# Patient Record
Sex: MALE | Race: WHITE | NOT HISPANIC OR LATINO | Employment: OTHER | URBAN - METROPOLITAN AREA
[De-identification: names, ages, dates, MRNs, and addresses within clinical notes are randomized per-mention and may not be internally consistent; named-entity substitution may affect disease eponyms.]

---

## 2018-03-05 LAB
ALT SERPL-CCNC: ABNORMAL U/L
AST SERPL-CCNC: ABNORMAL U/L
BUN BLD-MCNC: 24 MG/DL (ref 4–21)
CHOLEST SERPL-MSCNC: 164 MG/DL (ref 0–200)
CREAT SERPL-MCNC: ABNORMAL MG/DL
GLUCOSE 1H P 100 G GLC PO SERPL-MCNC: ABNORMAL MG/DL (ref ?–200)
GLUCOSE 2H P 100 G GLC PO SERPL-MCNC: ABNORMAL MG/DL (ref ?–140)
HBA1C MFR BLD: 6.6 %
HDLC SERPL-MCNC: 32 MG/DL
LDLC SERPL CALC-MCNC: 106 MG/DL (ref 0–160)
MICROALBUMIN URINE: ABNORMAL
MICROALBUMIN/CREATININE RATIO: ABNORMAL
PROTEIN/CREATININE RATIO: ABNORMAL
TRIGL SERPL-MCNC: 151 MG/DL

## 2018-06-05 ENCOUNTER — DOCUMENTATION ONLY (OUTPATIENT)
Dept: FAMILY MEDICINE | Facility: CLINIC | Age: 83
End: 2018-06-05

## 2018-06-05 NOTE — PROGRESS NOTES
Pre-Visit Chart Review  For Appointment Scheduled on 6-6-18    Health Maintenance Due   Topic Date Due    Eye Exam  03/25/1944    TETANUS VACCINE  03/25/1952    Zoster Vaccine  03/25/1994    Pneumococcal (65+) (2 of 2 - PCV13) 08/20/2014    Foot Exam  08/20/2014    Hemoglobin A1c  08/10/2015    Lipid Panel  02/10/2016

## 2018-06-06 ENCOUNTER — OFFICE VISIT (OUTPATIENT)
Dept: FAMILY MEDICINE | Facility: CLINIC | Age: 83
End: 2018-06-06
Payer: MEDICARE

## 2018-06-06 VITALS
SYSTOLIC BLOOD PRESSURE: 109 MMHG | DIASTOLIC BLOOD PRESSURE: 64 MMHG | WEIGHT: 231.5 LBS | BODY MASS INDEX: 36.34 KG/M2 | HEIGHT: 67 IN | HEART RATE: 57 BPM | TEMPERATURE: 98 F

## 2018-06-06 DIAGNOSIS — Z01.00 DIABETIC EYE EXAM: Primary | ICD-10-CM

## 2018-06-06 DIAGNOSIS — I10 HYPERTENSION, UNSPECIFIED TYPE: ICD-10-CM

## 2018-06-06 DIAGNOSIS — E66.9 OBESITY, UNSPECIFIED CLASSIFICATION, UNSPECIFIED OBESITY TYPE, UNSPECIFIED WHETHER SERIOUS COMORBIDITY PRESENT: ICD-10-CM

## 2018-06-06 DIAGNOSIS — E11.22 CONTROLLED TYPE 2 DIABETES MELLITUS WITH STAGE 3 CHRONIC KIDNEY DISEASE, WITHOUT LONG-TERM CURRENT USE OF INSULIN: ICD-10-CM

## 2018-06-06 DIAGNOSIS — N18.30 CHRONIC KIDNEY DISEASE, STAGE III (MODERATE): ICD-10-CM

## 2018-06-06 DIAGNOSIS — K76.0 FATTY LIVER: ICD-10-CM

## 2018-06-06 DIAGNOSIS — E78.5 HYPERLIPIDEMIA LDL GOAL <70: ICD-10-CM

## 2018-06-06 DIAGNOSIS — N18.30 CONTROLLED TYPE 2 DIABETES MELLITUS WITH STAGE 3 CHRONIC KIDNEY DISEASE, WITHOUT LONG-TERM CURRENT USE OF INSULIN: ICD-10-CM

## 2018-06-06 DIAGNOSIS — E11.9 DIABETIC EYE EXAM: Primary | ICD-10-CM

## 2018-06-06 DIAGNOSIS — Z12.5 PROSTATE CANCER SCREENING: ICD-10-CM

## 2018-06-06 DIAGNOSIS — M10.9 GOUT, UNSPECIFIED CAUSE, UNSPECIFIED CHRONICITY, UNSPECIFIED SITE: ICD-10-CM

## 2018-06-06 PROCEDURE — 99203 OFFICE O/P NEW LOW 30 MIN: CPT | Mod: S$PBB,,, | Performed by: FAMILY MEDICINE

## 2018-06-06 PROCEDURE — 99999 PR PBB SHADOW E&M-EST. PATIENT-LVL III: CPT | Mod: PBBFAC,,, | Performed by: FAMILY MEDICINE

## 2018-06-06 PROCEDURE — 99213 OFFICE O/P EST LOW 20 MIN: CPT | Mod: PBBFAC,PO | Performed by: FAMILY MEDICINE

## 2018-06-06 RX ORDER — CAPTOPRIL 50 MG/1
50 TABLET ORAL DAILY
COMMUNITY
Start: 2018-02-27 | End: 2018-12-13

## 2018-06-06 RX ORDER — ASPIRIN 81 MG/1
81 TABLET ORAL DAILY
COMMUNITY
End: 2019-03-25

## 2018-06-06 RX ORDER — TAMSULOSIN HYDROCHLORIDE 0.4 MG/1
0.4 CAPSULE ORAL DAILY
COMMUNITY
End: 2019-08-08

## 2018-06-06 RX ORDER — SITAGLIPTIN 100 MG/1
100 TABLET, FILM COATED ORAL DAILY
COMMUNITY
Start: 2018-02-27 | End: 2019-08-08

## 2018-06-06 RX ORDER — PREGABALIN 75 MG/1
75 CAPSULE ORAL 3 TIMES DAILY
COMMUNITY
Start: 2018-03-03 | End: 2019-08-08

## 2018-06-06 RX ORDER — PRAVASTATIN SODIUM 40 MG/1
40 TABLET ORAL DAILY
COMMUNITY
Start: 2018-02-27 | End: 2019-08-08

## 2018-06-06 RX ORDER — ALLOPURINOL 300 MG/1
300 TABLET ORAL
COMMUNITY
Start: 2018-02-27 | End: 2018-06-06

## 2018-06-06 RX ORDER — FEBUXOSTAT 40 MG/1
40 TABLET, FILM COATED ORAL DAILY
Qty: 90 TABLET | Refills: 3 | Status: SHIPPED | OUTPATIENT
Start: 2018-06-06 | End: 2018-09-11

## 2018-06-06 NOTE — PROGRESS NOTES
Subjective:       Patient ID: Siddharth Urias is a 84 y.o. male.    Chief Complaint: Establish Care    HPI  Review of Systems   Constitutional: Negative for fatigue and unexpected weight change.   Respiratory: Negative for chest tightness and shortness of breath.    Cardiovascular: Negative for chest pain, palpitations and leg swelling.   Gastrointestinal: Negative for abdominal pain.   Musculoskeletal: Negative for arthralgias.   Neurological: Negative for dizziness, syncope, light-headedness and headaches.       Patient Active Problem List   Diagnosis    Depressive disorder, not elsewhere classified    Unspecified venous (peripheral) insufficiency    Prostate cancer    HTN (hypertension)    Fatty liver    Irradiation cystitis    SANDRA (obstructive sleep apnea)    Hyperlipidemia LDL goal <70    Type 2 diabetes mellitus, controlled, with renal complications    Diabetic nephropathy    Chronic kidney disease, stage 3    Bilateral renal cysts    Obesity, unspecified    Body mass index 33.0-33.9, adult     Patient is here for an annual check up  Health maintenance reviewed.  Due for see list  No visits with results within 1 Month(s) from this visit.   Latest known visit with results is:   Lab Visit on 09/25/2014   Component Date Value Ref Range Status    PSA DIAGNOSTIC 09/25/2014 0.19  0.00 - 4.00 ng/mL Final   ]  Objective:      Physical Exam   Constitutional: He is oriented to person, place, and time. He appears well-developed and well-nourished.   Cardiovascular: Normal rate, regular rhythm and normal heart sounds.    Pulmonary/Chest: Effort normal and breath sounds normal.   Musculoskeletal: He exhibits no edema.   Neurological: He is alert and oriented to person, place, and time.   Skin: Skin is warm and dry.   Psychiatric: He has a normal mood and affect.   Nursing note and vitals reviewed.      Assessment:       1. Diabetic eye exam    2. Fatty liver    3. Hypertension, unspecified type    4.  "Hyperlipidemia LDL goal <70    5. Obesity, unspecified classification, unspecified obesity type, unspecified whether serious comorbidity present    6. Controlled type 2 diabetes mellitus with stage 3 chronic kidney disease, without long-term current use of insulin    7. Chronic kidney disease, stage 3    8. Gout, unspecified cause, unspecified chronicity, unspecified site    9. Prostate cancer screening        Plan:       1. Diabetic eye exam  Screen and treat as indicated:  - Ambulatory referral to Optometry    2. Fatty liver  Your liver enzymes are slightly elevated.  Avoid liver irritants like tylenol and alcohol, eat a low fat diet and work toward an ideal body weight to help lower liver inflammation.     3. Hypertension, unspecified type  Controlled on current medications.  Continue current medications.  D/c metoprolol    4. Hyperlipidemia LDL goal <70  Stable condition.  Continue current medications.  Will adjust based on lab findings or if condition changes.    - Lipid panel; Future    5. Obesity, unspecified classification, unspecified obesity type, unspecified whether serious comorbidity present  Counseled patient on his ideal body weight, health consequences of being obese and current recommendations including weekly exercise and a heart healthy diet.  Current BMI is:Estimated body mass index is 36.26 kg/m² as calculated from the following:    Height as of this encounter: 5' 7" (1.702 m).    Weight as of this encounter: 105 kg (231 lb 7.7 oz)..  Patient is aware that ideal BMI < 25 or Weight in (lb) to have BMI = 25: 159.3.        6. Controlled type 2 diabetes mellitus with stage 3 chronic kidney disease, without long-term current use of insulin  Stable condition.  Continue current medications.  Will adjust based on lab findings or if condition changes.    - CBC auto differential; Future  - Comprehensive metabolic panel; Future  - Hemoglobin A1c; Future    7. Chronic kidney disease, stage 3  Stable and " chronic.  Will continue to monitor q3-6 months and control chronic conditions as optimally as possible to preserve function.      8. Gout, unspecified cause, unspecified chronicity, unspecified site  Screen and treat as indicated:  D/c allopurinol and add uloric  - Uric acid; Future    9. Prostate cancer screening  Discussed benefits, risks and limitations of PSA testing and current USPSTF guidelines.  Patient expressed verbal understanding and wished to pursue screening.      Reeval 3 months or sooner prn  - PSA, Screening; Future

## 2018-06-29 ENCOUNTER — OFFICE VISIT (OUTPATIENT)
Dept: OPTOMETRY | Facility: CLINIC | Age: 83
End: 2018-06-29
Payer: MEDICARE

## 2018-06-29 DIAGNOSIS — Z96.1 PSEUDOPHAKIA: ICD-10-CM

## 2018-06-29 DIAGNOSIS — H52.7 REFRACTIVE ERROR: ICD-10-CM

## 2018-06-29 DIAGNOSIS — E11.9 DIABETES MELLITUS WITHOUT OPHTHALMIC MANIFESTATIONS: Primary | ICD-10-CM

## 2018-06-29 PROCEDURE — 99211 OFF/OP EST MAY X REQ PHY/QHP: CPT | Mod: PBBFAC,PO | Performed by: OPTOMETRIST

## 2018-06-29 PROCEDURE — 92004 COMPRE OPH EXAM NEW PT 1/>: CPT | Mod: S$PBB,,, | Performed by: OPTOMETRIST

## 2018-06-29 PROCEDURE — 99999 PR PBB SHADOW E&M-EST. PATIENT-LVL I: CPT | Mod: PBBFAC,,, | Performed by: OPTOMETRIST

## 2018-06-29 NOTE — LETTER
June 29, 2018      Martine Maxwell MD  [address]  [place] [address]           [facility]  [address] [place] [address] Drive [address]  [place] [address]  Phone: [phone]          Patient: Siddharth Urias   MR Number: [id number]   YOB: 1934   Date of Visit: 6/29/2018       Dear Dr. Martine Maxwell:    Thank you for referring Siddharth Urias to me for evaluation. Attached you will find relevant portions of my assessment and plan of care.    If you have questions, please do not hesitate to call me. I look forward to following Siddharth Urias along with you.    Sincerely,    Nestor Quesada, OD    Enclosure  CC:  No Recipients    If you would like to receive this communication electronically, please contact [email or web address] or [phone] to request more information on [employer] Link access.    For providers and/or their staff who would like to refer a patient to [facility], please contact us through our one-stop-shop provider referral line, Santos Flores, at [phone].    If you feel you have received this communication in error or would no longer like to receive these types of communications, please e-mail [email or web address]

## 2018-06-29 NOTE — PROGRESS NOTES
HPI     Presenting Complaint: Pt here today for yearly diabetic eye exam. DLE: 12   years    Hemoglobin A1C       Date                     Value               Ref Range             Status                05/08/2014               6.9 (H)             4.5 - 6.2 %           Final                 08/27/2013               7.3 (H)             4.0 - 6.2 %           Final                 01/31/2013               7.5 (H)             4.0 - 6.2 %           Final            ----------    Ophthalmic medication / drops: None    (-) headaches  (-) diplopia   (-) flashes / (-) floaters      Last edited by Murray Dutton on 6/29/2018  3:15 PM. (History)            Assessment /Plan     For exam results, see Encounter Report.    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 Discussed possible ocular affects of uncontrolled blood sugar with patient. Recommended continued strong blood sugar control and continued care with PCP. Monitor yearly.     S/p cataract extraction with good results. Patient happy with current specs, denies refraction. Return as needed for updated spec Rx.      RTC in 1 year for comprehensive eye exam, or sooner prn.

## 2018-09-06 ENCOUNTER — LAB VISIT (OUTPATIENT)
Dept: LAB | Facility: HOSPITAL | Age: 83
End: 2018-09-06
Attending: FAMILY MEDICINE
Payer: MEDICARE

## 2018-09-06 ENCOUNTER — PATIENT OUTREACH (OUTPATIENT)
Dept: ADMINISTRATIVE | Facility: HOSPITAL | Age: 83
End: 2018-09-06

## 2018-09-06 DIAGNOSIS — E11.22 CONTROLLED TYPE 2 DIABETES MELLITUS WITH STAGE 3 CHRONIC KIDNEY DISEASE, WITHOUT LONG-TERM CURRENT USE OF INSULIN: ICD-10-CM

## 2018-09-06 DIAGNOSIS — M10.9 GOUT, UNSPECIFIED CAUSE, UNSPECIFIED CHRONICITY, UNSPECIFIED SITE: ICD-10-CM

## 2018-09-06 DIAGNOSIS — N18.30 CONTROLLED TYPE 2 DIABETES MELLITUS WITH STAGE 3 CHRONIC KIDNEY DISEASE, WITHOUT LONG-TERM CURRENT USE OF INSULIN: ICD-10-CM

## 2018-09-06 DIAGNOSIS — Z12.5 PROSTATE CANCER SCREENING: ICD-10-CM

## 2018-09-06 DIAGNOSIS — E78.5 HYPERLIPIDEMIA LDL GOAL <70: ICD-10-CM

## 2018-09-06 DIAGNOSIS — E11.8 TYPE 2 DIABETES MELLITUS WITH COMPLICATION, WITHOUT LONG-TERM CURRENT USE OF INSULIN: Primary | ICD-10-CM

## 2018-09-06 LAB
ALBUMIN SERPL BCP-MCNC: 3.8 G/DL
ALP SERPL-CCNC: 59 U/L
ALT SERPL W/O P-5'-P-CCNC: 31 U/L
ANION GAP SERPL CALC-SCNC: 12 MMOL/L
AST SERPL-CCNC: 21 U/L
BASOPHILS # BLD AUTO: 0.07 K/UL
BASOPHILS NFR BLD: 0.8 %
BILIRUB SERPL-MCNC: 0.7 MG/DL
BUN SERPL-MCNC: 28 MG/DL
CALCIUM SERPL-MCNC: 9.6 MG/DL
CHLORIDE SERPL-SCNC: 107 MMOL/L
CHOLEST SERPL-MCNC: 177 MG/DL
CHOLEST/HDLC SERPL: 4.9 {RATIO}
CO2 SERPL-SCNC: 22 MMOL/L
COMPLEXED PSA SERPL-MCNC: 0.34 NG/ML
CREAT SERPL-MCNC: 1.6 MG/DL
DIFFERENTIAL METHOD: ABNORMAL
EOSINOPHIL # BLD AUTO: 0.3 K/UL
EOSINOPHIL NFR BLD: 2.9 %
ERYTHROCYTE [DISTWIDTH] IN BLOOD BY AUTOMATED COUNT: 16.3 %
EST. GFR  (AFRICAN AMERICAN): 45.1 ML/MIN/1.73 M^2
EST. GFR  (NON AFRICAN AMERICAN): 39 ML/MIN/1.73 M^2
ESTIMATED AVG GLUCOSE: 151 MG/DL
GLUCOSE SERPL-MCNC: 163 MG/DL
HBA1C MFR BLD HPLC: 6.9 %
HCT VFR BLD AUTO: 55.4 %
HDLC SERPL-MCNC: 36 MG/DL
HDLC SERPL: 20.3 %
HGB BLD-MCNC: 16.7 G/DL
IMM GRANULOCYTES # BLD AUTO: 0.05 K/UL
IMM GRANULOCYTES NFR BLD AUTO: 0.6 %
LDLC SERPL CALC-MCNC: 109.6 MG/DL
LYMPHOCYTES # BLD AUTO: 1.7 K/UL
LYMPHOCYTES NFR BLD: 18.7 %
MCH RBC QN AUTO: 25.6 PG
MCHC RBC AUTO-ENTMCNC: 30.1 G/DL
MCV RBC AUTO: 85 FL
MONOCYTES # BLD AUTO: 0.8 K/UL
MONOCYTES NFR BLD: 9.3 %
NEUTROPHILS # BLD AUTO: 6.2 K/UL
NEUTROPHILS NFR BLD: 67.7 %
NONHDLC SERPL-MCNC: 141 MG/DL
NRBC BLD-RTO: 0 /100 WBC
PLATELET # BLD AUTO: 181 K/UL
PMV BLD AUTO: 11.5 FL
POTASSIUM SERPL-SCNC: 4.2 MMOL/L
PROT SERPL-MCNC: 8.2 G/DL
RBC # BLD AUTO: 6.52 M/UL
SODIUM SERPL-SCNC: 141 MMOL/L
TRIGL SERPL-MCNC: 157 MG/DL
URATE SERPL-MCNC: 10.7 MG/DL
WBC # BLD AUTO: 9.07 K/UL

## 2018-09-06 PROCEDURE — 80053 COMPREHEN METABOLIC PANEL: CPT

## 2018-09-06 PROCEDURE — 83036 HEMOGLOBIN GLYCOSYLATED A1C: CPT

## 2018-09-06 PROCEDURE — 80061 LIPID PANEL: CPT

## 2018-09-06 PROCEDURE — 85025 COMPLETE CBC W/AUTO DIFF WBC: CPT

## 2018-09-06 PROCEDURE — 36415 COLL VENOUS BLD VENIPUNCTURE: CPT | Mod: PO

## 2018-09-06 PROCEDURE — 84550 ASSAY OF BLOOD/URIC ACID: CPT

## 2018-09-06 PROCEDURE — 84153 ASSAY OF PSA TOTAL: CPT

## 2018-09-11 ENCOUNTER — OFFICE VISIT (OUTPATIENT)
Dept: FAMILY MEDICINE | Facility: CLINIC | Age: 83
End: 2018-09-11
Payer: MEDICARE

## 2018-09-11 VITALS
HEIGHT: 67 IN | BODY MASS INDEX: 35.64 KG/M2 | TEMPERATURE: 98 F | DIASTOLIC BLOOD PRESSURE: 71 MMHG | SYSTOLIC BLOOD PRESSURE: 106 MMHG | HEART RATE: 86 BPM | RESPIRATION RATE: 16 BRPM | OXYGEN SATURATION: 95 % | WEIGHT: 227.06 LBS

## 2018-09-11 DIAGNOSIS — Z23 FLU VACCINE NEED: ICD-10-CM

## 2018-09-11 DIAGNOSIS — N18.30 CHRONIC KIDNEY DISEASE, STAGE III (MODERATE): ICD-10-CM

## 2018-09-11 DIAGNOSIS — E78.5 HYPERLIPIDEMIA LDL GOAL <70: ICD-10-CM

## 2018-09-11 DIAGNOSIS — E11.22 CONTROLLED TYPE 2 DIABETES MELLITUS WITH STAGE 3 CHRONIC KIDNEY DISEASE, WITHOUT LONG-TERM CURRENT USE OF INSULIN: ICD-10-CM

## 2018-09-11 DIAGNOSIS — I10 HYPERTENSION, UNSPECIFIED TYPE: ICD-10-CM

## 2018-09-11 DIAGNOSIS — N18.30 CONTROLLED TYPE 2 DIABETES MELLITUS WITH STAGE 3 CHRONIC KIDNEY DISEASE, WITHOUT LONG-TERM CURRENT USE OF INSULIN: ICD-10-CM

## 2018-09-11 DIAGNOSIS — K76.0 FATTY LIVER: ICD-10-CM

## 2018-09-11 DIAGNOSIS — Z23 NEED FOR VACCINATION WITH 13-POLYVALENT PNEUMOCOCCAL CONJUGATE VACCINE: ICD-10-CM

## 2018-09-11 DIAGNOSIS — M10.9 GOUT, UNSPECIFIED CAUSE, UNSPECIFIED CHRONICITY, UNSPECIFIED SITE: ICD-10-CM

## 2018-09-11 DIAGNOSIS — K58.0 IRRITABLE BOWEL SYNDROME WITH DIARRHEA: Primary | ICD-10-CM

## 2018-09-11 DIAGNOSIS — K58.0 IRRITABLE BOWEL SYNDROME WITH DIARRHEA: ICD-10-CM

## 2018-09-11 PROCEDURE — 90662 IIV NO PRSV INCREASED AG IM: CPT | Mod: PBBFAC,PO

## 2018-09-11 PROCEDURE — 99214 OFFICE O/P EST MOD 30 MIN: CPT | Mod: S$PBB,,, | Performed by: FAMILY MEDICINE

## 2018-09-11 PROCEDURE — G0009 ADMIN PNEUMOCOCCAL VACCINE: HCPCS | Mod: PBBFAC,PO

## 2018-09-11 PROCEDURE — 99214 OFFICE O/P EST MOD 30 MIN: CPT | Mod: PBBFAC,PO | Performed by: FAMILY MEDICINE

## 2018-09-11 PROCEDURE — 99999 PR PBB SHADOW E&M-EST. PATIENT-LVL IV: CPT | Mod: PBBFAC,,, | Performed by: FAMILY MEDICINE

## 2018-09-11 RX ORDER — ALLOPURINOL 300 MG/1
300 TABLET ORAL DAILY
COMMUNITY
End: 2018-09-11 | Stop reason: SDUPTHER

## 2018-09-11 RX ORDER — CHLORDIAZEPOXIDE HYDROCHLORIDE AND CLIDINIUM BROMIDE 5; 2.5 MG/1; MG/1
1 CAPSULE ORAL
Qty: 30 CAPSULE | Status: SHIPPED | OUTPATIENT
Start: 2018-09-11 | End: 2018-09-11 | Stop reason: SDUPTHER

## 2018-09-11 RX ORDER — ALLOPURINOL 300 MG/1
300 TABLET ORAL DAILY
Qty: 90 TABLET | Refills: 3 | Status: SHIPPED | OUTPATIENT
Start: 2018-09-11 | End: 2019-08-08

## 2018-09-11 RX ORDER — DULOXETIN HYDROCHLORIDE 30 MG/1
30 CAPSULE, DELAYED RELEASE ORAL DAILY
Qty: 30 CAPSULE | Refills: 2 | Status: SHIPPED | OUTPATIENT
Start: 2018-09-11 | End: 2018-12-13

## 2018-09-11 RX ORDER — CHLORDIAZEPOXIDE HYDROCHLORIDE AND CLIDINIUM BROMIDE 5; 2.5 MG/1; MG/1
1 CAPSULE ORAL
Qty: 30 CAPSULE | Status: SHIPPED | OUTPATIENT
Start: 2018-09-11 | End: 2018-09-12 | Stop reason: SDUPTHER

## 2018-09-11 RX ORDER — ALLOPURINOL 300 MG/1
300 TABLET ORAL DAILY
Qty: 90 TABLET | Refills: 3 | Status: SHIPPED | OUTPATIENT
Start: 2018-09-11 | End: 2018-09-11 | Stop reason: SDUPTHER

## 2018-09-11 NOTE — PATIENT INSTRUCTIONS
Established High Blood Pressure    High blood pressure (hypertension) is a chronic disease. Often, healthcare providers dont know what causes it. But it can be caused by certain health conditions and medicines.  If you have high blood pressure, you may not have any symptoms. If you do have symptoms, they may include headache, dizziness, changes in your vision, chest pain, and shortness of breath. But even without symptoms, high blood pressure thats not treated raises your risk for heart attack and stroke. High blood pressure is a serious health risk and shouldnt be ignored.  A blood pressure reading is made up of two numbers: a higher number over a lower number. The top number is the systolic pressure. The bottom number is the diastolic pressure. A normal blood pressure is a systolic pressure of  less than 120 over a diastolic pressure of less than 80. You will see your blood pressure readings written together. For example, a person with a systolic pressure of 188 and a diastolic pressure of 78 will have 118/78 written in the medical record.  High blood pressure is when either the top number is 140 or higher, or the bottom number is 90 or higher. This must be the result when taking your blood pressure a number of times. The blood pressures between normal and high are called prehypertension.  Home care  If you have high blood pressure, you should do what is listed below to lower your blood pressure. If you are taking medicines for high blood pressure, these methods may reduce or end your need for medicines in the future.  · Begin a weight-loss program if you are overweight.  · Cut back on how much salt you get in your diet. Heres how to do this:  ¨ Dont eat foods that have a lot of salt. These include olives, pickles, smoked meats, and salted potato chips.  ¨ Dont add salt to your food at the table.  ¨ Use only small amounts of salt when cooking.  · Start an exercise program. Talk with your healthcare  provider about the type of exercise program that would be best for you. It doesn't have to be hard. Even brisk walking for 20 minutes 3 times a week is a good form of exercise.  · Dont take medicines that stimulate the heart. This includes many over-the-counter cold and sinus decongestant pills and sprays, as well as diet pills. Check the warnings about hypertension on the label. Before buying any over-the-counter medicines or supplements, always ask the pharmacist about the product's potential interaction with your high blood pressure and your high blood pressure medicines.  · Stimulants such as amphetamine or cocaine could be deadly for someone with high blood pressure. Never take these.  · Limit how much caffeine you get in your diet. Switch to caffeine-free products.  · Stop smoking. If you are a long-time smoker, this can be hard. Talk to your healthcare provider about medicines and nicotine replacement options to help you. Also, enroll in a stop-smoking program to make it more likely that you will quit for good.  · Learn how to handle stress. This is an important part of any program to lower blood pressure. Learn about relaxation methods like meditation, yoga, or biofeedback.  · If your provider prescribed medicines, take them exactly as directed. Missing doses may cause your blood pressure get out of control.  · If you miss a dose or doses, check with your healthcare provider or pharmacist about what to do.  · Consider buying an automatic blood pressure machine. Ask your provider for a recommendation. You can get one of these at most pharmacies.     The American Heart Association recommends the following guidelines for home blood pressure monitoring:  · Don't smoke or drink coffee for 30 minutes before taking your blood pressure.  · Go to the bathroom before the test.  · Relax for 5 minutes before taking the measurement.  · Sit with your back supported (don't sit on a couch or soft chair); keep your feet on  the floor uncrossed. Place your arm on a solid flat surface (like a table) with the upper part of the arm at heart level. Place the middle of the cuff directly above the eye of the elbow. Check the monitor's instruction manual for an illustration.  · Take multiple readings. When you measure, take 2 to 3 readings one minute apart and record all of the results.  · Take your blood pressure at the same time every day, or as your healthcare provider recommends.  · Record the date, time, and blood pressure reading.  · Take the record with you to your next medical appointment. If your blood pressure monitor has a built-in memory, simply take the monitor with you to your next appointment.  · Call your provider if you have several high readings. Don't be frightened by a single high blood pressure reading, but if you get several high readings, check in with your healthcare provider.  · Note: When blood pressure reaches a systolic (top number) of 180 or higher OR diastolic (bottom number) of 110 or higher, seek emergency medical treatment.  Follow-up care  You will need to see your healthcare provider regularly. This is to check your blood pressure and to make changes to your medicines. Make a follow-up appointment as directed. Bring the record of your home blood pressure readings to the appointment.  When to seek medical advice  Call your healthcare provider right away if any of these occur:  · Blood pressure reaches a systolic (upper number) of 180 or higher OR a diastolic (bottom number) of 110 or higher  · Chest pain or shortness of breath  · Severe headache  · Throbbing or rushing sound in the ears  · Nosebleed  · Sudden severe pain in your belly (abdomen)  · Extreme drowsiness, confusion, or fainting  · Dizziness or spinning sensation (vertigo)  · Weakness of an arm or leg or one side of the face  · You have problems speaking or seeing   Date Last Reviewed: 12/1/2016  © 0360-1998 [employer]. [address] [place] [address]  [place] [address]. All rights reserved. This information is not intended as a substitute for professional medical care. Always follow your healthcare professional's instructions.            Walking for Fitness  Fitness walking has something for everyone, even people who are already fit. Walking is one of the safest ways to condition your body aerobically. It can boost energy, help you lose weight, and reduce stress.    Physical benefits  · Walking strengthens your heart and lungs, and tones your muscles.  · When walking, your feet land with less impact than in other sports. This reduces chances of muscle, bone, and joint injury.  · Regular walking improves your cholesterol levels and lowers your risk of heart disease. And it helps you control your blood sugar if you have diabetes.  · Walking is a weight-bearing activity, which helps maintain bone density. This can help prevent osteoporosis.  Personal rewards  · Taking walks can help you relax and manage stress. And fitness walking may make you feel better about yourself.  · Walking can help you sleep better at night and make you less likely to be depressed.  · Regular walking may help maintain your memory as you get older.  · Walking is a great way to spend extra time with friends and family members. Be sure to invite your dog along!  Q&A about fitness walking  Q: Will walking keep me fit?  A: Yes. Regular walking at the right pace gives you all the benefits of other aerobic activities, such as jogging and swimming.  Q: Will walking help me lose weight and keep it off?  A: Yes. Per mile, walking can burn as many calories as jogging. Your health care provider can help work walking into your weight-loss plan.  Q: Is walking safe for my health?  A: Yes. Walking is safe if you have high blood pressure, diabetes, heart disease, or other conditions. Talk to your healthcare provider before you start.  Date Last Reviewed: 4/1/2017  © 2831-9954 [employer]. [address] [place] [address], [place] [address]. All rights reserved. This information is not intended as a substitute for professional medical care. Always follow your healthcare professional's instructions.            Diabetes (General Information)  Diabetes is a long-term health problem. It means your body does not make enough insulin. Or it may mean that your body cannot use the insulin it makes. Insulin is a hormone in your body. It lets blood sugar (glucose) reach the cells in your body. All of your cells need glucose for fuel.  When you have diabetes, the glucose in your blood builds up because it cannot get into the cells. This buildup is called high blood sugar (hyperglycemia).  Your blood sugar level depends on several things. It depends on what kind of food you eat and how much of it you eat. It also depends on how much exercise you get, and how much insulin you have in your body. Eating too much of the wrong kinds of food or not taking diabetes medicine on time can cause high blood sugar. Infections can cause high blood sugar even if you are taking medicines correctly.  These things can also cause low blood sugar:  · Missing meals  · Not eating enough food  · Taking too much diabetes medicine  Diabetes can cause serious problems over time if you do not get treated. These problems include heart disease, stroke, kidney failure, and blindness. They also include nerve pain or loss of feeling in your legs and feet, and gangrene of the feet. By keeping your blood sugar under control you can prevent or delay these problems.  Normal blood sugar levels are 80 to 100 before a meal and less than 180 in the 1 to 2 hours after a meal.  Home care  Follow these guidelines when caring for yourself at home:  · Follow the diet your healthcare provider gives you. Take insulin or other diabetes medicine exactly as told to.  · Watch your blood sugar as you are told to. Keep a log of your results. This will help your  provider change your medicines to keep your blood sugar under control.  · Try to reach your ideal weight. You may be able to cut back on or not have to take diabetes medicine if you eat the right foods and get exercise.  · Do not smoke. Smoking worsens the effects of diabetes on your circulation. You are much more likely to have a heart attack if you have diabetes and you smoke.  · Take good care of your feet. If you have lost feeling in your feet, you may not see an injury or infection. Check your feet and between your toes at least once a week.  · Wear a medical alert bracelet or necklace, or carry a card in your wallet that says you have diabetes. This will help healthcare providers give you the right care if you get very ill and cannot tell them that you have diabetes.  Sick day plan  If you get a cold, the flu, or a bacterial or viral infection, take these steps:  · Look at your diabetes sick plan and call your healthcare provider as you were told to. You may need to call your provider right away if:  ¨ Your blood sugar is above 240 while taking your diabetes medicine  ¨ Your urine ketone levels are above normal or high  ¨ You have been vomiting more than 6 hours  ¨ You have trouble breathing or your breath ha s a fruity smell  ¨ You have a high fever  ¨ You have a fever for several days and you are not getting better  ¨ You get light-headed and are sleepier than usual  · Keep taking your diabetes pills (oral medicine) even if you have been vomiting and are feeling sick. Call your provider right away because you may need insulin to lower your blood sugar until you recover from your illness.  · Keep taking your insulin even if you have been vomiting and are feeling sick. Call your provider right away to ask if you need to change your insulin dose. This will depend on your blood sugar results.  · Check your blood sugar every 2 to 4 hours, or at least 4 times a day.  · Check your ketones often. If you are vomiting  and having diarrhea, watch them more often.  · Do not skip meals. Try to eat small meals on a regular schedule. Do this even if you do not feel like eating.  · Drink water or other liquids that do not have caffeine or calories. This will keep you from getting dehydrated. If you are nauseated or vomiting, takes small sips every 5 minutes. To prevent dehydration try to drink a cup (8 ounces) of fluids every hour while you are awake.  General care  Always bring a source of fast-acting sugar with you in case you have symptoms of low blood sugar (below 70). At the first sign of low blood sugar, eat or drink 15 to 20 grams of fast-acting sugar to raise your blood sugar. Examples are:  · 3 to 4 glucose tablets. You can buy these at most drugstores.  · 4 ounces (1/2 cup) of regular (not diet) soft drinks  · 4 ounces (1/2 cup) of any fruit juice  · 8 ounces (1 cup) of milk  · 5 to 6 pieces of hard candy  · 1 tablespoon of honey  Check your blood sugar 15 minutes after treating yourself. If it is still below 70, take 15 to 20 more grams of fast-acting sugar. Test again in 15 minutes. If it returns to normal (70 or above), eat a snack or meal to keep your blood sugar in a safe range. If it stays low, call your doctor or go to an emergency room.  Follow-up care  Follow-up with your healthcare provider, or as advised. For more information about diabetes, visit the American Diabetes Association website at www.diabetes.org or call 341-406-3800.  When to seek medical advice  Call your healthcare provider right away if you have any of these symptoms of high blood sugar:  · Frequent urination  · Dizziness  · Drowsiness  · Thirst  · Headache  · Nausea or vomiting  · Abdominal pain  · Eyesight changes  · Fast breathing  · Confusion or loss of consciousness  Also call your provider right away if you have any of these signs of low blood sugar:  · Fatigue  · Headache  · Shakes  · Excess sweating  · Hunger  · Feeling anxious or  restless  · Eyesight changes  · Drowsiness  · Weakness  · Confusion or loss of consciousness  Call 911  Call for emergency help right away if any of these occur:  · Chest pain or shortness of breath  · Dizziness or fainting  · Weakness of an arm or leg or one side of the face  · Trouble speaking or seeing   Date Last Reviewed: 6/1/2016 © 2000-2017 [employer]. [address] [place] [address], [place] [address]. All rights reserved. This information is not intended as a substitute for professional medical care. Always follow your healthcare professional's instructions.            Weight Management: Getting Started  Healthy bodies come in all shapes and sizes. Not all bodies are made to be thin. For some people, a healthy weight is higher than the average weight listed on weight charts. Your healthcare provider can help you decide on a healthy weight for you.    Reasons to lose weight  Losing weight can help with some health problems, such as high blood pressure, heart disease, diabetes, sleep apnea, and arthritis. You may also feel more energy.  Set your long-term goal  Your goal doesn't even have to be a specific weight. You may decide on a fitness goal (such as being able to walk 10 miles a week), or a health goal (such as lowering your blood pressure). Choose a goal that is measurable and reasonable, so you know when you've reached it. A goal of reaching a BMI of less than 25 is not always reasonable (or possible).   Make an action plan  Habits dont change overnight. Setting your goals too high can leave you feeling discouraged if you cant reach them. Be realistic. Choose one or two small changes you can make now. Set an action plan for how you are going to make these changes. When you can stick to this plan, keep making a few more small changes. Taking small steps will help you stay on the path to success.  Track your progress  Write down your goals. Then, keep a daily record of your progress. Write down  what you eat and how active you are. This record lets you look back on how much youve done. It may also help when youre feeling frustrated. Reward yourself for success. Even if you dont reach every goal, give yourself credit for what you do get done.  Get support  Encouragement from others can help make losing weight easier. Ask your family members and friends for support. They may even want to join you. Also look to your healthcare provider, registered dietitian, and  for help. Your local hospital can give you more information about nutrition, exercise, and weight loss.  Date Last Reviewed: 1/31/2016  © 6536-1875 [employer]. [address] [place] [address], [place] [address]. All rights reserved. This information is not intended as a substitute for professional medical care. Always follow your healthcare professional's instructions.

## 2018-09-12 DIAGNOSIS — K58.0 IRRITABLE BOWEL SYNDROME WITH DIARRHEA: ICD-10-CM

## 2018-09-14 RX ORDER — CHLORDIAZEPOXIDE HYDROCHLORIDE AND CLIDINIUM BROMIDE 5; 2.5 MG/1; MG/1
1 CAPSULE ORAL
Qty: 30 CAPSULE | Refills: 2 | Status: SHIPPED | OUTPATIENT
Start: 2018-09-14 | End: 2018-10-11

## 2018-09-23 NOTE — PROGRESS NOTES
Subjective:       Patient ID: Siddharth Urias is a 84 y.o. male.    Chief Complaint: Follow-up (3mth f/u)    HPI  Review of Systems   Constitutional: Negative for fatigue and unexpected weight change.   Respiratory: Negative for chest tightness and shortness of breath.    Cardiovascular: Negative for chest pain, palpitations and leg swelling.   Gastrointestinal: Negative for abdominal pain.   Musculoskeletal: Negative for arthralgias.   Neurological: Negative for dizziness, syncope, light-headedness and headaches.       Patient Active Problem List   Diagnosis    Depressive disorder, not elsewhere classified    Unspecified venous (peripheral) insufficiency    Prostate cancer    HTN (hypertension)    Fatty liver    Irradiation cystitis    SANDRA (obstructive sleep apnea)    Hyperlipidemia LDL goal <70    Type 2 diabetes mellitus, controlled, with renal complications    Diabetic nephropathy    Chronic kidney disease, stage 3    Bilateral renal cysts    Obesity, unspecified    Body mass index 33.0-33.9, adult     Patient is here for a chronic conditions follow up.    Patient Outreach on 09/06/2018   Component Date Value Ref Range Status    Hemoglobin A1C 03/05/2018 6.6  % Final    Cholesterol 03/05/2018 164  0 - 200 mg/dL Final    HDL 03/05/2018 32  mg/dL Final    LDL Calculated 03/05/2018 106  0 - 160 mg/dL Final    Triglycerides 03/05/2018 151  mg/dL Final    BUN 03/05/2018 24* 4 - 21 mg/dL Final   Lab Visit on 09/06/2018   Component Date Value Ref Range Status    WBC 09/06/2018 9.07  3.90 - 12.70 K/uL Final    RBC 09/06/2018 6.52* 4.60 - 6.20 M/uL Final    Hemoglobin 09/06/2018 16.7  14.0 - 18.0 g/dL Final    Hematocrit 09/06/2018 55.4* 40.0 - 54.0 % Final    MCV 09/06/2018 85  82 - 98 fL Final    MCH 09/06/2018 25.6* 27.0 - 31.0 pg Final    MCHC 09/06/2018 30.1* 32.0 - 36.0 g/dL Final    RDW 09/06/2018 16.3* 11.5 - 14.5 % Final    Platelets 09/06/2018 181  150 - 350 K/uL Final    MPV  09/06/2018 11.5  9.2 - 12.9 fL Final    Immature Granulocytes 09/06/2018 0.6* 0.0 - 0.5 % Final    Gran # (ANC) 09/06/2018 6.2  1.8 - 7.7 K/uL Final    Immature Grans (Abs) 09/06/2018 0.05* 0.00 - 0.04 K/uL Final    Lymph # 09/06/2018 1.7  1.0 - 4.8 K/uL Final    Mono # 09/06/2018 0.8  0.3 - 1.0 K/uL Final    Eos # 09/06/2018 0.3  0.0 - 0.5 K/uL Final    Baso # 09/06/2018 0.07  0.00 - 0.20 K/uL Final    nRBC 09/06/2018 0  0 /100 WBC Final    Gran% 09/06/2018 67.7  38.0 - 73.0 % Final    Lymph% 09/06/2018 18.7  18.0 - 48.0 % Final    Mono% 09/06/2018 9.3  4.0 - 15.0 % Final    Eosinophil% 09/06/2018 2.9  0.0 - 8.0 % Final    Basophil% 09/06/2018 0.8  0.0 - 1.9 % Final    Differential Method 09/06/2018 Automated   Final    Sodium 09/06/2018 141  136 - 145 mmol/L Final    Potassium 09/06/2018 4.2  3.5 - 5.1 mmol/L Final    Chloride 09/06/2018 107  95 - 110 mmol/L Final    CO2 09/06/2018 22* 23 - 29 mmol/L Final    Glucose 09/06/2018 163* 70 - 110 mg/dL Final    BUN, Bld 09/06/2018 28* 8 - 23 mg/dL Final    Creatinine 09/06/2018 1.6* 0.5 - 1.4 mg/dL Final    Calcium 09/06/2018 9.6  8.7 - 10.5 mg/dL Final    Total Protein 09/06/2018 8.2  6.0 - 8.4 g/dL Final    Albumin 09/06/2018 3.8  3.5 - 5.2 g/dL Final    Total Bilirubin 09/06/2018 0.7  0.1 - 1.0 mg/dL Final    Alkaline Phosphatase 09/06/2018 59  55 - 135 U/L Final    AST 09/06/2018 21  10 - 40 U/L Final    ALT 09/06/2018 31  10 - 44 U/L Final    Anion Gap 09/06/2018 12  8 - 16 mmol/L Final    eGFR if  09/06/2018 45.1* >60 mL/min/1.73 m^2 Final    eGFR if non African American 09/06/2018 39.0* >60 mL/min/1.73 m^2 Final    Hemoglobin A1C 09/06/2018 6.9* 4.0 - 5.6 % Final    Estimated Avg Glucose 09/06/2018 151* 68 - 131 mg/dL Final    Cholesterol 09/06/2018 177  120 - 199 mg/dL Final    Triglycerides 09/06/2018 157* 30 - 150 mg/dL Final    HDL 09/06/2018 36* 40 - 75 mg/dL Final    LDL Cholesterol 09/06/2018 109.6   63.0 - 159.0 mg/dL Final    HDL/Chol Ratio 09/06/2018 20.3  20.0 - 50.0 % Final    Total Cholesterol/HDL Ratio 09/06/2018 4.9  2.0 - 5.0 Final    Non-HDL Cholesterol 09/06/2018 141  mg/dL Final    PSA, SCREEN 09/06/2018 0.34  0.00 - 4.00 ng/mL Final    Uric Acid 09/06/2018 10.7* 3.4 - 7.0 mg/dL Final   }  Objective:      Physical Exam   Constitutional: He is oriented to person, place, and time. He appears well-developed and well-nourished.   Cardiovascular: Normal rate, regular rhythm and normal heart sounds.   Pulmonary/Chest: Effort normal and breath sounds normal.   Musculoskeletal: He exhibits no edema.   Neurological: He is alert and oriented to person, place, and time.   Skin: Skin is warm and dry.   Psychiatric: He has a normal mood and affect.   Nursing note and vitals reviewed.      Assessment:       1. Irritable bowel syndrome with diarrhea    2. Gout, unspecified cause, unspecified chronicity, unspecified site    3. Need for vaccination with 13-polyvalent pneumococcal conjugate vaccine    4. Flu vaccine need    5. Controlled type 2 diabetes mellitus with stage 3 chronic kidney disease, without long-term current use of insulin    6. Hyperlipidemia LDL goal <70    7. Hypertension, unspecified type    8. Chronic kidney disease, stage 3    9. Fatty liver        Plan:       1. Irritable bowel syndrome with diarrhea  Counseled patient on IBS diagnosis, treatment options including probiotics, fiber supplements, anti-spasmodics, anti depressants and antibiotics.  Discussed side effects, risks and benefits of each.  Printed materials were given and FODMAPS diet was discussed. Patient elected to proceed       2. Gout, unspecified cause, unspecified chronicity, unspecified site  Treat  - allopurinol (ZYLOPRIM) 300 MG tablet; Take 1 tablet (300 mg total) by mouth once daily.  Dispense: 90 tablet; Refill: 3  - Uric acid; Future    3. Need for vaccination with 13-polyvalent pneumococcal conjugate vaccine  Immunize  today.  Counseled patient on risks, benefits and side effects.  Patient elected to proceed with vaccination.    - (In Office Administered) Pneumococcal Conjugate Vaccine (13 Valent) (IM)    4. Flu vaccine need  Immunize today.  Counseled patient on risks, benefits and side effects.  Patient elected to proceed with vaccination.    - Influenza - High Dose (65+) (PF) (IM)    5. Controlled type 2 diabetes mellitus with stage 3 chronic kidney disease, without long-term current use of insulin  Stable condition.  Continue current medications.  Will adjust based on lab findings or if condition changes.    - CBC auto differential; Future  - Comprehensive metabolic panel; Future  - Hemoglobin A1c; Future    6. Hyperlipidemia LDL goal <70  Stable condition.  Continue current medications.  Will adjust based on lab findings or if condition changes.    - Lipid panel; Future    7. Hypertension, unspecified type  Controlled on current medications.  Continue current medications.      8. Chronic kidney disease, stage 3  Stable and chronic.  Will continue to monitor q3-6 months and control chronic conditions as optimally as possible to preserve function.      9. Fatty liver  Your liver enzymes are slightly elevated.  Avoid liver irritants like tylenol and alcohol, eat a low fat diet and work toward an ideal body weight to help lower liver inflammation.         Time spent with patient: 20 minutes    Patient with be reevaluated in 1 month with [facility]  6 months or sooner prn    Greater than 50% of this visit was spent counseling as described in above documentation:Yes

## 2018-10-11 ENCOUNTER — OFFICE VISIT (OUTPATIENT)
Dept: FAMILY MEDICINE | Facility: CLINIC | Age: 83
End: 2018-10-11
Payer: MEDICARE

## 2018-10-11 VITALS
BODY MASS INDEX: 35.26 KG/M2 | SYSTOLIC BLOOD PRESSURE: 127 MMHG | DIASTOLIC BLOOD PRESSURE: 81 MMHG | WEIGHT: 224.63 LBS | HEIGHT: 67 IN | HEART RATE: 82 BPM | TEMPERATURE: 98 F

## 2018-10-11 DIAGNOSIS — N18.30 CONTROLLED TYPE 2 DIABETES MELLITUS WITH STAGE 3 CHRONIC KIDNEY DISEASE, WITHOUT LONG-TERM CURRENT USE OF INSULIN: ICD-10-CM

## 2018-10-11 DIAGNOSIS — M79.672 PAIN IN BOTH FEET: ICD-10-CM

## 2018-10-11 DIAGNOSIS — E66.01 SEVERE OBESITY (BMI 35.0-39.9) WITH COMORBIDITY: ICD-10-CM

## 2018-10-11 DIAGNOSIS — E11.22 CONTROLLED TYPE 2 DIABETES MELLITUS WITH STAGE 3 CHRONIC KIDNEY DISEASE, WITHOUT LONG-TERM CURRENT USE OF INSULIN: ICD-10-CM

## 2018-10-11 DIAGNOSIS — I10 HYPERTENSION, UNSPECIFIED TYPE: ICD-10-CM

## 2018-10-11 DIAGNOSIS — M79.671 PAIN IN BOTH FEET: ICD-10-CM

## 2018-10-11 DIAGNOSIS — K58.0 IRRITABLE BOWEL SYNDROME WITH DIARRHEA: Primary | ICD-10-CM

## 2018-10-11 PROCEDURE — 99214 OFFICE O/P EST MOD 30 MIN: CPT | Mod: S$PBB,,, | Performed by: NURSE PRACTITIONER

## 2018-10-11 PROCEDURE — 99999 PR PBB SHADOW E&M-EST. PATIENT-LVL V: CPT | Mod: PBBFAC,,, | Performed by: NURSE PRACTITIONER

## 2018-10-11 PROCEDURE — 99215 OFFICE O/P EST HI 40 MIN: CPT | Mod: PBBFAC,PO | Performed by: NURSE PRACTITIONER

## 2018-10-11 RX ORDER — LOPERAMIDE HYDROCHLORIDE 2 MG/1
2 CAPSULE ORAL 4 TIMES DAILY PRN
Refills: 0
Start: 2018-10-11 | End: 2018-10-21

## 2018-10-11 NOTE — PROGRESS NOTES
Subjective:       Patient ID: Siddharth Urias is a 84 y.o. male.    Chief Complaint: Follow-up (1 month f/u IBS )    Mr. Urias presents to the clinic today for follow up for IBS.  He did not try the Librax which was prescribed at last visit because he was scared of side effects when he read the information given to him by the pharmacist.  He complains of uncontrollable diarrhea whenever he eats a very large meal or greasy meal.  No problem with lactose.  No cramping or pain prior to BM.  No constipation.  Has not changed his diet.  He complains of stiffness and numbness in his feet.  He was seeing Podiatry at  and need to establish within [facility] system.      Review of Systems   Constitutional: Negative for chills and fever.   Respiratory: Negative for cough and shortness of breath.    Gastrointestinal: Positive for diarrhea. Negative for abdominal pain, constipation, nausea and vomiting.       Objective:      Physical Exam   Constitutional: He is oriented to person, place, and time. He appears well-developed and well-nourished. No distress.   HENT:   Head: Normocephalic and atraumatic.   Right Ear: External ear normal.   Left Ear: External ear normal.   Mouth/Throat: Oropharynx is clear and moist. No oropharyngeal exudate.   Eyes: Pupils are equal, round, and reactive to light. Right eye exhibits no discharge. Left eye exhibits no discharge.   Neck: Neck supple. No thyromegaly present.   Cardiovascular: Normal rate and regular rhythm. Exam reveals no gallop and no friction rub.   No murmur heard.  Pulmonary/Chest: Effort normal and breath sounds normal. No respiratory distress. He has no wheezes. He has no rales.   Abdominal: Soft. He exhibits no distension. There is no tenderness.   Obese abdomen limits exam   Musculoskeletal:        Right foot: There is decreased range of motion and deformity (hammer toes).        Left foot: There is decreased range of motion and deformity (hammer toes).    Lymphadenopathy:     He has no cervical adenopathy.   Neurological: He is alert and oriented to person, place, and time. Coordination normal.   Skin: Skin is warm and dry.   Psychiatric: He has a normal mood and affect. His behavior is normal. Thought content normal.   Vitals reviewed.          Current Outpatient Medications:     allopurinol (ZYLOPRIM) 300 MG tablet, Take 1 tablet (300 mg total) by mouth once daily., Disp: 90 tablet, Rfl: 3    aspirin (ECOTRIN) 81 MG EC tablet, Take 81 mg by mouth once daily., Disp: , Rfl:     captopril (CAPOTEN) 50 MG tablet, Take 50 mg by mouth once daily. , Disp: , Rfl:     DULoxetine (CYMBALTA) 30 MG capsule, Take 1 capsule (30 mg total) by mouth once daily., Disp: 30 capsule, Rfl: 2    JANUVIA 100 mg Tab, 100 mg once daily. , Disp: , Rfl:     LYRICA 75 mg capsule, Take 75 mg by mouth 3 (three) times daily. , Disp: , Rfl:     multivitamin with minerals tablet, Take 1 tablet by mouth once daily., Disp: , Rfl:     pravastatin (PRAVACHOL) 40 MG tablet, Take 40 mg by mouth once daily. , Disp: , Rfl:     tamsulosin (FLOMAX) 0.4 mg Cp24, Take 0.4 mg by mouth once daily., Disp: , Rfl:     VESICARE 5 mg tablet, TAKE 1 TABLET DAILY, Disp: 90 tablet, Rfl: 0    loperamide (IMODIUM) 2 mg capsule, Take 1 capsule (2 mg total) by mouth 4 (four) times daily as needed for Diarrhea (diarrhea)., Disp: , Rfl: 0  Assessment:       1. Irritable bowel syndrome with diarrhea    2. Pain in both feet    3. Hypertension, unspecified type    4. Controlled type 2 diabetes mellitus with stage 3 chronic kidney disease, without long-term current use of insulin    5. Severe obesity (BMI 35.0-39.9) with comorbidity        Plan:       Irritable bowel syndrome with diarrhea  Avoid greasy food and large meals.    If for some reason you have to eat greasy meal, take loperamide 30-45 min prior to meal.  -     loperamide (IMODIUM) 2 mg capsule; Take 1 capsule (2 mg total) by mouth 4 (four) times daily as  needed for Diarrhea (diarrhea).; Refill: 0    Pain in both feet  -     Ambulatory referral to Podiatry    Hypertension, unspecified type  Stable on current medication.    Controlled type 2 diabetes mellitus with stage 3 chronic kidney disease, without long-term current use of insulin  Stable on current medication.    Severe obesity (BMI 35.0-39.9) with comorbidity  Increase physical activity.  Decrease fattening, greasy foods.    Patient readiness: acceptance and barriers:none    During the course of the visit the patient was educated and counseled about the following:     Diabetes:  Discussed general issues about diabetes pathophysiology and management.  Suggested low cholesterol diet.  Encouraged aerobic exercise.  Hypertension:   Medication: no change.  Obesity:   Regular aerobic exercise program discussed.    Goals: Diabetes: Maintain Hemoglobin A1C below 7, Hypertension: Reduce Blood Pressure and Obesity: Reduce calorie intake and BMI    Did patient meet goals/outcomes: Yes    The following self management tools provided: declined    Patient Instructions (the written plan) was given to the patient/family.     Time spent with patient: 15 minutes    Barriers to medications present (no )    Adverse reactions to current medications (no)    Over the counter medications reviewed (No)        RTC 6 mos with PCP.

## 2018-10-16 ENCOUNTER — OFFICE VISIT (OUTPATIENT)
Dept: PODIATRY | Facility: CLINIC | Age: 83
End: 2018-10-16
Payer: MEDICARE

## 2018-10-16 VITALS — HEIGHT: 67 IN | BODY MASS INDEX: 35.02 KG/M2 | WEIGHT: 223.13 LBS

## 2018-10-16 DIAGNOSIS — M20.5X1 HALLUX LIMITUS OF RIGHT FOOT: ICD-10-CM

## 2018-10-16 DIAGNOSIS — E11.49 TYPE II DIABETES MELLITUS WITH NEUROLOGICAL MANIFESTATIONS: ICD-10-CM

## 2018-10-16 DIAGNOSIS — M79.671 FOOT PAIN, RIGHT: Primary | ICD-10-CM

## 2018-10-16 PROCEDURE — 99203 OFFICE O/P NEW LOW 30 MIN: CPT | Mod: S$PBB,,, | Performed by: PODIATRIST

## 2018-10-16 PROCEDURE — 99999 PR PBB SHADOW E&M-EST. PATIENT-LVL III: CPT | Mod: PBBFAC,,, | Performed by: PODIATRIST

## 2018-10-16 PROCEDURE — 99213 OFFICE O/P EST LOW 20 MIN: CPT | Mod: PBBFAC,PO | Performed by: PODIATRIST

## 2018-10-16 RX ORDER — LIDOCAINE HYDROCHLORIDE 20 MG/ML
JELLY TOPICAL
Qty: 30 ML | Refills: 2 | Status: SHIPPED | OUTPATIENT
Start: 2018-10-16 | End: 2021-06-28

## 2018-10-16 NOTE — PROGRESS NOTES
Reviewed resident note, exam and procedures were performed under my direct supervision.  Agree with note and care.  Discrepancies discussed.    CC Diabetes, increased risk amputation needing evaluation/management/optomization of foot care.    CC2 deep aching pain right big toe joint, sometimes sharp.    The patient has received literature on basic diabetic foot care.  Patient will inspect feet daily, wear protective shoe gear when ambulatory, and apply moisturizer to skin as needed to maintain elasticity and help prevent ulceration.    Discussed conservative treatment with shoes of adequate dimensions, material, and style to alleviate symptoms and delay or prevent surgical intervention.    Continue periodic pedicures.

## 2018-10-16 NOTE — PROGRESS NOTES
Subjective:      Patient ID: Siddharth Urias is a 84 y.o. male.    Chief Complaint: Diabetic Foot Exam    Siddharth is a 84 y.o. male who presents to the clinic upon referral from Dr. Becerril  for evaluation and treatment of diabetic feet. Siddharth has a past medical history of Arthritis, Benign hypertension, Diabetes mellitus, type 2, Fatty liver, Irradiation cystitis, SANDRA (obstructive sleep apnea), and Prostate cancer (2009). Patient relates no major problem with feet. Only complaints today consist of some pain to his right foot, points to his 1st MTPJ, states it has been going on for several months, worse with activity, better with rest.    PCP: Martine Maxwell MD    Date Last Seen by PCP: 10/11    Current shoe gear: Slip-on shoes    Hemoglobin A1C   Date Value Ref Range Status   09/06/2018 6.9 (H) 4.0 - 5.6 % Final     Comment:     ADA Screening Guidelines:  5.7-6.4%  Consistent with prediabetes  >or=6.5%  Consistent with diabetes  High levels of fetal hemoglobin interfere with the HbA1C  assay. Heterozygous hemoglobin variants (HbS, HgC, etc)do  not significantly interfere with this assay.   However, presence of multiple variants may affect accuracy.     03/05/2018 6.6 % Final   05/08/2014 6.9 (H) 4.5 - 6.2 % Final   08/27/2013 7.3 (H) 4.0 - 6.2 % Final           Review of Systems   Constitution: Negative for chills and fever.   Skin: Negative for color change, dry skin, poor wound healing, suspicious lesions and unusual hair distribution.   Musculoskeletal: Positive for joint pain. Negative for falls, joint swelling, muscle weakness and myalgias.   Gastrointestinal: Negative for nausea and vomiting.   Neurological: Positive for numbness and sensory change. Negative for loss of balance, paresthesias and tremors.   Psychiatric/Behavioral: Negative for altered mental status. The patient is not nervous/anxious.            Objective:      Physical Exam   Constitutional: He is oriented to person, place, and time. He  appears well-developed and well-nourished.   HENT:   Head: Atraumatic.   Neck: No tracheal deviation present.   Cardiovascular:   Pulses:       Dorsalis pedis pulses are 2+ on the right side, and 2+ on the left side.        Posterior tibial pulses are 2+ on the right side, and 2+ on the left side.   Musculoskeletal: He exhibits tenderness. He exhibits no edema or deformity.   Pain to palpation of 1st right sub met head.  Decreased 1st MTPJ with weight bearing and non weight bearing b/l.  Ankle dorsiflexion decreased at <10 degrees bilateral with moderate increase with knee flexion bilateral.      Otherwise, Normal angle, base, station of gait. All ten toes without clubbing, cyanosis, or signs of ischemia.  No pain to palpation bilateral lower extremities.  Range of motion, stability, muscle strength, and muscle tone age and health appropriate normal bilateral feet and legs.   Feet:   Right Foot:   Protective Sensation: 10 sites tested. 7 sites sensed.   Left Foot:   Protective Sensation: 10 sites tested. 7 sites sensed.   Neurological: He is alert and oriented to person, place, and time. He displays no atrophy.   Reflex Scores:       Patellar reflexes are 2+ on the right side and 2+ on the left side.       Achilles reflexes are 2+ on the right side and 2+ on the left side.  Skin: Capillary refill takes less than 2 seconds. No rash noted. No erythema.     Otherwise no open wounds, interdigital macerations, or calluses.  Skin with normal temperature, texture and turgor.    .       Psychiatric: He has a normal mood and affect. His behavior is normal.             Assessment:       Encounter Diagnoses   Name Primary?    Foot pain, right Yes    Type II diabetes mellitus with neurological manifestations     Hallux limitus of right foot          Plan:       [facility] was seen today for diabetic foot exam.    Diagnoses and all orders for this visit:    Foot pain, right  -     X-Ray Foot Complete Right; Future    Type II  diabetes mellitus with neurological manifestations  -     X-Ray Foot Complete Right; Future    Hallux limitus of right foot  -     X-Ray Foot Complete Right; Future    Other orders  -     lidocaine HCL 2% (XYLOCAINE) 2 % jelly; Apply topically as needed. Apply topically once nightly to affected part of foot/feet.      I counseled the patient on his conditions, their implications and medical management.    Ordered x-rays    Patient evaluated on Diabetic foot risk factors.  Patient counseled to do daily foot checks.  Counseled to wear accommodative shoe gear.  Educated on importance of glycemic control.    Patient will stretch the tendo achilles complex three times daily as demonstrated in the office.  Literature was dispensed illustrating proper stretching technique.     Rx lidocaine jelly for symptomatic relief.    Assisted by Scar Núñez, PGY2

## 2018-10-16 NOTE — LETTER
October 16, 2018      Cha Becerril, ISIDROP-C  [address] [place] [address]           [facility]  [address]  [place] [address]  Phone: [phone]          Patient: Siddharth Urias   MR Number: [id number]   YOB: 1934   Date of Visit: 10/16/2018       Dear Cha Becerril:    Thank you for referring Siddharth Urias to me for evaluation. Attached you will find relevant portions of my assessment and plan of care.    If you have questions, please do not hesitate to call me. I look forward to following Siddharth Urias along with you.    Sincerely,    Chai Gordon, KRYSTYNA    Enclosure  CC:  No Recipients    If you would like to receive this communication electronically, please contact [email or web address] or [phone] to request more information on [facility] access.    For providers and/or their staff who would like to refer a patient to [facility], please contact us through our one-stop-shop provider referral line, [facility], at [phone].    If you feel you have received this communication in error or would no longer like to receive these types of communications, please e-mail [email or web address]

## 2018-11-21 ENCOUNTER — TELEPHONE (OUTPATIENT)
Dept: FAMILY MEDICINE | Facility: CLINIC | Age: 83
End: 2018-11-21

## 2018-11-21 ENCOUNTER — OFFICE VISIT (OUTPATIENT)
Dept: FAMILY MEDICINE | Facility: CLINIC | Age: 83
End: 2018-11-21
Payer: MEDICARE

## 2018-11-21 VITALS
SYSTOLIC BLOOD PRESSURE: 137 MMHG | HEIGHT: 67 IN | TEMPERATURE: 99 F | DIASTOLIC BLOOD PRESSURE: 81 MMHG | HEART RATE: 103 BPM | BODY MASS INDEX: 34.91 KG/M2 | WEIGHT: 222.44 LBS

## 2018-11-21 DIAGNOSIS — M10.9 ACUTE GOUT OF LEFT HAND, UNSPECIFIED CAUSE: Primary | ICD-10-CM

## 2018-11-21 PROCEDURE — 96372 THER/PROPH/DIAG INJ SC/IM: CPT | Mod: PBBFAC,PO

## 2018-11-21 PROCEDURE — 99999 PR PBB SHADOW E&M-EST. PATIENT-LVL III: CPT | Mod: PBBFAC,,, | Performed by: FAMILY MEDICINE

## 2018-11-21 PROCEDURE — 99214 OFFICE O/P EST MOD 30 MIN: CPT | Mod: S$PBB,,, | Performed by: FAMILY MEDICINE

## 2018-11-21 PROCEDURE — 99213 OFFICE O/P EST LOW 20 MIN: CPT | Mod: PBBFAC,PO,25 | Performed by: FAMILY MEDICINE

## 2018-11-21 RX ORDER — BETAMETHASONE SODIUM PHOSPHATE AND BETAMETHASONE ACETATE 3; 3 MG/ML; MG/ML
9 INJECTION, SUSPENSION INTRA-ARTICULAR; INTRALESIONAL; INTRAMUSCULAR; SOFT TISSUE
Status: COMPLETED | OUTPATIENT
Start: 2018-11-21 | End: 2018-11-21

## 2018-11-21 RX ADMIN — BETAMETHASONE ACETATE AND BETAMETHASONE SODIUM PHOSPHATE 9 MG: 3; 3 INJECTION, SUSPENSION INTRA-ARTICULAR; INTRALESIONAL; INTRAMUSCULAR; SOFT TISSUE at 10:11

## 2018-11-21 NOTE — PROGRESS NOTES
Subjective:       Patient ID: Siddharth Urias is a 84 y.o. male.    Chief Complaint: Joint Swelling (hand/finger)    New to me patient here for UC visit.      Hand Pain    There was no injury mechanism. The pain is present in the left hand. The quality of the pain is described as aching. The pain does not radiate. The pain is moderate. The pain has been constant since the incident. Pertinent negatives include no chest pain or numbness. The symptoms are aggravated by palpation and movement. He has tried nothing for the symptoms.     Review of Systems   Constitutional: Negative for fever.   Respiratory: Negative for shortness of breath.    Cardiovascular: Negative for chest pain.   Gastrointestinal: Negative for abdominal pain and nausea.   Skin: Negative for rash.   Neurological: Negative for numbness.   All other systems reviewed and are negative.      Objective:      Physical Exam   Constitutional: He appears well-developed. No distress.   Cardiovascular: Normal rate, regular rhythm and intact distal pulses.   Murmur heard.  Pulmonary/Chest: Effort normal and breath sounds normal.   Musculoskeletal:        Left hand: He exhibits normal capillary refill.        Hands:      Assessment:       1. Acute gout of left hand, unspecified cause        Plan:       Siddharth was seen today for joint swelling.    Diagnoses and all orders for this visit:    Acute gout of left hand, unspecified cause  -     betamethasone acetate-betamethasone sodium phosphate injection 9 mg    Continue Allopurinal

## 2018-11-21 NOTE — PROGRESS NOTES
Administered 9 mg celestone, IM. Identified patient's name&. Patient tolerated well, aseptic technique maintained. Pain scale 0/10 with injection.

## 2018-11-21 NOTE — TELEPHONE ENCOUNTER
Call transferrred Spoke to patient's wife states patient's left hand is swollen x 4 days states patient did fall or hurt his hand states patient woke up with the swelling  Appointment scheduled patient's wife confirmed appointment

## 2018-12-11 ENCOUNTER — TELEPHONE (OUTPATIENT)
Dept: FAMILY MEDICINE | Facility: CLINIC | Age: 83
End: 2018-12-11

## 2018-12-11 NOTE — TELEPHONE ENCOUNTER
----- Message from Benita Mackenzie sent at 12/11/2018 12:01 PM CST -----  Contact: wife Enedina Moore   Patient wife states patient has a cough and little flem coming out mouth and will like a cough syrup called in for patient       Please call to advice [phone] patient wife states use this phone           [facility] [address] [place] [address] YAMILETH BAEZ  [address] AT [facility] [address]  [place]  [address] [place] [address]  Phone: [phone] Fax: [phone]

## 2018-12-11 NOTE — TELEPHONE ENCOUNTER
----- Message from Atul Russell sent at 12/11/2018 10:40 AM CST -----  Contact: pt wife cherry  Type: Needs Medical Advice    Who Called:  [email or web address]  Symptoms (please be specific):  Slight cold with cough with green phlem  How long has patient had these symptoms:  since last night  Pharmacy name and phone #:      [facility] [address] [place] [address] YAMILETH BAEZ - [address] AT [facility] [address]  [place]  [address] [place] [address]  Phone: [phone] Fax: [phone]    Best Call Back Number:  or [phone]  Additional Information:

## 2018-12-13 ENCOUNTER — OFFICE VISIT (OUTPATIENT)
Dept: FAMILY MEDICINE | Facility: CLINIC | Age: 83
End: 2018-12-13
Payer: MEDICARE

## 2018-12-13 ENCOUNTER — HOSPITAL ENCOUNTER (OUTPATIENT)
Facility: HOSPITAL | Age: 83
Discharge: HOME | End: 2018-12-14
Attending: EMERGENCY MEDICINE | Admitting: HOSPITALIST
Payer: MEDICARE

## 2018-12-13 VITALS
HEART RATE: 100 BPM | BODY MASS INDEX: 35.5 KG/M2 | TEMPERATURE: 99 F | SYSTOLIC BLOOD PRESSURE: 140 MMHG | DIASTOLIC BLOOD PRESSURE: 78 MMHG | WEIGHT: 226.19 LBS | HEIGHT: 67 IN

## 2018-12-13 DIAGNOSIS — N18.30 CONTROLLED TYPE 2 DIABETES MELLITUS WITH STAGE 3 CHRONIC KIDNEY DISEASE, WITHOUT LONG-TERM CURRENT USE OF INSULIN: ICD-10-CM

## 2018-12-13 DIAGNOSIS — E11.59 HYPERTENSION ASSOCIATED WITH DIABETES: ICD-10-CM

## 2018-12-13 DIAGNOSIS — K62.7 RADIATION PROCTITIS: ICD-10-CM

## 2018-12-13 DIAGNOSIS — I15.2 HYPERTENSION ASSOCIATED WITH DIABETES: ICD-10-CM

## 2018-12-13 DIAGNOSIS — K62.5 RECTAL BLEEDING: Primary | ICD-10-CM

## 2018-12-13 DIAGNOSIS — K57.30 DIVERTICULOSIS OF LARGE INTESTINE WITHOUT HEMORRHAGE: ICD-10-CM

## 2018-12-13 DIAGNOSIS — K63.5 POLYP OF COLON, UNSPECIFIED PART OF COLON, UNSPECIFIED TYPE: ICD-10-CM

## 2018-12-13 DIAGNOSIS — E11.22 CONTROLLED TYPE 2 DIABETES MELLITUS WITH STAGE 3 CHRONIC KIDNEY DISEASE, WITHOUT LONG-TERM CURRENT USE OF INSULIN: ICD-10-CM

## 2018-12-13 DIAGNOSIS — R05.9 COUGH: ICD-10-CM

## 2018-12-13 LAB
ABO + RH BLD: NORMAL
ALBUMIN SERPL BCP-MCNC: 3.4 G/DL
ALP SERPL-CCNC: 56 U/L
ALT SERPL W/O P-5'-P-CCNC: 23 U/L
ANION GAP SERPL CALC-SCNC: 8 MMOL/L
APTT BLDCRRT: 27.1 SEC
AST SERPL-CCNC: 17 U/L
BASOPHILS # BLD AUTO: 0 K/UL
BASOPHILS NFR BLD: 0.4 %
BILIRUB SERPL-MCNC: 0.5 MG/DL
BLD GP AB SCN CELLS X3 SERPL QL: NORMAL
BUN SERPL-MCNC: 32 MG/DL
CALCIUM SERPL-MCNC: 9.2 MG/DL
CHLORIDE SERPL-SCNC: 107 MMOL/L
CO2 SERPL-SCNC: 26 MMOL/L
CREAT SERPL-MCNC: 1.6 MG/DL
DIFFERENTIAL METHOD: ABNORMAL
EOSINOPHIL # BLD AUTO: 0.2 K/UL
EOSINOPHIL NFR BLD: 2.1 %
ERYTHROCYTE [DISTWIDTH] IN BLOOD BY AUTOMATED COUNT: 16.5 %
EST. GFR  (AFRICAN AMERICAN): 45 ML/MIN/1.73 M^2
EST. GFR  (NON AFRICAN AMERICAN): 39 ML/MIN/1.73 M^2
ESTIMATED AVG GLUCOSE: 154 MG/DL
GLUCOSE SERPL-MCNC: 114 MG/DL
HBA1C MFR BLD HPLC: 7 %
HCT VFR BLD AUTO: 42.8 %
HCT VFR BLD AUTO: 45.3 %
HGB BLD-MCNC: 13.6 G/DL
HGB BLD-MCNC: 14.9 G/DL
INR PPP: 1
LYMPHOCYTES # BLD AUTO: 1.3 K/UL
LYMPHOCYTES NFR BLD: 14.4 %
MCH RBC QN AUTO: 26.6 PG
MCHC RBC AUTO-ENTMCNC: 32.8 G/DL
MCV RBC AUTO: 81 FL
MONOCYTES # BLD AUTO: 0.6 K/UL
MONOCYTES NFR BLD: 6.6 %
NEUTROPHILS # BLD AUTO: 6.9 K/UL
NEUTROPHILS NFR BLD: 76.5 %
PLATELET # BLD AUTO: 141 K/UL
PMV BLD AUTO: 9.4 FL
POCT GLUCOSE: 104 MG/DL (ref 70–110)
POCT GLUCOSE: 108 MG/DL (ref 70–110)
POTASSIUM SERPL-SCNC: 4.4 MMOL/L
PROT SERPL-MCNC: 6.9 G/DL
PROTHROMBIN TIME: 10.7 SEC
RBC # BLD AUTO: 5.58 M/UL
SODIUM SERPL-SCNC: 141 MMOL/L
WBC # BLD AUTO: 9 K/UL

## 2018-12-13 PROCEDURE — 85018 HEMOGLOBIN: CPT

## 2018-12-13 PROCEDURE — 85025 COMPLETE CBC W/AUTO DIFF WBC: CPT

## 2018-12-13 PROCEDURE — G0378 HOSPITAL OBSERVATION PER HR: HCPCS

## 2018-12-13 PROCEDURE — 99285 EMERGENCY DEPT VISIT HI MDM: CPT | Mod: 27

## 2018-12-13 PROCEDURE — 25000003 PHARM REV CODE 250: Performed by: EMERGENCY MEDICINE

## 2018-12-13 PROCEDURE — 25000003 PHARM REV CODE 250: Performed by: HOSPITALIST

## 2018-12-13 PROCEDURE — 86850 RBC ANTIBODY SCREEN: CPT

## 2018-12-13 PROCEDURE — 99213 OFFICE O/P EST LOW 20 MIN: CPT | Mod: PBBFAC,PO | Performed by: NURSE PRACTITIONER

## 2018-12-13 PROCEDURE — 85014 HEMATOCRIT: CPT

## 2018-12-13 PROCEDURE — 85730 THROMBOPLASTIN TIME PARTIAL: CPT

## 2018-12-13 PROCEDURE — 99214 OFFICE O/P EST MOD 30 MIN: CPT | Mod: S$PBB,,, | Performed by: NURSE PRACTITIONER

## 2018-12-13 PROCEDURE — 36415 COLL VENOUS BLD VENIPUNCTURE: CPT

## 2018-12-13 PROCEDURE — 80053 COMPREHEN METABOLIC PANEL: CPT

## 2018-12-13 PROCEDURE — 83036 HEMOGLOBIN GLYCOSYLATED A1C: CPT

## 2018-12-13 PROCEDURE — 85610 PROTHROMBIN TIME: CPT

## 2018-12-13 PROCEDURE — 99999 PR PBB SHADOW E&M-EST. PATIENT-LVL III: CPT | Mod: PBBFAC,,, | Performed by: NURSE PRACTITIONER

## 2018-12-13 PROCEDURE — 96361 HYDRATE IV INFUSION ADD-ON: CPT

## 2018-12-13 PROCEDURE — 96360 HYDRATION IV INFUSION INIT: CPT

## 2018-12-13 RX ORDER — IBUPROFEN 200 MG
24 TABLET ORAL
Status: DISCONTINUED | OUTPATIENT
Start: 2018-12-13 | End: 2018-12-14 | Stop reason: HOSPADM

## 2018-12-13 RX ORDER — ACETAMINOPHEN 325 MG/1
650 TABLET ORAL EVERY 8 HOURS PRN
Status: DISCONTINUED | OUTPATIENT
Start: 2018-12-13 | End: 2018-12-14 | Stop reason: HOSPADM

## 2018-12-13 RX ORDER — FEBUXOSTAT 40 MG/1
40 TABLET, FILM COATED ORAL DAILY
COMMUNITY
End: 2019-08-08

## 2018-12-13 RX ORDER — SODIUM CHLORIDE 9 MG/ML
INJECTION, SOLUTION INTRAVENOUS CONTINUOUS
Status: DISCONTINUED | OUTPATIENT
Start: 2018-12-13 | End: 2018-12-14 | Stop reason: HOSPADM

## 2018-12-13 RX ORDER — PREGABALIN 75 MG/1
75 CAPSULE ORAL 3 TIMES DAILY
Status: DISCONTINUED | OUTPATIENT
Start: 2018-12-13 | End: 2018-12-14 | Stop reason: HOSPADM

## 2018-12-13 RX ORDER — GLUCAGON 1 MG
1 KIT INJECTION
Status: DISCONTINUED | OUTPATIENT
Start: 2018-12-13 | End: 2018-12-14 | Stop reason: HOSPADM

## 2018-12-13 RX ORDER — PANTOPRAZOLE SODIUM 40 MG/1
40 TABLET, DELAYED RELEASE ORAL DAILY
Status: DISCONTINUED | OUTPATIENT
Start: 2018-12-14 | End: 2018-12-13

## 2018-12-13 RX ORDER — ONDANSETRON 2 MG/ML
4 INJECTION INTRAMUSCULAR; INTRAVENOUS EVERY 8 HOURS PRN
Status: DISCONTINUED | OUTPATIENT
Start: 2018-12-13 | End: 2018-12-14 | Stop reason: HOSPADM

## 2018-12-13 RX ORDER — INSULIN ASPART 100 [IU]/ML
0-5 INJECTION, SOLUTION INTRAVENOUS; SUBCUTANEOUS EVERY 6 HOURS PRN
Status: DISCONTINUED | OUTPATIENT
Start: 2018-12-13 | End: 2018-12-14 | Stop reason: HOSPADM

## 2018-12-13 RX ORDER — PANTOPRAZOLE SODIUM 40 MG/1
40 TABLET, DELAYED RELEASE ORAL DAILY
Status: DISCONTINUED | OUTPATIENT
Start: 2018-12-13 | End: 2018-12-14 | Stop reason: HOSPADM

## 2018-12-13 RX ORDER — SODIUM CHLORIDE 0.9 % (FLUSH) 0.9 %
5 SYRINGE (ML) INJECTION
Status: DISCONTINUED | OUTPATIENT
Start: 2018-12-13 | End: 2018-12-14 | Stop reason: HOSPADM

## 2018-12-13 RX ORDER — IBUPROFEN 200 MG
16 TABLET ORAL
Status: DISCONTINUED | OUTPATIENT
Start: 2018-12-13 | End: 2018-12-14 | Stop reason: HOSPADM

## 2018-12-13 RX ORDER — TAMSULOSIN HYDROCHLORIDE 0.4 MG/1
0.4 CAPSULE ORAL DAILY
Status: DISCONTINUED | OUTPATIENT
Start: 2018-12-14 | End: 2018-12-14 | Stop reason: HOSPADM

## 2018-12-13 RX ADMIN — SODIUM CHLORIDE: 0.9 INJECTION, SOLUTION INTRAVENOUS at 02:12

## 2018-12-13 RX ADMIN — SODIUM CHLORIDE 500 ML: 0.9 INJECTION, SOLUTION INTRAVENOUS at 11:12

## 2018-12-13 RX ADMIN — PANTOPRAZOLE SODIUM 40 MG: 40 TABLET, DELAYED RELEASE ORAL at 08:12

## 2018-12-13 RX ADMIN — PREGABALIN 75 MG: 75 CAPSULE ORAL at 08:12

## 2018-12-13 NOTE — PROGRESS NOTES
Subjective:       Patient ID: Siddharth Urias is a 84 y.o. male.    Chief Complaint: Cough    Mr. Urias presents to the clinic today for cough which is chronic and occurs in the morning.  Denies shortness of breath.  No wheezing or allergy/sinus symptoms. Cough is productive.  Patient's wife who is present at visit mentions he has had rectal bleeding x 2 days.  He admits to large amount of bright red blood with clots per rectum, painless.  No abdominal pain, constipation, diarrhea, vomiting.  Has possibly decreased in the past day.  He had to start wearing a diaper because the blood was soaking through his clothes.  He is not lightheaded/dizzy.      Review of Systems   Constitutional: Negative for chills and fever.   HENT: Negative for congestion, ear pain, postnasal drip, rhinorrhea and sinus pressure.    Respiratory: Positive for cough. Negative for shortness of breath and wheezing.    Cardiovascular: Negative for chest pain, palpitations and leg swelling.   Gastrointestinal: Positive for anal bleeding. Negative for abdominal pain, constipation, diarrhea, nausea and rectal pain.   Neurological: Negative for dizziness and light-headedness.       Objective:      Physical Exam   Constitutional: He is oriented to person, place, and time. He appears well-developed and well-nourished. No distress.   HENT:   Head: Normocephalic and atraumatic.   Right Ear: External ear normal.   Left Ear: External ear normal.   Mouth/Throat: Oropharynx is clear and moist. No oropharyngeal exudate.   Eyes: Pupils are equal, round, and reactive to light. Right eye exhibits no discharge. Left eye exhibits no discharge.   Neck: Neck supple.   Cardiovascular: Normal rate and regular rhythm. Exam reveals no gallop and no friction rub.   No murmur heard.  Pulmonary/Chest: Effort normal and breath sounds normal. No respiratory distress. He has no wheezes. He has no rales.   Abdominal: Soft. Bowel sounds are normal. He exhibits no  distension. There is no tenderness.   Genitourinary: Rectal exam shows external hemorrhoid (non-inflamed). Rectal exam shows no internal hemorrhoid, no fissure, no mass and no tenderness.   Genitourinary Comments: Rectal bleeding noted with dark red and bright red clotted blood in diaper.  Perianal area covered with blood.   Lymphadenopathy:     He has no cervical adenopathy.   Neurological: He is alert and oriented to person, place, and time. Coordination normal.   Skin: Skin is warm and dry.   Psychiatric: He has a normal mood and affect. His behavior is normal. Thought content normal.   Vitals reviewed.        No current facility-administered medications for this visit.     Current Outpatient Medications:     allopurinol (ZYLOPRIM) 300 MG tablet, Take 1 tablet (300 mg total) by mouth once daily., Disp: 90 tablet, Rfl: 3    aspirin (ECOTRIN) 81 MG EC tablet, Take 81 mg by mouth once daily., Disp: , Rfl:     febuxostat (ULORIC) 40 mg Tab, Take 40 mg by mouth once daily., Disp: , Rfl:     JANUVIA 100 mg Tab, 100 mg once daily. , Disp: , Rfl:     LYRICA 75 mg capsule, Take 75 mg by mouth 3 (three) times daily. , Disp: , Rfl:     pravastatin (PRAVACHOL) 40 MG tablet, Take 40 mg by mouth once daily. , Disp: , Rfl:     tamsulosin (FLOMAX) 0.4 mg Cp24, Take 0.4 mg by mouth once daily., Disp: , Rfl:     VESICARE 5 mg tablet, TAKE 1 TABLET DAILY, Disp: 90 tablet, Rfl: 0    lidocaine HCL 2% (XYLOCAINE) 2 % jelly, Apply topically as needed. Apply topically once nightly to affected part of foot/feet., Disp: 30 mL, Rfl: 2    multivitamin with minerals tablet, Take 1 tablet by mouth once daily., Disp: , Rfl:   Assessment:       1. Rectal bleeding    2. Controlled type 2 diabetes mellitus with stage 3 chronic kidney disease, without long-term current use of insulin    3. Hypertension associated with diabetes    4. Cough        Plan:       Rectal bleeding  Patient was referred to [facility] ED.   -     Refer to  Emergency Dept.    Controlled type 2 diabetes mellitus with stage 3 chronic kidney disease, without long-term current use of insulin  Stable, continue current medication.    Hypertension associated with diabetes  Stable, continue current medication.    Cough   Will address after patient is evaluated in ED.   Would recommend PFT, CXR.    Patient readiness: acceptance and barriers:none    During the course of the visit the patient was educated and counseled about the following:     Diabetes:  Discussed general issues about diabetes pathophysiology and management.  Hypertension:   Medication: no change.    Goals: Diabetes: Maintain Hemoglobin A1C below 7 and Hypertension: Reduce Blood Pressure    Did patient meet goals/outcomes: Yes    The following self management tools provided: declined    Patient Instructions (the written plan) was given to the patient/family.     Time spent with patient: 15 minutes    Barriers to medications present (no )    Adverse reactions to current medications (no)    Over the counter medications reviewed (No)

## 2018-12-13 NOTE — ED NOTES
Pt presents to ED POV with c/o rectal bleeding x3 days. Pt is AAOx4. Skin warm, dry to touch. Respirations even, nonlabored. NAD noted. Pt ambulates with steady gait unassisted. Pt denies abdominal pain, nausea, or vomiting.

## 2018-12-13 NOTE — PLAN OF CARE
Problem: Adult Inpatient Plan of Care  Goal: Plan of Care Review  Outcome: Ongoing (interventions implemented as appropriate)  Pt lying in bed, respirations even and unlabored, no acute distress noted.  GI consult called in to Dr. Aguiar, he will see the patient on tomorrow.  NS infusing at 125 ml/hr.  TEDS/SCDs intact.  Denies pain and discomfort at this time.  Side rails up times two, bed low and locked, call light within reach.

## 2018-12-13 NOTE — NURSING
Pt admitted to unit from ER.  Initial assessment completed per flow sheet.  Vitals stable.  Telemetry monitor 8712 applied NSR 70s.  NS infusing at 125 ml/hr via RFA PIV. Denies pain and discomfort at this time.  Side rails up times two, bed low and locked, call light within reach.

## 2018-12-13 NOTE — ED PROVIDER NOTES
"Encounter Date: 12/13/2018    SCRIBE #1 NOTE: I, Fadumo Diaz, am scribing for, and in the presence of, Gunner Rodriguez MD.       History     Chief Complaint   Patient presents with    Rectal Bleeding     x 3 days red blood from rectum. Denies pain       Time seen by provider: 9:14 AM on 12/13/2018    Siddharth Urias is a 84 y.o. male with DM, HTN and prostate CA (2009) who presents to the ED with an onset of rectal bleeding for the past 3 days. He was seen in the ER PTA for a cough and was referred to the ER for further evaluation of rectal bleeding. The patient states that he has bright and dark red rectal bleeding for the past 3 days. He reports having a BM everyday and has "uncontrollable diarrhea monthly, sometimes weekly" but has not had diarrhea in over a month. The patient is on a daily baby Aspirin regimen but no additional blood thinners. He has had a colonoscopy in the past. The patient denies fevers, difficulty breathing, chest pain, nausea, vomiting, constipation, rectal pain, blood in urine or any other symptoms at this time. No known drug allergies noted.      The history is provided by the patient and the spouse (wife).     Review of patient's allergies indicates:   Allergen Reactions    No known drug allergies      Past Medical History:   Diagnosis Date    Arthritis     Benign hypertension     Diabetes mellitus, type 2     2/2011    Fatty liver     Irradiation cystitis     SANDRA (obstructive sleep apnea)     no    Prostate cancer 2009    s/p XRT (T1C, [place] 8)     Past Surgical History:   Procedure Laterality Date    APPENDECTOMY      COLONOSCOPY N/A 3/11/2013    [facility]    HERNIA REPAIR      bilateral inguinal    TONSILLECTOMY       Family History   Problem Relation Age of Onset    Diabetes Mother      Social History     Tobacco Use    Smoking status: Former Smoker     Packs/day: 1.50     Years: 60.00     Pack years: 90.00     Types: " Cigarettes     Last attempt to quit: 1/2/2008     Years since quitting: 10.9    Smokeless tobacco: Former User     Quit date: 3/11/2010   Substance Use Topics    Alcohol use: Yes     Comment: social    Drug use: No     Review of Systems   Constitutional: Negative for fever.   HENT: Negative for nosebleeds.    Eyes: Negative for visual disturbance.   Respiratory: Negative for shortness of breath.    Cardiovascular: Negative for chest pain.   Gastrointestinal: Positive for anal bleeding (bright & dark). Negative for abdominal pain, constipation, diarrhea, nausea, rectal pain and vomiting.   Genitourinary: Negative for hematuria.   Musculoskeletal: Negative for back pain and neck pain.   Skin: Negative for rash.   Neurological: Negative for seizures, syncope and headaches.     Physical Exam     Initial Vitals [12/13/18 0859]   BP Pulse Resp Temp SpO2   131/72 67 16 98.4 °F (36.9 °C) (!) 92 %      MAP       --         Physical Exam    Nursing note and vitals reviewed.  Constitutional: He appears well-developed and well-nourished. He is not diaphoretic. No distress.   HENT:   Head: Normocephalic and atraumatic.   Eyes: EOM are normal. Pupils are equal, round, and reactive to light.   Neck: Normal range of motion. Neck supple.   Cardiovascular: Normal rate, regular rhythm and intact distal pulses. Exam reveals no gallop and no friction rub.    Murmur heard.  Pulmonary/Chest: Breath sounds normal. No respiratory distress. He has no wheezes. He has no rhonchi. He has no rales.   Abdominal: Soft. Bowel sounds are normal. There is no tenderness.   Genitourinary: Rectal exam shows guaiac positive stool. Guaiac positive stool. : Acceptable.  Genitourinary Comments: Grossly dark bloody stool.    Musculoskeletal: Normal range of motion.   Neurological: He is alert and oriented to person, place, and time.   Skin: Skin is warm.   Psychiatric: He has a normal mood and affect. His behavior is normal. Judgment and  thought content normal.       ED Course   Procedures  Labs Reviewed   COMPREHENSIVE METABOLIC PANEL - Abnormal; Notable for the following components:       Result Value    Glucose 114 (*)     BUN, Bld 32 (*)     Creatinine 1.6 (*)     Albumin 3.4 (*)     eGFR if  45 (*)     eGFR if non  39 (*)     All other components within normal limits   CBC W/ AUTO DIFFERENTIAL - Abnormal; Notable for the following components:    MCV 81 (*)     MCH 26.6 (*)     RDW 16.5 (*)     Platelets 141 (*)     Gran% 76.5 (*)     Lymph% 14.4 (*)     All other components within normal limits   PROTIME-INR   APTT   TYPE & SCREEN        Imaging Results    None          Medical Decision Making:   History:   Old Medical Records: I decided to obtain old medical records.  Initial Assessment:   84-year-old male presented with a chief complaint of rectal bleeding.  Differential Diagnosis:   My differential diagnosis includes colitis, diverticulitis, internal hemorrhoids and coagulopathy.   Clinical Tests:   Lab Tests: Ordered and Reviewed  ED Management:  The patient was emergently evaluated in the emergency department, his evaluation was significant for an elderly male with a benign abdominal exam.  The patient does have dark bloody stool noted as well. The patient's labs were significant only for a mild decrease in his hemoglobin and hematocrit.  The patient was orthostatic by vital signs in the emergency department.  He was treated with a small IV fluid bolus.  A.  I will admit him to the hospitalist service for further care and workup of his rectal bleeding.  The case was discussed with the hospitalist on call, Dr. Hernandez.  She has accepted the patient for admission.  Other:   I have discussed this case with another health care provider.            Scribe Attestation:   Scribe #1: I performed the above scribed service and the documentation accurately describes the services I performed. I attest to the accuracy  of the note.           I, Dr. Gunner Rodriguez, personally performed the services described in this documentation. All medical record entries made by the scribe were at my direction and in my presence.  I have reviewed the chart and agree that the record reflects my personal performance and is accurate and complete. Gunner Rodriguez MD.  1:51 PM 12/13/2018       Clinical Impression:   The encounter diagnosis was Rectal bleeding.      Disposition:   Disposition: Placed in Observation                        Gunner Rodriguez MD  12/13/18 6122

## 2018-12-13 NOTE — HPI
"Siddharth Urias is a 84 y.o. male with DM, HTN and prostate CA (2009) who presents to the ED with an onset of rectal bleeding for the past 3 days.   He was seen in the ER PTA for a cough and was referred to the ER for further evaluation of rectal bleeding. The patient states that he has bright and dark red rectal bleeding for the past 3 days. Wife reported bright red blood on sheets of bed approx 5 inch [place].  He reports having a BM everyday and has "uncontrollable diarrhea monthly, sometimes weekly" but has not had diarrhea in over a month. The patient is on a daily baby Aspirin regimen but no additional blood thinners. He has had a colonoscopy in the past--last 2013; He denies chronic or excessive use of nsaids or etoh. The patient denies fevers, difficulty breathing, chest pain, abdominal pain,  nausea, vomiting, constipation, rectal pain, blood in urine, dizzy, lightheaded or weakness  or any other symptoms at this time. No known drug allergies noted.    Evaluation in ER -noted stable vital signs and normal hgb down 2 gm from 3 months ago. He had large amount of thick black stool in his diaper in the ER but no active bright red blood. He is admitted for serial h/h, prn transfusion and GI consult. Patient is stable but has had 3 days of bleeding per report.         "

## 2018-12-14 ENCOUNTER — ANESTHESIA EVENT (OUTPATIENT)
Dept: ENDOSCOPY | Facility: HOSPITAL | Age: 83
End: 2018-12-14
Payer: MEDICARE

## 2018-12-14 ENCOUNTER — ANESTHESIA (OUTPATIENT)
Dept: ENDOSCOPY | Facility: HOSPITAL | Age: 83
End: 2018-12-14
Payer: MEDICARE

## 2018-12-14 VITALS
DIASTOLIC BLOOD PRESSURE: 68 MMHG | RESPIRATION RATE: 16 BRPM | OXYGEN SATURATION: 93 % | BODY MASS INDEX: 33.5 KG/M2 | HEART RATE: 81 BPM | HEIGHT: 69 IN | SYSTOLIC BLOOD PRESSURE: 112 MMHG | WEIGHT: 226.19 LBS | TEMPERATURE: 96 F

## 2018-12-14 PROBLEM — K62.7 PROCTITIS, RADIATION: Status: ACTIVE | Noted: 2018-12-14

## 2018-12-14 LAB
ANION GAP SERPL CALC-SCNC: 9 MMOL/L
BUN SERPL-MCNC: 19 MG/DL
CALCIUM SERPL-MCNC: 8.5 MG/DL
CHLORIDE SERPL-SCNC: 106 MMOL/L
CO2 SERPL-SCNC: 25 MMOL/L
CREAT SERPL-MCNC: 1.2 MG/DL
EST. GFR  (AFRICAN AMERICAN): >60 ML/MIN/1.73 M^2
EST. GFR  (NON AFRICAN AMERICAN): 55 ML/MIN/1.73 M^2
GLUCOSE SERPL-MCNC: 127 MG/DL
HCT VFR BLD AUTO: 43.3 %
HCT VFR BLD AUTO: 43.6 %
HGB BLD-MCNC: 14 G/DL
HGB BLD-MCNC: 14.1 G/DL
POCT GLUCOSE: 130 MG/DL (ref 70–110)
POTASSIUM SERPL-SCNC: 4.3 MMOL/L
SODIUM SERPL-SCNC: 140 MMOL/L

## 2018-12-14 PROCEDURE — 37000008 HC ANESTHESIA 1ST 15 MINUTES: Performed by: INTERNAL MEDICINE

## 2018-12-14 PROCEDURE — 45385 COLONOSCOPY W/LESION REMOVAL: CPT | Performed by: INTERNAL MEDICINE

## 2018-12-14 PROCEDURE — 45380 COLONOSCOPY AND BIOPSY: CPT | Mod: 59,,, | Performed by: INTERNAL MEDICINE

## 2018-12-14 PROCEDURE — 25000003 PHARM REV CODE 250: Performed by: EMERGENCY MEDICINE

## 2018-12-14 PROCEDURE — 25000003 PHARM REV CODE 250: Performed by: INTERNAL MEDICINE

## 2018-12-14 PROCEDURE — 85018 HEMOGLOBIN: CPT | Mod: 91

## 2018-12-14 PROCEDURE — 63600175 PHARM REV CODE 636 W HCPCS: Performed by: NURSE ANESTHETIST, CERTIFIED REGISTERED

## 2018-12-14 PROCEDURE — 80048 BASIC METABOLIC PNL TOTAL CA: CPT

## 2018-12-14 PROCEDURE — 37000009 HC ANESTHESIA EA ADD 15 MINS: Performed by: INTERNAL MEDICINE

## 2018-12-14 PROCEDURE — 45382 COLONOSCOPY W/CONTROL BLEED: CPT | Performed by: INTERNAL MEDICINE

## 2018-12-14 PROCEDURE — 27202087 HC PROBE, APC: Performed by: INTERNAL MEDICINE

## 2018-12-14 PROCEDURE — 27201089 HC SNARE, DISP (ANY): Performed by: INTERNAL MEDICINE

## 2018-12-14 PROCEDURE — 45380 COLONOSCOPY AND BIOPSY: CPT | Performed by: INTERNAL MEDICINE

## 2018-12-14 PROCEDURE — 25000003 PHARM REV CODE 250: Performed by: HOSPITALIST

## 2018-12-14 PROCEDURE — 36415 COLL VENOUS BLD VENIPUNCTURE: CPT

## 2018-12-14 PROCEDURE — 27201012 HC FORCEPS, HOT/COLD, DISP: Performed by: INTERNAL MEDICINE

## 2018-12-14 PROCEDURE — 85014 HEMATOCRIT: CPT

## 2018-12-14 PROCEDURE — D9220A PRA ANESTHESIA: Mod: ANES,,, | Performed by: ANESTHESIOLOGY

## 2018-12-14 PROCEDURE — 45388 COLONOSCOPY W/ABLATION: CPT | Mod: ,,, | Performed by: INTERNAL MEDICINE

## 2018-12-14 PROCEDURE — D9220A PRA ANESTHESIA: Mod: CRNA,,, | Performed by: NURSE ANESTHETIST, CERTIFIED REGISTERED

## 2018-12-14 PROCEDURE — 88305 TISSUE EXAM BY PATHOLOGIST: CPT | Mod: 91 | Performed by: PATHOLOGY

## 2018-12-14 PROCEDURE — 45385 COLONOSCOPY W/LESION REMOVAL: CPT | Mod: 59,,, | Performed by: INTERNAL MEDICINE

## 2018-12-14 PROCEDURE — G0378 HOSPITAL OBSERVATION PER HR: HCPCS

## 2018-12-14 PROCEDURE — 99204 OFFICE O/P NEW MOD 45 MIN: CPT | Mod: 25,,, | Performed by: INTERNAL MEDICINE

## 2018-12-14 RX ORDER — PROPOFOL 10 MG/ML
VIAL (ML) INTRAVENOUS
Status: DISCONTINUED | OUTPATIENT
Start: 2018-12-14 | End: 2018-12-14

## 2018-12-14 RX ADMIN — PROPOFOL 40 MG: 10 INJECTION, EMULSION INTRAVENOUS at 12:12

## 2018-12-14 RX ADMIN — PANTOPRAZOLE SODIUM 40 MG: 40 TABLET, DELAYED RELEASE ORAL at 08:12

## 2018-12-14 RX ADMIN — PROPOFOL 120 MG: 10 INJECTION, EMULSION INTRAVENOUS at 12:12

## 2018-12-14 RX ADMIN — PREGABALIN 75 MG: 75 CAPSULE ORAL at 03:12

## 2018-12-14 RX ADMIN — SODIUM CHLORIDE: 0.9 INJECTION, SOLUTION INTRAVENOUS at 11:12

## 2018-12-14 RX ADMIN — TAMSULOSIN HYDROCHLORIDE 0.4 MG: 0.4 CAPSULE ORAL at 08:12

## 2018-12-14 RX ADMIN — POLYETHYLENE GLYCOL-3350 AND ELECTROLYTES WITH FLAVOR PACK 4000 ML: 240; 5.84; 2.98; 6.72; 22.72 POWDER, FOR SOLUTION ORAL at 12:12

## 2018-12-14 RX ADMIN — SODIUM CHLORIDE: 0.9 INJECTION, SOLUTION INTRAVENOUS at 08:12

## 2018-12-14 RX ADMIN — PREGABALIN 75 MG: 75 CAPSULE ORAL at 08:12

## 2018-12-14 RX ADMIN — PROPOFOL 40 MG: 10 INJECTION, EMULSION INTRAVENOUS at 01:12

## 2018-12-14 NOTE — TRANSFER OF CARE
"Anesthesia Transfer of Care Note    Patient: Siddharth Urias    Procedure(s) Performed: Procedure(s) (LRB):  COLONOSCOPY (N/A)    Patient location: GI    Anesthesia Type: general    Transport from OR: Transported from OR on 2-3 L/min O2 by NC with adequate spontaneous ventilation    Post pain: adequate analgesia    Post assessment: no apparent anesthetic complications    Post vital signs: stable    Level of consciousness: awake    Nausea/Vomiting: no nausea/vomiting    Complications: none    Transfer of care protocol was followed      Last vitals:   Visit Vitals  BP (!) 152/74 (BP Location: Left arm, Patient Position: Lying)   Pulse 65   Temp 35.7 °C (96.2 °F) (Oral)   Resp 16   Ht 5' 8.5" (1.74 m)   Wt 102.6 kg (226 lb 3.1 oz)   SpO2 (!) 94%   BMI 33.89 kg/m²     "

## 2018-12-14 NOTE — ASSESSMENT & PLAN NOTE
DM2-hold po medications; Accucks and ISS; start long acting insulin as indicated.   Start levemir at 0.2 units/kg daily  Start humalog 0.08 units/kg with meals

## 2018-12-14 NOTE — PLAN OF CARE
Problem: Adult Inpatient Plan of Care  Goal: Plan of Care Review  Outcome: Ongoing (interventions implemented as appropriate)  POC reviewed with pt with verbalized understanding. Free from falls, safety maintained, VSS. Bowel prep initiated and completed per orders and NPO status maintained for colonoscopy today.  Stool still a maroon color, but consistency of water. No complaints at this time. Trending H/H. Call light in reach, bed locked/lowest position. Srx2. Will continue to monitor.

## 2018-12-14 NOTE — NURSING
Pt's medication and discharge instructions given and reviewed, understanding verbalized.  PIV and telemetry monitor removed.  Discharged home with his wife.

## 2018-12-14 NOTE — DISCHARGE INSTRUCTIONS
Thank you for choosing [facility] for your medical care. The primary doctor who is taking care of you at the time of your discharge is Martine Hernandez MD.     Your were admitted to the hospital with Rectal bleeding. 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  If you have any questions about your medical issues, prescriptions, or any other questions, please feel free to contact the [facility] Medicine Dept at [phone] and we will help.    Please direct all long term medication refills and follow up to your primary care provider, Martine Maxwell MD. Thank you again for letting us take care of your health care needs.

## 2018-12-14 NOTE — ASSESSMENT & PLAN NOTE
CKD (chronic kidney disease) stage 3, GFR 30-59 ml/min renal dose all meds; no nephrotoxic agents   Likely 2/2 DM/htn

## 2018-12-14 NOTE — PLAN OF CARE
12/14/18 1608   Final Note   Assessment Type Final Discharge Note   Anticipated Discharge Disposition Home

## 2018-12-14 NOTE — HOSPITAL COURSE
Siddharth Urias is a 84 y.o. male with DM, HTN and prostate CA (2009) who presents to the ED with an onset of rectal bleeding for the past 3 days.  He has chronic diarrhea. His hgb remained stable. He was prepped for colonoscopy which indicated radiation proctitis. GI instructions area as follows:  Colonoscopy done.  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  Treated with APC.  Bleeding likely minor as CBC stable.  Recommend high fiber diet and daily stool softener.  Avoid NSAIDs.  Follow up in my office in 6 weeks.  OK to discharge from GI standpoint.  Call for questions.  He is dc home. He tolerated diet prior to discharge   PE -ALERT nad   skin -w/d  Psych-cooperative and calm. Insight good

## 2018-12-14 NOTE — PLAN OF CARE
Pt awake, alert.  Vital signs stable, denies nausea or pain,  abd soft. No adverse effects of anesthesia noted. Transferred to room per wheelchair,  Report given to Jesus

## 2018-12-14 NOTE — H&P
"[facility] Medicine  History & Physical    Patient Name: Siddharth Urias  MRN: [id number]  Admission Date: 12/13/2018  Attending Physician: Martine Hernandez MD   Primary Care Provider: Martine Maxwell MD         Patient information was obtained from past medical records and ER records.     Subjective:     Principal Problem:Rectal bleeding    Chief Complaint:   Chief Complaint   Patient presents with    Rectal Bleeding     x 3 days red blood from rectum. Denies pain        HPI: Siddharth Urias is a 84 y.o. male with DM, HTN and prostate CA (2009) who presents to the ED with an onset of rectal bleeding for the past 3 days.   He was seen in the ER PTA for a cough and was referred to the ER for further evaluation of rectal bleeding. The patient states that he has bright and dark red rectal bleeding for the past 3 days. Wife reported bright red blood on sheets of bed approx 5 inch [place].  He reports having a BM everyday and has "uncontrollable diarrhea monthly, sometimes weekly" but has not had diarrhea in over a month. The patient is on a daily baby Aspirin regimen but no additional blood thinners. He has had a colonoscopy in the past--last 2013; He denies chronic or excessive use of nsaids or etoh. The patient denies fevers, difficulty breathing, chest pain, abdominal pain,  nausea, vomiting, constipation, rectal pain, blood in urine, dizzy, lightheaded or weakness  or any other symptoms at this time. No known drug allergies noted.    Evaluation in ER -noted stable vital signs and normal hgb down 2 gm from 3 months ago. He had large amount of thick black stool in his diaper in the ER but no active bright red blood. He is admitted for serial h/h, prn transfusion and GI consult. Patient is stable but has had 3 days of bleeding per report.           Past Medical History:   Diagnosis Date    Arthritis     Benign hypertension     Diabetes mellitus, type 2     2/2011    Fatty liver     " Irradiation cystitis     SANDRA (obstructive sleep apnea)     no    Prostate cancer 2009    s/p XRT (T1C, [place] 8)       Past Surgical History:   Procedure Laterality Date    APPENDECTOMY      COLONOSCOPY N/A 3/11/2013    Performed by [facility] at [facility] ENDO    HERNIA REPAIR      bilateral inguinal    TONSILLECTOMY         Review of patient's allergies indicates:   Allergen Reactions    No known drug allergies        No current facility-administered medications on file prior to encounter.      Current Outpatient Medications on File Prior to Encounter   Medication Sig    allopurinol (ZYLOPRIM) 300 MG tablet Take 1 tablet (300 mg total) by mouth once daily.    aspirin (ECOTRIN) 81 MG EC tablet Take 81 mg by mouth once daily.    febuxostat (ULORIC) 40 mg Tab Take 40 mg by mouth once daily.    JANUVIA 100 mg Tab 100 mg once daily.     lidocaine HCL 2% (XYLOCAINE) 2 % jelly Apply topically as needed. Apply topically once nightly to affected part of foot/feet.    LYRICA 75 mg capsule Take 75 mg by mouth 3 (three) times daily.     multivitamin with minerals tablet Take 1 tablet by mouth once daily.    pravastatin (PRAVACHOL) 40 MG tablet Take 40 mg by mouth once daily.     tamsulosin (FLOMAX) 0.4 mg Cp24 Take 0.4 mg by mouth once daily.    VESICARE 5 mg tablet TAKE 1 TABLET DAILY    [DISCONTINUED] captopril (CAPOTEN) 50 MG tablet Take 50 mg by mouth once daily.     [DISCONTINUED] DULoxetine (CYMBALTA) 30 MG capsule Take 1 capsule (30 mg total) by mouth once daily.     Family History     Problem Relation (Age of Onset)    Diabetes Mother        Tobacco Use    Smoking status: Former Smoker     Packs/day: 1.50     Years: 60.00     Pack years: 90.00     Types: Cigarettes     Last attempt to quit: 1/2/2008     Years since quitting: 10.9    Smokeless tobacco: Former User     Quit date: 3/11/2010   Substance and Sexual Activity    Alcohol use: Yes     Comment: social    Drug use: No    Sexual  activity: Not on file     Review of Systems   Constitutional: Negative for chills, fever and unexpected weight change.   HENT: Negative for congestion and sore throat.    Eyes: Negative for discharge and itching.   Respiratory: Negative for cough and shortness of breath.    Cardiovascular: Negative for chest pain and leg swelling.   Gastrointestinal: Positive for blood in stool (bright red and dark both ). Negative for abdominal distention, abdominal pain, diarrhea, rectal pain and vomiting.   Endocrine: Negative for cold intolerance and heat intolerance.   Genitourinary: Negative for difficulty urinating and dysuria.   Musculoskeletal: Negative for arthralgias and back pain.   Skin: Negative for pallor and rash.   Allergic/Immunologic: Negative for environmental allergies and food allergies.   Neurological: Negative for dizziness and weakness.   Hematological: Negative for adenopathy. Does not bruise/bleed easily.   Psychiatric/Behavioral: Negative for agitation and decreased concentration.     Objective:     Vital Signs (Most Recent):  Temp: 97.9 °F (36.6 °C) (12/13/18 1917)  Pulse: 66 (12/13/18 1917)  Resp: 16 (12/13/18 1917)  BP: (!) 151/90 (12/13/18 1917)  SpO2: (!) 91 % (12/13/18 1917) Vital Signs (24h Range):  Temp:  [97 °F (36.1 °C)-98.6 °F (37 °C)] 97.9 °F (36.6 °C)  Pulse:  [] 66  Resp:  [16-20] 16  SpO2:  [91 %-92 %] 91 %  BP: (131-169)/(67-94) 151/90     Weight: 102.6 kg (226 lb 3.1 oz)  Body mass index is 33.89 kg/m².    Physical Exam   Constitutional: He is oriented to person, place, and time. He appears well-developed and well-nourished. No distress.   HENT:   Head: Normocephalic and atraumatic.   Right Ear: External ear normal.   Left Ear: External ear normal.   Nose: Nose normal.   Mouth/Throat: Oropharynx is clear and moist. No oropharyngeal exudate.   Eyes: Conjunctivae and EOM are normal. Right eye exhibits no discharge. Left eye exhibits no discharge. No scleral icterus.   Neck: Neck  supple. No thyromegaly present.   Cardiovascular: Normal rate, regular rhythm, normal heart sounds and intact distal pulses. Exam reveals no gallop and no friction rub.   No murmur heard.  Pulmonary/Chest: Effort normal and breath sounds normal. No respiratory distress. He has no wheezes.   Abdominal: Soft. Bowel sounds are normal. He exhibits no distension and no mass. There is no tenderness. There is no rebound and no guarding.   Musculoskeletal: Normal range of motion. He exhibits no edema or tenderness.   Lymphadenopathy:     He has no cervical adenopathy.   Neurological: He is alert and oriented to person, place, and time. No cranial nerve deficit. Coordination normal.   Skin: Skin is warm and dry. Capillary refill takes less than 2 seconds. No rash noted. No erythema.   Psychiatric: He has a normal mood and affect. His behavior is normal. Judgment and thought content normal.   Nursing note and vitals reviewed.        CRANIAL NERVES     CN III, IV, VI   Extraocular motions are normal.        Significant Labs:   BMP:   Recent Labs   Lab 12/13/18  0940   *      K 4.4      CO2 26   BUN 32*   CREATININE 1.6*   CALCIUM 9.2     CBC:   Recent Labs   Lab 12/13/18  0940   WBC 9.00   HGB 14.9   HCT 45.3   *       Significant Imaging: I have reviewed and interpreted all pertinent imaging results/findings within the past 24 hours.    Assessment/Plan:     * Rectal bleeding    Acute, possible upper combined with lower -no evidence active hemorrhoid bleed   Could be diverticular /ischemic but no evidence active bleed or recent hypotension     Keep npo and start PPI. Gi consulted -if acute bleed immediate evaluation   Is type and screened.   Monitor serial h/h; transfuse not required at present.   Monitor overnight        Chronic kidney disease, stage 3     CKD (chronic kidney disease) stage 3, GFR 30-59 ml/min renal dose all meds; no nephrotoxic agents   Likely 2/2 DM/htn         Type 2 diabetes  mellitus, controlled, with renal complications    DM2-hold po medications; Accucks and ISS; start long acting insulin as indicated.   Start levemir at 0.2 units/kg daily  Start humalog 0.08 units/kg with meals        SANDRA (obstructive sleep apnea)    Chronic, stable -oxygen prn        Hypertension associated with diabetes    Chronic, stable-not currently on meds   Hypertension Medications                VTE Risk Mitigation (From admission, onward)        Ordered     IP VTE HIGH RISK PATIENT  Once      12/13/18 1718     Reason for no Mechanical VTE Prophylaxis  Once      12/13/18 1718     Place LAKESHA hose  Until discontinued      12/13/18 1407     Place sequential compression device  Until discontinued      12/13/18 1407      sent a text to Dr Bonds -will see in am       Martine Hernandez MD  Department of Hospital Medicine   [facility]

## 2018-12-14 NOTE — ANESTHESIA POSTPROCEDURE EVALUATION
"Anesthesia Post Evaluation    Patient: Siddharth Urias    Procedure(s) Performed: Procedure(s) (LRB):  COLONOSCOPY (N/A)    Final Anesthesia Type: general  Patient location during evaluation: PACU  Patient participation: Yes- Able to Participate  Level of consciousness: awake and alert  Post-procedure vital signs: reviewed and stable  Pain management: adequate  Airway patency: patent  PONV status at discharge: No PONV  Anesthetic complications: no      Cardiovascular status: hemodynamically stable and blood pressure returned to baseline  Respiratory status: unassisted, spontaneous ventilation and room air  Hydration status: euvolemic  Follow-up not needed.        Visit Vitals  /68   Pulse 81   Temp 35.7 °C (96.2 °F) (Oral)   Resp 16   Ht 5' 8.5" (1.74 m)   Wt 102.6 kg (226 lb 3.1 oz)   SpO2 (!) 93%   BMI 33.89 kg/m²       Pain/Edward Score: No Data Recorded      "

## 2018-12-14 NOTE — PLAN OF CARE
Patient reports living with spouse, reports independence, denies use of assist device, does not have POA or living will. Pharmacy is [facility], PCP is Dr. Maxwell. Patient plans to return home with no needs.      12/14/18 1500   Discharge Assessment   Assessment Type Discharge Planning Assessment   Confirmed/corrected address and phone number on facesheet? Yes   Assessment information obtained from? Patient   Communicated expected length of stay with patient/caregiver yes   Prior to hospitilization cognitive status: Alert/Oriented   Prior to hospitalization functional status: Independent   Current cognitive status: Alert/Oriented   Current Functional Status: Independent   Lives With spouse   Able to Return to Prior Arrangements yes   Is patient able to care for self after discharge? Yes   Patient's perception of discharge disposition home or selfcare   Readmission Within the Last 30 Days no previous admission in last 30 days   Patient currently being followed by outpatient case management? No   Patient currently receives any other outside agency services? No   Equipment Currently Used at Home none   Do you have any problems affording any of your prescribed medications? No   Is the patient taking medications as prescribed? yes   Does the patient have transportation home? Yes   Does the patient receive services at the [facility]? No   Discharge Plan A Home   Patient/Family in Agreement with Plan yes

## 2018-12-14 NOTE — H&P
[facility]     CC: Rectal bleeding    HPI 84 y.o. male with rectal bleeding, onset about 3 days ago, bright red, copious amount, ongoing, with no alleviating/exacerbating factors.  He last had colonoscopy several years ago and is uncertain if he had polyps.  No family history of colon cancer.  No colon surgeries.  No blood thinners.  Denies UGI complaints.    Past Medical History:   Diagnosis Date    Arthritis     Benign hypertension     Diabetes mellitus, type 2     2/2011    Fatty liver     Irradiation cystitis     SANDRA (obstructive sleep apnea)     no    Prostate cancer 2009    s/p XRT (T1C, João 8)       Past Surgical History:   Procedure Laterality Date    APPENDECTOMY      COLONOSCOPY N/A 3/11/2013    Performed by [facility]ano Pro [facility] at [facility] ENDO    HERNIA REPAIR      bilateral inguinal    TONSILLECTOMY         Social History     Tobacco Use    Smoking status: Former Smoker     Packs/day: 1.50     Years: 60.00     Pack years: 90.00     Types: Cigarettes     Last attempt to quit: 1/2/2008     Years since quitting: 10.9    Smokeless tobacco: Former User     Quit date: 3/11/2010   Substance Use Topics    Alcohol use: Yes     Comment: social    Drug use: No       Family History   Problem Relation Age of Onset    Diabetes Mother        Review of Systems  General ROS: negative for - chills, fever or weight loss  Psychological ROS: negative for - hallucination, depression or suicidal ideation  Ophthalmic ROS: negative for - blurry vision, photophobia or eye pain  ENT ROS: negative for - epistaxis, sore throat or rhinorrhea  Respiratory ROS: no cough, shortness of breath, or wheezing  Cardiovascular ROS: no chest pain or dyspnea on exertion  Gastrointestinal ROS: + rectal bleeding  Genito-Urinary ROS: no dysuria, trouble voiding, or hematuria  Musculoskeletal ROS: negative for - arthralgia, myalgia, weakness  Neurological ROS: no syncope or seizures; no ataxia  Dermatological ROS:  "negative for pruritis, rash and jaundice    Physical Examination  BP (!) 152/74 (BP Location: Left arm, Patient Position: Lying)   Pulse 65   Temp 96.2 °F (35.7 °C) (Oral)   Resp 16   Ht 5' 8.5" (1.74 m)   Wt 102.6 kg (226 lb 3.1 oz)   SpO2 (!) 94%   BMI 33.89 kg/m²   General appearance: alert, cooperative, no distress  HENT: Normocephalic, atraumatic, neck symmetrical, no nasal discharge   Eyes: conjunctivae/corneas clear, PERRL, EOM's intact, sclera anicteric  Lungs: clear to auscultation bilaterally, no dullness to percussion bilaterally, symmetric expansion, breathing unlabored  Heart: regular rate and rhythm without rub; no displacement of the PMI   Abdomen: obese, NT   Extremities: extremities symmetric; no clubbing, cyanosis, or edema  Integument: Skin color, texture, turgor normal; no rashes; hair distrubution normal, no jaundice  Neurologic: Alert and oriented X 3, no focal sensory or motor neurologic deficits  Psychiatric: no pressured speech; normal affect; no evidence of impaired cognition, no anxiety/depression     Labs:  Lab Results   Component Value Date    WBC 9.00 12/13/2018    HGB 14.1 12/14/2018    HCT 43.3 12/14/2018    MCV 81 (L) 12/13/2018     (L) 12/13/2018       CMP  Sodium   Date Value Ref Range Status   12/14/2018 140 136 - 145 mmol/L Final     Potassium   Date Value Ref Range Status   12/14/2018 4.3 3.5 - 5.1 mmol/L Final     Chloride   Date Value Ref Range Status   12/14/2018 106 95 - 110 mmol/L Final     CO2   Date Value Ref Range Status   12/14/2018 25 23 - 29 mmol/L Final     Glucose   Date Value Ref Range Status   12/14/2018 127 (H) 70 - 110 mg/dL Final     BUN, Bld   Date Value Ref Range Status   12/14/2018 19 8 - 23 mg/dL Final     BUN   Date Value Ref Range Status   03/05/2018 24 (A) 4 - 21 mg/dL Final     Creatinine   Date Value Ref Range Status   12/14/2018 1.2 0.5 - 1.4 mg/dL Final     Calcium   Date Value Ref Range Status   12/14/2018 8.5 (L) 8.7 - 10.5 mg/dL " Final     Total Protein   Date Value Ref Range Status   12/13/2018 6.9 6.0 - 8.4 g/dL Final     Albumin   Date Value Ref Range Status   12/13/2018 3.4 (L) 3.5 - 5.2 g/dL Final     Total Bilirubin   Date Value Ref Range Status   12/13/2018 0.5 0.1 - 1.0 mg/dL Final     Comment:     For infants and newborns, interpretation of results should be based  on gestational age, weight and in agreement with clinical  observations.  Premature Infant recommended reference ranges:  Up to 24 hours.............<8.0 mg/dL  Up to 48 hours............<12.0 mg/dL  3-5 days..................<15.0 mg/dL  6-29 days.................<15.0 mg/dL       Alkaline Phosphatase   Date Value Ref Range Status   12/13/2018 56 55 - 135 U/L Final     AST   Date Value Ref Range Status   12/13/2018 17 10 - 40 U/L Final     ALT   Date Value Ref Range Status   12/13/2018 23 10 - 44 U/L Final     Anion Gap   Date Value Ref Range Status   12/14/2018 9 8 - 16 mmol/L Final     eGFR if    Date Value Ref Range Status   12/14/2018 >60 >60 mL/min/1.73 m^2 Final     eGFR if non    Date Value Ref Range Status   12/14/2018 55 (A) >60 mL/min/1.73 m^2 Final     Comment:     Calculation used to obtain the estimated glomerular filtration  rate (eGFR) is the CKD-EPI equation.              Imaging:  Ultrasound was independently visualized and reviewed by me and showed fatty liver.    I have personally reviewed these images    Case discussed with nursing who relates no further bleeding since admission.    Assessment:   1.  Rectal bleeding without anemia    Plan:  1.  Colonoscopy today  2.  Monitor CBC  3.  Further recommendations to follow after above.  4.  Communication will be sent to the referring MD, Dr. Hernandez regarding my assessment and plan on this patient via EPIC.      Noel Nation MD  [facility]  [address], [address]  [place] [address]  Office: [phone]  Fax: [phone]

## 2018-12-14 NOTE — [employer] PATIENT INSTRUCTIONS
Discharge Summary/Instructions after an Endoscopic Procedure  Patient Name: Siddharth Urias  Patient MRN: [id number]  Patient YOB: 1934  Friday, December 14, 2018  Noel Nation MD  RESTRICTIONS:  During your procedure today, you received medications for sedation.  These   medications may affect your judgment, balance and coordination.  Therefore,   for 24 hours, you have the following restrictions:   - DO NOT drive a car, operate machinery, make legal/financial decisions,   sign important papers or drink alcohol.    ACTIVITY:  Today: no heavy lifting, straining or running due to procedural   sedation/anesthesia.  The following day: return to full activity including work.  DIET:  Eat and drink normally unless instructed otherwise.     TREATMENT FOR COMMON SIDE EFFECTS:  - Mild abdominal pain, nausea, belching, bloating or excessive gas:  rest,   eat lightly and use a heating pad.  - Sore Throat: treat with throat lozenges and/or gargle with warm salt   water.  - Because air was used during the procedure, expelling large amounts of air   from your rectum or belching is normal.  - If a bowel prep was taken, you may not have a bowel movement for 1-3 days.    This is normal.  SYMPTOMS TO WATCH FOR AND REPORT TO YOUR PHYSICIAN:  1. Abdominal pain or bloating, other than gas cramps.  2. Chest pain.  3. Back pain.  4. Signs of infection such as: chills or fever occurring within 24 hours   after the procedure.  5. Rectal bleeding, which would show as bright red, maroon, or black stools.   (A tablespoon of blood from the rectum is not serious, especially if   hemorrhoids are present.)  6. Vomiting.  7. Weakness or dizziness.  GO DIRECTLY TO THE NEAREST EMERGENCY ROOM IF YOU HAVE ANY OF THE FOLLOWING:      Difficulty breathing              Chills and/or fever over 101 F   Persistent vomiting and/or vomiting blood   Severe abdominal pain   Severe chest pain   Black, tarry stools   Bleeding- more than one  tablespoon   Any other symptom or condition that you feel may need urgent attention  Your doctor recommends these additional instructions:  If any biopsies were taken, your doctors clinic will contact you in 1 to 2   weeks with any results.  - [facility] for possible [facility] same day.   - High fiber diet.   - Continue present medications.   - No aspirin, ibuprofen, naproxen, or other non-steroidal anti-inflammatory   drugs.   - Await pathology results.   - Repeat colonoscopy PRN for retreatment.   - Repeat colonoscopy in 3 years for surveillance.   - Discharge patient to home (ambulatory).   - Take Sitz baths Daily as needed.   - Return to my office in 6 weeks.  For questions, problems or results please call your physician - Noel Nation MD at Work:  [phone].  [facility], EMERGENCY ROOM PHONE NUMBER: [phone]  IF A COMPLICATION OR EMERGENCY SITUATION ARISES AND YOU ARE UNABLE TO REACH   YOUR PHYSICIAN - GO DIRECTLY TO THE EMERGENCY ROOM.  Noel Nation MD  12/14/2018 1:19:14 PM  This report has been verified and signed electronically.  [employer]

## 2018-12-14 NOTE — ANESTHESIA PREPROCEDURE EVALUATION
12/14/2018  Siddharth Urias is a 84 y.o., male.    Anesthesia Evaluation      I have reviewed the Medications.     Review of Systems  Anesthesia Hx:  No problems with previous Anesthesia   Social:  No Alcohol Use, Former Smoker    Hematology/Oncology:        Current/Recent Cancer.   Cardiovascular:   Hypertension    Pulmonary:   Sleep Apnea    Renal/:   Chronic Renal Disease, CRI    Hepatic/GI:  Hepatic/GI Normal    Neurological:  Neurology Normal    Endocrine:   Diabetes        Physical Exam  General:  Obesity    Airway/Jaw/Neck:  Airway Findings: Mouth Opening: Normal Tongue: Normal  General Airway Assessment: Adult, Average  Mallampati: II  Jaw/Neck Findings:  Neck ROM: Normal ROM       Chest/Lungs:  Chest/Lungs Findings: Clear to auscultation, Normal Respiratory Rate     Heart/Vascular:  Heart Findings: Rate: Normal  Rhythm: Regular Rhythm  Sounds: Normal  Heart murmur: negative       Mental Status:  Mental Status Findings:  Cooperative, Alert and Oriented         Anesthesia Plan  Type of Anesthesia, risks & benefits discussed:  Anesthesia Type:  general  Patient's Preference:   Intra-op Monitoring Plan:   Intra-op Monitoring Plan Comments:   Post Op Pain Control Plan:   Post Op Pain Control Plan Comments:   Induction:   IV  Beta Blocker:  Patient is not currently on a Beta-Blocker (No further documentation required).       Informed Consent: Patient understands risks and agrees with Anesthesia plan.  Questions answered. Anesthesia consent signed with patient.  ASA Score: 3     Day of Surgery Review of History & Physical:            Ready For Surgery From Anesthesia Perspective.

## 2018-12-14 NOTE — CONSULTS
See H&P for full consult.    Colonoscopy done.  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  Treated with APC.  Bleeding likely minor as CBC stable.  Recommend high fiber diet and daily stool softener.  Avoid NSAIDs.  Follow up in my office in 6 weeks.  OK to discharge from GI standpoint.  Call for questions.

## 2018-12-14 NOTE — ASSESSMENT & PLAN NOTE
Acute, possible upper combined with lower -no evidence active hemorrhoid bleed   Could be diverticular /ischemic but no evidence active bleed or recent hypotension     Keep npo and start PPI. Gi consulted -if acute bleed immediate evaluation   Is type and screened.   Monitor serial h/h; transfuse not required at present.   Monitor overnight

## 2018-12-14 NOTE — SUBJECTIVE & OBJECTIVE
Past Medical History:   Diagnosis Date    Arthritis     Benign hypertension     Diabetes mellitus, type 2     2/2011    Fatty liver     Irradiation cystitis     SANDRA (obstructive sleep apnea)     no    Prostate cancer 2009    s/p XRT (T1C, [place] 8)       Past Surgical History:   Procedure Laterality Date    APPENDECTOMY      COLONOSCOPY N/A 3/11/2013    [facility] at [facility]    HERNIA REPAIR      bilateral inguinal    TONSILLECTOMY         Review of patient's allergies indicates:   Allergen Reactions    No known drug allergies        No current facility-administered medications on file prior to encounter.      Current Outpatient Medications on File Prior to Encounter   Medication Sig    allopurinol (ZYLOPRIM) 300 MG tablet Take 1 tablet (300 mg total) by mouth once daily.    aspirin (ECOTRIN) 81 MG EC tablet Take 81 mg by mouth once daily.    febuxostat (ULORIC) 40 mg Tab Take 40 mg by mouth once daily.    JANUVIA 100 mg Tab 100 mg once daily.     lidocaine HCL 2% (XYLOCAINE) 2 % jelly Apply topically as needed. Apply topically once nightly to affected part of foot/feet.    LYRICA 75 mg capsule Take 75 mg by mouth 3 (three) times daily.     multivitamin with minerals tablet Take 1 tablet by mouth once daily.    pravastatin (PRAVACHOL) 40 MG tablet Take 40 mg by mouth once daily.     tamsulosin (FLOMAX) 0.4 mg Cp24 Take 0.4 mg by mouth once daily.    VESICARE 5 mg tablet TAKE 1 TABLET DAILY    [DISCONTINUED] captopril (CAPOTEN) 50 MG tablet Take 50 mg by mouth once daily.     [DISCONTINUED] DULoxetine (CYMBALTA) 30 MG capsule Take 1 capsule (30 mg total) by mouth once daily.     Family History     Problem Relation (Age of Onset)    Diabetes Mother        Tobacco Use    Smoking status: Former Smoker     Packs/day: 1.50     Years: 60.00     Pack years: 90.00     Types: Cigarettes     Last attempt to quit: 1/2/2008     Years since quitting: 10.9    Smokeless tobacco:  Former User     Quit date: 3/11/2010   Substance and Sexual Activity    Alcohol use: Yes     Comment: social    Drug use: No    Sexual activity: Not on file     Review of Systems   Constitutional: Negative for chills, fever and unexpected weight change.   HENT: Negative for congestion and sore throat.    Eyes: Negative for discharge and itching.   Respiratory: Negative for cough and shortness of breath.    Cardiovascular: Negative for chest pain and leg swelling.   Gastrointestinal: Positive for blood in stool (bright red and dark both ). Negative for abdominal distention, abdominal pain, diarrhea, rectal pain and vomiting.   Endocrine: Negative for cold intolerance and heat intolerance.   Genitourinary: Negative for difficulty urinating and dysuria.   Musculoskeletal: Negative for arthralgias and back pain.   Skin: Negative for pallor and rash.   Allergic/Immunologic: Negative for environmental allergies and food allergies.   Neurological: Negative for dizziness and weakness.   Hematological: Negative for adenopathy. Does not bruise/bleed easily.   Psychiatric/Behavioral: Negative for agitation and decreased concentration.     Objective:     Vital Signs (Most Recent):  Temp: 97.9 °F (36.6 °C) (12/13/18 1917)  Pulse: 66 (12/13/18 1917)  Resp: 16 (12/13/18 1917)  BP: (!) 151/90 (12/13/18 1917)  SpO2: (!) 91 % (12/13/18 1917) Vital Signs (24h Range):  Temp:  [97 °F (36.1 °C)-98.6 °F (37 °C)] 97.9 °F (36.6 °C)  Pulse:  [] 66  Resp:  [16-20] 16  SpO2:  [91 %-92 %] 91 %  BP: (131-169)/(67-94) 151/90     Weight: 102.6 kg (226 lb 3.1 oz)  Body mass index is 33.89 kg/m².    Physical Exam   Constitutional: He is oriented to person, place, and time. He appears well-developed and well-nourished. No distress.   HENT:   Head: Normocephalic and atraumatic.   Right Ear: External ear normal.   Left Ear: External ear normal.   Nose: Nose normal.   Mouth/Throat: Oropharynx is clear and moist. No oropharyngeal exudate.    Eyes: Conjunctivae and EOM are normal. Right eye exhibits no discharge. Left eye exhibits no discharge. No scleral icterus.   Neck: Neck supple. No thyromegaly present.   Cardiovascular: Normal rate, regular rhythm, normal heart sounds and intact distal pulses. Exam reveals no gallop and no friction rub.   No murmur heard.  Pulmonary/Chest: Effort normal and breath sounds normal. No respiratory distress. He has no wheezes.   Abdominal: Soft. Bowel sounds are normal. He exhibits no distension and no mass. There is no tenderness. There is no rebound and no guarding.   Musculoskeletal: Normal range of motion. He exhibits no edema or tenderness.   Lymphadenopathy:     He has no cervical adenopathy.   Neurological: He is alert and oriented to person, place, and time. No cranial nerve deficit. Coordination normal.   Skin: Skin is warm and dry. Capillary refill takes less than 2 seconds. No rash noted. No erythema.   Psychiatric: He has a normal mood and affect. His behavior is normal. Judgment and thought content normal.   Nursing note and vitals reviewed.        CRANIAL NERVES     CN III, IV, VI   Extraocular motions are normal.        Significant Labs:   BMP:   Recent Labs   Lab 12/13/18  0940   *      K 4.4      CO2 26   BUN 32*   CREATININE 1.6*   CALCIUM 9.2     CBC:   Recent Labs   Lab 12/13/18  0940   WBC 9.00   HGB 14.9   HCT 45.3   *       Significant Imaging: I have reviewed and interpreted all pertinent imaging results/findings within the past 24 hours.

## 2018-12-14 NOTE — DISCHARGE SUMMARY
"[facility] Medicine  Discharge Summary      Patient Name: Siddharth Urias  MRN: [id number]  Admission Date: 12/13/2018  Hospital Length of Stay: 0 days  Discharge Date and Time: No discharge date for patient encounter.  Attending Physician: Martine Hernandez MD   Discharging Provider: Martine Hernandez MD  Primary Care Provider: Martine Maxwell MD      HPI:   Siddharth Urias is a 84 y.o. male with DM, HTN and prostate CA (2009) who presents to the ED with an onset of rectal bleeding for the past 3 days.   He was seen in the ER PTA for a cough and was referred to the ER for further evaluation of rectal bleeding. The patient states that he has bright and dark red rectal bleeding for the past 3 days. Wife reported bright red blood on sheets of bed approx 5 inch [place].  He reports having a BM everyday and has "uncontrollable diarrhea monthly, sometimes weekly" but has not had diarrhea in over a month. The patient is on a daily baby Aspirin regimen but no additional blood thinners. He has had a colonoscopy in the past--last 2013; He denies chronic or excessive use of nsaids or etoh. The patient denies fevers, difficulty breathing, chest pain, abdominal pain,  nausea, vomiting, constipation, rectal pain, blood in urine, dizzy, lightheaded or weakness  or any other symptoms at this time. No known drug allergies noted.    Evaluation in ER -noted stable vital signs and normal hgb down 2 gm from 3 months ago. He had large amount of thick black stool in his diaper in the ER but no active bright red blood. He is admitted for serial h/h, prn transfusion and GI consult. Patient is stable but has had 3 days of bleeding per report.           Procedure(s) (LRB):  COLONOSCOPY (N/A)      Hospital Course:   Siddharth Urias is a 84 y.o. male with DM, HTN and prostate CA (2009) who presents to the ED with an onset of rectal bleeding for the past 3 days.  He has chronic diarrhea. His hgb remained stable. He " was prepped for colonoscopy which indicated radiation proctitis. GI instructions area as follows:  Colonoscopy done.  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  Treated with APC.  Bleeding likely minor as CBC stable.  Recommend high fiber diet and daily stool softener.  Avoid NSAIDs.  Follow up in my office in 6 weeks.  OK to discharge from GI standpoint.  Call for questions.  He is dc home. He tolerated diet prior to discharge   PE -ALERT nad   skin -w/d  Psych-cooperative and calm. Insight good         Consults:   Consults (From admission, onward)        Status Ordering Provider     Inpatient consult to Gastroenterology  Once     Provider:  Noel Aguiar MD    Completed MARTINE PICHARDO          No new Assessment & Plan notes have been filed under this hospital service since the last note was generated.  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    Specialty:  Family Medicine  Why:  As needed  Contact information:  [address]  [place] [id number] [phone]             Noel Aguiar MD In 4 weeks.    Specialty:  Gastroenterology  Contact information:  [address]  [address]  [place] [address] [phone]                 Patient Instructions:      Diet diabetic     Activity as tolerated       Significant Diagnostic Studies:      Results for REESE PETERSON (MRN [id number]) as of 12/14/2018 15:01   Ref. Range 12/13/2018 09:40 12/14/2018 08:30   Sodium Latest Ref Range: 136 - 145 mmol/L 141 140   Potassium Latest Ref Range: 3.5 - 5.1 mmol/L 4.4 4.3   Chloride Latest Ref Range: 95 - 110 mmol/L 107 106   CO2 Latest Ref Range: 23 - 29 mmol/L 26 25   Anion Gap Latest Ref Range: 8 - 16 mmol/L 8 9   BUN, Bld Latest Ref Range: 8 - 23 mg/dL 32 (H) 19   Creatinine Latest Ref Range: 0.5 - 1.4 mg/dL 1.6 (H) 1.2   eGFR if non African American Latest Ref Range: >60 mL/min/1.73 m^2 39 (A) 55 (A)   eGFR if African American Latest Ref Range: >60 mL/min/1.73 m^2 45 (A) >60   Glucose Latest Ref Range: 70 - 110 mg/dL 114 (H) 127 (H)   Calcium Latest Ref Range: 8.7 - 10.5 mg/dL 9.2 8.5 (L)   Alkaline Phosphatase Latest Ref Range: 55 - 135 U/L 56    Total Protein Latest Ref Range: 6.0 - 8.4 g/dL 6.9    Albumin Latest Ref Range: 3.5 - 5.2 g/dL 3.4 (L)    Total Bilirubin Latest Ref Range: 0.1 - 1.0 mg/dL 0.5    AST Latest Ref Range: 10 - 40 U/L 17    ALT Latest Ref Range: 10 - 44 U/L 23      Results for REESE PETERSON (MRN [id number]) as of 12/14/2018 15:01   Ref. Range 12/13/2018 09:40 12/13/2018 23:15 12/14/2018 07:26 12/14/2018 13:40   Hemoglobin Latest Ref Range: 14.0 - 18.0 g/dL 14.9 13.6 (L) 14.1 14.0   Hematocrit Latest Ref Range: 40.0 - 54.0 % 45.3 42.8 43.3 43.6     Results for REESE PETERSON (MRN [id number]) as of 12/14/2018 15:01   Ref. Range 12/13/2018 09:40   Protime Latest Ref Range: 9.0 - 12.5 sec 10.7   Coumadin Monitoring INR Latest Ref Range: 0.8 - 1.2  1.0   aPTT Latest Ref Range: 21.0 - 32.0 sec 27.1     Pending Diagnostic Studies:     None         Medications:  Reconciled Home Medications:      Medication List      CONTINUE taking these medications    allopurinol 300 MG tablet  Commonly known as:  ZYLOPRIM  Take 1 tablet (300 mg total) by mouth once daily.     aspirin 81 MG EC tablet  Commonly known as:  ECOTRIN  Take 81 mg by mouth once daily.      JANUVIA 100 MG Tab  Generic drug:  SITagliptin  100 mg once daily.     lidocaine HCL 2% 2 % jelly  Commonly known as:  XYLOCAINE  Apply topically as needed. Apply topically once nightly to affected part of foot/feet.     LYRICA 75 MG capsule  Generic drug:  pregabalin  Take 75 mg by mouth 3 (three) times daily.     multivitamin with minerals tablet  Take 1 tablet by mouth once daily.     pravastatin 40 MG tablet  Commonly known as:  PRAVACHOL  Take 40 mg by mouth once daily.     tamsulosin 0.4 mg Cap  Commonly known as:  FLOMAX  Take 0.4 mg by mouth once daily.     ULORIC 40 mg Tab  Generic drug:  febuxostat  Take 40 mg by mouth once daily.     VESICARE 5 MG tablet  Generic drug:  solifenacin  TAKE 1 TABLET DAILY            Indwelling Lines/Drains at time of discharge:   Lines/Drains/Airways          None          Time spent on the discharge of patient: 25 minutes  Patient was seen and examined on the date of discharge and determined to be suitable for discharge.         Martine Hernandez MD  Department of Hospital Medicine  [facility]

## 2018-12-17 ENCOUNTER — TELEPHONE (OUTPATIENT)
Dept: FAMILY MEDICINE | Facility: CLINIC | Age: 83
End: 2018-12-17

## 2018-12-17 ENCOUNTER — TELEPHONE (OUTPATIENT)
Dept: MEDSURG UNIT | Facility: HOSPITAL | Age: 83
End: 2018-12-17

## 2018-12-17 NOTE — TELEPHONE ENCOUNTER
----- Message from Jeannette Elliott sent at 12/17/2018 11:15 AM CST -----  Contact: self  Type: Needs Medical Advice    Who Called:  self  Symptoms (please be specific):  Discharge discharge 12/14/18 [facility] dx rectal bleeding  Best Call Back Number: [phone] or [phone]  Additional Information: patient was told to contact his primary doctor. Please call patient. Thanks!

## 2018-12-17 NOTE — TELEPHONE ENCOUNTER
Spoke with patient's wife Enedina and she did not want to make the appointment for the patient at all, as she is concerned about her deductible, especially as she said the patient is seeing Dr. Aguiar on 1/10/19. Enedina finally agreed to have the patient come for his hospital f/u appointment on 1/14/19.

## 2019-01-10 ENCOUNTER — OFFICE VISIT (OUTPATIENT)
Dept: GASTROENTEROLOGY | Facility: CLINIC | Age: 84
End: 2019-01-10
Payer: MEDICARE

## 2019-01-10 VITALS — HEIGHT: 69 IN | WEIGHT: 222.88 LBS | BODY MASS INDEX: 33.01 KG/M2

## 2019-01-10 DIAGNOSIS — K52.9 CHRONIC DIARRHEA: Primary | ICD-10-CM

## 2019-01-10 DIAGNOSIS — K62.7 PROCTITIS, RADIATION: ICD-10-CM

## 2019-01-10 PROBLEM — K62.5 RECTAL BLEEDING: Status: RESOLVED | Noted: 2018-12-13 | Resolved: 2019-01-10

## 2019-01-10 PROCEDURE — 99214 OFFICE O/P EST MOD 30 MIN: CPT | Mod: S$PBB,,, | Performed by: INTERNAL MEDICINE

## 2019-01-10 PROCEDURE — 99999 PR PBB SHADOW E&M-EST. PATIENT-LVL III: ICD-10-PCS | Mod: PBBFAC,,, | Performed by: INTERNAL MEDICINE

## 2019-01-10 PROCEDURE — 99214 PR OFFICE/OUTPT VISIT, EST, LEVL IV, 30-39 MIN: ICD-10-PCS | Mod: S$PBB,,, | Performed by: INTERNAL MEDICINE

## 2019-01-10 PROCEDURE — 99213 OFFICE O/P EST LOW 20 MIN: CPT | Mod: PBBFAC,PO | Performed by: INTERNAL MEDICINE

## 2019-01-10 PROCEDURE — 99999 PR PBB SHADOW E&M-EST. PATIENT-LVL III: CPT | Mod: PBBFAC,,, | Performed by: INTERNAL MEDICINE

## 2019-01-10 RX ORDER — CHOLESTYRAMINE 4 G/4.8G
4 POWDER, FOR SUSPENSION ORAL 3 TIMES DAILY
Qty: 270 PACKET | Refills: 3 | Status: SHIPPED | OUTPATIENT
Start: 2019-01-10 | End: 2021-06-28

## 2019-01-10 NOTE — PROGRESS NOTES
"Subjective:       Patient ID: Siddharth Urias is a 84 y.o. male.    This is an established patient.      Chief Complaint: Diarrhea    Diarrhea    This is a chronic problem. The current episode started more than 1 year ago (a few years ago). The problem occurs 2 to 4 times per day. The problem has been unchanged. Diarrhea characteristics: loose, explosive, nonbloody. Diarrhea awakens from sleep: rarely. Associated symptoms include bloating. Pertinent negatives include no abdominal pain, chills, coughing, fever, vomiting or weight loss. Nothing aggravates the symptoms. There are no known risk factors. He has tried anti-motility drug for the symptoms. The treatment provided no relief. recent rectal bleeding secondary to radiation proctitis treated with colonoscopy/APC     Review of Systems   Constitutional: Negative for chills, fatigue, fever and weight loss.   HENT: Negative for trouble swallowing.    Respiratory: Negative for cough, shortness of breath and wheezing.    Cardiovascular: Negative for chest pain and palpitations.   Gastrointestinal: Positive for bloating and diarrhea. Negative for abdominal pain, constipation, nausea and vomiting.   Skin: Negative for color change and rash.   All other systems reviewed and are negative.      Objective:       Vitals:    01/10/19 1417   Weight: 101.1 kg (222 lb 14.2 oz)   Height: 5' 8.5" (1.74 m)         Physical Exam   Constitutional: He is oriented to person, place, and time. He appears well-developed and well-nourished.   HENT:   Head: Normocephalic and atraumatic.   Mouth/Throat: Oropharynx is clear and moist.   Eyes: Conjunctivae are normal. Pupils are equal, round, and reactive to light. No scleral icterus.   Neck: Normal range of motion. Neck supple. No thyromegaly present.   Cardiovascular: Normal rate and regular rhythm.   No murmur heard.  Pulmonary/Chest: Effort normal and breath sounds normal. He has no wheezes.   Abdominal: Soft. Bowel sounds are normal. " He exhibits no distension. There is no tenderness.   Obese     Lymphadenopathy:     He has no cervical adenopathy.   Neurological: He is alert and oriented to person, place, and time.   Vitals reviewed.         Lab Results   Component Value Date    WBC 9.00 12/13/2018    HGB 14.0 12/14/2018    HCT 43.6 12/14/2018    MCV 81 (L) 12/13/2018     (L) 12/13/2018     Further history was obtained from the patient's wife who was present throughout the interview and states that problem has caused lifestyle inconveniences.  History is otherwise as above in the HPI.       Assessment:       1. Chronic diarrhea        Plan:       1.  Check labs and stool studies  2.  Check ultrasound  3.  Increase fiber  4.  Add cholestyramine to current regimen  5.  Consider probiotic

## 2019-01-13 ENCOUNTER — HOSPITAL ENCOUNTER (EMERGENCY)
Facility: HOSPITAL | Age: 84
Discharge: HOME | End: 2019-01-14
Attending: EMERGENCY MEDICINE
Payer: MEDICARE

## 2019-01-13 DIAGNOSIS — K92.1 BLACK STOOLS: Primary | ICD-10-CM

## 2019-01-13 DIAGNOSIS — R00.0 TACHYCARDIA: ICD-10-CM

## 2019-01-13 LAB
ALBUMIN SERPL BCP-MCNC: 3.2 G/DL
ALP SERPL-CCNC: 48 U/L
ALT SERPL W/O P-5'-P-CCNC: 21 U/L
ANION GAP SERPL CALC-SCNC: 13 MMOL/L
AST SERPL-CCNC: 18 U/L
BASOPHILS # BLD AUTO: 0 K/UL
BASOPHILS NFR BLD: 0.2 %
BILIRUB SERPL-MCNC: 0.3 MG/DL
BUN SERPL-MCNC: 41 MG/DL
CALCIUM SERPL-MCNC: 8.4 MG/DL
CHLORIDE SERPL-SCNC: 108 MMOL/L
CO2 SERPL-SCNC: 18 MMOL/L
CREAT SERPL-MCNC: 1.5 MG/DL
DIFFERENTIAL METHOD: ABNORMAL
EOSINOPHIL # BLD AUTO: 0.1 K/UL
EOSINOPHIL NFR BLD: 1.2 %
ERYTHROCYTE [DISTWIDTH] IN BLOOD BY AUTOMATED COUNT: 16.3 %
EST. GFR  (AFRICAN AMERICAN): 49 ML/MIN/1.73 M^2
EST. GFR  (NON AFRICAN AMERICAN): 42 ML/MIN/1.73 M^2
GLUCOSE SERPL-MCNC: 180 MG/DL
HCT VFR BLD AUTO: 47 %
HGB BLD-MCNC: 14.8 G/DL
LIPASE SERPL-CCNC: 25 U/L
LYMPHOCYTES # BLD AUTO: 0.9 K/UL
LYMPHOCYTES NFR BLD: 7.9 %
MAGNESIUM SERPL-MCNC: 1.8 MG/DL
MCH RBC QN AUTO: 25.8 PG
MCHC RBC AUTO-ENTMCNC: 31.5 G/DL
MCV RBC AUTO: 82 FL
MONOCYTES # BLD AUTO: 0.7 K/UL
MONOCYTES NFR BLD: 5.6 %
NEUTROPHILS # BLD AUTO: 10.2 K/UL
NEUTROPHILS NFR BLD: 85.1 %
PLATELET # BLD AUTO: 167 K/UL
PMV BLD AUTO: 10.1 FL
POTASSIUM SERPL-SCNC: 5 MMOL/L
PROT SERPL-MCNC: 6 G/DL
RBC # BLD AUTO: 5.73 M/UL
SODIUM SERPL-SCNC: 139 MMOL/L
WBC # BLD AUTO: 12 K/UL

## 2019-01-13 PROCEDURE — 83690 ASSAY OF LIPASE: CPT

## 2019-01-13 PROCEDURE — 93005 ELECTROCARDIOGRAM TRACING: CPT

## 2019-01-13 PROCEDURE — 80053 COMPREHEN METABOLIC PANEL: CPT

## 2019-01-13 PROCEDURE — 83735 ASSAY OF MAGNESIUM: CPT

## 2019-01-13 PROCEDURE — 25000003 PHARM REV CODE 250: Performed by: EMERGENCY MEDICINE

## 2019-01-13 PROCEDURE — 36415 COLL VENOUS BLD VENIPUNCTURE: CPT

## 2019-01-13 PROCEDURE — 99284 EMERGENCY DEPT VISIT MOD MDM: CPT | Mod: 25

## 2019-01-13 PROCEDURE — 96360 HYDRATION IV INFUSION INIT: CPT

## 2019-01-13 PROCEDURE — 85025 COMPLETE CBC W/AUTO DIFF WBC: CPT

## 2019-01-13 RX ADMIN — SODIUM CHLORIDE 1000 ML: 0.9 INJECTION, SOLUTION INTRAVENOUS at 11:01

## 2019-01-14 ENCOUNTER — TELEPHONE (OUTPATIENT)
Dept: GASTROENTEROLOGY | Facility: CLINIC | Age: 84
End: 2019-01-14

## 2019-01-14 ENCOUNTER — OFFICE VISIT (OUTPATIENT)
Dept: FAMILY MEDICINE | Facility: CLINIC | Age: 84
End: 2019-01-14
Payer: MEDICARE

## 2019-01-14 VITALS
HEIGHT: 67 IN | HEART RATE: 101 BPM | SYSTOLIC BLOOD PRESSURE: 111 MMHG | BODY MASS INDEX: 32.96 KG/M2 | RESPIRATION RATE: 18 BRPM | OXYGEN SATURATION: 95 % | DIASTOLIC BLOOD PRESSURE: 70 MMHG | WEIGHT: 210 LBS | TEMPERATURE: 97 F

## 2019-01-14 VITALS
WEIGHT: 220.25 LBS | SYSTOLIC BLOOD PRESSURE: 110 MMHG | TEMPERATURE: 98 F | BODY MASS INDEX: 34.49 KG/M2 | DIASTOLIC BLOOD PRESSURE: 65 MMHG | HEART RATE: 81 BPM

## 2019-01-14 DIAGNOSIS — K62.7 PROCTITIS, RADIATION: Primary | ICD-10-CM

## 2019-01-14 DIAGNOSIS — E11.22 CONTROLLED TYPE 2 DIABETES MELLITUS WITH STAGE 3 CHRONIC KIDNEY DISEASE, WITHOUT LONG-TERM CURRENT USE OF INSULIN: ICD-10-CM

## 2019-01-14 DIAGNOSIS — N18.30 CONTROLLED TYPE 2 DIABETES MELLITUS WITH STAGE 3 CHRONIC KIDNEY DISEASE, WITHOUT LONG-TERM CURRENT USE OF INSULIN: ICD-10-CM

## 2019-01-14 DIAGNOSIS — E11.59 HYPERTENSION ASSOCIATED WITH DIABETES: ICD-10-CM

## 2019-01-14 DIAGNOSIS — I15.2 HYPERTENSION ASSOCIATED WITH DIABETES: ICD-10-CM

## 2019-01-14 DIAGNOSIS — K62.5 RECTAL BLEEDING: ICD-10-CM

## 2019-01-14 DIAGNOSIS — E66.9 OBESITY (BMI 30-39.9): ICD-10-CM

## 2019-01-14 DIAGNOSIS — R19.7 DIARRHEA, UNSPECIFIED TYPE: ICD-10-CM

## 2019-01-14 DIAGNOSIS — K62.7 RADIATION PROCTITIS: Primary | ICD-10-CM

## 2019-01-14 PROCEDURE — 99999 PR PBB SHADOW E&M-EST. PATIENT-LVL IV: ICD-10-PCS | Mod: PBBFAC,,, | Performed by: NURSE PRACTITIONER

## 2019-01-14 PROCEDURE — 99214 PR OFFICE/OUTPT VISIT, EST, LEVL IV, 30-39 MIN: ICD-10-PCS | Mod: S$PBB,,, | Performed by: NURSE PRACTITIONER

## 2019-01-14 PROCEDURE — 99214 OFFICE O/P EST MOD 30 MIN: CPT | Mod: PBBFAC,PO | Performed by: NURSE PRACTITIONER

## 2019-01-14 PROCEDURE — 99999 PR PBB SHADOW E&M-EST. PATIENT-LVL IV: CPT | Mod: PBBFAC,,, | Performed by: NURSE PRACTITIONER

## 2019-01-14 PROCEDURE — 99214 OFFICE O/P EST MOD 30 MIN: CPT | Mod: S$PBB,,, | Performed by: NURSE PRACTITIONER

## 2019-01-14 NOTE — TELEPHONE ENCOUNTER
Patient wife was told to notify you he went to the ER yesterday, he had explosive uncontrollable diarrhea all over the bathroom and toilet bloody red and black diarrhea.patient is taking medication as prescribed has f/u appointment today with PCP and ultrasound and labs are scheduled 1/18 as ordered.

## 2019-01-14 NOTE — ED NOTES
Pt presents with complaints of diarrhea with rectal bleeding. Pt has 2 small episodes of black diarrhea with noted blood yesterday and one large explosive one just prior to coming in tonight. Pt reports feeling weak after the episode as he attempted to get into the shower. Pt alert and oriented with neuro intact. No complaints of noted active rectal bleeding at this time. Pt denies abdominal pain. No abdominal pain upon palpation. Abdomen soft and non distended.

## 2019-01-14 NOTE — PROGRESS NOTES
Subjective:       Patient ID: Siddharth Urias is a 84 y.o. male.    Chief Complaint: Rectal Bleeding    Mr. Urias presents to the clinic today for hospital discharge follow up for radiation proctitis.  He was sent from office to the ED on 12/13 and was admitted until 12/14 for rectal bleeding.  He saw Dr. Aguiar and had colonoscopy showing radiation proctitis which was treated at that time with APC.  Patient was given prescription for cholestyramine which he was taking but stopped taking it yesterday after he had an episode of bloody diarrhea which prompted return to the ED.  He is worried the medication caused the diarrhea.  He received IVF in the ED for dizziness and has not had any more dizziness.  He has not had any further bloody diarrhea but does state he has a small amount of leaking blood from the rectum.  He has labs and ultrasounds scheduled later this week.      Review of Systems   Constitutional: Negative for chills and fever.   Respiratory: Negative for shortness of breath.    Cardiovascular: Negative for chest pain.   Gastrointestinal: Positive for blood in stool and diarrhea. Negative for abdominal pain, constipation, nausea, rectal pain and vomiting.   Neurological: Negative for dizziness, syncope, weakness and light-headedness.       Objective:      Physical Exam   Constitutional: He is oriented to person, place, and time. He appears well-developed and well-nourished. No distress.   HENT:   Head: Normocephalic and atraumatic.   Right Ear: External ear normal.   Left Ear: External ear normal.   Mouth/Throat: Oropharynx is clear and moist. No oropharyngeal exudate.   Eyes: Right eye exhibits no discharge. Left eye exhibits no discharge.   Neck: Neck supple.   Cardiovascular: Normal rate and regular rhythm. Exam reveals no gallop and no friction rub.   No murmur heard.  Pulmonary/Chest: Effort normal and breath sounds normal. No respiratory distress. He has no wheezes. He has no rales.    Abdominal: Soft. He exhibits no distension. There is no tenderness.   Lymphadenopathy:     He has no cervical adenopathy.   Neurological: He is alert and oriented to person, place, and time. Coordination normal.   Skin: Skin is warm and dry.   Psychiatric: He has a normal mood and affect. His behavior is normal. Thought content normal.   Vitals reviewed.          Current Outpatient Medications:     Bifidobacterium infantis (ALIGN ORAL), Take by mouth., Disp: , Rfl:     allopurinol (ZYLOPRIM) 300 MG tablet, Take 1 tablet (300 mg total) by mouth once daily., Disp: 90 tablet, Rfl: 3    aspirin (ECOTRIN) 81 MG EC tablet, Take 81 mg by mouth once daily., Disp: , Rfl:     cholestyramine-aspartame (CHOLESTYRAMINE LIGHT) 4 gram PwPk, Take 1 packet (4 g total) by mouth 3 (three) times daily., Disp: 270 packet, Rfl: 3    febuxostat (ULORIC) 40 mg Tab, Take 40 mg by mouth once daily., Disp: , Rfl:     JANUVIA 100 mg Tab, 100 mg once daily. , Disp: , Rfl:     lidocaine HCL 2% (XYLOCAINE) 2 % jelly, Apply topically as needed. Apply topically once nightly to affected part of foot/feet., Disp: 30 mL, Rfl: 2    LYRICA 75 mg capsule, Take 75 mg by mouth 3 (three) times daily. , Disp: , Rfl:     multivitamin with minerals tablet, Take 1 tablet by mouth once daily., Disp: , Rfl:     pravastatin (PRAVACHOL) 40 MG tablet, Take 40 mg by mouth once daily. , Disp: , Rfl:     tamsulosin (FLOMAX) 0.4 mg Cp24, Take 0.4 mg by mouth once daily., Disp: , Rfl:     VESICARE 5 mg tablet, TAKE 1 TABLET DAILY, Disp: 90 tablet, Rfl: 0  No current facility-administered medications for this visit.   Assessment:       1. Proctitis, radiation    2. Hypertension associated with diabetes    3. Controlled type 2 diabetes mellitus with stage 3 chronic kidney disease, without long-term current use of insulin    4. Obesity (BMI 30-39.9)        Plan:       Proctitis, radiation  Patient's wife has already called GI office regarding ED visit,  awaiting Dr. Aguiar's recommendations. May need retreatment with APC.  Advised to continue prescribed cholestyramine.    Hypertension associated with diabetes  Stable on current medication.    Controlled type 2 diabetes mellitus with stage 3 chronic kidney disease, without long-term current use of insulin  Stable on current medication.    Obesity (BMI 30-39.9)  Patient readiness: acceptance and barriers:none    During the course of the visit the patient was educated and counseled about the following:     Diabetes:  Discussed general issues about diabetes pathophysiology and management.  Hypertension:   Medication: no change.  Obesity:   Diet interventions: moderate (500 kCal/d) deficit diet.    Goals: Diabetes: Maintain Hemoglobin A1C below 7, Hypertension: Reduce Blood Pressure and Obesity: Reduce calorie intake and BMI    Did patient meet goals/outcomes: Yes    The following self management tools provided: declined    Patient Instructions (the written plan) was given to the patient/family.     Time spent with patient: 15 minutes    Barriers to medications present (no )    Adverse reactions to current medications (no)    Over the counter medications reviewed (Yes)

## 2019-01-14 NOTE — ED PROVIDER NOTES
Encounter Date: 1/13/2019    SCRIBE #1 NOTE: Erika MEDINA, am scribing for, and in the presence of, .       History     Chief Complaint   Patient presents with    Diarrhea     Black diarrhea tonight        Time seen by provider: 10:57 PM on 01/13/2019    Siddharth Urias is a 84 y.o. male with who presents to the ED with diarrhea onset yesterday. He states yesterday he had diarrhea x1 and another today that had bloody red and black stools. Patient states he felt dizzy during/after the episode of diarrhea. He states his diarrhea is uncontrollable and is wearing depends; he notes the intermittent uncontrollable diarrhea began 2 years ago after radiation therapy for prostate CA. Patient has denies nausea, vomiting, abdominal pain, back pain, chest pain, shortness of breath, urinary symptoms, LOC, or fever.   is his GI and patient received an endoscope 1 month ago and follow up apt with him 3 days ago.       The history is provided by the patient. No  was used.     Review of patient's allergies indicates:   Allergen Reactions    No known drug allergies      Past Medical History:   Diagnosis Date    Arthritis     Benign hypertension     Diabetes mellitus, type 2     2/2011    Fatty liver     Irradiation cystitis     SANDRA (obstructive sleep apnea)     no    Prostate cancer 2009    s/p XRT (T1C, João 8)     Past Surgical History:   Procedure Laterality Date    APPENDECTOMY      COLONOSCOPY N/A 12/14/2018    Performed by Noel [facility]uiar [facility] at [facility]    COLONOSCOPY N/A 3/11/2013    Performed by Dano Pro [facility] at [facility]    HERNIA REPAIR      bilateral inguinal    TONSILLECTOMY       Family History   Problem Relation Age of Onset    Diabetes Mother      Social History     Tobacco Use    Smoking status: Former Smoker     Packs/day: 1.50     Years: 60.00     Pack years: 90.00     Types: Cigarettes     Last attempt to quit: 1/2/2008     Years since  quittin.0    Smokeless tobacco: Former User     Quit date: 3/11/2010   Substance Use Topics    Alcohol use: Yes     Comment: social    Drug use: No     Review of Systems   Constitutional: Negative for chills and fever.   HENT: Negative for drooling and sore throat.    Eyes: Negative for photophobia and visual disturbance.   Respiratory: Negative for cough and shortness of breath.    Cardiovascular: Negative for chest pain.   Gastrointestinal: Positive for blood in stool and diarrhea. Negative for abdominal pain, nausea and vomiting.   Genitourinary: Negative for difficulty urinating, dysuria and hematuria.   Musculoskeletal: Negative for back pain and neck pain.   Skin: Negative for rash.   Neurological: Negative for syncope, speech difficulty, weakness and numbness.   Hematological: Does not bruise/bleed easily.   Psychiatric/Behavioral: Negative for confusion.       Physical Exam     Initial Vitals [19 2225]   BP Pulse Resp Temp SpO2   105/62 (!) 126 20 97.1 °F (36.2 °C) 95 %      MAP       --         Physical Exam    Nursing note and vitals reviewed.  Constitutional: He appears well-nourished.   HENT:   Head: Normocephalic and atraumatic.   Eyes: Conjunctivae and EOM are normal.   Neck: Normal range of motion. Neck supple. No thyroid mass present.   Cardiovascular: Normal rate, regular rhythm, normal heart sounds and intact distal pulses. Exam reveals no gallop and no friction rub.    No murmur heard.  Pulmonary/Chest: Breath sounds normal. He has no wheezes. He has no rhonchi. He has no rales.   Abdominal: Soft. Normal appearance and bowel sounds are normal. He exhibits no distension. There is no tenderness. There is no rebound and no guarding.   Genitourinary: Rectal exam shows guaiac positive stool. Guaiac positive stool. : Acceptable.  Neurological: He is alert and oriented to person, place, and time. He has normal strength. No cranial nerve deficit or sensory deficit.   Skin:  Skin is warm and dry. No rash noted. No erythema.   Psychiatric: He has a normal mood and affect. His speech is normal. Cognition and memory are normal.         ED Course   Procedures  Labs Reviewed - No data to display  EKG Readings: (Independently Interpreted)   Initial Reading: No STEMI. Rhythm: Sinus Tachycardia. Heart Rate: 113. Clinical Impression: with PACs   Sinus tachycardia ate a rate of 113 with 1st degree AV block. PAC's. Inferior infarct age undetermined. No STEMI.        Imaging Results    None          Medical Decision Making:   History:   Old Medical Records: I decided to obtain old medical records.  Clinical Tests:   Lab Tests: Ordered and Reviewed  Medical Tests: Ordered and Reviewed  ED Management:  This patient was interviewed and examined emergently.  Initial vital signs significant for tachycardia.  Patient is not appear acutely dehydrated on physical examination. The patient reports a single episode of large black diarrhea tonight.  The patient was institutedOn cholestyramine therapy within the past 48 hr which I would associated with his new tarry stools tonight.  The patient's hemoglobin is stable.  He exhibits baseline findings on his comprehensive metabolic panel.  No concern for ongoing severe GI bleeding.  He recently was diagnosed with radiation proctitis which also may be contributing.  He is asked to increase hydration and continue taking his medications as prescribed by his gastroenterologist.  He is asked to follow up as soon as possible regarding any ongoing symptoms.  He is asked to return to the ER for any new, concerning, or worsening symptoms. Patient was agreeable with this plan for follow-up and was discharged in stable condition with his wife and son accompanying him.  Of note, the patient did have significant improvement in tachycardia with bolus hydration here.            Scribe Attestation:   Scribe #1: I performed the above scribed service and the documentation  accurately describes the services I performed. I attest to the accuracy of the note.    I, Dr. Bandar Worley, personally performed the services described in this documentation. All medical record entries made by the scribe were at my direction and in my presence.  I have reviewed the chart and agree that the record reflects my personal performance and is accurate and complete. Bandar Worley MD.  12:48 AM 01/14/2019           Clinical Impression:   The primary encounter diagnosis was Black stools. A diagnosis of Tachycardia was also pertinent to this visit.      Disposition:   Disposition: Admitted  Condition: Stable                        Bandar Worley MD  01/14/19 0656

## 2019-01-15 NOTE — TELEPHONE ENCOUNTER
Flex sig scheduled 1/22 at 1130am [facility](per patient request did not want to prep with enemas) instructions reviewed and patient will  a copy at the   and patient states understanding.

## 2019-01-18 ENCOUNTER — TELEPHONE (OUTPATIENT)
Dept: GASTROENTEROLOGY | Facility: CLINIC | Age: 84
End: 2019-01-18

## 2019-01-18 ENCOUNTER — HOSPITAL ENCOUNTER (OUTPATIENT)
Dept: RADIOLOGY | Facility: HOSPITAL | Age: 84
Discharge: HOME | End: 2019-01-18
Attending: INTERNAL MEDICINE
Payer: MEDICARE

## 2019-01-18 DIAGNOSIS — D64.9 ANEMIA, UNSPECIFIED TYPE: Primary | ICD-10-CM

## 2019-01-18 DIAGNOSIS — K52.9 CHRONIC DIARRHEA: ICD-10-CM

## 2019-01-18 PROCEDURE — 76700 US EXAM ABDOM COMPLETE: CPT | Mod: TC

## 2019-01-18 PROCEDURE — 76700 US EXAM ABDOM COMPLETE: CPT | Mod: 26,,, | Performed by: RADIOLOGY

## 2019-01-18 PROCEDURE — 76700 US ABDOMEN COMPLETE: ICD-10-PCS | Mod: 26,,, | Performed by: RADIOLOGY

## 2019-01-22 ENCOUNTER — HOSPITAL ENCOUNTER (OUTPATIENT)
Facility: HOSPITAL | Age: 84
Discharge: HOME | End: 2019-01-22
Attending: INTERNAL MEDICINE | Admitting: INTERNAL MEDICINE
Payer: MEDICARE

## 2019-01-22 ENCOUNTER — ANESTHESIA EVENT (OUTPATIENT)
Dept: ENDOSCOPY | Facility: HOSPITAL | Age: 84
End: 2019-01-22
Payer: MEDICARE

## 2019-01-22 ENCOUNTER — ANESTHESIA (OUTPATIENT)
Dept: ENDOSCOPY | Facility: HOSPITAL | Age: 84
End: 2019-01-22
Payer: MEDICARE

## 2019-01-22 DIAGNOSIS — K64.9 HEMORRHOIDS, UNSPECIFIED HEMORRHOID TYPE: Primary | ICD-10-CM

## 2019-01-22 DIAGNOSIS — K92.1 BLACK STOOLS: ICD-10-CM

## 2019-01-22 DIAGNOSIS — D64.9 ANEMIA, UNSPECIFIED TYPE: Primary | ICD-10-CM

## 2019-01-22 DIAGNOSIS — D64.9 ANEMIA, UNSPECIFIED TYPE: ICD-10-CM

## 2019-01-22 DIAGNOSIS — K62.7 RADIATION PROCTITIS: ICD-10-CM

## 2019-01-22 LAB
BASOPHILS # BLD AUTO: 0 K/UL
BASOPHILS NFR BLD: 0.4 %
DIFFERENTIAL METHOD: ABNORMAL
EOSINOPHIL # BLD AUTO: 0.1 K/UL
EOSINOPHIL NFR BLD: 1.5 %
ERYTHROCYTE [DISTWIDTH] IN BLOOD BY AUTOMATED COUNT: 16.9 %
FERRITIN SERPL-MCNC: 155 NG/ML
HCT VFR BLD AUTO: 31.3 %
HGB BLD-MCNC: 10 G/DL
IRON SERPL-MCNC: 35 UG/DL
LYMPHOCYTES # BLD AUTO: 1.2 K/UL
LYMPHOCYTES NFR BLD: 15.9 %
MCH RBC QN AUTO: 26.3 PG
MCHC RBC AUTO-ENTMCNC: 32 G/DL
MCV RBC AUTO: 82 FL
MONOCYTES # BLD AUTO: 0.6 K/UL
MONOCYTES NFR BLD: 7.8 %
NEUTROPHILS # BLD AUTO: 5.7 K/UL
NEUTROPHILS NFR BLD: 74.4 %
PLATELET # BLD AUTO: 230 K/UL
PMV BLD AUTO: 9 FL
RBC # BLD AUTO: 3.8 M/UL
SATURATED IRON: 12 %
TOTAL IRON BINDING CAPACITY: 302 UG/DL
TRANSFERRIN SERPL-MCNC: 204 MG/DL
WBC # BLD AUTO: 7.6 K/UL

## 2019-01-22 PROCEDURE — 25000003 PHARM REV CODE 250: Performed by: INTERNAL MEDICINE

## 2019-01-22 PROCEDURE — 37000008 HC ANESTHESIA 1ST 15 MINUTES: Performed by: INTERNAL MEDICINE

## 2019-01-22 PROCEDURE — D9220A PRA ANESTHESIA: ICD-10-PCS | Mod: CRNA,,, | Performed by: NURSE ANESTHETIST, CERTIFIED REGISTERED

## 2019-01-22 PROCEDURE — 45334 SIGMOIDOSCOPY FOR BLEEDING: CPT | Mod: ,,, | Performed by: INTERNAL MEDICINE

## 2019-01-22 PROCEDURE — D9220A PRA ANESTHESIA: ICD-10-PCS | Mod: ANES,,, | Performed by: ANESTHESIOLOGY

## 2019-01-22 PROCEDURE — 27202087 HC PROBE, APC: Performed by: INTERNAL MEDICINE

## 2019-01-22 PROCEDURE — D9220A PRA ANESTHESIA: Mod: CRNA,,, | Performed by: NURSE ANESTHETIST, CERTIFIED REGISTERED

## 2019-01-22 PROCEDURE — 82728 ASSAY OF FERRITIN: CPT

## 2019-01-22 PROCEDURE — 83540 ASSAY OF IRON: CPT

## 2019-01-22 PROCEDURE — 36415 COLL VENOUS BLD VENIPUNCTURE: CPT

## 2019-01-22 PROCEDURE — D9220A PRA ANESTHESIA: Mod: ANES,,, | Performed by: ANESTHESIOLOGY

## 2019-01-22 PROCEDURE — 45334 PR SIGMOIDOSCOPY,FLEX,W/CONTROL,BLEEDING: ICD-10-PCS | Mod: ,,, | Performed by: INTERNAL MEDICINE

## 2019-01-22 PROCEDURE — 45334 SIGMOIDOSCOPY FOR BLEEDING: CPT | Performed by: INTERNAL MEDICINE

## 2019-01-22 PROCEDURE — 85025 COMPLETE CBC W/AUTO DIFF WBC: CPT

## 2019-01-22 PROCEDURE — 63600175 PHARM REV CODE 636 W HCPCS: Performed by: NURSE ANESTHETIST, CERTIFIED REGISTERED

## 2019-01-22 RX ORDER — PROPOFOL 10 MG/ML
INJECTION, EMULSION INTRAVENOUS
Status: COMPLETED
Start: 2019-01-22 | End: 2019-01-22

## 2019-01-22 RX ORDER — SODIUM CHLORIDE 9 MG/ML
INJECTION, SOLUTION INTRAVENOUS CONTINUOUS
Status: DISCONTINUED | OUTPATIENT
Start: 2019-01-22 | End: 2019-01-22 | Stop reason: HOSPADM

## 2019-01-22 RX ORDER — PROPOFOL 10 MG/ML
VIAL (ML) INTRAVENOUS
Status: DISCONTINUED | OUTPATIENT
Start: 2019-01-22 | End: 2019-01-22

## 2019-01-22 RX ORDER — LIDOCAINE HYDROCHLORIDE 20 MG/ML
INJECTION, SOLUTION EPIDURAL; INFILTRATION; INTRACAUDAL; PERINEURAL
Status: DISCONTINUED
Start: 2019-01-22 | End: 2019-01-22 | Stop reason: HOSPADM

## 2019-01-22 RX ORDER — LIDOCAINE HCL/PF 100 MG/5ML
SYRINGE (ML) INTRAVENOUS
Status: DISCONTINUED | OUTPATIENT
Start: 2019-01-22 | End: 2019-01-22

## 2019-01-22 RX ADMIN — PROPOFOL 80 MG: 10 INJECTION, EMULSION INTRAVENOUS at 12:01

## 2019-01-22 RX ADMIN — SODIUM CHLORIDE 1000 ML: 0.9 INJECTION, SOLUTION INTRAVENOUS at 11:01

## 2019-01-22 RX ADMIN — LIDOCAINE HYDROCHLORIDE 75 MG: 20 INJECTION, SOLUTION INTRAVENOUS at 12:01

## 2019-01-22 NOTE — TRANSFER OF CARE
"Anesthesia Transfer of Care Note    Patient: Siddharth Urias    Procedure(s) Performed: Procedure(s) (LRB):  SIGMOIDOSCOPY, FLEXIBLE (N/A)    Patient location: PACU    Anesthesia Type: general    Transport from OR: Transported from OR on room air with adequate spontaneous ventilation    Post pain: adequate analgesia    Post assessment: no apparent anesthetic complications    Post vital signs: stable    Level of consciousness: awake    Nausea/Vomiting: no nausea/vomiting    Complications: none    Transfer of care protocol was followed      Last vitals:   Visit Vitals  /85   Pulse 94   Temp 37.2 °C (99 °F) (Temporal)   Resp 20   Ht 5' 8" (1.727 m)   Wt 99.8 kg (220 lb)   SpO2 (!) 94%   BMI 33.45 kg/m²     "

## 2019-01-22 NOTE — DISCHARGE INSTRUCTIONS
"Discharge Instructions: After Your Surgery/Procedure  Youve just had surgery. During surgery you were given medicine called anesthesia to keep you relaxed and free of pain. After surgery you may have some pain or nausea. This is common. Here are some tips for feeling better and getting well after surgery.     Stay on schedule with your medication.   Going home  Your doctor or nurse will show you how to take care of yourself when you go home. He or she will also answer your questions. Have an adult family member or friend drive you home.      For your safety we recommend these precaution for the first 24 hours after your procedure:  · Do not drive or use heavy equipment.  · Do not make important decisions or sign legal papers.  · Do not drink alcohol.  · Have someone stay with you, if needed. He or she can watch for problems and help keep you safe.  · Your concentration, balance, coordination, and judgement may be impaired for many hours after anesthesia.  Use caution when ambulating or standing up.     · You may feel weak and "washed out" after anesthesia and surgery.      Subtle residual effects of general anesthesia or sedation with regional / local anesthesia can last more than 24 hours.  Rest for the remainder of the day or longer if your Doctor/Surgeon has advised you to do so.  Although you may feel normal within the first 24 hours, your reflexes and mental ability may be impaired without you realizing it.  You may feel dizzy, lightheaded or sleepy for 24 hours or longer.      Be sure to go to all follow-up visits with your doctor. And rest after your surgery for as long as your doctor tells you to.  Coping with pain  If you have pain after surgery, pain medicine will help you feel better. Take it as told, before pain becomes severe. Also, ask your doctor or pharmacist about other ways to control pain. This might be with heat, ice, or relaxation. And follow any other instructions your surgeon or nurse gives " you.  Tips for taking pain medicine  To get the best relief possible, remember these points:  · Pain medicines can upset your stomach. Taking them with a little food may help.  · Most pain relievers taken by mouth need at least 20 to 30 minutes to start to work.  · Taking medicine on a schedule can help you remember to take it. Try to time your medicine so that you can take it before starting an activity. This might be before you get dressed, go for a walk, or sit down for dinner.  · Constipation is a common side effect of pain medicines. Call your doctor before taking any medicines such as laxatives or stool softeners to help ease constipation. Also ask if you should skip any foods. Drinking lots of fluids and eating foods such as fruits and vegetables that are high in fiber can also help. Remember, do not take laxatives unless your surgeon has prescribed them.  · Drinking alcohol and taking pain medicine can cause dizziness and slow your breathing. It can even be deadly. Do not drink alcohol while taking pain medicine.  · Pain medicine can make you react more slowly to things. Do not drive or run machinery while taking pain medicine.  Your health care provider may tell you to take acetaminophen to help ease your pain. Ask him or her how much you are supposed to take each day. Acetaminophen or other pain relievers may interact with your prescription medicines or other over-the-counter (OTC) drugs. Some prescription medicines have acetaminophen and other ingredients. Using both prescription and OTC acetaminophen for pain can cause you to overdose. Read the labels on your OTC medicines with care. This will help you to clearly know the list of ingredients, how much to take, and any warnings. It may also help you not take too much acetaminophen. If you have questions or do not understand the information, ask your pharmacist or health care provider to explain it to you before you take the OTC medicine.  Managing  nausea  Some people have an upset stomach after surgery. This is often because of anesthesia, pain, or pain medicine, or the stress of surgery. These tips will help you handle nausea and eat healthy foods as you get better. If you were on a special food plan before surgery, ask your doctor if you should follow it while you get better. These tips may help:  · Do not push yourself to eat. Your body will tell you when to eat and how much.  · Start off with clear liquids and soup. They are easier to digest.  · Next try semi-solid foods, such as mashed potatoes, applesauce, and gelatin, as you feel ready.  · Slowly move to solid foods. Dont eat fatty, rich, or spicy foods at first.  · Do not force yourself to have 3 large meals a day. Instead eat smaller amounts more often.  · Take pain medicines with a small amount of solid food, such as crackers or toast, to avoid nausea.     Call your surgeon if  · You still have pain an hour after taking medicine. The medicine may not be strong enough.  · You feel too sleepy, dizzy, or groggy. The medicine may be too strong.  · You have side effects like nausea, vomiting, or skin changes, such as rash, itching, or hives.       If you have obstructive sleep apnea  You were given anesthesia medicine during surgery to keep you comfortable and free of pain. After surgery, you may have more apnea spells because of this medicine and other medicines you were given. The spells may last longer than usual.   At home:  · Keep using the continuous positive airway pressure (CPAP) device when you sleep. Unless your health care provider tells you not to, use it when you sleep, day or night. CPAP is a common device used to treat obstructive sleep apnea.  · Talk with your provider before taking any pain medicine, muscle relaxants, or sedatives. Your provider will tell you about the possible dangers of taking these medicines.  © 1343-6763 [employer]. [address] [place] [address]  [place] [address]. All rights reserved. This information is not intended as a substitute for professional medical care. Always follow your healthcare professional's instructions.    Hemorrhoids    Hemorrhoids are swollen and inflamed veins inside the rectum and near the anus. The rectum is the last several inches of the colon. The anus is the passage between the rectum and the outside of the body.  Causes  The veins can become swollen due to increased pressure in them. This is most often caused by:  · Chronic constipation or diarrhea  · Straining when having a bowel movement  · Sitting too long on the toilet  · A low-fiber diet  · Pregnancy  Symptoms  · Bleeding from the rectum (this may be noticeable after bowel movements)  · Lump near the anus  · Itching around the anus  · Pain around the anus  There are different types of hemorrhoids. Depending on the type you have and the severity, you may be able to treat yourself at home. In some cases, a procedure may be the best treatment option. Your healthcare provider can tell you more about this, if needed.  Home care  General care  · To get relief from pain or itching, try:  ¨ Topical products. Your healthcare provider may prescribe or recommend creams, ointments, or pads that can be applied to the hemorrhoid. Use these exactly as directed.  ¨ Medicines. Your healthcare provider may recommend stool softeners, suppositories, or laxatives to help manage constipation. Use these exactly as directed.  ¨ Sitz baths. A sitz bath involves sitting in a few inches of warm bath water. Be careful not to make the water so hot that you burn yourself--test it before sitting in it. Soak for about 10 to 15 minutes a few times a day. This may help relieve pain.  Tips to help prevent hemorrhoids  · Eat more fiber. Fiber adds bulk to stool and absorbs water as it moves through your colon. This makes stool softer and easier to pass.  ¨ Increase the fiber in your diet with more fiber-rich foods.  These include fresh fruit, vegetables, and whole grains.  ¨ Take a fiber supplement or bulking agent, if advised to by your provider. These include products such as psyllium or methylcellulose.  · Drink plenty of water, if directed to by your provider. This can help keep stool soft.  · Be more active. Frequent exercise aids digestion and helps prevent constipation. It may also help make bowel movements more regular.  · Dont strain during bowel movements. This can make hemorrhoids more likely. Also, dont sit on the toilet for long periods of time.  Follow-up care  Follow up with your healthcare provider, or as advised. If a culture or imaging tests were done, you will be notified of the results when they are ready. This may take a few days or longer.  When to seek medical advice  Call your healthcare provider right away if any of these occur:  · Increased bleeding from the rectum  · Increased pain around the rectum or anus  · Weakness or dizziness  Call 911  Call 911 or return to the emergency department right away if any of these occur:  · Trouble breathing or swallowing  · Fainting or loss of consciousness  · Unusually fast heart rate  · Vomiting blood  · Large amounts of blood in stool  Date Last Reviewed: 6/22/2015  © 9442-7122 [employer]. [address] [place] [address], [place] [address]. All rights reserved. This information is not intended as a substitute for professional medical care. Always follow your healthcare professional's instructions.

## 2019-01-22 NOTE — ANESTHESIA PREPROCEDURE EVALUATION
01/22/2019  Siddharth Urias is a 84 y.o., male.    Pre-op Assessment    I have reviewed the Patient Summary Reports.     I have reviewed the Nursing Notes.   I have reviewed the Medications.     Review of Systems  Anesthesia Hx:  No problems with previous Anesthesia    Social:  No Alcohol Use, Former Smoker    Hematology/Oncology:        Current/Recent Cancer. Other (see Oncology comments)   Cardiovascular:   Hypertension    Pulmonary:   Sleep Apnea    Renal/:   Chronic Renal Disease, CRI    Hepatic/GI:   Liver Disease,    Neurological:  Neurology Normal    Endocrine:   Diabetes    Psych:   Psychiatric History          Physical Exam  General:  Obesity    Airway/Jaw/Neck:  Airway Findings: Mouth Opening: Normal Tongue: Normal  General Airway Assessment: Adult, Average  Mallampati: II  Jaw/Neck Findings:  Neck ROM: Normal ROM       Chest/Lungs:  Chest/Lungs Findings: Clear to auscultation, Normal Respiratory Rate     Heart/Vascular:  Heart Findings: Rate: Normal  Rhythm: Regular Rhythm        Mental Status:  Mental Status Findings:  Cooperative, Alert and Oriented         Anesthesia Plan  Type of Anesthesia, risks & benefits discussed:  Anesthesia Type:  general  Patient's Preference:   Intra-op Monitoring Plan: standard ASA monitors  Intra-op Monitoring Plan Comments:   Post Op Pain Control Plan:   Post Op Pain Control Plan Comments:   Induction:   IV  Beta Blocker:  Patient is not currently on a Beta-Blocker (No further documentation required).       Informed Consent: Patient understands risks and agrees with Anesthesia plan.  Questions answered. Anesthesia consent signed with patient.  ASA Score: 3     Day of Surgery Review of History & Physical: I have interviewed and examined the patient. I have reviewed the patient's H&P dated:  There are no significant changes.  H&P update referred to the provider.          Ready For Surgery From Anesthesia Perspective.

## 2019-01-22 NOTE — ANESTHESIA POSTPROCEDURE EVALUATION
"Anesthesia Post Evaluation    Patient: Siddharth Urias    Procedure(s) Performed: Procedure(s) (LRB):  SIGMOIDOSCOPY, FLEXIBLE (N/A)    Final Anesthesia Type: general  Patient location during evaluation: PACU  Patient participation: Yes- Able to Participate  Level of consciousness: awake and alert  Post-procedure vital signs: reviewed and stable  Pain management: adequate  Airway patency: patent  PONV status at discharge: No PONV  Anesthetic complications: no      Cardiovascular status: hemodynamically stable  Respiratory status: unassisted and room air  Hydration status: euvolemic  Follow-up not needed.        Visit Vitals  /61   Pulse 84   Temp 37.2 °C (99 °F) (Temporal)   Resp 20   Ht 5' 8" (1.727 m)   Wt 99.8 kg (220 lb)   SpO2 96%   BMI 33.45 kg/m²       Pain/Edward Score: Edward Score: 9 (1/22/2019  1:15 PM)        "

## 2019-01-22 NOTE — [employer] PATIENT INSTRUCTIONS
Discharge Summary/Instructions after an Endoscopic Procedure  Patient Name: Siddharth Urias  Patient MRN: [id number]  Patient YOB: 1934  Tuesday, January 22, 2019  Noel Nation MD  RESTRICTIONS:  During your procedure today, you received medications for sedation.  These   medications may affect your judgment, balance and coordination.  Therefore,   for 24 hours, you have the following restrictions:   - DO NOT drive a car, operate machinery, make legal/financial decisions,   sign important papers or drink alcohol.    ACTIVITY:  Today: no heavy lifting, straining or running due to procedural   sedation/anesthesia.  The following day: return to full activity including work.  DIET:  Eat and drink normally unless instructed otherwise.     TREATMENT FOR COMMON SIDE EFFECTS:  - Mild abdominal pain, nausea, belching, bloating or excessive gas:  rest,   eat lightly and use a heating pad.  - Sore Throat: treat with throat lozenges and/or gargle with warm salt   water.  - Because air was used during the procedure, expelling large amounts of air   from your rectum or belching is normal.  - If a bowel prep was taken, you may not have a bowel movement for 1-3 days.    This is normal.  SYMPTOMS TO WATCH FOR AND REPORT TO YOUR PHYSICIAN:  1. Abdominal pain or bloating, other than gas cramps.  2. Chest pain.  3. Back pain.  4. Signs of infection such as: chills or fever occurring within 24 hours   after the procedure.  5. Rectal bleeding, which would show as bright red, maroon, or black stools.   (A tablespoon of blood from the rectum is not serious, especially if   hemorrhoids are present.)  6. Vomiting.  7. Weakness or dizziness.  GO DIRECTLY TO THE NEAREST EMERGENCY ROOM IF YOU HAVE ANY OF THE FOLLOWING:      Difficulty breathing              Chills and/or fever over 101 F   Persistent vomiting and/or vomiting blood   Severe abdominal pain   Severe chest pain   Black, tarry stools   Bleeding- more than one  tablespoon   Any other symptom or condition that you feel may need urgent attention  Your doctor recommends these additional instructions:  If any biopsies were taken, your doctors clinic will contact you in 1 to 2   weeks with any results.  - Discharge patient to home (ambulatory).   - Patient has a contact number available for emergencies.  The signs and   symptoms of potential delayed complications were discussed with the   patient.  Return to normal activities tomorrow.  Written discharge   instructions were provided to the patient.   - Resume previous diet.   - Take Sitz baths Daily.   - Refer to a colo-rectal surgeon at appointment to be scheduled.   - Return to my office PRN.  For questions, problems or results please call your physician - Noel Nation MD at Work:  [phone].  [facility], EMERGENCY ROOM PHONE NUMBER: [phone]  IF A COMPLICATION OR EMERGENCY SITUATION ARISES AND YOU ARE UNABLE TO REACH   YOUR PHYSICIAN - GO DIRECTLY TO THE EMERGENCY ROOM.  Noel Nation MD  1/22/2019 1:10:12 PM  This report has been verified and signed electronically.  [employer]

## 2019-01-23 VITALS
BODY MASS INDEX: 33.34 KG/M2 | HEIGHT: 68 IN | SYSTOLIC BLOOD PRESSURE: 120 MMHG | RESPIRATION RATE: 23 BRPM | OXYGEN SATURATION: 94 % | DIASTOLIC BLOOD PRESSURE: 61 MMHG | WEIGHT: 220 LBS | TEMPERATURE: 99 F | HEART RATE: 84 BPM

## 2019-01-24 ENCOUNTER — ANESTHESIA EVENT (OUTPATIENT)
Dept: ENDOSCOPY | Facility: HOSPITAL | Age: 84
End: 2019-01-24
Payer: MEDICARE

## 2019-01-24 ENCOUNTER — HOSPITAL ENCOUNTER (OUTPATIENT)
Facility: HOSPITAL | Age: 84
Discharge: HOME | End: 2019-01-24
Attending: INTERNAL MEDICINE | Admitting: INTERNAL MEDICINE
Payer: MEDICARE

## 2019-01-24 ENCOUNTER — ANESTHESIA (OUTPATIENT)
Dept: ENDOSCOPY | Facility: HOSPITAL | Age: 84
End: 2019-01-24
Payer: MEDICARE

## 2019-01-24 DIAGNOSIS — K29.70 GASTRITIS, PRESENCE OF BLEEDING UNSPECIFIED, UNSPECIFIED CHRONICITY, UNSPECIFIED GASTRITIS TYPE: ICD-10-CM

## 2019-01-24 DIAGNOSIS — K29.80 DUODENITIS: Primary | ICD-10-CM

## 2019-01-24 DIAGNOSIS — D50.9 IDA (IRON DEFICIENCY ANEMIA): ICD-10-CM

## 2019-01-24 PROCEDURE — 43239 EGD BIOPSY SINGLE/MULTIPLE: CPT | Performed by: INTERNAL MEDICINE

## 2019-01-24 PROCEDURE — 88342 TISSUE SPECIMEN TO PATHOLOGY - SURGERY: ICD-10-PCS | Mod: 26,,, | Performed by: PATHOLOGY

## 2019-01-24 PROCEDURE — 88342 IMHCHEM/IMCYTCHM 1ST ANTB: CPT | Mod: 26,,, | Performed by: PATHOLOGY

## 2019-01-24 PROCEDURE — D9220A PRA ANESTHESIA: Mod: CRNA,,, | Performed by: NURSE ANESTHETIST, CERTIFIED REGISTERED

## 2019-01-24 PROCEDURE — 43239 EGD BIOPSY SINGLE/MULTIPLE: CPT | Mod: ,,, | Performed by: INTERNAL MEDICINE

## 2019-01-24 PROCEDURE — D9220A PRA ANESTHESIA: ICD-10-PCS | Mod: ANES,,, | Performed by: ANESTHESIOLOGY

## 2019-01-24 PROCEDURE — 37000008 HC ANESTHESIA 1ST 15 MINUTES: Performed by: INTERNAL MEDICINE

## 2019-01-24 PROCEDURE — 88305 TISSUE EXAM BY PATHOLOGIST: CPT | Mod: 26,,, | Performed by: PATHOLOGY

## 2019-01-24 PROCEDURE — D9220A PRA ANESTHESIA: Mod: ANES,,, | Performed by: ANESTHESIOLOGY

## 2019-01-24 PROCEDURE — 37000009 HC ANESTHESIA EA ADD 15 MINS: Performed by: INTERNAL MEDICINE

## 2019-01-24 PROCEDURE — 27201012 HC FORCEPS, HOT/COLD, DISP: Performed by: INTERNAL MEDICINE

## 2019-01-24 PROCEDURE — 63600175 PHARM REV CODE 636 W HCPCS: Performed by: NURSE ANESTHETIST, CERTIFIED REGISTERED

## 2019-01-24 PROCEDURE — 88305 TISSUE SPECIMEN TO PATHOLOGY - SURGERY: ICD-10-PCS | Mod: 26,,, | Performed by: PATHOLOGY

## 2019-01-24 PROCEDURE — 88305 TISSUE EXAM BY PATHOLOGIST: CPT | Mod: 59 | Performed by: PATHOLOGY

## 2019-01-24 PROCEDURE — 25000003 PHARM REV CODE 250: Performed by: INTERNAL MEDICINE

## 2019-01-24 PROCEDURE — D9220A PRA ANESTHESIA: ICD-10-PCS | Mod: CRNA,,, | Performed by: NURSE ANESTHETIST, CERTIFIED REGISTERED

## 2019-01-24 PROCEDURE — 43239 PR EGD, FLEX, W/BIOPSY, SGL/MULTI: ICD-10-PCS | Mod: ,,, | Performed by: INTERNAL MEDICINE

## 2019-01-24 RX ORDER — SUCRALFATE 1 G/1
1 TABLET ORAL 4 TIMES DAILY
Qty: 40 TABLET | Refills: 0 | Status: SHIPPED | OUTPATIENT
Start: 2019-01-24 | End: 2019-02-03

## 2019-01-24 RX ORDER — PROPOFOL 10 MG/ML
INJECTION, EMULSION INTRAVENOUS
Status: COMPLETED
Start: 2019-01-24 | End: 2019-01-24

## 2019-01-24 RX ORDER — PROPOFOL 10 MG/ML
VIAL (ML) INTRAVENOUS
Status: DISCONTINUED | OUTPATIENT
Start: 2019-01-24 | End: 2019-01-24

## 2019-01-24 RX ORDER — SODIUM CHLORIDE 9 MG/ML
INJECTION, SOLUTION INTRAVENOUS CONTINUOUS
Status: DISCONTINUED | OUTPATIENT
Start: 2019-01-24 | End: 2019-01-24 | Stop reason: HOSPADM

## 2019-01-24 RX ORDER — PANTOPRAZOLE SODIUM 40 MG/1
40 TABLET, DELAYED RELEASE ORAL DAILY
Qty: 90 TABLET | Refills: 3 | Status: SHIPPED | OUTPATIENT
Start: 2019-01-24 | End: 2019-08-08

## 2019-01-24 RX ORDER — LIDOCAINE HYDROCHLORIDE 20 MG/ML
INJECTION, SOLUTION EPIDURAL; INFILTRATION; INTRACAUDAL; PERINEURAL
Status: DISCONTINUED
Start: 2019-01-24 | End: 2019-01-24 | Stop reason: HOSPADM

## 2019-01-24 RX ORDER — LIDOCAINE HCL/PF 100 MG/5ML
SYRINGE (ML) INTRAVENOUS
Status: DISCONTINUED | OUTPATIENT
Start: 2019-01-24 | End: 2019-01-24

## 2019-01-24 RX ADMIN — SODIUM CHLORIDE: 0.9 INJECTION, SOLUTION INTRAVENOUS at 11:01

## 2019-01-24 RX ADMIN — PROPOFOL 100 MG: 10 INJECTION, EMULSION INTRAVENOUS at 01:01

## 2019-01-24 RX ADMIN — PROPOFOL 30 MG: 10 INJECTION, EMULSION INTRAVENOUS at 01:01

## 2019-01-24 RX ADMIN — LIDOCAINE HYDROCHLORIDE 100 MG: 20 INJECTION, SOLUTION INTRAVENOUS at 01:01

## 2019-01-24 NOTE — PROVATION PATIENT INSTRUCTIONS
Discharge Summary/Instructions after an Endoscopic Procedure  Patient Name: Siddharth Urias  Patient MRN: [id number]  Patient YOB: 1934  Thursday, January 24, 2019  Noel Nation MD  RESTRICTIONS:  During your procedure today, you received medications for sedation.  These   medications may affect your judgment, balance and coordination.  Therefore,   for 24 hours, you have the following restrictions:   - DO NOT drive a car, operate machinery, make legal/financial decisions,   sign important papers or drink alcohol.    ACTIVITY:  Today: no heavy lifting, straining or running due to procedural   sedation/anesthesia.  The following day: return to full activity including work.  DIET:  Eat and drink normally unless instructed otherwise.     TREATMENT FOR COMMON SIDE EFFECTS:  - Mild abdominal pain, nausea, belching, bloating or excessive gas:  rest,   eat lightly and use a heating pad.  - Sore Throat: treat with throat lozenges and/or gargle with warm salt   water.  - Because air was used during the procedure, expelling large amounts of air   from your rectum or belching is normal.  - If a bowel prep was taken, you may not have a bowel movement for 1-3 days.    This is normal.  SYMPTOMS TO WATCH FOR AND REPORT TO YOUR PHYSICIAN:  1. Abdominal pain or bloating, other than gas cramps.  2. Chest pain.  3. Back pain.  4. Signs of infection such as: chills or fever occurring within 24 hours   after the procedure.  5. Rectal bleeding, which would show as bright red, maroon, or black stools.   (A tablespoon of blood from the rectum is not serious, especially if   hemorrhoids are present.)  6. Vomiting.  7. Weakness or dizziness.  GO DIRECTLY TO THE NEAREST EMERGENCY ROOM IF YOU HAVE ANY OF THE FOLLOWING:      Difficulty breathing              Chills and/or fever over 101 F   Persistent vomiting and/or vomiting blood   Severe abdominal pain   Severe chest pain   Black, tarry stools   Bleeding- more than one  tablespoon   Any other symptom or condition that you feel may need urgent attention  Your doctor recommends these additional instructions:  If any biopsies were taken, your doctors clinic will contact you in 1 to 2   weeks with any results.  - Patient has a contact number available for emergencies.  The signs and   symptoms of potential delayed complications were discussed with the   patient.  Return to normal activities tomorrow.  Written discharge   instructions were provided to the patient.   - Resume previous diet.   - Continue present medications.   - No aspirin, ibuprofen, naproxen, or other non-steroidal anti-inflammatory   drugs.   - Await pathology results.   - Discharge patient to home (ambulatory).   - Follow an antireflux regimen.   - Use Protonix (pantoprazole) 40 mg PO daily.   - Use sucralfate tablets 1 gram PO QID for 10 days.   - Return to my office in 4 weeks.  For questions, problems or results please call your physician - Noel Nation MD at Work:  [phone].  [facility], EMERGENCY ROOM PHONE NUMBER: [phone]  IF A COMPLICATION OR EMERGENCY SITUATION ARISES AND YOU ARE UNABLE TO REACH   YOUR PHYSICIAN - GO DIRECTLY TO THE EMERGENCY ROOM.  Noel Nation MD  1/24/2019 1:24:10 PM  This report has been verified and signed electronically.  [employer]

## 2019-01-24 NOTE — ANESTHESIA PREPROCEDURE EVALUATION
01/24/2019  Siddharth Urias is a 84 y.o., male.    Pre-op Assessment    I have reviewed the Patient Summary Reports.     I have reviewed the Nursing Notes.   I have reviewed the Medications.     Review of Systems  Anesthesia Hx:  No problems with previous Anesthesia    Social:  No Alcohol Use, Former Smoker    Hematology/Oncology:        Current/Recent Cancer. Other (see Oncology comments)   Cardiovascular:   Hypertension    Pulmonary:   Sleep Apnea    Renal/:   Chronic Renal Disease, CRI    Hepatic/GI:   Liver Disease,    Neurological:  Neurology Normal    Endocrine:   Diabetes    Psych:   Psychiatric History          Physical Exam  General:  Obesity    Airway/Jaw/Neck:  Airway Findings: Mouth Opening: Normal Tongue: Normal  General Airway Assessment: Adult, Average  Mallampati: II  Jaw/Neck Findings:  Neck ROM: Normal ROM       Chest/Lungs:  Chest/Lungs Findings: Clear to auscultation, Normal Respiratory Rate     Heart/Vascular:  Heart Findings: Rate: Normal  Rhythm: Regular Rhythm        Mental Status:  Mental Status Findings:  Cooperative, Alert and Oriented         Anesthesia Plan  Type of Anesthesia, risks & benefits discussed:  Anesthesia Type:  general  Patient's Preference:   Intra-op Monitoring Plan: standard ASA monitors  Intra-op Monitoring Plan Comments:   Post Op Pain Control Plan:   Post Op Pain Control Plan Comments:   Induction:   IV  Beta Blocker:  Patient is not currently on a Beta-Blocker (No further documentation required).       Informed Consent: Patient understands risks and agrees with Anesthesia plan.  Questions answered. Anesthesia consent signed with patient.  ASA Score: 3     Day of Surgery Review of History & Physical: I have interviewed and examined the patient. I have reviewed the patient's H&P dated:  There are no significant changes.  H&P update referred to the provider.          Ready For Surgery From Anesthesia Perspective.

## 2019-01-24 NOTE — ANESTHESIA POSTPROCEDURE EVALUATION
"Anesthesia Post Evaluation    Patient: Siddharth Urias    Procedure(s) Performed: Procedure(s) (LRB):  EGD (ESOPHAGOGASTRODUODENOSCOPY) (N/A)    Final Anesthesia Type: general  Patient location during evaluation: PACU  Patient participation: Yes- Able to Participate  Level of consciousness: awake and alert  Post-procedure vital signs: reviewed and stable  Pain management: adequate  Airway patency: patent  PONV status at discharge: No PONV  Anesthetic complications: no      Cardiovascular status: blood pressure returned to baseline  Respiratory status: unassisted  Hydration status: euvolemic  Follow-up not needed.        Visit Vitals  BP (!) 111/58   Pulse 93   Temp 37.1 °C (98.8 °F)   Resp 17   Ht 5' 7" (1.702 m)   Wt 93.4 kg (206 lb)   SpO2 (!) 94%   BMI 32.26 kg/m²       Pain/Edward Score: No Data Recorded      "

## 2019-01-24 NOTE — DISCHARGE INSTRUCTIONS
Duodenitis    The duodenum is the first part of the small intestine, just past the stomach. Duodenitis is inflammation of the lining of the duodenum. This sheet tells you more about the condition.  Causes of duodenitis  The most common cause of duodenitis is infection by Helicobacter pylori (H. pylori) bacteria. Another common cause is long-term use of NSAIDs (such as aspirin and ibuprofen). Celiac disease, an allergy to gluten, causes a particular type of inflammation in the duodenum along with other changes. Less commonly, duodenitis happens along with another health problem, such as Crohn's disease. Drinking alcohol, smoking, or taking certain medicines also may make duodenitis more likely to happen.   Symptoms of duodenitis  The condition may cause no symptoms. If symptoms do happen, they can include:  · Burning, cramping, or hunger-like pain in your stomach  · Gas or a bloated feeling  · Nausea and vomiting  · Feeling full soon after starting a meal  Diagnosing duodenitis  Here is what will be done to diagnose duodenitis:  · If duodenitis is suspected, an upper endoscopy with biopsy will be done to confirm it. During this test, a thin, flexible tube with a light and camera on the end (endoscope) is used. The scope is moved down your throat to the stomach and into the duodenum. The scope sends images of the duodenum to a video screen. Small samples (biopsy) of the lining of the duodenum may be taken. These samples may be sent to a lab for testing for H. pylori.  · To check for H. pylori or other pathogens, a blood, stool, gastric biopsy, or breath test may be done. Samples of blood or stool are taken and tested in a lab. For a breath test, you swallow a harmless compound. If H. pylori bacteria are present, extra carbon dioxide gas can then be detected in your breath.  · To check for celiac disease, blood tests may be done. If positive, an upper endoscopy with biopsy is usually done to confirm the  diagnosis.   · In rare cases, an upper gastrointestinal (GI) series is done. This gives more information about the digestive tract. This procedure takes X-rays of the upper GI tract from the mouth to the small intestine.  Treating duodenitis  Duodenitis is treated using one or more of the following:  · Antibiotic medicines to kill H. pylori  · Medicines to reduce the amount of acid the stomach makes  · Stopping NSAIDs such as aspirin and ibuprofen. However, if you take aspirin for a medical condition, such as heart disease or stroke, do not stop until you check with your prescribing healthcare provider. If you take NSAIDs for arthritis or pain, check with your healthcare provider about alternatives.  · Adopting a gluten-free diet if celiac disease is the cause.  · Avoiding alcohol  · Stopping smoking  Your healthcare provider can tell you more about what treatments are needed.  Recovery and follow-up  With treatment, most cases of duodenitis clear up completely. In rare cases, duodenitis can be an ongoing (chronic) problem or can develop into a duodenal ulcer. If your symptoms do not improve or if they go away and come back, let your healthcare provider know. In such cases, regular healthcare provider visits and treatments are needed to manage your condition.   When to call the healthcare provider  Call your healthcare provider right away if you have any of the following:  · Fever of 100.4°F (38.0°C) or higher, or as advised by your healthcare provider  · Nausea or vomiting (vomit may be bloody or look like coffee grounds)  · Dark, tarry, or bloody stools  · Sudden or severe stomach pain  · Pain that does not improve with treatment  · Rapid weight loss   Date Last Reviewed: 7/1/2016  © 6015-3040 [employer]. [address] [place] [address], [place] [address]. All rights reserved. This information is not intended as a substitute for professional medical care. Always follow your healthcare professional's  instructions.        Gastritis (Adult)    Gastritis is inflammation and irritation of the stomach lining. It can be present for a short time (acute) or be long lasting (chronic). Gastritis is often caused by infection with bacteria called H pylori. More than a third of people in the [place] have this bacteria in their bodies. In many cases, H pylori causes no problems or symptoms. In some people, though, the infection irritates the stomach lining and causes gastritis. Other causes of stomach irritation include drinking alcohol or taking pain-relieving medicines called NSAIDs (such as aspirin or ibuprofen).   Symptoms of gastritis can include:  · Abdominal pain or bloating  · Loss of appetite  · Nausea or vomiting  · Vomiting blood or having black stools  · Feeling more tired than usual  An inflamed and irritated stomach lining is more likely to develop a sore called an ulcer. To help prevent this, gastritis should be treated.  Home care  If needed, medicines may be prescribed. If you have H pylori infection, treating it will likely relieve your symptoms. Other changes can help reduce stomach irritation and help it heal.  · If you have been prescribed medicines for H pylori infection, take them as directed. Take all of the medicine until it is finished or your healthcare provider tells you to stop, even if you feel better.  · Your healthcare provider may recommend avoiding NSAIDs. If you take daily aspirin for your heart or other medical reasons, do not stop without talking to your healthcare provider first.  · Avoid drinking alcohol.  · Stop smoking. Smoking can irritate the stomach and delay healing. As much as possible, stay away from second hand smoke.  Follow-up care  Follow up with your healthcare provider, or as advised by our staff. Testing may be needed to check for inflammation or an ulcer.  When to seek medical advice  Call your healthcare provider for any of the following:  · Stomach pain that gets worse or  moves to the lower right abdomen (appendix area)  · Chest pain that appears or gets worse, or spreads to the back, neck, shoulder, or arm  · Frequent vomiting (cant keep down liquids)  · Blood in the stool or vomit (red or black in color)  · Feeling weak or dizzy  · Fever of 100.4ºF (38ºC) or higher, or as directed by your healthcare provider  Date Last Reviewed: 6/22/2015  © 5233-3367 [employer]. [address] [place] [address]. All rights reserved. This information is not intended as a substitute for professional medical care. Always follow your healthcare professional's instructions.

## 2019-01-24 NOTE — TRANSFER OF CARE
"Anesthesia Transfer of Care Note    Patient: Siddharth Urias    Procedure(s) Performed: Procedure(s) (LRB):  EGD (ESOPHAGOGASTRODUODENOSCOPY) (N/A)    Patient location: GI    Anesthesia Type: general    Transport from OR: Transported from OR on 2-3 L/min O2 by NC with adequate spontaneous ventilation    Post pain: adequate analgesia    Post assessment: no apparent anesthetic complications    Post vital signs: stable    Level of consciousness: awake    Nausea/Vomiting: no nausea/vomiting    Complications: none    Transfer of care protocol was followed      Last vitals:   Visit Vitals  /75   Pulse 85   Temp 37.1 °C (98.8 °F)   Resp 19   Ht 5' 7" (1.702 m)   Wt 93.4 kg (206 lb)   SpO2 (!) 92%   BMI 32.26 kg/m²     "

## 2019-01-25 ENCOUNTER — TELEPHONE (OUTPATIENT)
Dept: SURGERY | Facility: CLINIC | Age: 84
End: 2019-01-25

## 2019-01-25 ENCOUNTER — TELEPHONE (OUTPATIENT)
Dept: GASTROENTEROLOGY | Facility: CLINIC | Age: 84
End: 2019-01-25

## 2019-01-25 VITALS
RESPIRATION RATE: 19 BRPM | TEMPERATURE: 99 F | SYSTOLIC BLOOD PRESSURE: 126 MMHG | BODY MASS INDEX: 32.33 KG/M2 | WEIGHT: 206 LBS | DIASTOLIC BLOOD PRESSURE: 73 MMHG | OXYGEN SATURATION: 94 % | HEART RATE: 84 BPM | HEIGHT: 67 IN

## 2019-01-25 DIAGNOSIS — K64.9 HEMORRHOIDS, UNSPECIFIED HEMORRHOID TYPE: Primary | ICD-10-CM

## 2019-01-25 NOTE — TELEPHONE ENCOUNTER
----- Message from Chai Tamayo sent at 1/25/2019 10:37 AM CST -----  Type: Needs Medical Advice    Who Called:  Wife/Edith    Best Call Back Number: [phone]  Additional Information: Caller is requesting a callback regarding a doctor that Dr. Nation had recommended to him for a hemohrhoids

## 2019-02-19 ENCOUNTER — OFFICE VISIT (OUTPATIENT)
Dept: SURGERY | Facility: CLINIC | Age: 84
End: 2019-02-19
Payer: MEDICARE

## 2019-02-19 VITALS
SYSTOLIC BLOOD PRESSURE: 132 MMHG | HEART RATE: 89 BPM | BODY MASS INDEX: 34.98 KG/M2 | HEIGHT: 67 IN | DIASTOLIC BLOOD PRESSURE: 78 MMHG | WEIGHT: 222.88 LBS | TEMPERATURE: 98 F

## 2019-02-19 DIAGNOSIS — K64.9 BLEEDING HEMORRHOIDS: Primary | ICD-10-CM

## 2019-02-19 PROCEDURE — 99999 PR PBB SHADOW E&M-EST. PATIENT-LVL III: ICD-10-PCS | Mod: PBBFAC,,, | Performed by: SURGERY

## 2019-02-19 PROCEDURE — 99213 OFFICE O/P EST LOW 20 MIN: CPT | Mod: PBBFAC,PO,25 | Performed by: SURGERY

## 2019-02-19 PROCEDURE — 46600 PR DIAG2STIC A2SCOPY: ICD-10-PCS | Mod: S$PBB,,, | Performed by: SURGERY

## 2019-02-19 PROCEDURE — 46600 DIAGNOSTIC ANOSCOPY SPX: CPT | Mod: PBBFAC,PO | Performed by: SURGERY

## 2019-02-19 PROCEDURE — 99204 PR OFFICE/OUTPT VISIT, NEW, LEVL IV, 45-59 MIN: ICD-10-PCS | Mod: S$PBB,25,, | Performed by: SURGERY

## 2019-02-19 PROCEDURE — 99204 OFFICE O/P NEW MOD 45 MIN: CPT | Mod: S$PBB,25,, | Performed by: SURGERY

## 2019-02-19 PROCEDURE — 99999 PR PBB SHADOW E&M-EST. PATIENT-LVL III: CPT | Mod: PBBFAC,,, | Performed by: SURGERY

## 2019-02-19 PROCEDURE — 46600 DIAGNOSTIC ANOSCOPY SPX: CPT | Mod: S$PBB,,, | Performed by: SURGERY

## 2019-02-19 RX ORDER — SUCRALFATE 1 G/1
1 TABLET ORAL 4 TIMES DAILY
COMMUNITY
End: 2019-08-08

## 2019-02-19 NOTE — PROGRESS NOTES
Subjective:       Patient ID: Siddharth Urias is a 84 y.o. male.    Chief Complaint: Consult (Hemorrhoids/Hx of colon cancer)    HPI  Pleasant 85 yo M referred to me for evaluation of hemorrhoids.  He notes that he has no pain.  Has had some occasional bleeding once a month.  No fever/chills.  He notes that they do not irritate him.  He does note that he did have some explosive diarrhea a few weeks ago after taking medication wrong.  REview of records indicates he has prolonged history of dirrhea.   Has not had that issue since.  Pt has a history of prostate cancer treated with radiation.  Has known history of radiation proctitis    NO significant abdominal surgery  Review of Systems   Constitutional: Negative for activity change, appetite change, diaphoresis, fever and unexpected weight change.   Respiratory: Negative for cough, chest tightness and wheezing.    Cardiovascular: Negative for chest pain and palpitations.   Gastrointestinal: Positive for diarrhea. Negative for abdominal distention, abdominal pain, anal bleeding, blood in stool, constipation, nausea, rectal pain and vomiting.   Genitourinary: Negative for difficulty urinating, dysuria and hematuria.   Musculoskeletal: Negative for back pain and gait problem.   Skin: Negative for color change and wound.   Neurological: Negative for tremors and seizures.   Hematological: Negative for adenopathy.       Objective:      Physical Exam   Constitutional: He is oriented to person, place, and time. He appears well-developed and well-nourished.   HENT:   Head: Normocephalic and atraumatic.   Eyes: Pupils are equal, round, and reactive to light.   Neck: Normal range of motion. No tracheal deviation present. No thyromegaly present.   Cardiovascular: Normal rate, regular rhythm and normal heart sounds. Exam reveals no gallop and no friction rub.   No murmur heard.  Pulmonary/Chest: Effort normal and breath sounds normal. He has no wheezes. He exhibits no  tenderness.   Abdominal: He exhibits no distension and no mass. There is no tenderness. There is no rebound and no guarding.   Genitourinary:   Genitourinary Comments: Ext exam demonstrates fecal residue in the perianal area. Prolapsed hemorrhoids which reduce with manual pressure.  Return nearly immediately.   KERWIN with decreased sphincter tone.  Anoscopy demonstrting enlarged int hemorrhoids in the R ant, R post and L lateral position       Musculoskeletal: Normal range of motion.   Neurological: He is alert and oriented to person, place, and time. Coordination normal.   Skin: Skin is warm.   Psychiatric: He has a normal mood and affect.   Vitals reviewed.      Assessment:     Prolapsing/bleeding hemorrhoids  No diagnosis found.    Plan:       D/w pt.  I have recommended increasing fiber in male diet and to also begin using a fiber supplement (metamucil or psyillium husk).  I have also offered and recommended banding.  Pt notes he prefers to avoid banding today if possible Will monitor for improvement and if no significant improvement will consider more aggressive treatment.

## 2019-02-19 NOTE — LETTER
February 19, 2019      Noel Aguiar MD  [address]  [address]  [place] [address]           [facility]  [address]  [place] [address]  Phone: [phone]          Patient: Siddharth Urias   MR Number: [id number]   YOB: 1934   Date of Visit: 2/19/2019       Dear Dr. Noel Aguiar:    Thank you for referring Siddharth Urias to me for evaluation. Attached you will find relevant portions of my assessment and plan of care.    If you have questions, please do not hesitate to call me. I look forward to following Siddharth Urias along with you.    Sincerely,    Dayday Arce MD    Enclosure  CC:  No Recipients    If you would like to receive this communication electronically, please contact [email or web address] or [phone] to request more information on [employer] access.    For providers and/or their staff who would like to refer a patient to [facility], please contact us through our one-stop-shop provider referral line, [facility], at [phone].    If you feel you have received this communication in error or would no longer like to receive these types of communications, please e-mail [email or web address]

## 2019-02-27 ENCOUNTER — OFFICE VISIT (OUTPATIENT)
Dept: GASTROENTEROLOGY | Facility: CLINIC | Age: 84
End: 2019-02-27
Payer: MEDICARE

## 2019-02-27 VITALS
WEIGHT: 226 LBS | BODY MASS INDEX: 35.47 KG/M2 | SYSTOLIC BLOOD PRESSURE: 114 MMHG | HEART RATE: 72 BPM | HEIGHT: 67 IN | DIASTOLIC BLOOD PRESSURE: 67 MMHG

## 2019-02-27 DIAGNOSIS — K62.7 RADIATION PROCTITIS: Primary | ICD-10-CM

## 2019-02-27 DIAGNOSIS — K64.9 HEMORRHOIDS, UNSPECIFIED HEMORRHOID TYPE: ICD-10-CM

## 2019-02-27 DIAGNOSIS — K52.9 CHRONIC DIARRHEA: ICD-10-CM

## 2019-02-27 DIAGNOSIS — G47.33 OSA (OBSTRUCTIVE SLEEP APNEA): ICD-10-CM

## 2019-02-27 PROCEDURE — 99214 OFFICE O/P EST MOD 30 MIN: CPT | Mod: S$PBB,,, | Performed by: INTERNAL MEDICINE

## 2019-02-27 PROCEDURE — 99213 OFFICE O/P EST LOW 20 MIN: CPT | Mod: PBBFAC,PO | Performed by: INTERNAL MEDICINE

## 2019-02-27 PROCEDURE — 99999 PR PBB SHADOW E&M-EST. PATIENT-LVL III: CPT | Mod: PBBFAC,,, | Performed by: INTERNAL MEDICINE

## 2019-02-27 PROCEDURE — 99214 PR OFFICE/OUTPT VISIT, EST, LEVL IV, 30-39 MIN: ICD-10-PCS | Mod: S$PBB,,, | Performed by: INTERNAL MEDICINE

## 2019-02-27 PROCEDURE — 99999 PR PBB SHADOW E&M-EST. PATIENT-LVL III: ICD-10-PCS | Mod: PBBFAC,,, | Performed by: INTERNAL MEDICINE

## 2019-02-27 NOTE — PROGRESS NOTES
"Subjective:       Patient ID: iSddharth Urias is a 84 y.o. male.    This is an established patient.      Chief Complaint: Diarrhea    PAST HISTORY:    Diarrhea    This is a chronic problem. The current episode started more than 1 year ago (a few years ago). The problem occurs 2 to 4 times per day. The problem has been unchanged. Diarrhea characteristics: loose, explosive, nonbloody. Diarrhea awakens from sleep: rarely. Associated symptoms include bloating. Pertinent negatives include no abdominal pain, chills, coughing, fever, vomiting or weight loss. Nothing aggravates the symptoms. There are no known risk factors. He has tried anti-motility drug for the symptoms. The treatment provided no relief. recent rectal bleeding secondary to radiation proctitis treated with colonoscopy/APC     INTERVAL HISTORY:  Since last visit, he had EGD and flex sig with treatment of radiation proctitis.  He also started cholestyramine.  He reports that all symptoms are greatly improved and he feels well.  No bleeding, abdominal pain, or change in bowel habits.  No other complaints.    Review of Systems   Constitutional: Negative for chills, fatigue, fever and weight loss.   HENT: Negative for trouble swallowing.    Respiratory: Negative for cough, shortness of breath and wheezing.    Cardiovascular: Negative for chest pain and palpitations.   Gastrointestinal: Positive for bloating and diarrhea. Negative for abdominal pain, constipation, nausea and vomiting.   Skin: Negative for color change and rash.   All other systems reviewed and are negative.      Objective:       Vitals:    02/27/19 1422   BP: 114/67   Pulse: 72   Weight: 102.5 kg (225 lb 15.5 oz)   Height: 5' 7" (1.702 m)         Physical Exam   Constitutional: He is oriented to person, place, and time. He appears well-developed and well-nourished.   HENT:   Head: Normocephalic and atraumatic.   Mouth/Throat: Oropharynx is clear and moist.   Eyes: Conjunctivae are normal. " Pupils are equal, round, and reactive to light. No scleral icterus.   Cardiovascular: Normal rate and regular rhythm.   No murmur heard.  Pulmonary/Chest: Effort normal and breath sounds normal. He has no wheezes.   Abdominal: Soft. Bowel sounds are normal. He exhibits no distension. There is no tenderness.   Obese     Neurological: He is alert and oriented to person, place, and time.   Vitals reviewed.         Lab Results   Component Value Date    WBC 7.60 01/22/2019    HGB 10.0 (L) 01/22/2019    HCT 31.3 (L) 01/22/2019    MCV 82 01/22/2019     01/22/2019     Further history was obtained from the patient's wife who was present throughout the interview and states that problem has caused lifestyle inconveniences in the past but is now much improved.  History is otherwise as above in the HPI.       Assessment:       1. Radiation proctitis    2. SANDRA (obstructive sleep apnea)    3. Chronic diarrhea    4. Hemorrhoids, unspecified hemorrhoid type        Plan:       1.  Continue fiber  2.  Continue current regimen  3.  Repeat flex sig PRN for rectal bleeding  4.  Follow up PRN

## 2019-03-12 DIAGNOSIS — E11.29 TYPE 2 DIABETES MELLITUS WITH OTHER KIDNEY COMPLICATION, UNSPECIFIED WHETHER LONG TERM INSULIN USE: Primary | ICD-10-CM

## 2019-03-14 ENCOUNTER — LAB VISIT (OUTPATIENT)
Dept: LAB | Facility: HOSPITAL | Age: 84
End: 2019-03-14
Attending: FAMILY MEDICINE
Payer: MEDICARE

## 2019-03-14 DIAGNOSIS — E11.22 CONTROLLED TYPE 2 DIABETES MELLITUS WITH STAGE 3 CHRONIC KIDNEY DISEASE, WITHOUT LONG-TERM CURRENT USE OF INSULIN: ICD-10-CM

## 2019-03-14 DIAGNOSIS — E78.5 HYPERLIPIDEMIA LDL GOAL <70: ICD-10-CM

## 2019-03-14 DIAGNOSIS — N18.30 CONTROLLED TYPE 2 DIABETES MELLITUS WITH STAGE 3 CHRONIC KIDNEY DISEASE, WITHOUT LONG-TERM CURRENT USE OF INSULIN: ICD-10-CM

## 2019-03-14 DIAGNOSIS — M10.9 GOUT, UNSPECIFIED CAUSE, UNSPECIFIED CHRONICITY, UNSPECIFIED SITE: ICD-10-CM

## 2019-03-14 LAB
ALBUMIN SERPL BCP-MCNC: 3.5 G/DL
ALP SERPL-CCNC: 61 U/L
ALT SERPL W/O P-5'-P-CCNC: 21 U/L
ANION GAP SERPL CALC-SCNC: 8 MMOL/L
AST SERPL-CCNC: 22 U/L
BASOPHILS # BLD AUTO: 0.09 K/UL
BASOPHILS NFR BLD: 1 %
BILIRUB SERPL-MCNC: 0.4 MG/DL
BUN SERPL-MCNC: 22 MG/DL
CALCIUM SERPL-MCNC: 9.6 MG/DL
CHLORIDE SERPL-SCNC: 104 MMOL/L
CHOLEST SERPL-MCNC: 151 MG/DL
CHOLEST/HDLC SERPL: 4.9 {RATIO}
CO2 SERPL-SCNC: 27 MMOL/L
CREAT SERPL-MCNC: 1.6 MG/DL
DIFFERENTIAL METHOD: ABNORMAL
EOSINOPHIL # BLD AUTO: 0.4 K/UL
EOSINOPHIL NFR BLD: 4.3 %
ERYTHROCYTE [DISTWIDTH] IN BLOOD BY AUTOMATED COUNT: 15.7 %
EST. GFR  (AFRICAN AMERICAN): 45.1 ML/MIN/1.73 M^2
EST. GFR  (NON AFRICAN AMERICAN): 39 ML/MIN/1.73 M^2
ESTIMATED AVG GLUCOSE: 134 MG/DL
GLUCOSE SERPL-MCNC: 144 MG/DL
HBA1C MFR BLD HPLC: 6.3 %
HCT VFR BLD AUTO: 46.1 %
HDLC SERPL-MCNC: 31 MG/DL
HDLC SERPL: 20.5 %
HGB BLD-MCNC: 13.1 G/DL
IMM GRANULOCYTES # BLD AUTO: 0.04 K/UL
IMM GRANULOCYTES NFR BLD AUTO: 0.4 %
LDLC SERPL CALC-MCNC: 87.6 MG/DL
LYMPHOCYTES # BLD AUTO: 1.7 K/UL
LYMPHOCYTES NFR BLD: 17.9 %
MCH RBC QN AUTO: 23.8 PG
MCHC RBC AUTO-ENTMCNC: 28.4 G/DL
MCV RBC AUTO: 84 FL
MONOCYTES # BLD AUTO: 1 K/UL
MONOCYTES NFR BLD: 10.5 %
NEUTROPHILS # BLD AUTO: 6.2 K/UL
NEUTROPHILS NFR BLD: 65.9 %
NONHDLC SERPL-MCNC: 120 MG/DL
NRBC BLD-RTO: 0 /100 WBC
PLATELET # BLD AUTO: 257 K/UL
PMV BLD AUTO: 11.4 FL
POTASSIUM SERPL-SCNC: 4.1 MMOL/L
PROT SERPL-MCNC: 7.7 G/DL
RBC # BLD AUTO: 5.51 M/UL
SODIUM SERPL-SCNC: 139 MMOL/L
TRIGL SERPL-MCNC: 162 MG/DL
URATE SERPL-MCNC: 8.8 MG/DL
WBC # BLD AUTO: 9.43 K/UL

## 2019-03-14 PROCEDURE — 80053 COMPREHEN METABOLIC PANEL: CPT

## 2019-03-14 PROCEDURE — 80061 LIPID PANEL: CPT

## 2019-03-14 PROCEDURE — 84550 ASSAY OF BLOOD/URIC ACID: CPT

## 2019-03-14 PROCEDURE — 36415 COLL VENOUS BLD VENIPUNCTURE: CPT | Mod: PO

## 2019-03-14 PROCEDURE — 83036 HEMOGLOBIN GLYCOSYLATED A1C: CPT

## 2019-03-14 PROCEDURE — 85025 COMPLETE CBC W/AUTO DIFF WBC: CPT

## 2019-03-22 ENCOUNTER — HOSPITAL ENCOUNTER (OUTPATIENT)
Dept: RADIOLOGY | Facility: HOSPITAL | Age: 84
Discharge: HOME | End: 2019-03-22
Attending: PHYSICIAN ASSISTANT
Payer: MEDICARE

## 2019-03-22 ENCOUNTER — OFFICE VISIT (OUTPATIENT)
Dept: FAMILY MEDICINE | Facility: CLINIC | Age: 84
End: 2019-03-22
Payer: MEDICARE

## 2019-03-22 ENCOUNTER — LAB VISIT (OUTPATIENT)
Dept: LAB | Facility: HOSPITAL | Age: 84
End: 2019-03-22
Attending: PHYSICIAN ASSISTANT
Payer: MEDICARE

## 2019-03-22 ENCOUNTER — TELEPHONE (OUTPATIENT)
Dept: FAMILY MEDICINE | Facility: CLINIC | Age: 84
End: 2019-03-22

## 2019-03-22 VITALS
OXYGEN SATURATION: 94 % | WEIGHT: 224.88 LBS | DIASTOLIC BLOOD PRESSURE: 68 MMHG | HEART RATE: 72 BPM | RESPIRATION RATE: 16 BRPM | HEIGHT: 67 IN | SYSTOLIC BLOOD PRESSURE: 138 MMHG | BODY MASS INDEX: 35.29 KG/M2 | TEMPERATURE: 98 F

## 2019-03-22 DIAGNOSIS — R60.0 LEG EDEMA: ICD-10-CM

## 2019-03-22 DIAGNOSIS — L03.115 CELLULITIS OF RIGHT LOWER EXTREMITY: ICD-10-CM

## 2019-03-22 DIAGNOSIS — I82.411 ACUTE DEEP VEIN THROMBOSIS (DVT) OF FEMORAL VEIN OF RIGHT LOWER EXTREMITY: ICD-10-CM

## 2019-03-22 DIAGNOSIS — I82.411 ACUTE DEEP VEIN THROMBOSIS (DVT) OF FEMORAL VEIN OF RIGHT LOWER EXTREMITY: Primary | ICD-10-CM

## 2019-03-22 LAB
ALBUMIN SERPL BCP-MCNC: 3.6 G/DL
ALP SERPL-CCNC: 55 U/L
ALT SERPL W/O P-5'-P-CCNC: 20 U/L
ANION GAP SERPL CALC-SCNC: 10 MMOL/L
AST SERPL-CCNC: 27 U/L
BASOPHILS # BLD AUTO: 0.04 K/UL
BASOPHILS NFR BLD: 0.5 %
BILIRUB SERPL-MCNC: 0.4 MG/DL
BUN SERPL-MCNC: 14 MG/DL
CALCIUM SERPL-MCNC: 9.2 MG/DL
CHLORIDE SERPL-SCNC: 106 MMOL/L
CO2 SERPL-SCNC: 26 MMOL/L
CREAT SERPL-MCNC: 1.3 MG/DL
DIFFERENTIAL METHOD: ABNORMAL
EOSINOPHIL # BLD AUTO: 0.4 K/UL
EOSINOPHIL NFR BLD: 4.9 %
ERYTHROCYTE [DISTWIDTH] IN BLOOD BY AUTOMATED COUNT: 16 %
EST. GFR  (AFRICAN AMERICAN): 57.9 ML/MIN/1.73 M^2
EST. GFR  (NON AFRICAN AMERICAN): 50.1 ML/MIN/1.73 M^2
GLUCOSE SERPL-MCNC: 83 MG/DL
HCT VFR BLD AUTO: 46.6 %
HGB BLD-MCNC: 13.2 G/DL
IMM GRANULOCYTES # BLD AUTO: 0.04 K/UL
IMM GRANULOCYTES NFR BLD AUTO: 0.5 %
LYMPHOCYTES # BLD AUTO: 1.4 K/UL
LYMPHOCYTES NFR BLD: 19.6 %
MCH RBC QN AUTO: 23.7 PG
MCHC RBC AUTO-ENTMCNC: 28.3 G/DL
MCV RBC AUTO: 84 FL
MONOCYTES # BLD AUTO: 0.6 K/UL
MONOCYTES NFR BLD: 8.3 %
NEUTROPHILS # BLD AUTO: 4.8 K/UL
NEUTROPHILS NFR BLD: 66.2 %
NRBC BLD-RTO: 0 /100 WBC
PLATELET # BLD AUTO: 248 K/UL
PMV BLD AUTO: 11 FL
POTASSIUM SERPL-SCNC: 4.1 MMOL/L
PROT SERPL-MCNC: 7.6 G/DL
RBC # BLD AUTO: 5.58 M/UL
SODIUM SERPL-SCNC: 142 MMOL/L
WBC # BLD AUTO: 7.31 K/UL

## 2019-03-22 PROCEDURE — 85610 PROTHROMBIN TIME: CPT

## 2019-03-22 PROCEDURE — 85305 CLOT INHIBIT PROT S TOTAL: CPT

## 2019-03-22 PROCEDURE — 93971 US LOWER EXTREMITY VEINS RIGHT: ICD-10-PCS | Mod: 26,RT,, | Performed by: RADIOLOGY

## 2019-03-22 PROCEDURE — 93971 EXTREMITY STUDY: CPT | Mod: 26,RT,, | Performed by: RADIOLOGY

## 2019-03-22 PROCEDURE — 99214 OFFICE O/P EST MOD 30 MIN: CPT | Mod: S$PBB,,, | Performed by: PHYSICIAN ASSISTANT

## 2019-03-22 PROCEDURE — 85730 THROMBOPLASTIN TIME PARTIAL: CPT

## 2019-03-22 PROCEDURE — 99214 PR OFFICE/OUTPT VISIT, EST, LEVL IV, 30-39 MIN: ICD-10-PCS | Mod: S$PBB,,, | Performed by: PHYSICIAN ASSISTANT

## 2019-03-22 PROCEDURE — 99215 OFFICE O/P EST HI 40 MIN: CPT | Mod: PBBFAC,25,PO | Performed by: PHYSICIAN ASSISTANT

## 2019-03-22 PROCEDURE — 99999 PR PBB SHADOW E&M-EST. PATIENT-LVL V: CPT | Mod: PBBFAC,,, | Performed by: PHYSICIAN ASSISTANT

## 2019-03-22 PROCEDURE — 99999 PR PBB SHADOW E&M-EST. PATIENT-LVL V: ICD-10-PCS | Mod: PBBFAC,,, | Performed by: PHYSICIAN ASSISTANT

## 2019-03-22 PROCEDURE — 80053 COMPREHEN METABOLIC PANEL: CPT

## 2019-03-22 PROCEDURE — 36415 COLL VENOUS BLD VENIPUNCTURE: CPT | Mod: PO

## 2019-03-22 PROCEDURE — 93971 EXTREMITY STUDY: CPT | Mod: TC,RT

## 2019-03-22 PROCEDURE — 85300 ANTITHROMBIN III ACTIVITY: CPT

## 2019-03-22 PROCEDURE — 85025 COMPLETE CBC W/AUTO DIFF WBC: CPT

## 2019-03-22 NOTE — TELEPHONE ENCOUNTER
Called by [facility] on call and connected to pharmacist at [facility].  Patient diagnosed today with DVT and Xarelto ordered.  Xarelto not covered on insurance and requires prior authorization.  Attempted to change to Eliquis but also requires prior authorization which cannot be obtained until Monday.    Patient with GFR greater than 60, no known drug allergies, PT/INR ordered but not yet completed.    Prescription given to pharmacist for warfarin 5 mg.  Take two (10mg) tonight, two (10mg) tomorrow and one (5mg) daily thereafter.  Contact Dr. Maxwell/Jazmyne for follow up Monday.

## 2019-03-22 NOTE — PROGRESS NOTES
Subjective:       Patient ID: Siddharth Urias is a 84 y.o. male.    Chief Complaint: Leg Swelling (right x 1 week)    Patient presents for evaluation of redness and swelling to the right lower extremity.  He noticed the symptoms approximately 4 days ago.  Patient denies any injury.  He denies pain in the leg.  He states that the leg is itching.  He states that he has been scratching the leg due to the itching.  He has not tried any treatment for the itching, redness, or swelling. Patient states he is otherwise feeling well.  He denies recent fever, cough, shortness of breath, upper respiratory symptoms, chest pain, heart palpitations.      Review of Systems   Constitutional: Negative for activity change, appetite change, chills, diaphoresis, fatigue, fever and unexpected weight change.   Respiratory: Negative for cough, chest tightness, shortness of breath and wheezing.    Cardiovascular: Positive for leg swelling. Negative for chest pain and palpitations.   Musculoskeletal: Negative for gait problem.   Skin: Positive for color change. Negative for rash and wound.        And itching       Objective:      Physical Exam   HENT:   Head: Normocephalic and atraumatic.   Cardiovascular: Normal rate, regular rhythm and normal heart sounds.   Pulmonary/Chest: Effort normal and breath sounds normal.   Musculoskeletal:        Right lower leg: He exhibits swelling and edema. He exhibits no tenderness.        Left lower leg: He exhibits no tenderness, no edema and no deformity.   Calf size is 44 cm on the right and 38 on the left  Ankle size is 25 cm on the right and 23 on the left  Juan sign is negative bilaterally   Skin: Skin is warm and dry. Capillary refill takes less than 2 seconds.          US venous RLE:  Positive examination demonstrating DVT within the right common femoral and proximal femoral veins.  Assessment:       1. Acute deep vein thrombosis (DVT) of femoral vein of right lower extremity    2. Leg edema         Plan:       Siddharth was seen today for leg swelling.    Diagnoses and all orders for this visit:    Acute deep vein thrombosis (DVT) of femoral vein of right lower extremity  -     CBC auto differential; Future  -     Comprehensive metabolic panel; Future  -     Protime-INR; Future  -     APTT; Future  -     PROTEIN C ANTIGEN, TOTAL; Future  -     PROTEIN S ANTIGEN, TOTAL; Future  -     ANTITHROMBIN III; Future  -     FACTOR 5 LEIDEN; Future    Leg edema  -     US Lower Extremity Veins Right; Future    Other orders  -     rivaroxaban (XARELTO) 15 mg Tab; Take 1 tablet (15 mg total) by mouth 2 (two) times daily with meals. for 21 days    Follow up as scheduled   Seek ER for any bleeding, melena

## 2019-03-22 NOTE — PATIENT INSTRUCTIONS
Deep Vein Thrombosis (DVT)    Deep vein thrombosis (DVT) occurs when a blood clot (thrombus) forms in a deep vein. This happens most often in the leg. It can also happen in the arms or other parts of the body. A part of the clot called an embolus can break off and travel to the lungs. When this happens, its called a pulmonary embolism (PE). PE is a medical emergency. It can cut off blood flow and lead to death. Both DVT and PE are closely related. Together, they are often referred to by the term venous thromboembolism (VTE).  Risk factors for DVT  Anything that slows blood flow, injures the lining of a vein, or increases blood clotting can make you more prone to having DVT. This includes the following:  · Long periods without movement (such as when sitting for many hours at a time or when recovering from major surgery or illness)  · Estrogen (female hormone) therapy, such as hormone replacement therapy (HRT) or oral contraceptives  · Fractured hip or leg  · Major surgery or joint replacement  · Major trauma or spinal cord injury  · Cancer  · Family history  · Excess weight or obesity  · Smoking  · Older age  Symptoms  DVT does not always cause symptoms. When symptoms do occur, they may appear around the site of the DVT, such as in the leg. Possible symptoms include:  · Swelling  · Pain  · Warmth  · Redness  · Tenderness  Home care  · You were likely prescribed blood thinners (anticoagulants). They may be given as pills (oral) or shots (injections). Follow all instructions when using these medicines. Note: Do not take blood thinners with other medicines, herbal remedies, or supplements without talking to your provider first. Certain medicines or products can affect how blood thinners work.  · Follow your providers instructions about activity and rest.  · If support or compression stockings are prescribed, wear them as directed. These may help improve blood flow in the legs.  · When sitting or lying down, move  your ankles, toes and knees often. This may also help improve blood flow in the legs.  Follow-up care  Follow up with your healthcare provider, or as advised. If imaging tests were done, they may need further review by a doctor. You will be told of any new findings that may affect your care.  When to seek medical advice  Call your healthcare provider right away if any of these occur:  · New or increased swelling, pain, tenderness, warmth, or redness, in the leg, arm, or other area  · Blood in the urine  · Bleeding with bowel movements  Call 911  Call 911 right away if any of these occur:  · Bleeding from the nose, gums, a cut, or vagina  · Heavy or uncontrolled bleeding  · Trouble breathing  · Chest pain or discomfort that worsens with deep breathing or coughing  · Coughing (may cough up blood)  · Fast heartbeat  · Sweating  · Anxiety  · Lightheadedness, dizziness, or fainting  Date Last Reviewed: 9/21/2015  © 4079-1880 [employer]. [address] [place] [address], [place] [address]. All rights reserved. This information is not intended as a substitute for professional medical care. Always follow your healthcare professional's instructions.

## 2019-03-25 ENCOUNTER — OFFICE VISIT (OUTPATIENT)
Dept: FAMILY MEDICINE | Facility: CLINIC | Age: 84
End: 2019-03-25
Payer: MEDICARE

## 2019-03-25 ENCOUNTER — LAB VISIT (OUTPATIENT)
Dept: LAB | Facility: HOSPITAL | Age: 84
End: 2019-03-25
Attending: NURSE PRACTITIONER
Payer: MEDICARE

## 2019-03-25 VITALS
SYSTOLIC BLOOD PRESSURE: 131 MMHG | BODY MASS INDEX: 35.39 KG/M2 | HEART RATE: 67 BPM | DIASTOLIC BLOOD PRESSURE: 81 MMHG | OXYGEN SATURATION: 91 % | WEIGHT: 225.5 LBS | HEIGHT: 67 IN | TEMPERATURE: 98 F

## 2019-03-25 DIAGNOSIS — I82.411 ACUTE DEEP VEIN THROMBOSIS (DVT) OF FEMORAL VEIN OF RIGHT LOWER EXTREMITY: Primary | ICD-10-CM

## 2019-03-25 DIAGNOSIS — Z79.01 WARFARIN ANTICOAGULATION: ICD-10-CM

## 2019-03-25 DIAGNOSIS — I82.411 ACUTE DEEP VEIN THROMBOSIS (DVT) OF FEMORAL VEIN OF RIGHT LOWER EXTREMITY: ICD-10-CM

## 2019-03-25 LAB
INR PPP: 3.3 (ref 0.8–1.2)
PROTHROMBIN TIME: 33.9 SEC (ref 9–12.5)

## 2019-03-25 PROCEDURE — 99999 PR PBB SHADOW E&M-EST. PATIENT-LVL IV: CPT | Mod: PBBFAC,,, | Performed by: NURSE PRACTITIONER

## 2019-03-25 PROCEDURE — 99214 OFFICE O/P EST MOD 30 MIN: CPT | Mod: PBBFAC,PO | Performed by: NURSE PRACTITIONER

## 2019-03-25 PROCEDURE — 36415 COLL VENOUS BLD VENIPUNCTURE: CPT

## 2019-03-25 PROCEDURE — 99214 OFFICE O/P EST MOD 30 MIN: CPT | Mod: S$PBB,,, | Performed by: NURSE PRACTITIONER

## 2019-03-25 PROCEDURE — 99214 PR OFFICE/OUTPT VISIT, EST, LEVL IV, 30-39 MIN: ICD-10-PCS | Mod: S$PBB,,, | Performed by: NURSE PRACTITIONER

## 2019-03-25 PROCEDURE — 85610 PROTHROMBIN TIME: CPT

## 2019-03-25 PROCEDURE — 99999 PR PBB SHADOW E&M-EST. PATIENT-LVL IV: ICD-10-PCS | Mod: PBBFAC,,, | Performed by: NURSE PRACTITIONER

## 2019-03-25 RX ORDER — WARFARIN SODIUM 5 MG/1
TABLET ORAL
Refills: 0 | COMMUNITY
Start: 2019-03-22 | End: 2019-03-25 | Stop reason: DRUGHIGH

## 2019-03-25 RX ORDER — ENOXAPARIN SODIUM 100 MG/ML
100 INJECTION SUBCUTANEOUS EVERY 12 HOURS
Qty: 14 ML | Refills: 0 | Status: SHIPPED | OUTPATIENT
Start: 2019-03-25 | End: 2019-03-25 | Stop reason: DRUGHIGH

## 2019-03-25 RX ORDER — WARFARIN 4 MG/1
4 TABLET ORAL DAILY
Qty: 30 TABLET | Refills: 1 | Status: SHIPPED | OUTPATIENT
Start: 2019-03-25 | End: 2019-03-25 | Stop reason: DRUGHIGH

## 2019-03-25 RX ORDER — WARFARIN SODIUM 5 MG/1
5 TABLET ORAL DAILY
Qty: 30 TABLET | Refills: 0
Start: 2019-03-25 | End: 2019-03-26

## 2019-03-25 NOTE — PATIENT INSTRUCTIONS
Avoid green leafy vegetables broccoli, lettuce, spinach etc.    Check INR today. The goal is between 2-3. I will call you with the results and the plan. Follow up in one week.

## 2019-03-25 NOTE — PROGRESS NOTES
Subjective:       Patient ID: Siddharth Urias is a 85 y.o. male.    Chief Complaint: Follow-up (6month)    Mr. Urias presents today after diagnosis of DVT on 3/22 to discuss medication management. U/S on 3/22 showed DVT within the right common femoral and proximal femoral veins. Xarelto was sent to his pharmacy. Later that evening the on call physician, Dr. Whitley, was notified that Xarelto required prior authorization. Attempted to switch to Eliquis, but this also required prior auth. The patient was placed on coumadin-  Take two (10mg) tonight, two (10mg) tomorrow and one (5mg) daily thereafter. He has taken 2 tabs daily since starting the prescription on Friday. Denies any changes in his symptoms- no SOB, chest pain, difficulty breathing, bleeding, dark stools or blood in stool. Has been resting at home over the weekend. He is here today with his family- concerned about medication and not able to get Xarelto.      Patient Active Problem List   Diagnosis    Depressive disorder, not elsewhere classified    Unspecified venous (peripheral) insufficiency    Prostate cancer    Hypertension associated with diabetes    Fatty liver    Irradiation cystitis    SANDRA (obstructive sleep apnea)    Hyperlipidemia LDL goal <70    Type 2 diabetes mellitus, controlled, with renal complications    Diabetic nephropathy    Chronic kidney disease, stage 3    Bilateral renal cysts    Obesity (BMI 30-39.9)    Radiation proctitis    NISH (iron deficiency anemia)    Warfarin anticoagulation    Acute deep vein thrombosis (DVT) of femoral vein of right lower extremity     Review of Systems   Constitutional: Negative for activity change, appetite change, chills, diaphoresis, fatigue, fever and unexpected weight change.   Respiratory: Negative for cough, chest tightness, shortness of breath and wheezing.    Cardiovascular: Positive for leg swelling. Negative for chest pain and palpitations.   Musculoskeletal: Negative for  gait problem.   Skin: Positive for color change. Negative for rash and wound.   Neurological: Negative for dizziness, weakness and light-headedness.       Objective:      Physical Exam   Constitutional: He is oriented to person, place, and time. He appears well-developed and well-nourished.   HENT:   Head: Normocephalic and atraumatic.   Cardiovascular: Normal rate, regular rhythm and normal heart sounds.   Pulmonary/Chest: Effort normal and breath sounds normal.   Musculoskeletal:        Right lower leg: He exhibits swelling and edema.   Erythema right lower leg   Neurological: He is alert and oriented to person, place, and time.   Skin: Skin is warm and dry.   Nursing note and vitals reviewed.      Assessment:       1. Acute deep vein thrombosis (DVT) of femoral vein of right lower extremity    2. Warfarin anticoagulation        Plan:       Siddharth was seen today for follow-up.    Diagnoses and all orders for this visit:    Acute deep vein thrombosis (DVT) of femoral vein of right lower extremity  -     Protime-INR; Future  -     Ambulatory referral to Anticoagulation Monitoring  -     Discontinue: enoxaparin (LOVENOX) 100 mg/mL Syrg; Inject 1 mL (100 mg total) into the skin every 12 (twelve) hours. for 7 days  -     warfarin (COUMADIN) 5 MG tablet; Take 1 tablet (5 mg total) by mouth Daily.  -     Ambulatory Referral to Pharmacy Assistance  -     rivaroxaban (XARELTO) 15 mg Tab; Take 1 tablet (15 mg total) by mouth 2 (two) times daily with meals. for 21 days  STAT INR today 3.3- no need for addition of Lovenox bridge  Continue 5 mg dose daily since patient has been doubling dose. Begin tomorrow, took today's dose this morning. Recheck INR 1 week  D/C aspirin and NSAIDs   Educated on coumadin diet- avoid foods rich in vitamin K  May participate in basic ADLs, avoid strenuous activity   Encouraged to seek emergency care if increased bleeding, bloody/dark stools, SOB, chest pain, cognitive changes   Will send  xarelto prescription to [facility]. Patient instructed that if this prescription arrives to his home by mail DO NOT start without contacting me to discuss. Continue coumadin for the time being. He and family verbalized understanding    Warfarin anticoagulation  -     Ambulatory referral to Anticoagulation Monitoring  -     warfarin (COUMADIN) 5 MG tablet; Take 1 tablet (5 mg total) by mouth Daily.  See above    F/U 1 week

## 2019-03-26 ENCOUNTER — DOCUMENTATION ONLY (OUTPATIENT)
Dept: FAMILY MEDICINE | Facility: CLINIC | Age: 84
End: 2019-03-26

## 2019-03-26 ENCOUNTER — TELEPHONE (OUTPATIENT)
Dept: FAMILY MEDICINE | Facility: CLINIC | Age: 84
End: 2019-03-26

## 2019-03-26 ENCOUNTER — ANTI-COAG VISIT (OUTPATIENT)
Dept: CARDIOLOGY | Facility: CLINIC | Age: 84
End: 2019-03-26

## 2019-03-26 DIAGNOSIS — I82.411 ACUTE DEEP VEIN THROMBOSIS (DVT) OF FEMORAL VEIN OF RIGHT LOWER EXTREMITY: ICD-10-CM

## 2019-03-26 DIAGNOSIS — Z79.01 WARFARIN ANTICOAGULATION: ICD-10-CM

## 2019-03-26 NOTE — TELEPHONE ENCOUNTER
----- Message from Priscilla Blackburn sent at 3/26/2019  9:22 AM CDT -----  Contact: Patient's wife Edith  Type: Needs Medical Advice    Who Called:  Patient  Best Call Back Number:  or   Additional Information: Mrs. Sharma is calling to clarify somethings on her 's medication rivaroxaban (XARELTO) 15 mg Tab.Please advise.

## 2019-03-26 NOTE — TELEPHONE ENCOUNTER
Spoke with patient wife, she needed to know where they had to go to  the xarelto, location was given and they will call the office before the patient begin taking this medication.

## 2019-03-26 NOTE — PROGRESS NOTES
Patient with acute VTE referred for warfarin management. Per chart review, preferred medication (rivaroxaban) was approved by patient's insurance. Referring provider, JOSHUA Verma, has cancelled referral. Have asked [facility] Med team to please remove warfarin from medication list.

## 2019-03-27 ENCOUNTER — TELEPHONE (OUTPATIENT)
Dept: PHARMACY | Facility: CLINIC | Age: 84
End: 2019-03-27

## 2019-03-27 NOTE — TELEPHONE ENCOUNTER
----- Message from Jeannette Elliott sent at 3/27/2019  7:55 AM CDT -----  Contact: wife, Enedina Moore  Type: Needs Medical Advice    Who Called:  self  Symptoms (please be specific):    How long has patient had these symptoms:  N/A  Pharmacy name and phone #:  N/A  Best Call Back Number: [phone]  Additional Information: patient was asked to call once they purchased the Xeralto. Please call wife. Thanks!

## 2019-03-27 NOTE — TELEPHONE ENCOUNTER
I spoke with the patient's spouse and she stated that patient is not in need of assistance with his medication at this time. A PA was needed from the imsurance company, who in return did approved the PA for the patient.

## 2019-03-28 LAB
APTT BLDCRRT: 28.1 SEC (ref 21–32)
AT III ACT/NOR PPP CHRO: 92 % (ref 83–118)
INR PPP: 1.1 (ref 0.8–1.2)
PROT S AG ACT/NOR PPP IA: 94 % (ref 50–140)
PROTHROMBIN TIME: 11.3 SEC (ref 9–12.5)

## 2019-03-29 LAB — PROT C AG ACT/NOR PPP IA: 101 % (ref 70–150)

## 2019-04-01 ENCOUNTER — TELEPHONE (OUTPATIENT)
Dept: PHARMACY | Facility: CLINIC | Age: 84
End: 2019-04-01

## 2019-04-01 ENCOUNTER — OFFICE VISIT (OUTPATIENT)
Dept: FAMILY MEDICINE | Facility: CLINIC | Age: 84
End: 2019-04-01
Payer: MEDICARE

## 2019-04-01 ENCOUNTER — HOSPITAL ENCOUNTER (OUTPATIENT)
Dept: RADIOLOGY | Facility: CLINIC | Age: 84
Discharge: HOME | End: 2019-04-01
Attending: NURSE PRACTITIONER
Payer: MEDICARE

## 2019-04-01 ENCOUNTER — OFFICE VISIT (OUTPATIENT)
Dept: HEMATOLOGY/ONCOLOGY | Facility: CLINIC | Age: 84
End: 2019-04-01
Payer: MEDICARE

## 2019-04-01 VITALS
WEIGHT: 226.88 LBS | DIASTOLIC BLOOD PRESSURE: 72 MMHG | OXYGEN SATURATION: 95 % | HEART RATE: 67 BPM | HEIGHT: 67 IN | SYSTOLIC BLOOD PRESSURE: 127 MMHG | BODY MASS INDEX: 35.61 KG/M2 | TEMPERATURE: 99 F

## 2019-04-01 VITALS
BODY MASS INDEX: 35.39 KG/M2 | HEART RATE: 103 BPM | SYSTOLIC BLOOD PRESSURE: 102 MMHG | RESPIRATION RATE: 22 BRPM | TEMPERATURE: 97 F | OXYGEN SATURATION: 92 % | DIASTOLIC BLOOD PRESSURE: 76 MMHG | WEIGHT: 225.5 LBS | HEIGHT: 67 IN

## 2019-04-01 DIAGNOSIS — I82.401 ACUTE DEEP VEIN THROMBOSIS (DVT) OF RIGHT LOWER EXTREMITY, UNSPECIFIED VEIN: Primary | ICD-10-CM

## 2019-04-01 DIAGNOSIS — I74.9 EMBOLISM AND THROMBOSIS OF ARTERY: ICD-10-CM

## 2019-04-01 DIAGNOSIS — I82.411 ACUTE DEEP VEIN THROMBOSIS (DVT) OF FEMORAL VEIN OF RIGHT LOWER EXTREMITY: Primary | ICD-10-CM

## 2019-04-01 DIAGNOSIS — R60.0 LOCALIZED EDEMA: ICD-10-CM

## 2019-04-01 DIAGNOSIS — E66.9 OBESITY (BMI 30-39.9): ICD-10-CM

## 2019-04-01 DIAGNOSIS — E78.5 HYPERLIPIDEMIA LDL GOAL <70: ICD-10-CM

## 2019-04-01 DIAGNOSIS — E11.59 HYPERTENSION ASSOCIATED WITH DIABETES: ICD-10-CM

## 2019-04-01 DIAGNOSIS — M79.89 SWELLING OF RIGHT HAND: ICD-10-CM

## 2019-04-01 DIAGNOSIS — N18.30 CHRONIC KIDNEY DISEASE, STAGE III (MODERATE): ICD-10-CM

## 2019-04-01 DIAGNOSIS — M25.531 RIGHT WRIST PAIN: ICD-10-CM

## 2019-04-01 DIAGNOSIS — K76.0 FATTY LIVER: ICD-10-CM

## 2019-04-01 DIAGNOSIS — I15.2 HYPERTENSION ASSOCIATED WITH DIABETES: ICD-10-CM

## 2019-04-01 DIAGNOSIS — G47.33 OSA (OBSTRUCTIVE SLEEP APNEA): ICD-10-CM

## 2019-04-01 DIAGNOSIS — I82.411 ACUTE DEEP VEIN THROMBOSIS (DVT) OF FEMORAL VEIN OF RIGHT LOWER EXTREMITY: ICD-10-CM

## 2019-04-01 DIAGNOSIS — E08.21 DIABETIC NEPHROPATHY ASSOCIATED WITH DIABETES MELLITUS DUE TO UNDERLYING CONDITION: ICD-10-CM

## 2019-04-01 DIAGNOSIS — C61 PROSTATE CANCER: ICD-10-CM

## 2019-04-01 PROCEDURE — 99213 OFFICE O/P EST LOW 20 MIN: CPT | Mod: S$PBB,,, | Performed by: NURSE PRACTITIONER

## 2019-04-01 PROCEDURE — 99999 PR PBB SHADOW E&M-EST. PATIENT-LVL IV: CPT | Mod: PBBFAC,,, | Performed by: INTERNAL MEDICINE

## 2019-04-01 PROCEDURE — 99999 PR PBB SHADOW E&M-EST. PATIENT-LVL III: ICD-10-PCS | Mod: PBBFAC,,, | Performed by: NURSE PRACTITIONER

## 2019-04-01 PROCEDURE — 99214 OFFICE O/P EST MOD 30 MIN: CPT | Mod: PBBFAC,27,PO,25 | Performed by: INTERNAL MEDICINE

## 2019-04-01 PROCEDURE — 93971 EXTREMITY STUDY: CPT | Mod: 26,RT,S$GLB, | Performed by: RADIOLOGY

## 2019-04-01 PROCEDURE — 99999 PR PBB SHADOW E&M-EST. PATIENT-LVL IV: ICD-10-PCS | Mod: PBBFAC,,, | Performed by: INTERNAL MEDICINE

## 2019-04-01 PROCEDURE — 99999 PR PBB SHADOW E&M-EST. PATIENT-LVL III: CPT | Mod: PBBFAC,,, | Performed by: NURSE PRACTITIONER

## 2019-04-01 PROCEDURE — 99204 OFFICE O/P NEW MOD 45 MIN: CPT | Mod: S$PBB,,, | Performed by: INTERNAL MEDICINE

## 2019-04-01 PROCEDURE — 93971 EXTREMITY STUDY: CPT | Mod: TC,PO,RT

## 2019-04-01 PROCEDURE — 99204 PR OFFICE/OUTPT VISIT, NEW, LEVL IV, 45-59 MIN: ICD-10-PCS | Mod: S$PBB,,, | Performed by: INTERNAL MEDICINE

## 2019-04-01 PROCEDURE — 99213 OFFICE O/P EST LOW 20 MIN: CPT | Mod: PBBFAC,PO,25 | Performed by: NURSE PRACTITIONER

## 2019-04-01 PROCEDURE — 93971 US UPPER EXTREMITY VEINS RIGHT: ICD-10-PCS | Mod: 26,RT,S$GLB, | Performed by: RADIOLOGY

## 2019-04-01 PROCEDURE — 99213 PR OFFICE/OUTPT VISIT, EST, LEVL III, 20-29 MIN: ICD-10-PCS | Mod: S$PBB,,, | Performed by: NURSE PRACTITIONER

## 2019-04-01 NOTE — LETTER
April 1, 2019      AUNG Elliott  [address]  [place] [address]           [facility]  [address], [address]  [facility] [address]  Phone: [phone]          Patient: Siddharth Urias   MR Number: [id number]   YOB: 1934   Date of Visit: 4/1/2019       Dear Jazmyne Maguire:    Thank you for referring Siddharth Urias to me for evaluation. Attached you will find relevant portions of my assessment and plan of care.    If you have questions, please do not hesitate to call me. I look forward to following Siddharth Urias along with you.    Sincerely,    Dona Rodriguez MD    Enclosure  CC:  No Recipients    If you would like to receive this communication electronically, please contact [email or web address] or [phone] to request more information on [facility] access.    For providers and/or their staff who would like to refer a patient to [facility], please contact us through our one-stop-shop provider referral line, [facility] [place], at [phone].    If you feel you have received this communication in error or would no longer like to receive these types of communications, please e-mail [email or web address]

## 2019-04-01 NOTE — PROGRESS NOTES
Subjective:       Patient ID: Siddharth Urias is a 85 y.o. male.    Chief Complaint:  Past few months has been very sedentary not doing too much because of the cold weather and feeling tired  Maynor swelling went for ultrasound shows DVT  mpression       Positive examination demonstrating DVT within the right common femoral and proximal femoral veins.     Started Xarelto here on consult  No prior history  No family history  of hypercoagulability    In clinic with his wife of 60 years  HPI:     Social History     Socioeconomic History    Marital status:      Spouse name: None    Number of children: None    Years of education: None    Highest education level: None   Occupational History    None   Social Needs    Financial resource strain: None    Food insecurity:     Worry: None     Inability: None    Transportation needs:     Medical: None     Non-medical: None   Tobacco Use    Smoking status: Former Smoker     Packs/day: 1.50     Years: 60.00     Pack years: 90.00     Types: Cigarettes     Last attempt to quit: 2008     Years since quittin.2    Smokeless tobacco: Former User     Quit date: 3/11/2010   Substance and Sexual Activity    Alcohol use: Yes     Comment: social    Drug use: No    Sexual activity: None   Lifestyle    Physical activity:     Days per week: None     Minutes per session: None    Stress: None   Relationships    Social connections:     Talks on phone: None     Gets together: None     Attends Caodaism service: None     Active member of club or organization: None     Attends meetings of clubs or organizations: None     Relationship status: None    Intimate partner violence:     Fear of current or ex partner: None     Emotionally abused: None     Physically abused: None     Forced sexual activity: None   Other Topics Concern    None   Social History Narrative    None     Family History   Problem Relation Age of Onset    Diabetes Mother      Past Surgical  History:   Procedure Laterality Date    APPENDECTOMY      COLONOSCOPY N/A 12/14/2018    [facility]    COLONOSCOPY N/A 3/11/2013    [facility] D[facility] Pro [facility] at [facility]    EGD (ESOPHAGOGASTRODUODENOSCOPY) N/A 1/24/2019    [facility]    HERNIA REPAIR      bilateral inguinal    SIGMOIDOSCOPY, FLEXIBLE N/A 1/22/2019    [facility]    TONSILLECTOMY       Past Medical History:   Diagnosis Date    Arthritis     Benign hypertension     Diabetes mellitus, type 2     2/2011    Fatty liver     Irradiation cystitis     SANDRA (obstructive sleep apnea)     no    Prostate cancer 2009    s/p XRT (T1C, [place] 8)    Radiation proctitis        Current Outpatient Medications:     allopurinol (ZYLOPRIM) 300 MG tablet, Take 1 tablet (300 mg total) by mouth once daily., Disp: 90 tablet, Rfl: 3    Bifidobacterium infantis (ALIGN ORAL), Take by mouth., Disp: , Rfl:     cholestyramine-aspartame (CHOLESTYRAMINE LIGHT) 4 gram PwPk, Take 1 packet (4 g total) by mouth 3 (three) times daily., Disp: 270 packet, Rfl: 3    febuxostat (ULORIC) 40 mg Tab, Take 40 mg by mouth once daily., Disp: , Rfl:     JANUVIA 100 mg Tab, 100 mg once daily. , Disp: , Rfl:     lidocaine HCL 2% (XYLOCAINE) 2 % jelly, Apply topically as needed. Apply topically once nightly to affected part of foot/feet., Disp: 30 mL, Rfl: 2    LYRICA 75 mg capsule, Take 75 mg by mouth 3 (three) times daily. , Disp: , Rfl:     multivitamin with minerals tablet, Take 1 tablet by mouth once daily., Disp: , Rfl:     pantoprazole (PROTONIX) 40 MG tablet, Take 1 tablet (40 mg total) by mouth once daily., Disp: 90 tablet, Rfl: 3    pravastatin (PRAVACHOL) 40 MG tablet, Take 40 mg by mouth once daily. , Disp: , Rfl:     rivaroxaban (XARELTO) 15 mg Tab, Take 1 tablet (15 mg total) by mouth 2 (two) times daily with meals. for 21 days, Disp: 180 tablet,  Rfl: 3    sucralfate (CARAFATE) 1 gram tablet, Take 1 g by mouth 4 (four) times daily., Disp: , Rfl:     tamsulosin (FLOMAX) 0.4 mg Cp24, Take 0.4 mg by mouth once daily., Disp: , Rfl:     VESICARE 5 mg tablet, TAKE 1 TABLET DAILY, Disp: 90 tablet, Rfl: 0  Review of patient's allergies indicates:   Allergen Reactions    No known drug allergies          REVIEW OF SYSTEMS:     CONSTITUTIONAL: The patient denies any weight change. There is no apparent    change in appetite, fever, night sweats, headaches, fatigue, dizziness, or    weakness.      SKIN: Denies rash, issues with nails, non-healing sores, bleeding, blotching    skin or abnormal bruising. Denies new moles or changes to existing moles.      BREASTS: There is no swelling around breasts or nipple discharge.    EYES: Denies eye pain, blurred vision, swelling, redness or discharge.      ENT AND MOUTH: Denies runny nose, stuffiness, sinus trouble or sores. Denies    nosebleeds. Denies, hoarseness, change in voice or swelling in front of the    neck.      CARDIOVASCULAR: Denies chest pain, discomfort or palpitations. Denies neck    swelling or episodes of passing out.      RESPIRATORY: Denies cough, sputum production, blood in sputum, and denies    shortness of breath.      GI: Denies trouble swallowing, indigestion, heartburn, abdominal pain, nausea,    vomiting, diarrhea, altered bowel habits, blood in stool, discoloration of    stools, change in nature of stool, bloating, increased abdominal girth.      GENITOURINARY: No discharge. No pelvic pain or lumps. No rash around groin or  lesions. No urinary frequency, hesitation, painful urination or blood in    urine. Denies incontinence. No problems with intercourse.      MUSCULOSKELETAL: Denies neck or back pain. Denies weakness in arms or legs,    joint problems or distended inflamed veins in legs. Denies swelling or abnormal  glands.  Some swelling of right lower extremity    NEUROLOGICAL: Denies tingling,  numbness, altered mentation changes to nerve    function in the face, weakness to one or both of the body. Denies changes to    gait and denies multiple falls or accidents.      PSYCHIATRIC: Denies nervousness, anxiety, hallucinations, depression, suicidal    ideation, trouble sleeping or changes in behavior noticed by family.      The patient denies recent foreign travel or recent exposure to chemicals or    products of concern or infectious diseases.     PHYSICAL EXAM:     Vitals:    04/01/19 1114   BP: 102/76   Pulse: 103   Resp: (!) 22   Temp: 96.5 °F (35.8 °C)       GENERAL: Comfortable looking patient. Patient is in no distress.  Awake, alert and oriented to time, person and place.  No anxiety, or agitation.      HEENT: Normal conjunctivae and eyelids. WNL.  PERRLA 3 to 4 mm. No icterus, no pallor, no congestion, and no discharge noted.     NECK:  Supple. Trachea is central.  No crepitus.  No JVD or masses.    RESPIRATORY:  No intercostal retractions.  No dullness to percussion.  Chest is clear to auscultation.  No rales, rhonchi or wheezes.  No crepitus.  Good air entry bilaterally.    CARDIOVASCULAR:  S1 and S2 are normally heard without murmurs or gallops.  All peripheral pulses are present.    ABDOMEN:  Normal abdomen.  No hepatosplenomegaly.  No free fluid.  Bowel sounds are present.  No hernia noted. No masses.  No rebound or tenderness.  No guarding or rigidity.  Umbilicus is midline.    LYMPHATICS:  No axillary, cervical, supraclavicular, submental, or inguinal lymphadenopathy.    SKIN/MUSCULOSKELETAL:  There is no evidence of excoriation marks or ecchmosis.  No rashes.  No cyanosis.  No clubbing.  No joint or skeletal deformities noted.  Normal range of motion.    NEUROLOGIC:  Higher functions are appropriate.  No cranial nerve deficits.  Normal coco.  Normal strength.  Motor and sensory functions are normal.  Deep tendon reflexes are normal.    GENITAL/RECTAL:  Exams are deferred.      Laboratory:      CBC:  Lab Results   Component Value Date    WBC 7.31 03/22/2019    RBC 5.58 03/22/2019    HGB 13.2 (L) 03/22/2019    HCT 46.6 03/22/2019    MCV 84 03/22/2019    MCH 23.7 (L) 03/22/2019    MCHC 28.3 (L) 03/22/2019    RDW 16.0 (H) 03/22/2019     03/22/2019    MPV 11.0 03/22/2019    GRAN 4.8 03/22/2019    GRAN 66.2 03/22/2019    LYMPH 1.4 03/22/2019    LYMPH 19.6 03/22/2019    MONO 0.6 03/22/2019    MONO 8.3 03/22/2019    EOS 0.4 03/22/2019    BASO 0.04 03/22/2019    EOSINOPHIL 4.9 03/22/2019    BASOPHIL 0.5 03/22/2019       BMP: BMP  Lab Results   Component Value Date     03/22/2019    K 4.1 03/22/2019     03/22/2019    CO2 26 03/22/2019    BUN 14 03/22/2019    CREATININE 1.3 03/22/2019    CALCIUM 9.2 03/22/2019    ANIONGAP 10 03/22/2019    ESTGFRAFRICA 57.9 (A) 03/22/2019    EGFRNONAA 50.1 (A) 03/22/2019       LFT:   Lab Results   Component Value Date    ALT 20 03/22/2019    AST 27 03/22/2019    ALKPHOS 55 03/22/2019    BILITOT 0.4 03/22/2019         Assessment/Plan:       1. Acute deep vein thrombosis (DVT) of right lower extremity, unspecified vein    2. Embolism and thrombosis of artery     3. Acute deep vein thrombosis (DVT) of femoral vein of right lower extremity        New onset DVT right leg with sedentary lifestyle  Continue Xarelto for 3 months to 4 months  Repeat ultrasound of right leg and see patient in clinic if thrombus has resolved will discontinue Xarelto and perform hypercoagulable workup patient needs to be more active  Continue PCP for fatty liver diabetes depression chronic kidney disease follow with Urology for prostate cancer continue with obstructive sleep apnea follow-up continue weight loss attempts  Continue management of dyslipidemia and hypertension

## 2019-04-01 NOTE — TELEPHONE ENCOUNTER
I spoke with the patient's spouse and shesaid he is not in need of medication at this time, the medication that he needed assistance with was for a PA.

## 2019-04-01 NOTE — PROGRESS NOTES
Subjective:       Patient ID: Siddharth Urias is a 85 y.o. male.    Chief Complaint: Hand Pain (right hand (swelling))    Mr. Urias presents today for a 1 week F/U for a DVT of the right femoral vein. Since his last visit, Xarelto was approved by his insurance. Began taking on 3/27. Had an appointment with Dr. Rodriguez today. Has repeat ultrasound scheduled for August. Also complaints of right hand swelling and pain in his right wrist. Started this morning. Wrist is tender to the touch Has had similar pain, self limiting in the past. History of gout.     Patient Active Problem List   Diagnosis    Depressive disorder, not elsewhere classified    Unspecified venous (peripheral) insufficiency    Prostate cancer    Hypertension associated with diabetes    Fatty liver    Irradiation cystitis    SANDRA (obstructive sleep apnea)    Hyperlipidemia LDL goal <70    Type 2 diabetes mellitus, controlled, with renal complications    Diabetic nephropathy    Chronic kidney disease, stage 3    Bilateral renal cysts    Obesity (BMI 30-39.9)    Radiation proctitis    NISH (iron deficiency anemia)    Warfarin anticoagulation    Acute deep vein thrombosis (DVT) of femoral vein of right lower extremity     Review of Systems   Constitutional: Negative for activity change, appetite change, chills, diaphoresis, fatigue, fever and unexpected weight change.   Respiratory: Negative for cough, chest tightness, shortness of breath and wheezing.    Cardiovascular: Positive for leg swelling. Negative for chest pain and palpitations.   Musculoskeletal: Negative for gait problem.        Right hand/wrist pain   Skin: Positive for color change. Negative for rash and wound.   Neurological: Negative for dizziness, weakness and light-headedness.       Objective:      Physical Exam   Constitutional: He is oriented to person, place, and time. He appears well-developed and well-nourished.   HENT:   Head: Normocephalic and atraumatic.    Cardiovascular: Normal rate, regular rhythm and normal heart sounds.   Pulmonary/Chest: Effort normal and breath sounds normal.   Musculoskeletal:        Left wrist: He exhibits tenderness and swelling.        Arms:       Right lower leg: He exhibits swelling.   Right lower leg no long erythematous. Skin is firm to touch- left leg circumference 40 cm, right leg circumference 42.5 cm    Neurological: He is alert and oriented to person, place, and time.   Skin: Skin is warm and dry.   Nursing note and vitals reviewed.      Assessment:       1. Acute deep vein thrombosis (DVT) of femoral vein of right lower extremity    2. Hypertension associated with diabetes    3. Right wrist pain    4. Swelling of right hand    5. Localized edema         Plan:       Siddharth was seen today for hand pain.    Diagnoses and all orders for this visit:    Acute deep vein thrombosis (DVT) of femoral vein of right lower extremity  Continue Xarelto at current dose  Have patient back on day 21 to discuss dose change   Continue with hematology's recommendation     Hypertension associated with diabetes  Controlled on current regimen    Right wrist pain  See below    Swelling of right hand  -     Uric acid; Future  -     US Upper Extremity Veins Right; Future  Screen and treat as needed    Localized edema   -     US Upper Extremity Veins Right; Future  Screen and treat as needed    F/U April 17

## 2019-04-02 ENCOUNTER — TELEPHONE (OUTPATIENT)
Dept: FAMILY MEDICINE | Facility: CLINIC | Age: 84
End: 2019-04-02

## 2019-04-02 DIAGNOSIS — M79.89 SWELLING OF RIGHT HAND: Primary | ICD-10-CM

## 2019-04-02 RX ORDER — METHYLPREDNISOLONE 4 MG/1
TABLET ORAL
Qty: 1 PACKAGE | Refills: 0 | Status: SHIPPED | OUTPATIENT
Start: 2019-04-02 | End: 2019-04-23

## 2019-04-02 NOTE — TELEPHONE ENCOUNTER
Spoke with patient wife regarding his results, and eloise's suggestions, patient wife stated they will  the steroids from their local pharmacy and call if it seems they don't see any change in his condition.

## 2019-04-02 NOTE — TELEPHONE ENCOUNTER
----- Message from Leah Salinas sent at 4/2/2019  8:30 AM CDT -----  Contact: Enedina Moore (spouse)  Type: Needs Medical Advice    Who Called:  Enedina Moore (#  Best Call Back Number: [phone]  Additional Information: would like for JOSHUA Verma to give her a call back in regards to the  Appointment yesterday. She states the conditions have worsened.. Please give call back

## 2019-04-17 ENCOUNTER — OFFICE VISIT (OUTPATIENT)
Dept: FAMILY MEDICINE | Facility: CLINIC | Age: 84
End: 2019-04-17
Payer: MEDICARE

## 2019-04-17 VITALS
HEART RATE: 68 BPM | HEIGHT: 67 IN | SYSTOLIC BLOOD PRESSURE: 110 MMHG | BODY MASS INDEX: 34.84 KG/M2 | TEMPERATURE: 98 F | OXYGEN SATURATION: 94 % | WEIGHT: 222 LBS | DIASTOLIC BLOOD PRESSURE: 71 MMHG

## 2019-04-17 DIAGNOSIS — I82.411 ACUTE DEEP VEIN THROMBOSIS (DVT) OF FEMORAL VEIN OF RIGHT LOWER EXTREMITY: Primary | ICD-10-CM

## 2019-04-17 PROCEDURE — 99999 PR PBB SHADOW E&M-EST. PATIENT-LVL III: ICD-10-PCS | Mod: PBBFAC,,, | Performed by: NURSE PRACTITIONER

## 2019-04-17 PROCEDURE — 99213 OFFICE O/P EST LOW 20 MIN: CPT | Mod: PBBFAC,PO | Performed by: NURSE PRACTITIONER

## 2019-04-17 PROCEDURE — 99213 OFFICE O/P EST LOW 20 MIN: CPT | Mod: S$PBB,,, | Performed by: NURSE PRACTITIONER

## 2019-04-17 PROCEDURE — 99213 PR OFFICE/OUTPT VISIT, EST, LEVL III, 20-29 MIN: ICD-10-PCS | Mod: S$PBB,,, | Performed by: NURSE PRACTITIONER

## 2019-04-17 PROCEDURE — 99999 PR PBB SHADOW E&M-EST. PATIENT-LVL III: CPT | Mod: PBBFAC,,, | Performed by: NURSE PRACTITIONER

## 2019-04-17 NOTE — PROGRESS NOTES
Subjective:       Patient ID: Siddharth Urias is a 85 y.o. male.    Chief Complaint: Follow-up    Mr. Urias presents today for DVT F/U. Last seen on 4/1, started xarelto 15 mg BID on 3/27. Has bottle today, has 3 tablets left. He admits that he may have missed some doses. Has not taken any today. Yesterday, 4/16, marked 21 days. He is here today to discuss dose change. Vitals stable.     Patient Active Problem List   Diagnosis    Depressive disorder, not elsewhere classified    Unspecified venous (peripheral) insufficiency    Prostate cancer    Hypertension associated with diabetes    Fatty liver    Irradiation cystitis    SANDRA (obstructive sleep apnea)    Hyperlipidemia LDL goal <70    Type 2 diabetes mellitus, controlled, with renal complications    Diabetic nephropathy    Chronic kidney disease, stage 3    Bilateral renal cysts    Obesity (BMI 30-39.9)    Radiation proctitis    NISH (iron deficiency anemia)    Warfarin anticoagulation    Acute deep vein thrombosis (DVT) of femoral vein of right lower extremity     Review of Systems   Constitutional: Negative for activity change, appetite change, chills, diaphoresis, fatigue, fever and unexpected weight change.   Respiratory: Negative for cough, chest tightness, shortness of breath and wheezing.    Cardiovascular: Positive for leg swelling. Negative for chest pain and palpitations.   Musculoskeletal: Negative for gait problem.   Skin: Positive for color change. Negative for rash and wound.   Neurological: Negative for dizziness, weakness and light-headedness.       Objective:      Physical Exam   Constitutional: He is oriented to person, place, and time. He appears well-developed and well-nourished.   HENT:   Head: Normocephalic and atraumatic.   Cardiovascular: Normal rate, regular rhythm and normal heart sounds.   Pulmonary/Chest: Effort normal and breath sounds normal.   Musculoskeletal:        Right lower leg: He exhibits swelling.    Right lower extremity has slight pink tint. Skin remains firm to touch- left leg circumference 39 cm, right leg circumference 40 cm    Neurological: He is alert and oriented to person, place, and time.   Skin: Skin is warm and dry.   Nursing note and vitals reviewed.      Assessment:       1. Acute deep vein thrombosis (DVT) of femoral vein of right lower extremity        Plan:       Siddharth was seen today for follow-up.    Diagnoses and all orders for this visit:    Acute deep vein thrombosis (DVT) of femoral vein of right lower extremity  -     rivaroxaban (XARELTO) 20 mg Tab; Take 1 tablet (20 mg total) by mouth daily with dinner or evening meal.  Take 15 mg BID today, start 20 mg daily tomorrow. Notify clinic if unable to get prescription   Continue medication DAILY until appointment with Dr. Rodriguez in August, F/U with us after. Verbalized understanding of the importance of compliance   RTC if symptomatic  Discussed risk for GI bleed and S/S, chest pain or SOB- go to ED if this occurs

## 2019-04-24 ENCOUNTER — TELEPHONE (OUTPATIENT)
Dept: GASTROENTEROLOGY | Facility: CLINIC | Age: 84
End: 2019-04-24

## 2019-04-24 NOTE — TELEPHONE ENCOUNTER
----- Message from Zeynep Wallace sent at 4/24/2019  1:31 PM CDT -----  Contact: Enedina wife  Type: Needs Medical Advice    Who Called:  Enedina  Symptoms (please be specific):  Having diarrhea  How long has patient had these symptoms:  ongoing  Pharmacy name and phone #:    Phone: [phone] Fax: [phone]    [facility] [address] [place] [address] YAMILETH BAEZ  [address] [facility] [address]  [address] [address]  Phone: [phone] Fax: [phone]    Best Call Back Number: [phone]  Additional Information: Enedina req for nurse to call her as soon as the nurse can.

## 2019-04-24 NOTE — TELEPHONE ENCOUNTER
Spoke with patient wife she was very upset on the phone states  keeps having diarrhea and accidents all over the house it is effecting her life. Asked wife what medications patient is taking for diarrhea she states she does not really know they do the best they can, offered appointment with Dr. Aguiar or NP sooner wife refused states he is under the care of NP for DVT and does not want appointment will follow up with JOSHUA Lopez

## 2019-05-10 ENCOUNTER — TELEPHONE (OUTPATIENT)
Dept: FAMILY MEDICINE | Facility: CLINIC | Age: 84
End: 2019-05-10

## 2019-05-10 NOTE — TELEPHONE ENCOUNTER
----- Message from Hesham Peralta sent at 5/10/2019 11:52 AM CDT -----  Type:  Patient Call Back    Who Called:  Pt wife mariza  Does the patient know what this is regarding?: blood clot; medication. Needs advice pt doesn't know what to do.confused.  Best Call Back Number: [phone]  Additional Information:  Please call pt and leave a detailed message if there is no answer.

## 2019-06-05 ENCOUNTER — TELEPHONE (OUTPATIENT)
Dept: FAMILY MEDICINE | Facility: CLINIC | Age: 84
End: 2019-06-05

## 2019-06-05 NOTE — TELEPHONE ENCOUNTER
----- Message from Jeannette Elliott sent at 6/5/2019 10:14 AM CDT -----  Contact: wifeEnedina  Type: Needs Medical Advice    Who Called:  wifeEnedina  Symptoms (please be specific):    How long has patient had these symptoms:    Pharmacy name and phone #:    Best Call Back Number: [phone] or  [phone]  Additional Information: Patient's wife would like a call back regarding patient's medication Xarelto. Please call wife to advise when he should be seen again. Thanks!

## 2019-06-05 NOTE — TELEPHONE ENCOUNTER
Spoke with patient wife regarding her call , she had concerns about his xarelto. I informed her that eloise wanted him to keep taking the medication until he see Dr. Rodriguez and Dr. Maxwell in August along with his ultrasound . patinet wife expressed understanding

## 2019-06-17 ENCOUNTER — OFFICE VISIT (OUTPATIENT)
Dept: FAMILY MEDICINE | Facility: CLINIC | Age: 84
End: 2019-06-17
Payer: MEDICARE

## 2019-06-17 VITALS
BODY MASS INDEX: 34.91 KG/M2 | TEMPERATURE: 99 F | HEIGHT: 67 IN | WEIGHT: 222.44 LBS | HEART RATE: 70 BPM | OXYGEN SATURATION: 94 % | DIASTOLIC BLOOD PRESSURE: 68 MMHG | SYSTOLIC BLOOD PRESSURE: 122 MMHG

## 2019-06-17 DIAGNOSIS — J30.9 ALLERGIC RHINITIS, UNSPECIFIED SEASONALITY, UNSPECIFIED TRIGGER: ICD-10-CM

## 2019-06-17 DIAGNOSIS — R05.9 COUGH: ICD-10-CM

## 2019-06-17 DIAGNOSIS — J00 ACUTE NASOPHARYNGITIS: Primary | ICD-10-CM

## 2019-06-17 PROCEDURE — 99213 OFFICE O/P EST LOW 20 MIN: CPT | Mod: S$PBB,,, | Performed by: NURSE PRACTITIONER

## 2019-06-17 PROCEDURE — 99213 OFFICE O/P EST LOW 20 MIN: CPT | Mod: PBBFAC,PO | Performed by: NURSE PRACTITIONER

## 2019-06-17 PROCEDURE — 99999 PR PBB SHADOW E&M-EST. PATIENT-LVL III: ICD-10-PCS | Mod: PBBFAC,,, | Performed by: NURSE PRACTITIONER

## 2019-06-17 PROCEDURE — 99999 PR PBB SHADOW E&M-EST. PATIENT-LVL III: CPT | Mod: PBBFAC,,, | Performed by: NURSE PRACTITIONER

## 2019-06-17 PROCEDURE — 99213 PR OFFICE/OUTPT VISIT, EST, LEVL III, 20-29 MIN: ICD-10-PCS | Mod: S$PBB,,, | Performed by: NURSE PRACTITIONER

## 2019-06-17 RX ORDER — GUAIFENESIN 100 MG/5ML
200 SOLUTION ORAL 3 TIMES DAILY PRN
COMMUNITY
End: 2019-12-06

## 2019-06-17 RX ORDER — FLUTICASONE PROPIONATE 50 MCG
1 SPRAY, SUSPENSION (ML) NASAL DAILY
Qty: 1 BOTTLE | Refills: 3 | Status: SHIPPED | OUTPATIENT
Start: 2019-06-17 | End: 2022-05-09

## 2019-06-17 RX ORDER — LORATADINE 10 MG/1
10 TABLET ORAL DAILY
Refills: 0 | COMMUNITY
Start: 2019-06-17 | End: 2021-06-28

## 2019-06-17 RX ORDER — BENZONATATE 100 MG/1
100 CAPSULE ORAL 3 TIMES DAILY PRN
Qty: 30 CAPSULE | Refills: 0 | Status: SHIPPED | OUTPATIENT
Start: 2019-06-17 | End: 2019-06-27

## 2019-06-17 NOTE — PROGRESS NOTES
Subjective:       Patient ID: Siddharth Urias is a 85 y.o. male.    Chief Complaint: Cough (yellow mucus)    URI    This is a new problem. The current episode started 1 to 4 weeks ago. The problem has been unchanged. There has been no fever. Associated symptoms include congestion, coughing (occasionally productive ) and rhinorrhea (clear). Pertinent negatives include no sinus pain or sore throat. Treatments tried: OTC cough medication. The treatment provided no relief.     Patient Active Problem List   Diagnosis    Depressive disorder, not elsewhere classified    Unspecified venous (peripheral) insufficiency    Prostate cancer    Hypertension associated with diabetes    Fatty liver    Irradiation cystitis    SANDRA (obstructive sleep apnea)    Hyperlipidemia LDL goal <70    Type 2 diabetes mellitus, controlled, with renal complications    Diabetic nephropathy    Chronic kidney disease, stage 3    Bilateral renal cysts    Obesity (BMI 30-39.9)    Radiation proctitis    NISH (iron deficiency anemia)    Warfarin anticoagulation    Acute deep vein thrombosis (DVT) of femoral vein of right lower extremity     Review of Systems   Constitutional: Negative for activity change, chills and fever.   HENT: Positive for congestion and rhinorrhea (clear). Negative for postnasal drip, sinus pressure, sinus pain and sore throat.    Eyes: Negative for redness and itching.   Respiratory: Positive for cough (occasionally productive ).        Objective:      Physical Exam   Constitutional: He is oriented to person, place, and time. He appears well-developed and well-nourished.   Cardiovascular: Normal rate, regular rhythm and normal heart sounds.   Pulmonary/Chest: Effort normal and breath sounds normal. He has no wheezes.   Musculoskeletal: He exhibits no edema.   Neurological: He is alert and oriented to person, place, and time.   Skin: Skin is warm and dry.   Psychiatric: He has a normal mood and affect.   Nursing  note and vitals reviewed.      Assessment:       1. Acute nasopharyngitis    2. Cough    3. Allergic rhinitis, unspecified seasonality, unspecified trigger        Plan:       Siddharth was seen today for cough.    Diagnoses and all orders for this visit:    Acute nasopharyngitis  I counseled the patient on general home care guidelines for cough and congestion including increasing fluid intake, getting plenty of rest and use of OTC cough and cold medications.  I recommended guafenesin for congestion and dextromethorphan as directed for cough.  A brand like Mucinex DM is recommended.  Avoidance of decongestants is recommended for patients with heart problems and hypertension.  Extra vitamin C may also benefit.  Return to clinic if symptoms last longer than 10 days or sooner if worsen with symptoms like fever > 100.4, severe sinus pain or headache, thick yellow nasal discharge or sputum, dehydration or lethargy.      Cough  -     benzonatate (TESSALON) 100 MG capsule; Take 1 capsule (100 mg total) by mouth 3 (three) times daily as needed.  Use PRN    Allergic rhinitis, unspecified seasonality, unspecified trigger  -     fluticasone propionate (FLONASE) 50 mcg/actuation nasal spray; 1 spray (50 mcg total) by Each Nare route once daily.  -     loratadine (CLARITIN) 10 mg tablet; Take 1 tablet (10 mg total) by mouth once daily.  Recommend otc non-sedating antihistamine such as Loratadine and/or steroid nasal spray such as Flonase as directed and as needed.  Please return to clinic if symptoms persist after these interventions.

## 2019-07-25 ENCOUNTER — PATIENT OUTREACH (OUTPATIENT)
Dept: ADMINISTRATIVE | Facility: HOSPITAL | Age: 84
End: 2019-07-25

## 2019-07-25 NOTE — PROGRESS NOTES
Health Maintenance Due   Topic Date Due    TETANUS VACCINE  03/25/1952    Foot Exam  06/01/2019    Eye Exam  06/29/2019

## 2019-07-25 NOTE — LETTER
July 25, 2019    Siddharth Urias  [address] [address]             [facility]  [address]  [place] [address]  Phone: [phone] [facility] is committed to your overall health.  To help you get the most out of each of your visits, we will review your information to make sure you are up to date on all of your recommended tests and/or procedures.      Dr. Martine Maxwell MD has found that your chart shows you may be due for a:    DIABETIC EYE EXAM  DIABETIC FOOT EXAM     If you have had any of the above done at another facility, please bring the records or information with you so that your record at [facility] will be complete.  If you would like to schedule any of these, please contact the clinic at [phone].    If you are currently taking medication, please bring it with you to your appointment for review.    Also, if you have any type of Advanced Directives, please bring them with you to your office visit so we may scan them into your chart.    Thank You,    Your [facility] Team,  MD Tanesha Mckenna LPN Clinical Care Coordinator  [facility]  [address]  [facility] [address]  Phone [phone]  Fax [phone]

## 2019-08-01 ENCOUNTER — HOSPITAL ENCOUNTER (OUTPATIENT)
Dept: RADIOLOGY | Facility: HOSPITAL | Age: 84
Discharge: HOME | End: 2019-08-01
Attending: INTERNAL MEDICINE
Payer: MEDICARE

## 2019-08-01 DIAGNOSIS — I74.9 EMBOLISM AND THROMBOSIS OF ARTERY: ICD-10-CM

## 2019-08-01 DIAGNOSIS — I82.401 ACUTE DEEP VEIN THROMBOSIS (DVT) OF RIGHT LOWER EXTREMITY, UNSPECIFIED VEIN: ICD-10-CM

## 2019-08-01 PROCEDURE — 93970 US LOWER EXTREMITY VEINS BILATERAL: ICD-10-PCS | Mod: 26,,, | Performed by: RADIOLOGY

## 2019-08-01 PROCEDURE — 93970 EXTREMITY STUDY: CPT | Mod: 26,,, | Performed by: RADIOLOGY

## 2019-08-01 PROCEDURE — 93970 EXTREMITY STUDY: CPT | Mod: TC

## 2019-08-05 ENCOUNTER — OFFICE VISIT (OUTPATIENT)
Dept: HEMATOLOGY/ONCOLOGY | Facility: CLINIC | Age: 84
End: 2019-08-05
Payer: MEDICARE

## 2019-08-05 VITALS
HEART RATE: 76 BPM | OXYGEN SATURATION: 93 % | TEMPERATURE: 98 F | WEIGHT: 224.44 LBS | SYSTOLIC BLOOD PRESSURE: 145 MMHG | DIASTOLIC BLOOD PRESSURE: 87 MMHG | HEIGHT: 67 IN | RESPIRATION RATE: 20 BRPM | BODY MASS INDEX: 35.22 KG/M2

## 2019-08-05 DIAGNOSIS — I74.9 EMBOLISM AND THROMBOSIS OF ARTERY: ICD-10-CM

## 2019-08-05 DIAGNOSIS — I82.411 ACUTE DEEP VEIN THROMBOSIS (DVT) OF FEMORAL VEIN OF RIGHT LOWER EXTREMITY: ICD-10-CM

## 2019-08-05 DIAGNOSIS — I82.401 ACUTE DEEP VEIN THROMBOSIS (DVT) OF RIGHT LOWER EXTREMITY, UNSPECIFIED VEIN: Primary | ICD-10-CM

## 2019-08-05 DIAGNOSIS — N18.30 CHRONIC KIDNEY DISEASE, STAGE III (MODERATE): ICD-10-CM

## 2019-08-05 DIAGNOSIS — I15.2 HYPERTENSION ASSOCIATED WITH DIABETES: ICD-10-CM

## 2019-08-05 DIAGNOSIS — E11.59 HYPERTENSION ASSOCIATED WITH DIABETES: ICD-10-CM

## 2019-08-05 DIAGNOSIS — E78.5 HYPERLIPIDEMIA LDL GOAL <70: ICD-10-CM

## 2019-08-05 DIAGNOSIS — E66.9 OBESITY (BMI 30-39.9): ICD-10-CM

## 2019-08-05 PROCEDURE — 99214 PR OFFICE/OUTPT VISIT, EST, LEVL IV, 30-39 MIN: ICD-10-PCS | Mod: S$PBB,,, | Performed by: INTERNAL MEDICINE

## 2019-08-05 PROCEDURE — 99213 OFFICE O/P EST LOW 20 MIN: CPT | Mod: PBBFAC,PO,25 | Performed by: INTERNAL MEDICINE

## 2019-08-05 PROCEDURE — 99999 PR PBB SHADOW E&M-EST. PATIENT-LVL III: CPT | Mod: PBBFAC,,, | Performed by: INTERNAL MEDICINE

## 2019-08-05 PROCEDURE — 99497 ADVNCD CARE PLAN 30 MIN: CPT | Mod: PBBFAC,PO | Performed by: INTERNAL MEDICINE

## 2019-08-05 PROCEDURE — 99999 PR PBB SHADOW E&M-EST. PATIENT-LVL III: ICD-10-PCS | Mod: PBBFAC,,, | Performed by: INTERNAL MEDICINE

## 2019-08-05 PROCEDURE — 99497 ADVNCD CARE PLAN 30 MIN: CPT | Mod: S$PBB,,, | Performed by: INTERNAL MEDICINE

## 2019-08-05 PROCEDURE — 99497 PR ADVNCD CARE PLAN 30 MIN: ICD-10-PCS | Mod: S$PBB,,, | Performed by: INTERNAL MEDICINE

## 2019-08-05 PROCEDURE — 99214 OFFICE O/P EST MOD 30 MIN: CPT | Mod: S$PBB,,, | Performed by: INTERNAL MEDICINE

## 2019-08-05 NOTE — PROGRESS NOTES
Subjective:       Patient ID: Siddharth Urias is a 85 y.o. male.    Chief Complaint:  Past few months has been very sedentary not doing too much because of the cold weather and feeling tired  Maynor swelling went for ultrasound showed DVT patient has been on anticoagulant for 3 months here with repeat ultrasound for recheck before coming off anticoagulant  mpression 2019       Positive examination demonstrating DVT within the right common femoral and proximal femoral veins.      onXarelto here  For follow-up  No prior history  No family history  of hypercoagulability    In clinic with his wife of 60 years  HPI:     Social History     Socioeconomic History    Marital status:      Spouse name: Not on file    Number of children: Not on file    Years of education: Not on file    Highest education level: Not on file   Occupational History    Not on file   Social Needs    Financial resource strain: Not on file    Food insecurity:     Worry: Not on file     Inability: Not on file    Transportation needs:     Medical: Not on file     Non-medical: Not on file   Tobacco Use    Smoking status: Former Smoker     Packs/day: 1.50     Years: 60.00     Pack years: 90.00     Types: Cigarettes     Last attempt to quit: 2008     Years since quittin.5    Smokeless tobacco: Former User     Quit date: 3/11/2010   Substance and Sexual Activity    Alcohol use: Yes     Comment: social    Drug use: No    Sexual activity: Not on file   Lifestyle    Physical activity:     Days per week: Not on file     Minutes per session: Not on file    Stress: Not on file   Relationships    Social connections:     Talks on phone: Not on file     Gets together: Not on file     Attends Sikhism service: Not on file     Active member of club or organization: Not on file     Attends meetings of clubs or organizations: Not on file     Relationship status: Not on file   Other Topics Concern    Not on file   Social History  Narrative    Not on file     Family History   Problem Relation Age of Onset    Diabetes Mother      Past Surgical History:   Procedure Laterality Date    APPENDECTOMY      COLONOSCOPY N/A 12/14/2018    [facility]    COLONOSCOPY N/A 3/11/2013    [facility] Dano Pro [facility]    EGD (ESOPHAGOGASTRODUODENOSCOPY) N/A 1/24/2019    [facility]    HERNIA REPAIR      bilateral inguinal    SIGMOIDOSCOPY, FLEXIBLE N/A 1/22/2019    [facility]    TONSILLECTOMY       Past Medical History:   Diagnosis Date    Arthritis     Benign hypertension     Diabetes mellitus, type 2     2/2011    Fatty liver     Irradiation cystitis     SANDRA (obstructive sleep apnea)     no    Prostate cancer 2009    s/p XRT (T1C, João 8)    Radiation proctitis        Current Outpatient Medications:     allopurinol (ZYLOPRIM) 300 MG tablet, Take 1 tablet (300 mg total) by mouth once daily., Disp: 90 tablet, Rfl: 3    Bifidobacterium infantis (ALIGN ORAL), Take by mouth., Disp: , Rfl:     cholestyramine-aspartame (CHOLESTYRAMINE LIGHT) 4 gram PwPk, Take 1 packet (4 g total) by mouth 3 (three) times daily., Disp: 270 packet, Rfl: 3    febuxostat (ULORIC) 40 mg Tab, Take 40 mg by mouth once daily., Disp: , Rfl:     fluticasone propionate (FLONASE) 50 mcg/actuation nasal spray, 1 spray (50 mcg total) by Each Nare route once daily., Disp: 1 Bottle, Rfl: 3    guaifenesin 100 mg/5 ml (ROBITUSSIN) 100 mg/5 mL syrup, Take 200 mg by mouth 3 (three) times daily as needed for Cough., Disp: , Rfl:     JANUVIA 100 mg Tab, 100 mg once daily. , Disp: , Rfl:     lidocaine HCL 2% (XYLOCAINE) 2 % jelly, Apply topically as needed. Apply topically once nightly to affected part of foot/feet., Disp: 30 mL, Rfl: 2    loratadine (CLARITIN) 10 mg tablet, Take 1 tablet (10 mg total) by mouth once daily., Disp: , Rfl: 0    LYRICA 75  mg capsule, Take 75 mg by mouth 3 (three) times daily. , Disp: , Rfl:     multivitamin with minerals tablet, Take 1 tablet by mouth once daily., Disp: , Rfl:     pantoprazole (PROTONIX) 40 MG tablet, Take 1 tablet (40 mg total) by mouth once daily., Disp: 90 tablet, Rfl: 3    pravastatin (PRAVACHOL) 40 MG tablet, Take 40 mg by mouth once daily. , Disp: , Rfl:     rivaroxaban (XARELTO) 20 mg Tab, Take 1 tablet (20 mg total) by mouth daily with dinner or evening meal., Disp: 30 tablet, Rfl: 3    sucralfate (CARAFATE) 1 gram tablet, Take 1 g by mouth 4 (four) times daily., Disp: , Rfl:     tamsulosin (FLOMAX) 0.4 mg Cp24, Take 0.4 mg by mouth once daily., Disp: , Rfl:     VESICARE 5 mg tablet, TAKE 1 TABLET DAILY, Disp: 90 tablet, Rfl: 0  Review of patient's allergies indicates:   Allergen Reactions    No known drug allergies          REVIEW OF SYSTEMS:     CONSTITUTIONAL: The patient denies any weight change. There is no apparent    change in appetite, fever, night sweats, headaches, fatigue, dizziness, or    weakness.      SKIN: Denies rash, issues with nails, non-healing sores, bleeding, blotching    skin or abnormal bruising. Denies new moles or changes to existing moles.      BREASTS: There is no swelling around breasts or nipple discharge.    EYES: Denies eye pain, blurred vision, swelling, redness or discharge.      ENT AND MOUTH: Denies runny nose, stuffiness, sinus trouble or sores. Denies    nosebleeds. Denies, hoarseness, change in voice or swelling in front of the    neck.      CARDIOVASCULAR: Denies chest pain, discomfort or palpitations. Denies neck    swelling or episodes of passing out.      RESPIRATORY: Denies cough, sputum production, blood in sputum, and denies    shortness of breath.      GI: Denies trouble swallowing, indigestion, heartburn, abdominal pain, nausea,    vomiting, diarrhea, altered bowel habits, blood in stool, discoloration of    stools, change in nature of stool, bloating,  increased abdominal girth.      GENITOURINARY: No discharge. No pelvic pain or lumps. No rash around groin or  lesions. No urinary frequency, hesitation, painful urination or blood in    urine. Denies incontinence. No problems with intercourse.      MUSCULOSKELETAL: Denies neck or back pain. Denies weakness in arms or legs,    joint problems or distended inflamed veins in legs. Denies swelling or abnormal  glands.  Some swelling of right lower extremity    NEUROLOGICAL: Denies tingling, numbness, altered mentation changes to nerve    function in the face, weakness to one or both of the body. Denies changes to    gait and denies multiple falls or accidents.      PSYCHIATRIC: Denies nervousness, anxiety, hallucinations, depression, suicidal    ideation, trouble sleeping or changes in behavior noticed by family.      The patient denies recent foreign travel or recent exposure to chemicals or    products of concern or infectious diseases.     PHYSICAL EXAM:     Wt Readings from Last 3 Encounters:   08/05/19 101.8 kg (224 lb 6.9 oz)   06/17/19 100.9 kg (222 lb 7.1 oz)   04/17/19 100.7 kg (222 lb 0.1 oz)     Temp Readings from Last 3 Encounters:   08/05/19 98.3 °F (36.8 °C)   06/17/19 99.1 °F (37.3 °C)   04/17/19 98.2 °F (36.8 °C)     BP Readings from Last 3 Encounters:   08/05/19 (!) 145/87   06/17/19 122/68   04/17/19 110/71     Pulse Readings from Last 3 Encounters:   08/05/19 76   06/17/19 70   04/17/19 68     GENERAL: Comfortable looking patient. Patient is in no distress.  Awake, alert and oriented to time, person and place.  No anxiety, or agitation.      HEENT: Normal conjunctivae and eyelids. WNL.  PERRLA 3 to 4 mm. No icterus, no pallor, no congestion, and no discharge noted.     NECK:  Supple. Trachea is central.  No crepitus.  No JVD or masses.    RESPIRATORY:  No intercostal retractions.  No dullness to percussion.  Chest is clear to auscultation.  No rales, rhonchi or wheezes.  No crepitus.  Good air entry  bilaterally.    CARDIOVASCULAR:  S1 and S2 are normally heard without murmurs or gallops.  All peripheral pulses are present.    ABDOMEN:  Normal abdomen.  No hepatosplenomegaly.  No free fluid.  Bowel sounds are present.  No hernia noted. No masses.  No rebound or tenderness.  No guarding or rigidity.  Umbilicus is midline.    LYMPHATICS:  No axillary, cervical, supraclavicular, submental, or inguinal lymphadenopathy.    SKIN/MUSCULOSKELETAL:  There is no evidence of excoriation marks or ecchmosis.  No rashes.  No cyanosis.  No clubbing.  No joint or skeletal deformities noted.  Normal range of motion.    NEUROLOGIC:  Higher functions are appropriate.  No cranial nerve deficits.  Normal coco.  Normal strength.  Motor and sensory functions are normal.  Deep tendon reflexes are normal.    GENITAL/RECTAL:  Exams are deferred.      Laboratory:     CBC:  Lab Results   Component Value Date    WBC 7.31 03/22/2019    RBC 5.58 03/22/2019    HGB 13.2 (L) 03/22/2019    HCT 46.6 03/22/2019    MCV 84 03/22/2019    MCH 23.7 (L) 03/22/2019    MCHC 28.3 (L) 03/22/2019    RDW 16.0 (H) 03/22/2019     03/22/2019    MPV 11.0 03/22/2019    GRAN 4.8 03/22/2019    GRAN 66.2 03/22/2019    LYMPH 1.4 03/22/2019    LYMPH 19.6 03/22/2019    MONO 0.6 03/22/2019    MONO 8.3 03/22/2019    EOS 0.4 03/22/2019    BASO 0.04 03/22/2019    EOSINOPHIL 4.9 03/22/2019    BASOPHIL 0.5 03/22/2019       BMP: BMP  Lab Results   Component Value Date     03/22/2019    K 4.1 03/22/2019     03/22/2019    CO2 26 03/22/2019    BUN 14 03/22/2019    CREATININE 1.3 03/22/2019    CALCIUM 9.2 03/22/2019    ANIONGAP 10 03/22/2019    ESTGFRAFRICA 57.9 (A) 03/22/2019    EGFRNONAA 50.1 (A) 03/22/2019       LFT:   Lab Results   Component Value Date    ALT 20 03/22/2019    AST 27 03/22/2019    ALKPHOS 55 03/22/2019    BILITOT 0.4 03/22/2019     Impression August 2019 ultrasound       Considerable interval decrease of the deep venous thrombosis right  femoral vein compared to the prior exam with just very small amount of residual eccentric thrombus or wall thickening proximal femoral vein today.         Assessment/Plan:      New onset DVT right leg with sedentary lifestyle repeat ultrasounds after 3 months of anticoagulant still shows DVT but improved  Continue Xarelto for 3 months to 4 months as he still has residual DVT  Repeat ultrasound of right leg and see patient in clinic if thrombus has resolved will discontinue Xarelto and perform hypercoagulable workup patient needs to be more active  Continue PCP for fatty liver diabetes depression chronic kidney disease follow with Urology for prostate cancer continue with obstructive sleep apnea follow-up continue weight loss attempts  Continue management of dyslipidemia and hypertension  Advance Care Planning     Living Will  During this visit, I engaged the patient in the advance care planning process.  The patient and I reviewed the role for advance directives and their purpose in directing future healthcare if the patient's unable to speak for himself.  At this point in time, the patient does have full decision-making capacity.  We discussed different extreme health states that he could experience, and reviewed what kind of medical care he would want in those situations.  The patient communicated that if he were comatose and had little chance of a meaningful recovery, he would not want machines/life-sustaining treatments used.    The patient has not completed a living will to reflect these preferences.  The patient  Has not already designated a healthcare power of  to make decisions on his behalf.   I spent a total of 25 minutes engaging the patient in this advance care planning discussion. His wife will make decisions on his behalf and if she is unable to do so his children will.

## 2019-08-06 DIAGNOSIS — I82.411 ACUTE DEEP VEIN THROMBOSIS (DVT) OF FEMORAL VEIN OF RIGHT LOWER EXTREMITY: ICD-10-CM

## 2019-08-08 ENCOUNTER — OFFICE VISIT (OUTPATIENT)
Dept: FAMILY MEDICINE | Facility: CLINIC | Age: 84
End: 2019-08-08
Payer: MEDICARE

## 2019-08-08 VITALS
HEART RATE: 85 BPM | WEIGHT: 224.19 LBS | TEMPERATURE: 98 F | RESPIRATION RATE: 14 BRPM | HEIGHT: 67 IN | SYSTOLIC BLOOD PRESSURE: 120 MMHG | OXYGEN SATURATION: 85 % | DIASTOLIC BLOOD PRESSURE: 65 MMHG | BODY MASS INDEX: 35.19 KG/M2

## 2019-08-08 DIAGNOSIS — K62.7 RADIATION PROCTITIS: ICD-10-CM

## 2019-08-08 DIAGNOSIS — G47.33 OSA (OBSTRUCTIVE SLEEP APNEA): ICD-10-CM

## 2019-08-08 DIAGNOSIS — E66.9 OBESITY (BMI 30-39.9): ICD-10-CM

## 2019-08-08 DIAGNOSIS — M10.9 GOUT, UNSPECIFIED CAUSE, UNSPECIFIED CHRONICITY, UNSPECIFIED SITE: ICD-10-CM

## 2019-08-08 DIAGNOSIS — N18.30 CHRONIC KIDNEY DISEASE, STAGE III (MODERATE): ICD-10-CM

## 2019-08-08 DIAGNOSIS — E08.21 DIABETIC NEPHROPATHY ASSOCIATED WITH DIABETES MELLITUS DUE TO UNDERLYING CONDITION: ICD-10-CM

## 2019-08-08 DIAGNOSIS — D50.9 IRON DEFICIENCY ANEMIA, UNSPECIFIED IRON DEFICIENCY ANEMIA TYPE: ICD-10-CM

## 2019-08-08 DIAGNOSIS — E11.59 HYPERTENSION ASSOCIATED WITH DIABETES: ICD-10-CM

## 2019-08-08 DIAGNOSIS — Z79.01 WARFARIN ANTICOAGULATION: ICD-10-CM

## 2019-08-08 DIAGNOSIS — E11.22 CONTROLLED TYPE 2 DIABETES MELLITUS WITH STAGE 3 CHRONIC KIDNEY DISEASE, WITHOUT LONG-TERM CURRENT USE OF INSULIN: ICD-10-CM

## 2019-08-08 DIAGNOSIS — Z12.5 PROSTATE CANCER SCREENING: ICD-10-CM

## 2019-08-08 DIAGNOSIS — E78.5 HYPERLIPIDEMIA LDL GOAL <70: Primary | ICD-10-CM

## 2019-08-08 DIAGNOSIS — Z79.899 OTHER LONG TERM (CURRENT) DRUG THERAPY: ICD-10-CM

## 2019-08-08 DIAGNOSIS — N18.30 CONTROLLED TYPE 2 DIABETES MELLITUS WITH STAGE 3 CHRONIC KIDNEY DISEASE, WITHOUT LONG-TERM CURRENT USE OF INSULIN: ICD-10-CM

## 2019-08-08 DIAGNOSIS — K76.0 FATTY LIVER: ICD-10-CM

## 2019-08-08 DIAGNOSIS — C61 PROSTATE CANCER: ICD-10-CM

## 2019-08-08 DIAGNOSIS — I82.411 ACUTE DEEP VEIN THROMBOSIS (DVT) OF FEMORAL VEIN OF RIGHT LOWER EXTREMITY: ICD-10-CM

## 2019-08-08 DIAGNOSIS — R41.3 MEMORY LOSS: ICD-10-CM

## 2019-08-08 DIAGNOSIS — I15.2 HYPERTENSION ASSOCIATED WITH DIABETES: ICD-10-CM

## 2019-08-08 PROCEDURE — 99214 PR OFFICE/OUTPT VISIT, EST, LEVL IV, 30-39 MIN: ICD-10-PCS | Mod: S$PBB,,, | Performed by: FAMILY MEDICINE

## 2019-08-08 PROCEDURE — 99214 OFFICE O/P EST MOD 30 MIN: CPT | Mod: S$PBB,,, | Performed by: FAMILY MEDICINE

## 2019-08-08 PROCEDURE — 99999 PR PBB SHADOW E&M-EST. PATIENT-LVL III: ICD-10-PCS | Mod: PBBFAC,,, | Performed by: FAMILY MEDICINE

## 2019-08-08 PROCEDURE — 99999 PR PBB SHADOW E&M-EST. PATIENT-LVL III: CPT | Mod: PBBFAC,,, | Performed by: FAMILY MEDICINE

## 2019-08-08 PROCEDURE — 99213 OFFICE O/P EST LOW 20 MIN: CPT | Mod: PBBFAC,PO | Performed by: FAMILY MEDICINE

## 2019-08-08 RX ORDER — PANTOPRAZOLE SODIUM 40 MG/1
40 TABLET, DELAYED RELEASE ORAL DAILY
Qty: 90 TABLET | Refills: 3 | Status: SHIPPED | OUTPATIENT
Start: 2019-08-08 | End: 2020-12-24

## 2019-08-08 RX ORDER — SITAGLIPTIN 100 MG/1
100 TABLET, FILM COATED ORAL DAILY
Qty: 90 TABLET | Refills: 3 | Status: SHIPPED | OUTPATIENT
Start: 2019-08-08 | End: 2022-01-01

## 2019-08-08 RX ORDER — PRAVASTATIN SODIUM 40 MG/1
40 TABLET ORAL DAILY
Qty: 90 TABLET | Refills: 3 | Status: SHIPPED | OUTPATIENT
Start: 2019-08-08 | End: 2020-02-04 | Stop reason: SDUPTHER

## 2019-08-08 RX ORDER — ASPIRIN 81 MG/1
81 TABLET ORAL DAILY
Refills: 0 | Status: CANCELLED | COMMUNITY
Start: 2019-08-08 | End: 2020-08-07

## 2019-08-08 RX ORDER — LOSARTAN POTASSIUM 25 MG/1
25 TABLET ORAL DAILY
Qty: 90 TABLET | Refills: 3 | Status: SHIPPED | OUTPATIENT
Start: 2019-08-08 | End: 2020-01-29 | Stop reason: SDUPTHER

## 2019-08-08 RX ORDER — SOLIFENACIN SUCCINATE 5 MG/1
5 TABLET, FILM COATED ORAL DAILY
Qty: 90 TABLET | Refills: 3 | Status: SHIPPED | OUTPATIENT
Start: 2019-08-08 | End: 2020-12-24

## 2019-08-08 NOTE — PROGRESS NOTES
Subjective:       Patient ID: Siddharth Urias is a 85 y.o. male.    Chief Complaint: Follow-up (1mth f/u DVT)    HPI  Review of Systems   Constitutional: Negative for fatigue and unexpected weight change.   Respiratory: Negative for chest tightness and shortness of breath.    Cardiovascular: Negative for chest pain, palpitations and leg swelling.   Gastrointestinal: Negative for abdominal pain.   Genitourinary: Positive for frequency. Negative for dysuria.        Nocturia     Musculoskeletal: Negative for arthralgias.   Neurological: Negative for dizziness, syncope, light-headedness and headaches.       Patient Active Problem List   Diagnosis    Depressive disorder, not elsewhere classified    Unspecified venous (peripheral) insufficiency    Prostate cancer    Hypertension associated with diabetes    Fatty liver    Irradiation cystitis    SANDRA (obstructive sleep apnea)    Hyperlipidemia LDL goal <70    Type 2 diabetes mellitus, controlled, with renal complications    Diabetic nephropathy    Chronic kidney disease, stage 3    Bilateral renal cysts    Obesity (BMI 30-39.9)    Radiation proctitis    NISH (iron deficiency anemia)    Acute deep vein thrombosis (DVT) of femoral vein of right lower extremity     Patient is here for a chronic conditions follow up.    Diagnosed with acute DVT due to sedentary activity 1/19 on xarelto. U/s done 4/19 showed residual but improving DVT and xarelto continue for 3-4 more months.  Heme/onc Dr. Rodriguez consulted. Plans to repeat 12/19 with f/u and consideration to d/c anticoag    GI Dr. Aguiar treating for post radiation proctitis after prostate ca treated 2010, chronic diarrhea    Urology Dr. Mcrae-prostate cancer    Pulm Dr. Henry treating SANDRA    Nephro Dr. Calvert CKD stage 3    Endocrine (Dr. Whitney  In remote past) type 2 DM-last a1c 6.3 3/19 -lipids at goal. On pravastatin-needs arb and asa. Eye exam with Dr. Quesada 8/15/19 scheduled    Podiatry Dr. Gordon  -last ov 10/18    Not taking any meds except xarelto.  No gout attacks in last year. Here with wife of 67 years. Both are reporting increased gradual forgetfulness, getting mixed up or confused more often, easily frustrated.        Objective:      Physical Exam   Constitutional: He is oriented to person, place, and time. He appears well-developed and well-nourished.   Cardiovascular: Normal rate, regular rhythm and normal heart sounds.   Pulmonary/Chest: Effort normal and breath sounds normal.   Musculoskeletal: He exhibits no edema.   Neurological: He is alert and oriented to person, place, and time.   Skin: Skin is warm and dry.   Psychiatric: He has a normal mood and affect.   Nursing note and vitals reviewed.      Assessment:       1. Hyperlipidemia LDL goal <70    2. Hypertension associated with diabetes    3. Diabetic nephropathy associated with diabetes mellitus due to underlying condition    4. Chronic kidney disease, stage 3    5. Warfarin anticoagulation    6. Acute deep vein thrombosis (DVT) of femoral vein of right lower extremity    7. Prostate cancer    8. Iron deficiency anemia, unspecified iron deficiency anemia type    9. Controlled type 2 diabetes mellitus with stage 3 chronic kidney disease, without long-term current use of insulin    10. Obesity (BMI 30-39.9)    11. Fatty liver    12. Radiation proctitis    13. SANDRA (obstructive sleep apnea)    14. Gout, unspecified cause, unspecified chronicity, unspecified site    15. Memory loss    16. Other long term (current) drug therapy     17. Prostate cancer screening        Plan:       1. Hyperlipidemia LDL goal <70  Stable condition.  Continue current medications.  Will adjust based on lab findings or if condition changes.  Restart pravastatin  - Comprehensive metabolic panel; Future  - Lipid panel; Future    2. Hypertension associated with diabetes  Controlled without meds. Add for renal protection:  - losartan (COZAAR) 25 MG tablet; Take 1 tablet (25  "mg total) by mouth once daily.  Dispense: 90 tablet; Refill: 3    3. Diabetic nephropathy associated with diabetes mellitus due to underlying condition  Stable condition.  Continue current medications.  Will adjust based on lab findings or if condition changes.      4. Chronic kidney disease, stage 3  Stable and chronic.  Will continue to monitor q3-6 months and control chronic conditions as optimally as possible to preserve function.      5. Warfarin anticoagulation  Discontinued-now on xarelto    6. Acute deep vein thrombosis (DVT) of femoral vein of right lower extremity  Cont xarelto and f/u 12/19 with u/s repeat and heme/onc  - rivaroxaban (XARELTO) 20 mg Tab; Take 1 tablet (20 mg total) by mouth daily with dinner or evening meal.  Dispense: 90 tablet; Refill: 3    7. Prostate cancer  Cont PSA and urology monitoring    8. Iron deficiency anemia, unspecified iron deficiency anemia type  Screen and treat as indicated:    - CBC auto differential; Future  - Ferritin; Future  - Iron and TIBC; Future    9. Controlled type 2 diabetes mellitus with stage 3 chronic kidney disease, without long-term current use of insulin  Screen and treat as indicated:  Rstart januvia  - Hemoglobin A1c; Future    10. Obesity (BMI 30-39.9)  Counseled patient on his ideal body weight, health consequences of being obese and current recommendations including weekly exercise and a heart healthy diet.  Current BMI is:Estimated body mass index is 35.12 kg/m² as calculated from the following:    Height as of this encounter: 5' 7" (1.702 m).    Weight as of this encounter: 101.7 kg (224 lb 3.3 oz)..  Patient is aware that ideal BMI < 25 or Weight in (lb) to have BMI = 25: 159.3.        11. Fatty liver  Screen and treat as indicated:      12. Radiation proctitis  Cont gi care    13. SANDRA (obstructive sleep apnea)  Cont current treatment    14. Gout, unspecified cause, unspecified chronicity, unspecified site  No attacks. Hold uloric and " allopurinol    15. Memory loss  Screen and treat as indicated:  Consider mri brain if sx persist/worsen and/or neuro referral  - Vitamin B12; Future  - TSH; Future  - Vitamin D; Future  - RPR; Future    16. Other long term (current) drug therapy   Screen and treat as indicated:    - Vitamin D; Future        Time spent with patient: 20 minutes    Patient with be reevaluated in 6 months or sooner prn    Greater than 50% of this visit was spent counseling as described in above documentation:Yes

## 2019-08-09 ENCOUNTER — LAB VISIT (OUTPATIENT)
Dept: LAB | Facility: HOSPITAL | Age: 84
End: 2019-08-09
Attending: FAMILY MEDICINE
Payer: MEDICARE

## 2019-08-09 DIAGNOSIS — Z12.5 PROSTATE CANCER SCREENING: ICD-10-CM

## 2019-08-09 LAB — COMPLEXED PSA SERPL-MCNC: 0.34 NG/ML (ref 0–4)

## 2019-08-09 PROCEDURE — 84153 ASSAY OF PSA TOTAL: CPT

## 2019-08-09 PROCEDURE — 36415 COLL VENOUS BLD VENIPUNCTURE: CPT | Mod: PO

## 2019-08-15 ENCOUNTER — OFFICE VISIT (OUTPATIENT)
Dept: OPTOMETRY | Facility: CLINIC | Age: 84
End: 2019-08-15
Payer: MEDICARE

## 2019-08-15 DIAGNOSIS — H52.7 REFRACTIVE ERROR: ICD-10-CM

## 2019-08-15 DIAGNOSIS — Z96.1 PSEUDOPHAKIA: ICD-10-CM

## 2019-08-15 DIAGNOSIS — E11.9 DIABETES MELLITUS WITHOUT OPHTHALMIC MANIFESTATIONS: Primary | ICD-10-CM

## 2019-08-15 PROCEDURE — 99212 OFFICE O/P EST SF 10 MIN: CPT | Mod: PBBFAC,PO | Performed by: OPTOMETRIST

## 2019-08-15 PROCEDURE — 99999 PR PBB SHADOW E&M-EST. PATIENT-LVL II: ICD-10-PCS | Mod: PBBFAC,,, | Performed by: OPTOMETRIST

## 2019-08-15 PROCEDURE — 92014 PR EYE EXAM, EST PATIENT,COMPREHESV: ICD-10-PCS | Mod: S$PBB,,, | Performed by: OPTOMETRIST

## 2019-08-15 PROCEDURE — 99999 PR PBB SHADOW E&M-EST. PATIENT-LVL II: CPT | Mod: PBBFAC,,, | Performed by: OPTOMETRIST

## 2019-08-15 PROCEDURE — 92014 COMPRE OPH EXAM EST PT 1/>: CPT | Mod: S$PBB,,, | Performed by: OPTOMETRIST

## 2019-08-15 NOTE — PROGRESS NOTES
HPI     Diabetic Eye Exam      Additional comments: pt does not check BSL               Annual Exam      Additional comments: DLE 6-18 (ryan)   ocular health exam --- pt does   not need updated specs rx              Comments     Hemoglobin A1C       Date                     Value               Ref Range             Status                03/14/2019               6.3 (H)             4.0 - 5.6 %           Final                  Hemoglobin A1C       Date                     Value               Ref Range             Status                03/14/2019               6.3 (H)             4.0 - 5.6 %           Final                 12/13/2018               7.0 (H)             4.0 - 5.6 %           Final                 09/06/2018               6.9 (H)             4.0 - 5.6 %           Final                 03/05/2018               6.6                 %                     Final            ----------            Last edited by Nestor Quesada, OD on 8/15/2019  1:47 PM. (History)            Assessment /Plan     For exam results, see Encounter Report.    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 Discussed possible ocular affects of uncontrolled blood sugar with patient. Recommended continued strong blood sugar control and continued care with PCP. Monitor yearly.     S/p cataract extraction with good results. Pt happy with uncorrected distance vision and otc readers prn for near, denies refraction. Return as desired for spec Rx.        RTC in 1 year for comprehensive eye exam, or sooner prn.

## 2019-09-06 ENCOUNTER — HOSPITAL ENCOUNTER (OUTPATIENT)
Dept: RADIOLOGY | Facility: HOSPITAL | Age: 84
Discharge: HOME | End: 2019-09-06
Attending: NURSE PRACTITIONER
Payer: MEDICARE

## 2019-09-06 ENCOUNTER — OFFICE VISIT (OUTPATIENT)
Dept: FAMILY MEDICINE | Facility: CLINIC | Age: 84
End: 2019-09-06
Payer: MEDICARE

## 2019-09-06 VITALS
OXYGEN SATURATION: 93 % | DIASTOLIC BLOOD PRESSURE: 64 MMHG | HEIGHT: 67 IN | TEMPERATURE: 98 F | SYSTOLIC BLOOD PRESSURE: 102 MMHG | HEART RATE: 85 BPM | BODY MASS INDEX: 35.33 KG/M2 | WEIGHT: 225.06 LBS

## 2019-09-06 DIAGNOSIS — D22.9 SKIN MOLE: ICD-10-CM

## 2019-09-06 DIAGNOSIS — R20.2 PARESTHESIAS: Primary | ICD-10-CM

## 2019-09-06 DIAGNOSIS — R20.0 NUMBNESS: ICD-10-CM

## 2019-09-06 DIAGNOSIS — R06.02 SOB (SHORTNESS OF BREATH) ON EXERTION: ICD-10-CM

## 2019-09-06 PROCEDURE — 99214 OFFICE O/P EST MOD 30 MIN: CPT | Mod: PBBFAC,25,PO | Performed by: NURSE PRACTITIONER

## 2019-09-06 PROCEDURE — 71046 X-RAY EXAM CHEST 2 VIEWS: CPT | Mod: TC,FY

## 2019-09-06 PROCEDURE — 99213 PR OFFICE/OUTPT VISIT, EST, LEVL III, 20-29 MIN: ICD-10-PCS | Mod: S$PBB,,, | Performed by: NURSE PRACTITIONER

## 2019-09-06 PROCEDURE — 71046 XR CHEST PA AND LATERAL: ICD-10-PCS | Mod: 26,,, | Performed by: RADIOLOGY

## 2019-09-06 PROCEDURE — 99999 PR PBB SHADOW E&M-EST. PATIENT-LVL IV: ICD-10-PCS | Mod: PBBFAC,,, | Performed by: NURSE PRACTITIONER

## 2019-09-06 PROCEDURE — 71046 X-RAY EXAM CHEST 2 VIEWS: CPT | Mod: 26,,, | Performed by: RADIOLOGY

## 2019-09-06 PROCEDURE — 93005 ELECTROCARDIOGRAM TRACING: CPT | Mod: PBBFAC,PO | Performed by: INTERNAL MEDICINE

## 2019-09-06 PROCEDURE — 93010 EKG 12-LEAD: ICD-10-PCS | Mod: S$PBB,,, | Performed by: INTERNAL MEDICINE

## 2019-09-06 PROCEDURE — 99999 PR PBB SHADOW E&M-EST. PATIENT-LVL IV: CPT | Mod: PBBFAC,,, | Performed by: NURSE PRACTITIONER

## 2019-09-06 PROCEDURE — 93010 ELECTROCARDIOGRAM REPORT: CPT | Mod: S$PBB,,, | Performed by: INTERNAL MEDICINE

## 2019-09-06 PROCEDURE — 99213 OFFICE O/P EST LOW 20 MIN: CPT | Mod: S$PBB,,, | Performed by: NURSE PRACTITIONER

## 2019-09-06 NOTE — PROGRESS NOTES
Subjective:       Patient ID: Siddharth Urias is a 85 y.o. male.    Chief Complaint: tingling in fingers (wart)    Mr. Urias presents today to review medications. Concerned with medication reaction. Losartan added at last appointment with Dr. Maxwell for renal protection. Since starting this, has developed numbness and paresthesias of his left thumb, index, middle fingers. Denies weakness. Feels difficult to grab things. Otherwise asymptomatic. Denies neck pain or arm pain. Wife also reports he becomes extremely SOB with exertion. Mr. Urias states it does not occur every time but he also notices this. Does not occur at rest. No cough, wheezing or recent fever/illness. Requesting referral to have mole on left wrist removed. Vitals stable today.     Patient Active Problem List   Diagnosis    Depressive disorder, not elsewhere classified    Unspecified venous (peripheral) insufficiency    Prostate cancer    Hypertension associated with diabetes    Fatty liver    Irradiation cystitis    SANDRA (obstructive sleep apnea)    Hyperlipidemia LDL goal <70    Type 2 diabetes mellitus, controlled, with renal complications    Diabetic nephropathy    Chronic kidney disease, stage 3    Bilateral renal cysts    Obesity (BMI 30-39.9)    Radiation proctitis    NISH (iron deficiency anemia)    Acute deep vein thrombosis (DVT) of femoral vein of right lower extremity     Review of Systems   Constitutional: Negative for activity change, appetite change, fatigue and fever.   Respiratory: Positive for shortness of breath (with exertion). Negative for cough and wheezing.    Cardiovascular: Negative for chest pain and palpitations.   Gastrointestinal: Negative for abdominal pain, constipation, diarrhea and vomiting.   Musculoskeletal: Negative for arthralgias, back pain, myalgias, neck pain and neck stiffness.   Neurological: Positive for numbness. Negative for dizziness, weakness and light-headedness.         Paresthesias        Objective:      Physical Exam   Constitutional: He is oriented to person, place, and time. He appears well-developed and well-nourished.   Cardiovascular: Normal rate, regular rhythm and normal heart sounds.   Pulmonary/Chest: Effort normal and breath sounds normal.   Musculoskeletal: He exhibits no edema.   Neurological: He is alert and oriented to person, place, and time. He has normal strength.   Skin: Skin is warm and dry.        Psychiatric: He has a normal mood and affect.   Nursing note and vitals reviewed.      Assessment:       1. Paresthesias    2. Numbness    3. Skin mole    4. SOB (shortness of breath) on exertion        Plan:       Siddharth was seen today for tingling in fingers.    Diagnoses and all orders for this visit:    Paresthesias  Hold losartan  F/U via phone next week and see if symptoms improve  If no improvement, consider physical medicine referral    Numbness  See above    Skin mole  -     Ambulatory referral to Dermatology    SOB (shortness of breath) on exertion  -     Complete PFT w/ bronchodilator; Future  -     Echo Color Flow Doppler? Yes; Future  -     X-Ray Chest PA And Lateral; Future  -     IN OFFICE EKG 12-LEAD (to Muse)  -     Holter monitor - 48 hour; Future  Workup  No major changes on EKG- reviewed in collaboration with Dr. Maxwell     F/U based on the results of the above

## 2019-09-06 NOTE — PATIENT INSTRUCTIONS
Hold losartan over the weekend and see if symptoms improve   Call clinic next week and let me know if symptoms are better

## 2019-09-09 ENCOUNTER — TELEPHONE (OUTPATIENT)
Dept: FAMILY MEDICINE | Facility: CLINIC | Age: 84
End: 2019-09-09

## 2019-09-09 DIAGNOSIS — R06.02 SOB (SHORTNESS OF BREATH) ON EXERTION: Primary | ICD-10-CM

## 2019-09-09 NOTE — TELEPHONE ENCOUNTER
----- Message from Jessica Verma NP sent at 9/9/2019  2:06 PM CDT -----  CXR concerning for CHF, needs BNP and CMP. Please schedule ASAP. There is no consolidation.

## 2019-09-09 NOTE — TELEPHONE ENCOUNTER
Spoke with patient regarding his results and eloise's suggestions, patient will have labs drawn today at [facility]

## 2019-09-10 ENCOUNTER — TELEPHONE (OUTPATIENT)
Dept: FAMILY MEDICINE | Facility: CLINIC | Age: 84
End: 2019-09-10

## 2019-09-10 ENCOUNTER — HOSPITAL ENCOUNTER (OUTPATIENT)
Dept: RESPIRATORY THERAPY | Facility: HOSPITAL | Age: 84
Discharge: HOME | End: 2019-09-10
Attending: NURSE PRACTITIONER
Payer: MEDICARE

## 2019-09-10 ENCOUNTER — CLINICAL SUPPORT (OUTPATIENT)
Dept: CARDIOLOGY | Facility: CLINIC | Age: 84
End: 2019-09-10
Attending: NURSE PRACTITIONER
Payer: MEDICARE

## 2019-09-10 VITALS — BODY MASS INDEX: 35.31 KG/M2 | HEIGHT: 67 IN | HEART RATE: 89 BPM | WEIGHT: 225 LBS

## 2019-09-10 DIAGNOSIS — R06.02 SOB (SHORTNESS OF BREATH) ON EXERTION: ICD-10-CM

## 2019-09-10 LAB
ASCENDING AORTA: 2.69 CM
AV INDEX (PROSTH): 0.4
AV MEAN GRADIENT: 30 MMHG
AV PEAK GRADIENT: 48 MMHG
AV VALVE AREA: 1.46 CM2
AV VELOCITY RATIO: 0.34
BRPFT: ABNORMAL
BSA FOR ECHO PROCEDURE: 2.2 M2
CV ECHO LV RWT: 0.69 CM
DLCO ADJ PRE: 14.23 ML/(MIN*MMHG) (ref 14.71–28.57)
DLCO SINGLE BREATH LLN: 14.71
DLCO SINGLE BREATH PRE REF: 65.7 %
DLCO SINGLE BREATH REF: 21.64
DLCOC SBVA LLN: 2.11
DLCOC SBVA PRE REF: 110.4 %
DLCOC SBVA REF: 3.33
DLCOC SINGLE BREATH LLN: 14.71
DLCOC SINGLE BREATH PRE REF: 65.7 %
DLCOC SINGLE BREATH REF: 21.64
DLCOVA LLN: 2.11
DLCOVA PRE REF: 110.4 %
DLCOVA PRE: 3.67 ML/(MIN*MMHG*L) (ref 2.11–4.55)
DLCOVA REF: 3.33
DLVAADJ PRE: 3.67 ML/(MIN*MMHG*L) (ref 2.11–4.55)
DOP CALC AO PEAK VEL: 3.46 M/S
DOP CALC AO VTI: 69.38 CM
DOP CALC LVOT AREA: 3.6 CM2
DOP CALC LVOT DIAMETER: 2.15 CM
DOP CALC LVOT PEAK VEL: 1.19 M/S
DOP CALC LVOT STROKE VOLUME: 101.06 CM3
DOP CALCLVOT PEAK VEL VTI: 27.85 CM
E WAVE DECELERATION TIME: 275.05 MSEC
E/A RATIO: 0.52
E/E' RATIO: 9.83 M/S
ECHO LV POSTERIOR WALL: 1.25 CM (ref 0.6–1.1)
ERVN2 LLN: 0.79
ERVN2 PRE REF: 22.6 %
ERVN2 PRE: 0.18 L (ref 0.79–0.79)
ERVN2 REF: 0.79
FEF 25 75 CHG: 33.4 %
FEF 25 75 LLN: 0.6
FEF 25 75 POST REF: 47.6 %
FEF 25 75 PRE REF: 35.7 %
FEF 25 75 REF: 1.71
FET100 CHG: -1.5 %
FEV1 CHG: 9.8 %
FEV1 FVC CHG: 5.6 %
FEV1 FVC LLN: 59
FEV1 FVC POST REF: 88.2 %
FEV1 FVC PRE REF: 83.5 %
FEV1 FVC REF: 75
FEV1 LLN: 1.67
FEV1 POST REF: 70.7 %
FEV1 PRE REF: 64.4 %
FEV1 REF: 2.46
FRACTIONAL SHORTENING: 31 % (ref 28–44)
FRCN2 LLN: 2.67
FRCN2 PRE REF: 36.5 %
FRCN2 REF: 3.65
FVC CHG: 3.9 %
FVC LLN: 2.37
FVC POST REF: 79.3 %
FVC PRE REF: 76.3 %
FVC REF: 3.34
INTERVENTRICULAR SEPTUM: 1.2 CM (ref 0.6–1.1)
IVC PRE: 2.15 L (ref 2.37–4.31)
IVC SINGLE BREATH LLN: 2.37
IVC SINGLE BREATH PRE REF: 64.3 %
IVC SINGLE BREATH REF: 3.34
IVRT: 0.11 MSEC
LA MAJOR: 5.35 CM
LA MINOR: 5.07 CM
LA WIDTH: 3.03 CM
LEFT ATRIUM SIZE: 3.22 CM
LEFT ATRIUM VOLUME INDEX: 20.3 ML/M2
LEFT ATRIUM VOLUME: 43.18 CM3
LEFT INTERNAL DIMENSION IN SYSTOLE: 2.52 CM (ref 2.1–4)
LEFT VENTRICLE DIASTOLIC VOLUME INDEX: 26.19 ML/M2
LEFT VENTRICLE DIASTOLIC VOLUME: 55.67 ML
LEFT VENTRICLE MASS INDEX: 70 G/M2
LEFT VENTRICLE SYSTOLIC VOLUME INDEX: 10.7 ML/M2
LEFT VENTRICLE SYSTOLIC VOLUME: 22.76 ML
LEFT VENTRICULAR INTERNAL DIMENSION IN DIASTOLE: 3.63 CM (ref 3.5–6)
LEFT VENTRICULAR MASS: 147.8 G
LV LATERAL E/E' RATIO: 9.83 M/S
LV SEPTAL E/E' RATIO: 9.83 M/S
MV PEAK A VEL: 1.14 M/S
MV PEAK E VEL: 0.59 M/S
MVV LLN: 80
MVV PRE REF: 37.2 %
MVV REF: 94
PEF CHG: -5.9 %
PEF LLN: 3.78
PEF POST REF: 97.3 %
PEF PRE REF: 103.4 %
PEF REF: 5.9
PISA TR MAX VEL: 1.69 M/S
POST FEF 25 75: 0.81 L/S (ref 0.6–2.82)
POST FET 100: 8.65 SEC
POST FEV1 FVC: 65.76 % (ref 58.96–90.15)
POST FEV1: 1.74 L (ref 1.67–3.26)
POST FVC: 2.65 L (ref 2.37–4.31)
POST PEF: 5.74 L/S (ref 3.78–8.02)
PRE DLCO: 14.23 ML/(MIN*MMHG) (ref 14.71–28.57)
PRE FEF 25 75: 0.61 L/S (ref 0.6–2.82)
PRE FET 100: 8.78 SEC
PRE FEV1 FVC: 62.25 % (ref 58.96–90.15)
PRE FEV1: 1.58 L (ref 1.67–3.26)
PRE FRC N2: 1.33 L
PRE FVC: 2.55 L (ref 2.37–4.31)
PRE MVV: 35 L/MIN (ref 80.07–108.33)
PRE PEF: 6.1 L/S (ref 3.78–8.02)
PV PEAK VELOCITY: 1.13 CM/S
RA MAJOR: 3.99 CM
RA PRESSURE: 3 MMHG
RA WIDTH: 2.78 CM
RIGHT VENTRICULAR END-DIASTOLIC DIMENSION: 3.39 CM
RVN2 LLN: 2.19
RVN2 PRE REF: 35 %
RVN2 PRE: 1 L (ref 2.19–3.54)
RVN2 REF: 2.87
RVN2TLCN2 LLN: 38.13
RVN2TLCN2 PRE REF: 60 %
RVN2TLCN2 PRE: 28.28 % (ref 38.13–56.09)
RVN2TLCN2 REF: 47.11
SINUS: 2.87 CM
STJ: 2.68 CM
TDI LATERAL: 0.06 M/S
TDI SEPTAL: 0.06 M/S
TDI: 0.06 M/S
TLCN2 LLN: 5.35
TLCN2 PRE REF: 54.6 %
TLCN2 PRE: 3.55 L (ref 5.35–7.65)
TLCN2 REF: 6.5
TR MAX PG: 11 MMHG
TRICUSPID ANNULAR PLANE SYSTOLIC EXCURSION: 1.78 CM
TV REST PULMONARY ARTERY PRESSURE: 14 MMHG
VA PRE: 3.87 L (ref 6.35–6.35)
VA SINGLE BREATH LLN: 6.35
VA SINGLE BREATH PRE REF: 61 %
VA SINGLE BREATH REF: 6.35
VCMAXN2 LLN: 2.37
VCMAXN2 PRE REF: 76.3 %
VCMAXN2 PRE: 2.55 L (ref 2.37–4.31)
VCMAXN2 REF: 3.34

## 2019-09-10 PROCEDURE — 99999 PR PBB SHADOW E&M-EST. PATIENT-LVL I: CPT | Mod: PBBFAC,,,

## 2019-09-10 PROCEDURE — 94729 DIFFUSING CAPACITY: CPT | Mod: 26,,, | Performed by: INTERNAL MEDICINE

## 2019-09-10 PROCEDURE — 94729 DIFFUSING CAPACITY: CPT

## 2019-09-10 PROCEDURE — 99211 OFF/OP EST MAY X REQ PHY/QHP: CPT | Mod: PBBFAC,25,PO

## 2019-09-10 PROCEDURE — 94060 PR EVAL OF BRONCHOSPASM: ICD-10-PCS | Mod: 26,,, | Performed by: INTERNAL MEDICINE

## 2019-09-10 PROCEDURE — 93306 ECHO (CUPID ONLY): ICD-10-PCS | Mod: 26,S$PBB,, | Performed by: INTERNAL MEDICINE

## 2019-09-10 PROCEDURE — 94729 PR C02/MEMBANE DIFFUSE CAPACITY: ICD-10-PCS | Mod: 26,,, | Performed by: INTERNAL MEDICINE

## 2019-09-10 PROCEDURE — 94727 PR PULM FUNCTION TEST BY GAS: ICD-10-PCS | Mod: 26,,, | Performed by: INTERNAL MEDICINE

## 2019-09-10 PROCEDURE — 93306 TTE W/DOPPLER COMPLETE: CPT | Mod: PBBFAC,PO | Performed by: INTERNAL MEDICINE

## 2019-09-10 PROCEDURE — 94727 GAS DIL/WSHOT DETER LNG VOL: CPT | Mod: 26,,, | Performed by: INTERNAL MEDICINE

## 2019-09-10 PROCEDURE — 94060 EVALUATION OF WHEEZING: CPT | Mod: 26,,, | Performed by: INTERNAL MEDICINE

## 2019-09-10 PROCEDURE — 94727 GAS DIL/WSHOT DETER LNG VOL: CPT

## 2019-09-10 PROCEDURE — 94060 EVALUATION OF WHEEZING: CPT

## 2019-09-10 PROCEDURE — 99999 PR PBB SHADOW E&M-EST. PATIENT-LVL I: ICD-10-PCS | Mod: PBBFAC,,,

## 2019-09-10 NOTE — TELEPHONE ENCOUNTER
----- Message from Jessica Verma NP sent at 9/9/2019  4:33 PM CDT -----  BNP not concerning with heart failure- is only slightly above normal. Your kidney function is moderately impaired but this is chronic and stable. Glucose is elevated, has diabetes.     Complete echo and PFTs as scheduled tomorrow.

## 2019-09-10 NOTE — TELEPHONE ENCOUNTER
Spoke with patient regarding his results, patient expressed understanding and will keep his appointment for his echo and PFT today

## 2019-09-11 ENCOUNTER — TELEPHONE (OUTPATIENT)
Dept: FAMILY MEDICINE | Facility: CLINIC | Age: 84
End: 2019-09-11

## 2019-09-11 DIAGNOSIS — I35.0 AORTIC VALVE STENOSIS, ETIOLOGY OF CARDIAC VALVE DISEASE UNSPECIFIED: ICD-10-CM

## 2019-09-11 DIAGNOSIS — R06.02 SOB (SHORTNESS OF BREATH) ON EXERTION: Primary | ICD-10-CM

## 2019-09-11 DIAGNOSIS — R93.1 ABNORMAL ECHOCARDIOGRAM: ICD-10-CM

## 2019-09-11 DIAGNOSIS — R94.2 ABNORMAL PFTS: ICD-10-CM

## 2019-09-11 NOTE — TELEPHONE ENCOUNTER
----- Message from Jessica Verma NP sent at 9/11/2019  2:06 PM CDT -----  Echo and PFTs reviewed in collaboration with Dr. Maxwell- there are some changes on the echo that could be contributing to his shortness of breath, such as aortic valve stenosis. I would like him to see a cardiologist. I have placed an external referral. Please schedule with cardiology institute and fax notes, echo, ekg, recent chest xray. PFTs showed restriction. I would like him to follow up with pulmonology. This could be related to obesity and sedentary lifestyle.

## 2019-09-11 NOTE — PROGRESS NOTES
Spoke with patient regarding results, he has been scheduled to see Dr. Sullivan and Yoko Coppola.on 10/04/2019.

## 2019-09-16 ENCOUNTER — TELEPHONE (OUTPATIENT)
Dept: FAMILY MEDICINE | Facility: CLINIC | Age: 84
End: 2019-09-16

## 2019-09-16 NOTE — TELEPHONE ENCOUNTER
----- Message from Chai Tamayo sent at 9/13/2019  4:36 PM CDT -----  Type:  Patient Returning Call    Who Called:  Enedina Dewitt Adonay (Wife)  Who Left Message for Patient:  NA  Does the patient know what this is regarding?: GERTRUDIS    Best Call Back Number:  [phone]  Additional Information:

## 2019-09-16 NOTE — TELEPHONE ENCOUNTER
Spoke with patient regarding his message from Friday, just needed to clarify the information regarding his appointment with  Dr. Sullivan on 10/4/2019. Information mailed

## 2019-09-25 ENCOUNTER — CLINICAL SUPPORT (OUTPATIENT)
Dept: CARDIOLOGY | Facility: CLINIC | Age: 84
End: 2019-09-25
Attending: NURSE PRACTITIONER
Payer: MEDICARE

## 2019-09-25 DIAGNOSIS — R06.02 SOB (SHORTNESS OF BREATH) ON EXERTION: ICD-10-CM

## 2019-09-25 PROCEDURE — 93227 XTRNL ECG REC<48 HR R&I: CPT | Mod: S$PBB,,, | Performed by: INTERNAL MEDICINE

## 2019-09-25 PROCEDURE — 93226 XTRNL ECG REC<48 HR SCAN A/R: CPT | Mod: PBBFAC,PO | Performed by: INTERNAL MEDICINE

## 2019-09-25 PROCEDURE — 93227 HOLTER MONITOR - 48 HOUR (CUPID ONLY): ICD-10-PCS | Mod: S$PBB,,, | Performed by: INTERNAL MEDICINE

## 2019-09-30 ENCOUNTER — OFFICE VISIT (OUTPATIENT)
Dept: PODIATRY | Facility: CLINIC | Age: 84
End: 2019-09-30
Payer: MEDICARE

## 2019-09-30 VITALS
SYSTOLIC BLOOD PRESSURE: 140 MMHG | BODY MASS INDEX: 35.33 KG/M2 | HEIGHT: 67 IN | WEIGHT: 225.06 LBS | DIASTOLIC BLOOD PRESSURE: 87 MMHG | HEART RATE: 94 BPM

## 2019-09-30 DIAGNOSIS — L98.8 SKIN PLAQUE: Primary | ICD-10-CM

## 2019-09-30 DIAGNOSIS — B35.1 ONYCHOMYCOSIS DUE TO DERMATOPHYTE: ICD-10-CM

## 2019-09-30 PROCEDURE — 99213 OFFICE O/P EST LOW 20 MIN: CPT | Mod: PBBFAC,PO | Performed by: PODIATRIST

## 2019-09-30 PROCEDURE — 99999 PR PBB SHADOW E&M-EST. PATIENT-LVL III: ICD-10-PCS | Mod: PBBFAC,,, | Performed by: PODIATRIST

## 2019-09-30 PROCEDURE — 99212 OFFICE O/P EST SF 10 MIN: CPT | Mod: S$PBB,,, | Performed by: PODIATRIST

## 2019-09-30 PROCEDURE — 99212 PR OFFICE/OUTPT VISIT, EST, LEVL II, 10-19 MIN: ICD-10-PCS | Mod: S$PBB,,, | Performed by: PODIATRIST

## 2019-09-30 PROCEDURE — 99999 PR PBB SHADOW E&M-EST. PATIENT-LVL III: CPT | Mod: PBBFAC,,, | Performed by: PODIATRIST

## 2019-09-30 RX ORDER — CICLOPIROX 80 MG/ML
SOLUTION TOPICAL NIGHTLY
Qty: 6.6 ML | Refills: 11 | Status: SHIPPED | OUTPATIENT
Start: 2019-09-30 | End: 2021-06-28

## 2019-09-30 NOTE — PROGRESS NOTES
Subjective:      Patient ID: Siddharth Urias is a 85 y.o. male.    Chief Complaint: Foot Pain (having some swelling that started yesterday 9/29/19)    Plaque of skin inside/back surface of right ankle/leg.  Gradual onset, unchanged over past several weeks, aggravated by increased weight bearing, shoe gear, pressure.  No previous medical treatment.  aquaphor helping it some past few days.  Denies trauma, surgery, scar right leg.  Currently managed with Predaxa for DVT.     Review of Systems   Constitution: Negative for chills, diaphoresis, fever, malaise/fatigue and night sweats.   Cardiovascular: Positive for leg swelling (right). Negative for claudication, cyanosis and syncope.   Skin: Positive for nail changes, rash and suspicious lesions. Negative for color change, dry skin and unusual hair distribution.   Musculoskeletal: Negative for falls, joint pain, joint swelling, muscle cramps, muscle weakness and stiffness.   Gastrointestinal: Negative for constipation, diarrhea, nausea and vomiting.   Neurological: Negative for brief paralysis, disturbances in coordination, focal weakness, numbness, paresthesias, sensory change and tremors.           Objective:      Physical Exam   Constitutional: He is oriented to person, place, and time. He appears well-developed and well-nourished. He is cooperative.   Oriented to time, place, and person.   Cardiovascular:   Pulses:       Dorsalis pedis pulses are 1+ on the right side, and 1+ on the left side.        Posterior tibial pulses are 1+ on the right side, and 1+ on the left side.   Capillary fill time 3-5 seconds.  All toes warm to touch.      LLE has dependent rubor, <2+ pitting edema, and tenderness to palpation.    RLE negative edema    Negative elevational pallor and dependent rubor bilateral.     Musculoskeletal:   Normal angle, base, station of gait. Decreased stride length, early heel off, moderately propulsive toe off bilateral.    All ten toes without clubbing,  cyanosis, or signs of ischemia.      No pain to palpation bilateral lower extremities.      Range of motion, stability, muscle strength, and muscle tone are age and health appropriate normal bilateral feet and legs.       Lymphadenopathy:   Negative lymphadenopathy bilateral popliteal fossa and tarsal tunnel.  Negative lymphangitic streaking bilateral foot/ankle bilateral.     Neurological: He is alert and oriented to person, place, and time. He has normal strength. He is not disoriented. He displays no atrophy and no tremor. A sensory deficit is present. He exhibits normal muscle tone.   Reflex Scores:       Patellar reflexes are 2+ on the right side and 2+ on the left side.       Achilles reflexes are 2+ on the right side and 2+ on the left side.  Decreased/absent vibratory sensation bilateral feet to 128Hz tuning fork.     Skin: Skin is warm, dry and intact. No abrasion, no bruising, no burn, no ecchymosis, no laceration, no lesion, no petechiae and no rash noted. He is not diaphoretic. No cyanosis or erythema. No pallor. Nails show no clubbing.   Skin thin, atrophic, with decreased density and distribution of pedal hair bilateral, but without hyperpigmentation, irving discoloration,  ulcers, masses, nodules or cords palpated bilateral feet and legs.      Toenails 1st, 2nd, 3rd, 4th, 5th  bilateral are hypertrophic thickened 2-3 mm, dystrophic, discolored tanish brown with tan, gray crumbly subungual debris.  Tender to distal nail plate pressure, without periungual skin abnormality of each.               Assessment:       Encounter Diagnoses   Name Primary?    Skin plaque Yes    Onychomycosis due to dermatophyte          Plan:       Siddharth was seen today for foot pain.    Diagnoses and all orders for this visit:    Skin plaque    Onychomycosis due to dermatophyte    Other orders  -     ciclopirox (PENLAC) 8 % Soln; Apply topically nightly.      I counseled the patient on his conditions, their implications and  medical management.        Follow with Dr. Maxwell for all DVT/anticoagulation instructions.  Keep all follow up same.    Rx penlac    Discussed very low chance of cancer with any mass/skin lesion in body only disprovable by biopsy and pathology.  Patient verbalizes understanding and declines biopsy/immaging today.     Continue aquaphor prn.    With the patient's permission, I debrided all ten toenails with a sterile nipper and curette, removing all offending nail and debris.  Patient tolerated the procedure well and related significant relief. - not billed - my mistake in not recognizing that anticoagulation is not long term - thus they would not be covered by [facility].                No follow-ups on file.

## 2019-10-01 ENCOUNTER — HOSPITAL ENCOUNTER (OUTPATIENT)
Dept: RADIOLOGY | Facility: CLINIC | Age: 84
Discharge: HOME | End: 2019-10-01
Attending: NURSE PRACTITIONER
Payer: MEDICARE

## 2019-10-01 ENCOUNTER — OFFICE VISIT (OUTPATIENT)
Dept: FAMILY MEDICINE | Facility: CLINIC | Age: 84
End: 2019-10-01
Payer: MEDICARE

## 2019-10-01 VITALS
BODY MASS INDEX: 34.88 KG/M2 | TEMPERATURE: 98 F | OXYGEN SATURATION: 95 % | HEIGHT: 67 IN | WEIGHT: 222.25 LBS | DIASTOLIC BLOOD PRESSURE: 62 MMHG | HEART RATE: 86 BPM | RESPIRATION RATE: 18 BRPM | SYSTOLIC BLOOD PRESSURE: 130 MMHG

## 2019-10-01 DIAGNOSIS — M79.89 PAIN AND SWELLING OF RIGHT LOWER LEG: Primary | ICD-10-CM

## 2019-10-01 DIAGNOSIS — M79.661 PAIN AND SWELLING OF RIGHT LOWER LEG: Primary | ICD-10-CM

## 2019-10-01 DIAGNOSIS — E66.09 CLASS 1 OBESITY DUE TO EXCESS CALORIES WITH BODY MASS INDEX (BMI) OF 34.0 TO 34.9 IN ADULT, UNSPECIFIED WHETHER SERIOUS COMORBIDITY PRESENT: ICD-10-CM

## 2019-10-01 DIAGNOSIS — R20.0 NUMBNESS AND TINGLING OF HAND: ICD-10-CM

## 2019-10-01 DIAGNOSIS — M79.89 PAIN AND SWELLING OF RIGHT LOWER LEG: ICD-10-CM

## 2019-10-01 DIAGNOSIS — M79.661 PAIN AND SWELLING OF RIGHT LOWER LEG: ICD-10-CM

## 2019-10-01 DIAGNOSIS — R20.2 NUMBNESS AND TINGLING OF HAND: ICD-10-CM

## 2019-10-01 PROCEDURE — 99215 OFFICE O/P EST HI 40 MIN: CPT | Mod: PBBFAC,25,PO | Performed by: NURSE PRACTITIONER

## 2019-10-01 PROCEDURE — 99999 PR PBB SHADOW E&M-EST. PATIENT-LVL V: CPT | Mod: PBBFAC,,, | Performed by: NURSE PRACTITIONER

## 2019-10-01 PROCEDURE — 99999 PR PBB SHADOW E&M-EST. PATIENT-LVL V: ICD-10-PCS | Mod: PBBFAC,,, | Performed by: NURSE PRACTITIONER

## 2019-10-01 PROCEDURE — 93970 EXTREMITY STUDY: CPT | Mod: TC,PO

## 2019-10-01 PROCEDURE — 99214 OFFICE O/P EST MOD 30 MIN: CPT | Mod: S$PBB,,, | Performed by: NURSE PRACTITIONER

## 2019-10-01 PROCEDURE — 93970 US LOWER EXTREMITY VEINS BILATERAL: ICD-10-PCS | Mod: 26,,, | Performed by: RADIOLOGY

## 2019-10-01 PROCEDURE — 99214 PR OFFICE/OUTPT VISIT, EST, LEVL IV, 30-39 MIN: ICD-10-PCS | Mod: S$PBB,,, | Performed by: NURSE PRACTITIONER

## 2019-10-01 PROCEDURE — 93970 EXTREMITY STUDY: CPT | Mod: 26,,, | Performed by: RADIOLOGY

## 2019-10-01 NOTE — PROGRESS NOTES
Subjective:       Patient ID: Siddharth Urias is a 85 y.o. male.    Chief Complaint: Leg Pain (right leg red swollen ) and Hand Pain (left )    Patient who is new to me presents for redness and swelling to his leg. He is being treated for a DVT with xarelto. He noticed an increase in redness and swelling on Friday. His wife, who is present with him, is concerned that it could be another clot. He denies any CP, SOB, or weakness. In addition he has developed numbness and paresthesias of his left thumb, index, and middle fingers. He has tried a wrist brace with some improvement in symptoms. His wife reports that he had difficulty applying eye drops for her this morning secondary to numbness and paresthesia. Denies weakness and otherwise is asymptomatic. Denies neck pain or arm pain.    Leg Pain    The incident occurred 3 to 5 days ago. The incident occurred at home. There was no injury mechanism. The pain is present in the right leg. The pain is at a severity of 0/10. Associated symptoms include numbness (to left hand). Pertinent negatives include no loss of motion, loss of sensation, muscle weakness or tingling.     Review of Systems   Constitutional: Negative for chills, fatigue and fever.   HENT: Negative for congestion, ear pain, sinus pressure and sore throat.    Respiratory: Negative for shortness of breath and wheezing.    Cardiovascular: Positive for leg swelling. Negative for chest pain.   Gastrointestinal: Negative for abdominal distention and abdominal pain.   Genitourinary: Negative for dysuria and flank pain.   Skin: Negative for color change and pallor.   Neurological: Positive for numbness (to left hand). Negative for tingling, light-headedness and headaches.       Objective:      Physical Exam   Constitutional: He is oriented to person, place, and time. He appears well-developed and well-nourished.   Elderly patient    HENT:   Head: Normocephalic and atraumatic.   Right Ear: External ear normal.    Left Ear: External ear normal.   Eyes: Pupils are equal, round, and reactive to light. Conjunctivae and EOM are normal.   Neck: Normal range of motion. Neck supple.   Cardiovascular: Normal rate and regular rhythm.   Pulmonary/Chest: Effort normal and breath sounds normal.   Abdominal: Soft. Bowel sounds are normal.   Musculoskeletal: Normal range of motion.        Left hand: He exhibits normal range of motion and no tenderness. Normal sensation noted. Normal strength noted. He exhibits no finger abduction, no thumb/finger opposition and no wrist extension trouble.   +2 RLE edema, negative anya's sign  + Durkan's test   Neurological: He is alert and oriented to person, place, and time.   Skin: Skin is warm and dry.        Nursing note and vitals reviewed.          Assessment:       1. Pain and swelling of right lower leg    2. Class 1 obesity due to excess calories with body mass index (BMI) of 34.0 to 34.9 in adult, unspecified whether serious comorbidity present    3. Numbness and tingling of hand        Plan:       Siddharth was seen today for leg pain and hand pain.    Diagnoses and all orders for this visit:    Pain and swelling of right lower leg  -     US Lower Extremity Veins Bilateral; Future  US shows progression of clot, will have patient follow up with hematology. Discussed s/s that warrant ER evaluation.   Class 1 obesity due to excess calories with body mass index (BMI) of 34.0 to 34.9 in adult, unspecified whether serious comorbidity present  Weight loss recommended with well balanced diet and portion controlled meals and increased activity.  Numbness and tingling of hand  Start using a wrist brace at night. Patient declines PT at this time. Follow up in 6 weeks if no improvement.     Follow up as needed.

## 2019-10-01 NOTE — PROGRESS NOTES
Please call the patient and let him know that the ultrasound shows his previous clot is still there, however there seems to be a possible new clot that has formed. He needs to see his hemo/onc for possible change in his blood thinner. Please schedule with hemo/onc. Thanks.

## 2019-10-02 LAB
OHS CV EVENT MONITOR DAY: 0
OHS CV HOLTER LENGTH DECIMAL HOURS: 48
OHS CV HOLTER LENGTH HOURS: 48
OHS CV HOLTER LENGTH MINUTES: 0

## 2019-10-04 ENCOUNTER — TELEPHONE (OUTPATIENT)
Dept: FAMILY MEDICINE | Facility: CLINIC | Age: 84
End: 2019-10-04

## 2019-10-04 ENCOUNTER — OFFICE VISIT (OUTPATIENT)
Dept: PULMONOLOGY | Facility: CLINIC | Age: 84
End: 2019-10-04
Payer: MEDICARE

## 2019-10-04 VITALS
WEIGHT: 220.38 LBS | SYSTOLIC BLOOD PRESSURE: 131 MMHG | HEART RATE: 71 BPM | BODY MASS INDEX: 34.59 KG/M2 | HEIGHT: 67 IN | DIASTOLIC BLOOD PRESSURE: 76 MMHG | OXYGEN SATURATION: 90 %

## 2019-10-04 DIAGNOSIS — J44.9 CHRONIC OBSTRUCTIVE PULMONARY DISEASE, UNSPECIFIED COPD TYPE: Primary | ICD-10-CM

## 2019-10-04 DIAGNOSIS — R06.02 SHORTNESS OF BREATH: ICD-10-CM

## 2019-10-04 PROCEDURE — 99215 OFFICE O/P EST HI 40 MIN: CPT | Mod: S$PBB,ICN,, | Performed by: NURSE PRACTITIONER

## 2019-10-04 PROCEDURE — 99213 OFFICE O/P EST LOW 20 MIN: CPT | Mod: PBBFAC,PO | Performed by: NURSE PRACTITIONER

## 2019-10-04 PROCEDURE — 99999 PR PBB SHADOW E&M-EST. PATIENT-LVL III: CPT | Mod: PBBFAC,,, | Performed by: NURSE PRACTITIONER

## 2019-10-04 PROCEDURE — 99215 PR OFFICE/OUTPT VISIT, EST, LEVL V, 40-54 MIN: ICD-10-PCS | Mod: S$PBB,ICN,, | Performed by: NURSE PRACTITIONER

## 2019-10-04 PROCEDURE — 99999 PR PBB SHADOW E&M-EST. PATIENT-LVL III: ICD-10-PCS | Mod: PBBFAC,,, | Performed by: NURSE PRACTITIONER

## 2019-10-04 RX ORDER — ALLOPURINOL 300 MG/1
300 TABLET ORAL DAILY
Status: ON HOLD | COMMUNITY
End: 2023-01-01 | Stop reason: HOSPADM

## 2019-10-04 RX ORDER — PREGABALIN 75 MG/1
75 CAPSULE ORAL 2 TIMES DAILY
COMMUNITY
End: 2020-02-27 | Stop reason: ALTCHOICE

## 2019-10-04 RX ORDER — TAMSULOSIN HYDROCHLORIDE 0.4 MG/1
0.4 CAPSULE ORAL DAILY
COMMUNITY
End: 2020-01-28 | Stop reason: SDUPTHER

## 2019-10-04 RX ORDER — NAPROXEN SODIUM 220 MG/1
81 TABLET, FILM COATED ORAL DAILY
COMMUNITY

## 2019-10-04 NOTE — PROGRESS NOTES
10/4/2019    Siddharth Urias  New Patient Consult    Chief Complaint   Patient presents with    Shortness of Breath    abnormal PFT       HPI:   10/4/2019- SOB- Onset 3 yrs, worse with exertion, daily, Cough- occasional, morning only, productive yellow in color, quarter size. States occasional day time drowsiness, states does not snore, able to sleep on back with out problems.   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 No pets in home  Family Hx: no lung cx, no COPD, no asthma, Brother ALS  Medical Hx: Arthritis flare age 12 yrs. No previous chest or shoulder surgeries, Pneumonia 1x in lifetime tx outpatient. Dx: DVT Rt leg April 2019; currently on Xarelto     The chief compliant  problem is new to me  PFSH:  Past Medical History:   Diagnosis Date    Arthritis     Benign hypertension     Diabetes mellitus, type 2     2/2011    Fatty liver     Irradiation cystitis     SANDRA (obstructive sleep apnea)     no    Prostate cancer 2009    s/p XRT (T1C, Meredith 8)    Radiation proctitis          Past Surgical History:   Procedure Laterality Date    APPENDECTOMY      CATARACT EXTRACTION Bilateral     COLONOSCOPY N/A 12/14/2018    Procedure: COLONOSCOPY;  Surgeon: Noel Aguiar MD;  Location: [facility];  Service: Endoscopy;  Laterality: N/A;    ESOPHAGOGASTRODUODENOSCOPY N/A 1/24/2019    Procedure: EGD (ESOPHAGOGASTRODUODENOSCOPY);  Surgeon: Noel Aguiar MD;  Location: [facility];  Service: Endoscopy;  Laterality: N/A;    FLEXIBLE SIGMOIDOSCOPY N/A 1/22/2019    Procedure: SIGMOIDOSCOPY, FLEXIBLE;  Surgeon: Noel Aguiar MD;  Location: [facility];  Service: Endoscopy;  Laterality: N/A;    HERNIA REPAIR      bilateral inguinal    TONSILLECTOMY       Social History     Tobacco Use    Smoking status: Former Smoker     Packs/day: 1.50     " Years: 60.00     Pack years: 90.00     Types: Cigarettes     Last attempt to quit: 2008     Years since quittin.7    Smokeless tobacco: Former User     Quit date: 3/11/2010   Substance Use Topics    Alcohol use: Yes     Comment: social    Drug use: No     Family History   Problem Relation Age of Onset    Diabetes Mother      Review of patient's allergies indicates:   Allergen Reactions    No known drug allergies      I have reviewed past medical, family, and social history. I have reviewed previous nurse notes.    Performance Status:The patient's activity level is no limits with regular activity.          Review of Systems   Constitutional: Negative for activity change, appetite change, chills, diaphoresis, fatigue, fever and unexpected weight change.   HENT: Negative for dental problem, postnasal drip, rhinorrhea, sinus pressure, sinus pain, sneezing, sore throat, trouble swallowing and voice change.    Respiratory: Negative for apnea, chest tightness,  wheezing and stridor.  Positive for  Cough, shortness of breath,  Cardiovascular: Negative for chest pain, palpitations. Positive for unilateral leg swelling  Gastrointestinal: Negative for abdominal distention, abdominal pain, constipation and nausea.   Musculoskeletal: Negative for gait problem, myalgias and neck pain.   Skin: Negative for color change and pallor.   Allergic/Immunologic: Negative for environmental allergies and food allergies.   Neurological: Negative for dizziness, speech difficulty, weakness, light-headedness, numbness and headaches.   Hematological: Negative for adenopathy. Does not bruise/bleed easily.   Psychiatric/Behavioral: Negative for dysphoric mood and sleep disturbance. The patient is not nervous/anxious.           Exam:Comprehensive exam done. /76 (BP Location: Left arm, Patient Position: Sitting)   Pulse 71   Ht 5' 7" (1.702 m)   Wt 100 kg (220 lb 5.6 oz)   SpO2 (!) 90% Comment: on room air  BMI 34.51 kg/m²  "  Exam included Vitals as listed  Constitutional: He is oriented to person, place, and time. He appears well-developed. No distress.   Nose: Nose normal.   Mouth/Throat: Uvula is midline, oropharynx is clear and moist and mucous membranes are normal. No dental caries. No oropharyngeal exudate, posterior oropharyngeal edema, posterior oropharyngeal erythema or tonsillar abscesses.  Mallapatti (M) score 4  Eyes: Pupils are equal, round, and reactive to light.   Neck: No JVD present. No thyromegaly present.   Cardiovascular: Normal rate, regular rhythm and normal heart sounds. Exam reveals no gallop and no friction rub.   No murmur heard.  Pulmonary/Chest: Effort normal and breath sounds normal. No accessory muscle usage or stridor. No apnea and no tachypnea. No respiratory distress, decreased breath sounds, wheezes, rhonchi, rales, or tenderness.   Abdominal: Soft. He exhibits no mass. There is no tenderness. No hepatosplenomegaly, hernias and normoactive bowel sounds  Musculoskeletal: Normal range of motion. exhibits no edema.   Lymphadenopathy:     He has no cervical adenopathy.     He has no axillary adenopathy.   Neurological:  alert and oriented to person, place, and time. not disoriented.   Skin: Skin is warm and dry. Capillary refill takes less 2 sec. No cyanosis or erythema. No pallor. Nails show no clubbing.   Psychiatric: normal mood and affect. behavior is normal. Judgment and thought content normal.       Radiographs (ct chest and cxr) reviewed: view by direct vision   X-Ray Chest PA And Lateral 09/06/2019   Mild basilar hypoventilatory changes.  Mild basilar infiltrate as can be seen with mild CHF cannot be excluded.  No consolidation..        Labs reviewed       Lab Results   Component Value Date    WBC 7.31 03/22/2019    RBC 5.58 03/22/2019    HGB 13.2 (L) 03/22/2019    HCT 46.6 03/22/2019    MCV 84 03/22/2019    MCH 23.7 (L) 03/22/2019    MCHC 28.3 (L) 03/22/2019    RDW 16.0 (H) 03/22/2019      03/22/2019    MPV 11.0 03/22/2019    GRAN 4.8 03/22/2019    GRAN 66.2 03/22/2019    LYMPH 1.4 03/22/2019    LYMPH 19.6 03/22/2019    MONO 0.6 03/22/2019    MONO 8.3 03/22/2019    EOS 0.4 03/22/2019    BASO 0.04 03/22/2019    EOSINOPHIL 4.9 03/22/2019    BASOPHIL 0.5 03/22/2019       PFT results reviewed  Pulmonary Functions Testing Results:  spirometry bronchodilator, lung volume by gas dilution, diffusion capacity were measured September 10, 2019. The  FEV1 to FVC ratio was 62% indicating airflow obstruction. The FEV1 measured 64% of predicted at 1.6 L. This would  make the airflow obstruction moderate. The 10% improvement in FEV1 following bronchodilator is not statistically  significant, as 12% is needed for statistical significance. Total lung capacity is recorded low at 55% of predicted.  Diffusion capacity, uncorrected for anemia if present, was only 66% of predicted.  Patient has moderate airflow obstruction with a bronchodilator response of does not reach statistical significance. Also  there is restriction. Also there is a loss of diffusion. Clinical correlation is recommended.  (Physician Final: 09/10/2019 05:17PM, Electronically    Ambulation in office 10/4/2019- no desaturation    Total face-to-face time with the patient was 60 minutes and greater than 50% was spent in counseling and coordination of care. The above assessment and plan have been discussed at length. Labs and procedure reviewed. Problem List updated.     Plan:  Clinical impression is resonably certain and repeated evaluation prn +/- follow up will be needed as below.    Siddharth was seen today for shortness of breath and abnormal pft.    Diagnoses and all orders for this visit:    Chronic obstructive pulmonary disease, unspecified COPD type  -     fluticasone-umeclidin-vilanter (TRELEGY ELLIPTA) 100-62.5-25 mcg DsDv; Inhale 1 puff into the lungs once daily.  -     albuterol sulfate (PROAIR RESPICLICK) 90 mcg/actuation AePB; Inhale 2 puffs  into the lungs every 4 (four) hours as needed. Rescue    Shortness of breath  -     CT Chest Without Contrast; Future        Follow up in about 2 months (around 12/4/2019), or if symptoms worsen or fail to improve.    Discussed with patient above for education the following:      Patient Instructions   Pulmonary strength test shows you have Chronic obstructive pulmonary disease but not to bad    Starting new medication   Trelegy 1 puff once a day every day.  rinse mouth after using due to risk for thrush if mouth or tongue has white sores contact clinic    Action plan for shortness of breath    Albuterol Inhaler 1-2 puffs every 4 hours, for cough or shortness of breath    Due to your history of smoking will order CT of chest to evaluate for heart failure and extent of your COPD.

## 2019-10-04 NOTE — LETTER
October 4, 2019      Jessica Verma NP  [address]  [place] [address]           [place] [facility]  [address]  [place] [address]  Phone: [phone]  Fax: [phone]          Patient: Siddharth Urias   MR Number: [id number]   YOB: 1934   Date of Visit: 10/4/2019       Dear Jessica Verma:    Thank you for referring Siddharth Urias to me for evaluation. Attached you will find relevant portions of my assessment and plan of care.    If you have questions, please do not hesitate to call me. I look forward to following Siddharth Urias along with you.    Sincerely,    Yoko Coppola, JOSHUA    Enclosure  CC:  No Recipients    If you would like to receive this communication electronically, please contact [email or web address] or [phone] to request more information on [employer] Link access.    For providers and/or their staff who would like to refer a patient to [facility], please contact us through our one-stop-shop provider referral line, [facility], at [phone].    If you feel you have received this communication in error or would no longer like to receive these types of communications, please e-mail [email or web address]

## 2019-10-04 NOTE — PATIENT INSTRUCTIONS
Pulmonary strength test shows you have Chronic obstructive pulmonary disease but not to bad    Starting new medication   Trelegy 1 puff once a day every day.  rinse mouth after using due to risk for thrush if mouth or tongue has white sores contact clinic    Action plan for shortness of breath    Albuterol Inhaler 1-2 puffs every 4 hours, for cough or shortness of breath    Due to your history of smoking will order CT of chest to evaluate for heart failure and extent of your COPD.

## 2019-10-04 NOTE — TELEPHONE ENCOUNTER
Spoke with patient regarding results, patient already have an appointment with Dr. Sullivan on today, spoke with Laurie's office results faxed.

## 2019-10-04 NOTE — TELEPHONE ENCOUNTER
----- Message from Jessica Verma NP sent at 10/4/2019  8:08 AM CDT -----  Multiple irregularities on Holter monitor. Needs STAT cardiac eval. If he has chest pain, heart palpitations, SOB, difficulty breathing needs to go to ED immediately. Please see if he has seen cardiology. Need to fax this and get him an appointment ASAP.

## 2019-12-04 NOTE — TELEPHONE ENCOUNTER
----- Message from Leona Jackson PharmD sent at 3/26/2019 10:23 AM CDT -----  Regarding: RE: Coumadin Clinic Referral  Also, please have MD/NP remove warfarin from patient's med list. Thanks!    ----- Message -----  From: Ragini Clement LPN  Sent: 3/26/2019   9:58 AM  To: Leona Jackson PharmD  Subject: RE: Coumadin Clinic Referral                     Per NP Jessica Verma, please cancel his enrollment.     Thank you,  SOCORRO Mccullough     ----- Message -----  From: Leona Jackson PharmD  Sent: 3/26/2019   9:51 AM  To: Martine Maxwell MD, [facility] Staff  Subject: Coumadin Clinic Referral                         We received a referral for warfarin management yesterday. Per chart review it looks like the Xarelto has been approved. Would you like us to cancel his enrollment at this time?    Please advise, thanks!    [facility]  [phone]      
Removed warfarin from patient's chart as requested by PharmSISI Jackson.   
No

## 2019-12-06 ENCOUNTER — OFFICE VISIT (OUTPATIENT)
Dept: HEMATOLOGY/ONCOLOGY | Facility: CLINIC | Age: 84
End: 2019-12-06
Payer: MEDICARE

## 2019-12-06 VITALS
HEART RATE: 67 BPM | TEMPERATURE: 98 F | HEIGHT: 67 IN | DIASTOLIC BLOOD PRESSURE: 59 MMHG | BODY MASS INDEX: 34.91 KG/M2 | SYSTOLIC BLOOD PRESSURE: 135 MMHG | WEIGHT: 222.44 LBS | RESPIRATION RATE: 20 BRPM | OXYGEN SATURATION: 90 %

## 2019-12-06 DIAGNOSIS — I82.401 ACUTE DEEP VEIN THROMBOSIS (DVT) OF RIGHT LOWER EXTREMITY, UNSPECIFIED VEIN: Primary | ICD-10-CM

## 2019-12-06 DIAGNOSIS — G47.33 OSA (OBSTRUCTIVE SLEEP APNEA): ICD-10-CM

## 2019-12-06 DIAGNOSIS — N18.30 CHRONIC KIDNEY DISEASE, STAGE III (MODERATE): ICD-10-CM

## 2019-12-06 DIAGNOSIS — I82.411 ACUTE EMBOLISM AND THROMBOSIS OF RIGHT FEMORAL VEIN: ICD-10-CM

## 2019-12-06 DIAGNOSIS — E78.5 HYPERLIPIDEMIA LDL GOAL <70: ICD-10-CM

## 2019-12-06 DIAGNOSIS — I15.2 HYPERTENSION ASSOCIATED WITH DIABETES: ICD-10-CM

## 2019-12-06 DIAGNOSIS — E66.9 OBESITY (BMI 30-39.9): ICD-10-CM

## 2019-12-06 DIAGNOSIS — K62.7 RADIATION PROCTITIS: ICD-10-CM

## 2019-12-06 DIAGNOSIS — I82.492 ACUTE EMBOLISM AND THROMBOSIS OF OTHER SPECIFIED DEEP VEIN OF LEFT LOWER EXTREMITY: ICD-10-CM

## 2019-12-06 DIAGNOSIS — E11.59 HYPERTENSION ASSOCIATED WITH DIABETES: ICD-10-CM

## 2019-12-06 PROCEDURE — 99214 OFFICE O/P EST MOD 30 MIN: CPT | Mod: S$PBB,,, | Performed by: INTERNAL MEDICINE

## 2019-12-06 PROCEDURE — 99214 PR OFFICE/OUTPT VISIT, EST, LEVL IV, 30-39 MIN: ICD-10-PCS | Mod: S$PBB,,, | Performed by: INTERNAL MEDICINE

## 2019-12-06 PROCEDURE — 1126F PR PAIN SEVERITY QUANTIFIED, NO PAIN PRESENT: ICD-10-PCS | Mod: ,,, | Performed by: INTERNAL MEDICINE

## 2019-12-06 PROCEDURE — 1126F AMNT PAIN NOTED NONE PRSNT: CPT | Mod: ,,, | Performed by: INTERNAL MEDICINE

## 2019-12-06 PROCEDURE — 1159F PR MEDICATION LIST DOCUMENTED IN MEDICAL RECORD: ICD-10-PCS | Mod: ,,, | Performed by: INTERNAL MEDICINE

## 2019-12-06 PROCEDURE — 99215 OFFICE O/P EST HI 40 MIN: CPT | Mod: PBBFAC,PO | Performed by: INTERNAL MEDICINE

## 2019-12-06 PROCEDURE — 99999 PR PBB SHADOW E&M-EST. PATIENT-LVL V: ICD-10-PCS | Mod: PBBFAC,,, | Performed by: INTERNAL MEDICINE

## 2019-12-06 PROCEDURE — 99999 PR PBB SHADOW E&M-EST. PATIENT-LVL V: CPT | Mod: PBBFAC,,, | Performed by: INTERNAL MEDICINE

## 2019-12-06 PROCEDURE — 1159F MED LIST DOCD IN RCRD: CPT | Mod: ,,, | Performed by: INTERNAL MEDICINE

## 2019-12-06 RX ORDER — CAPTOPRIL 50 MG/1
50 TABLET ORAL DAILY
COMMUNITY
End: 2020-01-29

## 2019-12-06 NOTE — PROGRESS NOTES
Subjective:       Patient ID: Siddharth Urias is a 85 y.o. male.    Chief Complaint:  Past few months has been very sedentary not doing too much because of the cold weather and feeling tired   swelling went for ultrasound showed DVT patient has been on anticoagulant for 6 months here with repeat ultrasound for recheck before coming off anticoagulant  mpression 2019       Positive examination demonstrating DVT within the right common femoral and proximal femoral veins.      onXarelto here  For follow-up  No prior history  No family history  of hypercoagulability    In clinic with his wife of 60 years  HPI:     Social History     Socioeconomic History    Marital status:      Spouse name: Not on file    Number of children: Not on file    Years of education: Not on file    Highest education level: Not on file   Occupational History    Not on file   Social Needs    Financial resource strain: Not on file    Food insecurity:     Worry: Not on file     Inability: Not on file    Transportation needs:     Medical: Not on file     Non-medical: Not on file   Tobacco Use    Smoking status: Former Smoker     Packs/day: 1.50     Years: 60.00     Pack years: 90.00     Types: Cigarettes     Last attempt to quit: 2008     Years since quittin.9    Smokeless tobacco: Former User     Quit date: 3/11/2010   Substance and Sexual Activity    Alcohol use: Yes     Comment: social    Drug use: No    Sexual activity: Not on file   Lifestyle    Physical activity:     Days per week: Not on file     Minutes per session: Not on file    Stress: Not on file   Relationships    Social connections:     Talks on phone: Not on file     Gets together: Not on file     Attends Hinduism service: Not on file     Active member of club or organization: Not on file     Attends meetings of clubs or organizations: Not on file     Relationship status: Not on file   Other Topics Concern    Not on file   Social History  Narrative    Not on file     Family History   Problem Relation Age of Onset    Diabetes Mother      Past Surgical History:   Procedure Laterality Date    APPENDECTOMY      CATARACT EXTRACTION Bilateral     COLONOSCOPY N/A 12/14/2018    Procedure: COLONOSCOPY;  Surgeon: Noel Aguiar MD;  Location: [facility];  Service: Endoscopy;  Laterality: N/A;    ESOPHAGOGASTRODUODENOSCOPY N/A 1/24/2019    Procedure: EGD (ESOPHAGOGASTRODUODENOSCOPY);  Surgeon: Noel Aguiar MD;  Location: [facility];  Service: Endoscopy;  Laterality: N/A;    FLEXIBLE SIGMOIDOSCOPY N/A 1/22/2019    Procedure: SIGMOIDOSCOPY, FLEXIBLE;  Surgeon: Noel Aguiar MD;  Location: [facility];  Service: Endoscopy;  Laterality: N/A;    HERNIA REPAIR      bilateral inguinal    TONSILLECTOMY       Past Medical History:   Diagnosis Date    Arthritis     Benign hypertension     Diabetes mellitus, type 2     2/2011    Fatty liver     Irradiation cystitis     SANDRA (obstructive sleep apnea)     no    Prostate cancer 2009    s/p XRT (T1C, Anselmo 8)    Radiation proctitis        Current Outpatient Medications:     albuterol sulfate (PROAIR RESPICLICK) 90 mcg/actuation AePB, Inhale 2 puffs into the lungs every 4 (four) hours as needed. Rescue, Disp: 1 each, Rfl: 11    allopurinol (ZYLOPRIM) 300 MG tablet, Take 300 mg by mouth once daily., Disp: , Rfl:     aspirin (AILYN CHEWABLE ASPIRIN) 81 MG Chew, Take 81 mg by mouth once daily., Disp: , Rfl:     Bifidobacterium infantis (ALIGN ORAL), Take by mouth., Disp: , Rfl:     cholestyramine-aspartame (CHOLESTYRAMINE LIGHT) 4 gram PwPk, Take 1 packet (4 g total) by mouth 3 (three) times daily. (Patient not taking: Reported on 10/4/2019), Disp: 270 packet, Rfl: 3    ciclopirox (PENLAC) 8 % Soln, Apply topically nightly. (Patient not taking: Reported on 10/4/2019), Disp: 6.6 mL, Rfl: 11    fluticasone propionate (FLONASE) 50 mcg/actuation nasal spray, 1 spray (50 mcg total) by Each Nare route  once daily., Disp: 1 Bottle, Rfl: 3    fluticasone-umeclidin-vilanter (TRELEGY ELLIPTA) 100-62.5-25 mcg DsDv, Inhale 1 puff into the lungs once daily., Disp: 180 each, Rfl: 3    guaifenesin 100 mg/5 ml (ROBITUSSIN) 100 mg/5 mL syrup, Take 200 mg by mouth 3 (three) times daily as needed for Cough., Disp: , Rfl:     JANUVIA 100 mg Tab, Take 1 tablet (100 mg total) by mouth once daily., Disp: 90 tablet, Rfl: 3    lidocaine HCL 2% (XYLOCAINE) 2 % jelly, Apply topically as needed. Apply topically once nightly to affected part of foot/feet., Disp: 30 mL, Rfl: 2    loratadine (CLARITIN) 10 mg tablet, Take 1 tablet (10 mg total) by mouth once daily. (Patient not taking: Reported on 10/4/2019), Disp: , Rfl: 0    losartan (COZAAR) 25 MG tablet, Take 1 tablet (25 mg total) by mouth once daily., Disp: 90 tablet, Rfl: 3    multivitamin with minerals tablet, Take 1 tablet by mouth once daily., Disp: , Rfl:     pantoprazole (PROTONIX) 40 MG tablet, Take 1 tablet (40 mg total) by mouth once daily. (Patient not taking: Reported on 10/4/2019), Disp: 90 tablet, Rfl: 3    pravastatin (PRAVACHOL) 40 MG tablet, Take 1 tablet (40 mg total) by mouth once daily., Disp: 90 tablet, Rfl: 3    pregabalin (LYRICA) 75 MG capsule, Take 75 mg by mouth 2 (two) times daily., Disp: , Rfl:     rivaroxaban (XARELTO) 20 mg Tab, Take 1 tablet (20 mg total) by mouth daily with dinner or evening meal., Disp: 90 tablet, Rfl: 3    solifenacin (VESICARE) 5 MG tablet, Take 1 tablet (5 mg total) by mouth once daily., Disp: 90 tablet, Rfl: 3    tamsulosin (FLOMAX) 0.4 mg Cap, Take 0.4 mg by mouth once daily., Disp: , Rfl:   Review of patient's allergies indicates:   Allergen Reactions    No known drug allergies          REVIEW OF SYSTEMS:     CONSTITUTIONAL: The patient denies any weight change. There is no apparent    change in appetite, fever, night sweats, headaches, fatigue, dizziness, or    weakness.      SKIN: Denies rash, issues with nails,  non-healing sores, bleeding, blotching    skin or abnormal bruising. Denies new moles or changes to existing moles.      BREASTS: There is no swelling around breasts or nipple discharge.    EYES: Denies eye pain, blurred vision, swelling, redness or discharge.      ENT AND MOUTH: Denies runny nose, stuffiness, sinus trouble or sores. Denies    nosebleeds. Denies, hoarseness, change in voice or swelling in front of the    neck.      CARDIOVASCULAR: Denies chest pain, discomfort or palpitations. Denies neck    swelling or episodes of passing out.      RESPIRATORY: Denies cough, sputum production, blood in sputum, and denies    shortness of breath.      GI: Denies trouble swallowing, indigestion, heartburn, abdominal pain, nausea,  or  vomiting, has significant diarrhea since radiation was completed for prostate cancer, no blood in stool, discoloration of  stools, or increased abdominal girth.      GENITOURINARY: No discharge. No pelvic pain or lumps. No rash around groin or  lesions. Has urinary frequency, occasional hesitation, no painful urination or blood in    urine. Denies incontinence. No problems with intercourse.      MUSCULOSKELETA occasional  neck or back pain. Denies weakness in arms or legs,     Denies swelling or abnormal glands.  Some swelling of right lower extremity multiple distended veins with discoloration to feet from venous stasis    NEUROLOGICAL: Denies tingling, numbness, altered mentation changes to nerve    function in the face, weakness to one or both of the body. Denies changes to    gait and denies multiple falls or accidents.      PSYCHIATRIC: Denies nervousness, anxiety, hallucinations, depression, suicidal    ideation, trouble sleeping or changes in behavior noticed by family.          PHYSICAL EXAM:     Wt Readings from Last 3 Encounters:   10/04/19 100 kg (220 lb 5.6 oz)   10/01/19 100.8 kg (222 lb 3.6 oz)   09/30/19 102.1 kg (225 lb 1.4 oz)     Temp Readings from Last 3 Encounters:    10/01/19 98.1 °F (36.7 °C) (Oral)   09/06/19 98.1 °F (36.7 °C)   08/08/19 97.7 °F (36.5 °C) (Oral)     BP Readings from Last 3 Encounters:   10/04/19 131/76   10/01/19 130/62   09/30/19 (!) 140/87     Pulse Readings from Last 3 Encounters:   10/04/19 71   10/01/19 86   09/30/19 94     GENERAL: Comfortable looking patient. Patient is in no distress. Morbidly obese  Awake, alert and oriented to time, person and place.  No anxiety, or agitation.      HEENT: Normal conjunctivae and eyelids. WNL.  PERRLA 3 to 4 mm. No icterus, no pallor, no congestion, and no discharge noted.     NECK:  Supple. Trachea is central.  No crepitus.  No JVD or masses.    RESPIRATORY:  No intercostal retractions.  No dullness to percussion.  Chest is clear to auscultation.  No rales, rhonchi or wheezes.  No crepitus.  Good air entry bilaterally.    CARDIOVASCULAR:  S1 and S2 are normally heard without murmurs or gallops.  All peripheral pulses are present.    ABDOMEN:  Normal abdomen.  No hepatosplenomegaly.  No free fluid.  Bowel sounds are present.  No hernia noted. No masses.  No rebound or tenderness.  No guarding or rigidity.  Umbilicus is midline.    LYMPHATICS:  No axillary, cervical, supraclavicular, submental, or inguinal lymphadenopathy.    SKIN/MUSCULOSKELETAL:  There is no evidence of excoriation marks or ecchmosis.  No rashes.  No cyanosis.  No clubbing.  No joint or skeletal deformities noted.  Normal range of motion.    NEUROLOGIC:  Higher functions are appropriate.  No cranial nerve deficits.  Normal coco.  Normal strength.  Motor and sensory functions are normal.  Deep tendon reflexes are normal.    GENITAL/RECTAL:  Exams are deferred.      Laboratory:     CBC:  Lab Results   Component Value Date    WBC 7.31 03/22/2019    RBC 5.58 03/22/2019    HGB 13.2 (L) 03/22/2019    HCT 46.6 03/22/2019    MCV 84 03/22/2019    MCH 23.7 (L) 03/22/2019    MCHC 28.3 (L) 03/22/2019    RDW 16.0 (H) 03/22/2019     03/22/2019    MPV 11.0  03/22/2019    GRAN 4.8 03/22/2019    GRAN 66.2 03/22/2019    LYMPH 1.4 03/22/2019    LYMPH 19.6 03/22/2019    MONO 0.6 03/22/2019    MONO 8.3 03/22/2019    EOS 0.4 03/22/2019    BASO 0.04 03/22/2019    EOSINOPHIL 4.9 03/22/2019    BASOPHIL 0.5 03/22/2019       BMP: BMP  Lab Results   Component Value Date     09/09/2019    K 4.7 09/09/2019     09/09/2019    CO2 24 09/09/2019    BUN 26 (H) 09/09/2019    CREATININE 1.5 (H) 09/09/2019    CALCIUM 8.5 (L) 09/09/2019    ANIONGAP 10 09/09/2019    ESTGFRAFRICA 48.4 (A) 09/09/2019    EGFRNONAA 41.8 (A) 09/09/2019       LFT:   Lab Results   Component Value Date    ALT 20 03/22/2019    AST 27 03/22/2019    ALKPHOS 55 03/22/2019    BILITOT 0.4 03/22/2019     Impression August 2019 ultrasound       Considerable interval decrease of the deep venous thrombosis right femoral vein compared to the prior exam with just very small amount of residual eccentric thrombus or wall thickening proximal femoral vein today.     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         Assessment/Plan:      t DVT right leg with sedentary lifestyle repeat ultrasounds after 6-8 months of anticoagulant still shows DVT positioning made it difficult to visualize the could even be some extension I will reimage this in about 6 months.   Continue Xarelto  If able to discontinue Xarelto and perform hypercoagulable workup patient needs to be more active  Continue PCP for fatty liver diabetes depression chronic kidney disease follow with Urology for prostate cancer continue with obstructive sleep apnea follow-up continue weight loss attempts  Continue management of dyslipidemia and hypertension  Managed venous stasis with elevating legs and stockings  His diabetes remains out of control because of his eating habits patient has chronic severe diarrhea and attributes that to his diet and prostate radiation patient  counseled about weight loss and diabetes management  Advance Care Planning     Living Will  Discussed and documented at previous visits

## 2019-12-11 ENCOUNTER — HOSPITAL ENCOUNTER (OUTPATIENT)
Dept: RADIOLOGY | Facility: HOSPITAL | Age: 84
Discharge: HOME | End: 2019-12-11
Attending: NURSE PRACTITIONER
Payer: MEDICARE

## 2019-12-11 ENCOUNTER — OFFICE VISIT (OUTPATIENT)
Dept: PULMONOLOGY | Facility: CLINIC | Age: 84
End: 2019-12-11
Payer: MEDICARE

## 2019-12-11 VITALS
BODY MASS INDEX: 33.41 KG/M2 | HEIGHT: 68 IN | DIASTOLIC BLOOD PRESSURE: 78 MMHG | SYSTOLIC BLOOD PRESSURE: 129 MMHG | RESPIRATION RATE: 16 BRPM | HEART RATE: 83 BPM | OXYGEN SATURATION: 94 % | WEIGHT: 220.44 LBS

## 2019-12-11 DIAGNOSIS — J44.9 CHRONIC OBSTRUCTIVE PULMONARY DISEASE, UNSPECIFIED COPD TYPE: ICD-10-CM

## 2019-12-11 DIAGNOSIS — R06.02 SHORTNESS OF BREATH: ICD-10-CM

## 2019-12-11 DIAGNOSIS — I70.0 AORTIC CALCIFICATION: ICD-10-CM

## 2019-12-11 DIAGNOSIS — J43.2 CENTRILOBULAR EMPHYSEMA: Primary | ICD-10-CM

## 2019-12-11 DIAGNOSIS — J47.1 BRONCHIECTASIS WITH ACUTE EXACERBATION: ICD-10-CM

## 2019-12-11 PROCEDURE — 99999 PR PBB SHADOW E&M-EST. PATIENT-LVL IV: CPT | Mod: PBBFAC,,, | Performed by: NURSE PRACTITIONER

## 2019-12-11 PROCEDURE — 71250 CT THORAX DX C-: CPT | Mod: 26,,, | Performed by: RADIOLOGY

## 2019-12-11 PROCEDURE — 71250 CT CHEST WITHOUT CONTRAST: ICD-10-PCS | Mod: 26,,, | Performed by: RADIOLOGY

## 2019-12-11 PROCEDURE — 99213 PR OFFICE/OUTPT VISIT, EST, LEVL III, 20-29 MIN: ICD-10-PCS | Mod: S$PBB,,, | Performed by: NURSE PRACTITIONER

## 2019-12-11 PROCEDURE — 71250 CT THORAX DX C-: CPT | Mod: TC

## 2019-12-11 PROCEDURE — 99214 OFFICE O/P EST MOD 30 MIN: CPT | Mod: PBBFAC,25,PO | Performed by: NURSE PRACTITIONER

## 2019-12-11 PROCEDURE — 1159F MED LIST DOCD IN RCRD: CPT | Mod: ,,, | Performed by: NURSE PRACTITIONER

## 2019-12-11 PROCEDURE — 1126F PR PAIN SEVERITY QUANTIFIED, NO PAIN PRESENT: ICD-10-PCS | Mod: ,,, | Performed by: NURSE PRACTITIONER

## 2019-12-11 PROCEDURE — 1159F PR MEDICATION LIST DOCUMENTED IN MEDICAL RECORD: ICD-10-PCS | Mod: ,,, | Performed by: NURSE PRACTITIONER

## 2019-12-11 PROCEDURE — 99213 OFFICE O/P EST LOW 20 MIN: CPT | Mod: S$PBB,,, | Performed by: NURSE PRACTITIONER

## 2019-12-11 PROCEDURE — 1126F AMNT PAIN NOTED NONE PRSNT: CPT | Mod: ,,, | Performed by: NURSE PRACTITIONER

## 2019-12-11 PROCEDURE — 99999 PR PBB SHADOW E&M-EST. PATIENT-LVL IV: ICD-10-PCS | Mod: PBBFAC,,, | Performed by: NURSE PRACTITIONER

## 2019-12-11 RX ORDER — PREDNISONE 20 MG/1
TABLET ORAL
Qty: 21 TABLET | Refills: 0 | Status: SHIPPED | OUTPATIENT
Start: 2019-12-11 | End: 2020-09-09 | Stop reason: ALTCHOICE

## 2019-12-11 RX ORDER — IPRATROPIUM BROMIDE AND ALBUTEROL SULFATE 2.5; .5 MG/3ML; MG/3ML
3 SOLUTION RESPIRATORY (INHALATION) EVERY 6 HOURS PRN
Qty: 120 VIAL | Refills: 11 | Status: SHIPPED | OUTPATIENT
Start: 2019-12-11 | End: 2021-06-28

## 2019-12-11 NOTE — PATIENT INSTRUCTIONS
Aortic calcification seen on chest CT, continue current cholesterol lowering medications    CT shows congestion, emphysema pockets, bronchial malasia (this means floppy bronchial tube that closes in on it self when you breath, there is no treatment for this)     Print out given for pulmonary hygiene exercises.    Nebulizer to help moisten mucous in lungs, use at least once a day three days a week    Continue Trelegy daily for COPD    Prednisone 20 mg pills take 2 pills for 7 days, then 1 pill for 7 days    If coughing up yellow/green mucous bring sample to any [facility] lab with in 4 hours of collecting   Quality 110: Preventive Care And Screening: Influenza Immunization: Influenza Immunization Administered during Influenza season Detail Level: Detailed Quality 131: Pain Assessment And Follow-Up: Pain assessment using a standardized tool is documented as negative, no follow-up plan required

## 2019-12-11 NOTE — PROGRESS NOTES
12/11/2019    Siddharth Urias  Office Note    Chief Complaint   Patient presents with    Follow-up    Shortness of Breath    COPD       HPI:   12/1/2019- States did not notice any improvement with Trelegy; SOB- unchanged, worse with exertion. Cough- unchanged, occasional, light yellow in color, morning and during day.     10/4/2019- SOB- Onset 3 yrs, worse with exertion, daily, Cough- occasional, morning only, productive yellow in color, quarter size. States occasional day time drowsiness, states does not snore, able to sleep on back with out problems.   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 No pets in home  Family Hx: no lung cx, no COPD, no asthma, Brother ALS  Medical Hx: Arthritis flare age 12 yrs. No previous chest or shoulder surgeries, Pneumonia 1x in lifetime tx outpatient. Dx: DVT Rt leg April 2019; currently on Xarelto     The chief compliant  problem is stable  PFSH:  Past Medical History:   Diagnosis Date    Arthritis     Benign hypertension     Diabetes mellitus, type 2     2/2011    Fatty liver     Irradiation cystitis     SANDRA (obstructive sleep apnea)     no    Prostate cancer 2009    s/p XRT (T1C, [place])    Radiation proctitis          Past Surgical History:   Procedure Laterality Date    APPENDECTOMY      CATARACT EXTRACTION Bilateral     COLONOSCOPY N/A 12/14/2018    Procedure: COLONOSCOPY;  Surgeon: Noel Aguiar MD;  Location: [facility];  Service: Endoscopy;  Laterality: N/A;    ESOPHAGOGASTRODUODENOSCOPY N/A 1/24/2019    Procedure: EGD (ESOPHAGOGASTRODUODENOSCOPY);  Surgeon: Noel Aguiar MD;  Location: [facility];  Service: Endoscopy;  Laterality: N/A;    FLEXIBLE SIGMOIDOSCOPY N/A 1/22/2019    Procedure: SIGMOIDOSCOPY, FLEXIBLE;  Surgeon: Noel Aguiar MD;  Location: [facility];  Service:  Endoscopy;  Laterality: N/A;    HERNIA REPAIR      bilateral inguinal    TONSILLECTOMY       Social History     Tobacco Use    Smoking status: Former Smoker     Packs/day: 1.50     Years: 60.00     Pack years: 90.00     Types: Cigarettes     Last attempt to quit: 2008     Years since quittin.9    Smokeless tobacco: Former User     Quit date: 3/11/2010   Substance Use Topics    Alcohol use: Yes     Comment: social    Drug use: No     Family History   Problem Relation Age of Onset    Diabetes Mother      Review of patient's allergies indicates:   Allergen Reactions    No known drug allergies      I have reviewed past medical, family, and social history. I have reviewed previous nurse notes.    Performance Status:The patient's activity level is no limits with regular activity.          Review of Systems   Constitutional: Negative for activity change, appetite change, chills, diaphoresis, fatigue, fever and unexpected weight change.   HENT: Negative for dental problem, postnasal drip, rhinorrhea, sinus pressure, sinus pain, sneezing, sore throat, trouble swallowing and voice change.    Respiratory: Negative for apnea, chest tightness,  wheezing and stridor.  Positive for  Cough, shortness of breath,  Cardiovascular: Negative for chest pain, palpitations. Positive for unilateral leg swelling  Gastrointestinal: Negative for abdominal distention, abdominal pain, constipation and nausea.   Musculoskeletal: Negative for gait problem, myalgias and neck pain.   Skin: Negative for color change and pallor.   Allergic/Immunologic: Negative for environmental allergies and food allergies.   Neurological: Negative for dizziness, speech difficulty, weakness, light-headedness, numbness and headaches.   Hematological: Negative for adenopathy. Does not bruise/bleed easily.   Psychiatric/Behavioral: Negative for dysphoric mood and sleep disturbance. The patient is not nervous/anxious.           Exam:Comprehensive exam  "done. /78   Pulse 83   Resp 16   Ht 5' 8" (1.727 m)   Wt 100 kg (220 lb 7.4 oz)   SpO2 (!) 94% Comment: room air  BMI 33.52 kg/m²   Exam included Vitals as listed  Constitutional: He is oriented to person, place, and time. He appears well-developed. No distress.   Nose: Nose normal.   Mouth/Throat: Uvula is midline, oropharynx is clear and moist and mucous membranes are normal. No dental caries. No oropharyngeal exudate, posterior oropharyngeal edema, posterior oropharyngeal erythema or tonsillar abscesses.  Mallapatti (M) score 4  Eyes: Pupils are equal, round, and reactive to light.   Neck: No JVD present. No thyromegaly present.   Cardiovascular: Normal rate, regular rhythm and normal heart sounds. Exam reveals no gallop and no friction rub.   No murmur heard.  Pulmonary/Chest: Effort normal and breath sounds normal. No accessory muscle usage or stridor. No apnea and no tachypnea. No respiratory distress, decreased breath sounds, wheezes, rhonchi, rales, or tenderness.   Abdominal: Soft. He exhibits no mass. There is no tenderness. No hepatosplenomegaly, hernias and normoactive bowel sounds  Musculoskeletal: Normal range of motion. exhibits no edema.   Lymphadenopathy:     He has no cervical adenopathy.     He has no axillary adenopathy.   Neurological:  alert and oriented to person, place, and time. not disoriented.   Skin: Skin is warm and dry. Capillary refill takes less 2 sec. No cyanosis or erythema. No pallor. Nails show no clubbing.   Psychiatric: normal mood and affect. behavior is normal. Judgment and thought content normal.       Radiographs (ct chest and cxr) reviewed: view by direct vision   X-Ray Chest PA And Lateral 09/06/2019   Mild basilar hypoventilatory changes.  Mild basilar infiltrate as can be seen with mild CHF cannot be excluded.  No consolidation..        Labs reviewed       Lab Results   Component Value Date    WBC 7.31 03/22/2019    RBC 5.58 03/22/2019    HGB 13.2 (L) " 03/22/2019    HCT 46.6 03/22/2019    MCV 84 03/22/2019    MCH 23.7 (L) 03/22/2019    MCHC 28.3 (L) 03/22/2019    RDW 16.0 (H) 03/22/2019     03/22/2019    MPV 11.0 03/22/2019    GRAN 4.8 03/22/2019    GRAN 66.2 03/22/2019    LYMPH 1.4 03/22/2019    LYMPH 19.6 03/22/2019    MONO 0.6 03/22/2019    MONO 8.3 03/22/2019    EOS 0.4 03/22/2019    BASO 0.04 03/22/2019    EOSINOPHIL 4.9 03/22/2019    BASOPHIL 0.5 03/22/2019       PFT results reviewed  Pulmonary Functions Testing Results:  spirometry bronchodilator, lung volume by gas dilution, diffusion capacity were measured September 10, 2019. The  FEV1 to FVC ratio was 62% indicating airflow obstruction. The FEV1 measured 64% of predicted at 1.6 L. This would  make the airflow obstruction moderate. The 10% improvement in FEV1 following bronchodilator is not statistically  significant, as 12% is needed for statistical significance. Total lung capacity is recorded low at 55% of predicted.  Diffusion capacity, uncorrected for anemia if present, was only 66% of predicted.  Patient has moderate airflow obstruction with a bronchodilator response of does not reach statistical significance. Also  there is restriction. Also there is a loss of diffusion. Clinical correlation is recommended.  (Physician Final: 09/10/2019 05:17PM, Electronically    Ambulation in office 10/4/2019- no desaturation      Plan:  Clinical impression is resonably certain and repeated evaluation prn +/- follow up will be needed as below.    Siddharth was seen today for follow-up, shortness of breath and copd.    Diagnoses and all orders for this visit:    Centrilobular emphysema  -     NEBULIZER FOR HOME USE  -     albuterol-ipratropium (DUO-NEB) 2.5 mg-0.5 mg/3 mL nebulizer solution; Take 3 mLs by nebulization every 6 (six) hours as needed for Wheezing or Shortness of Breath. Rescue    Chronic obstructive pulmonary disease, unspecified COPD type  -     NEBULIZER FOR HOME USE  -      albuterol-ipratropium (DUO-NEB) 2.5 mg-0.5 mg/3 mL nebulizer solution; Take 3 mLs by nebulization every 6 (six) hours as needed for Wheezing or Shortness of Breath. Rescue  -     predniSONE (DELTASONE) 20 MG tablet; 2 pills for 7 days, then 1 pill for 7 days    Bronchiectasis with acute exacerbation  -     predniSONE (DELTASONE) 20 MG tablet; 2 pills for 7 days, then 1 pill for 7 days  -     AFB Culture & Smear; Standing  -     Culture, Respiratory with Gram Stain; Future    Aortic calcification     - noted on CT Chest   - continue current Statin medication regiment    Follow up in about 3 months (around 3/11/2020), or if symptoms worsen or fail to improve.    Discussed with patient above for education the following:      Patient Instructions   Aortic calcification seen on chest CT, continue current cholesterol lowering medications    CT shows congestion, emphysema pockets, bronchial malasia (this means floppy bronchial tube that closes in on it self when you breath, there is no treatment for this)     Print out given for pulmonary hygiene exercises.    Nebulizer to help moisten mucous in lungs, use at least once a day three days a week    Continue Trelegy daily for COPD    Prednisone 20 mg pills take 2 pills for 7 days, then 1 pill for 7 days    If coughing up yellow/green mucous bring sample to any [facility] lab with in 4 hours of collecting

## 2020-01-28 NOTE — TELEPHONE ENCOUNTER
----- Message from Ellen Beltran sent at 1/28/2020 11:21 AM CST -----  Contact: wife  Pt wife state pt needs 2 medications refill captopril (CAPOTEN) 50 MG tablet 90 day supply and tamsulosin (FLOMAX) 0.4 mg Cap 90 day supply...[phone] or [phone]            .  [facility] mail order is where they need to go. The  #[phone]

## 2020-01-29 DIAGNOSIS — E11.59 HYPERTENSION ASSOCIATED WITH DIABETES: ICD-10-CM

## 2020-01-29 DIAGNOSIS — I15.2 HYPERTENSION ASSOCIATED WITH DIABETES: ICD-10-CM

## 2020-01-29 RX ORDER — CAPTOPRIL 50 MG/1
50 TABLET ORAL DAILY
Qty: 90 TABLET | Refills: 3 | OUTPATIENT
Start: 2020-01-29

## 2020-01-29 RX ORDER — TAMSULOSIN HYDROCHLORIDE 0.4 MG/1
0.4 CAPSULE ORAL DAILY
Qty: 90 CAPSULE | Refills: 3 | Status: SHIPPED | OUTPATIENT
Start: 2020-01-29 | End: 2021-02-27

## 2020-01-29 RX ORDER — LOSARTAN POTASSIUM 25 MG/1
25 TABLET ORAL DAILY
Qty: 90 TABLET | Refills: 3 | Status: SHIPPED | OUTPATIENT
Start: 2020-01-29 | End: 2021-01-28

## 2020-01-29 NOTE — TELEPHONE ENCOUNTER
Spoke to patient's wife. She reports that patient has been taking both captopril and losartan.  The captopril was apparently an old rx from Dr Castillo in 5/2018 that made its way back to his current medications box.   Please disregard request for captopril and only refill tamsulosin.   Patient is scheduled to see you in early March 2020.

## 2020-02-04 DIAGNOSIS — I82.411 ACUTE DEEP VEIN THROMBOSIS (DVT) OF FEMORAL VEIN OF RIGHT LOWER EXTREMITY: ICD-10-CM

## 2020-02-04 RX ORDER — PRAVASTATIN SODIUM 40 MG/1
40 TABLET ORAL DAILY
Qty: 90 TABLET | Refills: 3 | Status: SHIPPED | OUTPATIENT
Start: 2020-02-04 | End: 2021-01-20

## 2020-03-04 ENCOUNTER — LAB VISIT (OUTPATIENT)
Dept: LAB | Facility: HOSPITAL | Age: 85
End: 2020-03-04
Attending: FAMILY MEDICINE
Payer: MEDICARE

## 2020-03-04 DIAGNOSIS — E11.22 CONTROLLED TYPE 2 DIABETES MELLITUS WITH STAGE 3 CHRONIC KIDNEY DISEASE, WITHOUT LONG-TERM CURRENT USE OF INSULIN: ICD-10-CM

## 2020-03-04 DIAGNOSIS — Z79.899 OTHER LONG TERM (CURRENT) DRUG THERAPY: ICD-10-CM

## 2020-03-04 DIAGNOSIS — N18.30 CONTROLLED TYPE 2 DIABETES MELLITUS WITH STAGE 3 CHRONIC KIDNEY DISEASE, WITHOUT LONG-TERM CURRENT USE OF INSULIN: ICD-10-CM

## 2020-03-04 DIAGNOSIS — E78.5 HYPERLIPIDEMIA LDL GOAL <70: ICD-10-CM

## 2020-03-04 DIAGNOSIS — D50.9 IRON DEFICIENCY ANEMIA, UNSPECIFIED IRON DEFICIENCY ANEMIA TYPE: ICD-10-CM

## 2020-03-04 DIAGNOSIS — R41.3 MEMORY LOSS: ICD-10-CM

## 2020-03-04 LAB
25(OH)D3+25(OH)D2 SERPL-MCNC: 21 NG/ML (ref 30–96)
ALBUMIN SERPL BCP-MCNC: 3.6 G/DL (ref 3.5–5.2)
ALP SERPL-CCNC: 57 U/L (ref 55–135)
ALT SERPL W/O P-5'-P-CCNC: 21 U/L (ref 10–44)
ANION GAP SERPL CALC-SCNC: 9 MMOL/L (ref 8–16)
AST SERPL-CCNC: 19 U/L (ref 10–40)
BASOPHILS # BLD AUTO: 0.06 K/UL (ref 0–0.2)
BASOPHILS NFR BLD: 0.7 % (ref 0–1.9)
BILIRUB SERPL-MCNC: 0.4 MG/DL (ref 0.1–1)
BUN SERPL-MCNC: 20 MG/DL (ref 8–23)
CALCIUM SERPL-MCNC: 9 MG/DL (ref 8.7–10.5)
CHLORIDE SERPL-SCNC: 106 MMOL/L (ref 95–110)
CHOLEST SERPL-MCNC: 105 MG/DL (ref 120–199)
CHOLEST/HDLC SERPL: 3.8 {RATIO} (ref 2–5)
CO2 SERPL-SCNC: 26 MMOL/L (ref 23–29)
CREAT SERPL-MCNC: 1.7 MG/DL (ref 0.5–1.4)
DIFFERENTIAL METHOD: ABNORMAL
EOSINOPHIL # BLD AUTO: 0.2 K/UL (ref 0–0.5)
EOSINOPHIL NFR BLD: 2.9 % (ref 0–8)
ERYTHROCYTE [DISTWIDTH] IN BLOOD BY AUTOMATED COUNT: 18.6 % (ref 11.5–14.5)
EST. GFR  (AFRICAN AMERICAN): 41.6 ML/MIN/1.73 M^2
EST. GFR  (NON AFRICAN AMERICAN): 36 ML/MIN/1.73 M^2
ESTIMATED AVG GLUCOSE: 151 MG/DL (ref 68–131)
FERRITIN SERPL-MCNC: 50 NG/ML (ref 20–300)
GLUCOSE SERPL-MCNC: 136 MG/DL (ref 70–110)
HBA1C MFR BLD HPLC: 6.9 % (ref 4–5.6)
HCT VFR BLD AUTO: 51.5 % (ref 40–54)
HDLC SERPL-MCNC: 28 MG/DL (ref 40–75)
HDLC SERPL: 26.7 % (ref 20–50)
HGB BLD-MCNC: 15.3 G/DL (ref 14–18)
IMM GRANULOCYTES # BLD AUTO: 0.04 K/UL (ref 0–0.04)
IMM GRANULOCYTES NFR BLD AUTO: 0.5 % (ref 0–0.5)
IRON SERPL-MCNC: 47 UG/DL (ref 45–160)
LDLC SERPL CALC-MCNC: 41.2 MG/DL (ref 63–159)
LYMPHOCYTES # BLD AUTO: 1.6 K/UL (ref 1–4.8)
LYMPHOCYTES NFR BLD: 18.8 % (ref 18–48)
MCH RBC QN AUTO: 25.6 PG (ref 27–31)
MCHC RBC AUTO-ENTMCNC: 29.7 G/DL (ref 32–36)
MCV RBC AUTO: 86 FL (ref 82–98)
MONOCYTES # BLD AUTO: 0.7 K/UL (ref 0.3–1)
MONOCYTES NFR BLD: 7.8 % (ref 4–15)
NEUTROPHILS # BLD AUTO: 5.7 K/UL (ref 1.8–7.7)
NEUTROPHILS NFR BLD: 69.3 % (ref 38–73)
NONHDLC SERPL-MCNC: 77 MG/DL
NRBC BLD-RTO: 0 /100 WBC
PLATELET # BLD AUTO: 183 K/UL (ref 150–350)
PMV BLD AUTO: 12.2 FL (ref 9.2–12.9)
POTASSIUM SERPL-SCNC: 4.6 MMOL/L (ref 3.5–5.1)
PROT SERPL-MCNC: 7.4 G/DL (ref 6–8.4)
RBC # BLD AUTO: 5.97 M/UL (ref 4.6–6.2)
SATURATED IRON: 13 % (ref 20–50)
SODIUM SERPL-SCNC: 141 MMOL/L (ref 136–145)
TOTAL IRON BINDING CAPACITY: 360 UG/DL (ref 250–450)
TRANSFERRIN SERPL-MCNC: 243 MG/DL (ref 200–375)
TRIGL SERPL-MCNC: 179 MG/DL (ref 30–150)
TSH SERPL DL<=0.005 MIU/L-ACNC: 1.79 UIU/ML (ref 0.4–4)
VIT B12 SERPL-MCNC: 254 PG/ML (ref 210–950)
WBC # BLD AUTO: 8.29 K/UL (ref 3.9–12.7)

## 2020-03-04 PROCEDURE — 82306 VITAMIN D 25 HYDROXY: CPT

## 2020-03-04 PROCEDURE — 83540 ASSAY OF IRON: CPT

## 2020-03-04 PROCEDURE — 85025 COMPLETE CBC W/AUTO DIFF WBC: CPT

## 2020-03-04 PROCEDURE — 82607 VITAMIN B-12: CPT

## 2020-03-04 PROCEDURE — 84443 ASSAY THYROID STIM HORMONE: CPT

## 2020-03-04 PROCEDURE — 80061 LIPID PANEL: CPT

## 2020-03-04 PROCEDURE — 80053 COMPREHEN METABOLIC PANEL: CPT

## 2020-03-04 PROCEDURE — 36415 COLL VENOUS BLD VENIPUNCTURE: CPT | Mod: PO

## 2020-03-04 PROCEDURE — 86592 SYPHILIS TEST NON-TREP QUAL: CPT

## 2020-03-04 PROCEDURE — 83036 HEMOGLOBIN GLYCOSYLATED A1C: CPT

## 2020-03-04 PROCEDURE — 82728 ASSAY OF FERRITIN: CPT

## 2020-03-05 LAB — RPR SER QL: NORMAL

## 2020-03-09 ENCOUNTER — OFFICE VISIT (OUTPATIENT)
Dept: FAMILY MEDICINE | Facility: CLINIC | Age: 85
End: 2020-03-09
Payer: MEDICARE

## 2020-03-09 VITALS
BODY MASS INDEX: 33.91 KG/M2 | RESPIRATION RATE: 14 BRPM | HEART RATE: 80 BPM | HEIGHT: 68 IN | TEMPERATURE: 98 F | SYSTOLIC BLOOD PRESSURE: 120 MMHG | OXYGEN SATURATION: 95 % | WEIGHT: 223.75 LBS | DIASTOLIC BLOOD PRESSURE: 78 MMHG

## 2020-03-09 DIAGNOSIS — E11.59 HYPERTENSION ASSOCIATED WITH DIABETES: ICD-10-CM

## 2020-03-09 DIAGNOSIS — E78.5 HYPERLIPIDEMIA LDL GOAL <70: ICD-10-CM

## 2020-03-09 DIAGNOSIS — K76.0 FATTY LIVER: ICD-10-CM

## 2020-03-09 DIAGNOSIS — E53.8 B12 DEFICIENCY: ICD-10-CM

## 2020-03-09 DIAGNOSIS — R20.2 LEFT HAND PARESTHESIA: Primary | ICD-10-CM

## 2020-03-09 DIAGNOSIS — N18.30 CONTROLLED TYPE 2 DIABETES MELLITUS WITH STAGE 3 CHRONIC KIDNEY DISEASE, WITHOUT LONG-TERM CURRENT USE OF INSULIN: ICD-10-CM

## 2020-03-09 DIAGNOSIS — Z23 FLU VACCINE NEED: ICD-10-CM

## 2020-03-09 DIAGNOSIS — E55.9 VITAMIN D DEFICIENCY: ICD-10-CM

## 2020-03-09 DIAGNOSIS — E11.22 CONTROLLED TYPE 2 DIABETES MELLITUS WITH STAGE 3 CHRONIC KIDNEY DISEASE, WITHOUT LONG-TERM CURRENT USE OF INSULIN: ICD-10-CM

## 2020-03-09 DIAGNOSIS — I15.2 HYPERTENSION ASSOCIATED WITH DIABETES: ICD-10-CM

## 2020-03-09 PROCEDURE — 99214 OFFICE O/P EST MOD 30 MIN: CPT | Mod: S$PBB,,, | Performed by: FAMILY MEDICINE

## 2020-03-09 PROCEDURE — 99214 PR OFFICE/OUTPT VISIT, EST, LEVL IV, 30-39 MIN: ICD-10-PCS | Mod: S$PBB,,, | Performed by: FAMILY MEDICINE

## 2020-03-09 PROCEDURE — 99999 PR PBB SHADOW E&M-EST. PATIENT-LVL IV: ICD-10-PCS | Mod: PBBFAC,,, | Performed by: FAMILY MEDICINE

## 2020-03-09 PROCEDURE — 99214 OFFICE O/P EST MOD 30 MIN: CPT | Mod: PBBFAC,PO,25 | Performed by: FAMILY MEDICINE

## 2020-03-09 PROCEDURE — G0008 ADMIN INFLUENZA VIRUS VAC: HCPCS | Mod: PBBFAC,PO

## 2020-03-09 PROCEDURE — 99999 PR PBB SHADOW E&M-EST. PATIENT-LVL IV: CPT | Mod: PBBFAC,,, | Performed by: FAMILY MEDICINE

## 2020-03-09 RX ORDER — ERGOCALCIFEROL 1.25 MG/1
50000 CAPSULE ORAL
Qty: 12 CAPSULE | Refills: 1 | Status: SHIPPED | OUTPATIENT
Start: 2020-03-09 | End: 2021-06-28

## 2020-03-09 NOTE — PROGRESS NOTES
Subjective:       Patient ID: Siddharth Urias is a 85 y.o. male.    Chief Complaint: Follow-up (6mth f/u diabetes / hypertension)    HPI  Review of Systems   Constitutional: Negative for fatigue and unexpected weight change.   Respiratory: Negative for chest tightness and shortness of breath.    Cardiovascular: Negative for chest pain, palpitations and leg swelling.   Gastrointestinal: Negative for abdominal pain.   Musculoskeletal: Negative for arthralgias.   Neurological: Negative for dizziness, syncope, light-headedness and headaches.       Patient Active Problem List   Diagnosis    Depressive disorder, not elsewhere classified    Unspecified venous (peripheral) insufficiency    Prostate cancer    Hypertension associated with diabetes    Fatty liver    Irradiation cystitis    SANDRA (obstructive sleep apnea)    Hyperlipidemia LDL goal <70    Type 2 diabetes mellitus, controlled, with renal complications    Diabetic nephropathy    Chronic kidney disease, stage 3    Bilateral renal cysts    Obesity (BMI 30-39.9)    Radiation proctitis    NISH (iron deficiency anemia)    Acute deep vein thrombosis (DVT) of femoral vein of right lower extremity    Centrilobular emphysema    COPD (chronic obstructive pulmonary disease)    Aortic calcification     Patient is here for a chronic conditions follow up.    Has tingling and pins and needles 1st 3 fingers left hand x 1 month. No injury. No overuse known.  Seen in clinic in [place] and prescribed gabapentin titration and took up to bid without improvement so stopped it.  No neck pain. No pain or weakness.     Reviewed labs 3/20  Objective:      Physical Exam   Constitutional: He is oriented to person, place, and time. He appears well-developed and well-nourished.   Cardiovascular: Normal rate, regular rhythm and normal heart sounds.   Pulmonary/Chest: Effort normal and breath sounds normal.   Musculoskeletal: He exhibits no edema.   Neurological: He is  alert and oriented to person, place, and time.   Skin: Skin is warm and dry.   Psychiatric: He has a normal mood and affect.   Nursing note and vitals reviewed.      Assessment:       1. Left hand paresthesia    2. Vitamin D deficiency    3. Flu vaccine need    4. Hypertension associated with diabetes    5. Fatty liver    6. Hyperlipidemia LDL goal <70    7. Controlled type 2 diabetes mellitus with stage 3 chronic kidney disease, without long-term current use of insulin    8. B12 deficiency        Plan:         1. Left hand paresthesia  Refer for NCV r/o CTS. Recommend CT wrist splint  - Ambulatory referral/consult to Physical Medicine Rehab; Future    2. Vitamin D deficiency  Add and monitor  - ergocalciferol (ERGOCALCIFEROL) 50,000 unit Cap; Take 1 capsule (50,000 Units total) by mouth every 7 days.  Dispense: 12 capsule; Refill: 1  - Vitamin D; Future    3. Flu vaccine need  Immunize today.  Counseled patient on risks, benefits and side effects.  Patient elected to proceed with vaccination.    - Influenza - High Dose (65+) (PF) (IM)    4. Hypertension associated with diabetes  Controlled on current medications.  Continue current medications.      5. Fatty liver  Normal LFTs. Avoid liver irritants and monitor    6. Hyperlipidemia LDL goal <70  Chol at goal. Your triglycerides are borderline high. I recommend a heart healthy diet rich in fiber, fresh vegetables and fruit and low in saturated fats (fried foods, red meat, etc.).  I also recommend regular exercise including a minimum of 150 minutes of cardio exercise per week and 2-30 minute workouts of strength training like light weights, yoga, pilates, etc.  You should work toward a body mass index of < 25.      - Lipid panel; Future    7. Controlled type 2 diabetes mellitus with stage 3 chronic kidney disease, without long-term current use of insulin  Controlled on current medications.  Continue current medications.    - CBC auto differential; Future  -  Comprehensive metabolic panel; Future  - Hemoglobin A1c; Future    8. B12 deficiency  Recommend daily MVI and monitoring  - Vitamin B12; Future        Time spent with patient: 20 minutes    Patient with be reevaluated in 6 months or sooner prn    Greater than 50% of this visit was spent counseling as described in above documentation:Yes

## 2020-03-12 ENCOUNTER — PATIENT OUTREACH (OUTPATIENT)
Dept: ADMINISTRATIVE | Facility: OTHER | Age: 85
End: 2020-03-12

## 2020-03-13 ENCOUNTER — OFFICE VISIT (OUTPATIENT)
Dept: PHYSICAL MEDICINE AND REHAB | Facility: CLINIC | Age: 85
End: 2020-03-13
Payer: MEDICARE

## 2020-03-13 VITALS
SYSTOLIC BLOOD PRESSURE: 127 MMHG | HEART RATE: 75 BPM | DIASTOLIC BLOOD PRESSURE: 77 MMHG | BODY MASS INDEX: 33.8 KG/M2 | HEIGHT: 68 IN | WEIGHT: 223 LBS

## 2020-03-13 DIAGNOSIS — G56.02 CARPAL TUNNEL SYNDROME OF LEFT WRIST: Primary | ICD-10-CM

## 2020-03-13 DIAGNOSIS — R20.2 LEFT HAND PARESTHESIA: ICD-10-CM

## 2020-03-13 PROCEDURE — 76882 PR  US,EXTREMITY,NONVASCULAR,REAL-TIME IMAGE,LIMITED: ICD-10-PCS | Mod: 26,S$PBB,, | Performed by: PHYSICAL MEDICINE & REHABILITATION

## 2020-03-13 PROCEDURE — 76942 CARPAL TUNNEL: L INTERCARPAL: ICD-10-PCS | Mod: 26,59,S$PBB, | Performed by: PHYSICAL MEDICINE & REHABILITATION

## 2020-03-13 PROCEDURE — 20526 CARPAL TUNNEL: L INTERCARPAL: ICD-10-PCS | Mod: S$PBB,LT,, | Performed by: PHYSICAL MEDICINE & REHABILITATION

## 2020-03-13 PROCEDURE — 76942 ECHO GUIDE FOR BIOPSY: CPT | Mod: PBBFAC,PN,59 | Performed by: PHYSICAL MEDICINE & REHABILITATION

## 2020-03-13 PROCEDURE — 20526 THER INJECTION CARP TUNNEL: CPT | Mod: S$PBB,LT,, | Performed by: PHYSICAL MEDICINE & REHABILITATION

## 2020-03-13 PROCEDURE — 99215 OFFICE O/P EST HI 40 MIN: CPT | Mod: PBBFAC,PN,25 | Performed by: PHYSICAL MEDICINE & REHABILITATION

## 2020-03-13 PROCEDURE — 99203 OFFICE O/P NEW LOW 30 MIN: CPT | Mod: 25,S$PBB,, | Performed by: PHYSICAL MEDICINE & REHABILITATION

## 2020-03-13 PROCEDURE — 76882 US LMTD JT/FCL EVL NVASC XTR: CPT | Mod: 26,S$PBB,, | Performed by: PHYSICAL MEDICINE & REHABILITATION

## 2020-03-13 PROCEDURE — 99203 PR OFFICE/OUTPT VISIT, NEW, LEVL III, 30-44 MIN: ICD-10-PCS | Mod: 25,S$PBB,, | Performed by: PHYSICAL MEDICINE & REHABILITATION

## 2020-03-13 PROCEDURE — 76882 US LMTD JT/FCL EVL NVASC XTR: CPT | Mod: PBBFAC,PN,59 | Performed by: PHYSICAL MEDICINE & REHABILITATION

## 2020-03-13 PROCEDURE — 99999 PR PBB SHADOW E&M-EST. PATIENT-LVL V: ICD-10-PCS | Mod: PBBFAC,,, | Performed by: PHYSICAL MEDICINE & REHABILITATION

## 2020-03-13 PROCEDURE — 99999 PR PBB SHADOW E&M-EST. PATIENT-LVL V: CPT | Mod: PBBFAC,,, | Performed by: PHYSICAL MEDICINE & REHABILITATION

## 2020-03-13 RX ORDER — TRIAMCINOLONE ACETONIDE 40 MG/ML
40 INJECTION, SUSPENSION INTRA-ARTICULAR; INTRAMUSCULAR
Status: DISCONTINUED | OUTPATIENT
Start: 2020-03-13 | End: 2020-03-13 | Stop reason: HOSPADM

## 2020-03-13 RX ADMIN — TRIAMCINOLONE ACETONIDE 40 MG: 40 INJECTION, SUSPENSION INTRA-ARTICULAR; INTRAMUSCULAR at 01:03

## 2020-03-13 NOTE — PROGRESS NOTES
Today's Date: 03/13/2020     Referring Provider: Dr. Martine Maxwell     Chief Complaint:   Chief Complaint   Patient presents with    Hand Pain     left hand numbness       HPI: Siddharth Urias is a 85 y.o. right handed male  who presents today for evaluation and treatment of left wrist and hand pain.  He states the pain started approximately 1 month ago without inciting event.  He complains of pain, numbness, and tingling in the left thumb, index, middle finger.  Pain is a 9/10.  Pain is worse with prolonged periods of driving a with continued use of the left hand.  He denies any neck pain.  He denies any muscle spasticity, gait ataxia, or lower extremity weakness.  No change in bowel or bladder.    Review of Systems   Constitutional: Negative for chills and fever.   HENT: Negative for congestion and tinnitus.    Eyes: Negative for blurred vision and photophobia.   Respiratory: Negative for shortness of breath and wheezing.    Cardiovascular: Negative for chest pain and palpitations.   Gastrointestinal: Negative for nausea and vomiting.   Genitourinary: Negative for dysuria and frequency.   Musculoskeletal: Positive for joint pain (left wrist and hand). Negative for myalgias.   Skin: Negative for itching and rash.   Neurological: Positive for tingling and sensory change. Negative for speech change and focal weakness.   Endo/Heme/Allergies: Negative for environmental allergies. Does not bruise/bleed easily.   Psychiatric/Behavioral: Negative for depression. The patient is not nervous/anxious.         Social History     Socioeconomic History    Marital status:      Spouse name: Not on file    Number of children: Not on file    Years of education: Not on file    Highest education level: Not on file   Occupational History    Not on file   Social Needs    Financial resource strain: Not on file    Food insecurity:     Worry: Not on file     Inability: Not on file    Transportation needs:     Medical:  Not on file     Non-medical: Not on file   Tobacco Use    Smoking status: Former Smoker     Packs/day: 1.50     Years: 60.00     Pack years: 90.00     Types: Cigarettes     Last attempt to quit: 2008     Years since quittin.2    Smokeless tobacco: Former User     Quit date: 3/11/2010   Substance and Sexual Activity    Alcohol use: Yes     Comment: social    Drug use: No    Sexual activity: Not on file   Lifestyle    Physical activity:     Days per week: Not on file     Minutes per session: Not on file    Stress: Not on file   Relationships    Social connections:     Talks on phone: Not on file     Gets together: Not on file     Attends Oriental orthodox service: Not on file     Active member of club or organization: Not on file     Attends meetings of clubs or organizations: Not on file     Relationship status: Not on file   Other Topics Concern    Not on file   Social History Narrative    Not on file       Family History   Problem Relation Age of Onset    Diabetes Mother        Current Outpatient Medications on File Prior to Visit   Medication Sig Dispense Refill    albuterol sulfate (PROAIR RESPICLICK) 90 mcg/actuation AePB Inhale 2 puffs into the lungs every 4 (four) hours as needed. Rescue 1 each 11    albuterol-ipratropium (DUO-NEB) 2.5 mg-0.5 mg/3 mL nebulizer solution Take 3 mLs by nebulization every 6 (six) hours as needed for Wheezing or Shortness of Breath. Rescue 120 vial 11    allopurinol (ZYLOPRIM) 300 MG tablet Take 300 mg by mouth once daily.      aspirin (AILYN CHEWABLE ASPIRIN) 81 MG Chew Take 81 mg by mouth once daily.      Bifidobacterium infantis (ALIGN ORAL) Take by mouth.      ciclopirox (PENLAC) 8 % Soln Apply topically nightly. 6.6 mL 11    ergocalciferol (ERGOCALCIFEROL) 50,000 unit Cap Take 1 capsule (50,000 Units total) by mouth every 7 days. 12 capsule 1    fluticasone propionate (FLONASE) 50 mcg/actuation nasal spray 1 spray (50 mcg total) by Each Nare route once  daily. 1 Bottle 3    fluticasone-umeclidin-vilanter (TRELEGY ELLIPTA) 100-62.5-25 mcg DsDv Inhale 1 puff into the lungs once daily. 180 each 3    JANUVIA 100 mg Tab Take 1 tablet (100 mg total) by mouth once daily. 90 tablet 3    lidocaine HCL 2% (XYLOCAINE) 2 % jelly Apply topically as needed. Apply topically once nightly to affected part of foot/feet. 30 mL 2    loratadine (CLARITIN) 10 mg tablet Take 1 tablet (10 mg total) by mouth once daily.  0    losartan (COZAAR) 25 MG tablet Take 1 tablet (25 mg total) by mouth once daily. 90 tablet 3    multivitamin with minerals tablet Take 1 tablet by mouth once daily.      pantoprazole (PROTONIX) 40 MG tablet Take 1 tablet (40 mg total) by mouth once daily. 90 tablet 3    pravastatin (PRAVACHOL) 40 MG tablet Take 1 tablet (40 mg total) by mouth once daily. 90 tablet 3    predniSONE (DELTASONE) 20 MG tablet 2 pills for 7 days, then 1 pill for 7 days 21 tablet 0    rivaroxaban (XARELTO) 20 mg Tab Take 1 tablet (20 mg total) by mouth daily with dinner or evening meal. 90 tablet 3    solifenacin (VESICARE) 5 MG tablet Take 1 tablet (5 mg total) by mouth once daily. 90 tablet 3    tamsulosin (FLOMAX) 0.4 mg Cap Take 1 capsule (0.4 mg total) by mouth once daily. 90 capsule 3    cholestyramine-aspartame (CHOLESTYRAMINE LIGHT) 4 gram PwPk Take 1 packet (4 g total) by mouth 3 (three) times daily. 270 packet 3     No current facility-administered medications on file prior to visit.         Review of patient's allergies indicates:   Allergen Reactions    No known drug allergies        Past Surgical History:   Procedure Laterality Date    APPENDECTOMY      CATARACT EXTRACTION Bilateral     COLONOSCOPY N/A 12/14/2018    Procedure: COLONOSCOPY;  Surgeon: Noel Aguiar MD;  Location: [facility];  Service: Endoscopy;  Laterality: N/A;    ESOPHAGOGASTRODUODENOSCOPY N/A 1/24/2019    Procedure: EGD (ESOPHAGOGASTRODUODENOSCOPY);  Surgeon: Noel Aguiar MD;   Location: [facility];  Service: Endoscopy;  Laterality: N/A;    FLEXIBLE SIGMOIDOSCOPY N/A 1/22/2019    Procedure: SIGMOIDOSCOPY, FLEXIBLE;  Surgeon: Noel Aguiar MD;  Location: [facility];  Service: Endoscopy;  Laterality: N/A;    HERNIA REPAIR      bilateral inguinal    TONSILLECTOMY         Past Medical History:   Diagnosis Date    Arthritis     Benign hypertension     Diabetes mellitus, type 2     2/2011    Fatty liver     Irradiation cystitis     SANDRA (obstructive sleep apnea)     no    Prostate cancer 2009    s/p XRT (T1C, [place] 8)    Radiation proctitis        Vitals:    03/13/20 1254   BP: 127/77   Pulse: 75       Physical Exam   Constitutional: He is oriented to person, place, and time. He appears well-developed and well-nourished. No distress.   HENT:   Head: Normocephalic and atraumatic.   Eyes: Pupils are equal, round, and reactive to light. EOM are normal.   Pulmonary/Chest: Effort normal. No respiratory distress.   Abdominal: Normal appearance. He exhibits no distension.   Neurological: He is alert and oriented to person, place, and time. No cranial nerve deficit or sensory deficit.   Psychiatric: He has a normal mood and affect. His behavior is normal. Judgment and thought content normal.      Right Hand Exam     Muscle Strength   Wrist flexion: 5/5   : 5/5     Tests   Phalens Sign: negative  Tinel's sign (median nerve): negative  Finkelstein's test: negative      Left Hand Exam     Tenderness   The patient is experiencing tenderness in the reyes area.     Range of Motion   Wrist   Extension: abnormal   Flexion: abnormal     Muscle Strength   Wrist flexion: 4/5   :  4/5     Tests   Phalens sign: positive  Tinel's sign (median nerve): positive  Finkelstein's test: negative             Carpal tunnel syndrome of left wrist  -     EMG W/ ULTRASOUND AND NERVE CONDUCTION TEST 1 Extremity; Future  -     WRIST BRACE FOR HOME USE  -     Carpal Tunnel: L intercarpal    Left hand  paresthesia  -     Ambulatory referral/consult to Physical Medicine Rehab           PLAN:   Siddharth Urias is a very pleasant 85 y.o. male with left wrist and hand pain and paresthesias in the thumb, index, middle finger.  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  As his symptoms are severe and causing him disability, or provide him with a carpal tunnel injection today.  Please see procedure note for more detail.  All of the scheduled for an EMG/NCS of the left upper extremity to grade the carpal tunnel syndrome.  I will also write him a prescription for a resting cock-up wrist splint to wear only at night.    Jorge Alberto Sears D.O.  Physical Medicine and Rehabilitation          CC: Patients PCP: Martine Maxwell MD  CC: Referring Provider: Dr. Martine Maxwell

## 2020-03-13 NOTE — PROCEDURES
Procedures     Limited diagnostic ultrasound examination of the volar wrist on the left    Indications:  Left wrist pain and hand paresthesias    Findings:  Using a high frequency linear transducer on a [employer] HS60, the left volar wrist was identified.  The median nerve cross-sectional area at the proximal carpal tunnel was measured at 19 millimeter squared.      Impression:  1.  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

## 2020-03-13 NOTE — LETTER
March 13, 2020      Martine Maxwell MD  [address]  [place] [address]           [place] [facility]  [address] [place] [address]  [address]  [place] [address]  Phone: [phone]  Fax: [phone]          Patient: Siddharth Urias   MR Number: [id number]   YOB: 1934   Date of Visit: 3/13/2020       Dear Dr. Martine Maxwell:    Thank you for referring Siddharth Urias to me for evaluation. Attached you will find relevant portions of my assessment and plan of care.    If you have questions, please do not hesitate to call me. I look forward to following Siddharth Urias along with you.    Sincerely,    Jorge Alberto Sears MD    Enclosure  CC:  No Recipients    If you would like to receive this communication electronically, please contact [email or web address] or [phone] to request more information on [facility] access.    For providers and/or their staff who would like to refer a patient to [facility], please contact us through our one-stop-shop provider referral line, [facility], at [phone].    If you feel you have received this communication in error or would no longer like to receive these types of communications, please e-mail [email or web address]

## 2020-03-13 NOTE — PROCEDURES
Carpal Tunnel: L intercarpal  Date/Time: 3/13/2020 1:00 PM  Performed by: Jorge Alberto Sears MD  Authorized by: Jorge Alberto Sears MD     Indications:  Pain  Timeout: Prior to procedure the correct patient, procedure, and site was verified      Location:  Wrist  Site:  L intercarpal  Prep: Patient was prepped and draped in usual sterile fashion    Ultrasonic Guidance for needle placement: Yes  Images are saved and documented.  Medications:  40 mg triamcinolone acetonide 40 mg/mL         Procedure: LEFT carpal tunnel injection with ultrasound guidance    Preprocedure diagnosis: LEFT carpal tunnel syndrome    Postprocedure diagnosis:  Same    Anesthetic:  None    Assistant:  None    Equipment used:  [employer] HS60 with a high frequency linear transducer    Procedure in detail:  Risks and benefits were discussed and written informed consent was obtained.  The patient was placed in a prone position on the exam table.  The left palm was placed in the supine position in the proximal carpal tunnel was evaluated using a linear transducer.  The fingers were flexed.  Distally skin was cleaned with alcohol x2.  A 30 gauge 1 in needle was advanced under direct ultrasound visualization in the short axis between the flexor digitorum superficialis tendons carefully avoiding the median nerve.  Once proper position was identified on ultrasound visualization, a 1 cc solution of 0.5 cc of normal saline preservative-free and 0.5 cc of 40 milligrams/milliliter of Kenalog was injected showing excellent spread between the flexor tendons.  Needle was removed.  Fingers with an extended and the medication was visualized extending distally into the carpal tunnel.  The patient tolerated the procedure well with no adverse events.

## 2020-03-18 ENCOUNTER — TELEPHONE (OUTPATIENT)
Dept: SPINE | Facility: CLINIC | Age: 85
End: 2020-03-18

## 2020-03-18 ENCOUNTER — PATIENT OUTREACH (OUTPATIENT)
Dept: ADMINISTRATIVE | Facility: OTHER | Age: 85
End: 2020-03-18

## 2020-03-18 NOTE — TELEPHONE ENCOUNTER
----- Message from Marshall Andrew sent at 3/18/2020  2:01 PM CDT -----  Contact: pt's wife Enedina   Type: Needs Medical Advice    Who Called:  Enedina     Best Call Back Number: [phone]  Additional Information: Would like to speak to someone in the office regarding the pt's appointment tomorrow. Please call to advise.

## 2020-06-03 LAB
LEFT EYE DM RETINOPATHY: NEGATIVE
LEFT EYE DM RETINOPATHY: NEGATIVE
RIGHT EYE DM RETINOPATHY: NEGATIVE
RIGHT EYE DM RETINOPATHY: NEGATIVE

## 2020-07-08 ENCOUNTER — OFFICE VISIT (OUTPATIENT)
Dept: FAMILY MEDICINE | Facility: CLINIC | Age: 85
End: 2020-07-08
Payer: MEDICARE

## 2020-07-08 VITALS
DIASTOLIC BLOOD PRESSURE: 68 MMHG | HEART RATE: 64 BPM | TEMPERATURE: 98 F | SYSTOLIC BLOOD PRESSURE: 120 MMHG | HEIGHT: 68 IN | WEIGHT: 218.94 LBS | BODY MASS INDEX: 33.18 KG/M2

## 2020-07-08 DIAGNOSIS — J43.2 CENTRILOBULAR EMPHYSEMA: ICD-10-CM

## 2020-07-08 DIAGNOSIS — C61 PROSTATE CANCER: ICD-10-CM

## 2020-07-08 DIAGNOSIS — I15.2 HYPERTENSION ASSOCIATED WITH DIABETES: ICD-10-CM

## 2020-07-08 DIAGNOSIS — I74.9 EMBOLISM AND THROMBOSIS OF ARTERY: ICD-10-CM

## 2020-07-08 DIAGNOSIS — E11.59 HYPERTENSION ASSOCIATED WITH DIABETES: ICD-10-CM

## 2020-07-08 DIAGNOSIS — E78.5 HYPERLIPIDEMIA LDL GOAL <70: ICD-10-CM

## 2020-07-08 DIAGNOSIS — G56.03 BILATERAL CARPAL TUNNEL SYNDROME: Primary | ICD-10-CM

## 2020-07-08 DIAGNOSIS — E66.9 OBESITY (BMI 30.0-34.9): ICD-10-CM

## 2020-07-08 DIAGNOSIS — E66.01 SEVERE OBESITY (BMI 35.0-39.9) WITH COMORBIDITY: ICD-10-CM

## 2020-07-08 PROCEDURE — 99999 PR PBB SHADOW E&M-EST. PATIENT-LVL III: CPT | Mod: PBBFAC,,, | Performed by: NURSE PRACTITIONER

## 2020-07-08 PROCEDURE — 99214 OFFICE O/P EST MOD 30 MIN: CPT | Mod: S$PBB,,, | Performed by: NURSE PRACTITIONER

## 2020-07-08 PROCEDURE — 99999 PR PBB SHADOW E&M-EST. PATIENT-LVL III: ICD-10-PCS | Mod: PBBFAC,,, | Performed by: NURSE PRACTITIONER

## 2020-07-08 PROCEDURE — 99213 OFFICE O/P EST LOW 20 MIN: CPT | Mod: PBBFAC,PO | Performed by: NURSE PRACTITIONER

## 2020-07-08 PROCEDURE — 99214 PR OFFICE/OUTPT VISIT, EST, LEVL IV, 30-39 MIN: ICD-10-PCS | Mod: S$PBB,,, | Performed by: NURSE PRACTITIONER

## 2020-07-13 ENCOUNTER — PATIENT OUTREACH (OUTPATIENT)
Dept: ADMINISTRATIVE | Facility: OTHER | Age: 85
End: 2020-07-13

## 2020-07-13 NOTE — PROGRESS NOTES
Chart was reviewed for overdue [facility] Encounters (MOUSTAPHA)  topics  Updates were requested from care everywhere  Health Maintenance has been updated

## 2020-07-16 ENCOUNTER — OFFICE VISIT (OUTPATIENT)
Dept: PHYSICAL MEDICINE AND REHAB | Facility: CLINIC | Age: 85
End: 2020-07-16
Payer: MEDICARE

## 2020-07-16 VITALS
HEART RATE: 73 BPM | SYSTOLIC BLOOD PRESSURE: 154 MMHG | BODY MASS INDEX: 33.04 KG/M2 | HEIGHT: 68 IN | WEIGHT: 218 LBS | DIASTOLIC BLOOD PRESSURE: 79 MMHG

## 2020-07-16 DIAGNOSIS — G56.02 CARPAL TUNNEL SYNDROME, LEFT: Primary | ICD-10-CM

## 2020-07-16 PROCEDURE — 99999 PR PBB SHADOW E&M-EST. PATIENT-LVL III: ICD-10-PCS | Mod: PBBFAC,,, | Performed by: PHYSICAL MEDICINE & REHABILITATION

## 2020-07-16 PROCEDURE — 76942 ECHO GUIDE FOR BIOPSY: CPT | Mod: PBBFAC,PN | Performed by: PHYSICAL MEDICINE & REHABILITATION

## 2020-07-16 PROCEDURE — 99999 PR PBB SHADOW E&M-EST. PATIENT-LVL III: CPT | Mod: PBBFAC,,, | Performed by: PHYSICAL MEDICINE & REHABILITATION

## 2020-07-16 PROCEDURE — 20526 PR INJECT CARPAL TUNNEL: ICD-10-PCS | Mod: S$PBB,LT,, | Performed by: PHYSICAL MEDICINE & REHABILITATION

## 2020-07-16 PROCEDURE — 99213 OFFICE O/P EST LOW 20 MIN: CPT | Mod: PBBFAC,PN | Performed by: PHYSICAL MEDICINE & REHABILITATION

## 2020-07-16 PROCEDURE — 20526 THER INJECTION CARP TUNNEL: CPT | Mod: S$PBB,LT,, | Performed by: PHYSICAL MEDICINE & REHABILITATION

## 2020-07-16 PROCEDURE — 99213 PR OFFICE/OUTPT VISIT, EST, LEVL III, 20-29 MIN: ICD-10-PCS | Mod: 25,S$PBB,, | Performed by: PHYSICAL MEDICINE & REHABILITATION

## 2020-07-16 PROCEDURE — 99213 OFFICE O/P EST LOW 20 MIN: CPT | Mod: 25,S$PBB,, | Performed by: PHYSICAL MEDICINE & REHABILITATION

## 2020-07-16 PROCEDURE — 20526 THER INJECTION CARP TUNNEL: CPT | Mod: PBBFAC,PN | Performed by: PHYSICAL MEDICINE & REHABILITATION

## 2020-07-16 RX ORDER — TRIAMCINOLONE ACETONIDE 40 MG/ML
40 INJECTION, SUSPENSION INTRA-ARTICULAR; INTRAMUSCULAR
Status: DISCONTINUED | OUTPATIENT
Start: 2020-07-16 | End: 2020-07-16 | Stop reason: HOSPADM

## 2020-07-16 RX ADMIN — TRIAMCINOLONE ACETONIDE 40 MG: 40 INJECTION, SUSPENSION INTRA-ARTICULAR; INTRAMUSCULAR at 10:07

## 2020-07-16 NOTE — PROCEDURES
Carpal Tunnel    Date/Time: 7/16/2020 10:00 AM  Performed by: Jorge Alberto Sears MD  Authorized by: Jorge Alberto Sears MD     Consent Done?:  Yes (Written)  Indications:  Pain and diagnostic evaluation  Timeout: prior to procedure the correct patient, procedure, and site was verified    Prep: patient was prepped and draped in usual sterile fashion      Local anesthesia used?: No    Ultrasonic Guidance for Needle Placement?: Yes    Medications:  40 mg triamcinolone acetonide 40 mg/mL         Procedure: left carpal tunnel injection with ultrasound guidance    Preprocedure diagnosis: left carpal tunnel syndrome    Postprocedure diagnosis:  Same    Anesthetic:  None    Assistant:  None    Equipment used:  [employer] HS60 with a linear transducer    Procedure in detail:  Risks and benefits were discussed and written informed consent was obtained.  The patient was placed in a prone position on the exam table.  The left palm was placed in the supine position in the proximal carpal tunnel was evaluated using a linear transducer.  The fingers were flexed.  Distally skin was cleaned with chloraprep and allowed to dry.  A 30 gauge 1 in needle was advanced under direct ultrasound visualization in the short axis between the flexor digitorum superficialis tendons carefully avoiding the median nerve.  Once proper position was identified on ultrasound visualization, a 1 cc solution of 0.5 cc of normal saline preservative-free and 0.5 cc of 40 milligrams/milliliter of Kenalog was injected showing excellent spread between the flexor tendons.  Needle was removed.  Fingers with an extended and the medication was visualized extending distally into the carpal tunnel.  The patient tolerated the procedure well with no adverse events.

## 2020-07-16 NOTE — PROGRESS NOTES
Chief Complaint   Patient presents with    Wrist Pain     left wrist         HPI: Siddharth Urias is a  86 y.o. male who presents today for followup. he was last seen in clinic on in March a which time I performed a carpal tunnel injection on the left.  I also schedule him for an EMG/NCS of the left upper extremity, however this had to be delayed secondary to COVID-19.  He states that the injection provided him about 3 months of near 100% pain relief.  His symptoms have returned over the last month.  He continues to complain of numbness and tingling in the left hand in the median nerve distribution.        Review of Systems   Constitutional: Negative for chills and fever.   HENT: Negative for congestion and tinnitus.    Eyes: Negative for blurred vision and photophobia.   Respiratory: Negative for shortness of breath and wheezing.    Cardiovascular: Negative for chest pain and palpitations.   Gastrointestinal: Negative for nausea and vomiting.   Genitourinary: Negative for dysuria and frequency.   Musculoskeletal: Negative for joint pain and myalgias.   Skin: Negative for itching and rash.   Neurological: Positive for tingling and sensory change. Negative for speech change and focal weakness.   Endo/Heme/Allergies: Negative for environmental allergies. Does not bruise/bleed easily.   Psychiatric/Behavioral: Negative for depression. The patient is not nervous/anxious.             Past Medical History:   Diagnosis Date    Arthritis     Benign hypertension     Diabetes mellitus, type 2     2/2011    Fatty liver     Irradiation cystitis     SANDRA (obstructive sleep apnea)     no    Prostate cancer 2009    s/p XRT (T1C, João 8)    Radiation proctitis           Current Outpatient Medications on File Prior to Visit   Medication Sig Dispense Refill    albuterol sulfate (PROAIR RESPICLICK) 90 mcg/actuation AePB Inhale 2 puffs into the lungs every 4 (four) hours as needed. Rescue 1 each 11     albuterol-ipratropium (DUO-NEB) 2.5 mg-0.5 mg/3 mL nebulizer solution Take 3 mLs by nebulization every 6 (six) hours as needed for Wheezing or Shortness of Breath. Rescue 120 vial 11    allopurinol (ZYLOPRIM) 300 MG tablet Take 300 mg by mouth once daily.      aspirin (AILYN CHEWABLE ASPIRIN) 81 MG Chew Take 81 mg by mouth once daily.      Bifidobacterium infantis (ALIGN ORAL) Take by mouth.      cholestyramine-aspartame (CHOLESTYRAMINE LIGHT) 4 gram PwPk Take 1 packet (4 g total) by mouth 3 (three) times daily. 270 packet 3    ciclopirox (PENLAC) 8 % Soln Apply topically nightly. 6.6 mL 11    ergocalciferol (ERGOCALCIFEROL) 50,000 unit Cap Take 1 capsule (50,000 Units total) by mouth every 7 days. 12 capsule 1    fluticasone propionate (FLONASE) 50 mcg/actuation nasal spray 1 spray (50 mcg total) by Each Nare route once daily. 1 Bottle 3    fluticasone-umeclidin-vilanter (TRELEGY ELLIPTA) 100-62.5-25 mcg DsDv Inhale 1 puff into the lungs once daily. 180 each 3    JANUVIA 100 mg Tab Take 1 tablet (100 mg total) by mouth once daily. 90 tablet 3    lidocaine HCL 2% (XYLOCAINE) 2 % jelly Apply topically as needed. Apply topically once nightly to affected part of foot/feet. 30 mL 2    loratadine (CLARITIN) 10 mg tablet Take 1 tablet (10 mg total) by mouth once daily.  0    losartan (COZAAR) 25 MG tablet Take 1 tablet (25 mg total) by mouth once daily. 90 tablet 3    multivitamin with minerals tablet Take 1 tablet by mouth once daily.      pantoprazole (PROTONIX) 40 MG tablet Take 1 tablet (40 mg total) by mouth once daily. 90 tablet 3    pravastatin (PRAVACHOL) 40 MG tablet Take 1 tablet (40 mg total) by mouth once daily. 90 tablet 3    predniSONE (DELTASONE) 20 MG tablet 2 pills for 7 days, then 1 pill for 7 days 21 tablet 0    rivaroxaban (XARELTO) 20 mg Tab Take 1 tablet (20 mg total) by mouth daily with dinner or evening meal. 90 tablet 3    solifenacin (VESICARE) 5 MG tablet Take 1 tablet (5 mg  total) by mouth once daily. 90 tablet 3    tamsulosin (FLOMAX) 0.4 mg Cap Take 1 capsule (0.4 mg total) by mouth once daily. 90 capsule 3     No current facility-administered medications on file prior to visit.               Physical Exam:  Vitals:    07/16/20 0934   BP: (!) 154/79   Pulse: 73     Physical Exam  Constitutional:       General: He is not in acute distress.  HENT:      Head: Normocephalic and atraumatic.      Mouth/Throat:      Mouth: Mucous membranes are moist.      Pharynx: Oropharynx is clear.   Eyes:      Extraocular Movements: Extraocular movements intact.      Pupils: Pupils are equal, round, and reactive to light.   Pulmonary:      Effort: Pulmonary effort is normal.      Breath sounds: Normal breath sounds.   Abdominal:      General: Abdomen is flat. There is no distension.   Neurological:      General: No focal deficit present.      Mental Status: He is alert and oriented to person, place, and time.      Cranial Nerves: Cranial nerves are intact.      Sensory: Sensory deficit present.   Psychiatric:         Mood and Affect: Mood and affect normal.       Left Hand Exam     Tests   Phalens sign: positive  Tinel's sign (median nerve): positive                  Assessment:  Carpal tunnel syndrome, left  -     Carpal Tunnel    Paresthesias/numbness               PLAN:  Siddharth Urias is a 86 y.o. male with carpal tunnel syndrome on the left.  He is approximately 4 months status post left carpal tunnel injection.  This provided him with about 3 months of near 100% pain relief.  Over the last month, he complains of numbness and tingling in the left hand.  At this time, will proceed with a repeat carpal tunnel injection on the left.  If this does not provide him with 6 months or more relief, I will refer him for surgical release.                      Jorge Alberto Sears D.O.  Physical Medicine and Rehabilitation

## 2020-08-13 ENCOUNTER — TELEPHONE (OUTPATIENT)
Dept: HEMATOLOGY/ONCOLOGY | Facility: CLINIC | Age: 85
End: 2020-08-13

## 2020-08-14 ENCOUNTER — OFFICE VISIT (OUTPATIENT)
Dept: HEMATOLOGY/ONCOLOGY | Facility: CLINIC | Age: 85
End: 2020-08-14
Payer: MEDICARE

## 2020-08-14 VITALS
SYSTOLIC BLOOD PRESSURE: 119 MMHG | OXYGEN SATURATION: 95 % | TEMPERATURE: 97 F | HEART RATE: 80 BPM | BODY MASS INDEX: 33.84 KG/M2 | RESPIRATION RATE: 18 BRPM | DIASTOLIC BLOOD PRESSURE: 75 MMHG | WEIGHT: 216.06 LBS

## 2020-08-14 DIAGNOSIS — N18.30 CHRONIC KIDNEY DISEASE, STAGE III (MODERATE): ICD-10-CM

## 2020-08-14 DIAGNOSIS — G47.33 OSA (OBSTRUCTIVE SLEEP APNEA): ICD-10-CM

## 2020-08-14 DIAGNOSIS — E53.8 B12 DEFICIENCY: ICD-10-CM

## 2020-08-14 DIAGNOSIS — I82.411 ACUTE DEEP VEIN THROMBOSIS (DVT) OF FEMORAL VEIN OF RIGHT LOWER EXTREMITY: Primary | ICD-10-CM

## 2020-08-14 DIAGNOSIS — K76.0 FATTY LIVER: ICD-10-CM

## 2020-08-14 DIAGNOSIS — E78.5 HYPERLIPIDEMIA LDL GOAL <70: ICD-10-CM

## 2020-08-14 DIAGNOSIS — C61 PROSTATE CANCER: ICD-10-CM

## 2020-08-14 PROCEDURE — 99214 OFFICE O/P EST MOD 30 MIN: CPT | Mod: S$PBB,,, | Performed by: INTERNAL MEDICINE

## 2020-08-14 PROCEDURE — 99999 PR PBB SHADOW E&M-EST. PATIENT-LVL IV: CPT | Mod: PBBFAC,,, | Performed by: INTERNAL MEDICINE

## 2020-08-14 PROCEDURE — 99214 OFFICE O/P EST MOD 30 MIN: CPT | Mod: PBBFAC,PO | Performed by: INTERNAL MEDICINE

## 2020-08-14 PROCEDURE — 99999 PR PBB SHADOW E&M-EST. PATIENT-LVL IV: ICD-10-PCS | Mod: PBBFAC,,, | Performed by: INTERNAL MEDICINE

## 2020-08-14 PROCEDURE — 99214 PR OFFICE/OUTPT VISIT, EST, LEVL IV, 30-39 MIN: ICD-10-PCS | Mod: S$PBB,,, | Performed by: INTERNAL MEDICINE

## 2020-08-14 NOTE — Clinical Note
Disconitnue xarelto check hypercoag w/u in 1 month after being off xarelto orederd from today rtc to discuss results

## 2020-08-14 NOTE — PROGRESS NOTES
Subjective:       Patient ID: Siddharth Urias is a 86 y.o. male.    Chief Complaint:  Coming for follow-up patient with history of DVT with repeat ultrasound   swelling went for ultrasound showed DVT patient has been on anticoagulant for 6 months here with repeat ultrasound for recheck before coming off anticoagulant recently patient was sent to the emergency room because of fever but left AMA when UTI and gallbladder sludge was found patient had significant chills at that time.  He states he is feeling a lot better now he was not given any antibiotics for the UTI because he left against medical advice the patient's wife also hit her head sometime ago, with a subdural which has resolved  mpression 2019       Positive examination demonstrating DVT within the right common femoral and proximal femoral veins.      onXarelto here  For follow-up  No prior history  No family history  of hypercoagulability    In clinic with his wife of 60 years  HPI:   Patient states he is quite sedentary he does not ambulate much his legs do not hurt  He has no shortness of breath discomfort fever chills or rigors at this time  Social History     Socioeconomic History    Marital status:      Spouse name: Not on file    Number of children: Not on file    Years of education: Not on file    Highest education level: Not on file   Occupational History    Not on file   Social Needs    Financial resource strain: Not on file    Food insecurity     Worry: Not on file     Inability: Not on file    Transportation needs     Medical: Not on file     Non-medical: Not on file   Tobacco Use    Smoking status: Former Smoker     Packs/day: 1.50     Years: 60.00     Pack years: 90.00     Types: Cigarettes     Quit date: 2008     Years since quittin.6    Smokeless tobacco: Former User     Quit date: 3/11/2010   Substance and Sexual Activity    Alcohol use: Yes     Comment: social    Drug use: No    Sexual activity: Not on  file   Lifestyle    Physical activity     Days per week: Not on file     Minutes per session: Not on file    Stress: Not on file   Relationships    Social connections     Talks on phone: Not on file     Gets together: Not on file     Attends [employer] service: Not on file     Active member of club or organization: Not on file     Attends meetings of clubs or organizations: Not on file     Relationship status: Not on file   Other Topics Concern    Not on file   Social History Narrative    Not on file     Family History   Problem Relation Age of Onset    Diabetes Mother      Past Surgical History:   Procedure Laterality Date    APPENDECTOMY      CATARACT EXTRACTION Bilateral     COLONOSCOPY N/A 12/14/2018    Procedure: COLONOSCOPY;  Surgeon: Noel Aguiar MD;  Location: [facility] ENDO;  Service: Endoscopy;  Laterality: N/A;    ESOPHAGOGASTRODUODENOSCOPY N/A 1/24/2019    Procedure: EGD (ESOPHAGOGASTRODUODENOSCOPY);  Surgeon: Noel Aguiar MD;  Location: [facility] ENDO;  Service: Endoscopy;  Laterality: N/A;    FLEXIBLE SIGMOIDOSCOPY N/A 1/22/2019    Procedure: SIGMOIDOSCOPY, FLEXIBLE;  Surgeon: Noel Aguiar MD;  Location: [facility] ENDO;  Service: Endoscopy;  Laterality: N/A;    HERNIA REPAIR      bilateral inguinal    TONSILLECTOMY       Past Medical History:   Diagnosis Date    Arthritis     Benign hypertension     Diabetes mellitus, type 2     2/2011    Fatty liver     Irradiation cystitis     SANDRA (obstructive sleep apnea)     no    Prostate cancer 2009    s/p XRT (T1C, [place] 8)    Radiation proctitis        Current Outpatient Medications:     albuterol sulfate (PROAIR RESPICLICK) 90 mcg/actuation AePB, Inhale 2 puffs into the lungs every 4 (four) hours as needed. Rescue, Disp: 1 each, Rfl: 11    allopurinol (ZYLOPRIM) 300 MG tablet, Take 300 mg by mouth once daily., Disp: , Rfl:     JANUVIA 100 mg Tab, Take 1 tablet (100 mg total) by mouth once daily., Disp: 90 tablet, Rfl: 3    losartan  (COZAAR) 25 MG tablet, Take 1 tablet (25 mg total) by mouth once daily., Disp: 90 tablet, Rfl: 3    pantoprazole (PROTONIX) 40 MG tablet, Take 1 tablet (40 mg total) by mouth once daily., Disp: 90 tablet, Rfl: 3    pravastatin (PRAVACHOL) 40 MG tablet, Take 1 tablet (40 mg total) by mouth once daily., Disp: 90 tablet, Rfl: 3    rivaroxaban (XARELTO) 20 mg Tab, Take 1 tablet (20 mg total) by mouth daily with dinner or evening meal., Disp: 90 tablet, Rfl: 3    solifenacin (VESICARE) 5 MG tablet, Take 1 tablet (5 mg total) by mouth once daily., Disp: 90 tablet, Rfl: 3    tamsulosin (FLOMAX) 0.4 mg Cap, Take 1 capsule (0.4 mg total) by mouth once daily., Disp: 90 capsule, Rfl: 3    albuterol-ipratropium (DUO-NEB) 2.5 mg-0.5 mg/3 mL nebulizer solution, Take 3 mLs by nebulization every 6 (six) hours as needed for Wheezing or Shortness of Breath. Rescue (Patient not taking: Reported on 8/14/2020), Disp: 120 vial, Rfl: 11    aspirin (AILYN CHEWABLE ASPIRIN) 81 MG Chew, Take 81 mg by mouth once daily., Disp: , Rfl:     Bifidobacterium infantis (ALIGN ORAL), Take by mouth., Disp: , Rfl:     cholestyramine-aspartame (CHOLESTYRAMINE LIGHT) 4 gram PwPk, Take 1 packet (4 g total) by mouth 3 (three) times daily. (Patient not taking: Reported on 8/14/2020), Disp: 270 packet, Rfl: 3    ciclopirox (PENLAC) 8 % Soln, Apply topically nightly. (Patient not taking: Reported on 8/14/2020), Disp: 6.6 mL, Rfl: 11    ergocalciferol (ERGOCALCIFEROL) 50,000 unit Cap, Take 1 capsule (50,000 Units total) by mouth every 7 days. (Patient not taking: Reported on 8/14/2020), Disp: 12 capsule, Rfl: 1    fluticasone propionate (FLONASE) 50 mcg/actuation nasal spray, 1 spray (50 mcg total) by Each Nare route once daily. (Patient not taking: Reported on 8/14/2020), Disp: 1 Bottle, Rfl: 3    fluticasone-umeclidin-vilanter (TRELEGY ELLIPTA) 100-62.5-25 mcg DsDv, Inhale 1 puff into the lungs once daily. (Patient not taking: Reported on  8/14/2020), Disp: 180 each, Rfl: 3    lidocaine HCL 2% (XYLOCAINE) 2 % jelly, Apply topically as needed. Apply topically once nightly to affected part of foot/feet. (Patient not taking: Reported on 8/14/2020), Disp: 30 mL, Rfl: 2    loratadine (CLARITIN) 10 mg tablet, Take 1 tablet (10 mg total) by mouth once daily. (Patient not taking: Reported on 8/14/2020), Disp: , Rfl: 0    multivitamin with minerals tablet, Take 1 tablet by mouth once daily., Disp: , Rfl:     predniSONE (DELTASONE) 20 MG tablet, 2 pills for 7 days, then 1 pill for 7 days (Patient not taking: Reported on 8/14/2020), Disp: 21 tablet, Rfl: 0  Review of patient's allergies indicates:   Allergen Reactions    No known drug allergies          REVIEW OF SYSTEMS:     CONSTITUTIONAL: The patient denies any weight change. There is no apparent    change in appetite, fever, night sweats, headaches, fatigue, dizziness, or    weakness.      SKIN: Denies rash, issues with nails, non-healing sores, bleeding, blotching    skin or abnormal bruising. Denies new moles or changes to existing moles.      BREASTS: There is no swelling around breasts or nipple discharge.    EYES: Denies eye pain, blurred vision, swelling, redness or discharge.      ENT AND MOUTH: Denies runny nose, stuffiness, sinus trouble or sores. Denies    nosebleeds. Denies, hoarseness, change in voice or swelling in front of the    neck.      CARDIOVASCULAR: Denies chest pain, discomfort or palpitations. Denies neck    swelling or episodes of passing out.      RESPIRATORY: Denies cough, sputum production, blood in sputum, and denies    shortness of breath.      GI: Denies trouble swallowing, indigestion, heartburn, abdominal pain, nausea,  or  vomiting, has significant diarrhea since radiation was completed for prostate cancer, no blood in stool, discoloration of  stools, or increased abdominal girth.      GENITOURINARY: No discharge. No pelvic pain or lumps. No rash around groin  or  lesions. Has urinary frequency, occasional hesitation, no painful urination or blood in    urine. Denies incontinence. No problems with intercourse.      MUSCULOSKELETA occasional  neck or back pain. Denies weakness in arms or legs,     Denies swelling or abnormal glands.  Some swelling of right lower extremity multiple distended veins with discoloration to feet from venous stasis    NEUROLOGICAL: Denies tingling, numbness, altered mentation changes to nerve    function in the face, weakness to one or both of the body. Denies changes to    gait and denies multiple falls or accidents.      PSYCHIATRIC: Denies nervousness, anxiety, hallucinations, depression, suicidal    ideation, trouble sleeping or changes in behavior noticed by family.          PHYSICAL EXAM:     Wt Readings from Last 3 Encounters:   08/14/20 98 kg (216 lb 0.8 oz)   08/08/20 100.2 kg (220 lb 14.4 oz)   07/16/20 98.9 kg (218 lb)     Temp Readings from Last 3 Encounters:   08/14/20 97.2 °F (36.2 °C) (Temporal)   08/08/20 100.2 °F (37.9 °C) (Temporal)   07/08/20 97.6 °F (36.4 °C) (Other (see comments))     BP Readings from Last 3 Encounters:   08/14/20 119/75   08/09/20 134/69   07/16/20 (!) 154/79     Pulse Readings from Last 3 Encounters:   08/14/20 80   08/09/20 98   07/16/20 73     GENERAL: Comfortable looking patient.  Elderly gentleman Patient is in no distress. Morbidly obese  Awake, alert and oriented to time, person and place.  No anxiety, or agitation.      HEENT: Normal conjunctivae and eyelids. WNL.  PERRLA 3 to 4 mm. No icterus, no pallor, no congestion, and no discharge noted.     NECK:  Supple. Trachea is central.  No crepitus.  No JVD or masses.    RESPIRATORY:  No intercostal retractions.  No dullness to percussion.  Chest is clear to auscultation.  No rales, rhonchi or wheezes.  No crepitus.  Good air entry bilaterally.    CARDIOVASCULAR:  S1 and S2 are normally heard without murmurs or gallops.  All peripheral pulses are  present.    ABDOMEN:  Normal abdomen.  No hepatosplenomegaly.  No free fluid.  Bowel sounds are present.  No hernia noted. No masses.  No rebound or tenderness.  No guarding or rigidity.  Umbilicus is midline.    LYMPHATICS:  No axillary, cervical, supraclavicular, submental, or inguinal lymphadenopathy.    SKIN/MUSCULOSKELETAL:  Legs showing distended veins There is no evidence of excoriation marks or ecchmosis.  No rashes.  No cyanosis.  No clubbing.  No joint or skeletal deformities noted.  Normal range of motion.    NEUROLOGIC:  Higher functions are appropriate.  No cranial nerve deficits.  Normal coco.  Normal strength.  Motor and sensory functions are normal.  Deep tendon reflexes are normal.    GENITAL/RECTAL:  Exams are deferred.      Laboratory:     CBC:  Lab Results   Component Value Date    WBC 4.20 08/11/2020    RBC 5.61 08/11/2020    HGB 15.0 08/11/2020    HCT 46.1 08/11/2020    MCV 82 08/11/2020    MCH 26.8 (L) 08/11/2020    MCHC 32.6 08/11/2020    RDW 17.4 (H) 08/11/2020     (L) 08/11/2020    MPV 8.9 (L) 08/11/2020    GRAN 2.6 08/11/2020    GRAN 61.2 08/11/2020    LYMPH 1.0 08/11/2020    LYMPH 23.9 08/11/2020    MONO 0.5 08/11/2020    MONO 12.0 08/11/2020    EOS 0.1 08/11/2020    BASO 0.00 08/11/2020    EOSINOPHIL 2.0 08/11/2020    BASOPHIL 0.9 08/11/2020       BMP: BMP  Lab Results   Component Value Date     08/11/2020    K 3.7 08/11/2020     08/11/2020    CO2 22 (L) 08/11/2020    BUN 24 (H) 08/11/2020    CREATININE 1.6 (H) 08/11/2020    CALCIUM 8.5 (L) 08/11/2020    ANIONGAP 11 08/11/2020    ESTGFRAFRICA 44.4 (A) 08/11/2020    EGFRNONAA 38.4 (A) 08/11/2020       LFT:   Lab Results   Component Value Date    ALT 82 (H) 08/11/2020    AST 56 (H) 08/11/2020    ALKPHOS 98 08/11/2020    BILITOT 1.0 08/11/2020     Impression August 2019 ultrasound       Considerable interval decrease of the deep venous thrombosis right femoral vein compared to the prior exam with just very small amount of  residual eccentric thrombus or wall thickening proximal femoral vein today.     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   Impression: 8/2020     The gallbladder demonstrates sludge without discrete gallstones, there is gallbladder wall thickening however there is no evidence for pericholecystic fluid, or biliary dilatation and the technologist indicates a negative sonographic Amor sign.  Close clinical and historical correlation otherwise needed.     Enlarged appearance of the liver and spleen with mild increased echogenicity of the liver that may relate to diffuse fatty infiltrate.     Abdominal aortic aneurysm now measures up to 3.9 cm compared to 3.4 cm on the prior study, suggesting interval enlargement, close clinical and historical correlation and follow-up is recommended.    Impression: 8/2020     No evidence of deep venous thrombosis in either lower extremity.  Assessment/Plan:      DVT right leg with sedentary lifestyle repeat ultrasounds a year of anticoagulant  shows DVT resolved   Okay to discontinue Xarelto patient needs to ambulate more exercises legs more I have want them that sedentary lifestyle could cause recurrence of DVT  Will proceed with hypercoagulable workup rtc to clinic to discuss result   start b12 sl 1000 mcg daily b12 level in 250 range  Patient was recently in the emergency room with UTI symptoms these have resolved now without antibiotics I have advised them to call me if there are symptoms to call me we can assist them outpatient  Continue PCP for fatty liver diabetes depression chronic kidney disease follow with Urology for prostate cancer continue with obstructive sleep apnea follow-up continue weight loss attempts  Continue management of dyslipidemia and hypertension  Managed venous stasis with elevating legs and stockings  His diabetes remains out of control because of his eating habits  patient has chronic  diarrhea and attributes that to his diet and prostate radiation patient counseled about weight loss and diabetes management  Advance Care planning/ directives /living will/patient's wishes discussed with patient.  Patient has been given guidelines and instructions on completing these directives  COVID social distancing, face mask use, hand washing techniques and personal hygiene routine discussed with patient  Good exercise, nutrition and weight management discussed with patient  Health maintenance activities and follow-up with PCPs recommendations discussed with patient

## 2020-08-18 ENCOUNTER — DOCUMENTATION ONLY (OUTPATIENT)
Dept: HEMATOLOGY/ONCOLOGY | Facility: CLINIC | Age: 85
End: 2020-08-18

## 2020-09-04 ENCOUNTER — LAB VISIT (OUTPATIENT)
Dept: LAB | Facility: HOSPITAL | Age: 85
End: 2020-09-04
Attending: FAMILY MEDICINE
Payer: MEDICARE

## 2020-09-04 DIAGNOSIS — D50.9 IRON DEFICIENCY ANEMIA, UNSPECIFIED IRON DEFICIENCY ANEMIA TYPE: ICD-10-CM

## 2020-09-04 DIAGNOSIS — E55.9 VITAMIN D DEFICIENCY: ICD-10-CM

## 2020-09-04 DIAGNOSIS — E78.5 HYPERLIPIDEMIA LDL GOAL <70: ICD-10-CM

## 2020-09-04 DIAGNOSIS — N18.30 CHRONIC KIDNEY DISEASE, STAGE III (MODERATE): ICD-10-CM

## 2020-09-04 DIAGNOSIS — E78.5 HYPERLIPIDEMIA LDL GOAL <70: Primary | ICD-10-CM

## 2020-09-04 LAB
25(OH)D3+25(OH)D2 SERPL-MCNC: 30 NG/ML (ref 30–96)
ALBUMIN SERPL BCP-MCNC: 3.7 G/DL (ref 3.5–5.2)
ALP SERPL-CCNC: 71 U/L (ref 55–135)
ALT SERPL W/O P-5'-P-CCNC: 21 U/L (ref 10–44)
ANION GAP SERPL CALC-SCNC: 7 MMOL/L (ref 8–16)
AST SERPL-CCNC: 20 U/L (ref 10–40)
BASOPHILS # BLD AUTO: 0.06 K/UL (ref 0–0.2)
BASOPHILS NFR BLD: 0.7 % (ref 0–1.9)
BILIRUB SERPL-MCNC: 0.6 MG/DL (ref 0.1–1)
BUN SERPL-MCNC: 29 MG/DL (ref 8–23)
CALCIUM SERPL-MCNC: 9.4 MG/DL (ref 8.7–10.5)
CHLORIDE SERPL-SCNC: 105 MMOL/L (ref 95–110)
CHOLEST SERPL-MCNC: 135 MG/DL (ref 120–199)
CHOLEST/HDLC SERPL: 4.1 {RATIO} (ref 2–5)
CO2 SERPL-SCNC: 26 MMOL/L (ref 23–29)
CREAT SERPL-MCNC: 1.8 MG/DL (ref 0.5–1.4)
DIFFERENTIAL METHOD: ABNORMAL
EOSINOPHIL # BLD AUTO: 0.2 K/UL (ref 0–0.5)
EOSINOPHIL NFR BLD: 2.2 % (ref 0–8)
ERYTHROCYTE [DISTWIDTH] IN BLOOD BY AUTOMATED COUNT: 17.7 % (ref 11.5–14.5)
EST. GFR  (AFRICAN AMERICAN): 38.5 ML/MIN/1.73 M^2
EST. GFR  (NON AFRICAN AMERICAN): 33.3 ML/MIN/1.73 M^2
FERRITIN SERPL-MCNC: 92 NG/ML (ref 20–300)
GLUCOSE SERPL-MCNC: 144 MG/DL (ref 70–110)
HCT VFR BLD AUTO: 52.6 % (ref 40–54)
HDLC SERPL-MCNC: 33 MG/DL (ref 40–75)
HDLC SERPL: 24.4 % (ref 20–50)
HGB BLD-MCNC: 15.5 G/DL (ref 14–18)
IMM GRANULOCYTES # BLD AUTO: 0.07 K/UL (ref 0–0.04)
IMM GRANULOCYTES NFR BLD AUTO: 0.9 % (ref 0–0.5)
IRON SERPL-MCNC: 61 UG/DL (ref 45–160)
LDLC SERPL CALC-MCNC: 74 MG/DL (ref 63–159)
LYMPHOCYTES # BLD AUTO: 1.5 K/UL (ref 1–4.8)
LYMPHOCYTES NFR BLD: 18.7 % (ref 18–48)
MCH RBC QN AUTO: 26.1 PG (ref 27–31)
MCHC RBC AUTO-ENTMCNC: 29.5 G/DL (ref 32–36)
MCV RBC AUTO: 89 FL (ref 82–98)
MONOCYTES # BLD AUTO: 0.6 K/UL (ref 0.3–1)
MONOCYTES NFR BLD: 7.9 % (ref 4–15)
NEUTROPHILS # BLD AUTO: 5.7 K/UL (ref 1.8–7.7)
NEUTROPHILS NFR BLD: 69.6 % (ref 38–73)
NONHDLC SERPL-MCNC: 102 MG/DL
NRBC BLD-RTO: 0 /100 WBC
PLATELET # BLD AUTO: 152 K/UL (ref 150–350)
PMV BLD AUTO: 11.9 FL (ref 9.2–12.9)
POTASSIUM SERPL-SCNC: 4.6 MMOL/L (ref 3.5–5.1)
PROT SERPL-MCNC: 7.8 G/DL (ref 6–8.4)
RBC # BLD AUTO: 5.94 M/UL (ref 4.6–6.2)
SATURATED IRON: 18 % (ref 20–50)
SODIUM SERPL-SCNC: 138 MMOL/L (ref 136–145)
TOTAL IRON BINDING CAPACITY: 345 UG/DL (ref 250–450)
TRANSFERRIN SERPL-MCNC: 233 MG/DL (ref 200–375)
TRIGL SERPL-MCNC: 140 MG/DL (ref 30–150)
WBC # BLD AUTO: 8.13 K/UL (ref 3.9–12.7)

## 2020-09-04 PROCEDURE — 80061 LIPID PANEL: CPT

## 2020-09-04 PROCEDURE — 83540 ASSAY OF IRON: CPT

## 2020-09-04 PROCEDURE — 80053 COMPREHEN METABOLIC PANEL: CPT

## 2020-09-04 PROCEDURE — 82306 VITAMIN D 25 HYDROXY: CPT

## 2020-09-04 PROCEDURE — 85025 COMPLETE CBC W/AUTO DIFF WBC: CPT

## 2020-09-04 PROCEDURE — 82728 ASSAY OF FERRITIN: CPT

## 2020-09-04 PROCEDURE — 36415 COLL VENOUS BLD VENIPUNCTURE: CPT | Mod: PO

## 2020-09-09 ENCOUNTER — OFFICE VISIT (OUTPATIENT)
Dept: FAMILY MEDICINE | Facility: CLINIC | Age: 85
End: 2020-09-09
Payer: MEDICARE

## 2020-09-09 VITALS
OXYGEN SATURATION: 95 % | BODY MASS INDEX: 33.78 KG/M2 | RESPIRATION RATE: 14 BRPM | HEIGHT: 67 IN | TEMPERATURE: 99 F | SYSTOLIC BLOOD PRESSURE: 120 MMHG | WEIGHT: 215.19 LBS | DIASTOLIC BLOOD PRESSURE: 70 MMHG | HEART RATE: 72 BPM

## 2020-09-09 DIAGNOSIS — N18.30 CHRONIC KIDNEY DISEASE, STAGE III (MODERATE): ICD-10-CM

## 2020-09-09 DIAGNOSIS — G56.03 BILATERAL CARPAL TUNNEL SYNDROME: ICD-10-CM

## 2020-09-09 DIAGNOSIS — Z86.718 HISTORY OF DVT OF LOWER EXTREMITY: ICD-10-CM

## 2020-09-09 DIAGNOSIS — Z01.00 DIABETIC EYE EXAM: ICD-10-CM

## 2020-09-09 DIAGNOSIS — C61 PROSTATE CANCER: ICD-10-CM

## 2020-09-09 DIAGNOSIS — J43.2 CENTRILOBULAR EMPHYSEMA: ICD-10-CM

## 2020-09-09 DIAGNOSIS — E11.9 DIABETIC EYE EXAM: ICD-10-CM

## 2020-09-09 DIAGNOSIS — D50.9 IRON DEFICIENCY ANEMIA, UNSPECIFIED IRON DEFICIENCY ANEMIA TYPE: ICD-10-CM

## 2020-09-09 DIAGNOSIS — I15.2 HYPERTENSION ASSOCIATED WITH DIABETES: ICD-10-CM

## 2020-09-09 DIAGNOSIS — N18.30 CONTROLLED TYPE 2 DIABETES MELLITUS WITH STAGE 3 CHRONIC KIDNEY DISEASE, WITHOUT LONG-TERM CURRENT USE OF INSULIN: ICD-10-CM

## 2020-09-09 DIAGNOSIS — E66.9 OBESITY (BMI 30-39.9): ICD-10-CM

## 2020-09-09 DIAGNOSIS — E11.59 HYPERTENSION ASSOCIATED WITH DIABETES: ICD-10-CM

## 2020-09-09 DIAGNOSIS — E78.5 HYPERLIPIDEMIA LDL GOAL <70: ICD-10-CM

## 2020-09-09 DIAGNOSIS — K76.0 FATTY LIVER: ICD-10-CM

## 2020-09-09 DIAGNOSIS — J44.9 CHRONIC OBSTRUCTIVE PULMONARY DISEASE, UNSPECIFIED COPD TYPE: ICD-10-CM

## 2020-09-09 DIAGNOSIS — I71.40 ABDOMINAL AORTIC ANEURYSM (AAA) WITHOUT RUPTURE: Primary | ICD-10-CM

## 2020-09-09 DIAGNOSIS — E55.9 VITAMIN D DEFICIENCY: ICD-10-CM

## 2020-09-09 DIAGNOSIS — E11.22 CONTROLLED TYPE 2 DIABETES MELLITUS WITH STAGE 3 CHRONIC KIDNEY DISEASE, WITHOUT LONG-TERM CURRENT USE OF INSULIN: ICD-10-CM

## 2020-09-09 PROBLEM — E66.01 SEVERE OBESITY (BMI 35.0-39.9) WITH COMORBIDITY: Status: RESOLVED | Noted: 2020-07-08 | Resolved: 2020-09-09

## 2020-09-09 PROCEDURE — 99999 PR PBB SHADOW E&M-EST. PATIENT-LVL IV: ICD-10-PCS | Mod: PBBFAC,,, | Performed by: FAMILY MEDICINE

## 2020-09-09 PROCEDURE — 99214 OFFICE O/P EST MOD 30 MIN: CPT | Mod: S$PBB,,, | Performed by: FAMILY MEDICINE

## 2020-09-09 PROCEDURE — 99214 PR OFFICE/OUTPT VISIT, EST, LEVL IV, 30-39 MIN: ICD-10-PCS | Mod: S$PBB,,, | Performed by: FAMILY MEDICINE

## 2020-09-09 PROCEDURE — 99214 OFFICE O/P EST MOD 30 MIN: CPT | Mod: PBBFAC,PO | Performed by: FAMILY MEDICINE

## 2020-09-09 PROCEDURE — 99999 PR PBB SHADOW E&M-EST. PATIENT-LVL IV: CPT | Mod: PBBFAC,,, | Performed by: FAMILY MEDICINE

## 2020-09-09 NOTE — PROGRESS NOTES
Subjective:       Patient ID: Siddharth Urias is a 86 y.o. male.    Chief Complaint: Follow-up (6mth f/u diabetes / hypertension)    HPI  Review of Systems   Constitutional: Negative for fatigue and unexpected weight change.   Respiratory: Negative for chest tightness and shortness of breath.    Cardiovascular: Negative for chest pain, palpitations and leg swelling.   Gastrointestinal: Negative for abdominal pain.   Musculoskeletal: Negative for arthralgias.   Neurological: Negative for dizziness, syncope, light-headedness and headaches.       Patient Active Problem List   Diagnosis    Depressive disorder, not elsewhere classified    Unspecified venous (peripheral) insufficiency    Prostate cancer    Hypertension associated with diabetes    Fatty liver    Irradiation cystitis    SANDRA (obstructive sleep apnea)    Hyperlipidemia LDL goal <70    Type 2 diabetes mellitus, controlled, with renal complications    Diabetic nephropathy    Chronic kidney disease, stage 3    Bilateral renal cysts    Obesity (BMI 30-39.9)    Radiation proctitis    NISH (iron deficiency anemia)    History of DVT of lower extremity    Centrilobular emphysema    COPD (chronic obstructive pulmonary disease)    Aortic calcification    Embolism and thrombosis of artery    Bilateral carpal tunnel syndrome    Vitamin D deficiency     Patient is here for a chronic conditions follow up.      EYe Dr SUYAPA denson 2 months ago    Reviewed labs vit d normal 9/2020    Seen in ED 8/2020 with fever and elevated LFTs. COVID screen neg. lfts returned to normal 9/20. 1 blood culture returned with micrococcus. Advised to go back to ED but refused since was doing better. U/s abd 8/2020 The gallbladder demonstrates sludge without discrete gallstones, there is gallbladder wall thickening however there is no evidence for pericholecystic fluid, or biliary dilatation and the technologist indicates a negative sonographic Amor sign.  Close clinical  and historical correlation otherwise needed.   Enlarged appearance of the liver and spleen with mild increased echogenicity of the liver that may relate to diffuse fatty infiltrate.   Abdominal aortic aneurysm now measures up to 3.9 cm compared to 3.4 cm on the prior study, suggesting interval enlargement, close clinical and historical correlation and follow-up is recommended.         Heme/onC Dr. Rodriguez Diagnosed with acute DVT due to sedentary activity 1/19 on xarelto. Recently discontinue 8/2020. Following for prostate cancer     GI Dr. Aguiar treating for post radiation proctitis after prostate ca treated 2010, chronic diarrhea    Cardiology Dr. Sullivan     Urology Dr. Mcrae-prostate cancer     Pulm Dr. Henry treating SANDRA, COPD     Nephro Dr. Calvert CKD stage 3     Endocrine (Dr. Whitney  In remote past) type 2 DM-last a1c 6.7 8/2020 -lipids at goal 9/2020. On pravastatin, ARB and Asa.  Eye exam with Dr. Quesada 8/15/19    Podiatry Dr. Gordon -last ov 10/18       Dr. Sears PM & R left CTS   Objective:      Physical Exam  Vitals signs and nursing note reviewed.   Constitutional:       Appearance: He is well-developed.   Cardiovascular:      Rate and Rhythm: Normal rate and regular rhythm.      Heart sounds: Normal heart sounds.   Pulmonary:      Effort: Pulmonary effort is normal.      Breath sounds: Normal breath sounds.   Skin:     General: Skin is warm and dry.   Neurological:      Mental Status: He is alert and oriented to person, place, and time.         Assessment:       1. Abdominal aortic aneurysm (AAA) without rupture    2. Diabetic eye exam    3. Hypertension associated with diabetes    4. Hyperlipidemia LDL goal <70    5. Centrilobular emphysema    6. Chronic obstructive pulmonary disease, unspecified COPD type    7. Chronic kidney disease, stage 3    8. History of DVT of lower extremity    9. Prostate cancer    10. Iron deficiency anemia, unspecified iron deficiency anemia type    11. Controlled type 2  "diabetes mellitus with stage 3 chronic kidney disease, without long-term current use of insulin    12. Obesity (BMI 30-39.9)    13. Fatty liver    14. Vitamin D deficiency    15. Bilateral carpal tunnel syndrome        Plan:       1. Abdominal aortic aneurysm (AAA) without rupture  Refer for consult and monitoring  - Ambulatory referral/consult to Vascular Surgery; Future    2. Diabetic eye exam  Done -get records    3. Hypertension associated with diabetes  Controlled on current medications.  Continue current medications.      4. Hyperlipidemia LDL goal <70  Controlled on current medications.  Continue current medications.    - Comprehensive metabolic panel; Future  - Lipid Panel; Future    5. Centrilobular emphysema  Cont current regimen    6. Chronic obstructive pulmonary disease, unspecified COPD type  Cont current regimen    7. Chronic kidney disease, stage 3  Stable and chronic.  Will continue to monitor q3-6 months and control chronic conditions as optimally as possible to preserve function.      8. History of DVT of lower extremity  Cont heme/onc coag work up and monitoring    9. Prostate cancer  Cont onc surveillance    10. Iron deficiency anemia, unspecified iron deficiency anemia type  Controlled on current medications.  Continue current medications.    - CBC auto differential; Future  - Ferritin; Future  - Iron and TIBC; Future    11. Controlled type 2 diabetes mellitus with stage 3 chronic kidney disease, without long-term current use of insulin  Controlled on current medications.  Continue current medications.    - Hemoglobin A1C; Future    12. Obesity (BMI 30-39.9)  Counseled patient on his ideal body weight, health consequences of being obese and current recommendations including weekly exercise and a heart healthy diet.  Current BMI is:Estimated body mass index is 33.7 kg/m² as calculated from the following:    Height as of this encounter: 5' 7" (1.702 m).    Weight as of this encounter: 97.6 kg " (215 lb 2.7 oz)..  Patient is aware that ideal BMI < 25 or Weight in (lb) to have BMI = 25: 159.3.        13. Fatty liver  lfts normal. Cont to monitor and avoid liver irritants    14. Vitamin D deficiency  Normal. Cont supplement and then monitor    15. Bilateral carpal tunnel syndrome  Cont current mgmt        Time spent with patient: 20 minutes    Patient with be reevaluated in 4 months or sooner prn    Greater than 50% of this visit was spent counseling as described in above documentation:Yes

## 2020-09-11 ENCOUNTER — PATIENT OUTREACH (OUTPATIENT)
Dept: ADMINISTRATIVE | Facility: HOSPITAL | Age: 85
End: 2020-09-11

## 2020-09-11 NOTE — LETTER
AUTHORIZATION FOR RELEASE OF   CONFIDENTIAL INFORMATION    Dear DR. GABRIEL,    We are seeing Siddharth Urias, date of birth 3/25/1934, in the clinic at [facility]. Martine Maxwell MD is the patient's PCP. Siddharth Urias has an outstanding lab/procedure at the time we reviewed his chart. In order to help keep his health information updated, he has authorized us to request the following medical record(s):        (  )  MAMMOGRAM                                      (  )  COLONOSCOPY      (  )  PAP SMEAR                                          (  )  OUTSIDE LAB RESULTS     (  )  DEXA SCAN                                          ( X )  EYE EXAM            (  )  FOOT EXAM                                          (  )  ENTIRE RECORD     (  )  OUTSIDE IMMUNIZATIONS                 (  )  _______________         Please fax records to [facility], Amy L Hammons, MD, [phone]    Tanesha Grossman LPN  Clinical Care Coordinator  [address] [address]  P: [phone]  F: [phone]            Patient Name: Siddharth Urias  : 3/25/1934  Patient Phone #: [phone]

## 2020-09-14 ENCOUNTER — PATIENT OUTREACH (OUTPATIENT)
Dept: ADMINISTRATIVE | Facility: HOSPITAL | Age: 85
End: 2020-09-14

## 2020-09-17 ENCOUNTER — OFFICE VISIT (OUTPATIENT)
Dept: HEMATOLOGY/ONCOLOGY | Facility: CLINIC | Age: 85
End: 2020-09-17
Payer: MEDICARE

## 2020-09-17 VITALS
BODY MASS INDEX: 33.53 KG/M2 | SYSTOLIC BLOOD PRESSURE: 136 MMHG | TEMPERATURE: 98 F | WEIGHT: 214.06 LBS | HEART RATE: 74 BPM | OXYGEN SATURATION: 94 % | DIASTOLIC BLOOD PRESSURE: 70 MMHG | RESPIRATION RATE: 18 BRPM

## 2020-09-17 DIAGNOSIS — I15.2 HYPERTENSION ASSOCIATED WITH DIABETES: ICD-10-CM

## 2020-09-17 DIAGNOSIS — I82.411 ACUTE DEEP VEIN THROMBOSIS (DVT) OF FEMORAL VEIN OF RIGHT LOWER EXTREMITY: Primary | ICD-10-CM

## 2020-09-17 DIAGNOSIS — C61 PROSTATE CANCER: ICD-10-CM

## 2020-09-17 DIAGNOSIS — N41.9 INFLAMMATORY DISEASE OF PROSTATE, UNSPECIFIED: ICD-10-CM

## 2020-09-17 DIAGNOSIS — E11.59 HYPERTENSION ASSOCIATED WITH DIABETES: ICD-10-CM

## 2020-09-17 DIAGNOSIS — G47.33 OSA (OBSTRUCTIVE SLEEP APNEA): ICD-10-CM

## 2020-09-17 DIAGNOSIS — D50.0 IRON DEFICIENCY ANEMIA DUE TO CHRONIC BLOOD LOSS: ICD-10-CM

## 2020-09-17 PROCEDURE — 99213 OFFICE O/P EST LOW 20 MIN: CPT | Mod: S$PBB,,, | Performed by: INTERNAL MEDICINE

## 2020-09-17 PROCEDURE — 99999 PR PBB SHADOW E&M-EST. PATIENT-LVL V: ICD-10-PCS | Mod: PBBFAC,,, | Performed by: INTERNAL MEDICINE

## 2020-09-17 PROCEDURE — 99999 PR PBB SHADOW E&M-EST. PATIENT-LVL V: CPT | Mod: PBBFAC,,, | Performed by: INTERNAL MEDICINE

## 2020-09-17 PROCEDURE — 99213 PR OFFICE/OUTPT VISIT, EST, LEVL III, 20-29 MIN: ICD-10-PCS | Mod: S$PBB,,, | Performed by: INTERNAL MEDICINE

## 2020-09-17 PROCEDURE — 99215 OFFICE O/P EST HI 40 MIN: CPT | Mod: PBBFAC,PO | Performed by: INTERNAL MEDICINE

## 2020-09-18 NOTE — PROGRESS NOTES
Subjective:       Patient ID: Siddharth Urias is a 86 y.o. male.  Here to discuss hypercoagulable workup ordered  Chief Complaints:  patient with history of DVT with repeat ultrasound   swelling went for ultrasound showed DVT patient has been on anticoagulant for 6 months repeat ultrasound  recheck came off anticoagulant    the patient's wife also hit her head sometime ago, with a subdural which has resolved  mpression 2019       Positive examination demonstrating DVT within the right common femoral and proximal femoral veins.      onXarelto here  For follow-up  No prior history  No family history  of hypercoagulability    In clinic with his wife of 60 years  HPI:   Patient states he is quite sedentary he does not ambulate much his legs do not hurt  He has no shortness of breath discomfort fever chills or rigors at this time  He feels well attempts to ambulate more  Eager to know his hypercoagulable workup  Denies chest pain palpitations dizziness  Denies altered bowel bladder habits  Denies headaches blurring of vision passing out episodes  Social History     Socioeconomic History    Marital status:      Spouse name: Not on file    Number of children: Not on file    Years of education: Not on file    Highest education level: Not on file   Occupational History    Not on file   Social Needs    Financial resource strain: Not on file    Food insecurity     Worry: Not on file     Inability: Not on file    Transportation needs     Medical: Not on file     Non-medical: Not on file   Tobacco Use    Smoking status: Former Smoker     Packs/day: 1.50     Years: 60.00     Pack years: 90.00     Types: Cigarettes     Quit date: 2008     Years since quittin.7    Smokeless tobacco: Former User     Quit date: 3/11/2010   Substance and Sexual Activity    Alcohol use: Yes     Comment: social    Drug use: No    Sexual activity: Not on file   Lifestyle    Physical activity     Days per week: Not on  file     Minutes per session: Not on file    Stress: Not on file   Relationships    Social connections     Talks on phone: Not on file     Gets together: Not on file     Attends Oriental orthodox service: Not on file     Active member of club or organization: Not on file     Attends meetings of clubs or organizations: Not on file     Relationship status: Not on file   Other Topics Concern    Not on file   Social History Narrative    Not on file     Family History   Problem Relation Age of Onset    Diabetes Mother      Past Surgical History:   Procedure Laterality Date    APPENDECTOMY      CATARACT EXTRACTION Bilateral     COLONOSCOPY N/A 12/14/2018    Procedure: COLONOSCOPY;  Surgeon: Noel Aguiar MD;  Location: [facility];  Service: Endoscopy;  Laterality: N/A;    ESOPHAGOGASTRODUODENOSCOPY N/A 1/24/2019    Procedure: EGD (ESOPHAGOGASTRODUODENOSCOPY);  Surgeon: Noel Aguiar MD;  Location: [facility];  Service: Endoscopy;  Laterality: N/A;    FLEXIBLE SIGMOIDOSCOPY N/A 1/22/2019    Procedure: SIGMOIDOSCOPY, FLEXIBLE;  Surgeon: Noel Aguiar MD;  Location: [facility];  Service: Endoscopy;  Laterality: N/A;    HERNIA REPAIR      bilateral inguinal    TONSILLECTOMY       Past Medical History:   Diagnosis Date    Arthritis     Benign hypertension     Diabetes mellitus, type 2     2/2011    Fatty liver     Irradiation cystitis     SANDRA (obstructive sleep apnea)     no    Prostate cancer 2009    s/p XRT (T1C, Maple 8)    Radiation proctitis        Current Outpatient Medications:     albuterol sulfate (PROAIR RESPICLICK) 90 mcg/actuation AePB, Inhale 2 puffs into the lungs every 4 (four) hours as needed. Rescue, Disp: 1 each, Rfl: 11    albuterol-ipratropium (DUO-NEB) 2.5 mg-0.5 mg/3 mL nebulizer solution, Take 3 mLs by nebulization every 6 (six) hours as needed for Wheezing or Shortness of Breath. Rescue, Disp: 120 vial, Rfl: 11    allopurinol (ZYLOPRIM) 300 MG tablet, Take 300 mg by mouth once  daily., Disp: , Rfl:     aspirin (AILYN CHEWABLE ASPIRIN) 81 MG Chew, Take 81 mg by mouth once daily., Disp: , Rfl:     Bifidobacterium infantis (ALIGN ORAL), Take by mouth., Disp: , Rfl:     ciclopirox (PENLAC) 8 % Soln, Apply topically nightly., Disp: 6.6 mL, Rfl: 11    ergocalciferol (ERGOCALCIFEROL) 50,000 unit Cap, Take 1 capsule (50,000 Units total) by mouth every 7 days., Disp: 12 capsule, Rfl: 1    fluticasone propionate (FLONASE) 50 mcg/actuation nasal spray, 1 spray (50 mcg total) by Each Nare route once daily., Disp: 1 Bottle, Rfl: 3    fluticasone-umeclidin-vilanter (TRELEGY ELLIPTA) 100-62.5-25 mcg DsDv, Inhale 1 puff into the lungs once daily., Disp: 180 each, Rfl: 3    JANUVIA 100 mg Tab, Take 1 tablet (100 mg total) by mouth once daily., Disp: 90 tablet, Rfl: 3    lidocaine HCL 2% (XYLOCAINE) 2 % jelly, Apply topically as needed. Apply topically once nightly to affected part of foot/feet., Disp: 30 mL, Rfl: 2    losartan (COZAAR) 25 MG tablet, Take 1 tablet (25 mg total) by mouth once daily., Disp: 90 tablet, Rfl: 3    multivitamin with minerals tablet, Take 1 tablet by mouth once daily., Disp: , Rfl:     pravastatin (PRAVACHOL) 40 MG tablet, Take 1 tablet (40 mg total) by mouth once daily., Disp: 90 tablet, Rfl: 3    solifenacin (VESICARE) 5 MG tablet, Take 1 tablet (5 mg total) by mouth once daily., Disp: 90 tablet, Rfl: 3    tamsulosin (FLOMAX) 0.4 mg Cap, Take 1 capsule (0.4 mg total) by mouth once daily., Disp: 90 capsule, Rfl: 3    cholestyramine-aspartame (CHOLESTYRAMINE LIGHT) 4 gram PwPk, Take 1 packet (4 g total) by mouth 3 (three) times daily. (Patient not taking: Reported on 8/14/2020), Disp: 270 packet, Rfl: 3    loratadine (CLARITIN) 10 mg tablet, Take 1 tablet (10 mg total) by mouth once daily. (Patient not taking: Reported on 8/14/2020), Disp: , Rfl: 0    pantoprazole (PROTONIX) 40 MG tablet, Take 1 tablet (40 mg total) by mouth once daily., Disp: 90 tablet, Rfl:  3  Review of patient's allergies indicates:   Allergen Reactions    No known drug allergies          REVIEW OF SYSTEMS:     CONSTITUTIONAL:  Elderly gentleman The patient denies any weight change. There is no apparent    change in appetite, fever, night sweats, headaches, fatigue, dizziness, or    weakness.      SKIN: Denies rash, issues with nails, non-healing sores, bleeding, blotching    skin or abnormal bruising. Denies new moles or changes to existing moles.      BREASTS: There is no swelling around breasts or nipple discharge.    EYES: Denies eye pain, blurred vision, swelling, redness or discharge.      ENT AND MOUTH: Denies runny nose, stuffiness, sinus trouble or sores. Denies    nosebleeds. Denies, hoarseness, change in voice or swelling in front of the    neck.      CARDIOVASCULAR: Denies chest pain, discomfort or palpitations. Denies neck    swelling or episodes of passing out.      RESPIRATORY: Denies cough, sputum production, blood in sputum, and denies    shortness of breath.      GI: Denies trouble swallowing, indigestion, heartburn, abdominal pain, nausea,  or  vomiting, has significant diarrhea since radiation was completed for prostate cancer, no blood in stool, discoloration of  stools, or increased abdominal girth.      GENITOURINARY: No discharge. No pelvic pain or lumps. No rash around groin or  lesions. Has urinary frequency, occasional hesitation, no painful urination or blood in    urine. Denies incontinence. No problems with intercourse.      MUSCULOSKELETA occasional  neck or back pain. Denies weakness in arms or legs,     Denies swelling or abnormal glands.  Some swelling of right lower extremity multiple distended veins with discoloration to feet from venous stasis    NEUROLOGICAL: Denies tingling, numbness, altered mentation changes to nerve    function in the face, weakness to one or both of the body. Denies changes to    gait and denies multiple falls or accidents.      PSYCHIATRIC:  Denies nervousness, anxiety, hallucinations, depression, suicidal    ideation, trouble sleeping or changes in behavior noticed by family.          PHYSICAL EXAM:     Wt Readings from Last 3 Encounters:   09/17/20 97.1 kg (214 lb 1.1 oz)   09/09/20 97.6 kg (215 lb 2.7 oz)   08/14/20 98 kg (216 lb 0.8 oz)     Temp Readings from Last 3 Encounters:   09/17/20 97.9 °F (36.6 °C) (Temporal)   09/09/20 98.6 °F (37 °C) (Oral)   08/14/20 97.2 °F (36.2 °C) (Temporal)     BP Readings from Last 3 Encounters:   09/17/20 136/70   09/09/20 120/70   08/14/20 119/75     Pulse Readings from Last 3 Encounters:   09/17/20 74   09/09/20 72   08/14/20 80     GENERAL: Comfortable looking patient.  Elderly gentleman Patient is in no distress. Morbidly obese  Awake, alert and oriented to time, person and place.  No anxiety, or agitation.      HEENT: Normal conjunctivae and eyelids. WNL.  PERRLA 3 to 4 mm. No icterus, no pallor, no congestion, and no discharge noted.     NECK:  Supple. Trachea is central.  No crepitus.  No JVD or masses.    RESPIRATORY:  No intercostal retractions.  No dullness to percussion.  Chest is clear to auscultation.  No rales, rhonchi or wheezes.  No crepitus.  Good air entry bilaterally.    CARDIOVASCULAR:  S1 and S2 are normally heard without murmurs or gallops.  All peripheral pulses are present.    ABDOMEN:  Normal abdomen.  No hepatosplenomegaly.  No free fluid.  Bowel sounds are present.  No hernia noted. No masses.  No rebound or tenderness.  No guarding or rigidity.  Umbilicus is midline.    LYMPHATICS:  No axillary, cervical, supraclavicular, submental, or inguinal lymphadenopathy.    SKIN/MUSCULOSKELETAL:  Legs showing distended veins There is no evidence of excoriation marks or ecchmosis.  No rashes.  No cyanosis.  No clubbing.  No joint or skeletal deformities noted.  Normal range of motion.    NEUROLOGIC:  Higher functions are appropriate.  No cranial nerve deficits.  Normal coco.  Normal strength.   Motor and sensory functions are normal.  Deep tendon reflexes are normal.    GENITAL/RECTAL:  Exams are deferred.      Laboratory:     CBC:  Lab Results   Component Value Date    WBC 8.13 09/04/2020    RBC 5.94 09/04/2020    HGB 15.5 09/04/2020    HCT 52.6 09/04/2020    MCV 89 09/04/2020    MCH 26.1 (L) 09/04/2020    MCHC 29.5 (L) 09/04/2020    RDW 17.7 (H) 09/04/2020     09/04/2020    MPV 11.9 09/04/2020    GRAN 5.7 09/04/2020    GRAN 69.6 09/04/2020    LYMPH 1.5 09/04/2020    LYMPH 18.7 09/04/2020    MONO 0.6 09/04/2020    MONO 7.9 09/04/2020    EOS 0.2 09/04/2020    BASO 0.06 09/04/2020    EOSINOPHIL 2.2 09/04/2020    BASOPHIL 0.7 09/04/2020       BMP: BMP  Lab Results   Component Value Date     09/04/2020    K 4.6 09/04/2020     09/04/2020    CO2 26 09/04/2020    BUN 29 (H) 09/04/2020    CREATININE 1.8 (H) 09/04/2020    CALCIUM 9.4 09/04/2020    ANIONGAP 7 (L) 09/04/2020    ESTGFRAFRICA 38.5 (A) 09/04/2020    EGFRNONAA 33.3 (A) 09/04/2020       LFT:   Lab Results   Component Value Date    ALT 21 09/04/2020    AST 20 09/04/2020    ALKPHOS 71 09/04/2020    BILITOT 0.6 09/04/2020     Impression August 2019 ultrasound       Considerable interval decrease of the deep venous thrombosis right femoral vein compared to the prior exam with just very small amount of residual eccentric thrombus or wall thickening proximal femoral vein today.     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   Impression: 8/2020     The gallbladder demonstrates sludge without discrete gallstones, there is gallbladder wall thickening however there is no evidence for pericholecystic fluid, or biliary dilatation and the technologist indicates a negative sonographic Amor sign.  Close clinical and historical correlation otherwise needed.     Enlarged appearance of the liver and spleen with mild increased echogenicity of the liver that may  relate to diffuse fatty infiltrate.     Abdominal aortic aneurysm now measures up to 3.9 cm compared to 3.4 cm on the prior study, suggesting interval enlargement, close clinical and historical correlation and follow-up is recommended.    Impression: 8/2020     No evidence of deep venous thrombosis in either lower extremity.  Assessment/Plan:      DVT right leg with sedentary lifestyle repeat ultrasounds a year of anticoagulant  shows DVT resolved   Okay to discontinue Xarelto patient needs to ambulate more exercises legs more I have want them that sedentary lifestyle could cause recurrence of DVT   hypercoagulable workup partially resulted rtc to clinic to discuss result 2 weeks   start b12 sl 1000 mcg daily b12 level in 250 range  Patient was recently in the emergency room with UTI symptoms these have resolved now without antibiotics I have advised them to call me if there are symptoms to call me we can assist them outpatient  Continue PCP for fatty liver diabetes depression chronic kidney disease follow with Urology for prostate cancer continue with obstructive sleep apnea follow-up continue weight loss attempts  Continue management of dyslipidemia and hypertension  Managed venous stasis with elevating legs and stockings  His diabetes remains out of control because of his eating habits patient has chronic  diarrhea and attributes that to his diet and prostate radiation patient counseled about weight loss and diabetes management  Advance Care planning/ directives /living will/patient's wishes discussed with patient.  Patient has been given guidelines and instructions on completing these directives  COVID social distancing, face mask use, hand washing techniques and personal hygiene routine discussed with patient  Good exercise, nutrition and weight management discussed with patient  Health maintenance activities and follow-up with PCPs recommendations discussed with patient

## 2020-09-29 ENCOUNTER — PATIENT MESSAGE (OUTPATIENT)
Dept: OTHER | Facility: OTHER | Age: 85
End: 2020-09-29

## 2020-10-22 ENCOUNTER — CLINICAL SUPPORT (OUTPATIENT)
Dept: FAMILY MEDICINE | Facility: CLINIC | Age: 85
End: 2020-10-22
Payer: MEDICARE

## 2020-10-22 PROCEDURE — 90662 IIV NO PRSV INCREASED AG IM: CPT | Mod: PBBFAC,PO

## 2020-10-23 ENCOUNTER — OFFICE VISIT (OUTPATIENT)
Dept: HEMATOLOGY/ONCOLOGY | Facility: CLINIC | Age: 85
End: 2020-10-23
Payer: MEDICARE

## 2020-10-23 VITALS
RESPIRATION RATE: 18 BRPM | BODY MASS INDEX: 34.01 KG/M2 | DIASTOLIC BLOOD PRESSURE: 73 MMHG | TEMPERATURE: 98 F | SYSTOLIC BLOOD PRESSURE: 139 MMHG | OXYGEN SATURATION: 93 % | WEIGHT: 217.13 LBS | HEART RATE: 81 BPM

## 2020-10-23 DIAGNOSIS — E53.8 B12 DEFICIENCY: ICD-10-CM

## 2020-10-23 DIAGNOSIS — E78.5 HYPERLIPIDEMIA LDL GOAL <70: ICD-10-CM

## 2020-10-23 DIAGNOSIS — Z86.718 HISTORY OF DVT OF LOWER EXTREMITY: ICD-10-CM

## 2020-10-23 DIAGNOSIS — G47.33 OSA (OBSTRUCTIVE SLEEP APNEA): Primary | ICD-10-CM

## 2020-10-23 DIAGNOSIS — N18.30 STAGE 3 CHRONIC KIDNEY DISEASE, UNSPECIFIED WHETHER STAGE 3A OR 3B CKD: ICD-10-CM

## 2020-10-23 PROCEDURE — 99999 PR PBB SHADOW E&M-EST. PATIENT-LVL IV: ICD-10-PCS | Mod: PBBFAC,,, | Performed by: INTERNAL MEDICINE

## 2020-10-23 PROCEDURE — 99214 PR OFFICE/OUTPT VISIT, EST, LEVL IV, 30-39 MIN: ICD-10-PCS | Mod: S$PBB,,, | Performed by: INTERNAL MEDICINE

## 2020-10-23 PROCEDURE — 99214 OFFICE O/P EST MOD 30 MIN: CPT | Mod: S$PBB,,, | Performed by: INTERNAL MEDICINE

## 2020-10-23 PROCEDURE — 99999 PR PBB SHADOW E&M-EST. PATIENT-LVL IV: CPT | Mod: PBBFAC,,, | Performed by: INTERNAL MEDICINE

## 2020-10-23 PROCEDURE — 99214 OFFICE O/P EST MOD 30 MIN: CPT | Mod: PBBFAC,PO | Performed by: INTERNAL MEDICINE

## 2020-10-23 NOTE — PROGRESS NOTES
Subjective:       Patient ID: Siddharth Urias is a 86 y.o. male.  Here to discuss hypercoagulable workup ordered  Chief Complaints:  patient with history of DVT with repeat ultrasound   swelling went for ultrasound showed DVT patient has been on anticoagulant for 6 months repeat ultrasound  recheck came off anticoagulant     mpression 2019       Positive examination demonstrating DVT within the right common femoral and proximal femoral veins.      onXarelto here  For follow-up  No prior history  No family history  of hypercoagulability      HPI:   Patient states he is quite sedentary he does not ambulate much his legs do not hurt  He has no shortness of breath discomfort fever chills or rigors at this time  He feels well attempts to ambulate more  Eager to know his hypercoagulable workup  Denies chest pain palpitations dizziness  Denies altered bowel bladder habits  Denies headaches blurring of vision passing out episodes  Social History     Socioeconomic History    Marital status:      Spouse name: Not on file    Number of children: Not on file    Years of education: Not on file    Highest education level: Not on file   Occupational History    Not on file   Social Needs    Financial resource strain: Not on file    Food insecurity     Worry: Not on file     Inability: Not on file    Transportation needs     Medical: Not on file     Non-medical: Not on file   Tobacco Use    Smoking status: Former Smoker     Packs/day: 1.50     Years: 60.00     Pack years: 90.00     Types: Cigarettes     Quit date: 2008     Years since quittin.8    Smokeless tobacco: Former User     Quit date: 3/11/2010   Substance and Sexual Activity    Alcohol use: Yes     Comment: social    Drug use: No    Sexual activity: Not on file   Lifestyle    Physical activity     Days per week: Not on file     Minutes per session: Not on file    Stress: Not on file   Relationships    Social connections     Talks on  phone: Not on file     Gets together: Not on file     Attends Zoroastrian service: Not on file     Active member of club or organization: Not on file     Attends meetings of clubs or organizations: Not on file     Relationship status: Not on file   Other Topics Concern    Not on file   Social History Narrative    Not on file     Family History   Problem Relation Age of Onset    Diabetes Mother      Past Surgical History:   Procedure Laterality Date    APPENDECTOMY      CATARACT EXTRACTION Bilateral     COLONOSCOPY N/A 12/14/2018    Procedure: COLONOSCOPY;  Surgeon: Noel Aguiar MD;  Location: [facility] ENDO;  Service: Endoscopy;  Laterality: N/A;    ESOPHAGOGASTRODUODENOSCOPY N/A 1/24/2019    Procedure: EGD (ESOPHAGOGASTRODUODENOSCOPY);  Surgeon: Noel Aguiar MD;  Location: [facility] ENDO;  Service: Endoscopy;  Laterality: N/A;    FLEXIBLE SIGMOIDOSCOPY N/A 1/22/2019    Procedure: SIGMOIDOSCOPY, FLEXIBLE;  Surgeon: Noel Aguiar MD;  Location: [facility] ENDO;  Service: Endoscopy;  Laterality: N/A;    HERNIA REPAIR      bilateral inguinal    TONSILLECTOMY       Past Medical History:   Diagnosis Date    Arthritis     Benign hypertension     Diabetes mellitus, type 2     2/2011    Fatty liver     Irradiation cystitis     SANDRA (obstructive sleep apnea)     no    Prostate cancer 2009    s/p XRT (T1C, [place] 8)    Radiation proctitis        Current Outpatient Medications:     albuterol sulfate (PROAIR RESPICLICK) 90 mcg/actuation AePB, Inhale 2 puffs into the lungs every 4 (four) hours as needed. Rescue, Disp: 1 each, Rfl: 11    albuterol-ipratropium (DUO-NEB) 2.5 mg-0.5 mg/3 mL nebulizer solution, Take 3 mLs by nebulization every 6 (six) hours as needed for Wheezing or Shortness of Breath. Rescue, Disp: 120 vial, Rfl: 11    allopurinol (ZYLOPRIM) 300 MG tablet, Take 300 mg by mouth once daily., Disp: , Rfl:     aspirin (AILYN CHEWABLE ASPIRIN) 81 MG Chew, Take 81 mg by mouth once daily., Disp: , Rfl:      Bifidobacterium infantis (ALIGN ORAL), Take by mouth., Disp: , Rfl:     cholestyramine-aspartame (CHOLESTYRAMINE LIGHT) 4 gram PwPk, Take 1 packet (4 g total) by mouth 3 (three) times daily. (Patient not taking: Reported on 8/14/2020), Disp: 270 packet, Rfl: 3    ciclopirox (PENLAC) 8 % Soln, Apply topically nightly., Disp: 6.6 mL, Rfl: 11    ergocalciferol (ERGOCALCIFEROL) 50,000 unit Cap, Take 1 capsule (50,000 Units total) by mouth every 7 days., Disp: 12 capsule, Rfl: 1    fluticasone propionate (FLONASE) 50 mcg/actuation nasal spray, 1 spray (50 mcg total) by Each Nare route once daily., Disp: 1 Bottle, Rfl: 3    fluticasone-umeclidin-vilanter (TRELEGY ELLIPTA) 100-62.5-25 mcg DsDv, Inhale 1 puff into the lungs once daily., Disp: 180 each, Rfl: 3    JANUVIA 100 mg Tab, Take 1 tablet (100 mg total) by mouth once daily., Disp: 90 tablet, Rfl: 3    lidocaine HCL 2% (XYLOCAINE) 2 % jelly, Apply topically as needed. Apply topically once nightly to affected part of foot/feet., Disp: 30 mL, Rfl: 2    loratadine (CLARITIN) 10 mg tablet, Take 1 tablet (10 mg total) by mouth once daily. (Patient not taking: Reported on 8/14/2020), Disp: , Rfl: 0    losartan (COZAAR) 25 MG tablet, Take 1 tablet (25 mg total) by mouth once daily., Disp: 90 tablet, Rfl: 3    multivitamin with minerals tablet, Take 1 tablet by mouth once daily., Disp: , Rfl:     pantoprazole (PROTONIX) 40 MG tablet, Take 1 tablet (40 mg total) by mouth once daily., Disp: 90 tablet, Rfl: 3    pravastatin (PRAVACHOL) 40 MG tablet, Take 1 tablet (40 mg total) by mouth once daily., Disp: 90 tablet, Rfl: 3    solifenacin (VESICARE) 5 MG tablet, Take 1 tablet (5 mg total) by mouth once daily., Disp: 90 tablet, Rfl: 3    tamsulosin (FLOMAX) 0.4 mg Cap, Take 1 capsule (0.4 mg total) by mouth once daily., Disp: 90 capsule, Rfl: 3  Review of patient's allergies indicates:   Allergen Reactions    No known drug allergies          REVIEW OF SYSTEMS:      CONSTITUTIONAL:  Elderly gentleman The patient denies any weight change. There is no apparent    change in appetite, fever, night sweats, headaches, fatigue, dizziness, or    weakness.      SKIN: Denies rash, issues with nails, non-healing sores, bleeding, blotching    skin or abnormal bruising. Denies new moles or changes to existing moles.      BREASTS: There is no swelling around breasts or nipple discharge.    EYES: Denies eye pain, blurred vision, swelling, redness or discharge.      ENT AND MOUTH: Denies runny nose, stuffiness, sinus trouble or sores. Denies    nosebleeds. Denies, hoarseness, change in voice or swelling in front of the    neck.      CARDIOVASCULAR: Denies chest pain, discomfort or palpitations. Denies neck    swelling or episodes of passing out.      RESPIRATORY: Denies cough, sputum production, blood in sputum, and denies    shortness of breath.      GI: Denies trouble swallowing, indigestion, heartburn, abdominal pain, nausea,  or  vomiting, has significant diarrhea since radiation was completed for prostate cancer, no blood in stool, discoloration of  stools, or increased abdominal girth.      GENITOURINARY: No discharge. No pelvic pain or lumps. No rash around groin or  lesions. Has urinary frequency, occasional hesitation, no painful urination or blood in    urine. Denies incontinence. No problems with intercourse.      MUSCULOSKELETA occasional  neck or back pain. Denies weakness in arms or legs,     Denies swelling or abnormal glands.  Some swelling of right lower extremity multiple distended veins with discoloration to feet from venous stasis    NEUROLOGICAL: Denies tingling, numbness, altered mentation changes to nerve    function in the face, weakness to one or both of the body. Denies changes to    gait and denies multiple falls or accidents.      PSYCHIATRIC: Denies nervousness, anxiety, hallucinations, depression, suicidal    ideation, trouble sleeping or changes in behavior  noticed by family.          PHYSICAL EXAM:     Wt Readings from Last 3 Encounters:   09/17/20 97.1 kg (214 lb 1.1 oz)   09/09/20 97.6 kg (215 lb 2.7 oz)   08/14/20 98 kg (216 lb 0.8 oz)     Temp Readings from Last 3 Encounters:   09/17/20 97.9 °F (36.6 °C) (Temporal)   09/09/20 98.6 °F (37 °C) (Oral)   08/14/20 97.2 °F (36.2 °C) (Temporal)     BP Readings from Last 3 Encounters:   09/17/20 136/70   09/09/20 120/70   08/14/20 119/75     Pulse Readings from Last 3 Encounters:   09/17/20 74   09/09/20 72   08/14/20 80     GENERAL: Comfortable looking patient.  Elderly gentleman Patient is in no distress. Morbidly obese  Awake, alert and oriented to time, person and place.  No anxiety, or agitation.      HEENT: Normal conjunctivae and eyelids. WNL.  PERRLA 3 to 4 mm. No icterus, no pallor, no congestion, and no discharge noted.     NECK:  Supple. Trachea is central.  No crepitus.  No JVD or masses.    RESPIRATORY:  No intercostal retractions.  No dullness to percussion.  Chest is clear to auscultation.  No rales, rhonchi or wheezes.  No crepitus.  Good air entry bilaterally.    CARDIOVASCULAR:  S1 and S2 are normally heard without murmurs or gallops.  All peripheral pulses are present.    ABDOMEN:  Normal abdomen.  No hepatosplenomegaly.  No free fluid.  Bowel sounds are present.  No hernia noted. No masses.  No rebound or tenderness.  No guarding or rigidity.  Umbilicus is midline.    LYMPHATICS:  No axillary, cervical, supraclavicular, submental, or inguinal lymphadenopathy.    SKIN/MUSCULOSKELETAL:  Legs showing distended veins There is no evidence of excoriation marks or ecchmosis.  No rashes.  No cyanosis.  No clubbing.  No joint or skeletal deformities noted.  Normal range of motion.    NEUROLOGIC:  Higher functions are appropriate.  No cranial nerve deficits.  Normal coco.  Normal strength.  Motor and sensory functions are normal.  Deep tendon reflexes are normal.    GENITAL/RECTAL:  Exams are deferred.       Laboratory:     CBC:  Lab Results   Component Value Date    WBC 8.13 09/04/2020    RBC 5.94 09/04/2020    HGB 15.5 09/04/2020    HCT 52.6 09/04/2020    MCV 89 09/04/2020    MCH 26.1 (L) 09/04/2020    MCHC 29.5 (L) 09/04/2020    RDW 17.7 (H) 09/04/2020     09/04/2020    MPV 11.9 09/04/2020    GRAN 5.7 09/04/2020    GRAN 69.6 09/04/2020    LYMPH 1.5 09/04/2020    LYMPH 18.7 09/04/2020    MONO 0.6 09/04/2020    MONO 7.9 09/04/2020    EOS 0.2 09/04/2020    BASO 0.06 09/04/2020    EOSINOPHIL 2.2 09/04/2020    BASOPHIL 0.7 09/04/2020       BMP: BMP  Lab Results   Component Value Date     09/04/2020    K 4.6 09/04/2020     09/04/2020    CO2 26 09/04/2020    BUN 29 (H) 09/04/2020    CREATININE 1.8 (H) 09/04/2020    CALCIUM 9.4 09/04/2020    ANIONGAP 7 (L) 09/04/2020    ESTGFRAFRICA 38.5 (A) 09/04/2020    EGFRNONAA 33.3 (A) 09/04/2020       LFT:   Lab Results   Component Value Date    ALT 21 09/04/2020    AST 20 09/04/2020    ALKPHOS 71 09/04/2020    BILITOT 0.6 09/04/2020     Impression August 2019 ultrasound       Considerable interval decrease of the deep venous thrombosis right femoral vein compared to the prior exam with just very small amount of residual eccentric thrombus or wall thickening proximal femoral vein today.     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   Impression: 8/2020     The gallbladder demonstrates sludge without discrete gallstones, there is gallbladder wall thickening however there is no evidence for pericholecystic fluid, or biliary dilatation and the technologist indicates a negative sonographic Amor sign.  Close clinical and historical correlation otherwise needed.     Enlarged appearance of the liver and spleen with mild increased echogenicity of the liver that may relate to diffuse fatty infiltrate.     Abdominal aortic aneurysm now measures up to 3.9 cm compared to 3.4 cm on the  prior study, suggesting interval enlargement, close clinical and historical correlation and follow-up is recommended.    Impression: 8/2020     No evidence of deep venous thrombosis in either lower extremity.  Five Leiden negative prothrombin mutation negative, protein C normal, anti thrombin 3 normal, protein S normal homocystine is normal  Patient's anticardiolipin level is 13.4 marginally elevated and therefore is an inconclusive results  PSA normal  Assessment/Plan:      DVT right leg with sedentary lifestyle repeat ultrasounds a year of anticoagulant  shows DVT resolved   Okay to discontinue Xarelto patient needs to ambulate more exercises legs more I have want them that sedentary lifestyle could cause recurrence of DVT  Marginally elevated anticardiolipin level unclear of fits clinical relevance if patient has recurrent DVT will need to subject to lifelong anticoagulation for now discuss this with patient and observe     started  b12 sl 1000 mcg daily b12 level was in 250 range now improved to 508 continue B12  Recheck in 6 months CBC CMP  B12   hx of prostate ca psa being monirored  Continue PCP for fatty liver diabetes depression chronic kidney disease follow with Urology for prostate cancer continue with obstructive sleep apnea follow-up continue weight loss attempts  Continue management of dyslipidemia and hypertension  Managed venous stasis with elevating legs and stockings  His diabetes remains out of control because of his eating habits patient has chronic  He is on januvia diarrhea and attributes that to his diet and prostate radiation patient counseled about weight loss and diabetes management  Advance Care planning/ directives /living will/patient's wishes discussed with patient.  Patient has been given guidelines and instructions on completing these directives  COVID social distancing, face mask use, hand washing techniques and personal hygiene routine discussed with patient  Good exercise,  nutrition and weight management discussed with patient  Health maintenance activities and follow-up with PCPs recommendations discussed with patient

## 2020-10-26 ENCOUNTER — TELEPHONE (OUTPATIENT)
Dept: HEMATOLOGY/ONCOLOGY | Facility: CLINIC | Age: 85
End: 2020-10-26

## 2020-10-26 DIAGNOSIS — E53.8 B12 DEFICIENCY: Primary | ICD-10-CM

## 2020-10-26 NOTE — TELEPHONE ENCOUNTER
Orders scheduled for pt as requested. Apt reminders placed in mail.       ----- Message from Dona Rodriguez MD sent at 10/23/2020  1:34 PM CDT -----  CBC CMP B12 return to clinic 6 months

## 2020-12-11 ENCOUNTER — PATIENT MESSAGE (OUTPATIENT)
Dept: OTHER | Facility: OTHER | Age: 85
End: 2020-12-11

## 2021-01-07 ENCOUNTER — IMMUNIZATION (OUTPATIENT)
Dept: PRIMARY CARE CLINIC | Facility: CLINIC | Age: 86
End: 2021-01-07
Payer: MEDICARE

## 2021-01-07 DIAGNOSIS — Z23 NEED FOR VACCINATION: ICD-10-CM

## 2021-01-07 PROCEDURE — 91300 COVID-19, MRNA, LNP-S, PF, 30 MCG/0.3 ML DOSE VACCINE: CPT | Mod: PBBFAC,PN

## 2021-01-21 ENCOUNTER — PATIENT OUTREACH (OUTPATIENT)
Dept: ADMINISTRATIVE | Facility: OTHER | Age: 86
End: 2021-01-21

## 2021-01-25 ENCOUNTER — OFFICE VISIT (OUTPATIENT)
Dept: PHYSICAL MEDICINE AND REHAB | Facility: CLINIC | Age: 86
End: 2021-01-25
Payer: MEDICARE

## 2021-01-25 VITALS
WEIGHT: 219 LBS | SYSTOLIC BLOOD PRESSURE: 103 MMHG | BODY MASS INDEX: 34.37 KG/M2 | HEIGHT: 67 IN | DIASTOLIC BLOOD PRESSURE: 60 MMHG | HEART RATE: 63 BPM

## 2021-01-25 DIAGNOSIS — G56.02 CARPAL TUNNEL SYNDROME OF LEFT WRIST: Primary | ICD-10-CM

## 2021-01-25 DIAGNOSIS — R20.2 PARESTHESIAS: ICD-10-CM

## 2021-01-25 DIAGNOSIS — G56.01 CARPAL TUNNEL SYNDROME, RIGHT: ICD-10-CM

## 2021-01-25 PROCEDURE — 99999 PR PBB SHADOW E&M-EST. PATIENT-LVL V: ICD-10-PCS | Mod: PBBFAC,,, | Performed by: PHYSICAL MEDICINE & REHABILITATION

## 2021-01-25 PROCEDURE — 99214 PR OFFICE/OUTPT VISIT, EST, LEVL IV, 30-39 MIN: ICD-10-PCS | Mod: S$PBB,,, | Performed by: PHYSICAL MEDICINE & REHABILITATION

## 2021-01-25 PROCEDURE — 99214 OFFICE O/P EST MOD 30 MIN: CPT | Mod: S$PBB,,, | Performed by: PHYSICAL MEDICINE & REHABILITATION

## 2021-01-25 PROCEDURE — 99215 OFFICE O/P EST HI 40 MIN: CPT | Mod: PBBFAC,PN | Performed by: PHYSICAL MEDICINE & REHABILITATION

## 2021-01-25 PROCEDURE — 99999 PR PBB SHADOW E&M-EST. PATIENT-LVL V: CPT | Mod: PBBFAC,,, | Performed by: PHYSICAL MEDICINE & REHABILITATION

## 2021-01-28 ENCOUNTER — IMMUNIZATION (OUTPATIENT)
Dept: PRIMARY CARE CLINIC | Facility: CLINIC | Age: 86
End: 2021-01-28
Payer: MEDICARE

## 2021-01-28 DIAGNOSIS — Z23 NEED FOR VACCINATION: Primary | ICD-10-CM

## 2021-01-28 PROCEDURE — 0002A COVID-19, MRNA, LNP-S, PF, 30 MCG/0.3 ML DOSE VACCINE: CPT | Mod: PBBFAC | Performed by: EMERGENCY MEDICINE

## 2021-01-28 PROCEDURE — 91300 COVID-19, MRNA, LNP-S, PF, 30 MCG/0.3 ML DOSE VACCINE: CPT | Mod: PBBFAC | Performed by: EMERGENCY MEDICINE

## 2021-02-01 ENCOUNTER — TELEPHONE (OUTPATIENT)
Dept: ORTHOPEDICS | Facility: CLINIC | Age: 86
End: 2021-02-01

## 2021-02-01 ENCOUNTER — OFFICE VISIT (OUTPATIENT)
Dept: PHYSICAL MEDICINE AND REHAB | Facility: CLINIC | Age: 86
End: 2021-02-01
Payer: MEDICARE

## 2021-02-01 ENCOUNTER — TELEPHONE (OUTPATIENT)
Dept: FAMILY MEDICINE | Facility: CLINIC | Age: 86
End: 2021-02-01

## 2021-02-01 ENCOUNTER — OFFICE VISIT (OUTPATIENT)
Dept: ORTHOPEDICS | Facility: CLINIC | Age: 86
End: 2021-02-01
Payer: MEDICARE

## 2021-02-01 VITALS — BODY MASS INDEX: 34.37 KG/M2 | WEIGHT: 219 LBS | RESPIRATION RATE: 16 BRPM | HEIGHT: 67 IN

## 2021-02-01 DIAGNOSIS — G56.01 CARPAL TUNNEL SYNDROME, RIGHT: ICD-10-CM

## 2021-02-01 DIAGNOSIS — G56.03 BILATERAL CARPAL TUNNEL SYNDROME: Primary | ICD-10-CM

## 2021-02-01 DIAGNOSIS — G56.02 CARPAL TUNNEL SYNDROME OF LEFT WRIST: ICD-10-CM

## 2021-02-01 DIAGNOSIS — Z01.818 PREOP TESTING: Primary | ICD-10-CM

## 2021-02-01 DIAGNOSIS — G56.02 CARPAL TUNNEL SYNDROME OF LEFT WRIST: Primary | ICD-10-CM

## 2021-02-01 PROCEDURE — 99204 PR OFFICE/OUTPT VISIT, NEW, LEVL IV, 45-59 MIN: ICD-10-PCS | Mod: S$PBB,,, | Performed by: ORTHOPAEDIC SURGERY

## 2021-02-01 PROCEDURE — 99999 PR PBB SHADOW E&M-EST. PATIENT-LVL I: ICD-10-PCS | Mod: PBBFAC,,, | Performed by: PHYSICAL MEDICINE & REHABILITATION

## 2021-02-01 PROCEDURE — 95911 NRV CNDJ TEST 9-10 STUDIES: CPT | Mod: PBBFAC,PN | Performed by: PHYSICAL MEDICINE & REHABILITATION

## 2021-02-01 PROCEDURE — 95911 NRV CNDJ TEST 9-10 STUDIES: CPT | Mod: 26,S$PBB,, | Performed by: PHYSICAL MEDICINE & REHABILITATION

## 2021-02-01 PROCEDURE — 99204 OFFICE O/P NEW MOD 45 MIN: CPT | Mod: S$PBB,,, | Performed by: ORTHOPAEDIC SURGERY

## 2021-02-01 PROCEDURE — 99999 PR PBB SHADOW E&M-EST. PATIENT-LVL IV: ICD-10-PCS | Mod: PBBFAC,,, | Performed by: ORTHOPAEDIC SURGERY

## 2021-02-01 PROCEDURE — 95911 PR NERVE CONDUCTION STUDY; 9-10 STUDIES: ICD-10-PCS | Mod: 26,S$PBB,, | Performed by: PHYSICAL MEDICINE & REHABILITATION

## 2021-02-01 PROCEDURE — 99211 OFF/OP EST MAY X REQ PHY/QHP: CPT | Mod: PBBFAC,PN,25 | Performed by: PHYSICAL MEDICINE & REHABILITATION

## 2021-02-01 PROCEDURE — 99499 UNLISTED E&M SERVICE: CPT | Mod: S$PBB,,, | Performed by: PHYSICAL MEDICINE & REHABILITATION

## 2021-02-01 PROCEDURE — 99999 PR PBB SHADOW E&M-EST. PATIENT-LVL IV: CPT | Mod: PBBFAC,,, | Performed by: ORTHOPAEDIC SURGERY

## 2021-02-01 PROCEDURE — 99499 NO LOS: ICD-10-PCS | Mod: S$PBB,,, | Performed by: PHYSICAL MEDICINE & REHABILITATION

## 2021-02-01 PROCEDURE — 99214 OFFICE O/P EST MOD 30 MIN: CPT | Mod: PBBFAC,27,PN | Performed by: ORTHOPAEDIC SURGERY

## 2021-02-01 PROCEDURE — 99999 PR PBB SHADOW E&M-EST. PATIENT-LVL I: CPT | Mod: PBBFAC,,, | Performed by: PHYSICAL MEDICINE & REHABILITATION

## 2021-02-01 RX ORDER — MUPIROCIN 20 MG/G
OINTMENT TOPICAL
Status: CANCELLED | OUTPATIENT
Start: 2021-02-01

## 2021-02-01 RX ORDER — CEFAZOLIN SODIUM 2 G/50ML
2 SOLUTION INTRAVENOUS
Status: CANCELLED | OUTPATIENT
Start: 2021-02-01

## 2021-02-02 ENCOUNTER — LAB VISIT (OUTPATIENT)
Dept: PRIMARY CARE CLINIC | Facility: CLINIC | Age: 86
End: 2021-02-02
Payer: MEDICARE

## 2021-02-02 ENCOUNTER — HOSPITAL ENCOUNTER (OUTPATIENT)
Dept: RADIOLOGY | Facility: HOSPITAL | Age: 86
Discharge: HOME | End: 2021-02-02
Attending: ORTHOPAEDIC SURGERY
Payer: MEDICARE

## 2021-02-02 ENCOUNTER — HOSPITAL ENCOUNTER (OUTPATIENT)
Dept: PREADMISSION TESTING | Facility: HOSPITAL | Age: 86
Discharge: HOME | End: 2021-02-02
Attending: ORTHOPAEDIC SURGERY
Payer: MEDICARE

## 2021-02-02 DIAGNOSIS — Z01.818 PREOP TESTING: ICD-10-CM

## 2021-02-02 DIAGNOSIS — G56.02 CARPAL TUNNEL SYNDROME OF LEFT WRIST: ICD-10-CM

## 2021-02-02 LAB
ANION GAP SERPL CALC-SCNC: 11 MMOL/L (ref 8–16)
BASOPHILS # BLD AUTO: 0.05 K/UL (ref 0–0.2)
BASOPHILS NFR BLD: 0.5 % (ref 0–1.9)
BUN SERPL-MCNC: 26 MG/DL (ref 8–23)
CALCIUM SERPL-MCNC: 8.9 MG/DL (ref 8.7–10.5)
CHLORIDE SERPL-SCNC: 104 MMOL/L (ref 95–110)
CO2 SERPL-SCNC: 24 MMOL/L (ref 23–29)
CREAT SERPL-MCNC: 1.3 MG/DL (ref 0.5–1.4)
DIFFERENTIAL METHOD: ABNORMAL
EOSINOPHIL # BLD AUTO: 0.2 K/UL (ref 0–0.5)
EOSINOPHIL NFR BLD: 2 % (ref 0–8)
ERYTHROCYTE [DISTWIDTH] IN BLOOD BY AUTOMATED COUNT: 15.8 % (ref 11.5–14.5)
EST. GFR  (AFRICAN AMERICAN): 57 ML/MIN/1.73 M^2
EST. GFR  (NON AFRICAN AMERICAN): 49 ML/MIN/1.73 M^2
GLUCOSE SERPL-MCNC: 96 MG/DL (ref 70–110)
HCT VFR BLD AUTO: 48.1 % (ref 40–54)
HGB BLD-MCNC: 15.2 G/DL (ref 14–18)
IMM GRANULOCYTES # BLD AUTO: 0.04 K/UL (ref 0–0.04)
IMM GRANULOCYTES NFR BLD AUTO: 0.4 % (ref 0–0.5)
LYMPHOCYTES # BLD AUTO: 1.4 K/UL (ref 1–4.8)
LYMPHOCYTES NFR BLD: 15.2 % (ref 18–48)
MCH RBC QN AUTO: 26.7 PG (ref 27–31)
MCHC RBC AUTO-ENTMCNC: 31.6 G/DL (ref 32–36)
MCV RBC AUTO: 85 FL (ref 82–98)
MONOCYTES # BLD AUTO: 0.8 K/UL (ref 0.3–1)
MONOCYTES NFR BLD: 8.1 % (ref 4–15)
NEUTROPHILS # BLD AUTO: 6.9 K/UL (ref 1.8–7.7)
NEUTROPHILS NFR BLD: 73.8 % (ref 38–73)
NRBC BLD-RTO: 0 /100 WBC
PLATELET # BLD AUTO: 159 K/UL (ref 150–350)
PMV BLD AUTO: 11.6 FL (ref 9.2–12.9)
POTASSIUM SERPL-SCNC: 4.3 MMOL/L (ref 3.5–5.1)
RBC # BLD AUTO: 5.69 M/UL (ref 4.6–6.2)
SODIUM SERPL-SCNC: 139 MMOL/L (ref 136–145)
WBC # BLD AUTO: 9.34 K/UL (ref 3.9–12.7)

## 2021-02-02 PROCEDURE — 71046 XR CHEST PA AND LATERAL: ICD-10-PCS | Mod: 26,,, | Performed by: RADIOLOGY

## 2021-02-02 PROCEDURE — 93010 ELECTROCARDIOGRAM REPORT: CPT | Mod: ,,, | Performed by: INTERNAL MEDICINE

## 2021-02-02 PROCEDURE — 36415 COLL VENOUS BLD VENIPUNCTURE: CPT

## 2021-02-02 PROCEDURE — 93010 EKG 12-LEAD: ICD-10-PCS | Mod: ,,, | Performed by: INTERNAL MEDICINE

## 2021-02-02 PROCEDURE — 80048 BASIC METABOLIC PNL TOTAL CA: CPT

## 2021-02-02 PROCEDURE — 71046 X-RAY EXAM CHEST 2 VIEWS: CPT | Mod: 26,,, | Performed by: RADIOLOGY

## 2021-02-02 PROCEDURE — 93005 ELECTROCARDIOGRAM TRACING: CPT

## 2021-02-02 PROCEDURE — 71046 X-RAY EXAM CHEST 2 VIEWS: CPT | Mod: TC,FY

## 2021-02-02 PROCEDURE — 99900103 DSU ONLY-NO CHARGE-INITIAL HR (STAT)

## 2021-02-02 PROCEDURE — 85025 COMPLETE CBC W/AUTO DIFF WBC: CPT

## 2021-02-02 PROCEDURE — 99900104 DSU ONLY-NO CHARGE-EA ADD'L HR (STAT)

## 2021-02-02 PROCEDURE — U0003 INFECTIOUS AGENT DETECTION BY NUCLEIC ACID (DNA OR RNA); SEVERE ACUTE RESPIRATORY SYNDROME CORONAVIRUS 2 (SARS-COV-2) (CORONAVIRUS DISEASE [COVID-19]), AMPLIFIED PROBE TECHNIQUE, MAKING USE OF HIGH THROUGHPUT TECHNOLOGIES AS DESCRIBED BY CMS-2020-01-R: HCPCS

## 2021-02-02 RX ORDER — LOSARTAN POTASSIUM 25 MG/1
25 TABLET ORAL DAILY
COMMUNITY
End: 2021-09-27

## 2021-02-02 RX ORDER — RIVAROXABAN 20 MG/1
20 TABLET, FILM COATED ORAL DAILY
COMMUNITY
End: 2021-06-02

## 2021-02-03 ENCOUNTER — OFFICE VISIT (OUTPATIENT)
Dept: FAMILY MEDICINE | Facility: CLINIC | Age: 86
End: 2021-02-03
Payer: MEDICARE

## 2021-02-03 VITALS
OXYGEN SATURATION: 95 % | BODY MASS INDEX: 34.43 KG/M2 | RESPIRATION RATE: 16 BRPM | HEIGHT: 67 IN | DIASTOLIC BLOOD PRESSURE: 60 MMHG | WEIGHT: 219.38 LBS | HEART RATE: 80 BPM | TEMPERATURE: 98 F | SYSTOLIC BLOOD PRESSURE: 110 MMHG

## 2021-02-03 DIAGNOSIS — E11.22 CONTROLLED TYPE 2 DIABETES MELLITUS WITH STAGE 3 CHRONIC KIDNEY DISEASE, WITHOUT LONG-TERM CURRENT USE OF INSULIN: ICD-10-CM

## 2021-02-03 DIAGNOSIS — E11.59 HYPERTENSION ASSOCIATED WITH DIABETES: ICD-10-CM

## 2021-02-03 DIAGNOSIS — G56.03 BILATERAL CARPAL TUNNEL SYNDROME: ICD-10-CM

## 2021-02-03 DIAGNOSIS — J44.9 CHRONIC OBSTRUCTIVE PULMONARY DISEASE, UNSPECIFIED COPD TYPE: ICD-10-CM

## 2021-02-03 DIAGNOSIS — Z01.810 PREOP CARDIOVASCULAR EXAM: Primary | ICD-10-CM

## 2021-02-03 DIAGNOSIS — I15.2 HYPERTENSION ASSOCIATED WITH DIABETES: ICD-10-CM

## 2021-02-03 DIAGNOSIS — N18.30 CONTROLLED TYPE 2 DIABETES MELLITUS WITH STAGE 3 CHRONIC KIDNEY DISEASE, WITHOUT LONG-TERM CURRENT USE OF INSULIN: ICD-10-CM

## 2021-02-03 DIAGNOSIS — Z86.718 HISTORY OF DVT OF LOWER EXTREMITY: ICD-10-CM

## 2021-02-03 LAB — SARS-COV-2 RNA RESP QL NAA+PROBE: NOT DETECTED

## 2021-02-03 PROCEDURE — 99214 OFFICE O/P EST MOD 30 MIN: CPT | Mod: S$PBB,,, | Performed by: FAMILY MEDICINE

## 2021-02-03 PROCEDURE — 99214 PR OFFICE/OUTPT VISIT, EST, LEVL IV, 30-39 MIN: ICD-10-PCS | Mod: S$PBB,,, | Performed by: FAMILY MEDICINE

## 2021-02-03 PROCEDURE — 99213 OFFICE O/P EST LOW 20 MIN: CPT | Mod: PBBFAC,PO | Performed by: FAMILY MEDICINE

## 2021-02-03 PROCEDURE — 99999 PR PBB SHADOW E&M-EST. PATIENT-LVL III: ICD-10-PCS | Mod: PBBFAC,,, | Performed by: FAMILY MEDICINE

## 2021-02-03 PROCEDURE — 99999 PR PBB SHADOW E&M-EST. PATIENT-LVL III: CPT | Mod: PBBFAC,,, | Performed by: FAMILY MEDICINE

## 2021-02-04 ENCOUNTER — ANESTHESIA EVENT (OUTPATIENT)
Dept: SURGERY | Facility: HOSPITAL | Age: 86
End: 2021-02-04
Payer: MEDICARE

## 2021-02-05 ENCOUNTER — TELEPHONE (OUTPATIENT)
Dept: ORTHOPEDICS | Facility: CLINIC | Age: 86
End: 2021-02-05

## 2021-02-05 ENCOUNTER — ANESTHESIA (OUTPATIENT)
Dept: SURGERY | Facility: HOSPITAL | Age: 86
End: 2021-02-05
Payer: MEDICARE

## 2021-02-05 ENCOUNTER — HOSPITAL ENCOUNTER (OUTPATIENT)
Facility: HOSPITAL | Age: 86
Discharge: HOME | End: 2021-02-05
Attending: ORTHOPAEDIC SURGERY | Admitting: ORTHOPAEDIC SURGERY
Payer: MEDICARE

## 2021-02-05 DIAGNOSIS — G56.02 CARPAL TUNNEL SYNDROME OF LEFT WRIST: Primary | ICD-10-CM

## 2021-02-05 PROCEDURE — 25000003 PHARM REV CODE 250: Performed by: ANESTHESIOLOGY

## 2021-02-05 PROCEDURE — 36000707: Performed by: ORTHOPAEDIC SURGERY

## 2021-02-05 PROCEDURE — 64721 PR REVISE MEDIAN N/CARPAL TUNNEL SURG: ICD-10-PCS | Mod: LT,,, | Performed by: ORTHOPAEDIC SURGERY

## 2021-02-05 PROCEDURE — 25000003 PHARM REV CODE 250: Performed by: ORTHOPAEDIC SURGERY

## 2021-02-05 PROCEDURE — 63600175 PHARM REV CODE 636 W HCPCS: Performed by: NURSE ANESTHETIST, CERTIFIED REGISTERED

## 2021-02-05 PROCEDURE — D9220A PRA ANESTHESIA: ICD-10-PCS | Mod: ANES,,, | Performed by: ANESTHESIOLOGY

## 2021-02-05 PROCEDURE — 63600175 PHARM REV CODE 636 W HCPCS: Performed by: ORTHOPAEDIC SURGERY

## 2021-02-05 PROCEDURE — 71000015 HC POSTOP RECOV 1ST HR: Performed by: ORTHOPAEDIC SURGERY

## 2021-02-05 PROCEDURE — 99900103 DSU ONLY-NO CHARGE-INITIAL HR (STAT): Performed by: ORTHOPAEDIC SURGERY

## 2021-02-05 PROCEDURE — 36000706: Performed by: ORTHOPAEDIC SURGERY

## 2021-02-05 PROCEDURE — 64721 CARPAL TUNNEL SURGERY: CPT | Mod: LT,,, | Performed by: ORTHOPAEDIC SURGERY

## 2021-02-05 PROCEDURE — 71000033 HC RECOVERY, INTIAL HOUR: Performed by: ORTHOPAEDIC SURGERY

## 2021-02-05 PROCEDURE — D9220A PRA ANESTHESIA: Mod: CRNA,,, | Performed by: NURSE ANESTHETIST, CERTIFIED REGISTERED

## 2021-02-05 PROCEDURE — 99900104 DSU ONLY-NO CHARGE-EA ADD'L HR (STAT): Performed by: ORTHOPAEDIC SURGERY

## 2021-02-05 PROCEDURE — 27200651 HC AIRWAY, LMA: Performed by: ANESTHESIOLOGY

## 2021-02-05 PROCEDURE — 37000008 HC ANESTHESIA 1ST 15 MINUTES: Performed by: ORTHOPAEDIC SURGERY

## 2021-02-05 PROCEDURE — 37000009 HC ANESTHESIA EA ADD 15 MINS: Performed by: ORTHOPAEDIC SURGERY

## 2021-02-05 PROCEDURE — 25000003 PHARM REV CODE 250: Performed by: NURSE ANESTHETIST, CERTIFIED REGISTERED

## 2021-02-05 PROCEDURE — D9220A PRA ANESTHESIA: ICD-10-PCS | Mod: CRNA,,, | Performed by: NURSE ANESTHETIST, CERTIFIED REGISTERED

## 2021-02-05 PROCEDURE — D9220A PRA ANESTHESIA: Mod: ANES,,, | Performed by: ANESTHESIOLOGY

## 2021-02-05 RX ORDER — HYDROCODONE BITARTRATE AND ACETAMINOPHEN 7.5; 325 MG/1; MG/1
1 TABLET ORAL EVERY 6 HOURS PRN
Qty: 28 TABLET | Refills: 0 | Status: SHIPPED | OUTPATIENT
Start: 2021-02-05 | End: 2021-02-12

## 2021-02-05 RX ORDER — HYDROCODONE BITARTRATE AND ACETAMINOPHEN 7.5; 325 MG/1; MG/1
1 TABLET ORAL EVERY 6 HOURS PRN
Qty: 28 TABLET | Refills: 0 | Status: SHIPPED | OUTPATIENT
Start: 2021-02-05 | End: 2021-02-05 | Stop reason: SDUPTHER

## 2021-02-05 RX ORDER — DIPHENHYDRAMINE HYDROCHLORIDE 50 MG/ML
25 INJECTION INTRAMUSCULAR; INTRAVENOUS EVERY 6 HOURS PRN
Status: DISCONTINUED | OUTPATIENT
Start: 2021-02-05 | End: 2021-02-05 | Stop reason: HOSPADM

## 2021-02-05 RX ORDER — SODIUM CHLORIDE 0.9 % (FLUSH) 0.9 %
3 SYRINGE (ML) INJECTION EVERY 8 HOURS
Status: DISCONTINUED | OUTPATIENT
Start: 2021-02-05 | End: 2021-02-05 | Stop reason: HOSPADM

## 2021-02-05 RX ORDER — CEFAZOLIN SODIUM 2 G/50ML
2 SOLUTION INTRAVENOUS
Status: COMPLETED | OUTPATIENT
Start: 2021-02-05 | End: 2021-02-05

## 2021-02-05 RX ORDER — MUPIROCIN 20 MG/G
OINTMENT TOPICAL
Status: DISCONTINUED | OUTPATIENT
Start: 2021-02-05 | End: 2021-02-05 | Stop reason: HOSPADM

## 2021-02-05 RX ORDER — PROPOFOL 10 MG/ML
VIAL (ML) INTRAVENOUS
Status: DISCONTINUED | OUTPATIENT
Start: 2021-02-05 | End: 2021-02-05

## 2021-02-05 RX ORDER — LIDOCAINE HYDROCHLORIDE 10 MG/ML
0.5 INJECTION, SOLUTION EPIDURAL; INFILTRATION; INTRACAUDAL; PERINEURAL ONCE
Status: DISCONTINUED | OUTPATIENT
Start: 2021-02-05 | End: 2021-02-05 | Stop reason: HOSPADM

## 2021-02-05 RX ORDER — FENTANYL CITRATE 50 UG/ML
25 INJECTION, SOLUTION INTRAMUSCULAR; INTRAVENOUS EVERY 5 MIN PRN
Status: DISCONTINUED | OUTPATIENT
Start: 2021-02-05 | End: 2021-02-05 | Stop reason: HOSPADM

## 2021-02-05 RX ORDER — DEXAMETHASONE SODIUM PHOSPHATE 4 MG/ML
INJECTION, SOLUTION INTRA-ARTICULAR; INTRALESIONAL; INTRAMUSCULAR; INTRAVENOUS; SOFT TISSUE
Status: DISCONTINUED | OUTPATIENT
Start: 2021-02-05 | End: 2021-02-05

## 2021-02-05 RX ORDER — SODIUM CHLORIDE 0.9 % (FLUSH) 0.9 %
3 SYRINGE (ML) INJECTION
Status: DISCONTINUED | OUTPATIENT
Start: 2021-02-05 | End: 2021-02-05 | Stop reason: HOSPADM

## 2021-02-05 RX ORDER — LIDOCAINE HYDROCHLORIDE 20 MG/ML
INJECTION INTRAVENOUS
Status: DISCONTINUED | OUTPATIENT
Start: 2021-02-05 | End: 2021-02-05

## 2021-02-05 RX ORDER — BUPIVACAINE HYDROCHLORIDE 2.5 MG/ML
INJECTION, SOLUTION EPIDURAL; INFILTRATION; INTRACAUDAL
Status: DISCONTINUED | OUTPATIENT
Start: 2021-02-05 | End: 2021-02-05 | Stop reason: HOSPADM

## 2021-02-05 RX ORDER — ONDANSETRON 2 MG/ML
4 INJECTION INTRAMUSCULAR; INTRAVENOUS DAILY PRN
Status: DISCONTINUED | OUTPATIENT
Start: 2021-02-05 | End: 2021-02-05 | Stop reason: HOSPADM

## 2021-02-05 RX ORDER — SODIUM CHLORIDE, SODIUM LACTATE, POTASSIUM CHLORIDE, CALCIUM CHLORIDE 600; 310; 30; 20 MG/100ML; MG/100ML; MG/100ML; MG/100ML
INJECTION, SOLUTION INTRAVENOUS CONTINUOUS
Status: DISCONTINUED | OUTPATIENT
Start: 2021-02-05 | End: 2021-02-05 | Stop reason: HOSPADM

## 2021-02-05 RX ORDER — HYDROMORPHONE HYDROCHLORIDE 2 MG/ML
0.2 INJECTION, SOLUTION INTRAMUSCULAR; INTRAVENOUS; SUBCUTANEOUS EVERY 5 MIN PRN
Status: DISCONTINUED | OUTPATIENT
Start: 2021-02-05 | End: 2021-02-05 | Stop reason: HOSPADM

## 2021-02-05 RX ORDER — FENTANYL CITRATE 50 UG/ML
INJECTION, SOLUTION INTRAMUSCULAR; INTRAVENOUS
Status: DISCONTINUED | OUTPATIENT
Start: 2021-02-05 | End: 2021-02-05

## 2021-02-05 RX ORDER — OXYCODONE HYDROCHLORIDE 5 MG/1
5 TABLET ORAL
Status: DISCONTINUED | OUTPATIENT
Start: 2021-02-05 | End: 2021-02-05 | Stop reason: HOSPADM

## 2021-02-05 RX ADMIN — LIDOCAINE HYDROCHLORIDE 50 MG: 20 INJECTION, SOLUTION INTRAVENOUS at 08:02

## 2021-02-05 RX ADMIN — DEXAMETHASONE SODIUM PHOSPHATE 4 MG: 4 INJECTION, SOLUTION INTRA-ARTICULAR; INTRALESIONAL; INTRAMUSCULAR; INTRAVENOUS; SOFT TISSUE at 08:02

## 2021-02-05 RX ADMIN — FENTANYL CITRATE 50 MCG: 50 INJECTION, SOLUTION INTRAMUSCULAR; INTRAVENOUS at 08:02

## 2021-02-05 RX ADMIN — PROPOFOL 110 MG: 10 INJECTION, EMULSION INTRAVENOUS at 08:02

## 2021-02-05 RX ADMIN — SODIUM CHLORIDE, SODIUM GLUCONATE, SODIUM ACETATE, POTASSIUM CHLORIDE, MAGNESIUM CHLORIDE, SODIUM PHOSPHATE, DIBASIC, AND POTASSIUM PHOSPHATE: .53; .5; .37; .037; .03; .012; .00082 INJECTION, SOLUTION INTRAVENOUS at 07:02

## 2021-02-05 RX ADMIN — CEFAZOLIN SODIUM 2 G: 2 SOLUTION INTRAVENOUS at 08:02

## 2021-02-08 VITALS
HEIGHT: 67 IN | BODY MASS INDEX: 33.59 KG/M2 | SYSTOLIC BLOOD PRESSURE: 150 MMHG | RESPIRATION RATE: 18 BRPM | OXYGEN SATURATION: 96 % | HEART RATE: 78 BPM | TEMPERATURE: 98 F | DIASTOLIC BLOOD PRESSURE: 82 MMHG | WEIGHT: 214 LBS

## 2021-02-22 ENCOUNTER — OFFICE VISIT (OUTPATIENT)
Dept: ORTHOPEDICS | Facility: CLINIC | Age: 86
End: 2021-02-22
Payer: MEDICARE

## 2021-02-22 VITALS — RESPIRATION RATE: 16 BRPM | HEIGHT: 67 IN | BODY MASS INDEX: 33.59 KG/M2 | WEIGHT: 214 LBS

## 2021-02-22 DIAGNOSIS — G56.03 BILATERAL CARPAL TUNNEL SYNDROME: Primary | ICD-10-CM

## 2021-02-22 PROCEDURE — 99024 PR POST-OP FOLLOW-UP VISIT: ICD-10-PCS | Mod: POP,,, | Performed by: ORTHOPAEDIC SURGERY

## 2021-02-22 PROCEDURE — 99214 OFFICE O/P EST MOD 30 MIN: CPT | Mod: PBBFAC,PN | Performed by: ORTHOPAEDIC SURGERY

## 2021-02-22 PROCEDURE — 99999 PR PBB SHADOW E&M-EST. PATIENT-LVL IV: CPT | Mod: PBBFAC,,, | Performed by: ORTHOPAEDIC SURGERY

## 2021-02-22 PROCEDURE — 99999 PR PBB SHADOW E&M-EST. PATIENT-LVL IV: ICD-10-PCS | Mod: PBBFAC,,, | Performed by: ORTHOPAEDIC SURGERY

## 2021-02-22 PROCEDURE — 99024 POSTOP FOLLOW-UP VISIT: CPT | Mod: POP,,, | Performed by: ORTHOPAEDIC SURGERY

## 2021-03-22 ENCOUNTER — OFFICE VISIT (OUTPATIENT)
Dept: ORTHOPEDICS | Facility: CLINIC | Age: 86
End: 2021-03-22
Payer: MEDICARE

## 2021-03-22 ENCOUNTER — PES CALL (OUTPATIENT)
Dept: ADMINISTRATIVE | Facility: CLINIC | Age: 86
End: 2021-03-22

## 2021-03-22 VITALS — HEIGHT: 67 IN | WEIGHT: 214 LBS | BODY MASS INDEX: 33.59 KG/M2 | RESPIRATION RATE: 16 BRPM

## 2021-03-22 DIAGNOSIS — G56.02 CARPAL TUNNEL SYNDROME OF LEFT WRIST: Primary | ICD-10-CM

## 2021-03-22 PROCEDURE — 99214 OFFICE O/P EST MOD 30 MIN: CPT | Mod: PBBFAC,PN | Performed by: ORTHOPAEDIC SURGERY

## 2021-03-22 PROCEDURE — 99999 PR PBB SHADOW E&M-EST. PATIENT-LVL IV: ICD-10-PCS | Mod: PBBFAC,,, | Performed by: ORTHOPAEDIC SURGERY

## 2021-03-22 PROCEDURE — 99999 PR PBB SHADOW E&M-EST. PATIENT-LVL IV: CPT | Mod: PBBFAC,,, | Performed by: ORTHOPAEDIC SURGERY

## 2021-03-22 PROCEDURE — 99024 PR POST-OP FOLLOW-UP VISIT: ICD-10-PCS | Mod: POP,,, | Performed by: ORTHOPAEDIC SURGERY

## 2021-03-22 PROCEDURE — 99024 POSTOP FOLLOW-UP VISIT: CPT | Mod: POP,,, | Performed by: ORTHOPAEDIC SURGERY

## 2021-04-23 ENCOUNTER — OFFICE VISIT (OUTPATIENT)
Dept: HEMATOLOGY/ONCOLOGY | Facility: CLINIC | Age: 86
End: 2021-04-23
Payer: MEDICARE

## 2021-04-23 VITALS
HEIGHT: 66 IN | HEART RATE: 88 BPM | WEIGHT: 217.13 LBS | RESPIRATION RATE: 15 BRPM | TEMPERATURE: 98 F | BODY MASS INDEX: 34.9 KG/M2 | DIASTOLIC BLOOD PRESSURE: 68 MMHG | OXYGEN SATURATION: 95 % | SYSTOLIC BLOOD PRESSURE: 132 MMHG

## 2021-04-23 DIAGNOSIS — D50.0 IRON DEFICIENCY ANEMIA DUE TO CHRONIC BLOOD LOSS: ICD-10-CM

## 2021-04-23 DIAGNOSIS — D51.0 VITAMIN B12 DEFICIENCY ANEMIA DUE TO INTRINSIC FACTOR DEFICIENCY: ICD-10-CM

## 2021-04-23 DIAGNOSIS — E78.5 HYPERLIPIDEMIA LDL GOAL <70: ICD-10-CM

## 2021-04-23 DIAGNOSIS — E08.21 DIABETIC NEPHROPATHY ASSOCIATED WITH DIABETES MELLITUS DUE TO UNDERLYING CONDITION: ICD-10-CM

## 2021-04-23 DIAGNOSIS — E53.8 B12 DEFICIENCY: Primary | ICD-10-CM

## 2021-04-23 DIAGNOSIS — C61 PROSTATE CANCER: ICD-10-CM

## 2021-04-23 PROCEDURE — 99999 PR PBB SHADOW E&M-EST. PATIENT-LVL V: ICD-10-PCS | Mod: PBBFAC,,, | Performed by: INTERNAL MEDICINE

## 2021-04-23 PROCEDURE — 99214 OFFICE O/P EST MOD 30 MIN: CPT | Mod: S$PBB,,, | Performed by: INTERNAL MEDICINE

## 2021-04-23 PROCEDURE — 99215 OFFICE O/P EST HI 40 MIN: CPT | Mod: PBBFAC,PO | Performed by: INTERNAL MEDICINE

## 2021-04-23 PROCEDURE — 99214 PR OFFICE/OUTPT VISIT, EST, LEVL IV, 30-39 MIN: ICD-10-PCS | Mod: S$PBB,,, | Performed by: INTERNAL MEDICINE

## 2021-04-23 PROCEDURE — 99999 PR PBB SHADOW E&M-EST. PATIENT-LVL V: CPT | Mod: PBBFAC,,, | Performed by: INTERNAL MEDICINE

## 2021-05-12 ENCOUNTER — LAB VISIT (OUTPATIENT)
Dept: LAB | Facility: HOSPITAL | Age: 86
End: 2021-05-12
Attending: PODIATRIST
Payer: MEDICARE

## 2021-05-12 ENCOUNTER — OFFICE VISIT (OUTPATIENT)
Dept: FAMILY MEDICINE | Facility: CLINIC | Age: 86
End: 2021-05-12
Payer: MEDICARE

## 2021-05-12 VITALS
OXYGEN SATURATION: 95 % | TEMPERATURE: 98 F | SYSTOLIC BLOOD PRESSURE: 120 MMHG | RESPIRATION RATE: 20 BRPM | WEIGHT: 216.06 LBS | DIASTOLIC BLOOD PRESSURE: 60 MMHG | BODY MASS INDEX: 34.72 KG/M2 | HEIGHT: 66 IN | HEART RATE: 84 BPM

## 2021-05-12 DIAGNOSIS — J44.9 CHRONIC OBSTRUCTIVE PULMONARY DISEASE, UNSPECIFIED COPD TYPE: ICD-10-CM

## 2021-05-12 DIAGNOSIS — E55.9 VITAMIN D DEFICIENCY: ICD-10-CM

## 2021-05-12 DIAGNOSIS — E66.01 SEVERE OBESITY (BMI 35.0-39.9) WITH COMORBIDITY: ICD-10-CM

## 2021-05-12 DIAGNOSIS — E11.59 HYPERTENSION ASSOCIATED WITH DIABETES: Primary | ICD-10-CM

## 2021-05-12 DIAGNOSIS — N18.30 STAGE 3 CHRONIC KIDNEY DISEASE, UNSPECIFIED WHETHER STAGE 3A OR 3B CKD: ICD-10-CM

## 2021-05-12 DIAGNOSIS — E11.22 CONTROLLED TYPE 2 DIABETES MELLITUS WITH STAGE 3 CHRONIC KIDNEY DISEASE, WITHOUT LONG-TERM CURRENT USE OF INSULIN: ICD-10-CM

## 2021-05-12 DIAGNOSIS — E78.5 HYPERLIPIDEMIA LDL GOAL <70: ICD-10-CM

## 2021-05-12 DIAGNOSIS — N18.30 CONTROLLED TYPE 2 DIABETES MELLITUS WITH STAGE 3 CHRONIC KIDNEY DISEASE, WITHOUT LONG-TERM CURRENT USE OF INSULIN: ICD-10-CM

## 2021-05-12 DIAGNOSIS — Z86.718 HISTORY OF DVT OF LOWER EXTREMITY: ICD-10-CM

## 2021-05-12 DIAGNOSIS — E11.9 ENCOUNTER FOR DIABETIC FOOT EXAM: ICD-10-CM

## 2021-05-12 DIAGNOSIS — I15.2 HYPERTENSION ASSOCIATED WITH DIABETES: Primary | ICD-10-CM

## 2021-05-12 PROCEDURE — 99999 PR PBB SHADOW E&M-EST. PATIENT-LVL III: ICD-10-PCS | Mod: PBBFAC,,, | Performed by: FAMILY MEDICINE

## 2021-05-12 PROCEDURE — 99214 PR OFFICE/OUTPT VISIT, EST, LEVL IV, 30-39 MIN: ICD-10-PCS | Mod: S$PBB,,, | Performed by: FAMILY MEDICINE

## 2021-05-12 PROCEDURE — 82306 VITAMIN D 25 HYDROXY: CPT | Performed by: FAMILY MEDICINE

## 2021-05-12 PROCEDURE — 99999 PR PBB SHADOW E&M-EST. PATIENT-LVL III: CPT | Mod: PBBFAC,,, | Performed by: FAMILY MEDICINE

## 2021-05-12 PROCEDURE — 80053 COMPREHEN METABOLIC PANEL: CPT | Performed by: FAMILY MEDICINE

## 2021-05-12 PROCEDURE — 99213 OFFICE O/P EST LOW 20 MIN: CPT | Mod: PBBFAC,PO | Performed by: FAMILY MEDICINE

## 2021-05-12 PROCEDURE — 99214 OFFICE O/P EST MOD 30 MIN: CPT | Mod: S$PBB,,, | Performed by: FAMILY MEDICINE

## 2021-05-12 PROCEDURE — 80061 LIPID PANEL: CPT | Performed by: FAMILY MEDICINE

## 2021-05-12 PROCEDURE — 83036 HEMOGLOBIN GLYCOSYLATED A1C: CPT | Performed by: FAMILY MEDICINE

## 2021-05-12 PROCEDURE — 85025 COMPLETE CBC W/AUTO DIFF WBC: CPT | Performed by: FAMILY MEDICINE

## 2021-05-12 PROCEDURE — 36415 COLL VENOUS BLD VENIPUNCTURE: CPT | Mod: PO | Performed by: FAMILY MEDICINE

## 2021-05-13 LAB
25(OH)D3+25(OH)D2 SERPL-MCNC: 25 NG/ML (ref 30–96)
ALBUMIN SERPL BCP-MCNC: 3.7 G/DL (ref 3.5–5.2)
ALP SERPL-CCNC: 68 U/L (ref 55–135)
ALT SERPL W/O P-5'-P-CCNC: 19 U/L (ref 10–44)
ANION GAP SERPL CALC-SCNC: 11 MMOL/L (ref 8–16)
AST SERPL-CCNC: 19 U/L (ref 10–40)
BASOPHILS # BLD AUTO: 0.07 K/UL (ref 0–0.2)
BASOPHILS NFR BLD: 0.7 % (ref 0–1.9)
BILIRUB SERPL-MCNC: 0.6 MG/DL (ref 0.1–1)
BUN SERPL-MCNC: 26 MG/DL (ref 8–23)
CALCIUM SERPL-MCNC: 9.5 MG/DL (ref 8.7–10.5)
CHLORIDE SERPL-SCNC: 105 MMOL/L (ref 95–110)
CHOLEST SERPL-MCNC: 149 MG/DL (ref 120–199)
CHOLEST/HDLC SERPL: 4.4 {RATIO} (ref 2–5)
CO2 SERPL-SCNC: 24 MMOL/L (ref 23–29)
CREAT SERPL-MCNC: 1.7 MG/DL (ref 0.5–1.4)
DIFFERENTIAL METHOD: ABNORMAL
EOSINOPHIL # BLD AUTO: 0.2 K/UL (ref 0–0.5)
EOSINOPHIL NFR BLD: 1.6 % (ref 0–8)
ERYTHROCYTE [DISTWIDTH] IN BLOOD BY AUTOMATED COUNT: 17.2 % (ref 11.5–14.5)
EST. GFR  (AFRICAN AMERICAN): 41 ML/MIN/1.73 M^2
EST. GFR  (NON AFRICAN AMERICAN): 35.5 ML/MIN/1.73 M^2
ESTIMATED AVG GLUCOSE: 166 MG/DL (ref 68–131)
GLUCOSE SERPL-MCNC: 98 MG/DL (ref 70–110)
HBA1C MFR BLD: 7.4 % (ref 4–5.6)
HCT VFR BLD AUTO: 51.3 % (ref 40–54)
HDLC SERPL-MCNC: 34 MG/DL (ref 40–75)
HDLC SERPL: 22.8 % (ref 20–50)
HGB BLD-MCNC: 15.7 G/DL (ref 14–18)
IMM GRANULOCYTES # BLD AUTO: 0.06 K/UL (ref 0–0.04)
IMM GRANULOCYTES NFR BLD AUTO: 0.6 % (ref 0–0.5)
LDLC SERPL CALC-MCNC: 90.6 MG/DL (ref 63–159)
LYMPHOCYTES # BLD AUTO: 1.6 K/UL (ref 1–4.8)
LYMPHOCYTES NFR BLD: 15.2 % (ref 18–48)
MCH RBC QN AUTO: 26.2 PG (ref 27–31)
MCHC RBC AUTO-ENTMCNC: 30.6 G/DL (ref 32–36)
MCV RBC AUTO: 86 FL (ref 82–98)
MONOCYTES # BLD AUTO: 1 K/UL (ref 0.3–1)
MONOCYTES NFR BLD: 9.1 % (ref 4–15)
NEUTROPHILS # BLD AUTO: 7.8 K/UL (ref 1.8–7.7)
NEUTROPHILS NFR BLD: 72.8 % (ref 38–73)
NONHDLC SERPL-MCNC: 115 MG/DL
NRBC BLD-RTO: 0 /100 WBC
PLATELET # BLD AUTO: 179 K/UL (ref 150–450)
PMV BLD AUTO: 11.7 FL (ref 9.2–12.9)
POTASSIUM SERPL-SCNC: 4.9 MMOL/L (ref 3.5–5.1)
PROT SERPL-MCNC: 7.7 G/DL (ref 6–8.4)
RBC # BLD AUTO: 5.99 M/UL (ref 4.6–6.2)
SODIUM SERPL-SCNC: 140 MMOL/L (ref 136–145)
TRIGL SERPL-MCNC: 122 MG/DL (ref 30–150)
WBC # BLD AUTO: 10.76 K/UL (ref 3.9–12.7)

## 2021-06-02 ENCOUNTER — TELEPHONE (OUTPATIENT)
Dept: HEMATOLOGY/ONCOLOGY | Facility: CLINIC | Age: 86
End: 2021-06-02

## 2021-06-11 ENCOUNTER — PES CALL (OUTPATIENT)
Dept: ADMINISTRATIVE | Facility: CLINIC | Age: 86
End: 2021-06-11

## 2021-06-28 ENCOUNTER — OFFICE VISIT (OUTPATIENT)
Dept: PRIMARY CARE CLINIC | Facility: CLINIC | Age: 86
End: 2021-06-28
Payer: MEDICARE

## 2021-06-28 VITALS
HEIGHT: 66 IN | SYSTOLIC BLOOD PRESSURE: 122 MMHG | OXYGEN SATURATION: 97 % | DIASTOLIC BLOOD PRESSURE: 78 MMHG | BODY MASS INDEX: 34.12 KG/M2 | WEIGHT: 212.31 LBS | HEART RATE: 60 BPM

## 2021-06-28 DIAGNOSIS — I70.0 AORTIC CALCIFICATION: ICD-10-CM

## 2021-06-28 DIAGNOSIS — G47.33 OSA (OBSTRUCTIVE SLEEP APNEA): ICD-10-CM

## 2021-06-28 DIAGNOSIS — J43.2 CENTRILOBULAR EMPHYSEMA: ICD-10-CM

## 2021-06-28 DIAGNOSIS — Z00.00 ENCOUNTER FOR PREVENTIVE HEALTH EXAMINATION: Primary | ICD-10-CM

## 2021-06-28 DIAGNOSIS — H91.90 HEARING LOSS, UNSPECIFIED HEARING LOSS TYPE, UNSPECIFIED LATERALITY: ICD-10-CM

## 2021-06-28 DIAGNOSIS — I15.2 HYPERTENSION ASSOCIATED WITH DIABETES: ICD-10-CM

## 2021-06-28 DIAGNOSIS — E66.9 OBESITY (BMI 30-39.9): ICD-10-CM

## 2021-06-28 DIAGNOSIS — E11.59 HYPERTENSION ASSOCIATED WITH DIABETES: ICD-10-CM

## 2021-06-28 DIAGNOSIS — Z74.09 OTHER REDUCED MOBILITY: ICD-10-CM

## 2021-06-28 DIAGNOSIS — E11.21 DIABETIC NEPHROPATHY ASSOCIATED WITH TYPE 2 DIABETES MELLITUS: ICD-10-CM

## 2021-06-28 DIAGNOSIS — E78.5 HYPERLIPIDEMIA LDL GOAL <70: ICD-10-CM

## 2021-06-28 PROCEDURE — 99214 OFFICE O/P EST MOD 30 MIN: CPT | Mod: PBBFAC,PN | Performed by: NURSE PRACTITIONER

## 2021-06-28 PROCEDURE — 99999 PR PBB SHADOW E&M-EST. PATIENT-LVL IV: CPT | Mod: PBBFAC,,, | Performed by: NURSE PRACTITIONER

## 2021-06-28 PROCEDURE — 99999 PR PBB SHADOW E&M-EST. PATIENT-LVL IV: ICD-10-PCS | Mod: PBBFAC,,, | Performed by: NURSE PRACTITIONER

## 2021-06-28 PROCEDURE — G0439 PPPS, SUBSEQ VISIT: HCPCS | Mod: ,,, | Performed by: NURSE PRACTITIONER

## 2021-06-28 PROCEDURE — G0439 PR MEDICARE ANNUAL WELLNESS SUBSEQUENT VISIT: ICD-10-PCS | Mod: ,,, | Performed by: NURSE PRACTITIONER

## 2021-06-28 RX ORDER — LANOLIN ALCOHOL/MO/W.PET/CERES
100 CREAM (GRAM) TOPICAL DAILY
COMMUNITY
End: 2022-05-13 | Stop reason: CLARIF

## 2021-07-01 ENCOUNTER — PATIENT MESSAGE (OUTPATIENT)
Dept: ADMINISTRATIVE | Facility: OTHER | Age: 86
End: 2021-07-01

## 2021-07-02 ENCOUNTER — PATIENT OUTREACH (OUTPATIENT)
Dept: ADMINISTRATIVE | Facility: HOSPITAL | Age: 86
End: 2021-07-02

## 2021-07-14 ENCOUNTER — TELEPHONE (OUTPATIENT)
Dept: FAMILY MEDICINE | Facility: CLINIC | Age: 86
End: 2021-07-14

## 2021-07-14 ENCOUNTER — OFFICE VISIT (OUTPATIENT)
Dept: FAMILY MEDICINE | Facility: CLINIC | Age: 86
End: 2021-07-14
Payer: MEDICARE

## 2021-07-14 VITALS
DIASTOLIC BLOOD PRESSURE: 70 MMHG | RESPIRATION RATE: 17 BRPM | TEMPERATURE: 98 F | SYSTOLIC BLOOD PRESSURE: 132 MMHG | WEIGHT: 216.25 LBS | OXYGEN SATURATION: 95 % | BODY MASS INDEX: 34.75 KG/M2 | HEART RATE: 78 BPM | HEIGHT: 66 IN

## 2021-07-14 DIAGNOSIS — E11.59 HYPERTENSION ASSOCIATED WITH DIABETES: ICD-10-CM

## 2021-07-14 DIAGNOSIS — J44.9 CHRONIC OBSTRUCTIVE PULMONARY DISEASE, UNSPECIFIED COPD TYPE: ICD-10-CM

## 2021-07-14 DIAGNOSIS — I15.2 HYPERTENSION ASSOCIATED WITH DIABETES: ICD-10-CM

## 2021-07-14 DIAGNOSIS — N18.30 CONTROLLED TYPE 2 DIABETES MELLITUS WITH STAGE 3 CHRONIC KIDNEY DISEASE, WITHOUT LONG-TERM CURRENT USE OF INSULIN: ICD-10-CM

## 2021-07-14 DIAGNOSIS — E11.22 CONTROLLED TYPE 2 DIABETES MELLITUS WITH STAGE 3 CHRONIC KIDNEY DISEASE, WITHOUT LONG-TERM CURRENT USE OF INSULIN: ICD-10-CM

## 2021-07-14 DIAGNOSIS — J44.1 COPD EXACERBATION: Primary | ICD-10-CM

## 2021-07-14 PROCEDURE — 99215 OFFICE O/P EST HI 40 MIN: CPT | Mod: PBBFAC,PO | Performed by: PHYSICIAN ASSISTANT

## 2021-07-14 PROCEDURE — 99999 PR PBB SHADOW E&M-EST. PATIENT-LVL V: CPT | Mod: PBBFAC,,, | Performed by: PHYSICIAN ASSISTANT

## 2021-07-14 PROCEDURE — 99214 OFFICE O/P EST MOD 30 MIN: CPT | Mod: S$PBB,,, | Performed by: PHYSICIAN ASSISTANT

## 2021-07-14 PROCEDURE — 99214 PR OFFICE/OUTPT VISIT, EST, LEVL IV, 30-39 MIN: ICD-10-PCS | Mod: S$PBB,,, | Performed by: PHYSICIAN ASSISTANT

## 2021-07-14 PROCEDURE — 99999 PR PBB SHADOW E&M-EST. PATIENT-LVL V: ICD-10-PCS | Mod: PBBFAC,,, | Performed by: PHYSICIAN ASSISTANT

## 2021-07-14 RX ORDER — ALBUTEROL SULFATE 90 UG/1
2 AEROSOL, METERED RESPIRATORY (INHALATION) EVERY 6 HOURS PRN
Qty: 18 G | Refills: 1 | Status: SHIPPED | OUTPATIENT
Start: 2021-07-14 | End: 2021-08-26

## 2021-07-14 RX ORDER — DOXYCYCLINE HYCLATE 100 MG
100 TABLET ORAL 2 TIMES DAILY
Qty: 20 TABLET | Refills: 0 | Status: SHIPPED | OUTPATIENT
Start: 2021-07-14 | End: 2021-07-24

## 2021-07-28 ENCOUNTER — OFFICE VISIT (OUTPATIENT)
Dept: FAMILY MEDICINE | Facility: CLINIC | Age: 86
End: 2021-07-28
Payer: MEDICARE

## 2021-07-28 VITALS
TEMPERATURE: 98 F | DIASTOLIC BLOOD PRESSURE: 64 MMHG | BODY MASS INDEX: 35.4 KG/M2 | WEIGHT: 220.25 LBS | RESPIRATION RATE: 17 BRPM | HEIGHT: 66 IN | SYSTOLIC BLOOD PRESSURE: 122 MMHG | HEART RATE: 74 BPM | OXYGEN SATURATION: 95 %

## 2021-07-28 DIAGNOSIS — E11.59 HYPERTENSION ASSOCIATED WITH DIABETES: ICD-10-CM

## 2021-07-28 DIAGNOSIS — J44.9 CHRONIC OBSTRUCTIVE PULMONARY DISEASE, UNSPECIFIED COPD TYPE: Primary | ICD-10-CM

## 2021-07-28 DIAGNOSIS — E78.5 HYPERLIPIDEMIA LDL GOAL <70: ICD-10-CM

## 2021-07-28 DIAGNOSIS — N18.30 CONTROLLED TYPE 2 DIABETES MELLITUS WITH STAGE 3 CHRONIC KIDNEY DISEASE, WITHOUT LONG-TERM CURRENT USE OF INSULIN: ICD-10-CM

## 2021-07-28 DIAGNOSIS — I15.2 HYPERTENSION ASSOCIATED WITH DIABETES: ICD-10-CM

## 2021-07-28 DIAGNOSIS — E11.22 CONTROLLED TYPE 2 DIABETES MELLITUS WITH STAGE 3 CHRONIC KIDNEY DISEASE, WITHOUT LONG-TERM CURRENT USE OF INSULIN: ICD-10-CM

## 2021-07-28 PROCEDURE — 99213 OFFICE O/P EST LOW 20 MIN: CPT | Mod: S$PBB,,, | Performed by: PHYSICIAN ASSISTANT

## 2021-07-28 PROCEDURE — 99213 PR OFFICE/OUTPT VISIT, EST, LEVL III, 20-29 MIN: ICD-10-PCS | Mod: S$PBB,,, | Performed by: PHYSICIAN ASSISTANT

## 2021-07-28 PROCEDURE — 99999 PR PBB SHADOW E&M-EST. PATIENT-LVL IV: ICD-10-PCS | Mod: PBBFAC,,, | Performed by: PHYSICIAN ASSISTANT

## 2021-07-28 PROCEDURE — 99999 PR PBB SHADOW E&M-EST. PATIENT-LVL IV: CPT | Mod: PBBFAC,,, | Performed by: PHYSICIAN ASSISTANT

## 2021-07-28 PROCEDURE — 99214 OFFICE O/P EST MOD 30 MIN: CPT | Mod: PBBFAC,PO | Performed by: PHYSICIAN ASSISTANT

## 2021-07-30 ENCOUNTER — PATIENT OUTREACH (OUTPATIENT)
Dept: ADMINISTRATIVE | Facility: HOSPITAL | Age: 86
End: 2021-07-30

## 2021-08-02 ENCOUNTER — PATIENT OUTREACH (OUTPATIENT)
Dept: ADMINISTRATIVE | Facility: HOSPITAL | Age: 86
End: 2021-08-02

## 2021-10-15 ENCOUNTER — OFFICE VISIT (OUTPATIENT)
Dept: HEMATOLOGY/ONCOLOGY | Facility: CLINIC | Age: 86
End: 2021-10-15
Payer: MEDICARE

## 2021-10-15 ENCOUNTER — TELEPHONE (OUTPATIENT)
Dept: HEMATOLOGY/ONCOLOGY | Facility: CLINIC | Age: 86
End: 2021-10-15

## 2021-10-15 VITALS
SYSTOLIC BLOOD PRESSURE: 104 MMHG | OXYGEN SATURATION: 94 % | HEIGHT: 67 IN | BODY MASS INDEX: 33.64 KG/M2 | DIASTOLIC BLOOD PRESSURE: 59 MMHG | RESPIRATION RATE: 14 BRPM | HEART RATE: 102 BPM | TEMPERATURE: 98 F | WEIGHT: 214.31 LBS

## 2021-10-15 DIAGNOSIS — C61 PROSTATE CANCER: ICD-10-CM

## 2021-10-15 DIAGNOSIS — D50.0 IRON DEFICIENCY ANEMIA DUE TO CHRONIC BLOOD LOSS: ICD-10-CM

## 2021-10-15 DIAGNOSIS — N18.30 STAGE 3 CHRONIC KIDNEY DISEASE, UNSPECIFIED WHETHER STAGE 3A OR 3B CKD: ICD-10-CM

## 2021-10-15 DIAGNOSIS — E53.8 B12 DEFICIENCY: Primary | ICD-10-CM

## 2021-10-15 PROCEDURE — 99999 PR PBB SHADOW E&M-EST. PATIENT-LVL III: ICD-10-PCS | Mod: PBBFAC,,, | Performed by: INTERNAL MEDICINE

## 2021-10-15 PROCEDURE — 99999 PR PBB SHADOW E&M-EST. PATIENT-LVL III: CPT | Mod: PBBFAC,,, | Performed by: INTERNAL MEDICINE

## 2021-10-15 PROCEDURE — 99214 PR OFFICE/OUTPT VISIT, EST, LEVL IV, 30-39 MIN: ICD-10-PCS | Mod: S$PBB,,, | Performed by: INTERNAL MEDICINE

## 2021-10-15 PROCEDURE — 99214 OFFICE O/P EST MOD 30 MIN: CPT | Mod: S$PBB,,, | Performed by: INTERNAL MEDICINE

## 2021-10-15 PROCEDURE — 99213 OFFICE O/P EST LOW 20 MIN: CPT | Mod: PBBFAC,PO | Performed by: INTERNAL MEDICINE

## 2021-11-20 ENCOUNTER — HOSPITAL ENCOUNTER (INPATIENT)
Facility: HOSPITAL | Age: 86
LOS: 10 days | Discharge: HOME HEALTH | DRG: 853 | End: 2021-11-30
Attending: EMERGENCY MEDICINE | Admitting: INTERNAL MEDICINE
Payer: MEDICARE

## 2021-11-20 ENCOUNTER — ANESTHESIA EVENT (OUTPATIENT)
Dept: INTENSIVE CARE | Facility: HOSPITAL | Age: 86
DRG: 853 | End: 2021-11-20
Payer: MEDICARE

## 2021-11-20 ENCOUNTER — ANESTHESIA (OUTPATIENT)
Dept: INTENSIVE CARE | Facility: HOSPITAL | Age: 86
DRG: 853 | End: 2021-11-20
Payer: MEDICARE

## 2021-11-20 DIAGNOSIS — E11.22 CONTROLLED TYPE 2 DIABETES MELLITUS WITH STAGE 3 CHRONIC KIDNEY DISEASE, WITHOUT LONG-TERM CURRENT USE OF INSULIN: ICD-10-CM

## 2021-11-20 DIAGNOSIS — N17.9 AKI (ACUTE KIDNEY INJURY): ICD-10-CM

## 2021-11-20 DIAGNOSIS — A41.81 ENTEROCOCCAL SEPSIS: ICD-10-CM

## 2021-11-20 DIAGNOSIS — K83.09 CHOLANGITIS: ICD-10-CM

## 2021-11-20 DIAGNOSIS — R79.89 TROPONIN LEVEL ELEVATED: ICD-10-CM

## 2021-11-20 DIAGNOSIS — R65.20 SEVERE SEPSIS: ICD-10-CM

## 2021-11-20 DIAGNOSIS — A41.9 SEVERE SEPSIS: ICD-10-CM

## 2021-11-20 DIAGNOSIS — R79.89 ELEVATED TROPONIN: ICD-10-CM

## 2021-11-20 DIAGNOSIS — R74.01 TRANSAMINITIS: ICD-10-CM

## 2021-11-20 DIAGNOSIS — A41.9 SEPSIS, DUE TO UNSPECIFIED ORGANISM, UNSPECIFIED WHETHER ACUTE ORGAN DYSFUNCTION PRESENT: Primary | ICD-10-CM

## 2021-11-20 DIAGNOSIS — R50.9 FEVER, UNSPECIFIED FEVER CAUSE: ICD-10-CM

## 2021-11-20 DIAGNOSIS — R07.9 CHEST PAIN: ICD-10-CM

## 2021-11-20 DIAGNOSIS — R06.02 SOB (SHORTNESS OF BREATH): ICD-10-CM

## 2021-11-20 DIAGNOSIS — N18.30 CONTROLLED TYPE 2 DIABETES MELLITUS WITH STAGE 3 CHRONIC KIDNEY DISEASE, WITHOUT LONG-TERM CURRENT USE OF INSULIN: ICD-10-CM

## 2021-11-20 DIAGNOSIS — R17 ELEVATED BILIRUBIN: ICD-10-CM

## 2021-11-20 PROBLEM — E80.6 HYPERBILIRUBINEMIA: Status: ACTIVE | Noted: 2021-11-20

## 2021-11-20 PROBLEM — E83.42 HYPOMAGNESEMIA: Status: ACTIVE | Noted: 2021-11-20

## 2021-11-20 LAB
ALBUMIN SERPL BCP-MCNC: 3.5 G/DL (ref 3.5–5.2)
ALP SERPL-CCNC: 89 U/L (ref 55–135)
ALT SERPL W/O P-5'-P-CCNC: 123 U/L (ref 10–44)
ANION GAP SERPL CALC-SCNC: 14 MMOL/L (ref 8–16)
AST SERPL-CCNC: 98 U/L (ref 10–40)
BACTERIA #/AREA URNS HPF: ABNORMAL /HPF
BASOPHILS # BLD AUTO: 0.03 K/UL (ref 0–0.2)
BASOPHILS NFR BLD: 0.2 % (ref 0–1.9)
BILIRUB SERPL-MCNC: 5.8 MG/DL (ref 0.1–1)
BILIRUB UR QL STRIP: ABNORMAL
BNP SERPL-MCNC: 629 PG/ML (ref 0–99)
BUN SERPL-MCNC: 39 MG/DL (ref 8–23)
CALCIUM SERPL-MCNC: 7.9 MG/DL (ref 8.7–10.5)
CHLORIDE SERPL-SCNC: 102 MMOL/L (ref 95–110)
CLARITY UR: ABNORMAL
CO2 SERPL-SCNC: 20 MMOL/L (ref 23–29)
COLOR UR: ABNORMAL
CREAT SERPL-MCNC: 2.6 MG/DL (ref 0.5–1.4)
DIFFERENTIAL METHOD: ABNORMAL
EOSINOPHIL # BLD AUTO: 0 K/UL (ref 0–0.5)
EOSINOPHIL NFR BLD: 0 % (ref 0–8)
ERYTHROCYTE [DISTWIDTH] IN BLOOD BY AUTOMATED COUNT: 16.8 % (ref 11.5–14.5)
EST. GFR  (AFRICAN AMERICAN): 24.5 ML/MIN/1.73 M^2
EST. GFR  (NON AFRICAN AMERICAN): 21.2 ML/MIN/1.73 M^2
GLUCOSE SERPL-MCNC: 154 MG/DL (ref 70–110)
GLUCOSE UR QL STRIP: NEGATIVE
GRAN CASTS #/AREA URNS LPF: 3 /LPF
HCT VFR BLD AUTO: 48.5 % (ref 40–54)
HGB BLD-MCNC: 15.1 G/DL (ref 14–18)
HGB UR QL STRIP: ABNORMAL
HYALINE CASTS #/AREA URNS LPF: 85 /LPF
IMM GRANULOCYTES # BLD AUTO: 0.13 K/UL (ref 0–0.04)
IMM GRANULOCYTES NFR BLD AUTO: 0.8 % (ref 0–0.5)
KETONES UR QL STRIP: ABNORMAL
LACTATE SERPL-SCNC: 3.4 MMOL/L (ref 0.5–1.9)
LEUKOCYTE ESTERASE UR QL STRIP: ABNORMAL
LYMPHOCYTES # BLD AUTO: 0.2 K/UL (ref 1–4.8)
LYMPHOCYTES NFR BLD: 1.4 % (ref 18–48)
MAGNESIUM SERPL-MCNC: 1.5 MG/DL (ref 1.6–2.6)
MCH RBC QN AUTO: 25.6 PG (ref 27–31)
MCHC RBC AUTO-ENTMCNC: 31.1 G/DL (ref 32–36)
MCV RBC AUTO: 82 FL (ref 82–98)
MICROSCOPIC COMMENT: ABNORMAL
MONOCYTES # BLD AUTO: 0.8 K/UL (ref 0.3–1)
MONOCYTES NFR BLD: 4.6 % (ref 4–15)
NEUTROPHILS # BLD AUTO: 15 K/UL (ref 1.8–7.7)
NEUTROPHILS NFR BLD: 93 % (ref 38–73)
NITRITE UR QL STRIP: ABNORMAL
NRBC BLD-RTO: 0 /100 WBC
PH UR STRIP: 6 [PH] (ref 5–8)
PHOSPHATE SERPL-MCNC: 2.7 MG/DL (ref 2.7–4.5)
PLATELET # BLD AUTO: 174 K/UL (ref 150–450)
PMV BLD AUTO: 10.9 FL (ref 9.2–12.9)
POTASSIUM SERPL-SCNC: 4.1 MMOL/L (ref 3.5–5.1)
PROT SERPL-MCNC: 6.9 G/DL (ref 6–8.4)
PROT UR QL STRIP: ABNORMAL
RBC # BLD AUTO: 5.89 M/UL (ref 4.6–6.2)
RBC #/AREA URNS HPF: 4 /HPF (ref 0–4)
SARS-COV-2 RDRP RESP QL NAA+PROBE: NEGATIVE
SODIUM SERPL-SCNC: 136 MMOL/L (ref 136–145)
SP GR UR STRIP: >=1.03 (ref 1–1.03)
SQUAMOUS #/AREA URNS HPF: 30 /HPF
TROPONIN I SERPL DL<=0.01 NG/ML-MCNC: 0.07 NG/ML
URN SPEC COLLECT METH UR: ABNORMAL
UROBILINOGEN UR STRIP-ACNC: ABNORMAL EU/DL
WBC # BLD AUTO: 16.18 K/UL (ref 3.9–12.7)
WBC #/AREA URNS HPF: 44 /HPF (ref 0–5)

## 2021-11-20 PROCEDURE — 96361 HYDRATE IV INFUSION ADD-ON: CPT

## 2021-11-20 PROCEDURE — 25000003 PHARM REV CODE 250: Performed by: EMERGENCY MEDICINE

## 2021-11-20 PROCEDURE — 83735 ASSAY OF MAGNESIUM: CPT | Performed by: EMERGENCY MEDICINE

## 2021-11-20 PROCEDURE — 99284 EMERGENCY DEPT VISIT MOD MDM: CPT | Mod: 25

## 2021-11-20 PROCEDURE — U0002 COVID-19 LAB TEST NON-CDC: HCPCS | Performed by: EMERGENCY MEDICINE

## 2021-11-20 PROCEDURE — 96375 TX/PRO/DX INJ NEW DRUG ADDON: CPT

## 2021-11-20 PROCEDURE — 63600175 PHARM REV CODE 636 W HCPCS

## 2021-11-20 PROCEDURE — 80053 COMPREHEN METABOLIC PANEL: CPT | Performed by: EMERGENCY MEDICINE

## 2021-11-20 PROCEDURE — 83605 ASSAY OF LACTIC ACID: CPT | Performed by: EMERGENCY MEDICINE

## 2021-11-20 PROCEDURE — 63600175 PHARM REV CODE 636 W HCPCS: Performed by: EMERGENCY MEDICINE

## 2021-11-20 PROCEDURE — 84484 ASSAY OF TROPONIN QUANT: CPT | Performed by: EMERGENCY MEDICINE

## 2021-11-20 PROCEDURE — 93005 ELECTROCARDIOGRAM TRACING: CPT | Performed by: INTERNAL MEDICINE

## 2021-11-20 PROCEDURE — 81001 URINALYSIS AUTO W/SCOPE: CPT | Performed by: EMERGENCY MEDICINE

## 2021-11-20 PROCEDURE — 93010 EKG 12-LEAD: ICD-10-PCS | Mod: ,,, | Performed by: INTERNAL MEDICINE

## 2021-11-20 PROCEDURE — 93010 ELECTROCARDIOGRAM REPORT: CPT | Mod: ,,, | Performed by: INTERNAL MEDICINE

## 2021-11-20 PROCEDURE — 85025 COMPLETE CBC W/AUTO DIFF WBC: CPT | Performed by: EMERGENCY MEDICINE

## 2021-11-20 PROCEDURE — 84100 ASSAY OF PHOSPHORUS: CPT | Performed by: EMERGENCY MEDICINE

## 2021-11-20 PROCEDURE — 36415 COLL VENOUS BLD VENIPUNCTURE: CPT | Performed by: EMERGENCY MEDICINE

## 2021-11-20 PROCEDURE — 87186 SC STD MICRODIL/AGAR DIL: CPT | Mod: 59 | Performed by: EMERGENCY MEDICINE

## 2021-11-20 PROCEDURE — 87040 BLOOD CULTURE FOR BACTERIA: CPT | Mod: 59 | Performed by: EMERGENCY MEDICINE

## 2021-11-20 PROCEDURE — 20000000 HC ICU ROOM

## 2021-11-20 PROCEDURE — 25000003 PHARM REV CODE 250

## 2021-11-20 PROCEDURE — 93010 ELECTROCARDIOGRAM REPORT: CPT | Mod: 76,,, | Performed by: INTERNAL MEDICINE

## 2021-11-20 PROCEDURE — 83880 ASSAY OF NATRIURETIC PEPTIDE: CPT | Performed by: EMERGENCY MEDICINE

## 2021-11-20 PROCEDURE — 96365 THER/PROPH/DIAG IV INF INIT: CPT

## 2021-11-20 PROCEDURE — 87086 URINE CULTURE/COLONY COUNT: CPT | Performed by: EMERGENCY MEDICINE

## 2021-11-20 PROCEDURE — 25000003 PHARM REV CODE 250: Performed by: INTERNAL MEDICINE

## 2021-11-20 PROCEDURE — 87077 CULTURE AEROBIC IDENTIFY: CPT | Performed by: EMERGENCY MEDICINE

## 2021-11-20 RX ORDER — TAMSULOSIN HYDROCHLORIDE 0.4 MG/1
0.4 CAPSULE ORAL DAILY
Status: DISCONTINUED | OUTPATIENT
Start: 2021-11-21 | End: 2021-11-21

## 2021-11-20 RX ORDER — IPRATROPIUM BROMIDE AND ALBUTEROL SULFATE 2.5; .5 MG/3ML; MG/3ML
3 SOLUTION RESPIRATORY (INHALATION) EVERY 6 HOURS PRN
Status: DISCONTINUED | OUTPATIENT
Start: 2021-11-20 | End: 2021-11-30 | Stop reason: HOSPADM

## 2021-11-20 RX ORDER — IBUPROFEN 200 MG
16 TABLET ORAL
Status: DISCONTINUED | OUTPATIENT
Start: 2021-11-20 | End: 2021-11-30 | Stop reason: HOSPADM

## 2021-11-20 RX ORDER — LANOLIN ALCOHOL/MO/W.PET/CERES
1000 CREAM (GRAM) TOPICAL DAILY
Status: DISCONTINUED | OUTPATIENT
Start: 2021-11-21 | End: 2021-11-21

## 2021-11-20 RX ORDER — VANCOMYCIN HCL IN 5 % DEXTROSE 1G/250ML
1000 PLASTIC BAG, INJECTION (ML) INTRAVENOUS
Status: COMPLETED | OUTPATIENT
Start: 2021-11-20 | End: 2021-11-20

## 2021-11-20 RX ORDER — ACETAMINOPHEN 500 MG
TABLET ORAL
Status: COMPLETED
Start: 2021-11-20 | End: 2021-11-20

## 2021-11-20 RX ORDER — ENOXAPARIN SODIUM 100 MG/ML
30 INJECTION SUBCUTANEOUS EVERY 24 HOURS
Status: DISCONTINUED | OUTPATIENT
Start: 2021-11-21 | End: 2021-11-21

## 2021-11-20 RX ORDER — FLUTICASONE FUROATE AND VILANTEROL 100; 25 UG/1; UG/1
1 POWDER RESPIRATORY (INHALATION) DAILY
Status: DISCONTINUED | OUTPATIENT
Start: 2021-11-21 | End: 2021-11-30 | Stop reason: HOSPADM

## 2021-11-20 RX ORDER — NOREPINEPHRINE BITARTRATE/D5W 4MG/250ML
0-3 PLASTIC BAG, INJECTION (ML) INTRAVENOUS CONTINUOUS
Status: DISCONTINUED | OUTPATIENT
Start: 2021-11-20 | End: 2021-11-22

## 2021-11-20 RX ORDER — NALOXONE HCL 0.4 MG/ML
0.02 VIAL (ML) INJECTION
Status: DISCONTINUED | OUTPATIENT
Start: 2021-11-20 | End: 2021-11-30 | Stop reason: HOSPADM

## 2021-11-20 RX ORDER — SODIUM CHLORIDE 0.9 % (FLUSH) 0.9 %
10 SYRINGE (ML) INJECTION EVERY 12 HOURS PRN
Status: DISCONTINUED | OUTPATIENT
Start: 2021-11-20 | End: 2021-11-30 | Stop reason: HOSPADM

## 2021-11-20 RX ORDER — OXYBUTYNIN CHLORIDE 5 MG/1
5 TABLET, EXTENDED RELEASE ORAL DAILY
Status: DISCONTINUED | OUTPATIENT
Start: 2021-11-21 | End: 2021-11-21

## 2021-11-20 RX ORDER — ACETAMINOPHEN 500 MG
1000 TABLET ORAL
Status: DISCONTINUED | OUTPATIENT
Start: 2021-11-20 | End: 2021-11-20

## 2021-11-20 RX ORDER — ONDANSETRON 2 MG/ML
4 INJECTION INTRAMUSCULAR; INTRAVENOUS EVERY 6 HOURS PRN
Status: DISCONTINUED | OUTPATIENT
Start: 2021-11-20 | End: 2021-11-30 | Stop reason: HOSPADM

## 2021-11-20 RX ORDER — POLYETHYLENE GLYCOL 3350 17 G/17G
17 POWDER, FOR SOLUTION ORAL 2 TIMES DAILY PRN
Status: DISCONTINUED | OUTPATIENT
Start: 2021-11-20 | End: 2021-11-30 | Stop reason: HOSPADM

## 2021-11-20 RX ORDER — ACETAMINOPHEN 325 MG/1
650 TABLET ORAL EVERY 4 HOURS PRN
Status: DISCONTINUED | OUTPATIENT
Start: 2021-11-20 | End: 2021-11-30 | Stop reason: HOSPADM

## 2021-11-20 RX ORDER — MAGNESIUM SULFATE 1 G/100ML
1 INJECTION INTRAVENOUS ONCE
Status: COMPLETED | OUTPATIENT
Start: 2021-11-20 | End: 2021-11-21

## 2021-11-20 RX ORDER — PRAVASTATIN SODIUM 40 MG/1
40 TABLET ORAL NIGHTLY
Status: DISCONTINUED | OUTPATIENT
Start: 2021-11-21 | End: 2021-11-21

## 2021-11-20 RX ORDER — TALC
6 POWDER (GRAM) TOPICAL NIGHTLY PRN
Status: DISCONTINUED | OUTPATIENT
Start: 2021-11-20 | End: 2021-11-30 | Stop reason: HOSPADM

## 2021-11-20 RX ORDER — IBUPROFEN 200 MG
24 TABLET ORAL
Status: DISCONTINUED | OUTPATIENT
Start: 2021-11-20 | End: 2021-11-30 | Stop reason: HOSPADM

## 2021-11-20 RX ORDER — TOBRAMYCIN 3 MG/ML
1 SOLUTION/ DROPS OPHTHALMIC EVERY 4 HOURS
COMMUNITY
End: 2021-11-20

## 2021-11-20 RX ORDER — PANTOPRAZOLE SODIUM 40 MG/1
40 TABLET, DELAYED RELEASE ORAL
Status: DISCONTINUED | OUTPATIENT
Start: 2021-11-21 | End: 2021-11-23

## 2021-11-20 RX ORDER — GLUCAGON 1 MG
1 KIT INJECTION
Status: DISCONTINUED | OUTPATIENT
Start: 2021-11-20 | End: 2021-11-30 | Stop reason: HOSPADM

## 2021-11-20 RX ORDER — NAPROXEN SODIUM 220 MG/1
81 TABLET, FILM COATED ORAL DAILY
Status: DISCONTINUED | OUTPATIENT
Start: 2021-11-21 | End: 2021-11-22

## 2021-11-20 RX ORDER — FLUTICASONE PROPIONATE 50 MCG
1 SPRAY, SUSPENSION (ML) NASAL DAILY
Status: DISCONTINUED | OUTPATIENT
Start: 2021-11-21 | End: 2021-11-30 | Stop reason: HOSPADM

## 2021-11-20 RX ORDER — LIDOCAINE HYDROCHLORIDE 10 MG/ML
INJECTION, SOLUTION EPIDURAL; INFILTRATION; INTRACAUDAL; PERINEURAL
Status: DISPENSED
Start: 2021-11-20 | End: 2021-11-21

## 2021-11-20 RX ADMIN — NOREPINEPHRINE BITARTRATE 0.02 MCG/KG/MIN: 4 INJECTION, SOLUTION INTRAVENOUS at 11:11

## 2021-11-20 RX ADMIN — ACETAMINOPHEN 1000 MG: 500 TABLET, FILM COATED ORAL at 04:11

## 2021-11-20 RX ADMIN — Medication 1000 MG: at 04:11

## 2021-11-20 RX ADMIN — VANCOMYCIN HYDROCHLORIDE 500 MG: 500 INJECTION, POWDER, LYOPHILIZED, FOR SOLUTION INTRAVENOUS at 09:11

## 2021-11-20 RX ADMIN — MAGNESIUM SULFATE 1 G: 1 INJECTION INTRAVENOUS at 11:11

## 2021-11-20 RX ADMIN — VANCOMYCIN HYDROCHLORIDE 1000 MG: 1 INJECTION, POWDER, LYOPHILIZED, FOR SOLUTION INTRAVENOUS at 09:11

## 2021-11-20 RX ADMIN — PIPERACILLIN AND TAZOBACTAM 4.5 G: 4; .5 INJECTION, POWDER, LYOPHILIZED, FOR SOLUTION INTRAVENOUS; PARENTERAL at 06:11

## 2021-11-20 RX ADMIN — SODIUM CHLORIDE 2900 ML: 0.9 INJECTION, SOLUTION INTRAVENOUS at 05:11

## 2021-11-21 ENCOUNTER — ANESTHESIA EVENT (OUTPATIENT)
Dept: INTENSIVE CARE | Facility: HOSPITAL | Age: 86
DRG: 853 | End: 2021-11-21
Payer: MEDICARE

## 2021-11-21 ENCOUNTER — ANESTHESIA (OUTPATIENT)
Dept: INTENSIVE CARE | Facility: HOSPITAL | Age: 86
DRG: 853 | End: 2021-11-21
Payer: MEDICARE

## 2021-11-21 ENCOUNTER — CLINICAL SUPPORT (OUTPATIENT)
Dept: CARDIOLOGY | Facility: HOSPITAL | Age: 86
DRG: 853 | End: 2021-11-21
Attending: INTERNAL MEDICINE
Payer: MEDICARE

## 2021-11-21 VITALS — BODY MASS INDEX: 33.98 KG/M2 | WEIGHT: 216.5 LBS | HEIGHT: 67 IN

## 2021-11-21 PROBLEM — J81.0 ACUTE PULMONARY EDEMA: Status: ACTIVE | Noted: 2021-11-21

## 2021-11-21 PROBLEM — I35.0 AORTIC STENOSIS: Status: ACTIVE | Noted: 2021-11-21

## 2021-11-21 PROBLEM — R65.21 SEPTIC SHOCK: Status: ACTIVE | Noted: 2021-11-21

## 2021-11-21 PROBLEM — J96.01 ACUTE HYPOXEMIC RESPIRATORY FAILURE: Status: ACTIVE | Noted: 2021-11-21

## 2021-11-21 PROBLEM — I21.4 NSTEMI (NON-ST ELEVATED MYOCARDIAL INFARCTION): Status: ACTIVE | Noted: 2021-11-21

## 2021-11-21 PROBLEM — A41.50 GRAM NEGATIVE SEPSIS: Status: ACTIVE | Noted: 2021-11-21

## 2021-11-21 PROBLEM — A41.9 SEPTIC SHOCK: Status: ACTIVE | Noted: 2021-11-21

## 2021-11-21 LAB
ALBUMIN SERPL BCP-MCNC: 3.1 G/DL (ref 3.5–5.2)
ALP SERPL-CCNC: 67 U/L (ref 55–135)
ALT SERPL W/O P-5'-P-CCNC: 99 U/L (ref 10–44)
ANION GAP SERPL CALC-SCNC: 15 MMOL/L (ref 8–16)
AORTIC ROOT ANNULUS: 3.31 CM
AORTIC VALVE CUSP SEPERATION: 0.75 CM
AST SERPL-CCNC: 75 U/L (ref 10–40)
AV INDEX (PROSTH): 0.22
AV MEAN GRADIENT: 39 MMHG
AV PEAK GRADIENT: 58 MMHG
AV VALVE AREA: 0.68 CM2
AV VELOCITY RATIO: 22.97
BASOPHILS NFR BLD: 0 % (ref 0–1.9)
BILIRUB SERPL-MCNC: 6.5 MG/DL (ref 0.1–1)
BSA FOR ECHO PROCEDURE: 2.15 M2
BUN SERPL-MCNC: 41 MG/DL (ref 8–23)
CALCIUM SERPL-MCNC: 7.3 MG/DL (ref 8.7–10.5)
CHLORIDE SERPL-SCNC: 102 MMOL/L (ref 95–110)
CK MB SERPL-MCNC: 11.5 NG/ML (ref 0.1–6.5)
CK MB SERPL-MCNC: 5.7 NG/ML (ref 0.1–6.5)
CK MB SERPL-MCNC: 9.4 NG/ML (ref 0.1–6.5)
CK SERPL-CCNC: 343 U/L (ref 20–200)
CK SERPL-CCNC: 382 U/L (ref 20–200)
CK SERPL-CCNC: 387 U/L (ref 20–200)
CK SERPL-CCNC: 400 U/L (ref 20–200)
CO2 SERPL-SCNC: 16 MMOL/L (ref 23–29)
CREAT SERPL-MCNC: 3.2 MG/DL (ref 0.5–1.4)
CRP SERPL-MCNC: 23.37 MG/DL
CV ECHO LV RWT: 0.49 CM
DIFFERENTIAL METHOD: ABNORMAL
DOP CALC AO PEAK VEL: 3.81 M/S
DOP CALC AO VTI: 84.44 CM
DOP CALC LVOT AREA: 3.1 CM2
DOP CALC LVOT DIAMETER: 2 CM
DOP CALC LVOT PEAK VEL: 87.51 M/S
DOP CALC LVOT STROKE VOLUME: 57.02 CM3
DOP CALCLVOT PEAK VEL VTI: 18.16 CM
E WAVE DECELERATION TIME: 326.61 MSEC
E/A RATIO: 0.94
E/E' RATIO: 9.2 M/S
ECHO LV POSTERIOR WALL: 1.2 CM (ref 0.6–1.1)
EJECTION FRACTION: 55 %
EOSINOPHIL NFR BLD: 0 % (ref 0–8)
ERYTHROCYTE [DISTWIDTH] IN BLOOD BY AUTOMATED COUNT: 17.2 % (ref 11.5–14.5)
EST. GFR  (AFRICAN AMERICAN): 19.1 ML/MIN/1.73 M^2
EST. GFR  (NON AFRICAN AMERICAN): 16.5 ML/MIN/1.73 M^2
FRACTIONAL SHORTENING: 30 % (ref 28–44)
GLUCOSE SERPL-MCNC: 116 MG/DL (ref 70–110)
GLUCOSE SERPL-MCNC: 139 MG/DL (ref 70–110)
GLUCOSE SERPL-MCNC: 207 MG/DL (ref 70–110)
GLUCOSE SERPL-MCNC: 219 MG/DL (ref 70–110)
GLUCOSE SERPL-MCNC: 231 MG/DL (ref 70–110)
GLUCOSE SERPL-MCNC: 77 MG/DL (ref 70–110)
HCT VFR BLD AUTO: 46 % (ref 40–54)
HGB BLD-MCNC: 14.6 G/DL (ref 14–18)
IMM GRANULOCYTES # BLD AUTO: ABNORMAL K/UL (ref 0–0.04)
IMM GRANULOCYTES NFR BLD AUTO: ABNORMAL % (ref 0–0.5)
INFLUENZA A, MOLECULAR: NEGATIVE
INFLUENZA B, MOLECULAR: NEGATIVE
INTERVENTRICULAR SEPTUM: 1.24 CM (ref 0.6–1.1)
LACTATE SERPL-SCNC: 1.6 MMOL/L (ref 0.5–1.9)
LACTATE SERPL-SCNC: 2.6 MMOL/L (ref 0.5–1.9)
LACTATE SERPL-SCNC: 2.7 MMOL/L (ref 0.5–1.9)
LACTATE SERPL-SCNC: 3.1 MMOL/L (ref 0.5–1.9)
LEFT ATRIUM SIZE: 4.96 CM
LEFT INTERNAL DIMENSION IN SYSTOLE: 3.44 CM (ref 2.1–4)
LEFT VENTRICLE DIASTOLIC VOLUME INDEX: 59.77 ML/M2
LEFT VENTRICLE DIASTOLIC VOLUME: 124.91 ML
LEFT VENTRICLE MASS INDEX: 112 G/M2
LEFT VENTRICLE SYSTOLIC VOLUME INDEX: 27.8 ML/M2
LEFT VENTRICLE SYSTOLIC VOLUME: 58.09 ML
LEFT VENTRICULAR INTERNAL DIMENSION IN DIASTOLE: 4.92 CM (ref 3.5–6)
LEFT VENTRICULAR MASS: 233.22 G
LV LATERAL E/E' RATIO: 9.2 M/S
LV SEPTAL E/E' RATIO: 9.2 M/S
LYMPHOCYTES NFR BLD: 3 % (ref 18–48)
MAGNESIUM SERPL-MCNC: 1.7 MG/DL (ref 1.6–2.6)
MCH RBC QN AUTO: 26.6 PG (ref 27–31)
MCHC RBC AUTO-ENTMCNC: 31.7 G/DL (ref 32–36)
MCV RBC AUTO: 84 FL (ref 82–98)
MONOCYTES NFR BLD: 8 % (ref 4–15)
MRSA SCREEN BY PCR: NEGATIVE
MV PEAK A VEL: 0.98 M/S
MV PEAK E VEL: 0.92 M/S
NEUTROPHILS NFR BLD: 72 % (ref 38–73)
NEUTS BAND NFR BLD MANUAL: 17 %
NRBC BLD-RTO: 0 /100 WBC
OSMOLALITY UR: 285 MOSM/KG (ref 50–1200)
PISA TR MAX VEL: 2.79 M/S
PLATELET # BLD AUTO: 152 K/UL (ref 150–450)
PLATELET BLD QL SMEAR: ABNORMAL
PMV BLD AUTO: 11.3 FL (ref 9.2–12.9)
POTASSIUM SERPL-SCNC: 4 MMOL/L (ref 3.5–5.1)
PROCALCITONIN SERPL IA-MCNC: 59.93 NG/ML (ref 0–0.5)
PROT SERPL-MCNC: 6.4 G/DL (ref 6–8.4)
PV PEAK VELOCITY: 95.33 CM/S
RA PRESSURE: 8 MMHG
RBC # BLD AUTO: 5.48 M/UL (ref 4.6–6.2)
RIGHT VENTRICULAR END-DIASTOLIC DIMENSION: 286 CM
SODIUM SERPL-SCNC: 133 MMOL/L (ref 136–145)
SPECIMEN SOURCE: NORMAL
TDI LATERAL: 0.1 M/S
TDI SEPTAL: 0.1 M/S
TDI: 0.1 M/S
TR MAX PG: 31 MMHG
TROPONIN I SERPL DL<=0.01 NG/ML-MCNC: 0.12 NG/ML
TROPONIN I SERPL DL<=0.01 NG/ML-MCNC: 0.21 NG/ML
TROPONIN I SERPL DL<=0.01 NG/ML-MCNC: 0.32 NG/ML
TROPONIN I SERPL DL<=0.01 NG/ML-MCNC: 0.36 NG/ML
TV REST PULMONARY ARTERY PRESSURE: 39 MMHG
VANCOMYCIN SERPL-MCNC: 11.8 UG/ML
WBC # BLD AUTO: 34.41 K/UL (ref 3.9–12.7)

## 2021-11-21 PROCEDURE — 80053 COMPREHEN METABOLIC PANEL: CPT | Performed by: INTERNAL MEDICINE

## 2021-11-21 PROCEDURE — 36415 COLL VENOUS BLD VENIPUNCTURE: CPT | Performed by: INTERNAL MEDICINE

## 2021-11-21 PROCEDURE — 25000242 PHARM REV CODE 250 ALT 637 W/ HCPCS: Performed by: INTERNAL MEDICINE

## 2021-11-21 PROCEDURE — 85007 BL SMEAR W/DIFF WBC COUNT: CPT | Performed by: INTERNAL MEDICINE

## 2021-11-21 PROCEDURE — 85027 COMPLETE CBC AUTOMATED: CPT | Performed by: INTERNAL MEDICINE

## 2021-11-21 PROCEDURE — 27000221 HC OXYGEN, UP TO 24 HOURS

## 2021-11-21 PROCEDURE — 99223 PR INITIAL HOSPITAL CARE,LEVL III: ICD-10-PCS | Mod: 25,,, | Performed by: INTERNAL MEDICINE

## 2021-11-21 PROCEDURE — 83935 ASSAY OF URINE OSMOLALITY: CPT | Performed by: INTERNAL MEDICINE

## 2021-11-21 PROCEDURE — 83735 ASSAY OF MAGNESIUM: CPT | Performed by: INTERNAL MEDICINE

## 2021-11-21 PROCEDURE — 99223 1ST HOSP IP/OBS HIGH 75: CPT | Mod: 25,,, | Performed by: INTERNAL MEDICINE

## 2021-11-21 PROCEDURE — 87502 INFLUENZA DNA AMP PROBE: CPT | Performed by: INTERNAL MEDICINE

## 2021-11-21 PROCEDURE — 63600175 PHARM REV CODE 636 W HCPCS: Performed by: INTERNAL MEDICINE

## 2021-11-21 PROCEDURE — 63700000 PHARM REV CODE 250 ALT 637 W/O HCPCS: Performed by: STUDENT IN AN ORGANIZED HEALTH CARE EDUCATION/TRAINING PROGRAM

## 2021-11-21 PROCEDURE — 82550 ASSAY OF CK (CPK): CPT | Performed by: INTERNAL MEDICINE

## 2021-11-21 PROCEDURE — 93306 ECHO (CUPID ONLY): ICD-10-PCS | Mod: 26,,, | Performed by: INTERNAL MEDICINE

## 2021-11-21 PROCEDURE — 80202 ASSAY OF VANCOMYCIN: CPT | Performed by: INTERNAL MEDICINE

## 2021-11-21 PROCEDURE — 83605 ASSAY OF LACTIC ACID: CPT | Performed by: EMERGENCY MEDICINE

## 2021-11-21 PROCEDURE — 25000003 PHARM REV CODE 250

## 2021-11-21 PROCEDURE — 25000003 PHARM REV CODE 250: Performed by: INTERNAL MEDICINE

## 2021-11-21 PROCEDURE — 87641 MR-STAPH DNA AMP PROBE: CPT | Performed by: STUDENT IN AN ORGANIZED HEALTH CARE EDUCATION/TRAINING PROGRAM

## 2021-11-21 PROCEDURE — 20000000 HC ICU ROOM

## 2021-11-21 PROCEDURE — 82553 CREATINE MB FRACTION: CPT | Mod: 91 | Performed by: INTERNAL MEDICINE

## 2021-11-21 PROCEDURE — 82962 GLUCOSE BLOOD TEST: CPT

## 2021-11-21 PROCEDURE — U0003 INFECTIOUS AGENT DETECTION BY NUCLEIC ACID (DNA OR RNA); SEVERE ACUTE RESPIRATORY SYNDROME CORONAVIRUS 2 (SARS-COV-2) (CORONAVIRUS DISEASE [COVID-19]), AMPLIFIED PROBE TECHNIQUE, MAKING USE OF HIGH THROUGHPUT TECHNOLOGIES AS DESCRIBED BY CMS-2020-01-R: HCPCS | Performed by: INTERNAL MEDICINE

## 2021-11-21 PROCEDURE — 84484 ASSAY OF TROPONIN QUANT: CPT | Performed by: INTERNAL MEDICINE

## 2021-11-21 PROCEDURE — 86140 C-REACTIVE PROTEIN: CPT | Performed by: INTERNAL MEDICINE

## 2021-11-21 PROCEDURE — 94640 AIRWAY INHALATION TREATMENT: CPT

## 2021-11-21 PROCEDURE — 99291 PR CRITICAL CARE, E/M 30-74 MINUTES: ICD-10-PCS | Mod: ,,, | Performed by: STUDENT IN AN ORGANIZED HEALTH CARE EDUCATION/TRAINING PROGRAM

## 2021-11-21 PROCEDURE — 99291 CRITICAL CARE FIRST HOUR: CPT | Mod: ,,, | Performed by: STUDENT IN AN ORGANIZED HEALTH CARE EDUCATION/TRAINING PROGRAM

## 2021-11-21 PROCEDURE — 63600175 PHARM REV CODE 636 W HCPCS: Performed by: NURSE PRACTITIONER

## 2021-11-21 PROCEDURE — 99900035 HC TECH TIME PER 15 MIN (STAT)

## 2021-11-21 PROCEDURE — 84145 PROCALCITONIN (PCT): CPT | Performed by: INTERNAL MEDICINE

## 2021-11-21 PROCEDURE — U0005 INFEC AGEN DETEC AMPLI PROBE: HCPCS | Performed by: INTERNAL MEDICINE

## 2021-11-21 PROCEDURE — 83605 ASSAY OF LACTIC ACID: CPT | Mod: 91 | Performed by: INTERNAL MEDICINE

## 2021-11-21 PROCEDURE — 94761 N-INVAS EAR/PLS OXIMETRY MLT: CPT

## 2021-11-21 PROCEDURE — 93306 TTE W/DOPPLER COMPLETE: CPT

## 2021-11-21 PROCEDURE — 63600175 PHARM REV CODE 636 W HCPCS: Performed by: STUDENT IN AN ORGANIZED HEALTH CARE EDUCATION/TRAINING PROGRAM

## 2021-11-21 PROCEDURE — 93306 TTE W/DOPPLER COMPLETE: CPT | Mod: 26,,, | Performed by: INTERNAL MEDICINE

## 2021-11-21 RX ORDER — MAGNESIUM SULFATE HEPTAHYDRATE 40 MG/ML
2 INJECTION, SOLUTION INTRAVENOUS
Status: DISCONTINUED | OUTPATIENT
Start: 2021-11-21 | End: 2021-11-30 | Stop reason: HOSPADM

## 2021-11-21 RX ORDER — POTASSIUM CHLORIDE 20 MEQ/1
40 TABLET, EXTENDED RELEASE ORAL
Status: DISCONTINUED | OUTPATIENT
Start: 2021-11-21 | End: 2021-11-30 | Stop reason: HOSPADM

## 2021-11-21 RX ORDER — MAGNESIUM SULFATE HEPTAHYDRATE 40 MG/ML
4 INJECTION, SOLUTION INTRAVENOUS
Status: DISCONTINUED | OUTPATIENT
Start: 2021-11-21 | End: 2021-11-30 | Stop reason: HOSPADM

## 2021-11-21 RX ORDER — MUPIROCIN 20 MG/G
OINTMENT TOPICAL 2 TIMES DAILY
Status: DISCONTINUED | OUTPATIENT
Start: 2021-11-21 | End: 2021-11-24

## 2021-11-21 RX ORDER — POTASSIUM CHLORIDE 7.45 MG/ML
40 INJECTION INTRAVENOUS
Status: DISCONTINUED | OUTPATIENT
Start: 2021-11-21 | End: 2021-11-30 | Stop reason: HOSPADM

## 2021-11-21 RX ORDER — POTASSIUM CHLORIDE 20 MEQ/1
20 TABLET, EXTENDED RELEASE ORAL
Status: DISCONTINUED | OUTPATIENT
Start: 2021-11-21 | End: 2021-11-30 | Stop reason: HOSPADM

## 2021-11-21 RX ORDER — SODIUM CHLORIDE 9 MG/ML
INJECTION, SOLUTION INTRAVENOUS CONTINUOUS
Status: DISCONTINUED | OUTPATIENT
Start: 2021-11-21 | End: 2021-11-21

## 2021-11-21 RX ORDER — HEPARIN SODIUM 5000 [USP'U]/ML
5000 INJECTION, SOLUTION INTRAVENOUS; SUBCUTANEOUS EVERY 8 HOURS
Status: DISCONTINUED | OUTPATIENT
Start: 2021-11-21 | End: 2021-11-22

## 2021-11-21 RX ORDER — CEFEPIME HYDROCHLORIDE 1 G/50ML
1 INJECTION, SOLUTION INTRAVENOUS
Status: DISCONTINUED | OUTPATIENT
Start: 2021-11-21 | End: 2021-11-21

## 2021-11-21 RX ORDER — POTASSIUM CHLORIDE 7.45 MG/ML
20 INJECTION INTRAVENOUS
Status: DISCONTINUED | OUTPATIENT
Start: 2021-11-21 | End: 2021-11-30 | Stop reason: HOSPADM

## 2021-11-21 RX ORDER — CHLORHEXIDINE GLUCONATE ORAL RINSE 1.2 MG/ML
15 SOLUTION DENTAL 2 TIMES DAILY
Status: DISCONTINUED | OUTPATIENT
Start: 2021-11-21 | End: 2021-11-24

## 2021-11-21 RX ORDER — MAGNESIUM SULFATE 1 G/100ML
1 INJECTION INTRAVENOUS
Status: DISCONTINUED | OUTPATIENT
Start: 2021-11-21 | End: 2021-11-30 | Stop reason: HOSPADM

## 2021-11-21 RX ORDER — LANOLIN ALCOHOL/MO/W.PET/CERES
800 CREAM (GRAM) TOPICAL
Status: DISCONTINUED | OUTPATIENT
Start: 2021-11-21 | End: 2021-11-30 | Stop reason: HOSPADM

## 2021-11-21 RX ORDER — MEROPENEM AND SODIUM CHLORIDE 1 G/50ML
1 INJECTION, SOLUTION INTRAVENOUS
Status: DISCONTINUED | OUTPATIENT
Start: 2021-11-21 | End: 2021-11-21

## 2021-11-21 RX ORDER — FLUCONAZOLE 100 MG/1
200 TABLET ORAL DAILY
Status: DISCONTINUED | OUTPATIENT
Start: 2021-11-21 | End: 2021-11-24

## 2021-11-21 RX ORDER — MEROPENEM AND SODIUM CHLORIDE 1 G/50ML
1 INJECTION, SOLUTION INTRAVENOUS
Status: DISCONTINUED | OUTPATIENT
Start: 2021-11-21 | End: 2021-11-22

## 2021-11-21 RX ADMIN — PANTOPRAZOLE SODIUM 40 MG: 40 TABLET, DELAYED RELEASE ORAL at 05:11

## 2021-11-21 RX ADMIN — NOREPINEPHRINE BITARTRATE 0.16 MCG/KG/MIN: 4 INJECTION, SOLUTION INTRAVENOUS at 04:11

## 2021-11-21 RX ADMIN — CHLORHEXIDINE GLUCONATE 15 ML: 1.2 RINSE ORAL at 09:11

## 2021-11-21 RX ADMIN — HEPARIN SODIUM 5000 UNITS: 5000 INJECTION INTRAVENOUS; SUBCUTANEOUS at 05:11

## 2021-11-21 RX ADMIN — MEROPENEM AND SODIUM CHLORIDE 1 G: 1 INJECTION, SOLUTION INTRAVENOUS at 09:11

## 2021-11-21 RX ADMIN — MUPIROCIN: 20 OINTMENT TOPICAL at 08:11

## 2021-11-21 RX ADMIN — HEPARIN SODIUM 5000 UNITS: 5000 INJECTION INTRAVENOUS; SUBCUTANEOUS at 09:11

## 2021-11-21 RX ADMIN — CEFEPIME 2 G: 2 INJECTION, POWDER, FOR SOLUTION INTRAVENOUS at 12:11

## 2021-11-21 RX ADMIN — ASPIRIN 81 MG 81 MG: 81 TABLET ORAL at 08:11

## 2021-11-21 RX ADMIN — HUMAN INSULIN 2 UNITS: 100 INJECTION, SOLUTION SUBCUTANEOUS at 09:11

## 2021-11-21 RX ADMIN — HUMAN INSULIN 2 UNITS: 100 INJECTION, SOLUTION SUBCUTANEOUS at 06:11

## 2021-11-21 RX ADMIN — FLUTICASONE FUROATE AND VILANTEROL TRIFENATATE 1 PUFF: 100; 25 POWDER RESPIRATORY (INHALATION) at 07:11

## 2021-11-21 RX ADMIN — FLUCONAZOLE 200 MG: 100 TABLET ORAL at 09:11

## 2021-11-21 RX ADMIN — TIOTROPIUM BROMIDE INHALATION SPRAY 2 PUFF: 3.12 SPRAY, METERED RESPIRATORY (INHALATION) at 07:11

## 2021-11-21 RX ADMIN — FLUTICASONE PROPIONATE 50 MCG: 50 SPRAY, METERED NASAL at 09:11

## 2021-11-21 RX ADMIN — MELATONIN TAB 3 MG 6 MG: 3 TAB at 09:11

## 2021-11-21 RX ADMIN — ENOXAPARIN SODIUM 30 MG: 30 INJECTION SUBCUTANEOUS at 12:11

## 2021-11-21 RX ADMIN — MAGNESIUM SULFATE 1 G: 1 INJECTION INTRAVENOUS at 04:11

## 2021-11-21 RX ADMIN — MUPIROCIN: 20 OINTMENT TOPICAL at 09:11

## 2021-11-22 LAB
ALBUMIN SERPL BCP-MCNC: 3 G/DL (ref 3.5–5.2)
ALBUMIN SERPL BCP-MCNC: 3 G/DL (ref 3.5–5.2)
ALP SERPL-CCNC: 78 U/L (ref 55–135)
ALP SERPL-CCNC: 78 U/L (ref 55–135)
ALT SERPL W/O P-5'-P-CCNC: 73 U/L (ref 10–44)
ALT SERPL W/O P-5'-P-CCNC: 73 U/L (ref 10–44)
ANION GAP SERPL CALC-SCNC: 15 MMOL/L (ref 8–16)
ANION GAP SERPL CALC-SCNC: 15 MMOL/L (ref 8–16)
AST SERPL-CCNC: 47 U/L (ref 10–40)
AST SERPL-CCNC: 47 U/L (ref 10–40)
BASOPHILS # BLD AUTO: 0.03 K/UL (ref 0–0.2)
BASOPHILS # BLD AUTO: 0.03 K/UL (ref 0–0.2)
BASOPHILS NFR BLD: 0.2 % (ref 0–1.9)
BASOPHILS NFR BLD: 0.2 % (ref 0–1.9)
BILIRUB SERPL-MCNC: 6.2 MG/DL (ref 0.1–1)
BILIRUB SERPL-MCNC: 6.2 MG/DL (ref 0.1–1)
BUN SERPL-MCNC: 54 MG/DL (ref 8–23)
BUN SERPL-MCNC: 54 MG/DL (ref 8–23)
CALCIUM SERPL-MCNC: 7.2 MG/DL (ref 8.7–10.5)
CALCIUM SERPL-MCNC: 7.2 MG/DL (ref 8.7–10.5)
CHLORIDE SERPL-SCNC: 99 MMOL/L (ref 95–110)
CHLORIDE SERPL-SCNC: 99 MMOL/L (ref 95–110)
CK SERPL-CCNC: 198 U/L (ref 20–200)
CK SERPL-CCNC: 271 U/L (ref 20–200)
CO2 SERPL-SCNC: 19 MMOL/L (ref 23–29)
CO2 SERPL-SCNC: 19 MMOL/L (ref 23–29)
COMPLEXED PSA SERPL-MCNC: 0.99 NG/ML (ref 0–4)
CREAT SERPL-MCNC: 3.5 MG/DL (ref 0.5–1.4)
CREAT SERPL-MCNC: 3.5 MG/DL (ref 0.5–1.4)
DIFFERENTIAL METHOD: ABNORMAL
DIFFERENTIAL METHOD: ABNORMAL
EOSINOPHIL # BLD AUTO: 0 K/UL (ref 0–0.5)
EOSINOPHIL # BLD AUTO: 0 K/UL (ref 0–0.5)
EOSINOPHIL NFR BLD: 0.2 % (ref 0–8)
EOSINOPHIL NFR BLD: 0.2 % (ref 0–8)
ERYTHROCYTE [DISTWIDTH] IN BLOOD BY AUTOMATED COUNT: 17.3 % (ref 11.5–14.5)
ERYTHROCYTE [DISTWIDTH] IN BLOOD BY AUTOMATED COUNT: 17.3 % (ref 11.5–14.5)
EST. GFR  (AFRICAN AMERICAN): 17.1 ML/MIN/1.73 M^2
EST. GFR  (AFRICAN AMERICAN): 17.1 ML/MIN/1.73 M^2
EST. GFR  (NON AFRICAN AMERICAN): 14.8 ML/MIN/1.73 M^2
EST. GFR  (NON AFRICAN AMERICAN): 14.8 ML/MIN/1.73 M^2
GLUCOSE SERPL-MCNC: 125 MG/DL (ref 70–110)
GLUCOSE SERPL-MCNC: 129 MG/DL (ref 70–110)
GLUCOSE SERPL-MCNC: 135 MG/DL (ref 70–110)
GLUCOSE SERPL-MCNC: 135 MG/DL (ref 70–110)
GLUCOSE SERPL-MCNC: 152 MG/DL (ref 70–110)
HCT VFR BLD AUTO: 46 % (ref 40–54)
HCT VFR BLD AUTO: 46 % (ref 40–54)
HGB BLD-MCNC: 14.6 G/DL (ref 14–18)
HGB BLD-MCNC: 14.6 G/DL (ref 14–18)
IMM GRANULOCYTES # BLD AUTO: 0.18 K/UL (ref 0–0.04)
IMM GRANULOCYTES # BLD AUTO: 0.18 K/UL (ref 0–0.04)
IMM GRANULOCYTES NFR BLD AUTO: 1.1 % (ref 0–0.5)
IMM GRANULOCYTES NFR BLD AUTO: 1.1 % (ref 0–0.5)
LYMPHOCYTES # BLD AUTO: 0.5 K/UL (ref 1–4.8)
LYMPHOCYTES # BLD AUTO: 0.5 K/UL (ref 1–4.8)
LYMPHOCYTES NFR BLD: 3.1 % (ref 18–48)
LYMPHOCYTES NFR BLD: 3.1 % (ref 18–48)
MAGNESIUM SERPL-MCNC: 2.1 MG/DL (ref 1.6–2.6)
MCH RBC QN AUTO: 26.1 PG (ref 27–31)
MCH RBC QN AUTO: 26.1 PG (ref 27–31)
MCHC RBC AUTO-ENTMCNC: 31.7 G/DL (ref 32–36)
MCHC RBC AUTO-ENTMCNC: 31.7 G/DL (ref 32–36)
MCV RBC AUTO: 82 FL (ref 82–98)
MCV RBC AUTO: 82 FL (ref 82–98)
MONOCYTES # BLD AUTO: 0.8 K/UL (ref 0.3–1)
MONOCYTES # BLD AUTO: 0.8 K/UL (ref 0.3–1)
MONOCYTES NFR BLD: 5 % (ref 4–15)
MONOCYTES NFR BLD: 5 % (ref 4–15)
NEUTROPHILS # BLD AUTO: 14.6 K/UL (ref 1.8–7.7)
NEUTROPHILS # BLD AUTO: 14.6 K/UL (ref 1.8–7.7)
NEUTROPHILS NFR BLD: 90.4 % (ref 38–73)
NEUTROPHILS NFR BLD: 90.4 % (ref 38–73)
NRBC BLD-RTO: 0 /100 WBC
NRBC BLD-RTO: 0 /100 WBC
PLATELET # BLD AUTO: 114 K/UL (ref 150–450)
PLATELET # BLD AUTO: 114 K/UL (ref 150–450)
PMV BLD AUTO: 11.3 FL (ref 9.2–12.9)
PMV BLD AUTO: 11.3 FL (ref 9.2–12.9)
POTASSIUM SERPL-SCNC: 4.2 MMOL/L (ref 3.5–5.1)
POTASSIUM SERPL-SCNC: 4.2 MMOL/L (ref 3.5–5.1)
PROT SERPL-MCNC: 6.5 G/DL (ref 6–8.4)
PROT SERPL-MCNC: 6.5 G/DL (ref 6–8.4)
RBC # BLD AUTO: 5.59 M/UL (ref 4.6–6.2)
RBC # BLD AUTO: 5.59 M/UL (ref 4.6–6.2)
SARS-COV-2 RNA RESP QL NAA+PROBE: NOT DETECTED
SARS-COV-2- CYCLE NUMBER: NORMAL
SODIUM SERPL-SCNC: 133 MMOL/L (ref 136–145)
SODIUM SERPL-SCNC: 133 MMOL/L (ref 136–145)
URATE SERPL-MCNC: 7.7 MG/DL (ref 3.4–7)
WBC # BLD AUTO: 16.14 K/UL (ref 3.9–12.7)
WBC # BLD AUTO: 16.14 K/UL (ref 3.9–12.7)

## 2021-11-22 PROCEDURE — 25000003 PHARM REV CODE 250: Performed by: INTERNAL MEDICINE

## 2021-11-22 PROCEDURE — 83735 ASSAY OF MAGNESIUM: CPT | Performed by: INTERNAL MEDICINE

## 2021-11-22 PROCEDURE — 87070 CULTURE OTHR SPECIMN AEROBIC: CPT | Performed by: INTERNAL MEDICINE

## 2021-11-22 PROCEDURE — 99233 SBSQ HOSP IP/OBS HIGH 50: CPT | Mod: ,,, | Performed by: INTERNAL MEDICINE

## 2021-11-22 PROCEDURE — 87040 BLOOD CULTURE FOR BACTERIA: CPT | Mod: 59 | Performed by: STUDENT IN AN ORGANIZED HEALTH CARE EDUCATION/TRAINING PROGRAM

## 2021-11-22 PROCEDURE — 99900031 HC PATIENT EDUCATION (STAT)

## 2021-11-22 PROCEDURE — 99233 PR SUBSEQUENT HOSPITAL CARE,LEVL III: ICD-10-PCS | Mod: ,,, | Performed by: INTERNAL MEDICINE

## 2021-11-22 PROCEDURE — 25000242 PHARM REV CODE 250 ALT 637 W/ HCPCS: Performed by: INTERNAL MEDICINE

## 2021-11-22 PROCEDURE — 94640 AIRWAY INHALATION TREATMENT: CPT

## 2021-11-22 PROCEDURE — 63600175 PHARM REV CODE 636 W HCPCS: Performed by: INTERNAL MEDICINE

## 2021-11-22 PROCEDURE — 85025 COMPLETE CBC W/AUTO DIFF WBC: CPT | Performed by: INTERNAL MEDICINE

## 2021-11-22 PROCEDURE — 63600175 PHARM REV CODE 636 W HCPCS: Performed by: NURSE PRACTITIONER

## 2021-11-22 PROCEDURE — 94761 N-INVAS EAR/PLS OXIMETRY MLT: CPT

## 2021-11-22 PROCEDURE — 82550 ASSAY OF CK (CPK): CPT | Performed by: INTERNAL MEDICINE

## 2021-11-22 PROCEDURE — 84550 ASSAY OF BLOOD/URIC ACID: CPT | Performed by: INTERNAL MEDICINE

## 2021-11-22 PROCEDURE — 87205 SMEAR GRAM STAIN: CPT | Performed by: INTERNAL MEDICINE

## 2021-11-22 PROCEDURE — 80053 COMPREHEN METABOLIC PANEL: CPT | Performed by: INTERNAL MEDICINE

## 2021-11-22 PROCEDURE — 12000002 HC ACUTE/MED SURGE SEMI-PRIVATE ROOM

## 2021-11-22 PROCEDURE — 84153 ASSAY OF PSA TOTAL: CPT | Performed by: STUDENT IN AN ORGANIZED HEALTH CARE EDUCATION/TRAINING PROGRAM

## 2021-11-22 PROCEDURE — 63700000 PHARM REV CODE 250 ALT 637 W/O HCPCS: Performed by: STUDENT IN AN ORGANIZED HEALTH CARE EDUCATION/TRAINING PROGRAM

## 2021-11-22 PROCEDURE — 99900035 HC TECH TIME PER 15 MIN (STAT)

## 2021-11-22 PROCEDURE — 36415 COLL VENOUS BLD VENIPUNCTURE: CPT | Performed by: STUDENT IN AN ORGANIZED HEALTH CARE EDUCATION/TRAINING PROGRAM

## 2021-11-22 PROCEDURE — 25000003 PHARM REV CODE 250

## 2021-11-22 PROCEDURE — 94799 UNLISTED PULMONARY SVC/PX: CPT

## 2021-11-22 RX ORDER — MAG HYDROX/ALUMINUM HYD/SIMETH 200-200-20
30 SUSPENSION, ORAL (FINAL DOSE FORM) ORAL EVERY 6 HOURS PRN
Status: DISCONTINUED | OUTPATIENT
Start: 2021-11-22 | End: 2021-11-30 | Stop reason: HOSPADM

## 2021-11-22 RX ORDER — TAMSULOSIN HYDROCHLORIDE 0.4 MG/1
0.4 CAPSULE ORAL DAILY
Status: DISCONTINUED | OUTPATIENT
Start: 2021-11-22 | End: 2021-11-30 | Stop reason: HOSPADM

## 2021-11-22 RX ADMIN — MUPIROCIN: 20 OINTMENT TOPICAL at 09:11

## 2021-11-22 RX ADMIN — ALUMINUM HYDROXIDE, MAGNESIUM HYDROXIDE, AND SIMETHICONE 30 ML: 200; 200; 20 SUSPENSION ORAL at 02:11

## 2021-11-22 RX ADMIN — CHLORHEXIDINE GLUCONATE 15 ML: 1.2 RINSE ORAL at 08:11

## 2021-11-22 RX ADMIN — FLUTICASONE FUROATE AND VILANTEROL TRIFENATATE 1 PUFF: 100; 25 POWDER RESPIRATORY (INHALATION) at 11:11

## 2021-11-22 RX ADMIN — POLYETHYLENE GLYCOL 3350 17 G: 17 POWDER, FOR SOLUTION ORAL at 02:11

## 2021-11-22 RX ADMIN — HEPARIN SODIUM 5000 UNITS: 5000 INJECTION INTRAVENOUS; SUBCUTANEOUS at 05:11

## 2021-11-22 RX ADMIN — ONDANSETRON 4 MG: 2 INJECTION INTRAMUSCULAR; INTRAVENOUS at 02:11

## 2021-11-22 RX ADMIN — CHLORHEXIDINE GLUCONATE 15 ML: 1.2 RINSE ORAL at 09:11

## 2021-11-22 RX ADMIN — VANCOMYCIN HYDROCHLORIDE 1500 MG: 1.5 INJECTION, POWDER, LYOPHILIZED, FOR SOLUTION INTRAVENOUS at 05:11

## 2021-11-22 RX ADMIN — ASPIRIN 81 MG 81 MG: 81 TABLET ORAL at 09:11

## 2021-11-22 RX ADMIN — TIOTROPIUM BROMIDE INHALATION SPRAY 2 PUFF: 3.12 SPRAY, METERED RESPIRATORY (INHALATION) at 11:11

## 2021-11-22 RX ADMIN — TAMSULOSIN HYDROCHLORIDE 0.4 MG: 0.4 CAPSULE ORAL at 04:11

## 2021-11-22 RX ADMIN — FLUCONAZOLE 200 MG: 100 TABLET ORAL at 09:11

## 2021-11-22 RX ADMIN — HEPARIN SODIUM 5000 UNITS: 5000 INJECTION INTRAVENOUS; SUBCUTANEOUS at 03:11

## 2021-11-22 RX ADMIN — PANTOPRAZOLE SODIUM 40 MG: 40 TABLET, DELAYED RELEASE ORAL at 05:11

## 2021-11-22 RX ADMIN — PIPERACILLIN AND TAZOBACTAM 4.5 G: 4; .5 INJECTION, POWDER, LYOPHILIZED, FOR SOLUTION INTRAVENOUS; PARENTERAL at 08:11

## 2021-11-22 RX ADMIN — MUPIROCIN: 20 OINTMENT TOPICAL at 08:11

## 2021-11-22 RX ADMIN — FLUTICASONE PROPIONATE 50 MCG: 50 SPRAY, METERED NASAL at 08:11

## 2021-11-22 RX ADMIN — PIPERACILLIN AND TAZOBACTAM 4.5 G: 4; .5 INJECTION, POWDER, LYOPHILIZED, FOR SOLUTION INTRAVENOUS; PARENTERAL at 09:11

## 2021-11-23 ENCOUNTER — ANESTHESIA (OUTPATIENT)
Dept: SURGERY | Facility: HOSPITAL | Age: 86
DRG: 853 | End: 2021-11-23
Payer: MEDICARE

## 2021-11-23 ENCOUNTER — ANESTHESIA EVENT (OUTPATIENT)
Dept: SURGERY | Facility: HOSPITAL | Age: 86
DRG: 853 | End: 2021-11-23
Payer: MEDICARE

## 2021-11-23 PROBLEM — I48.91 ATRIAL FIBRILLATION: Status: ACTIVE | Noted: 2021-11-23

## 2021-11-23 PROBLEM — I35.0 AORTIC STENOSIS, SEVERE: Status: ACTIVE | Noted: 2021-11-23

## 2021-11-23 PROBLEM — D69.6 THROMBOCYTOPENIA: Status: ACTIVE | Noted: 2021-11-23

## 2021-11-23 LAB
ALBUMIN SERPL BCP-MCNC: 2.6 G/DL (ref 3.5–5.2)
ALP SERPL-CCNC: 108 U/L (ref 55–135)
ALT SERPL W/O P-5'-P-CCNC: 52 U/L (ref 10–44)
ANION GAP SERPL CALC-SCNC: 10 MMOL/L (ref 8–16)
AST SERPL-CCNC: 35 U/L (ref 10–40)
BACTERIA BLD CULT: ABNORMAL
BACTERIA UR CULT: NO GROWTH
BASOPHILS # BLD AUTO: 0.03 K/UL (ref 0–0.2)
BASOPHILS NFR BLD: 0.3 % (ref 0–1.9)
BILIRUB SERPL-MCNC: 5.7 MG/DL (ref 0.1–1)
BUN SERPL-MCNC: 65 MG/DL (ref 8–23)
CALCIUM SERPL-MCNC: 7 MG/DL (ref 8.7–10.5)
CHLORIDE SERPL-SCNC: 100 MMOL/L (ref 95–110)
CO2 SERPL-SCNC: 21 MMOL/L (ref 23–29)
CREAT SERPL-MCNC: 3.4 MG/DL (ref 0.5–1.4)
DIFFERENTIAL METHOD: ABNORMAL
EOSINOPHIL # BLD AUTO: 0.3 K/UL (ref 0–0.5)
EOSINOPHIL NFR BLD: 2.5 % (ref 0–8)
ERYTHROCYTE [DISTWIDTH] IN BLOOD BY AUTOMATED COUNT: 16.8 % (ref 11.5–14.5)
EST. GFR  (AFRICAN AMERICAN): 17.7 ML/MIN/1.73 M^2
EST. GFR  (NON AFRICAN AMERICAN): 15.3 ML/MIN/1.73 M^2
GLUCOSE SERPL-MCNC: 131 MG/DL (ref 70–110)
GLUCOSE SERPL-MCNC: 134 MG/DL (ref 70–110)
GLUCOSE SERPL-MCNC: 139 MG/DL (ref 70–110)
GLUCOSE SERPL-MCNC: 148 MG/DL (ref 70–110)
HCT VFR BLD AUTO: 41.1 % (ref 40–54)
HGB BLD-MCNC: 13.1 G/DL (ref 14–18)
IMM GRANULOCYTES # BLD AUTO: 0.17 K/UL (ref 0–0.04)
IMM GRANULOCYTES NFR BLD AUTO: 1.7 % (ref 0–0.5)
LYMPHOCYTES # BLD AUTO: 0.6 K/UL (ref 1–4.8)
LYMPHOCYTES NFR BLD: 6.2 % (ref 18–48)
MAGNESIUM SERPL-MCNC: 2.2 MG/DL (ref 1.6–2.6)
MCH RBC QN AUTO: 26.1 PG (ref 27–31)
MCHC RBC AUTO-ENTMCNC: 31.9 G/DL (ref 32–36)
MCV RBC AUTO: 82 FL (ref 82–98)
MONOCYTES # BLD AUTO: 0.7 K/UL (ref 0.3–1)
MONOCYTES NFR BLD: 6.6 % (ref 4–15)
NEUTROPHILS # BLD AUTO: 8.5 K/UL (ref 1.8–7.7)
NEUTROPHILS NFR BLD: 82.7 % (ref 38–73)
NRBC BLD-RTO: 0 /100 WBC
PLATELET # BLD AUTO: 97 K/UL (ref 150–450)
PMV BLD AUTO: 10.8 FL (ref 9.2–12.9)
POTASSIUM SERPL-SCNC: 4.1 MMOL/L (ref 3.5–5.1)
PROT SERPL-MCNC: 5.9 G/DL (ref 6–8.4)
RBC # BLD AUTO: 5.02 M/UL (ref 4.6–6.2)
SODIUM SERPL-SCNC: 131 MMOL/L (ref 136–145)
WBC # BLD AUTO: 10.2 K/UL (ref 3.9–12.7)

## 2021-11-23 PROCEDURE — 25000003 PHARM REV CODE 250: Performed by: INTERNAL MEDICINE

## 2021-11-23 PROCEDURE — 43264 ERCP REMOVE DUCT CALCULI: CPT | Performed by: INTERNAL MEDICINE

## 2021-11-23 PROCEDURE — 99232 PR SUBSEQUENT HOSPITAL CARE,LEVL II: ICD-10-PCS | Mod: ,,, | Performed by: INTERNAL MEDICINE

## 2021-11-23 PROCEDURE — 25000003 PHARM REV CODE 250

## 2021-11-23 PROCEDURE — 80053 COMPREHEN METABOLIC PANEL: CPT | Performed by: INTERNAL MEDICINE

## 2021-11-23 PROCEDURE — 12000002 HC ACUTE/MED SURGE SEMI-PRIVATE ROOM

## 2021-11-23 PROCEDURE — 99900031 HC PATIENT EDUCATION (STAT)

## 2021-11-23 PROCEDURE — 27000221 HC OXYGEN, UP TO 24 HOURS

## 2021-11-23 PROCEDURE — 83735 ASSAY OF MAGNESIUM: CPT | Performed by: INTERNAL MEDICINE

## 2021-11-23 PROCEDURE — 99232 SBSQ HOSP IP/OBS MODERATE 35: CPT | Mod: ,,, | Performed by: INTERNAL MEDICINE

## 2021-11-23 PROCEDURE — 94640 AIRWAY INHALATION TREATMENT: CPT

## 2021-11-23 PROCEDURE — 43262 ENDO CHOLANGIOPANCREATOGRAPH: CPT | Performed by: INTERNAL MEDICINE

## 2021-11-23 PROCEDURE — 27202125 HC BALLOON, EXTRACTION (ANY): Performed by: INTERNAL MEDICINE

## 2021-11-23 PROCEDURE — 85025 COMPLETE CBC W/AUTO DIFF WBC: CPT | Performed by: INTERNAL MEDICINE

## 2021-11-23 PROCEDURE — 25000003 PHARM REV CODE 250: Performed by: NURSE ANESTHETIST, CERTIFIED REGISTERED

## 2021-11-23 PROCEDURE — 37000008 HC ANESTHESIA 1ST 15 MINUTES: Performed by: INTERNAL MEDICINE

## 2021-11-23 PROCEDURE — C1769 GUIDE WIRE: HCPCS | Performed by: INTERNAL MEDICINE

## 2021-11-23 PROCEDURE — 63600175 PHARM REV CODE 636 W HCPCS: Performed by: INTERNAL MEDICINE

## 2021-11-23 PROCEDURE — 94761 N-INVAS EAR/PLS OXIMETRY MLT: CPT

## 2021-11-23 PROCEDURE — 99233 SBSQ HOSP IP/OBS HIGH 50: CPT | Mod: ,,, | Performed by: INTERNAL MEDICINE

## 2021-11-23 PROCEDURE — 99222 1ST HOSP IP/OBS MODERATE 55: CPT | Mod: ,,, | Performed by: SURGERY

## 2021-11-23 PROCEDURE — 99900035 HC TECH TIME PER 15 MIN (STAT)

## 2021-11-23 PROCEDURE — 63700000 PHARM REV CODE 250 ALT 637 W/O HCPCS: Performed by: STUDENT IN AN ORGANIZED HEALTH CARE EDUCATION/TRAINING PROGRAM

## 2021-11-23 PROCEDURE — 94799 UNLISTED PULMONARY SVC/PX: CPT

## 2021-11-23 PROCEDURE — 99222 PR INITIAL HOSPITAL CARE,LEVL II: ICD-10-PCS | Mod: ,,, | Performed by: SURGERY

## 2021-11-23 PROCEDURE — 63600175 PHARM REV CODE 636 W HCPCS: Performed by: NURSE ANESTHETIST, CERTIFIED REGISTERED

## 2021-11-23 PROCEDURE — 37000009 HC ANESTHESIA EA ADD 15 MINS: Performed by: INTERNAL MEDICINE

## 2021-11-23 PROCEDURE — 99233 PR SUBSEQUENT HOSPITAL CARE,LEVL III: ICD-10-PCS | Mod: ,,, | Performed by: INTERNAL MEDICINE

## 2021-11-23 RX ORDER — ROCURONIUM BROMIDE 10 MG/ML
INJECTION, SOLUTION INTRAVENOUS
Status: DISCONTINUED | OUTPATIENT
Start: 2021-11-23 | End: 2021-11-23

## 2021-11-23 RX ORDER — PHENYLEPHRINE HYDROCHLORIDE 10 MG/ML
INJECTION INTRAVENOUS
Status: DISCONTINUED | OUTPATIENT
Start: 2021-11-23 | End: 2021-11-23

## 2021-11-23 RX ORDER — FENTANYL CITRATE 50 UG/ML
INJECTION, SOLUTION INTRAMUSCULAR; INTRAVENOUS
Status: DISCONTINUED | OUTPATIENT
Start: 2021-11-23 | End: 2021-11-23

## 2021-11-23 RX ORDER — EPHEDRINE SULFATE 50 MG/ML
INJECTION, SOLUTION INTRAVENOUS
Status: DISCONTINUED | OUTPATIENT
Start: 2021-11-23 | End: 2021-11-23

## 2021-11-23 RX ORDER — LIDOCAINE HYDROCHLORIDE 20 MG/ML
JELLY TOPICAL
Status: DISCONTINUED | OUTPATIENT
Start: 2021-11-23 | End: 2021-11-23

## 2021-11-23 RX ORDER — FAMOTIDINE 10 MG/ML
INJECTION INTRAVENOUS
Status: DISCONTINUED | OUTPATIENT
Start: 2021-11-23 | End: 2021-11-23

## 2021-11-23 RX ORDER — SUCCINYLCHOLINE CHLORIDE 20 MG/ML
INJECTION INTRAMUSCULAR; INTRAVENOUS
Status: DISCONTINUED | OUTPATIENT
Start: 2021-11-23 | End: 2021-11-23

## 2021-11-23 RX ORDER — ONDANSETRON 2 MG/ML
INJECTION INTRAMUSCULAR; INTRAVENOUS
Status: DISCONTINUED | OUTPATIENT
Start: 2021-11-23 | End: 2021-11-23

## 2021-11-23 RX ORDER — ESMOLOL HYDROCHLORIDE 10 MG/ML
INJECTION INTRAVENOUS
Status: DISCONTINUED | OUTPATIENT
Start: 2021-11-23 | End: 2021-11-23

## 2021-11-23 RX ORDER — ETOMIDATE 2 MG/ML
INJECTION INTRAVENOUS
Status: DISCONTINUED | OUTPATIENT
Start: 2021-11-23 | End: 2021-11-23

## 2021-11-23 RX ORDER — LIDOCAINE HYDROCHLORIDE 20 MG/ML
INJECTION, SOLUTION EPIDURAL; INFILTRATION; INTRACAUDAL; PERINEURAL
Status: DISCONTINUED | OUTPATIENT
Start: 2021-11-23 | End: 2021-11-23

## 2021-11-23 RX ADMIN — PHENYLEPHRINE HYDROCHLORIDE 200 MCG: 10 INJECTION INTRAVENOUS at 03:11

## 2021-11-23 RX ADMIN — PHENYLEPHRINE HYDROCHLORIDE 200 MCG: 10 INJECTION INTRAVENOUS at 02:11

## 2021-11-23 RX ADMIN — MUPIROCIN: 20 OINTMENT TOPICAL at 08:11

## 2021-11-23 RX ADMIN — ESMOLOL HYDROCHLORIDE 10 MG: 10 INJECTION, SOLUTION INTRAVENOUS at 03:11

## 2021-11-23 RX ADMIN — FENTANYL CITRATE 25 MCG: 50 INJECTION INTRAMUSCULAR; INTRAVENOUS at 02:11

## 2021-11-23 RX ADMIN — FLUCONAZOLE 200 MG: 100 TABLET ORAL at 08:11

## 2021-11-23 RX ADMIN — FLUTICASONE PROPIONATE 50 MCG: 50 SPRAY, METERED NASAL at 08:11

## 2021-11-23 RX ADMIN — EPHEDRINE SULFATE 10 MG: 50 INJECTION INTRAVENOUS at 03:11

## 2021-11-23 RX ADMIN — ONDANSETRON 4 MG: 2 INJECTION INTRAMUSCULAR; INTRAVENOUS at 02:11

## 2021-11-23 RX ADMIN — TAMSULOSIN HYDROCHLORIDE 0.4 MG: 0.4 CAPSULE ORAL at 08:11

## 2021-11-23 RX ADMIN — ETOMIDATE 18 MG: 2 INJECTION, SOLUTION INTRAVENOUS at 02:11

## 2021-11-23 RX ADMIN — FAMOTIDINE 20 MG: 10 INJECTION, SOLUTION INTRAVENOUS at 02:11

## 2021-11-23 RX ADMIN — LIDOCAINE HYDROCHLORIDE 60 MG: 20 INJECTION, SOLUTION INTRAVENOUS at 02:11

## 2021-11-23 RX ADMIN — SODIUM CHLORIDE: 900 INJECTION INTRAVENOUS at 02:11

## 2021-11-23 RX ADMIN — PIPERACILLIN AND TAZOBACTAM 4.5 G: 4; .5 INJECTION, POWDER, LYOPHILIZED, FOR SOLUTION INTRAVENOUS; PARENTERAL at 08:11

## 2021-11-23 RX ADMIN — PANTOPRAZOLE SODIUM 40 MG: 40 TABLET, DELAYED RELEASE ORAL at 06:11

## 2021-11-23 RX ADMIN — ROCURONIUM BROMIDE 5 MG: 10 INJECTION, SOLUTION INTRAVENOUS at 02:11

## 2021-11-23 RX ADMIN — MELATONIN TAB 3 MG 6 MG: 3 TAB at 12:11

## 2021-11-23 RX ADMIN — TIOTROPIUM BROMIDE INHALATION SPRAY 2 PUFF: 3.12 SPRAY, METERED RESPIRATORY (INHALATION) at 07:11

## 2021-11-23 RX ADMIN — CHLORHEXIDINE GLUCONATE 15 ML: 1.2 RINSE ORAL at 08:11

## 2021-11-23 RX ADMIN — PHENYLEPHRINE HYDROCHLORIDE 100 MCG: 10 INJECTION INTRAVENOUS at 02:11

## 2021-11-23 RX ADMIN — SUCCINYLCHOLINE CHLORIDE 100 MG: 20 INJECTION, SOLUTION INTRAMUSCULAR; INTRAVENOUS at 02:11

## 2021-11-23 RX ADMIN — EPHEDRINE SULFATE 5 MG: 50 INJECTION INTRAVENOUS at 03:11

## 2021-11-23 RX ADMIN — LIDOCAINE HYDROCHLORIDE 3 ML: 20 JELLY TOPICAL at 02:11

## 2021-11-23 RX ADMIN — FLUTICASONE FUROATE AND VILANTEROL TRIFENATATE 1 PUFF: 100; 25 POWDER RESPIRATORY (INHALATION) at 07:11

## 2021-11-24 ENCOUNTER — ANESTHESIA (OUTPATIENT)
Dept: SURGERY | Facility: HOSPITAL | Age: 86
DRG: 853 | End: 2021-11-24
Payer: MEDICARE

## 2021-11-24 ENCOUNTER — ANESTHESIA EVENT (OUTPATIENT)
Dept: SURGERY | Facility: HOSPITAL | Age: 86
DRG: 853 | End: 2021-11-24
Payer: MEDICARE

## 2021-11-24 LAB
ALBUMIN SERPL BCP-MCNC: 2.5 G/DL (ref 3.5–5.2)
ALP SERPL-CCNC: 156 U/L (ref 55–135)
ALT SERPL W/O P-5'-P-CCNC: 51 U/L (ref 10–44)
ANION GAP SERPL CALC-SCNC: 15 MMOL/L (ref 8–16)
APTT PPP: 32 SEC (ref 23.3–35.1)
AST SERPL-CCNC: 47 U/L (ref 10–40)
BACTERIA SPEC AEROBE CULT: NORMAL
BASOPHILS # BLD AUTO: 0.01 K/UL (ref 0–0.2)
BASOPHILS NFR BLD: 0.1 % (ref 0–1.9)
BILIRUB DIRECT SERPL-MCNC: 4.4 MG/DL (ref 0.1–0.3)
BILIRUB SERPL-MCNC: 6.9 MG/DL (ref 0.1–1)
BUN SERPL-MCNC: 54 MG/DL (ref 8–23)
CALCIUM SERPL-MCNC: 7.3 MG/DL (ref 8.7–10.5)
CHLORIDE SERPL-SCNC: 102 MMOL/L (ref 95–110)
CO2 SERPL-SCNC: 23 MMOL/L (ref 23–29)
CREAT SERPL-MCNC: 3 MG/DL (ref 0.5–1.4)
DIFFERENTIAL METHOD: ABNORMAL
EOSINOPHIL # BLD AUTO: 0.1 K/UL (ref 0–0.5)
EOSINOPHIL NFR BLD: 1.6 % (ref 0–8)
ERYTHROCYTE [DISTWIDTH] IN BLOOD BY AUTOMATED COUNT: 17.1 % (ref 11.5–14.5)
EST. GFR  (AFRICAN AMERICAN): 20.6 ML/MIN/1.73 M^2
EST. GFR  (NON AFRICAN AMERICAN): 17.9 ML/MIN/1.73 M^2
GLUCOSE SERPL-MCNC: 116 MG/DL (ref 70–110)
GLUCOSE SERPL-MCNC: 118 MG/DL (ref 70–110)
GLUCOSE SERPL-MCNC: 125 MG/DL (ref 70–110)
GLUCOSE SERPL-MCNC: 125 MG/DL (ref 70–110)
GLUCOSE SERPL-MCNC: 132 MG/DL (ref 70–110)
GRAM STN SPEC: NORMAL
HCT VFR BLD AUTO: 41.4 % (ref 40–54)
HGB BLD-MCNC: 13.1 G/DL (ref 14–18)
IMM GRANULOCYTES # BLD AUTO: 0.07 K/UL (ref 0–0.04)
IMM GRANULOCYTES NFR BLD AUTO: 1 % (ref 0–0.5)
LYMPHOCYTES # BLD AUTO: 0.6 K/UL (ref 1–4.8)
LYMPHOCYTES NFR BLD: 8.4 % (ref 18–48)
MCH RBC QN AUTO: 25.5 PG (ref 27–31)
MCHC RBC AUTO-ENTMCNC: 31.6 G/DL (ref 32–36)
MCV RBC AUTO: 81 FL (ref 82–98)
MONOCYTES # BLD AUTO: 0.6 K/UL (ref 0.3–1)
MONOCYTES NFR BLD: 9 % (ref 4–15)
NEUTROPHILS # BLD AUTO: 5.5 K/UL (ref 1.8–7.7)
NEUTROPHILS NFR BLD: 79.9 % (ref 38–73)
NRBC BLD-RTO: 0 /100 WBC
PLATELET # BLD AUTO: 104 K/UL (ref 150–450)
PMV BLD AUTO: 11.5 FL (ref 9.2–12.9)
POTASSIUM SERPL-SCNC: 4.4 MMOL/L (ref 3.5–5.1)
PROT SERPL-MCNC: 6.1 G/DL (ref 6–8.4)
RBC # BLD AUTO: 5.13 M/UL (ref 4.6–6.2)
SODIUM SERPL-SCNC: 140 MMOL/L (ref 136–145)
WBC # BLD AUTO: 6.87 K/UL (ref 3.9–12.7)

## 2021-11-24 PROCEDURE — 36000710: Performed by: SURGERY

## 2021-11-24 PROCEDURE — 63600175 PHARM REV CODE 636 W HCPCS: Performed by: SURGERY

## 2021-11-24 PROCEDURE — 85730 THROMBOPLASTIN TIME PARTIAL: CPT | Performed by: NURSE PRACTITIONER

## 2021-11-24 PROCEDURE — 37000009 HC ANESTHESIA EA ADD 15 MINS: Performed by: SURGERY

## 2021-11-24 PROCEDURE — 27201423 OPTIME MED/SURG SUP & DEVICES STERILE SUPPLY: Performed by: SURGERY

## 2021-11-24 PROCEDURE — 99232 SBSQ HOSP IP/OBS MODERATE 35: CPT | Mod: ,,, | Performed by: INTERNAL MEDICINE

## 2021-11-24 PROCEDURE — 99233 PR SUBSEQUENT HOSPITAL CARE,LEVL III: ICD-10-PCS | Mod: ,,, | Performed by: GENERAL PRACTICE

## 2021-11-24 PROCEDURE — 47562 LAPAROSCOPIC CHOLECYSTECTOMY: CPT | Mod: ,,, | Performed by: SURGERY

## 2021-11-24 PROCEDURE — 27201107 HC STYLET, STANDARD: Performed by: STUDENT IN AN ORGANIZED HEALTH CARE EDUCATION/TRAINING PROGRAM

## 2021-11-24 PROCEDURE — 71000033 HC RECOVERY, INTIAL HOUR: Performed by: SURGERY

## 2021-11-24 PROCEDURE — 36000711: Performed by: SURGERY

## 2021-11-24 PROCEDURE — 80048 BASIC METABOLIC PNL TOTAL CA: CPT | Performed by: NURSE PRACTITIONER

## 2021-11-24 PROCEDURE — 27000671 HC TUBING MICROBORE EXT: Performed by: STUDENT IN AN ORGANIZED HEALTH CARE EDUCATION/TRAINING PROGRAM

## 2021-11-24 PROCEDURE — 94761 N-INVAS EAR/PLS OXIMETRY MLT: CPT

## 2021-11-24 PROCEDURE — 63600175 PHARM REV CODE 636 W HCPCS: Performed by: NURSE ANESTHETIST, CERTIFIED REGISTERED

## 2021-11-24 PROCEDURE — 63600175 PHARM REV CODE 636 W HCPCS: Performed by: INTERNAL MEDICINE

## 2021-11-24 PROCEDURE — 12000002 HC ACUTE/MED SURGE SEMI-PRIVATE ROOM

## 2021-11-24 PROCEDURE — 88304 TISSUE EXAM BY PATHOLOGIST: CPT | Mod: TC

## 2021-11-24 PROCEDURE — 25000003 PHARM REV CODE 250: Performed by: INTERNAL MEDICINE

## 2021-11-24 PROCEDURE — 99232 PR SUBSEQUENT HOSPITAL CARE,LEVL II: ICD-10-PCS | Mod: ,,, | Performed by: INTERNAL MEDICINE

## 2021-11-24 PROCEDURE — 47562 PR LAP,CHOLECYSTECTOMY: ICD-10-PCS | Mod: ,,, | Performed by: SURGERY

## 2021-11-24 PROCEDURE — 71000039 HC RECOVERY, EACH ADD'L HOUR: Performed by: SURGERY

## 2021-11-24 PROCEDURE — 37000008 HC ANESTHESIA 1ST 15 MINUTES: Performed by: SURGERY

## 2021-11-24 PROCEDURE — 94640 AIRWAY INHALATION TREATMENT: CPT

## 2021-11-24 PROCEDURE — 86022 PLATELET ANTIBODIES: CPT | Performed by: NURSE PRACTITIONER

## 2021-11-24 PROCEDURE — 25000003 PHARM REV CODE 250: Performed by: SURGERY

## 2021-11-24 PROCEDURE — 27000673 HC TUBING BLOOD Y: Performed by: STUDENT IN AN ORGANIZED HEALTH CARE EDUCATION/TRAINING PROGRAM

## 2021-11-24 PROCEDURE — 27202107 HC XP QUATRO SENSOR: Performed by: STUDENT IN AN ORGANIZED HEALTH CARE EDUCATION/TRAINING PROGRAM

## 2021-11-24 PROCEDURE — 27000221 HC OXYGEN, UP TO 24 HOURS

## 2021-11-24 PROCEDURE — 25000003 PHARM REV CODE 250: Performed by: NURSE ANESTHETIST, CERTIFIED REGISTERED

## 2021-11-24 PROCEDURE — 25000003 PHARM REV CODE 250: Performed by: STUDENT IN AN ORGANIZED HEALTH CARE EDUCATION/TRAINING PROGRAM

## 2021-11-24 PROCEDURE — C9290 INJ, BUPIVACAINE LIPOSOME: HCPCS | Performed by: SURGERY

## 2021-11-24 PROCEDURE — 63600175 PHARM REV CODE 636 W HCPCS: Performed by: STUDENT IN AN ORGANIZED HEALTH CARE EDUCATION/TRAINING PROGRAM

## 2021-11-24 PROCEDURE — 27000080 OPTIME MED/SURG SUP & DEVICES GENERAL CLASSIFICATION: Performed by: SURGERY

## 2021-11-24 PROCEDURE — 83735 ASSAY OF MAGNESIUM: CPT | Performed by: INTERNAL MEDICINE

## 2021-11-24 PROCEDURE — 85025 COMPLETE CBC W/AUTO DIFF WBC: CPT | Mod: 91 | Performed by: INTERNAL MEDICINE

## 2021-11-24 PROCEDURE — 80053 COMPREHEN METABOLIC PANEL: CPT | Performed by: INTERNAL MEDICINE

## 2021-11-24 PROCEDURE — 99233 SBSQ HOSP IP/OBS HIGH 50: CPT | Mod: ,,, | Performed by: GENERAL PRACTICE

## 2021-11-24 PROCEDURE — 99900035 HC TECH TIME PER 15 MIN (STAT)

## 2021-11-24 PROCEDURE — 80076 HEPATIC FUNCTION PANEL: CPT | Performed by: NURSE PRACTITIONER

## 2021-11-24 PROCEDURE — 82962 GLUCOSE BLOOD TEST: CPT

## 2021-11-24 PROCEDURE — 85025 COMPLETE CBC W/AUTO DIFF WBC: CPT | Performed by: NURSE PRACTITIONER

## 2021-11-24 PROCEDURE — 25000003 PHARM REV CODE 250

## 2021-11-24 RX ORDER — HYDROMORPHONE HYDROCHLORIDE 1 MG/ML
0.2 INJECTION, SOLUTION INTRAMUSCULAR; INTRAVENOUS; SUBCUTANEOUS
Status: DISCONTINUED | OUTPATIENT
Start: 2021-11-24 | End: 2021-11-24 | Stop reason: HOSPADM

## 2021-11-24 RX ORDER — SUCCINYLCHOLINE CHLORIDE 20 MG/ML
INJECTION INTRAMUSCULAR; INTRAVENOUS
Status: DISCONTINUED | OUTPATIENT
Start: 2021-11-24 | End: 2021-11-24

## 2021-11-24 RX ORDER — OXYCODONE HYDROCHLORIDE 5 MG/1
5 TABLET ORAL
Status: DISCONTINUED | OUTPATIENT
Start: 2021-11-24 | End: 2021-11-24 | Stop reason: HOSPADM

## 2021-11-24 RX ORDER — FAMOTIDINE 20 MG/1
20 TABLET, FILM COATED ORAL DAILY
Status: DISCONTINUED | OUTPATIENT
Start: 2021-11-25 | End: 2021-11-30 | Stop reason: HOSPADM

## 2021-11-24 RX ORDER — FAMOTIDINE 10 MG/ML
INJECTION INTRAVENOUS
Status: DISCONTINUED | OUTPATIENT
Start: 2021-11-24 | End: 2021-11-24

## 2021-11-24 RX ORDER — ROCURONIUM BROMIDE 10 MG/ML
INJECTION, SOLUTION INTRAVENOUS
Status: DISCONTINUED | OUTPATIENT
Start: 2021-11-24 | End: 2021-11-24

## 2021-11-24 RX ORDER — SODIUM CHLORIDE, SODIUM LACTATE, POTASSIUM CHLORIDE, CALCIUM CHLORIDE 600; 310; 30; 20 MG/100ML; MG/100ML; MG/100ML; MG/100ML
INJECTION, SOLUTION INTRAVENOUS CONTINUOUS PRN
Status: DISCONTINUED | OUTPATIENT
Start: 2021-11-24 | End: 2021-11-24

## 2021-11-24 RX ORDER — ONDANSETRON 2 MG/ML
INJECTION INTRAMUSCULAR; INTRAVENOUS
Status: DISCONTINUED | OUTPATIENT
Start: 2021-11-24 | End: 2021-11-24

## 2021-11-24 RX ORDER — TRAMADOL HYDROCHLORIDE 50 MG/1
50 TABLET ORAL EVERY 6 HOURS PRN
Status: DISCONTINUED | OUTPATIENT
Start: 2021-11-24 | End: 2021-11-30 | Stop reason: HOSPADM

## 2021-11-24 RX ORDER — FENTANYL CITRATE 50 UG/ML
INJECTION, SOLUTION INTRAMUSCULAR; INTRAVENOUS
Status: DISCONTINUED | OUTPATIENT
Start: 2021-11-24 | End: 2021-11-24

## 2021-11-24 RX ORDER — ONDANSETRON 2 MG/ML
4 INJECTION INTRAMUSCULAR; INTRAVENOUS DAILY PRN
Status: DISCONTINUED | OUTPATIENT
Start: 2021-11-24 | End: 2021-11-24 | Stop reason: HOSPADM

## 2021-11-24 RX ORDER — MEPERIDINE HYDROCHLORIDE 50 MG/ML
12.5 INJECTION INTRAMUSCULAR; INTRAVENOUS; SUBCUTANEOUS EVERY 10 MIN PRN
Status: DISCONTINUED | OUTPATIENT
Start: 2021-11-24 | End: 2021-11-24 | Stop reason: HOSPADM

## 2021-11-24 RX ORDER — LIDOCAINE HYDROCHLORIDE 10 MG/ML
INJECTION, SOLUTION EPIDURAL; INFILTRATION; INTRACAUDAL; PERINEURAL
Status: DISCONTINUED | OUTPATIENT
Start: 2021-11-24 | End: 2021-11-24

## 2021-11-24 RX ORDER — DEXAMETHASONE SODIUM PHOSPHATE 4 MG/ML
INJECTION, SOLUTION INTRA-ARTICULAR; INTRALESIONAL; INTRAMUSCULAR; INTRAVENOUS; SOFT TISSUE
Status: DISCONTINUED | OUTPATIENT
Start: 2021-11-24 | End: 2021-11-24

## 2021-11-24 RX ORDER — BUPIVACAINE HYDROCHLORIDE AND EPINEPHRINE 2.5; 5 MG/ML; UG/ML
INJECTION, SOLUTION EPIDURAL; INFILTRATION; INTRACAUDAL; PERINEURAL
Status: DISCONTINUED | OUTPATIENT
Start: 2021-11-24 | End: 2021-11-24 | Stop reason: HOSPADM

## 2021-11-24 RX ORDER — DIPHENHYDRAMINE HYDROCHLORIDE 50 MG/ML
12.5 INJECTION INTRAMUSCULAR; INTRAVENOUS
Status: DISCONTINUED | OUTPATIENT
Start: 2021-11-24 | End: 2021-11-24 | Stop reason: HOSPADM

## 2021-11-24 RX ORDER — ETOMIDATE 2 MG/ML
INJECTION INTRAVENOUS
Status: DISCONTINUED | OUTPATIENT
Start: 2021-11-24 | End: 2021-11-24

## 2021-11-24 RX ORDER — SODIUM CHLORIDE 0.9 % (FLUSH) 0.9 %
10 SYRINGE (ML) INJECTION
Status: DISCONTINUED | OUTPATIENT
Start: 2021-11-24 | End: 2021-11-30 | Stop reason: HOSPADM

## 2021-11-24 RX ORDER — LIDOCAINE HYDROCHLORIDE 20 MG/ML
JELLY TOPICAL
Status: DISCONTINUED | OUTPATIENT
Start: 2021-11-24 | End: 2021-11-24

## 2021-11-24 RX ORDER — PHENYLEPHRINE HYDROCHLORIDE 10 MG/ML
INJECTION INTRAVENOUS
Status: DISCONTINUED | OUTPATIENT
Start: 2021-11-24 | End: 2021-11-24

## 2021-11-24 RX ADMIN — LIDOCAINE HYDROCHLORIDE 5 ML: 20 JELLY TOPICAL at 03:11

## 2021-11-24 RX ADMIN — ROCURONIUM BROMIDE 40 MG: 10 INJECTION, SOLUTION INTRAVENOUS at 03:11

## 2021-11-24 RX ADMIN — FLUTICASONE PROPIONATE 50 MCG: 50 SPRAY, METERED NASAL at 09:11

## 2021-11-24 RX ADMIN — TAMSULOSIN HYDROCHLORIDE 0.4 MG: 0.4 CAPSULE ORAL at 09:11

## 2021-11-24 RX ADMIN — FENTANYL CITRATE 50 MCG: 50 INJECTION INTRAMUSCULAR; INTRAVENOUS at 03:11

## 2021-11-24 RX ADMIN — TIOTROPIUM BROMIDE INHALATION SPRAY 2 PUFF: 3.12 SPRAY, METERED RESPIRATORY (INHALATION) at 07:11

## 2021-11-24 RX ADMIN — MELATONIN TAB 3 MG 6 MG: 3 TAB at 01:11

## 2021-11-24 RX ADMIN — PIPERACILLIN AND TAZOBACTAM 4.5 G: 4; .5 INJECTION, POWDER, LYOPHILIZED, FOR SOLUTION INTRAVENOUS; PARENTERAL at 09:11

## 2021-11-24 RX ADMIN — CHLORHEXIDINE GLUCONATE 15 ML: 1.2 RINSE ORAL at 09:11

## 2021-11-24 RX ADMIN — MUPIROCIN: 20 OINTMENT TOPICAL at 09:11

## 2021-11-24 RX ADMIN — SODIUM CHLORIDE, SODIUM LACTATE, POTASSIUM CHLORIDE, AND CALCIUM CHLORIDE: .6; .31; .03; .02 INJECTION, SOLUTION INTRAVENOUS at 04:11

## 2021-11-24 RX ADMIN — PHENYLEPHRINE HYDROCHLORIDE 200 MCG: 10 INJECTION INTRAVENOUS at 03:11

## 2021-11-24 RX ADMIN — PIPERACILLIN AND TAZOBACTAM 4.5 G: 4; .5 INJECTION, POWDER, LYOPHILIZED, FOR SOLUTION INTRAVENOUS; PARENTERAL at 08:11

## 2021-11-24 RX ADMIN — SODIUM CHLORIDE, SODIUM LACTATE, POTASSIUM CHLORIDE, AND CALCIUM CHLORIDE: .6; .31; .03; .02 INJECTION, SOLUTION INTRAVENOUS at 03:11

## 2021-11-24 RX ADMIN — SUCCINYLCHOLINE CHLORIDE 160 MG: 20 INJECTION, SOLUTION INTRAMUSCULAR; INTRAVENOUS at 03:11

## 2021-11-24 RX ADMIN — SUGAMMADEX 200 MG: 100 INJECTION, SOLUTION INTRAVENOUS at 04:11

## 2021-11-24 RX ADMIN — HYDROMORPHONE HYDROCHLORIDE 0.2 MG: 1 INJECTION, SOLUTION INTRAMUSCULAR; INTRAVENOUS; SUBCUTANEOUS at 06:11

## 2021-11-24 RX ADMIN — PHENYLEPHRINE HYDROCHLORIDE 200 MCG: 10 INJECTION INTRAVENOUS at 04:11

## 2021-11-24 RX ADMIN — ONDANSETRON 4 MG: 2 INJECTION INTRAMUSCULAR; INTRAVENOUS at 03:11

## 2021-11-24 RX ADMIN — ETOMIDATE 20 MG: 2 INJECTION, SOLUTION INTRAVENOUS at 03:11

## 2021-11-24 RX ADMIN — LIDOCAINE HYDROCHLORIDE 100 MG: 10 INJECTION, SOLUTION EPIDURAL; INFILTRATION; INTRACAUDAL; PERINEURAL at 03:11

## 2021-11-24 RX ADMIN — OXYCODONE HYDROCHLORIDE 5 MG: 5 TABLET ORAL at 05:11

## 2021-11-24 RX ADMIN — FAMOTIDINE 20 MG: 10 INJECTION, SOLUTION INTRAVENOUS at 03:11

## 2021-11-24 RX ADMIN — ROCURONIUM BROMIDE 10 MG: 10 INJECTION, SOLUTION INTRAVENOUS at 03:11

## 2021-11-24 RX ADMIN — DEXAMETHASONE SODIUM PHOSPHATE 8 MG: 4 INJECTION, SOLUTION INTRAMUSCULAR; INTRAVENOUS at 03:11

## 2021-11-24 RX ADMIN — TRAMADOL HYDROCHLORIDE 50 MG: 50 TABLET, FILM COATED ORAL at 08:11

## 2021-11-24 RX ADMIN — HYDROMORPHONE HYDROCHLORIDE 0.2 MG: 1 INJECTION, SOLUTION INTRAMUSCULAR; INTRAVENOUS; SUBCUTANEOUS at 05:11

## 2021-11-24 RX ADMIN — FLUTICASONE FUROATE AND VILANTEROL TRIFENATATE 1 PUFF: 100; 25 POWDER RESPIRATORY (INHALATION) at 07:11

## 2021-11-25 LAB
ALBUMIN SERPL BCP-MCNC: 2.6 G/DL (ref 3.5–5.2)
ALBUMIN SERPL BCP-MCNC: 2.7 G/DL (ref 3.5–5.2)
ALP SERPL-CCNC: 162 U/L (ref 55–135)
ALP SERPL-CCNC: 168 U/L (ref 55–135)
ALT SERPL W/O P-5'-P-CCNC: 60 U/L (ref 10–44)
ALT SERPL W/O P-5'-P-CCNC: 61 U/L (ref 10–44)
ANION GAP SERPL CALC-SCNC: 11 MMOL/L (ref 8–16)
ANION GAP SERPL CALC-SCNC: 11 MMOL/L (ref 8–16)
AST SERPL-CCNC: 70 U/L (ref 10–40)
AST SERPL-CCNC: 76 U/L (ref 10–40)
BASOPHILS # BLD AUTO: 0.04 K/UL (ref 0–0.2)
BASOPHILS # BLD AUTO: 0.04 K/UL (ref 0–0.2)
BASOPHILS NFR BLD: 0.5 % (ref 0–1.9)
BASOPHILS NFR BLD: 0.5 % (ref 0–1.9)
BILIRUB SERPL-MCNC: 7.2 MG/DL (ref 0.1–1)
BILIRUB SERPL-MCNC: 7.3 MG/DL (ref 0.1–1)
BNP SERPL-MCNC: 579 PG/ML (ref 0–99)
BUN SERPL-MCNC: 50 MG/DL (ref 8–23)
BUN SERPL-MCNC: 50 MG/DL (ref 8–23)
CALCIUM SERPL-MCNC: 7.8 MG/DL (ref 8.7–10.5)
CALCIUM SERPL-MCNC: 7.8 MG/DL (ref 8.7–10.5)
CHLORIDE SERPL-SCNC: 105 MMOL/L (ref 95–110)
CHLORIDE SERPL-SCNC: 107 MMOL/L (ref 95–110)
CO2 SERPL-SCNC: 22 MMOL/L (ref 23–29)
CO2 SERPL-SCNC: 23 MMOL/L (ref 23–29)
CREAT SERPL-MCNC: 2.9 MG/DL (ref 0.5–1.4)
CREAT SERPL-MCNC: 3 MG/DL (ref 0.5–1.4)
DIFFERENTIAL METHOD: ABNORMAL
DIFFERENTIAL METHOD: ABNORMAL
EOSINOPHIL # BLD AUTO: 0.1 K/UL (ref 0–0.5)
EOSINOPHIL # BLD AUTO: 0.2 K/UL (ref 0–0.5)
EOSINOPHIL NFR BLD: 1.4 % (ref 0–8)
EOSINOPHIL NFR BLD: 1.9 % (ref 0–8)
ERYTHROCYTE [DISTWIDTH] IN BLOOD BY AUTOMATED COUNT: 17.5 % (ref 11.5–14.5)
ERYTHROCYTE [DISTWIDTH] IN BLOOD BY AUTOMATED COUNT: 18 % (ref 11.5–14.5)
EST. GFR  (AFRICAN AMERICAN): 20.6 ML/MIN/1.73 M^2
EST. GFR  (AFRICAN AMERICAN): 21.5 ML/MIN/1.73 M^2
EST. GFR  (NON AFRICAN AMERICAN): 17.9 ML/MIN/1.73 M^2
EST. GFR  (NON AFRICAN AMERICAN): 18.6 ML/MIN/1.73 M^2
GLUCOSE SERPL-MCNC: 126 MG/DL (ref 70–110)
GLUCOSE SERPL-MCNC: 128 MG/DL (ref 70–110)
GLUCOSE SERPL-MCNC: 136 MG/DL (ref 70–110)
GLUCOSE SERPL-MCNC: 153 MG/DL (ref 70–110)
GLUCOSE SERPL-MCNC: 163 MG/DL (ref 70–110)
GLUCOSE SERPL-MCNC: 164 MG/DL (ref 70–110)
HCT VFR BLD AUTO: 40.7 % (ref 40–54)
HCT VFR BLD AUTO: 43 % (ref 40–54)
HGB BLD-MCNC: 12.8 G/DL (ref 14–18)
HGB BLD-MCNC: 13.5 G/DL (ref 14–18)
IMM GRANULOCYTES # BLD AUTO: 0.09 K/UL (ref 0–0.04)
IMM GRANULOCYTES # BLD AUTO: 0.12 K/UL (ref 0–0.04)
IMM GRANULOCYTES NFR BLD AUTO: 1.1 % (ref 0–0.5)
IMM GRANULOCYTES NFR BLD AUTO: 1.6 % (ref 0–0.5)
LYMPHOCYTES # BLD AUTO: 0.5 K/UL (ref 1–4.8)
LYMPHOCYTES # BLD AUTO: 0.8 K/UL (ref 1–4.8)
LYMPHOCYTES NFR BLD: 6.3 % (ref 18–48)
LYMPHOCYTES NFR BLD: 9.6 % (ref 18–48)
MAGNESIUM SERPL-MCNC: 2.2 MG/DL (ref 1.6–2.6)
MAGNESIUM SERPL-MCNC: 2.4 MG/DL (ref 1.6–2.6)
MCH RBC QN AUTO: 25.8 PG (ref 27–31)
MCH RBC QN AUTO: 25.8 PG (ref 27–31)
MCHC RBC AUTO-ENTMCNC: 31.4 G/DL (ref 32–36)
MCHC RBC AUTO-ENTMCNC: 31.4 G/DL (ref 32–36)
MCV RBC AUTO: 82 FL (ref 82–98)
MCV RBC AUTO: 82 FL (ref 82–98)
MONOCYTES # BLD AUTO: 0.8 K/UL (ref 0.3–1)
MONOCYTES # BLD AUTO: 0.9 K/UL (ref 0.3–1)
MONOCYTES NFR BLD: 10 % (ref 4–15)
MONOCYTES NFR BLD: 10.7 % (ref 4–15)
NEUTROPHILS # BLD AUTO: 6 K/UL (ref 1.8–7.7)
NEUTROPHILS # BLD AUTO: 6.2 K/UL (ref 1.8–7.7)
NEUTROPHILS NFR BLD: 76.2 % (ref 38–73)
NEUTROPHILS NFR BLD: 80.2 % (ref 38–73)
NRBC BLD-RTO: 0 /100 WBC
NRBC BLD-RTO: 0 /100 WBC
PLATELET # BLD AUTO: 105 K/UL (ref 150–450)
PLATELET # BLD AUTO: 106 K/UL (ref 150–450)
PMV BLD AUTO: 11.1 FL (ref 9.2–12.9)
PMV BLD AUTO: 11.2 FL (ref 9.2–12.9)
POTASSIUM SERPL-SCNC: 4.5 MMOL/L (ref 3.5–5.1)
POTASSIUM SERPL-SCNC: 4.6 MMOL/L (ref 3.5–5.1)
PROT SERPL-MCNC: 6.2 G/DL (ref 6–8.4)
PROT SERPL-MCNC: 6.6 G/DL (ref 6–8.4)
RBC # BLD AUTO: 4.97 M/UL (ref 4.6–6.2)
RBC # BLD AUTO: 5.23 M/UL (ref 4.6–6.2)
SODIUM SERPL-SCNC: 139 MMOL/L (ref 136–145)
SODIUM SERPL-SCNC: 140 MMOL/L (ref 136–145)
WBC # BLD AUTO: 7.72 K/UL (ref 3.9–12.7)
WBC # BLD AUTO: 7.93 K/UL (ref 3.9–12.7)

## 2021-11-25 PROCEDURE — 99232 PR SUBSEQUENT HOSPITAL CARE,LEVL II: ICD-10-PCS | Mod: ,,, | Performed by: INTERNAL MEDICINE

## 2021-11-25 PROCEDURE — 83735 ASSAY OF MAGNESIUM: CPT | Performed by: SURGERY

## 2021-11-25 PROCEDURE — 25000003 PHARM REV CODE 250: Performed by: SURGERY

## 2021-11-25 PROCEDURE — 99900035 HC TECH TIME PER 15 MIN (STAT)

## 2021-11-25 PROCEDURE — 12000002 HC ACUTE/MED SURGE SEMI-PRIVATE ROOM

## 2021-11-25 PROCEDURE — 94640 AIRWAY INHALATION TREATMENT: CPT

## 2021-11-25 PROCEDURE — 63600175 PHARM REV CODE 636 W HCPCS: Performed by: SURGERY

## 2021-11-25 PROCEDURE — 83880 ASSAY OF NATRIURETIC PEPTIDE: CPT | Performed by: SURGERY

## 2021-11-25 PROCEDURE — 94799 UNLISTED PULMONARY SVC/PX: CPT

## 2021-11-25 PROCEDURE — 80053 COMPREHEN METABOLIC PANEL: CPT | Performed by: SURGERY

## 2021-11-25 PROCEDURE — 99232 SBSQ HOSP IP/OBS MODERATE 35: CPT | Mod: ,,, | Performed by: INTERNAL MEDICINE

## 2021-11-25 PROCEDURE — 85025 COMPLETE CBC W/AUTO DIFF WBC: CPT | Performed by: SURGERY

## 2021-11-25 PROCEDURE — 99231 SBSQ HOSP IP/OBS SF/LOW 25: CPT | Mod: ,,, | Performed by: INTERNAL MEDICINE

## 2021-11-25 PROCEDURE — 27000221 HC OXYGEN, UP TO 24 HOURS

## 2021-11-25 PROCEDURE — 25000003 PHARM REV CODE 250: Performed by: INTERNAL MEDICINE

## 2021-11-25 PROCEDURE — 94761 N-INVAS EAR/PLS OXIMETRY MLT: CPT

## 2021-11-25 PROCEDURE — 99231 PR SUBSEQUENT HOSPITAL CARE,LEVL I: ICD-10-PCS | Mod: ,,, | Performed by: INTERNAL MEDICINE

## 2021-11-25 PROCEDURE — 82962 GLUCOSE BLOOD TEST: CPT

## 2021-11-25 RX ADMIN — FLUTICASONE FUROATE AND VILANTEROL TRIFENATATE 1 PUFF: 100; 25 POWDER RESPIRATORY (INHALATION) at 07:11

## 2021-11-25 RX ADMIN — TAMSULOSIN HYDROCHLORIDE 0.4 MG: 0.4 CAPSULE ORAL at 09:11

## 2021-11-25 RX ADMIN — PIPERACILLIN AND TAZOBACTAM 4.5 G: 4; .5 INJECTION, POWDER, LYOPHILIZED, FOR SOLUTION INTRAVENOUS; PARENTERAL at 09:11

## 2021-11-25 RX ADMIN — ACETAMINOPHEN 650 MG: 325 TABLET, FILM COATED ORAL at 09:11

## 2021-11-25 RX ADMIN — TIOTROPIUM BROMIDE INHALATION SPRAY 2 PUFF: 3.12 SPRAY, METERED RESPIRATORY (INHALATION) at 07:11

## 2021-11-25 RX ADMIN — FAMOTIDINE 20 MG: 20 TABLET, FILM COATED ORAL at 09:11

## 2021-11-25 RX ADMIN — PIPERACILLIN AND TAZOBACTAM 4.5 G: 4; .5 INJECTION, POWDER, LYOPHILIZED, FOR SOLUTION INTRAVENOUS; PARENTERAL at 08:11

## 2021-11-26 LAB
ALBUMIN SERPL BCP-MCNC: 2.5 G/DL (ref 3.5–5.2)
ALP SERPL-CCNC: 145 U/L (ref 55–135)
ALT SERPL W/O P-5'-P-CCNC: 52 U/L (ref 10–44)
ANION GAP SERPL CALC-SCNC: 6 MMOL/L (ref 8–16)
AST SERPL-CCNC: 51 U/L (ref 10–40)
BASOPHILS # BLD AUTO: 0.04 K/UL (ref 0–0.2)
BASOPHILS NFR BLD: 0.4 % (ref 0–1.9)
BILIRUB SERPL-MCNC: 4.5 MG/DL (ref 0.1–1)
BNP SERPL-MCNC: 684 PG/ML (ref 0–99)
BUN SERPL-MCNC: 46 MG/DL (ref 8–23)
CALCIUM SERPL-MCNC: 7.4 MG/DL (ref 8.7–10.5)
CHLORIDE SERPL-SCNC: 105 MMOL/L (ref 95–110)
CO2 SERPL-SCNC: 23 MMOL/L (ref 23–29)
CREAT SERPL-MCNC: 2.8 MG/DL (ref 0.5–1.4)
DIFFERENTIAL METHOD: ABNORMAL
EOSINOPHIL # BLD AUTO: 0.3 K/UL (ref 0–0.5)
EOSINOPHIL NFR BLD: 3.5 % (ref 0–8)
ERYTHROCYTE [DISTWIDTH] IN BLOOD BY AUTOMATED COUNT: 17.2 % (ref 11.5–14.5)
EST. GFR  (AFRICAN AMERICAN): 22.4 ML/MIN/1.73 M^2
EST. GFR  (NON AFRICAN AMERICAN): 19.4 ML/MIN/1.73 M^2
GLUCOSE SERPL-MCNC: 109 MG/DL (ref 70–110)
GLUCOSE SERPL-MCNC: 111 MG/DL (ref 70–110)
GLUCOSE SERPL-MCNC: 114 MG/DL (ref 70–110)
GLUCOSE SERPL-MCNC: 132 MG/DL (ref 70–110)
GLUCOSE SERPL-MCNC: 187 MG/DL (ref 70–110)
HCT VFR BLD AUTO: 38 % (ref 40–54)
HGB BLD-MCNC: 12.1 G/DL (ref 14–18)
IMM GRANULOCYTES # BLD AUTO: 0.27 K/UL (ref 0–0.04)
IMM GRANULOCYTES NFR BLD AUTO: 3 % (ref 0–0.5)
LYMPHOCYTES # BLD AUTO: 0.7 K/UL (ref 1–4.8)
LYMPHOCYTES NFR BLD: 7.2 % (ref 18–48)
MAGNESIUM SERPL-MCNC: 2.1 MG/DL (ref 1.6–2.6)
MCH RBC QN AUTO: 25.8 PG (ref 27–31)
MCHC RBC AUTO-ENTMCNC: 31.8 G/DL (ref 32–36)
MCV RBC AUTO: 81 FL (ref 82–98)
MONOCYTES # BLD AUTO: 1 K/UL (ref 0.3–1)
MONOCYTES NFR BLD: 11 % (ref 4–15)
NEUTROPHILS # BLD AUTO: 6.8 K/UL (ref 1.8–7.7)
NEUTROPHILS NFR BLD: 74.9 % (ref 38–73)
NRBC BLD-RTO: 0 /100 WBC
PLATELET # BLD AUTO: 109 K/UL (ref 150–450)
PMV BLD AUTO: 11.2 FL (ref 9.2–12.9)
POTASSIUM SERPL-SCNC: 4.2 MMOL/L (ref 3.5–5.1)
PROCALCITONIN SERPL IA-MCNC: 4.91 NG/ML (ref 0–0.5)
PROT SERPL-MCNC: 5.9 G/DL (ref 6–8.4)
RBC # BLD AUTO: 4.69 M/UL (ref 4.6–6.2)
SODIUM SERPL-SCNC: 134 MMOL/L (ref 136–145)
WBC # BLD AUTO: 9.12 K/UL (ref 3.9–12.7)

## 2021-11-26 PROCEDURE — 85025 COMPLETE CBC W/AUTO DIFF WBC: CPT | Performed by: SURGERY

## 2021-11-26 PROCEDURE — 25000003 PHARM REV CODE 250: Performed by: SURGERY

## 2021-11-26 PROCEDURE — 63600175 PHARM REV CODE 636 W HCPCS: Performed by: SURGERY

## 2021-11-26 PROCEDURE — 99233 SBSQ HOSP IP/OBS HIGH 50: CPT | Mod: ,,, | Performed by: INTERNAL MEDICINE

## 2021-11-26 PROCEDURE — 80053 COMPREHEN METABOLIC PANEL: CPT | Performed by: SURGERY

## 2021-11-26 PROCEDURE — 97116 GAIT TRAINING THERAPY: CPT

## 2021-11-26 PROCEDURE — 99900035 HC TECH TIME PER 15 MIN (STAT)

## 2021-11-26 PROCEDURE — 99233 PR SUBSEQUENT HOSPITAL CARE,LEVL III: ICD-10-PCS | Mod: ,,, | Performed by: INTERNAL MEDICINE

## 2021-11-26 PROCEDURE — 97161 PT EVAL LOW COMPLEX 20 MIN: CPT

## 2021-11-26 PROCEDURE — 84145 PROCALCITONIN (PCT): CPT | Performed by: INTERNAL MEDICINE

## 2021-11-26 PROCEDURE — 83880 ASSAY OF NATRIURETIC PEPTIDE: CPT | Performed by: SURGERY

## 2021-11-26 PROCEDURE — 94760 N-INVAS EAR/PLS OXIMETRY 1: CPT

## 2021-11-26 PROCEDURE — 99232 SBSQ HOSP IP/OBS MODERATE 35: CPT | Mod: ,,, | Performed by: INTERNAL MEDICINE

## 2021-11-26 PROCEDURE — 25000003 PHARM REV CODE 250: Performed by: INTERNAL MEDICINE

## 2021-11-26 PROCEDURE — 94799 UNLISTED PULMONARY SVC/PX: CPT

## 2021-11-26 PROCEDURE — 83735 ASSAY OF MAGNESIUM: CPT | Performed by: SURGERY

## 2021-11-26 PROCEDURE — 94640 AIRWAY INHALATION TREATMENT: CPT

## 2021-11-26 PROCEDURE — 12000002 HC ACUTE/MED SURGE SEMI-PRIVATE ROOM

## 2021-11-26 PROCEDURE — 99232 PR SUBSEQUENT HOSPITAL CARE,LEVL II: ICD-10-PCS | Mod: ,,, | Performed by: INTERNAL MEDICINE

## 2021-11-26 PROCEDURE — 99900031 HC PATIENT EDUCATION (STAT)

## 2021-11-26 RX ADMIN — FLUTICASONE PROPIONATE 50 MCG: 50 SPRAY, METERED NASAL at 08:11

## 2021-11-26 RX ADMIN — ACETAMINOPHEN 650 MG: 325 TABLET, FILM COATED ORAL at 08:11

## 2021-11-26 RX ADMIN — PIPERACILLIN AND TAZOBACTAM 4.5 G: 4; .5 INJECTION, POWDER, LYOPHILIZED, FOR SOLUTION INTRAVENOUS; PARENTERAL at 08:11

## 2021-11-26 RX ADMIN — TIOTROPIUM BROMIDE INHALATION SPRAY 2 PUFF: 3.12 SPRAY, METERED RESPIRATORY (INHALATION) at 08:11

## 2021-11-26 RX ADMIN — TAMSULOSIN HYDROCHLORIDE 0.4 MG: 0.4 CAPSULE ORAL at 08:11

## 2021-11-26 RX ADMIN — FLUTICASONE FUROATE AND VILANTEROL TRIFENATATE 1 PUFF: 100; 25 POWDER RESPIRATORY (INHALATION) at 08:11

## 2021-11-26 RX ADMIN — MELATONIN TAB 3 MG 6 MG: 3 TAB at 02:11

## 2021-11-26 RX ADMIN — FAMOTIDINE 20 MG: 20 TABLET, FILM COATED ORAL at 08:11

## 2021-11-26 RX ADMIN — HUMAN INSULIN 1 UNITS: 100 INJECTION, SOLUTION SUBCUTANEOUS at 08:11

## 2021-11-27 LAB
ALBUMIN SERPL BCP-MCNC: 2.6 G/DL (ref 3.5–5.2)
ALP SERPL-CCNC: 135 U/L (ref 55–135)
ALT SERPL W/O P-5'-P-CCNC: 44 U/L (ref 10–44)
ANION GAP SERPL CALC-SCNC: 9 MMOL/L (ref 8–16)
AST SERPL-CCNC: 36 U/L (ref 10–40)
BACTERIA BLD CULT: NORMAL
BACTERIA BLD CULT: NORMAL
BASOPHILS # BLD AUTO: 0.08 K/UL (ref 0–0.2)
BASOPHILS NFR BLD: 0.9 % (ref 0–1.9)
BILIRUB SERPL-MCNC: 3.6 MG/DL (ref 0.1–1)
BNP SERPL-MCNC: 1313 PG/ML (ref 0–99)
BUN SERPL-MCNC: 37 MG/DL (ref 8–23)
CALCIUM SERPL-MCNC: 7.9 MG/DL (ref 8.7–10.5)
CHLORIDE SERPL-SCNC: 105 MMOL/L (ref 95–110)
CO2 SERPL-SCNC: 24 MMOL/L (ref 23–29)
CREAT SERPL-MCNC: 2.4 MG/DL (ref 0.5–1.4)
DIFFERENTIAL METHOD: ABNORMAL
EOSINOPHIL # BLD AUTO: 0.3 K/UL (ref 0–0.5)
EOSINOPHIL NFR BLD: 3.3 % (ref 0–8)
ERYTHROCYTE [DISTWIDTH] IN BLOOD BY AUTOMATED COUNT: 17.1 % (ref 11.5–14.5)
EST. GFR  (AFRICAN AMERICAN): 27 ML/MIN/1.73 M^2
EST. GFR  (NON AFRICAN AMERICAN): 23.4 ML/MIN/1.73 M^2
GLUCOSE SERPL-MCNC: 113 MG/DL (ref 70–110)
GLUCOSE SERPL-MCNC: 123 MG/DL (ref 70–110)
GLUCOSE SERPL-MCNC: 123 MG/DL (ref 70–110)
GLUCOSE SERPL-MCNC: 182 MG/DL (ref 70–110)
GLUCOSE SERPL-MCNC: 201 MG/DL (ref 70–110)
HCT VFR BLD AUTO: 38 % (ref 40–54)
HGB BLD-MCNC: 12.2 G/DL (ref 14–18)
IMM GRANULOCYTES # BLD AUTO: 0.45 K/UL (ref 0–0.04)
IMM GRANULOCYTES NFR BLD AUTO: 4.8 % (ref 0–0.5)
LYMPHOCYTES # BLD AUTO: 0.8 K/UL (ref 1–4.8)
LYMPHOCYTES NFR BLD: 8.6 % (ref 18–48)
MAGNESIUM SERPL-MCNC: 1.9 MG/DL (ref 1.6–2.6)
MCH RBC QN AUTO: 26 PG (ref 27–31)
MCHC RBC AUTO-ENTMCNC: 32.1 G/DL (ref 32–36)
MCV RBC AUTO: 81 FL (ref 82–98)
MONOCYTES # BLD AUTO: 1 K/UL (ref 0.3–1)
MONOCYTES NFR BLD: 10.8 % (ref 4–15)
NEUTROPHILS # BLD AUTO: 6.7 K/UL (ref 1.8–7.7)
NEUTROPHILS NFR BLD: 71.6 % (ref 38–73)
NRBC BLD-RTO: 0 /100 WBC
PLATELET # BLD AUTO: 152 K/UL (ref 150–450)
PLATELET BLD QL SMEAR: ABNORMAL
PMV BLD AUTO: 12.2 FL (ref 9.2–12.9)
POTASSIUM SERPL-SCNC: 4.1 MMOL/L (ref 3.5–5.1)
PROT SERPL-MCNC: 6.2 G/DL (ref 6–8.4)
RBC # BLD AUTO: 4.69 M/UL (ref 4.6–6.2)
SODIUM SERPL-SCNC: 138 MMOL/L (ref 136–145)
WBC # BLD AUTO: 9.41 K/UL (ref 3.9–12.7)

## 2021-11-27 PROCEDURE — 25000003 PHARM REV CODE 250: Performed by: SURGERY

## 2021-11-27 PROCEDURE — 85025 COMPLETE CBC W/AUTO DIFF WBC: CPT | Performed by: SURGERY

## 2021-11-27 PROCEDURE — 63600175 PHARM REV CODE 636 W HCPCS: Performed by: SURGERY

## 2021-11-27 PROCEDURE — 83735 ASSAY OF MAGNESIUM: CPT | Performed by: SURGERY

## 2021-11-27 PROCEDURE — 25000003 PHARM REV CODE 250: Performed by: INTERNAL MEDICINE

## 2021-11-27 PROCEDURE — 83880 ASSAY OF NATRIURETIC PEPTIDE: CPT | Performed by: SURGERY

## 2021-11-27 PROCEDURE — 63600175 PHARM REV CODE 636 W HCPCS: Performed by: INTERNAL MEDICINE

## 2021-11-27 PROCEDURE — 80053 COMPREHEN METABOLIC PANEL: CPT | Performed by: SURGERY

## 2021-11-27 PROCEDURE — 97116 GAIT TRAINING THERAPY: CPT

## 2021-11-27 PROCEDURE — 12000002 HC ACUTE/MED SURGE SEMI-PRIVATE ROOM

## 2021-11-27 RX ORDER — FUROSEMIDE 10 MG/ML
20 INJECTION INTRAMUSCULAR; INTRAVENOUS ONCE
Status: COMPLETED | OUTPATIENT
Start: 2021-11-27 | End: 2021-11-27

## 2021-11-27 RX ORDER — AMLODIPINE BESYLATE 5 MG/1
5 TABLET ORAL DAILY
Status: DISCONTINUED | OUTPATIENT
Start: 2021-11-27 | End: 2021-11-30 | Stop reason: HOSPADM

## 2021-11-27 RX ADMIN — FLUTICASONE PROPIONATE 50 MCG: 50 SPRAY, METERED NASAL at 09:11

## 2021-11-27 RX ADMIN — TIOTROPIUM BROMIDE INHALATION SPRAY 2 PUFF: 3.12 SPRAY, METERED RESPIRATORY (INHALATION) at 08:11

## 2021-11-27 RX ADMIN — FLUTICASONE FUROATE AND VILANTEROL TRIFENATATE 1 PUFF: 100; 25 POWDER RESPIRATORY (INHALATION) at 08:11

## 2021-11-27 RX ADMIN — TRAMADOL HYDROCHLORIDE 50 MG: 50 TABLET, FILM COATED ORAL at 08:11

## 2021-11-27 RX ADMIN — PIPERACILLIN AND TAZOBACTAM 4.5 G: 4; .5 INJECTION, POWDER, LYOPHILIZED, FOR SOLUTION INTRAVENOUS; PARENTERAL at 09:11

## 2021-11-27 RX ADMIN — AMLODIPINE BESYLATE 5 MG: 5 TABLET ORAL at 08:11

## 2021-11-27 RX ADMIN — MELATONIN TAB 3 MG 6 MG: 3 TAB at 08:11

## 2021-11-27 RX ADMIN — TAMSULOSIN HYDROCHLORIDE 0.4 MG: 0.4 CAPSULE ORAL at 09:11

## 2021-11-27 RX ADMIN — FUROSEMIDE 20 MG: 10 INJECTION, SOLUTION INTRAVENOUS at 09:11

## 2021-11-27 RX ADMIN — FAMOTIDINE 20 MG: 20 TABLET, FILM COATED ORAL at 09:11

## 2021-11-28 LAB
ALBUMIN SERPL BCP-MCNC: 2.7 G/DL (ref 3.5–5.2)
ALP SERPL-CCNC: 143 U/L (ref 55–135)
ALT SERPL W/O P-5'-P-CCNC: 36 U/L (ref 10–44)
ANION GAP SERPL CALC-SCNC: 10 MMOL/L (ref 8–16)
ANISOCYTOSIS BLD QL SMEAR: SLIGHT
AST SERPL-CCNC: 31 U/L (ref 10–40)
BASOPHILS NFR BLD: 0 % (ref 0–1.9)
BILIRUB SERPL-MCNC: 3.1 MG/DL (ref 0.1–1)
BUN SERPL-MCNC: 36 MG/DL (ref 8–23)
CALCIUM SERPL-MCNC: 8 MG/DL (ref 8.7–10.5)
CHLORIDE SERPL-SCNC: 103 MMOL/L (ref 95–110)
CO2 SERPL-SCNC: 27 MMOL/L (ref 23–29)
CREAT SERPL-MCNC: 2.3 MG/DL (ref 0.5–1.4)
DIFFERENTIAL METHOD: ABNORMAL
EOSINOPHIL NFR BLD: 2 % (ref 0–8)
ERYTHROCYTE [DISTWIDTH] IN BLOOD BY AUTOMATED COUNT: 16.9 % (ref 11.5–14.5)
EST. GFR  (AFRICAN AMERICAN): 28.5 ML/MIN/1.73 M^2
EST. GFR  (NON AFRICAN AMERICAN): 24.6 ML/MIN/1.73 M^2
GLUCOSE SERPL-MCNC: 125 MG/DL (ref 70–110)
GLUCOSE SERPL-MCNC: 126 MG/DL (ref 70–110)
GLUCOSE SERPL-MCNC: 151 MG/DL (ref 70–110)
GLUCOSE SERPL-MCNC: 189 MG/DL (ref 70–110)
HCT VFR BLD AUTO: 37.8 % (ref 40–54)
HGB BLD-MCNC: 12.4 G/DL (ref 14–18)
IMM GRANULOCYTES # BLD AUTO: ABNORMAL K/UL (ref 0–0.04)
IMM GRANULOCYTES NFR BLD AUTO: ABNORMAL % (ref 0–0.5)
LYMPHOCYTES NFR BLD: 8 % (ref 18–48)
MAGNESIUM SERPL-MCNC: 1.7 MG/DL (ref 1.6–2.6)
MCH RBC QN AUTO: 26.3 PG (ref 27–31)
MCHC RBC AUTO-ENTMCNC: 32.8 G/DL (ref 32–36)
MCV RBC AUTO: 80 FL (ref 82–98)
METAMYELOCYTES NFR BLD MANUAL: 1 %
MONOCYTES NFR BLD: 7 % (ref 4–15)
MYELOCYTES NFR BLD MANUAL: 2 %
NEUTROPHILS NFR BLD: 69 % (ref 38–73)
NEUTS BAND NFR BLD MANUAL: 11 %
NRBC BLD-RTO: 0 /100 WBC
PLATELET # BLD AUTO: 169 K/UL (ref 150–450)
PLATELET BLD QL SMEAR: ABNORMAL
PMV BLD AUTO: 11.3 FL (ref 9.2–12.9)
POTASSIUM SERPL-SCNC: 4 MMOL/L (ref 3.5–5.1)
PROT SERPL-MCNC: 6.5 G/DL (ref 6–8.4)
RBC # BLD AUTO: 4.71 M/UL (ref 4.6–6.2)
SODIUM SERPL-SCNC: 140 MMOL/L (ref 136–145)
WBC # BLD AUTO: 10.45 K/UL (ref 3.9–12.7)

## 2021-11-28 PROCEDURE — 12000002 HC ACUTE/MED SURGE SEMI-PRIVATE ROOM

## 2021-11-28 PROCEDURE — 97116 GAIT TRAINING THERAPY: CPT

## 2021-11-28 PROCEDURE — 85027 COMPLETE CBC AUTOMATED: CPT | Performed by: SURGERY

## 2021-11-28 PROCEDURE — 25000003 PHARM REV CODE 250: Performed by: INTERNAL MEDICINE

## 2021-11-28 PROCEDURE — 94640 AIRWAY INHALATION TREATMENT: CPT

## 2021-11-28 PROCEDURE — 97110 THERAPEUTIC EXERCISES: CPT

## 2021-11-28 PROCEDURE — 63600175 PHARM REV CODE 636 W HCPCS: Performed by: INTERNAL MEDICINE

## 2021-11-28 PROCEDURE — 80053 COMPREHEN METABOLIC PANEL: CPT | Performed by: SURGERY

## 2021-11-28 PROCEDURE — 83735 ASSAY OF MAGNESIUM: CPT | Performed by: SURGERY

## 2021-11-28 PROCEDURE — 99900035 HC TECH TIME PER 15 MIN (STAT)

## 2021-11-28 PROCEDURE — 85007 BL SMEAR W/DIFF WBC COUNT: CPT | Performed by: SURGERY

## 2021-11-28 PROCEDURE — 94761 N-INVAS EAR/PLS OXIMETRY MLT: CPT

## 2021-11-28 PROCEDURE — 99900031 HC PATIENT EDUCATION (STAT)

## 2021-11-28 PROCEDURE — 94799 UNLISTED PULMONARY SVC/PX: CPT

## 2021-11-28 PROCEDURE — 25000003 PHARM REV CODE 250: Performed by: SURGERY

## 2021-11-28 RX ADMIN — TIOTROPIUM BROMIDE INHALATION SPRAY 2 PUFF: 3.12 SPRAY, METERED RESPIRATORY (INHALATION) at 08:11

## 2021-11-28 RX ADMIN — PIPERACILLIN AND TAZOBACTAM 4.5 G: 4; .5 INJECTION, POWDER, LYOPHILIZED, FOR SOLUTION INTRAVENOUS; PARENTERAL at 05:11

## 2021-11-28 RX ADMIN — FAMOTIDINE 20 MG: 20 TABLET, FILM COATED ORAL at 10:11

## 2021-11-28 RX ADMIN — FLUTICASONE FUROATE AND VILANTEROL TRIFENATATE 1 PUFF: 100; 25 POWDER RESPIRATORY (INHALATION) at 08:11

## 2021-11-28 RX ADMIN — AMLODIPINE BESYLATE 5 MG: 5 TABLET ORAL at 10:11

## 2021-11-28 RX ADMIN — TAMSULOSIN HYDROCHLORIDE 0.4 MG: 0.4 CAPSULE ORAL at 10:11

## 2021-11-28 RX ADMIN — PIPERACILLIN AND TAZOBACTAM 4.5 G: 4; .5 INJECTION, POWDER, LYOPHILIZED, FOR SOLUTION INTRAVENOUS; PARENTERAL at 08:11

## 2021-11-29 LAB
ALBUMIN SERPL BCP-MCNC: 2.7 G/DL (ref 3.5–5.2)
ALP SERPL-CCNC: 125 U/L (ref 55–135)
ALT SERPL W/O P-5'-P-CCNC: 37 U/L (ref 10–44)
ANION GAP SERPL CALC-SCNC: 11 MMOL/L (ref 8–16)
AST SERPL-CCNC: 35 U/L (ref 10–40)
BASOPHILS # BLD AUTO: 0.07 K/UL (ref 0–0.2)
BASOPHILS NFR BLD: 0.5 % (ref 0–1.9)
BILIRUB SERPL-MCNC: 2.8 MG/DL (ref 0.1–1)
BUN SERPL-MCNC: 37 MG/DL (ref 8–23)
CALCIUM SERPL-MCNC: 8.1 MG/DL (ref 8.7–10.5)
CHLORIDE SERPL-SCNC: 101 MMOL/L (ref 95–110)
CO2 SERPL-SCNC: 26 MMOL/L (ref 23–29)
CREAT SERPL-MCNC: 2.3 MG/DL (ref 0.5–1.4)
DIFFERENTIAL METHOD: ABNORMAL
EOSINOPHIL # BLD AUTO: 0.2 K/UL (ref 0–0.5)
EOSINOPHIL NFR BLD: 1.5 % (ref 0–8)
ERYTHROCYTE [DISTWIDTH] IN BLOOD BY AUTOMATED COUNT: 17.2 % (ref 11.5–14.5)
EST. GFR  (AFRICAN AMERICAN): 28.5 ML/MIN/1.73 M^2
EST. GFR  (NON AFRICAN AMERICAN): 24.6 ML/MIN/1.73 M^2
GLUCOSE SERPL-MCNC: 129 MG/DL (ref 70–110)
GLUCOSE SERPL-MCNC: 137 MG/DL (ref 70–110)
GLUCOSE SERPL-MCNC: 138 MG/DL (ref 70–110)
GLUCOSE SERPL-MCNC: 167 MG/DL (ref 70–110)
GLUCOSE SERPL-MCNC: 181 MG/DL (ref 70–110)
GLUCOSE SERPL-MCNC: 185 MG/DL (ref 70–110)
GLUCOSE SERPL-MCNC: 188 MG/DL (ref 70–110)
HCT VFR BLD AUTO: 37.7 % (ref 40–54)
HGB BLD-MCNC: 12 G/DL (ref 14–18)
IMM GRANULOCYTES # BLD AUTO: 0.5 K/UL (ref 0–0.04)
IMM GRANULOCYTES NFR BLD AUTO: 3.8 % (ref 0–0.5)
LYMPHOCYTES # BLD AUTO: 1.1 K/UL (ref 1–4.8)
LYMPHOCYTES NFR BLD: 8.7 % (ref 18–48)
MAGNESIUM SERPL-MCNC: 1.7 MG/DL (ref 1.6–2.6)
MCH RBC QN AUTO: 25.5 PG (ref 27–31)
MCHC RBC AUTO-ENTMCNC: 31.8 G/DL (ref 32–36)
MCV RBC AUTO: 80 FL (ref 82–98)
MONOCYTES # BLD AUTO: 1.3 K/UL (ref 0.3–1)
MONOCYTES NFR BLD: 9.8 % (ref 4–15)
NEUTROPHILS # BLD AUTO: 9.9 K/UL (ref 1.8–7.7)
NEUTROPHILS NFR BLD: 75.7 % (ref 38–73)
NRBC BLD-RTO: 0 /100 WBC
PF4 HEPARIN CMPLX AB SER QL: 0.28 OD (ref 0–0.4)
PLATELET # BLD AUTO: 226 K/UL (ref 150–450)
PMV BLD AUTO: 12 FL (ref 9.2–12.9)
POTASSIUM SERPL-SCNC: 3.8 MMOL/L (ref 3.5–5.1)
PROT SERPL-MCNC: 6.5 G/DL (ref 6–8.4)
RBC # BLD AUTO: 4.7 M/UL (ref 4.6–6.2)
SODIUM SERPL-SCNC: 138 MMOL/L (ref 136–145)
WBC # BLD AUTO: 13.1 K/UL (ref 3.9–12.7)

## 2021-11-29 PROCEDURE — 99900035 HC TECH TIME PER 15 MIN (STAT)

## 2021-11-29 PROCEDURE — 97110 THERAPEUTIC EXERCISES: CPT

## 2021-11-29 PROCEDURE — 83735 ASSAY OF MAGNESIUM: CPT | Performed by: STUDENT IN AN ORGANIZED HEALTH CARE EDUCATION/TRAINING PROGRAM

## 2021-11-29 PROCEDURE — 12000002 HC ACUTE/MED SURGE SEMI-PRIVATE ROOM

## 2021-11-29 PROCEDURE — 25000003 PHARM REV CODE 250: Performed by: INTERNAL MEDICINE

## 2021-11-29 PROCEDURE — 99233 PR SUBSEQUENT HOSPITAL CARE,LEVL III: ICD-10-PCS | Mod: ,,, | Performed by: INTERNAL MEDICINE

## 2021-11-29 PROCEDURE — 63600175 PHARM REV CODE 636 W HCPCS: Performed by: INTERNAL MEDICINE

## 2021-11-29 PROCEDURE — 94640 AIRWAY INHALATION TREATMENT: CPT

## 2021-11-29 PROCEDURE — 80053 COMPREHEN METABOLIC PANEL: CPT | Performed by: STUDENT IN AN ORGANIZED HEALTH CARE EDUCATION/TRAINING PROGRAM

## 2021-11-29 PROCEDURE — 63600175 PHARM REV CODE 636 W HCPCS: Performed by: SURGERY

## 2021-11-29 PROCEDURE — 97116 GAIT TRAINING THERAPY: CPT

## 2021-11-29 PROCEDURE — 25000003 PHARM REV CODE 250: Performed by: NURSE PRACTITIONER

## 2021-11-29 PROCEDURE — 25000003 PHARM REV CODE 250: Performed by: SURGERY

## 2021-11-29 PROCEDURE — 99233 SBSQ HOSP IP/OBS HIGH 50: CPT | Mod: ,,, | Performed by: INTERNAL MEDICINE

## 2021-11-29 PROCEDURE — 99900031 HC PATIENT EDUCATION (STAT)

## 2021-11-29 PROCEDURE — 94761 N-INVAS EAR/PLS OXIMETRY MLT: CPT

## 2021-11-29 PROCEDURE — 63600175 PHARM REV CODE 636 W HCPCS: Performed by: NURSE PRACTITIONER

## 2021-11-29 PROCEDURE — 85025 COMPLETE CBC W/AUTO DIFF WBC: CPT | Performed by: STUDENT IN AN ORGANIZED HEALTH CARE EDUCATION/TRAINING PROGRAM

## 2021-11-29 RX ORDER — MAGNESIUM SULFATE 1 G/100ML
1 INJECTION INTRAVENOUS ONCE
Status: COMPLETED | OUTPATIENT
Start: 2021-11-29 | End: 2021-11-29

## 2021-11-29 RX ORDER — METOPROLOL TARTRATE 25 MG/1
12.5 TABLET ORAL 2 TIMES DAILY
Status: DISCONTINUED | OUTPATIENT
Start: 2021-11-29 | End: 2021-11-30 | Stop reason: HOSPADM

## 2021-11-29 RX ADMIN — FLUTICASONE PROPIONATE 50 MCG: 50 SPRAY, METERED NASAL at 09:11

## 2021-11-29 RX ADMIN — HUMAN INSULIN 1 UNITS: 100 INJECTION, SOLUTION SUBCUTANEOUS at 01:11

## 2021-11-29 RX ADMIN — PIPERACILLIN AND TAZOBACTAM 4.5 G: 4; .5 INJECTION, POWDER, LYOPHILIZED, FOR SOLUTION INTRAVENOUS; PARENTERAL at 12:11

## 2021-11-29 RX ADMIN — TIOTROPIUM BROMIDE INHALATION SPRAY 2 PUFF: 3.12 SPRAY, METERED RESPIRATORY (INHALATION) at 07:11

## 2021-11-29 RX ADMIN — PIPERACILLIN AND TAZOBACTAM 4.5 G: 4; .5 INJECTION, POWDER, LYOPHILIZED, FOR SOLUTION INTRAVENOUS; PARENTERAL at 03:11

## 2021-11-29 RX ADMIN — METOPROLOL TARTRATE 12.5 MG: 25 TABLET, FILM COATED ORAL at 08:11

## 2021-11-29 RX ADMIN — MAGNESIUM SULFATE 1 G: 1 INJECTION INTRAVENOUS at 01:11

## 2021-11-29 RX ADMIN — PIPERACILLIN AND TAZOBACTAM 4.5 G: 4; .5 INJECTION, POWDER, LYOPHILIZED, FOR SOLUTION INTRAVENOUS; PARENTERAL at 07:11

## 2021-11-29 RX ADMIN — TAMSULOSIN HYDROCHLORIDE 0.4 MG: 0.4 CAPSULE ORAL at 09:11

## 2021-11-29 RX ADMIN — FAMOTIDINE 20 MG: 20 TABLET, FILM COATED ORAL at 09:11

## 2021-11-29 RX ADMIN — FLUTICASONE FUROATE AND VILANTEROL TRIFENATATE 1 PUFF: 100; 25 POWDER RESPIRATORY (INHALATION) at 07:11

## 2021-11-29 RX ADMIN — PIPERACILLIN AND TAZOBACTAM 4.5 G: 4; .5 INJECTION, POWDER, LYOPHILIZED, FOR SOLUTION INTRAVENOUS; PARENTERAL at 11:11

## 2021-11-29 RX ADMIN — METOPROLOL TARTRATE 12.5 MG: 25 TABLET, FILM COATED ORAL at 01:11

## 2021-11-29 RX ADMIN — MELATONIN TAB 3 MG 6 MG: 3 TAB at 02:11

## 2021-11-29 RX ADMIN — AMLODIPINE BESYLATE 5 MG: 5 TABLET ORAL at 09:11

## 2021-11-30 VITALS
HEIGHT: 67 IN | HEART RATE: 62 BPM | WEIGHT: 216.5 LBS | RESPIRATION RATE: 18 BRPM | DIASTOLIC BLOOD PRESSURE: 91 MMHG | BODY MASS INDEX: 33.98 KG/M2 | SYSTOLIC BLOOD PRESSURE: 127 MMHG | OXYGEN SATURATION: 95 % | TEMPERATURE: 97 F

## 2021-11-30 PROBLEM — D69.6 THROMBOCYTOPENIA: Status: RESOLVED | Noted: 2021-11-23 | Resolved: 2021-11-30

## 2021-11-30 PROBLEM — K62.7 RADIATION PROCTITIS: Status: RESOLVED | Noted: 2019-01-22 | Resolved: 2021-11-30

## 2021-11-30 PROBLEM — R65.21 SEPTIC SHOCK: Status: RESOLVED | Noted: 2021-11-21 | Resolved: 2021-11-30

## 2021-11-30 PROBLEM — J81.0 ACUTE PULMONARY EDEMA: Status: RESOLVED | Noted: 2021-11-21 | Resolved: 2021-11-30

## 2021-11-30 PROBLEM — I21.4 NSTEMI (NON-ST ELEVATED MYOCARDIAL INFARCTION): Status: RESOLVED | Noted: 2021-11-21 | Resolved: 2021-11-30

## 2021-11-30 PROBLEM — A41.9 SEPTIC SHOCK: Status: RESOLVED | Noted: 2021-11-21 | Resolved: 2021-11-30

## 2021-11-30 PROBLEM — A41.9 SEVERE SEPSIS: Status: RESOLVED | Noted: 2021-11-20 | Resolved: 2021-11-30

## 2021-11-30 PROBLEM — E80.6 HYPERBILIRUBINEMIA: Status: RESOLVED | Noted: 2021-11-20 | Resolved: 2021-11-30

## 2021-11-30 PROBLEM — R65.20 SEVERE SEPSIS: Status: RESOLVED | Noted: 2021-11-20 | Resolved: 2021-11-30

## 2021-11-30 PROBLEM — J96.01 ACUTE HYPOXEMIC RESPIRATORY FAILURE: Status: RESOLVED | Noted: 2021-11-21 | Resolved: 2021-11-30

## 2021-11-30 LAB
ALBUMIN SERPL BCP-MCNC: 2.6 G/DL (ref 3.5–5.2)
ALP SERPL-CCNC: 114 U/L (ref 55–135)
ALT SERPL W/O P-5'-P-CCNC: 35 U/L (ref 10–44)
ANION GAP SERPL CALC-SCNC: 12 MMOL/L (ref 8–16)
AST SERPL-CCNC: 31 U/L (ref 10–40)
BASOPHILS # BLD AUTO: 0.05 K/UL (ref 0–0.2)
BASOPHILS # BLD AUTO: 0.05 K/UL (ref 0–0.2)
BASOPHILS NFR BLD: 0.4 % (ref 0–1.9)
BASOPHILS NFR BLD: 0.4 % (ref 0–1.9)
BILIRUB SERPL-MCNC: 2.9 MG/DL (ref 0.1–1)
BUN SERPL-MCNC: 39 MG/DL (ref 8–23)
CALCIUM SERPL-MCNC: 8 MG/DL (ref 8.7–10.5)
CHLORIDE SERPL-SCNC: 102 MMOL/L (ref 95–110)
CO2 SERPL-SCNC: 24 MMOL/L (ref 23–29)
CREAT SERPL-MCNC: 2.2 MG/DL (ref 0.5–1.4)
DIFFERENTIAL METHOD: ABNORMAL
DIFFERENTIAL METHOD: ABNORMAL
EOSINOPHIL # BLD AUTO: 0.3 K/UL (ref 0–0.5)
EOSINOPHIL # BLD AUTO: 0.3 K/UL (ref 0–0.5)
EOSINOPHIL NFR BLD: 2.4 % (ref 0–8)
EOSINOPHIL NFR BLD: 2.4 % (ref 0–8)
ERYTHROCYTE [DISTWIDTH] IN BLOOD BY AUTOMATED COUNT: 17.1 % (ref 11.5–14.5)
ERYTHROCYTE [DISTWIDTH] IN BLOOD BY AUTOMATED COUNT: 17.1 % (ref 11.5–14.5)
EST. GFR  (AFRICAN AMERICAN): 30 ML/MIN/1.73 M^2
EST. GFR  (NON AFRICAN AMERICAN): 26 ML/MIN/1.73 M^2
GLUCOSE SERPL-MCNC: 137 MG/DL (ref 70–110)
GLUCOSE SERPL-MCNC: 147 MG/DL (ref 70–110)
GLUCOSE SERPL-MCNC: 148 MG/DL (ref 70–110)
HCT VFR BLD AUTO: 37.6 % (ref 40–54)
HCT VFR BLD AUTO: 37.6 % (ref 40–54)
HGB BLD-MCNC: 11.9 G/DL (ref 14–18)
HGB BLD-MCNC: 11.9 G/DL (ref 14–18)
IMM GRANULOCYTES # BLD AUTO: 0.32 K/UL (ref 0–0.04)
IMM GRANULOCYTES # BLD AUTO: 0.32 K/UL (ref 0–0.04)
IMM GRANULOCYTES NFR BLD AUTO: 2.5 % (ref 0–0.5)
IMM GRANULOCYTES NFR BLD AUTO: 2.5 % (ref 0–0.5)
LYMPHOCYTES # BLD AUTO: 1.3 K/UL (ref 1–4.8)
LYMPHOCYTES # BLD AUTO: 1.3 K/UL (ref 1–4.8)
LYMPHOCYTES NFR BLD: 10.3 % (ref 18–48)
LYMPHOCYTES NFR BLD: 10.3 % (ref 18–48)
MAGNESIUM SERPL-MCNC: 1.8 MG/DL (ref 1.6–2.6)
MCH RBC QN AUTO: 25.3 PG (ref 27–31)
MCH RBC QN AUTO: 25.3 PG (ref 27–31)
MCHC RBC AUTO-ENTMCNC: 31.6 G/DL (ref 32–36)
MCHC RBC AUTO-ENTMCNC: 31.6 G/DL (ref 32–36)
MCV RBC AUTO: 80 FL (ref 82–98)
MCV RBC AUTO: 80 FL (ref 82–98)
MONOCYTES # BLD AUTO: 1.1 K/UL (ref 0.3–1)
MONOCYTES # BLD AUTO: 1.1 K/UL (ref 0.3–1)
MONOCYTES NFR BLD: 8.7 % (ref 4–15)
MONOCYTES NFR BLD: 8.7 % (ref 4–15)
NEUTROPHILS # BLD AUTO: 9.5 K/UL (ref 1.8–7.7)
NEUTROPHILS # BLD AUTO: 9.5 K/UL (ref 1.8–7.7)
NEUTROPHILS NFR BLD: 75.7 % (ref 38–73)
NEUTROPHILS NFR BLD: 75.7 % (ref 38–73)
NRBC BLD-RTO: 0 /100 WBC
NRBC BLD-RTO: 0 /100 WBC
PLATELET # BLD AUTO: 233 K/UL (ref 150–450)
PLATELET # BLD AUTO: 233 K/UL (ref 150–450)
PMV BLD AUTO: 11.9 FL (ref 9.2–12.9)
PMV BLD AUTO: 11.9 FL (ref 9.2–12.9)
POTASSIUM SERPL-SCNC: 3.8 MMOL/L (ref 3.5–5.1)
PROT SERPL-MCNC: 6.7 G/DL (ref 6–8.4)
RBC # BLD AUTO: 4.71 M/UL (ref 4.6–6.2)
RBC # BLD AUTO: 4.71 M/UL (ref 4.6–6.2)
SODIUM SERPL-SCNC: 138 MMOL/L (ref 136–145)
WBC # BLD AUTO: 12.58 K/UL (ref 3.9–12.7)
WBC # BLD AUTO: 12.58 K/UL (ref 3.9–12.7)

## 2021-11-30 PROCEDURE — 63600175 PHARM REV CODE 636 W HCPCS: Performed by: INTERNAL MEDICINE

## 2021-11-30 PROCEDURE — 63600175 PHARM REV CODE 636 W HCPCS: Performed by: SURGERY

## 2021-11-30 PROCEDURE — 99900035 HC TECH TIME PER 15 MIN (STAT)

## 2021-11-30 PROCEDURE — 25000003 PHARM REV CODE 250: Performed by: SURGERY

## 2021-11-30 PROCEDURE — 94761 N-INVAS EAR/PLS OXIMETRY MLT: CPT

## 2021-11-30 PROCEDURE — 83735 ASSAY OF MAGNESIUM: CPT | Performed by: SURGERY

## 2021-11-30 PROCEDURE — 85025 COMPLETE CBC W/AUTO DIFF WBC: CPT | Performed by: SURGERY

## 2021-11-30 PROCEDURE — 97116 GAIT TRAINING THERAPY: CPT

## 2021-11-30 PROCEDURE — 25000003 PHARM REV CODE 250: Performed by: NURSE PRACTITIONER

## 2021-11-30 PROCEDURE — 99233 PR SUBSEQUENT HOSPITAL CARE,LEVL III: ICD-10-PCS | Mod: ,,, | Performed by: INTERNAL MEDICINE

## 2021-11-30 PROCEDURE — 80053 COMPREHEN METABOLIC PANEL: CPT | Performed by: SURGERY

## 2021-11-30 PROCEDURE — 99900031 HC PATIENT EDUCATION (STAT)

## 2021-11-30 PROCEDURE — 94640 AIRWAY INHALATION TREATMENT: CPT

## 2021-11-30 PROCEDURE — 25000003 PHARM REV CODE 250: Performed by: INTERNAL MEDICINE

## 2021-11-30 PROCEDURE — 99233 SBSQ HOSP IP/OBS HIGH 50: CPT | Mod: ,,, | Performed by: INTERNAL MEDICINE

## 2021-11-30 RX ORDER — AMLODIPINE BESYLATE 5 MG/1
5 TABLET ORAL DAILY
Qty: 30 TABLET | Refills: 0 | Status: SHIPPED | OUTPATIENT
Start: 2021-11-30 | End: 2022-05-09

## 2021-11-30 RX ORDER — LOSARTAN POTASSIUM 25 MG/1
25 TABLET ORAL DAILY
Start: 2021-11-30 | End: 2023-01-01 | Stop reason: SDUPTHER

## 2021-11-30 RX ORDER — METOPROLOL TARTRATE 25 MG/1
12.5 TABLET, FILM COATED ORAL 2 TIMES DAILY
Qty: 30 TABLET | Refills: 0 | Status: SHIPPED | OUTPATIENT
Start: 2021-11-30 | End: 2022-05-09

## 2021-11-30 RX ORDER — AMOXICILLIN AND CLAVULANATE POTASSIUM 500; 125 MG/1; MG/1
1 TABLET, FILM COATED ORAL 2 TIMES DAILY
Qty: 12 TABLET | Refills: 0 | Status: SHIPPED | OUTPATIENT
Start: 2021-11-30 | End: 2021-12-06

## 2021-11-30 RX ADMIN — MELATONIN TAB 3 MG 6 MG: 3 TAB at 01:11

## 2021-11-30 RX ADMIN — ONDANSETRON 4 MG: 2 INJECTION INTRAMUSCULAR; INTRAVENOUS at 07:11

## 2021-11-30 RX ADMIN — TAMSULOSIN HYDROCHLORIDE 0.4 MG: 0.4 CAPSULE ORAL at 07:11

## 2021-11-30 RX ADMIN — AMLODIPINE BESYLATE 5 MG: 5 TABLET ORAL at 07:11

## 2021-11-30 RX ADMIN — FLUTICASONE FUROATE AND VILANTEROL TRIFENATATE 1 PUFF: 100; 25 POWDER RESPIRATORY (INHALATION) at 08:11

## 2021-11-30 RX ADMIN — METOPROLOL TARTRATE 12.5 MG: 25 TABLET, FILM COATED ORAL at 07:11

## 2021-11-30 RX ADMIN — TIOTROPIUM BROMIDE INHALATION SPRAY 2 PUFF: 3.12 SPRAY, METERED RESPIRATORY (INHALATION) at 08:11

## 2021-11-30 RX ADMIN — PIPERACILLIN AND TAZOBACTAM 4.5 G: 4; .5 INJECTION, POWDER, LYOPHILIZED, FOR SOLUTION INTRAVENOUS; PARENTERAL at 08:11

## 2021-11-30 RX ADMIN — FAMOTIDINE 20 MG: 20 TABLET, FILM COATED ORAL at 07:11

## 2021-12-03 ENCOUNTER — TELEPHONE (OUTPATIENT)
Dept: SURGERY | Facility: CLINIC | Age: 86
End: 2021-12-03
Payer: MEDICARE

## 2021-12-03 ENCOUNTER — TELEPHONE (OUTPATIENT)
Dept: FAMILY MEDICINE | Facility: CLINIC | Age: 86
End: 2021-12-03
Payer: MEDICARE

## 2021-12-03 NOTE — TELEPHONE ENCOUNTER
----- Message from Gibson Marsh sent at 12/3/2021  2:55 PM CST -----  Contact: Spouse/Edith  Type:  Sooner Apoointment Request    Caller is requesting a sooner appointment.  Caller declined first available appointment listed below.  Caller will not accept being placed on the waitlist and is requesting a message be sent to doctor.    Name of Caller: Spouse/Edith  When is the first available appointment?  N/a  Symptoms:  Hosp f/u  Best Call Back Number: [phone]    Additional Information:  States that pt cannot drive to follow up appt. Would like to know if virtual audio is possible.

## 2021-12-13 ENCOUNTER — OFFICE VISIT (OUTPATIENT)
Dept: SURGERY | Facility: CLINIC | Age: 86
End: 2021-12-13
Payer: MEDICARE

## 2021-12-13 VITALS — DIASTOLIC BLOOD PRESSURE: 86 MMHG | HEART RATE: 60 BPM | TEMPERATURE: 98 F | SYSTOLIC BLOOD PRESSURE: 127 MMHG

## 2021-12-13 DIAGNOSIS — Z90.49 S/P LAPAROSCOPIC CHOLECYSTECTOMY: Primary | ICD-10-CM

## 2021-12-13 PROCEDURE — 99024 POSTOP FOLLOW-UP VISIT: CPT | Mod: S$GLB,POP,, | Performed by: SURGERY

## 2021-12-13 PROCEDURE — 99024 PR POST-OP FOLLOW-UP VISIT: ICD-10-PCS | Mod: S$GLB,POP,, | Performed by: SURGERY

## 2021-12-22 ENCOUNTER — EXTERNAL HOME HEALTH (OUTPATIENT)
Dept: HOME HEALTH SERVICES | Facility: HOSPITAL | Age: 86
End: 2021-12-22
Payer: MEDICARE

## 2022-01-01 ENCOUNTER — TELEPHONE (OUTPATIENT)
Dept: FAMILY MEDICINE | Facility: CLINIC | Age: 87
End: 2022-01-01
Payer: MEDICARE

## 2022-01-01 ENCOUNTER — NURSE TRIAGE (OUTPATIENT)
Dept: ADMINISTRATIVE | Facility: CLINIC | Age: 87
End: 2022-01-01
Payer: MEDICARE

## 2022-01-01 ENCOUNTER — HOSPITAL ENCOUNTER (OUTPATIENT)
Facility: HOSPITAL | Age: 87
Discharge: HOME | End: 2022-11-04
Attending: INTERNAL MEDICINE | Admitting: INTERNAL MEDICINE
Payer: MEDICARE

## 2022-01-01 ENCOUNTER — OFFICE VISIT (OUTPATIENT)
Dept: OPTOMETRY | Facility: CLINIC | Age: 87
End: 2022-01-01
Payer: MEDICARE

## 2022-01-01 ENCOUNTER — PATIENT MESSAGE (OUTPATIENT)
Dept: FAMILY MEDICINE | Facility: CLINIC | Age: 87
End: 2022-01-01
Payer: MEDICARE

## 2022-01-01 ENCOUNTER — OFFICE VISIT (OUTPATIENT)
Dept: ORTHOPEDICS | Facility: CLINIC | Age: 87
End: 2022-01-01
Payer: MEDICARE

## 2022-01-01 ENCOUNTER — OFFICE VISIT (OUTPATIENT)
Dept: FAMILY MEDICINE | Facility: CLINIC | Age: 87
End: 2022-01-01
Payer: MEDICARE

## 2022-01-01 ENCOUNTER — OFFICE VISIT (OUTPATIENT)
Dept: OTOLARYNGOLOGY | Facility: CLINIC | Age: 87
End: 2022-01-01
Payer: MEDICARE

## 2022-01-01 ENCOUNTER — TELEPHONE (OUTPATIENT)
Dept: GASTROENTEROLOGY | Facility: CLINIC | Age: 87
End: 2022-01-01
Payer: MEDICARE

## 2022-01-01 ENCOUNTER — ANESTHESIA EVENT (OUTPATIENT)
Dept: ENDOSCOPY | Facility: HOSPITAL | Age: 87
End: 2022-01-01
Payer: MEDICARE

## 2022-01-01 ENCOUNTER — ANESTHESIA (OUTPATIENT)
Dept: ENDOSCOPY | Facility: HOSPITAL | Age: 87
End: 2022-01-01
Payer: MEDICARE

## 2022-01-01 ENCOUNTER — PATIENT MESSAGE (OUTPATIENT)
Dept: ADMINISTRATIVE | Facility: CLINIC | Age: 87
End: 2022-01-01
Payer: MEDICARE

## 2022-01-01 ENCOUNTER — OFFICE VISIT (OUTPATIENT)
Dept: PODIATRY | Facility: CLINIC | Age: 87
End: 2022-01-01
Payer: MEDICARE

## 2022-01-01 ENCOUNTER — OFFICE VISIT (OUTPATIENT)
Dept: HEMATOLOGY/ONCOLOGY | Facility: CLINIC | Age: 87
End: 2022-01-01
Payer: MEDICARE

## 2022-01-01 VITALS
RESPIRATION RATE: 16 BRPM | SYSTOLIC BLOOD PRESSURE: 134 MMHG | OXYGEN SATURATION: 94 % | HEIGHT: 67 IN | HEART RATE: 50 BPM | DIASTOLIC BLOOD PRESSURE: 85 MMHG | WEIGHT: 203.25 LBS | TEMPERATURE: 97 F | BODY MASS INDEX: 31.9 KG/M2

## 2022-01-01 VITALS
DIASTOLIC BLOOD PRESSURE: 84 MMHG | SYSTOLIC BLOOD PRESSURE: 148 MMHG | HEART RATE: 71 BPM | WEIGHT: 201.63 LBS | BODY MASS INDEX: 30.56 KG/M2 | HEIGHT: 68 IN

## 2022-01-01 VITALS
DIASTOLIC BLOOD PRESSURE: 58 MMHG | WEIGHT: 200 LBS | SYSTOLIC BLOOD PRESSURE: 130 MMHG | BODY MASS INDEX: 31.39 KG/M2 | HEIGHT: 67 IN

## 2022-01-01 VITALS
HEIGHT: 67 IN | RESPIRATION RATE: 14 BRPM | WEIGHT: 200.63 LBS | SYSTOLIC BLOOD PRESSURE: 122 MMHG | HEART RATE: 60 BPM | DIASTOLIC BLOOD PRESSURE: 50 MMHG | TEMPERATURE: 98 F | OXYGEN SATURATION: 93 % | BODY MASS INDEX: 31.49 KG/M2

## 2022-01-01 VITALS
SYSTOLIC BLOOD PRESSURE: 125 MMHG | DIASTOLIC BLOOD PRESSURE: 75 MMHG | OXYGEN SATURATION: 94 % | RESPIRATION RATE: 16 BRPM | HEART RATE: 64 BPM | BODY MASS INDEX: 32.89 KG/M2 | WEIGHT: 210 LBS | TEMPERATURE: 98 F

## 2022-01-01 VITALS — HEIGHT: 68 IN | WEIGHT: 203.38 LBS | BODY MASS INDEX: 30.82 KG/M2 | TEMPERATURE: 98 F

## 2022-01-01 VITALS
TEMPERATURE: 98 F | WEIGHT: 199.5 LBS | BODY MASS INDEX: 31.31 KG/M2 | HEIGHT: 67 IN | OXYGEN SATURATION: 95 % | DIASTOLIC BLOOD PRESSURE: 60 MMHG | HEART RATE: 58 BPM | SYSTOLIC BLOOD PRESSURE: 122 MMHG | RESPIRATION RATE: 20 BRPM

## 2022-01-01 DIAGNOSIS — J41.0 SIMPLE CHRONIC BRONCHITIS: ICD-10-CM

## 2022-01-01 DIAGNOSIS — L60.3 NAIL DYSTROPHY: Primary | ICD-10-CM

## 2022-01-01 DIAGNOSIS — U07.1 COVID-19: Primary | ICD-10-CM

## 2022-01-01 DIAGNOSIS — E66.9 OBESITY (BMI 30-39.9): ICD-10-CM

## 2022-01-01 DIAGNOSIS — H61.22 HEARING LOSS DUE TO CERUMEN IMPACTION, LEFT: ICD-10-CM

## 2022-01-01 DIAGNOSIS — B35.1 PAIN DUE TO ONYCHOMYCOSIS OF TOENAILS OF BOTH FEET: ICD-10-CM

## 2022-01-01 DIAGNOSIS — E11.9 DIABETIC EYE EXAM: ICD-10-CM

## 2022-01-01 DIAGNOSIS — C61 PROSTATE CANCER: ICD-10-CM

## 2022-01-01 DIAGNOSIS — H91.13 PRESBYCUSIS, BILATERAL: ICD-10-CM

## 2022-01-01 DIAGNOSIS — N18.31 STAGE 3A CHRONIC KIDNEY DISEASE: ICD-10-CM

## 2022-01-01 DIAGNOSIS — H83.3X3 NOISE-INDUCED HEARING LOSS OF BOTH EARS: ICD-10-CM

## 2022-01-01 DIAGNOSIS — H04.123 DRY EYE SYNDROME, BILATERAL: ICD-10-CM

## 2022-01-01 DIAGNOSIS — K57.90 DIVERTICULOSIS: Primary | ICD-10-CM

## 2022-01-01 DIAGNOSIS — E11.49 TYPE II DIABETES MELLITUS WITH NEUROLOGICAL MANIFESTATIONS: ICD-10-CM

## 2022-01-01 DIAGNOSIS — M79.674 PAIN DUE TO ONYCHOMYCOSIS OF TOENAILS OF BOTH FEET: ICD-10-CM

## 2022-01-01 DIAGNOSIS — Z97.4 WEARS HEARING AID IN BOTH EARS: ICD-10-CM

## 2022-01-01 DIAGNOSIS — M79.675 PAIN DUE TO ONYCHOMYCOSIS OF TOENAILS OF BOTH FEET: ICD-10-CM

## 2022-01-01 DIAGNOSIS — R31.0 GROSS HEMATURIA: Primary | ICD-10-CM

## 2022-01-01 DIAGNOSIS — I73.9 PAD (PERIPHERAL ARTERY DISEASE): ICD-10-CM

## 2022-01-01 DIAGNOSIS — E11.9 ENCOUNTER FOR DIABETIC FOOT EXAM: ICD-10-CM

## 2022-01-01 DIAGNOSIS — Z96.1 PSEUDOPHAKIA: ICD-10-CM

## 2022-01-01 DIAGNOSIS — D50.0 IRON DEFICIENCY ANEMIA DUE TO CHRONIC BLOOD LOSS: ICD-10-CM

## 2022-01-01 DIAGNOSIS — E11.9 DIABETES MELLITUS TYPE 2 WITHOUT RETINOPATHY: Primary | ICD-10-CM

## 2022-01-01 DIAGNOSIS — N39.0 URINARY TRACT INFECTION WITH HEMATURIA, SITE UNSPECIFIED: ICD-10-CM

## 2022-01-01 DIAGNOSIS — E78.5 HYPERLIPIDEMIA LDL GOAL <70: ICD-10-CM

## 2022-01-01 DIAGNOSIS — K64.8 INTERNAL HEMORRHOIDS: ICD-10-CM

## 2022-01-01 DIAGNOSIS — M10.9 GOUT, UNSPECIFIED CAUSE, UNSPECIFIED CHRONICITY, UNSPECIFIED SITE: ICD-10-CM

## 2022-01-01 DIAGNOSIS — I15.2 HYPERTENSION ASSOCIATED WITH DIABETES: Primary | ICD-10-CM

## 2022-01-01 DIAGNOSIS — E55.9 VITAMIN D DEFICIENCY: ICD-10-CM

## 2022-01-01 DIAGNOSIS — H52.7 REFRACTIVE ERROR: ICD-10-CM

## 2022-01-01 DIAGNOSIS — Z01.00 DIABETIC EYE EXAM: ICD-10-CM

## 2022-01-01 DIAGNOSIS — H91.90 HEARING LOSS, UNSPECIFIED HEARING LOSS TYPE, UNSPECIFIED LATERALITY: Primary | ICD-10-CM

## 2022-01-01 DIAGNOSIS — K63.5 POLYP OF COLON, UNSPECIFIED PART OF COLON, UNSPECIFIED TYPE: ICD-10-CM

## 2022-01-01 DIAGNOSIS — K76.0 FATTY LIVER: ICD-10-CM

## 2022-01-01 DIAGNOSIS — M19.031 ARTHRITIS OF RIGHT WRIST: Primary | ICD-10-CM

## 2022-01-01 DIAGNOSIS — M19.041 PRIMARY OSTEOARTHRITIS OF RIGHT HAND: ICD-10-CM

## 2022-01-01 DIAGNOSIS — E11.59 HYPERTENSION ASSOCIATED WITH DIABETES: Primary | ICD-10-CM

## 2022-01-01 DIAGNOSIS — E53.8 B12 DEFICIENCY: Primary | ICD-10-CM

## 2022-01-01 DIAGNOSIS — E53.8 B12 DEFICIENCY: ICD-10-CM

## 2022-01-01 DIAGNOSIS — E11.21 CONTROLLED TYPE 2 DIABETES MELLITUS WITH DIABETIC NEPHROPATHY, WITHOUT LONG-TERM CURRENT USE OF INSULIN: ICD-10-CM

## 2022-01-01 DIAGNOSIS — R31.9 URINARY TRACT INFECTION WITH HEMATURIA, SITE UNSPECIFIED: ICD-10-CM

## 2022-01-01 DIAGNOSIS — K52.9 CHRONIC DIARRHEA: ICD-10-CM

## 2022-01-01 LAB
BACTERIA UR CULT: ABNORMAL
BILIRUB SERPL-MCNC: ABNORMAL MG/DL
BLOOD, POC UA: 250
FINAL PATHOLOGIC DIAGNOSIS: NORMAL
GLUCOSE UR QL STRIP: NORMAL
GROSS: NORMAL
KETONES UR QL STRIP: NEGATIVE
LEUKOCYTE ESTERASE URINE, POC: ABNORMAL
Lab: NORMAL
NITRITE, POC UA: POSITIVE
PH, POC UA: 5
PROTEIN, POC: ABNORMAL
SPECIFIC GRAVITY, POC UA: 1.01
UROBILINOGEN, POC UA: NORMAL

## 2022-01-01 PROCEDURE — 87186 SC STD MICRODIL/AGAR DIL: CPT | Performed by: FAMILY MEDICINE

## 2022-01-01 PROCEDURE — 99214 OFFICE O/P EST MOD 30 MIN: CPT | Mod: S$PBB,,, | Performed by: FAMILY MEDICINE

## 2022-01-01 PROCEDURE — 99999 PR PBB SHADOW E&M-EST. PATIENT-LVL III: ICD-10-PCS | Mod: PBBFAC,,, | Performed by: OPTOMETRIST

## 2022-01-01 PROCEDURE — 45380 COLONOSCOPY AND BIOPSY: CPT | Mod: 59 | Performed by: INTERNAL MEDICINE

## 2022-01-01 PROCEDURE — 63600175 PHARM REV CODE 636 W HCPCS: Performed by: NURSE ANESTHETIST, CERTIFIED REGISTERED

## 2022-01-01 PROCEDURE — D9220A PRA ANESTHESIA: Mod: CRNA,,, | Performed by: NURSE ANESTHETIST, CERTIFIED REGISTERED

## 2022-01-01 PROCEDURE — 99999 PR PBB SHADOW E&M-EST. PATIENT-LVL IV: ICD-10-PCS | Mod: PBBFAC,,, | Performed by: INTERNAL MEDICINE

## 2022-01-01 PROCEDURE — 45385 PR COLONOSCOPY,REMV LESN,SNARE: ICD-10-PCS | Mod: ,,, | Performed by: INTERNAL MEDICINE

## 2022-01-01 PROCEDURE — D9220A PRA ANESTHESIA: ICD-10-PCS | Mod: ANES,,, | Performed by: ANESTHESIOLOGY

## 2022-01-01 PROCEDURE — 99213 OFFICE O/P EST LOW 20 MIN: CPT | Mod: PBBFAC,PN | Performed by: OTOLARYNGOLOGY

## 2022-01-01 PROCEDURE — 92015 DETERMINE REFRACTIVE STATE: CPT | Mod: ,,, | Performed by: OPTOMETRIST

## 2022-01-01 PROCEDURE — 99214 PR OFFICE/OUTPT VISIT, EST, LEVL IV, 30-39 MIN: ICD-10-PCS | Mod: S$PBB,,, | Performed by: INTERNAL MEDICINE

## 2022-01-01 PROCEDURE — 99213 OFFICE O/P EST LOW 20 MIN: CPT | Mod: PBBFAC,PO | Performed by: OPTOMETRIST

## 2022-01-01 PROCEDURE — 37000008 HC ANESTHESIA 1ST 15 MINUTES: Performed by: INTERNAL MEDICINE

## 2022-01-01 PROCEDURE — D9220A PRA ANESTHESIA: ICD-10-PCS | Mod: CRNA,,, | Performed by: NURSE ANESTHETIST, CERTIFIED REGISTERED

## 2022-01-01 PROCEDURE — 88305 TISSUE EXAM BY PATHOLOGIST: CPT | Mod: 26,,, | Performed by: STUDENT IN AN ORGANIZED HEALTH CARE EDUCATION/TRAINING PROGRAM

## 2022-01-01 PROCEDURE — 99999 PR PBB SHADOW E&M-EST. PATIENT-LVL III: ICD-10-PCS | Mod: PBBFAC,,, | Performed by: FAMILY MEDICINE

## 2022-01-01 PROCEDURE — 45385 COLONOSCOPY W/LESION REMOVAL: CPT | Performed by: INTERNAL MEDICINE

## 2022-01-01 PROCEDURE — G0008 ADMIN INFLUENZA VIRUS VAC: HCPCS | Mod: PBBFAC,PO

## 2022-01-01 PROCEDURE — 92004 COMPRE OPH EXAM NEW PT 1/>: CPT | Mod: S$PBB,,, | Performed by: OPTOMETRIST

## 2022-01-01 PROCEDURE — 99214 OFFICE O/P EST MOD 30 MIN: CPT | Mod: S$PBB,,, | Performed by: INTERNAL MEDICINE

## 2022-01-01 PROCEDURE — 25000003 PHARM REV CODE 250: Performed by: NURSE ANESTHETIST, CERTIFIED REGISTERED

## 2022-01-01 PROCEDURE — 45380 PR COLONOSCOPY,BIOPSY: ICD-10-PCS | Mod: 59,,, | Performed by: INTERNAL MEDICINE

## 2022-01-01 PROCEDURE — 99213 OFFICE O/P EST LOW 20 MIN: CPT | Mod: S$PBB,,, | Performed by: FAMILY MEDICINE

## 2022-01-01 PROCEDURE — 99213 PR OFFICE/OUTPT VISIT, EST, LEVL III, 20-29 MIN: ICD-10-PCS | Mod: 25,S$PBB,, | Performed by: PODIATRIST

## 2022-01-01 PROCEDURE — 99203 PR OFFICE/OUTPT VISIT, NEW, LEVL III, 30-44 MIN: ICD-10-PCS | Mod: 25,S$PBB,, | Performed by: OTOLARYNGOLOGY

## 2022-01-01 PROCEDURE — 99213 OFFICE O/P EST LOW 20 MIN: CPT | Mod: 25,S$PBB,, | Performed by: PODIATRIST

## 2022-01-01 PROCEDURE — 27201012 HC FORCEPS, HOT/COLD, DISP: Performed by: INTERNAL MEDICINE

## 2022-01-01 PROCEDURE — 99999 PR PBB SHADOW E&M-EST. PATIENT-LVL IV: CPT | Mod: PBBFAC,,, | Performed by: INTERNAL MEDICINE

## 2022-01-01 PROCEDURE — 87088 URINE BACTERIA CULTURE: CPT | Performed by: FAMILY MEDICINE

## 2022-01-01 PROCEDURE — 99999 PR PBB SHADOW E&M-EST. PATIENT-LVL V: CPT | Mod: PBBFAC,,, | Performed by: FAMILY MEDICINE

## 2022-01-01 PROCEDURE — 99213 PR OFFICE/OUTPT VISIT, EST, LEVL III, 20-29 MIN: ICD-10-PCS | Mod: S$PBB,,, | Performed by: FAMILY MEDICINE

## 2022-01-01 PROCEDURE — 99203 OFFICE O/P NEW LOW 30 MIN: CPT | Mod: 25,S$PBB,, | Performed by: OTOLARYNGOLOGY

## 2022-01-01 PROCEDURE — 45380 COLONOSCOPY AND BIOPSY: CPT | Mod: 59,,, | Performed by: INTERNAL MEDICINE

## 2022-01-01 PROCEDURE — 11721 DEBRIDE NAIL 6 OR MORE: CPT | Mod: PBBFAC,PN | Performed by: PODIATRIST

## 2022-01-01 PROCEDURE — 99203 OFFICE O/P NEW LOW 30 MIN: CPT | Mod: S$GLB,,, | Performed by: PHYSICIAN ASSISTANT

## 2022-01-01 PROCEDURE — 88305 TISSUE EXAM BY PATHOLOGIST: ICD-10-PCS | Mod: 26,,, | Performed by: STUDENT IN AN ORGANIZED HEALTH CARE EDUCATION/TRAINING PROGRAM

## 2022-01-01 PROCEDURE — 37000009 HC ANESTHESIA EA ADD 15 MINS: Performed by: INTERNAL MEDICINE

## 2022-01-01 PROCEDURE — 27201089 HC SNARE, DISP (ANY): Performed by: INTERNAL MEDICINE

## 2022-01-01 PROCEDURE — 99999 PR PBB SHADOW E&M-EST. PATIENT-LVL III: ICD-10-PCS | Mod: PBBFAC,,, | Performed by: OTOLARYNGOLOGY

## 2022-01-01 PROCEDURE — 99999 PR PBB SHADOW E&M-EST. PATIENT-LVL III: CPT | Mod: PBBFAC,,, | Performed by: FAMILY MEDICINE

## 2022-01-01 PROCEDURE — 87077 CULTURE AEROBIC IDENTIFY: CPT | Performed by: FAMILY MEDICINE

## 2022-01-01 PROCEDURE — 69210 EAR CERUMEN REMOVAL: ICD-10-PCS | Mod: S$PBB,,, | Performed by: OTOLARYNGOLOGY

## 2022-01-01 PROCEDURE — 99203 PR OFFICE/OUTPT VISIT, NEW, LEVL III, 30-44 MIN: ICD-10-PCS | Mod: S$GLB,,, | Performed by: PHYSICIAN ASSISTANT

## 2022-01-01 PROCEDURE — 99213 OFFICE O/P EST LOW 20 MIN: CPT | Mod: PBBFAC,PN | Performed by: PODIATRIST

## 2022-01-01 PROCEDURE — 87086 URINE CULTURE/COLONY COUNT: CPT | Performed by: FAMILY MEDICINE

## 2022-01-01 PROCEDURE — D9220A PRA ANESTHESIA: Mod: ANES,,, | Performed by: ANESTHESIOLOGY

## 2022-01-01 PROCEDURE — 99999 PR PBB SHADOW E&M-EST. PATIENT-LVL III: CPT | Mod: PBBFAC,,, | Performed by: OTOLARYNGOLOGY

## 2022-01-01 PROCEDURE — 69210 REMOVE IMPACTED EAR WAX UNI: CPT | Mod: PBBFAC,PN | Performed by: OTOLARYNGOLOGY

## 2022-01-01 PROCEDURE — 99214 OFFICE O/P EST MOD 30 MIN: CPT | Mod: PBBFAC,PO | Performed by: INTERNAL MEDICINE

## 2022-01-01 PROCEDURE — 88305 TISSUE EXAM BY PATHOLOGIST: CPT | Performed by: STUDENT IN AN ORGANIZED HEALTH CARE EDUCATION/TRAINING PROGRAM

## 2022-01-01 PROCEDURE — 45385 COLONOSCOPY W/LESION REMOVAL: CPT | Mod: ,,, | Performed by: INTERNAL MEDICINE

## 2022-01-01 PROCEDURE — 99215 OFFICE O/P EST HI 40 MIN: CPT | Mod: PBBFAC,PO | Performed by: FAMILY MEDICINE

## 2022-01-01 PROCEDURE — 92004 PR EYE EXAM, NEW PATIENT,COMPREHESV: ICD-10-PCS | Mod: S$PBB,,, | Performed by: OPTOMETRIST

## 2022-01-01 PROCEDURE — 99999 PR PBB SHADOW E&M-EST. PATIENT-LVL III: CPT | Mod: PBBFAC,,, | Performed by: OPTOMETRIST

## 2022-01-01 PROCEDURE — 99999 PR PBB SHADOW E&M-EST. PATIENT-LVL III: CPT | Mod: PBBFAC,,, | Performed by: PODIATRIST

## 2022-01-01 PROCEDURE — 81003 URINALYSIS AUTO W/O SCOPE: CPT | Mod: PBBFAC,PO | Performed by: FAMILY MEDICINE

## 2022-01-01 PROCEDURE — 92015 PR REFRACTION: ICD-10-PCS | Mod: ,,, | Performed by: OPTOMETRIST

## 2022-01-01 PROCEDURE — 99999 PR PBB SHADOW E&M-EST. PATIENT-LVL III: ICD-10-PCS | Mod: PBBFAC,,, | Performed by: PODIATRIST

## 2022-01-01 PROCEDURE — 99999 PR PBB SHADOW E&M-EST. PATIENT-LVL V: ICD-10-PCS | Mod: PBBFAC,,, | Performed by: FAMILY MEDICINE

## 2022-01-01 PROCEDURE — 25000003 PHARM REV CODE 250: Performed by: INTERNAL MEDICINE

## 2022-01-01 PROCEDURE — 99213 OFFICE O/P EST LOW 20 MIN: CPT | Mod: PBBFAC,PO,25 | Performed by: FAMILY MEDICINE

## 2022-01-01 PROCEDURE — 11721 NAIL DEBRIDEMENT: ICD-10-PCS | Mod: S$PBB,,, | Performed by: PODIATRIST

## 2022-01-01 PROCEDURE — 99214 PR OFFICE/OUTPT VISIT, EST, LEVL IV, 30-39 MIN: ICD-10-PCS | Mod: S$PBB,,, | Performed by: FAMILY MEDICINE

## 2022-01-01 RX ORDER — DOXYCYCLINE 100 MG/1
100 CAPSULE ORAL 2 TIMES DAILY
Qty: 20 CAPSULE | Refills: 0 | Status: SHIPPED | OUTPATIENT
Start: 2022-01-01 | End: 2023-01-01 | Stop reason: CLARIF

## 2022-01-01 RX ORDER — METHYLPREDNISOLONE 4 MG/1
TABLET ORAL
Qty: 1 EACH | Refills: 0 | Status: ON HOLD | OUTPATIENT
Start: 2022-01-01 | End: 2023-01-01 | Stop reason: HOSPADM

## 2022-01-01 RX ORDER — PROPOFOL 10 MG/ML
VIAL (ML) INTRAVENOUS
Status: DISCONTINUED | OUTPATIENT
Start: 2022-01-01 | End: 2022-01-01

## 2022-01-01 RX ORDER — TRAZODONE HYDROCHLORIDE 50 MG/1
50 TABLET ORAL NIGHTLY PRN
Qty: 90 TABLET | Refills: 3 | Status: SHIPPED | OUTPATIENT
Start: 2022-01-01 | End: 2023-11-22

## 2022-01-01 RX ORDER — SODIUM CHLORIDE 9 MG/ML
INJECTION, SOLUTION INTRAVENOUS CONTINUOUS
Status: DISCONTINUED | OUTPATIENT
Start: 2022-01-01 | End: 2022-01-01 | Stop reason: HOSPADM

## 2022-01-01 RX ORDER — LIDOCAINE HYDROCHLORIDE 20 MG/ML
INJECTION INTRAVENOUS
Status: DISCONTINUED | OUTPATIENT
Start: 2022-01-01 | End: 2022-01-01

## 2022-01-01 RX ADMIN — PROPOFOL 20 MG: 10 INJECTION, EMULSION INTRAVENOUS at 12:11

## 2022-01-01 RX ADMIN — PROPOFOL 100 MG: 10 INJECTION, EMULSION INTRAVENOUS at 12:11

## 2022-01-01 RX ADMIN — LIDOCAINE HYDROCHLORIDE 100 MG: 20 INJECTION, SOLUTION INTRAVENOUS at 12:11

## 2022-01-01 RX ADMIN — SODIUM CHLORIDE: 0.9 INJECTION, SOLUTION INTRAVENOUS at 12:11

## 2022-01-04 ENCOUNTER — TELEPHONE (OUTPATIENT)
Dept: FAMILY MEDICINE | Facility: CLINIC | Age: 87
End: 2022-01-04
Payer: MEDICARE

## 2022-01-04 NOTE — TELEPHONE ENCOUNTER
----- Message from Bianca De Dios sent at 1/3/2022 11:59 AM CST -----  Type:  Hospital Follow Up    Caller is requesting a sooner appointment.     Name of Caller:  Wife (Enedina)  How soon does patient need to be seen: NA  When is the first available appointment?  April  Diagnosis: Hospital  follow up  Best Call Back Number:  [phone] or [phone]  Additional Information:  Requesting soon appointment with Dr. Maxwell ONLY

## 2022-01-14 ENCOUNTER — IMMUNIZATION (OUTPATIENT)
Dept: PRIMARY CARE CLINIC | Facility: CLINIC | Age: 87
End: 2022-01-14
Payer: MEDICARE

## 2022-01-14 DIAGNOSIS — Z23 NEED FOR VACCINATION: Primary | ICD-10-CM

## 2022-01-14 PROCEDURE — 0004A COVID-19, MRNA, LNP-S, PF, 30 MCG/0.3 ML DOSE VACCINE: CPT | Mod: PBBFAC,PN

## 2022-01-18 ENCOUNTER — DOCUMENT SCAN (OUTPATIENT)
Dept: HOME HEALTH SERVICES | Facility: HOSPITAL | Age: 87
End: 2022-01-18
Payer: MEDICARE

## 2022-01-19 ENCOUNTER — OFFICE VISIT (OUTPATIENT)
Dept: FAMILY MEDICINE | Facility: CLINIC | Age: 87
End: 2022-01-19
Payer: MEDICARE

## 2022-01-19 VITALS
HEIGHT: 67 IN | HEART RATE: 59 BPM | WEIGHT: 209.88 LBS | BODY MASS INDEX: 32.94 KG/M2 | TEMPERATURE: 98 F | RESPIRATION RATE: 20 BRPM | DIASTOLIC BLOOD PRESSURE: 60 MMHG | OXYGEN SATURATION: 95 % | SYSTOLIC BLOOD PRESSURE: 100 MMHG

## 2022-01-19 DIAGNOSIS — I35.0 AORTIC VALVE STENOSIS, ETIOLOGY OF CARDIAC VALVE DISEASE UNSPECIFIED: ICD-10-CM

## 2022-01-19 DIAGNOSIS — K81.0 ACUTE CHOLECYSTITIS: ICD-10-CM

## 2022-01-19 DIAGNOSIS — J44.9 CHRONIC OBSTRUCTIVE PULMONARY DISEASE, UNSPECIFIED COPD TYPE: ICD-10-CM

## 2022-01-19 DIAGNOSIS — A41.9 SEPSIS ASSOCIATED HYPOTENSION: Primary | ICD-10-CM

## 2022-01-19 DIAGNOSIS — E78.5 HYPERLIPIDEMIA LDL GOAL <70: ICD-10-CM

## 2022-01-19 DIAGNOSIS — E11.22 CONTROLLED TYPE 2 DIABETES MELLITUS WITH STAGE 3 CHRONIC KIDNEY DISEASE, WITHOUT LONG-TERM CURRENT USE OF INSULIN: ICD-10-CM

## 2022-01-19 DIAGNOSIS — I95.9 SEPSIS ASSOCIATED HYPOTENSION: Primary | ICD-10-CM

## 2022-01-19 DIAGNOSIS — E11.59 HYPERTENSION ASSOCIATED WITH DIABETES: ICD-10-CM

## 2022-01-19 DIAGNOSIS — N17.9 AKI (ACUTE KIDNEY INJURY): ICD-10-CM

## 2022-01-19 DIAGNOSIS — I48.91 ATRIAL FIBRILLATION, UNSPECIFIED TYPE: ICD-10-CM

## 2022-01-19 DIAGNOSIS — I15.2 HYPERTENSION ASSOCIATED WITH DIABETES: ICD-10-CM

## 2022-01-19 DIAGNOSIS — E53.8 B12 DEFICIENCY: ICD-10-CM

## 2022-01-19 DIAGNOSIS — N18.30 CONTROLLED TYPE 2 DIABETES MELLITUS WITH STAGE 3 CHRONIC KIDNEY DISEASE, WITHOUT LONG-TERM CURRENT USE OF INSULIN: ICD-10-CM

## 2022-01-19 DIAGNOSIS — N18.30 STAGE 3 CHRONIC KIDNEY DISEASE, UNSPECIFIED WHETHER STAGE 3A OR 3B CKD: ICD-10-CM

## 2022-01-19 PROCEDURE — 99213 OFFICE O/P EST LOW 20 MIN: CPT | Mod: PBBFAC,PO,25 | Performed by: FAMILY MEDICINE

## 2022-01-19 PROCEDURE — 99999 PR PBB SHADOW E&M-EST. PATIENT-LVL III: ICD-10-PCS | Mod: PBBFAC,,, | Performed by: FAMILY MEDICINE

## 2022-01-19 PROCEDURE — G0008 ADMIN INFLUENZA VIRUS VAC: HCPCS | Mod: PBBFAC,PO

## 2022-01-19 PROCEDURE — 99214 PR OFFICE/OUTPT VISIT, EST, LEVL IV, 30-39 MIN: ICD-10-PCS | Mod: S$PBB,,, | Performed by: FAMILY MEDICINE

## 2022-01-19 PROCEDURE — 99214 OFFICE O/P EST MOD 30 MIN: CPT | Mod: S$PBB,,, | Performed by: FAMILY MEDICINE

## 2022-01-19 PROCEDURE — 99999 PR PBB SHADOW E&M-EST. PATIENT-LVL III: CPT | Mod: PBBFAC,,, | Performed by: FAMILY MEDICINE

## 2022-01-19 NOTE — PROGRESS NOTES
Subjective:       Patient ID: Siddharth Urias is a 87 y.o. male.    Chief Complaint: Transitional Care    HPI  Review of Systems   Constitutional: Negative for fatigue and unexpected weight change.   Respiratory: Negative for chest tightness and shortness of breath.    Cardiovascular: Negative for chest pain, palpitations and leg swelling.   Gastrointestinal: Negative for abdominal pain.   Musculoskeletal: Negative for arthralgias.   Neurological: Negative for dizziness, syncope, light-headedness and headaches.       Patient Active Problem List   Diagnosis    Unspecified venous (peripheral) insufficiency    Hypertension associated with diabetes    Fatty liver    Irradiation cystitis    SANDRA (obstructive sleep apnea)    Hyperlipidemia LDL goal <70    Type 2 diabetes mellitus, controlled, with renal complications    Diabetic nephropathy    Chronic kidney disease, stage 3    Bilateral renal cysts    Obesity (BMI 30-39.9)    Radiation proctitis    NISH (iron deficiency anemia)    History of DVT of lower extremity    Centrilobular emphysema    COPD (chronic obstructive pulmonary disease)    Aortic calcification and ectasia    Embolism and thrombosis of artery    Bilateral carpal tunnel syndrome    Vitamin D deficiency    B12 deficiency    Carpal tunnel syndrome of left wrist    Elevated troponin    KAT (acute kidney injury)    Hypomagnesemia    Aortic stenosis    Elevated bilirubin    Aortic stenosis, severe    Atrial fibrillation     Patient is here for a hospital follow up.     Transitional Care Note    Family and/or Caretaker present at visit?  Yes.  Diagnostic tests reviewed/disposition: No diagnosic tests pending after this hospitalization.  Disease/illness education: yes  Home health/community services discussion/referrals: Patient does not have home health established from hospital visit.  They do not need home health.  If needed, we will set up home health for the patient.    Establishment or re-establishment of referral orders for community resources: No other necessary community resources.   Discussion with other health care providers: No discussion with other health care providers necessary.     Patient has a recent hospitalization which is summarized above.  Communication (direct contact by telephone or electronic mail) with the patient and/or caregiver was documented within 2 business days of discharge.    Medical decision making was based on a face-to-face visit scheduled within the indicated time frame.  Medication reconciliation and management was completed at this visit.      Had admission [facility] 11/20/2021  10 days for sepsis related to cholecystitis.    chest pain and SOB after returning home from the store and bringing several bags into the home.  He tells me that he felt well yesterday but today has been having some cough.  ED MD supplemented that the patient told him that the cough was productive of yellow/green sputum.  In the ED, patient was noted to be febrile at 102.  He was mildly hypotensive with blood pressure 80s/40s. Room air saturation was 89% but improved when placed on supplemental oxygen.  He has not been able to urinate despite almost 3L of fluid being administered as part of severe sepsis protocol.  Denies urinary symptoms and seems to believe that he urinated when he had a BM earlier and this is why he cannot produce a urine sample. When a martinez cath was placed, he had small amount of brown urine obtained.  He tells me urine is dark off and on but not recently.  He has chronic loose stool with outpatient work up seeming to suggest this is due to radiation proctitis.  He had increase in stool volume approximately 2 weeks ago but has returned to his normal stool amount since that time.  Chest pain today was transient and resolved at the time of my exam- he reports it was mid chest, non radiating. When asked, on ROS, he questions maybe a little abdominal pain  present in the epigastric region but none presently on my exam. No recent fever and chills at home. Review of records reveal ED visit 8/2021 complaining of fever and cough but he had taken tylenol prior to arrival and was afebrile.  He improved with supportive care in the ED and discharged home.  He was unable to give a urine sample and declined cath urine.      In the ED, labs significant for Cr 2.6 (baseline is 1.5ish), leukocytosis of 16, Bilirubin 5.8 (new), AST 98/ (new),  (110 9/2019), Troponin 0.068, Lactic acid 3.4.  CXR with no signs of consolidation. Covid screen is negative. Abd US:  3.4 x 2.8 x 2 cm area of heterogeneous but slightly hypoechoic tissue within the hepatic parenchyma adjacent to the gallbladder fossa is of unknown etiology and could be a focal area of hepatic steatosis. No abnormal appearing gallbladder findings reported and no ductal dilatation.   Procedure(s) (LRB):  CHOLECYSTECTOMY, LAPAROSCOPIC (N/A)     Hospital Course:   During his hospital stay patient was treated for severe sepsis due to cholangitis requiring ERCP and laparoscopic cholecystectomy.  Hospital course was complicated by KAT, hyperbilirubinemia, transaminitis, severe deconditioning and 1 episode of 5 beats of V-tach.  Patient was cleared for discharge by multiple consultants, today family decided to take him home with home health which was arranged upon discharge.  cardiology recommended anticoagulation upon discharge.  he received 9 days of IV antibiotics while in the hospital, provided prescription for 6 more days of Augmentin.  Discharged on 2.5 mg Eliquis b.i.d. as recommended by Cardiology(general surgery cleared him for Eliquis).  Home health was arranged on discharge.  patient was ambulating in the room, bowel functions returned.  He was advised to follow-up with PCP and other consultants to further optimize his outpatient regimen.  He was advised to follow-up with cardiology for severe aortic  stenosis and might qualify for TAVR.      History:   Ortho- Dr. Hawkins did CT release left 2/5/21  Right one now needing surgery     Reviewed labs 4/2021=b12 now 444    EYe Dr SUYAPA denson 2 months ago      Seen in ED 8/2020 with fever and elevated LFTs. COVID screen neg. lfts returned to normal 9/20. 1 blood culture returned with micrococcus. Advised to go back to ED but refused since was doing better. U/s abd 8/2020 The gallbladder demonstrates sludge without discrete gallstones, there is gallbladder wall thickening however there is no evidence for pericholecystic fluid, or biliary dilatation and the technologist indicates a negative sonographic Amor sign.  Close clinical and historical correlation otherwise needed.   Enlarged appearance of the liver and spleen with mild increased echogenicity of the liver that may relate to diffuse fatty infiltrate.   Abdominal aortic aneurysm now measures up to 3.9 cm compared to 3.4 cm on the prior study, suggesting interval enlargement, close clinical and historical correlation and follow-up is recommended.     Heme/onC Dr. Rodriguez Diagnosed with acute DVT due to sedentary activity 1/19 on xarelto. Recently discontinue 8/2020. Following for prostate cancer and b12 def     GI Dr. Aguiar treating for post radiation proctitis after prostate ca treated 2010, chronic diarrhea     Cardiology Dr. Sullivan     Urology Dr. Mcrae-prostate cancer     Pulm Dr. Henry treating SANDRA, COPD     Nephro Dr. Calvert CKD stage 3     Endocrine (Dr. Whitney  In remote past) type 2 DM-last a1c 6.7 8/2020 -lipids at goal 9/2020. On pravastatin, ARB and Asa.  Eye exam with Dr. Quesada 8/15/19    Podiatry Dr. Gordon -last ov 10/18        Dr. Sears PM & R left CTS   Objective:      Physical Exam  Vitals and nursing note reviewed.   Constitutional:       Appearance: He is well-developed and well-nourished.   Cardiovascular:      Rate and Rhythm: Normal rate and regular rhythm.      Heart sounds: Normal heart  sounds.   Pulmonary:      Effort: Pulmonary effort is normal.      Breath sounds: Normal breath sounds.   Musculoskeletal:         General: No edema.   Skin:     General: Skin is warm and dry.   Neurological:      Mental Status: He is alert and oriented to person, place, and time.   Psychiatric:         Mood and Affect: Mood and affect normal.         Assessment:       1. Sepsis associated hypotension    2. KAT (acute kidney injury)    3. Aortic valve stenosis, etiology of cardiac valve disease unspecified    4. Atrial fibrillation, unspecified type    5. B12 deficiency    6. Stage 3 chronic kidney disease, unspecified whether stage 3a or 3b CKD    7. Hypertension associated with diabetes    8. Hyperlipidemia LDL goal <70    9. Controlled type 2 diabetes mellitus with stage 3 chronic kidney disease, without long-term current use of insulin    10. Chronic obstructive pulmonary disease, unspecified COPD type    11. Acute cholecystitis        Plan:       1. Sepsis associated hypotension  resolved    2. KAT (acute kidney injury)  resolved    3. Aortic valve stenosis, etiology of cardiac valve disease unspecified  Cont card monitoring    4. Atrial fibrillation, unspecified type  Cont card monitoring  - TSH; Future    5. B12 deficiency  Screen and treat as indicated:    - CBC Auto Differential; Future  - Vitamin B12; Future    6. Stage 3 chronic kidney disease, unspecified whether stage 3a or 3b CKD  Stable and chronic.  Will continue to monitor q3-6 months and control chronic conditions as optimally as possible to preserve function.      7. Hypertension associated with diabetes  Controlled on current medications.  Continue current medications.      8. Hyperlipidemia LDL goal <70  Controlled on current medications.  Continue current medications.    - Comprehensive Metabolic Panel; Future    9. Controlled type 2 diabetes mellitus with stage 3 chronic kidney disease, without long-term current use of insulin  Controlled on  current medications.  Continue current medications.    - Hemoglobin A1C; Future    10. Chronic obstructive pulmonary disease, unspecified COPD type  Cont current regimen    11. Acute cholecystitis  resolved          Time spent with patient: 20 minutes    Patient with be reevaluated in 3 months or sooner prn    Greater than 50% of this visit was spent counseling as described in above documentation:Yes

## 2022-03-07 ENCOUNTER — DOCUMENT SCAN (OUTPATIENT)
Dept: HOME HEALTH SERVICES | Facility: HOSPITAL | Age: 87
End: 2022-03-07
Payer: MEDICARE

## 2022-04-22 ENCOUNTER — OFFICE VISIT (OUTPATIENT)
Dept: HEMATOLOGY/ONCOLOGY | Facility: CLINIC | Age: 87
End: 2022-04-22
Payer: MEDICARE

## 2022-04-22 ENCOUNTER — TELEPHONE (OUTPATIENT)
Dept: HEMATOLOGY/ONCOLOGY | Facility: CLINIC | Age: 87
End: 2022-04-22

## 2022-04-22 ENCOUNTER — TELEPHONE (OUTPATIENT)
Dept: CARDIOLOGY | Facility: CLINIC | Age: 87
End: 2022-04-22
Payer: MEDICARE

## 2022-04-22 VITALS
BODY MASS INDEX: 32.32 KG/M2 | HEIGHT: 67 IN | SYSTOLIC BLOOD PRESSURE: 126 MMHG | DIASTOLIC BLOOD PRESSURE: 82 MMHG | OXYGEN SATURATION: 94 % | HEART RATE: 62 BPM | WEIGHT: 205.94 LBS | TEMPERATURE: 98 F

## 2022-04-22 DIAGNOSIS — G47.33 OSA (OBSTRUCTIVE SLEEP APNEA): ICD-10-CM

## 2022-04-22 DIAGNOSIS — E11.59 HYPERTENSION ASSOCIATED WITH DIABETES: ICD-10-CM

## 2022-04-22 DIAGNOSIS — E53.8 B12 DEFICIENCY: ICD-10-CM

## 2022-04-22 DIAGNOSIS — I15.2 HYPERTENSION ASSOCIATED WITH DIABETES: ICD-10-CM

## 2022-04-22 DIAGNOSIS — J41.0 SIMPLE CHRONIC BRONCHITIS: ICD-10-CM

## 2022-04-22 DIAGNOSIS — E11.21 CONTROLLED TYPE 2 DIABETES MELLITUS WITH DIABETIC NEPHROPATHY, WITHOUT LONG-TERM CURRENT USE OF INSULIN: ICD-10-CM

## 2022-04-22 DIAGNOSIS — I48.0 PAROXYSMAL ATRIAL FIBRILLATION: ICD-10-CM

## 2022-04-22 DIAGNOSIS — E78.5 HYPERLIPIDEMIA LDL GOAL <70: ICD-10-CM

## 2022-04-22 DIAGNOSIS — I74.9 EMBOLISM AND THROMBOSIS OF ARTERY: Primary | ICD-10-CM

## 2022-04-22 DIAGNOSIS — I35.0 CRITICAL AORTIC VALVE STENOSIS: ICD-10-CM

## 2022-04-22 PROCEDURE — 99999 PR PBB SHADOW E&M-EST. PATIENT-LVL IV: ICD-10-PCS | Mod: PBBFAC,,, | Performed by: INTERNAL MEDICINE

## 2022-04-22 PROCEDURE — 99999 PR PBB SHADOW E&M-EST. PATIENT-LVL IV: CPT | Mod: PBBFAC,,, | Performed by: INTERNAL MEDICINE

## 2022-04-22 PROCEDURE — 99214 OFFICE O/P EST MOD 30 MIN: CPT | Mod: PBBFAC,PO | Performed by: INTERNAL MEDICINE

## 2022-04-22 PROCEDURE — 99214 OFFICE O/P EST MOD 30 MIN: CPT | Mod: S$PBB,,, | Performed by: INTERNAL MEDICINE

## 2022-04-22 PROCEDURE — 99214 PR OFFICE/OUTPT VISIT, EST, LEVL IV, 30-39 MIN: ICD-10-PCS | Mod: S$PBB,,, | Performed by: INTERNAL MEDICINE

## 2022-04-22 NOTE — PROGRESS NOTES
Subjective:       Patient ID: Siddharth Urias is a 88 y.o. male.    Chief Complaints:  patient with history of DVT with repeat ultrasound had   swelling went for ultrasound showed DVT patient has been on anticoagulant for 6 months repeat ultrasound negative recheck came off anticoagulant hypercoagulable work negative     mpression 2019       Positive examination demonstrating DVT within the right common femoral and proximal femoral veins.      onXarelto here  For follow-up  No prior history  No family history  of hypercoagulability      HPI:   Patient states he is quite sedentary he does not ambulate much his legs do not hurt  He has no shortness of breath discomfort fever chills or rigors at this time  He feels well attempts to ambulate more  Eager to know his hypercoagulable workup  Denies chest pain palpitations dizziness  Denies altered bowel bladder habits  Denies headaches blurring of vision passing out episodes   gall bladder removal  was hospitalized for 2 weeks with sepsis  Social History     Socioeconomic History    Marital status:    Tobacco Use    Smoking status: Former Smoker     Packs/day: 1.50     Years: 60.00     Pack years: 90.00     Types: Cigarettes     Start date:      Quit date: 2008     Years since quittin.3    Smokeless tobacco: Former User     Quit date: 3/11/2010   Substance and Sexual Activity    Alcohol use: Yes     Comment: social - at speical events    Drug use: No    Sexual activity: Not Currently     Partners: Female     Social Determinants of Health     Financial Resource Strain: High Risk    Difficulty of Paying Living Expenses: Very hard   Food Insecurity: No Food Insecurity    Worried About Running Out of Food in the Last Year: Never true    Ran Out of Food in the Last Year: Never true   Transportation Needs: No Transportation Needs    Lack of Transportation (Medical): No    Lack of Transportation (Non-Medical): No   Physical Activity:  Inactive    Days of Exercise per Week: 0 days    Minutes of Exercise per Session: 0 min   Stress: No Stress Concern Present    Feeling of Stress : Not at all   Social Connections: Socially Integrated    Frequency of Communication with Friends and Family: Twice a week    Frequency of Social Gatherings with Friends and Family: Once a week    Attends Buddhist Services: More than 4 times per year    Active Member of Clubs or Organizations: Yes    Attends Club or Organization Meetings: 1 to 4 times per year    Marital Status:    Housing Stability: Low Risk     Unable to Pay for Housing in the Last Year: No    Number of Places Lived in the Last Year: 1    Unstable Housing in the Last Year: No     Family History   Problem Relation Age of Onset    Diabetes Mother     Diabetes Brother      Past Surgical History:   Procedure Laterality Date    APPENDECTOMY      CARPAL TUNNEL RELEASE Left 2/5/2021    Procedure: RELEASE, CARPAL TUNNEL;  Surgeon: Jayro Hawkins II, MD;  Location: [facility];  Service: Orthopedics;  Laterality: Left;    CATARACT EXTRACTION Bilateral     COLONOSCOPY N/A 12/14/2018    Procedure: COLONOSCOPY;  Surgeon: Noel Aguiar MD;  Location: [facility];  Service: Endoscopy;  Laterality: N/A;    ERCP N/A 11/23/2021    Procedure: ERCP (ENDOSCOPIC RETROGRADE CHOLANGIOPANCREATOGRAPHY);  Surgeon: Marshall Gardner III, MD;  Location: [facility];  Service: Endoscopy;  Laterality: N/A;    ESOPHAGOGASTRODUODENOSCOPY N/A 1/24/2019    Procedure: EGD (ESOPHAGOGASTRODUODENOSCOPY);  Surgeon: Noel Aguiar MD;  Location: [facility];  Service: Endoscopy;  Laterality: N/A;    EYE SURGERY      FLEXIBLE SIGMOIDOSCOPY N/A 1/22/2019    Procedure: SIGMOIDOSCOPY, FLEXIBLE;  Surgeon: Noel Aguiar MD;  Location: [facility];  Service: Endoscopy;  Laterality: N/A;    HERNIA REPAIR      bilateral inguinal    LAPAROSCOPIC CHOLECYSTECTOMY N/A 11/24/2021    Procedure: CHOLECYSTECTOMY, LAPAROSCOPIC;   Surgeon: Jay Cabrera Jr., MD;  Location: [facility];  Service: General;  Laterality: N/A;    TONSILLECTOMY       Past Medical History:   Diagnosis Date    Afib     Arthritis     Benign hypertension     Diabetes mellitus, type 2     2/2011    Fatty liver     History of blood clots     2020 Dr. Rodriguez     Irradiation cystitis     SANDRA (obstructive sleep apnea)     no    Prostate cancer 2009    s/p XRT (T1C, [place] 8)    Radiation proctitis        Current Outpatient Medications:     albuterol (PROVENTIL/VENTOLIN HFA) 90 mcg/actuation inhaler, INHALE 2 PUFFS INTO THE LUNGS EVERY 6 HOURS AS NEEDED FOR WHEEZE (Patient taking differently: Inhale 2 puffs into the lungs every 4 (four) hours as needed.), Disp: 18 g, Rfl: 1    allopurinol (ZYLOPRIM) 300 MG tablet, Take 300 mg by mouth once daily., Disp: , Rfl:     amLODIPine (NORVASC) 5 MG tablet, Take 1 tablet (5 mg total) by mouth once daily., Disp: 30 tablet, Rfl: 0    aspirin 81 MG Chew, Take 81 mg by mouth once daily., Disp: , Rfl:     cyanocobalamin (VITAMIN B-12) 1000 MCG tablet, Take 100 mcg by mouth once daily., Disp: , Rfl:     fluticasone propionate (FLONASE) 50 mcg/actuation nasal spray, 1 spray (50 mcg total) by Each Nare route once daily., Disp: 1 Bottle, Rfl: 3    fluticasone-umeclidin-vilanter (TRELEGY ELLIPTA) 100-62.5-25 mcg DsDv, Inhale 1 puff into the lungs once daily., Disp: 180 each, Rfl: 3    JANUVIA 100 mg Tab, Take 1 tablet (100 mg total) by mouth once daily., Disp: 90 tablet, Rfl: 3    losartan (COZAAR) 25 MG tablet, Take 1 tablet (25 mg total) by mouth once daily., Disp: , Rfl:     metoprolol tartrate (LOPRESSOR) 25 MG tablet, Take 0.5 tablets (12.5 mg total) by mouth 2 (two) times daily., Disp: 30 tablet, Rfl: 0    pantoprazole (PROTONIX) 40 MG tablet, TAKE 1 TABLET DAILY (Patient taking differently: Take 40 mg by mouth once daily.), Disp: 90 tablet, Rfl: 3    pravastatin (PRAVACHOL) 40 MG tablet, TAKE 1 TABLET ONCE  DAILY (Patient taking differently: Take 40 mg by mouth once daily.), Disp: 90 tablet, Rfl: 3    solifenacin (VESICARE) 5 MG tablet, TAKE 1 TABLET DAILY (Patient taking differently: Take 5 mg by mouth once daily.), Disp: 90 tablet, Rfl: 3    tamsulosin (FLOMAX) 0.4 mg Cap, TAKE 1 CAPSULE ONCE DAILY, Disp: 90 capsule, Rfl: 3  Review of patient's allergies indicates:   Allergen Reactions    No known drug allergies          REVIEW OF SYSTEMS:     CONSTITUTIONAL:  Elderly gentleman The patient denies any weight change. There is no apparent    change in appetite, fever, night sweats, headaches, fatigue, dizziness, or    weakness.      SKIN: Denies rash, issues with nails, non-healing sores, bleeding, blotching    skin or abnormal bruising. Denies new moles or changes to existing moles.      BREASTS: There is no swelling around breasts or nipple discharge.    EYES: Denies eye pain, blurred vision, swelling, redness or discharge.      ENT AND MOUTH: Denies runny nose, stuffiness, sinus trouble or sores. Denies    nosebleeds. Denies, hoarseness, change in voice or swelling in front of the    neck.      CARDIOVASCULAR: Denies chest pain, discomfort or palpitations. Denies neck    swelling or episodes of passing out.      RESPIRATORY: Denies cough, sputum production, blood in sputum, and denies    shortness of breath.      GI: Denies trouble swallowing, indigestion, heartburn, abdominal pain, nausea,  or  vomiting, has significant diarrhea since radiation was completed for prostate cancer, no blood in stool, discoloration of  stools, or increased abdominal girth.      GENITOURINARY: No discharge. No pelvic pain or lumps. No rash around groin or  lesions. Has urinary frequency, occasional hesitation, no painful urination or blood in    urine. Denies incontinence. No problems with intercourse.      MUSCULOSKELETA occasional  neck or back pain. Denies weakness in arms or legs,     Denies swelling or abnormal glands.  Some  swelling of right lower extremity multiple distended veins with discoloration to feet from venous stasis    NEUROLOGICAL: Denies tingling, numbness, altered mentation changes to nerve    function in the face, weakness to one or both of the body. Denies changes to    gait and denies multiple falls or accidents.      PSYCHIATRIC: Denies nervousness, anxiety, hallucinations, depression, suicidal    ideation, trouble sleeping or changes in behavior noticed by family.          PHYSICAL EXAM:     Wt Readings from Last 3 Encounters:   11/26/21 98.2 kg (216 lb 7.9 oz)   11/21/21 98.2 kg (216 lb 7.9 oz)   10/15/21 97.2 kg (214 lb 4.6 oz)     Temp Readings from Last 3 Encounters:   12/13/21 97.7 °F (36.5 °C)   11/30/21 97 °F (36.1 °C)   10/15/21 98.1 °F (36.7 °C) (Temporal)     BP Readings from Last 3 Encounters:   12/13/21 127/86   11/30/21 (!) 127/91   10/15/21 (!) 104/59     Pulse Readings from Last 3 Encounters:   12/13/21 60   11/30/21 62   10/15/21 102     GENERAL: Comfortable looking patient.  Elderly gentleman Patient is in no distress. Morbidly obese  Awake, alert and oriented to time, person and place.  No anxiety, or agitation.      HEENT: Normal conjunctivae and eyelids. WNL.  PERRLA 3 to 4 mm. No icterus, no pallor, no congestion, and no discharge noted.     NECK:  Supple. Trachea is central.  No crepitus.  No JVD or masses.    RESPIRATORY:  No intercostal retractions.  No dullness to percussion.  Chest is clear to auscultation.  No rales, rhonchi or wheezes.  No crepitus.  Good air entry bilaterally.    CARDIOVASCULAR:  S1 and S2 are normally heard without murmurs or gallops.  All peripheral pulses are present.    ABDOMEN:  Normal abdomen.  No hepatosplenomegaly.  No free fluid.  Bowel sounds are present.  No hernia noted. No masses.  No rebound or tenderness.  No guarding or rigidity.  Umbilicus is midline.    LYMPHATICS:  No axillary, cervical, supraclavicular, submental, or inguinal  lymphadenopathy.    SKIN/MUSCULOSKELETAL:  Legs showing distended veins There is no evidence of excoriation marks or ecchmosis.  No rashes.  No cyanosis.  No clubbing.  No joint or skeletal deformities noted.  Normal range of motion.    NEUROLOGIC:  Higher functions are appropriate.  No cranial nerve deficits.  Normal coco.  Normal strength.  Motor and sensory functions are normal.  Deep tendon reflexes are normal.    GENITAL/RECTAL:  Exams are deferred.      Laboratory:     CBC:  Lab Results   Component Value Date    WBC 8.22 04/21/2022    RBC 6.17 04/21/2022    HGB 15.8 04/21/2022    HCT 53.2 04/21/2022    MCV 86 04/21/2022    MCH 25.6 (L) 04/21/2022    MCHC 29.7 (L) 04/21/2022    RDW 15.8 (H) 04/21/2022     04/21/2022    MPV 11.6 04/21/2022    GRAN 4.9 04/21/2022    GRAN 59.9 04/21/2022    LYMPH 2.1 04/21/2022    LYMPH 25.4 04/21/2022    MONO 0.9 04/21/2022    MONO 11.3 04/21/2022    EOS 0.2 04/21/2022    BASO 0.04 04/21/2022    EOSINOPHIL 2.4 04/21/2022    BASOPHIL 0.5 04/21/2022       BMP: BMP  Lab Results   Component Value Date     04/21/2022    K 5.0 04/21/2022     04/21/2022    CO2 22 (L) 04/21/2022    BUN 31 (H) 04/21/2022    CREATININE 1.8 (H) 04/21/2022    CALCIUM 9.6 04/21/2022    ANIONGAP 13 04/21/2022    ESTGFRAFRICA 38.0 (A) 04/21/2022    EGFRNONAA 32.9 (A) 04/21/2022       LFT:   Lab Results   Component Value Date    ALT 15 04/21/2022    AST 17 04/21/2022    ALKPHOS 72 04/21/2022    BILITOT 0.5 04/21/2022     Impression August 2019 ultrasound       Considerable interval decrease of the deep venous thrombosis right femoral vein compared to the prior exam with just very small amount of residual eccentric thrombus or wall thickening proximal femoral vein today.     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   Impression: 8/2020     The gallbladder demonstrates sludge without discrete gallstones,  there is gallbladder wall thickening however there is no evidence for pericholecystic fluid, or biliary dilatation and the technologist indicates a negative sonographic Amor sign.  Close clinical and historical correlation otherwise needed.     Enlarged appearance of the liver and spleen with mild increased echogenicity of the liver that may relate to diffuse fatty infiltrate.     Abdominal aortic aneurysm now measures up to 3.9 cm compared to 3.4 cm on the prior study, suggesting interval enlargement, close clinical and historical correlation and follow-up is recommended.    Impression: 8/2020     No evidence of deep venous thrombosis in either lower extremity.  Five Leiden negative prothrombin mutation negative, protein C normal, anti thrombin 3 normal, protein S normal homocystine is normal  Patient's anticardiolipin level is 13.4 marginally elevated and therefore is an inconclusive results  PSA normal  Assessment/Plan:      DVT right leg with sedentary lifestyle repeat ultrasounds a year of anticoagulant  shows DVT resolved    discontinued Xarelto patient needs to ambulate more exercises legs more I have explained to them that sedentary lifestyle could cause recurrence of DVT  Marginally elevated anticardiolipin level unclear of fits clinical relevance if patient has recurrent DVT will need to subject to lifelong anticoagulation for now our plan was to discuss this with patient and observe  However since hospital stay in 3/22 pt as been seen by DR Kamlesh Sullivan and has afib and critical arotic stenosis, needs to  Consider anticoag and must follow in his clinic   started  b12 sl 1000 mcg daily b12 level was in 250 range now improved to 508 continue B12  Recheck in 6 months CBC CMP  B12   hx of prostate ca psa being monirored psa1.1 4/22  Continue PCP for fatty liver diabetes depression chronic kidney disease follow with Urology for prostate cancer continue with obstructive sleep apnea follow-up continue weight  loss attempts  Continue management of dyslipidemia and hypertension  Managed venous stasis with elevating legs and stockings  His diabetes remains out of control because of his eating habits patient has chronic  He is on januvia diarrhea and attributes that to his diet and prostate radiation patient counseled about weight loss and diabetes management    Advance Care planning/ directives /living will/patient's wishes discussed with patient.  Patient has been given guidelines and instructions on completing these directives  COVID social distancing, face mask use, hand washing techniques and personal hygiene routine discussed with patient  Good exercise, nutrition and weight management discussed with patient  Health maintenance activities and follow-up with PCPs recommendations discussed with patient

## 2022-04-22 NOTE — TELEPHONE ENCOUNTER
Correct referral message sent to dr sullivan          ----- Message from Harsh Johnson RN sent at 4/22/2022 11:41 AM CDT -----    ----- Message -----  From: Dona Rodriguez MD  Sent: 4/22/2022  11:40 AM CDT  To: Michael Carcamo Staff- [place]    Refer to DR Sullivan, pt has critical aortic stenosis and afib  See me in 6 months with elizc mp

## 2022-04-22 NOTE — TELEPHONE ENCOUNTER
----- Message from Marylou Del Rio sent at 4/22/2022 12:17 PM CDT -----  Regarding: appt  New pt wants appt

## 2022-04-22 NOTE — TELEPHONE ENCOUNTER
----- Message from Harsh Johnson RN sent at 4/22/2022 11:41 AM CDT -----    ----- Message -----  From: Dona Rodriguez MD  Sent: 4/22/2022  11:40 AM CDT  To: Michael Carcamo Staff- [place]    Refer to DR Sullivan, pt has critical aortic stenosis and afib  See me in 6 months with elizc mp

## 2022-04-22 NOTE — TELEPHONE ENCOUNTER
Appt was made with Rgroberto for 5/10. Advised pt if sx get worse go to ER.    Problem: Nutrition/Hydration-ADULT  Goal: Nutrient/Hydration intake appropriate for improving, restoring or maintaining nutritional needs  Description  Monitor and assess patient's nutrition/hydration status for malnutrition  Collaborate with interdisciplinary team and initiate plan and interventions as ordered  Monitor patient's weight and dietary intake as ordered or per policy  Utilize nutrition screening tool and intervene as necessary  Determine patient's food preferences and provide high-protein, high-caloric foods as appropriate       INTERVENTIONS:  - Monitor oral intake, urinary output, labs, and treatment plans  - Assess nutrition and hydration status and recommend course of action  - Evaluate amount of meals eaten  - Assist patient with eating if necessary   - Allow adequate time for meals  - Recommend/ encourage appropriate diets, oral nutritional supplements, and vitamin/mineral supplements  - Order, calculate, and assess calorie counts as needed  - Recommend, monitor, and adjust tube feedings and TPN/PPN based on assessed needs  - Assess need for intravenous fluids  - Provide specific nutrition/hydration education as appropriate  - Include patient/family/caregiver in decisions related to nutrition  Outcome: Progressing

## 2022-04-22 NOTE — Clinical Note
Refer to DR Sullivan, pt has critical aortic stenosis and afib See me in 6 months with tito [place]ington mp

## 2022-05-09 ENCOUNTER — TELEPHONE (OUTPATIENT)
Dept: CARDIOLOGY | Facility: CLINIC | Age: 87
End: 2022-05-09

## 2022-05-09 ENCOUNTER — OFFICE VISIT (OUTPATIENT)
Dept: CARDIOLOGY | Facility: CLINIC | Age: 87
End: 2022-05-09
Payer: MEDICARE

## 2022-05-09 VITALS
HEART RATE: 98 BPM | OXYGEN SATURATION: 95 % | SYSTOLIC BLOOD PRESSURE: 116 MMHG | BODY MASS INDEX: 32.49 KG/M2 | DIASTOLIC BLOOD PRESSURE: 72 MMHG | WEIGHT: 207 LBS | HEIGHT: 67 IN

## 2022-05-09 DIAGNOSIS — I35.0 CRITICAL AORTIC VALVE STENOSIS: Primary | ICD-10-CM

## 2022-05-09 DIAGNOSIS — I48.0 PAROXYSMAL ATRIAL FIBRILLATION: ICD-10-CM

## 2022-05-09 DIAGNOSIS — N18.31 STAGE 3A CHRONIC KIDNEY DISEASE: ICD-10-CM

## 2022-05-09 DIAGNOSIS — G47.33 OBSTRUCTIVE SLEEP APNEA: ICD-10-CM

## 2022-05-09 DIAGNOSIS — E08.65 DIABETES MELLITUS DUE TO UNDERLYING CONDITION WITH HYPERGLYCEMIA, WITHOUT LONG-TERM CURRENT USE OF INSULIN: ICD-10-CM

## 2022-05-09 PROCEDURE — 93000 EKG 12-LEAD: ICD-10-PCS | Mod: S$GLB,,, | Performed by: INTERNAL MEDICINE

## 2022-05-09 PROCEDURE — 93000 ELECTROCARDIOGRAM COMPLETE: CPT | Mod: S$GLB,,, | Performed by: INTERNAL MEDICINE

## 2022-05-09 PROCEDURE — 99215 PR OFFICE/OUTPT VISIT, EST, LEVL V, 40-54 MIN: ICD-10-PCS | Mod: S$GLB,,, | Performed by: INTERNAL MEDICINE

## 2022-05-09 PROCEDURE — 99215 OFFICE O/P EST HI 40 MIN: CPT | Mod: S$GLB,,, | Performed by: INTERNAL MEDICINE

## 2022-05-09 NOTE — PROGRESS NOTES
Subjective:    Patient ID:  Siddharth Urias is a 88 y.o. male       Chief Complaint   Patient presents with    Atrial Fibrillation    Hyperlipidemia    Hypertension       HPI:  Mr Siddharth Urias is a 88 y.o. male is here for initial consultation.  Patient has been doing well no specific complaints.  Patient was seen by his hematologist and she was concerned in regards with his aortic stenosis.  Patient had prior history of DVT and was treated with Xarelto.  And patient was admitted to the hospital with paroxysmal atrial fibrillation and he was scheduled to restart Xarelto for some reason it was not restarted.  Patient is currently in sinus rhythm with frequent PVCs.  Patient denies any chest pain or tightness or heaviness his breathing is good denies any shortness of breath or difficulty breathing denies any dizziness or lightheadedness or loss of consciousness falls or head injury.    Review of patient's allergies indicates:   Allergen Reactions    No known drug allergies        Past Medical History:   Diagnosis Date    Afib     Arthritis     Benign hypertension     Diabetes mellitus, type 2     2/2011    Fatty liver     History of blood clots     2020 Dr. Rodriguez     Irradiation cystitis     SANDRA (obstructive sleep apnea)     no    Prostate cancer 2009    s/p XRT (T1C, [place] 8)    Radiation proctitis      Past Surgical History:   Procedure Laterality Date    APPENDECTOMY      CARPAL TUNNEL RELEASE Left 2/5/2021    Procedure: RELEASE, CARPAL TUNNEL;  Surgeon: Jayro Hawkins II, MD;  Location: [facility] OR;  Service: Orthopedics;  Laterality: Left;    CATARACT EXTRACTION Bilateral     COLONOSCOPY N/A 12/14/2018    Procedure: COLONOSCOPY;  Surgeon: Noel Aguiar MD;  Location: [facility];  Service: Endoscopy;  Laterality: N/A;    ERCP N/A 11/23/2021    Procedure: ERCP (ENDOSCOPIC RETROGRADE CHOLANGIOPANCREATOGRAPHY);  Surgeon: Marshall Gardner III, MD;  Location: [facility] ENDO;  Service:  Endoscopy;  Laterality: N/A;    ESOPHAGOGASTRODUODENOSCOPY N/A 2019    Procedure: EGD (ESOPHAGOGASTRODUODENOSCOPY);  Surgeon: Noel Aguiar MD;  Location: [facility] ENDO;  Service: Endoscopy;  Laterality: N/A;    EYE SURGERY      FLEXIBLE SIGMOIDOSCOPY N/A 2019    Procedure: SIGMOIDOSCOPY, FLEXIBLE;  Surgeon: Noel Aguiar MD;  Location: [facility] ENDO;  Service: Endoscopy;  Laterality: N/A;    HERNIA REPAIR      bilateral inguinal    LAPAROSCOPIC CHOLECYSTECTOMY N/A 2021    Procedure: CHOLECYSTECTOMY, LAPAROSCOPIC;  Surgeon: Jay Cabrera Jr., MD;  Location: [facility] OR;  Service: General;  Laterality: N/A;    TONSILLECTOMY       Social History     Tobacco Use    Smoking status: Former Smoker     Packs/day: 1.50     Years: 60.00     Pack years: 90.00     Types: Cigarettes     Start date:      Quit date: 2008     Years since quittin.3    Smokeless tobacco: Former User     Quit date: 3/11/2010   Substance Use Topics    Alcohol use: Yes     Comment: social - at speical events    Drug use: No     Family History   Problem Relation Age of Onset    Diabetes Mother     Diabetes Brother         Review of Systems:   Constitution: Negative for diaphoresis and fever.   HEENT: Negative for nosebleeds.    Cardiovascular: Negative for chest pain       No dyspnea on exertion       No leg swelling        No palpitations, history of paroxysmal atrial fibrillation  Respiratory: Negative for shortness of breath and wheezing.    Hematologic/Lymphatic: Negative for bleeding problem. Does not bruise/bleed easily.  History of did deep vein thrombosis.    Skin: Negative for color change and rash.   Musculoskeletal: Negative for falls and myalgias.   Gastrointestinal: Negative for hematemesis and hematochezia.   Genitourinary: Negative for hematuria.   Neurological: Negative for dizziness and light-headedness.   Psychiatric/Behavioral: Negative for altered mental status and memory loss.           Objective:        Vitals:    05/09/22 1021   BP: 116/72   Pulse: 98       Lab Results   Component Value Date    WBC 8.22 04/21/2022    HGB 15.8 04/21/2022    HCT 53.2 04/21/2022     04/21/2022    CHOL 149 05/12/2021    TRIG 122 05/12/2021    HDL 34 (L) 05/12/2021    ALT 15 04/21/2022    AST 17 04/21/2022     04/21/2022    K 5.0 04/21/2022     04/21/2022    CREATININE 1.8 (H) 04/21/2022    BUN 31 (H) 04/21/2022    CO2 22 (L) 04/21/2022    TSH 1.667 01/31/2022    PSA 0.99 11/22/2021    INR 2.5 (H) 04/01/2019    HGBA1C 6.3 (H) 01/31/2022        ECHOCARDIOGRAM RESULTS  Results for orders placed during the hospital encounter of 11/20/21    Echo    Interpretation Summary  · The estimated PA systolic pressure is 39 mmHg.  · The left ventricle is normal in size with concentric remodeling and normal systolic function.  · The estimated ejection fraction is 55%.  · Normal left ventricular diastolic function.  · Normal right ventricular size with normal right ventricular systolic function.  · Moderate left atrial enlargement.  · There is severe aortic valve stenosis.  · Aortic valve area is 0.68 cm2; peak velocity is 3.81 m/s; mean gradient is 39 mmHg.  · Mild aortic regurgitation.  · Mild tricuspid regurgitation.  · There is mild pulmonary hypertension.        CURRENT/PREVIOUS VISIT EKG  Results for orders placed or performed during the hospital encounter of 11/20/21   EKG 12-lead    Collection Time: 11/20/21  6:40 PM    Narrative    Test Reason : R07.9,    Vent. Rate : 120 BPM     Atrial Rate : 120 BPM     P-R Int : 288 ms          QRS Dur : 104 ms      QT Int : 300 ms       P-R-T Axes : 000 068 071 degrees     QTc Int : 424 ms    Sinus tachycardia with 1st degree A-V block  Septal infarct (cited on or before 06-SEP-2019)  Possible Inferior infarct ,age undetermined  Abnormal ECG  When compared with ECG of 20-NOV-2021 16:35,  Sinus rhythm has replaced Atrial fibrillation  QRS duration has  increased  Borderline criteria for Inferior infarct are now Present  Non-specific change in ST segment in Inferior leads  Confirmed by Angeles HOYOS, Lele BREWSTER ([id number]) on 11/22/2021 1:43:50 PM    Referred By: CUATE   SELF           Confirmed By:Lele Reynoso MD     No valid procedures specified.   No results found for this or any previous visit.      Physical Exam:  CONSTITUTIONAL: No fever, no chills  HEENT: Normocephalic, atraumatic,pupils reactive to light                 NECK:  No JVD no carotid bruit  CVS: S1S2+, RRR, ejection systolic murmurs,   LUNGS: Clear  ABDOMEN: Soft, NT, BS+  EXTREMITIES: No cyanosis, edema, varicose veins.  : No martinez catheter  NEURO: AAO X 3  PSY: Normal affect      Medication List with Changes/Refills   Current Medications    ALBUTEROL (PROVENTIL/VENTOLIN HFA) 90 MCG/ACTUATION INHALER    INHALE 2 PUFFS INTO THE LUNGS EVERY 6 HOURS AS NEEDED FOR WHEEZE    ALLOPURINOL (ZYLOPRIM) 300 MG TABLET    Take 300 mg by mouth once daily.    ASPIRIN 81 MG CHEW    Take 81 mg by mouth once daily.    CYANOCOBALAMIN (VITAMIN B-12) 1000 MCG TABLET    Take 100 mcg by mouth once daily.    FLUTICASONE-UMECLIDIN-VILANTER (TRELEGY ELLIPTA) 100-62.5-25 MCG DSDV    Inhale 1 puff into the lungs once daily.    JANUVIA 100 MG TAB    Take 1 tablet (100 mg total) by mouth once daily.    LOSARTAN (COZAAR) 25 MG TABLET    Take 1 tablet (25 mg total) by mouth once daily.    PANTOPRAZOLE (PROTONIX) 40 MG TABLET    TAKE 1 TABLET DAILY    PRAVASTATIN (PRAVACHOL) 40 MG TABLET    TAKE 1 TABLET ONCE DAILY    SOLIFENACIN (VESICARE) 5 MG TABLET    TAKE 1 TABLET DAILY    TAMSULOSIN (FLOMAX) 0.4 MG CAP    TAKE 1 CAPSULE ONCE DAILY   Discontinued Medications    AMLODIPINE (NORVASC) 5 MG TABLET    Take 1 tablet (5 mg total) by mouth once daily.    FLUTICASONE PROPIONATE (FLONASE) 50 MCG/ACTUATION NASAL SPRAY    1 spray (50 mcg total) by Each Nare route once daily.    METOPROLOL TARTRATE (LOPRESSOR) 25 MG TABLET    Take 0.5  tablets (12.5 mg total) by mouth 2 (two) times daily.             Assessment:       1. Critical aortic valve stenosis    2. Paroxysmal atrial fibrillation    3. Diabetes mellitus due to underlying condition with hyperglycemia, without long-term current use of insulin    4. Obstructive sleep apnea    5. Stage 3a chronic kidney disease         Plan:   1. Discussed with patient in regards with his aortic valve stenosis.  Discussed with both patient and his wife.  Patient has aortic valve area of 0.68 centimeter sq with a mean gradient of 39 mmHg.  2. Discussed with patient and his wife the options of TAVR versus open repair SAVR.  3. Patient would require to undergo cardiac catheterization and coronary angiography.  4. Patient has paroxysmal atrial fibrillation and currently not on Xarelto.  It was not filled or started at home.  Would restart him back on Xarelto.  5.  Reviewed his EKG independently patient is in sinus rhythm with first-degree AV block with premature ventricular complexes.  Septal infarct age undetermined.  Nonspecific ST T-wave changes.  In cannot rule out inferior infarct age undetermined.  6. Discussed with patient the benefits and options at length in detail.  Coronary Angiogram +/- PCI  AntiPlatelet therapy-  Asprin Plavix Brilinta  Access - Bilateral CFA/ Radial  Allergies : No Iodine Allergy  Pre Hydration IVF  cc /hr  Pre Procedure Medication : Benadryl 25 - 50 mg po prior to procedure X 1  No recent head trauma.  No bleeding issues or Blood in the stools or Urine.   Risks benefits and alternate options were explained to the Patient.    Risks include but not limited to bleeding, allergic reaction to the dye, vascular damage, renal failure with potential need for Dialysis, Infection, Rhythm abnormalities, stroke, myocardial infarction, emergency bypass surgery and death.    The risks is of moderate sedation include hypotension respiratory depression arrhythmias bronchospasm and  death.  Patient does understand these risks and wants to proceed with cardiac catheterization.  Should stenting be indicated, patient has agreed to dual antiplatelet therapy for 12 to 18 months with drug-eluting stent and a minimum of 1 month of dual antiplatelet therapy with the use of bare metal stent.  Additionally patient is aware of the noncompliance with medications is likely to result in the stent clotting with heart attack and heart failure and or death.  All questions have been answered to patient's satisfaction.And patient has voiced to proceed with the procedure.  Verbal consent has been obtained and patient is agreeable to proceed with the procedure.   Will keep patient nothing by mouth post midnight on the day of the procedure.  and proceed with the coronary angiography possible PCI.          Problem List Items Addressed This Visit        Cardiac/Vascular    Atrial fibrillation    Relevant Orders    IN OFFICE EKG 12-LEAD (to Muse)    Critical aortic valve stenosis - Primary    Relevant Orders    IN OFFICE EKG 12-LEAD (to Muse)       Renal/    Chronic kidney disease, stage 3       Endocrine    Diabetes mellitus due to underlying condition with hyperglycemia, without long-term current use of insulin       Other    Obstructive sleep apnea          No follow-ups on file.

## 2022-05-10 ENCOUNTER — TELEPHONE (OUTPATIENT)
Dept: CARDIOLOGY | Facility: CLINIC | Age: 87
End: 2022-05-10
Payer: MEDICARE

## 2022-05-10 DIAGNOSIS — I35.0 SEVERE AORTIC STENOSIS: Primary | ICD-10-CM

## 2022-05-10 DIAGNOSIS — R79.1 ABNORMAL COAGULATION PROFILE: ICD-10-CM

## 2022-05-10 RX ORDER — DIPHENHYDRAMINE HCL 25 MG
50 CAPSULE ORAL
Status: CANCELLED | OUTPATIENT
Start: 2022-05-10

## 2022-05-10 RX ORDER — SODIUM CHLORIDE 450 MG/100ML
INJECTION, SOLUTION INTRAVENOUS CONTINUOUS
Status: CANCELLED | OUTPATIENT
Start: 2022-05-10

## 2022-05-13 ENCOUNTER — HOSPITAL ENCOUNTER (OUTPATIENT)
Dept: PREADMISSION TESTING | Facility: HOSPITAL | Age: 87
Discharge: HOME | End: 2022-05-13
Attending: INTERNAL MEDICINE
Payer: MEDICARE

## 2022-05-13 VITALS — HEIGHT: 67 IN | WEIGHT: 211 LBS | BODY MASS INDEX: 33.12 KG/M2

## 2022-05-13 DIAGNOSIS — R79.1 ABNORMAL COAGULATION PROFILE: ICD-10-CM

## 2022-05-13 DIAGNOSIS — I35.0 SEVERE AORTIC STENOSIS: ICD-10-CM

## 2022-05-13 DIAGNOSIS — Z01.818 PRE-OP TESTING: ICD-10-CM

## 2022-05-13 LAB
ALBUMIN SERPL BCP-MCNC: 4 G/DL (ref 3.5–5.2)
ALP SERPL-CCNC: 54 U/L (ref 55–135)
ALT SERPL W/O P-5'-P-CCNC: 20 U/L (ref 10–44)
ANION GAP SERPL CALC-SCNC: 10 MMOL/L (ref 8–16)
APTT PPP: 32 SEC (ref 23.3–35.1)
AST SERPL-CCNC: 19 U/L (ref 10–40)
BASOPHILS # BLD AUTO: 0.04 K/UL (ref 0–0.2)
BASOPHILS NFR BLD: 0.5 % (ref 0–1.9)
BILIRUB SERPL-MCNC: 1 MG/DL (ref 0.1–1)
BUN SERPL-MCNC: 29 MG/DL (ref 8–23)
CALCIUM SERPL-MCNC: 8.7 MG/DL (ref 8.7–10.5)
CHLORIDE SERPL-SCNC: 106 MMOL/L (ref 95–110)
CO2 SERPL-SCNC: 23 MMOL/L (ref 23–29)
CREAT SERPL-MCNC: 1.7 MG/DL (ref 0.5–1.4)
DIFFERENTIAL METHOD: ABNORMAL
EOSINOPHIL # BLD AUTO: 0.2 K/UL (ref 0–0.5)
EOSINOPHIL NFR BLD: 1.9 % (ref 0–8)
ERYTHROCYTE [DISTWIDTH] IN BLOOD BY AUTOMATED COUNT: 17.1 % (ref 11.5–14.5)
EST. GFR  (AFRICAN AMERICAN): 40.7 ML/MIN/1.73 M^2
EST. GFR  (NON AFRICAN AMERICAN): 35.2 ML/MIN/1.73 M^2
GLUCOSE SERPL-MCNC: 117 MG/DL (ref 70–110)
HCT VFR BLD AUTO: 50.4 % (ref 40–54)
HGB BLD-MCNC: 15.9 G/DL (ref 14–18)
IMM GRANULOCYTES # BLD AUTO: 0.04 K/UL (ref 0–0.04)
IMM GRANULOCYTES NFR BLD AUTO: 0.5 % (ref 0–0.5)
INR PPP: 1.2
LYMPHOCYTES # BLD AUTO: 1.4 K/UL (ref 1–4.8)
LYMPHOCYTES NFR BLD: 17.1 % (ref 18–48)
MCH RBC QN AUTO: 25.2 PG (ref 27–31)
MCHC RBC AUTO-ENTMCNC: 31.5 G/DL (ref 32–36)
MCV RBC AUTO: 80 FL (ref 82–98)
MONOCYTES # BLD AUTO: 0.7 K/UL (ref 0.3–1)
MONOCYTES NFR BLD: 8.3 % (ref 4–15)
NEUTROPHILS # BLD AUTO: 5.9 K/UL (ref 1.8–7.7)
NEUTROPHILS NFR BLD: 71.7 % (ref 38–73)
NRBC BLD-RTO: 0 /100 WBC
PLATELET # BLD AUTO: 170 K/UL (ref 150–450)
PMV BLD AUTO: 10.4 FL (ref 9.2–12.9)
POTASSIUM SERPL-SCNC: 4.4 MMOL/L (ref 3.5–5.1)
PROT SERPL-MCNC: 8 G/DL (ref 6–8.4)
PROTHROMBIN TIME: 14.4 SEC (ref 11.4–13.7)
RBC # BLD AUTO: 6.32 M/UL (ref 4.6–6.2)
SODIUM SERPL-SCNC: 139 MMOL/L (ref 136–145)
WBC # BLD AUTO: 8.24 K/UL (ref 3.9–12.7)

## 2022-05-13 PROCEDURE — 85610 PROTHROMBIN TIME: CPT | Performed by: NURSE PRACTITIONER

## 2022-05-13 PROCEDURE — 93010 EKG 12-LEAD: ICD-10-PCS | Mod: ,,, | Performed by: INTERNAL MEDICINE

## 2022-05-13 PROCEDURE — 93010 ELECTROCARDIOGRAM REPORT: CPT | Mod: ,,, | Performed by: INTERNAL MEDICINE

## 2022-05-13 PROCEDURE — 93005 ELECTROCARDIOGRAM TRACING: CPT | Performed by: INTERNAL MEDICINE

## 2022-05-13 PROCEDURE — 85025 COMPLETE CBC W/AUTO DIFF WBC: CPT | Performed by: NURSE PRACTITIONER

## 2022-05-13 PROCEDURE — 85730 THROMBOPLASTIN TIME PARTIAL: CPT | Performed by: NURSE PRACTITIONER

## 2022-05-13 PROCEDURE — 80053 COMPREHEN METABOLIC PANEL: CPT | Performed by: NURSE PRACTITIONER

## 2022-05-13 PROCEDURE — 36415 COLL VENOUS BLD VENIPUNCTURE: CPT | Performed by: NURSE PRACTITIONER

## 2022-05-13 NOTE — DISCHARGE INSTRUCTIONS
Nothing to eat or drink after midnight the night before your procedure.  Do not take any medications the morning of your procedure  Bring all your medications with you in the original pill bottles from pharmacy.  If you take blood thinners, ask your doctor if you should stop taking them  Do your chlorhexidine wash the night before and morning of your procedure  Make arrangements for someone you know to drive you home after your procedure. Taxi and Uber are not acceptable.

## 2022-05-17 ENCOUNTER — HOSPITAL ENCOUNTER (OUTPATIENT)
Facility: HOSPITAL | Age: 87
Discharge: HOME | End: 2022-05-17
Attending: INTERNAL MEDICINE | Admitting: INTERNAL MEDICINE
Payer: MEDICARE

## 2022-05-17 VITALS
HEART RATE: 55 BPM | RESPIRATION RATE: 20 BRPM | OXYGEN SATURATION: 93 % | DIASTOLIC BLOOD PRESSURE: 62 MMHG | SYSTOLIC BLOOD PRESSURE: 128 MMHG

## 2022-05-17 DIAGNOSIS — R79.89 ELEVATED TROPONIN: ICD-10-CM

## 2022-05-17 DIAGNOSIS — I35.0 SEVERE AORTIC STENOSIS: ICD-10-CM

## 2022-05-17 DIAGNOSIS — I25.10 CAD (CORONARY ARTERY DISEASE): ICD-10-CM

## 2022-05-17 DIAGNOSIS — I35.0 NONRHEUMATIC AORTIC VALVE STENOSIS: Primary | ICD-10-CM

## 2022-05-17 PROCEDURE — C1760 CLOSURE DEV, VASC: HCPCS | Performed by: INTERNAL MEDICINE

## 2022-05-17 PROCEDURE — 93454 PR CATH PLACE/CORONARY ANGIO, IMG SUPER/INTERP: ICD-10-PCS | Mod: 26,52,, | Performed by: INTERNAL MEDICINE

## 2022-05-17 PROCEDURE — C1769 GUIDE WIRE: HCPCS | Performed by: INTERNAL MEDICINE

## 2022-05-17 PROCEDURE — 99152 MOD SED SAME PHYS/QHP 5/>YRS: CPT | Mod: ,,, | Performed by: INTERNAL MEDICINE

## 2022-05-17 PROCEDURE — 25000003 PHARM REV CODE 250: Performed by: INTERNAL MEDICINE

## 2022-05-17 PROCEDURE — 25500020 PHARM REV CODE 255: Performed by: INTERNAL MEDICINE

## 2022-05-17 PROCEDURE — 99152 MOD SED SAME PHYS/QHP 5/>YRS: CPT | Performed by: INTERNAL MEDICINE

## 2022-05-17 PROCEDURE — 99152 PR MOD CONSCIOUS SEDATION, SAME PHYS, 5+ YRS, FIRST 15 MIN: ICD-10-PCS | Mod: ,,, | Performed by: INTERNAL MEDICINE

## 2022-05-17 PROCEDURE — C1894 INTRO/SHEATH, NON-LASER: HCPCS | Performed by: INTERNAL MEDICINE

## 2022-05-17 PROCEDURE — 63600175 PHARM REV CODE 636 W HCPCS: Performed by: INTERNAL MEDICINE

## 2022-05-17 PROCEDURE — 99153 MOD SED SAME PHYS/QHP EA: CPT | Performed by: INTERNAL MEDICINE

## 2022-05-17 PROCEDURE — 93454 CORONARY ARTERY ANGIO S&I: CPT | Mod: 26,52,, | Performed by: INTERNAL MEDICINE

## 2022-05-17 PROCEDURE — C1887 CATHETER, GUIDING: HCPCS | Performed by: INTERNAL MEDICINE

## 2022-05-17 PROCEDURE — 93458 L HRT ARTERY/VENTRICLE ANGIO: CPT | Mod: 74 | Performed by: INTERNAL MEDICINE

## 2022-05-17 PROCEDURE — 25000003 PHARM REV CODE 250: Performed by: NURSE PRACTITIONER

## 2022-05-17 DEVICE — DEVICE CLOSURE VASCADE 5FR: Type: IMPLANTABLE DEVICE | Site: GROIN | Status: FUNCTIONAL

## 2022-05-17 RX ORDER — SODIUM CHLORIDE 450 MG/100ML
INJECTION, SOLUTION INTRAVENOUS CONTINUOUS
Status: DISCONTINUED | OUTPATIENT
Start: 2022-05-17 | End: 2022-05-17 | Stop reason: HOSPADM

## 2022-05-17 RX ORDER — ACETAMINOPHEN 325 MG/1
650 TABLET ORAL EVERY 4 HOURS PRN
Status: DISCONTINUED | OUTPATIENT
Start: 2022-05-17 | End: 2022-05-17 | Stop reason: HOSPADM

## 2022-05-17 RX ORDER — FENTANYL CITRATE 50 UG/ML
INJECTION, SOLUTION INTRAMUSCULAR; INTRAVENOUS
Status: DISCONTINUED | OUTPATIENT
Start: 2022-05-17 | End: 2022-05-17 | Stop reason: HOSPADM

## 2022-05-17 RX ORDER — DIPHENHYDRAMINE HCL 25 MG
50 CAPSULE ORAL
Status: DISCONTINUED | OUTPATIENT
Start: 2022-05-17 | End: 2022-05-17 | Stop reason: HOSPADM

## 2022-05-17 RX ORDER — ONDANSETRON 4 MG/1
8 TABLET, ORALLY DISINTEGRATING ORAL EVERY 8 HOURS PRN
Status: DISCONTINUED | OUTPATIENT
Start: 2022-05-17 | End: 2022-05-17 | Stop reason: HOSPADM

## 2022-05-17 RX ORDER — LIDOCAINE HYDROCHLORIDE 10 MG/ML
INJECTION, SOLUTION EPIDURAL; INFILTRATION; INTRACAUDAL; PERINEURAL
Status: DISCONTINUED | OUTPATIENT
Start: 2022-05-17 | End: 2022-05-17 | Stop reason: HOSPADM

## 2022-05-17 RX ORDER — MIDAZOLAM HYDROCHLORIDE 1 MG/ML
INJECTION INTRAMUSCULAR; INTRAVENOUS
Status: DISCONTINUED | OUTPATIENT
Start: 2022-05-17 | End: 2022-05-17 | Stop reason: HOSPADM

## 2022-05-17 RX ORDER — IODIXANOL 320 MG/ML
INJECTION, SOLUTION INTRAVASCULAR
Status: DISCONTINUED | OUTPATIENT
Start: 2022-05-17 | End: 2022-05-17 | Stop reason: HOSPADM

## 2022-05-17 RX ADMIN — DIPHENHYDRAMINE HYDROCHLORIDE 50 MG: 25 CAPSULE ORAL at 07:05

## 2022-05-17 RX ADMIN — SODIUM CHLORIDE: 0.45 INJECTION, SOLUTION INTRAVENOUS at 07:05

## 2022-05-17 NOTE — Clinical Note
The catheter was inserted into the ostial  left coronary artery. An angiography was performed of the left coronary arteries. Multiple views were taken. The angiography was performed via power injection. The injected amount was 8 mL contrast at 4 mL/s.

## 2022-05-17 NOTE — Clinical Note
7 ml of contrast were injected throughout the case. 5 mL of contrast was the total wasted during the case. 12 mL was the total amount used during the case.

## 2022-05-17 NOTE — Clinical Note
An airway assessment has been completed by md.   Patient is calling to state the pharmacy told her that they did not receive the RX     metoprolol tartrate (LOPRESSOR) 50 MG tablet     [id number] - [place], [place] - [address] - [phone]  - [phone]   [phone]    Patient call back [phone]

## 2022-05-18 ENCOUNTER — TELEPHONE (OUTPATIENT)
Dept: CARDIOLOGY | Facility: CLINIC | Age: 87
End: 2022-05-18
Payer: MEDICARE

## 2022-05-18 DIAGNOSIS — I70.0 AORTIC CALCIFICATION: Primary | ICD-10-CM

## 2022-05-18 NOTE — TELEPHONE ENCOUNTER
----- Message from Chas Harris sent at 5/18/2022 12:21 PM CDT -----  Type: Needs Medical Advice  Who Called:  Pt's wife    Best Call Back Number: [phone] or [phone] (home)     Additional Information: Sts they are still waiting on a call to let them know what they need to do next due to the procedure not working properly yesterday.  Please advise == Thank

## 2022-05-18 NOTE — TELEPHONE ENCOUNTER
Spoke with pts wife she said you gave her instruction that he was to be transferred to [facility] due to them not being able to fully perform the angiogram yesterday. there are no notes for yesterday at all all I can find is the discharge summary. Told her I would reach out to the nurse who did her discharge and im also reaching out to Yoko vela to assist.

## 2022-05-18 NOTE — TELEPHONE ENCOUNTER
Spoke with pt informed her Yoko Aguilar is putting in a referral for him to see Dr. Emanuel it will be about two weeks before they can schedule him but he is stable.

## 2022-05-31 ENCOUNTER — TELEPHONE (OUTPATIENT)
Dept: CARDIOLOGY | Facility: CLINIC | Age: 87
End: 2022-05-31
Payer: MEDICARE

## 2022-05-31 NOTE — TELEPHONE ENCOUNTER
Called patient. Pt wife was upset due to not hearing anything from the doctor at [facility] that was suppose to see  due to needing stents and CB not able to do it. Contacted Dr. Emanuel office and they said they was going to call patient and set up an appt.

## 2022-05-31 NOTE — TELEPHONE ENCOUNTER
----- Message from Linda Holloway sent at 5/31/2022  9:30 AM CDT -----  Contact: Enedina  Type: Needs Medical Advice  Who Called: Patient wife Enedina  Symptoms (please be specific):   How long has patient had these symptoms:    Pharmacy name and phone #:    Best Call Back Number: [phone]  Additional Information: Pt wife requesting a call back concerning an appt for Cardiology.

## 2022-06-02 ENCOUNTER — TELEPHONE (OUTPATIENT)
Dept: CARDIOLOGY | Facility: CLINIC | Age: 87
End: 2022-06-02
Payer: MEDICARE

## 2022-06-02 DIAGNOSIS — I70.0 AORTIC CALCIFICATION: Primary | ICD-10-CM

## 2022-06-02 DIAGNOSIS — I25.10 CORONARY ARTERY DISEASE INVOLVING NATIVE CORONARY ARTERY OF NATIVE HEART WITHOUT ANGINA PECTORIS: ICD-10-CM

## 2022-06-02 DIAGNOSIS — I73.9 PERIPHERAL VASCULAR DISEASE, UNSPECIFIED: ICD-10-CM

## 2022-06-02 NOTE — TELEPHONE ENCOUNTER
Patient daughter was called pt was sitting beside her. Pt doesn't understand a lot so daughter was talking. Advised that pt needs CTA done prior to seeing Emanuel. Pt daughter relayed message to patient. Pt said they would call [facility] and see if testing can be done.

## 2022-06-02 NOTE — TELEPHONE ENCOUNTER
----- Message from Leah Horowitz sent at 6/2/2022 12:07 PM CDT -----  Pt  daughter   Cony would like to be called back regarding  a return call to schedule a follow up     Pt can be reached at  [phone]

## 2022-06-10 DIAGNOSIS — Z01.818 PRE-OP TESTING: Primary | ICD-10-CM

## 2022-06-16 ENCOUNTER — OFFICE VISIT (OUTPATIENT)
Dept: CARDIOLOGY | Facility: CLINIC | Age: 87
End: 2022-06-16
Payer: MEDICARE

## 2022-06-16 VITALS
DIASTOLIC BLOOD PRESSURE: 60 MMHG | OXYGEN SATURATION: 95 % | HEART RATE: 47 BPM | WEIGHT: 205.5 LBS | BODY MASS INDEX: 32.25 KG/M2 | HEIGHT: 67 IN | SYSTOLIC BLOOD PRESSURE: 123 MMHG

## 2022-06-16 DIAGNOSIS — E11.59 HYPERTENSION ASSOCIATED WITH DIABETES: Primary | ICD-10-CM

## 2022-06-16 DIAGNOSIS — E11.21 CONTROLLED TYPE 2 DIABETES MELLITUS WITH DIABETIC NEPHROPATHY, WITHOUT LONG-TERM CURRENT USE OF INSULIN: ICD-10-CM

## 2022-06-16 DIAGNOSIS — I35.0 AORTIC VALVE STENOSIS, ETIOLOGY OF CARDIAC VALVE DISEASE UNSPECIFIED: ICD-10-CM

## 2022-06-16 DIAGNOSIS — I15.2 HYPERTENSION ASSOCIATED WITH DIABETES: Primary | ICD-10-CM

## 2022-06-16 DIAGNOSIS — E78.5 HYPERLIPIDEMIA LDL GOAL <70: ICD-10-CM

## 2022-06-16 PROCEDURE — 99214 OFFICE O/P EST MOD 30 MIN: CPT | Mod: S$PBB,,, | Performed by: INTERNAL MEDICINE

## 2022-06-16 PROCEDURE — 99214 PR OFFICE/OUTPT VISIT, EST, LEVL IV, 30-39 MIN: ICD-10-PCS | Mod: S$PBB,,, | Performed by: INTERNAL MEDICINE

## 2022-06-16 PROCEDURE — 99999 PR PBB SHADOW E&M-EST. PATIENT-LVL III: CPT | Mod: PBBFAC,,, | Performed by: INTERNAL MEDICINE

## 2022-06-16 PROCEDURE — 99213 OFFICE O/P EST LOW 20 MIN: CPT | Mod: PBBFAC | Performed by: INTERNAL MEDICINE

## 2022-06-16 PROCEDURE — 99999 PR PBB SHADOW E&M-EST. PATIENT-LVL III: ICD-10-PCS | Mod: PBBFAC,,, | Performed by: INTERNAL MEDICINE

## 2022-06-16 RX ORDER — HYDROCHLOROTHIAZIDE 12.5 MG/1
12.5 CAPSULE ORAL DAILY
Status: ON HOLD | COMMUNITY
End: 2023-01-01 | Stop reason: HOSPADM

## 2022-06-16 RX ORDER — POTASSIUM CHLORIDE 750 MG/1
10 CAPSULE, EXTENDED RELEASE ORAL ONCE
Status: ON HOLD | COMMUNITY
End: 2023-01-01 | Stop reason: HOSPADM

## 2022-06-16 RX ORDER — LANOLIN ALCOHOL/MO/W.PET/CERES
100 CREAM (GRAM) TOPICAL DAILY
COMMUNITY
End: 2023-01-01

## 2022-06-16 NOTE — PROGRESS NOTES
Cardiology Clinic Note  Reason for Visit: aortic valve stenosis     Referring physician: Dr Laurie MD       HPI:   88-year-old male with past history of hypertension, paroxysmal atrial fibrillation, hyperlipidemia, severe aortic valve stenosis,CKD stage 4  presents as a referral after he underwent an unsuccessful coronary angiogram.  His cardiologist tried doing an angiogram but was not able to do it due to severe calcification of  abdominal aorta, left iliac and left CFA, and his right CFA was occluded from heavy calcification.  He was also found to have with calcifications of the subclavian artery and arch of the aorta following which CTA of the thorax, abdomen, and pelvis with runoff to evaluate for the patency of the vessels was ordered.  Patient currently denies having any chest pain but complains of having shortness of breath on moderate exertion.  Denies having any presyncopal events.  He was diagnosed with DVT previously for which he was treated with xarelto  and repeat ultrasound was negative for DVT .  In november  he was found to have paroxysmal atrial fibrillation when he was admitted for sepsis from cholangitis .  He was discharged on Eliquis 2.5 mg but patient states that he is not taking his blood thinners.  He has a list of medications which does not have Eliquis in it.      ROS:      Review of Systems   Constitutional: Negative.    HENT: Negative.    Eyes: Negative.    Respiratory: Positive for shortness of breath.    Cardiovascular: Negative.    Gastrointestinal: Negative.    Genitourinary: Negative.    Musculoskeletal: Negative.    Skin: Negative.    Neurological: Negative.        PMH:     Past Medical History:   Diagnosis Date    Afib     Arthritis     Chronic kidney disease     COPD (chronic obstructive pulmonary disease)     Diabetes mellitus, type 2     2/2011    Fatty liver     History of blood clots     2020 Dr. Rodriguez     Irradiation cystitis     Prostate cancer 2009    s/p XRT  (T1C, [place] 8)    Radiation proctitis     Sleep apnea        PSH:     Past Surgical History:   Procedure Laterality Date    ANGIOGRAM, CORONARY, WITH LEFT HEART CATHETERIZATION N/A 5/17/2022    Procedure: Angiogram, Coronary, with Left Heart Cath;  Surgeon: Kamlesh Sullivan MD;  Location: [facility] CATH/EP LAB;  Service: Cardiology;  Laterality: N/A;    APPENDECTOMY      CARPAL TUNNEL RELEASE Left 2/5/2021    Procedure: RELEASE, CARPAL TUNNEL;  Surgeon: Jayro Hawkins II, MD;  Location: [facility] OR;  Service: Orthopedics;  Laterality: Left;    CATARACT EXTRACTION Bilateral     COLONOSCOPY N/A 12/14/2018    Procedure: COLONOSCOPY;  Surgeon: Noel Aguiar MD;  Location: [facility] ENDO;  Service: Endoscopy;  Laterality: N/A;    ERCP N/A 11/23/2021    Procedure: ERCP (ENDOSCOPIC RETROGRADE CHOLANGIOPANCREATOGRAPHY);  Surgeon: Marshall Gardner III, MD;  Location: [facility] ENDO;  Service: Endoscopy;  Laterality: N/A;    ESOPHAGOGASTRODUODENOSCOPY N/A 1/24/2019    Procedure: EGD (ESOPHAGOGASTRODUODENOSCOPY);  Surgeon: Noel Aguiar MD;  Location: [facility] ENDO;  Service: Endoscopy;  Laterality: N/A;    EYE SURGERY      FLEXIBLE SIGMOIDOSCOPY N/A 1/22/2019    Procedure: SIGMOIDOSCOPY, FLEXIBLE;  Surgeon: Noel Aguiar MD;  Location: [facility] ENDO;  Service: Endoscopy;  Laterality: N/A;    HERNIA REPAIR      bilateral inguinal    LAPAROSCOPIC CHOLECYSTECTOMY N/A 11/24/2021    Procedure: CHOLECYSTECTOMY, LAPAROSCOPIC;  Surgeon: Jay Cabrera Jr., MD;  Location: [facility] OR;  Service: General;  Laterality: N/A;    TONSILLECTOMY       Allergies:     Review of patient's allergies indicates:   Allergen Reactions    No known drug allergies      Medications:     Current Outpatient Medications on File Prior to Visit   Medication Sig Dispense Refill    albuterol (PROVENTIL/VENTOLIN HFA) 90 mcg/actuation inhaler INHALE 2 PUFFS INTO THE LUNGS EVERY 6 HOURS AS NEEDED FOR WHEEZE 18 g 1    allopurinol (ZYLOPRIM) 300 MG  "tablet Take 300 mg by mouth once daily.      aspirin 81 MG Chew Take 81 mg by mouth once daily.      cyanocobalamin (VITAMIN B-12) 1000 MCG tablet Take 100 mcg by mouth once daily.      fluticasone-umeclidin-vilanter (TRELEGY ELLIPTA) 100-62.5-25 mcg DsDv Inhale 1 puff into the lungs once daily. 180 each 3    hydroCHLOROthiazide (MICROZIDE) 12.5 mg capsule Take 12.5 mg by mouth once daily.      JANUVIA 100 mg Tab Take 1 tablet (100 mg total) by mouth once daily. 90 tablet 3    losartan (COZAAR) 25 MG tablet Take 1 tablet (25 mg total) by mouth once daily.      pantoprazole (PROTONIX) 40 MG tablet TAKE 1 TABLET DAILY (Patient taking differently: Take 40 mg by mouth once daily.) 90 tablet 3    potassium chloride (MICRO-K) 10 MEQ CpSR Take 10 mEq by mouth once.      pravastatin (PRAVACHOL) 40 MG tablet TAKE 1 TABLET ONCE DAILY (Patient taking differently: Take 40 mg by mouth once daily.) 90 tablet 3    solifenacin (VESICARE) 5 MG tablet TAKE 1 TABLET DAILY (Patient taking differently: Take 5 mg by mouth once daily.) 90 tablet 3    tamsulosin (FLOMAX) 0.4 mg Cap TAKE 1 CAPSULE ONCE DAILY 90 capsule 3     No current facility-administered medications on file prior to visit.     Social History:     Social History     Tobacco Use    Smoking status: Former Smoker     Packs/day: 1.50     Years: 60.00     Pack years: 90.00     Types: Cigarettes     Start date:      Quit date: 2008     Years since quittin.4    Smokeless tobacco: Never Used   Substance Use Topics    Alcohol use: Yes     Comment: social - at speical events     Family History:     Family History   Problem Relation Age of Onset    Diabetes Mother     Diabetes Brother      Physical Exam:   /60 (BP Location: Right arm, Patient Position: Sitting, BP Method: Large (Automatic))   Pulse (!) 47   Ht 5' 6.54" (1.69 m)   Wt 93.2 kg (205 lb 7.5 oz)   SpO2 95%   BMI 32.63 kg/m²      Physical Exam  Constitutional:       Appearance: " Normal appearance.   HENT:      Head: Normocephalic and atraumatic.      Nose: Nose normal.   Eyes:      Extraocular Movements: Extraocular movements intact.      Pupils: Pupils are equal, round, and reactive to light.   Cardiovascular:      Rate and Rhythm: Normal rate and regular rhythm.      Heart sounds: Murmur heard.      Comments: Unable to hear S2  Pulmonary:      Effort: Pulmonary effort is normal.      Breath sounds: Normal breath sounds.   Abdominal:      General: Abdomen is flat. Bowel sounds are normal.      Palpations: Abdomen is soft.   Musculoskeletal:         General: Normal range of motion.      Cervical back: Normal range of motion.   Skin:     General: Skin is warm.      Capillary Refill: Capillary refill takes less than 2 seconds.   Neurological:      General: No focal deficit present.      Mental Status: He is alert and oriented to person, place, and time.         Notable Labs:     Lab Results   Component Value Date     05/13/2022    K 4.4 05/13/2022     05/13/2022    CO2 23 05/13/2022    BUN 29 (H) 05/13/2022    BUN 24 (A) 03/05/2018    CREATININE 1.7 (H) 05/13/2022    ANIONGAP 10 05/13/2022     Lab Results   Component Value Date    HGBA1C 6.3 (H) 01/31/2022    HGBA1C 6.6 03/05/2018     Lab Results   Component Value Date    BNP 1,313 (H) 11/27/2021     (H) 11/26/2021     (H) 11/25/2021    Lab Results   Component Value Date    WBC 8.24 05/13/2022    HGB 15.9 05/13/2022    HCT 50.4 05/13/2022     05/13/2022    GRAN 5.9 05/13/2022    GRAN 71.7 05/13/2022     Lab Results   Component Value Date    CHOL 149 05/12/2021    HDL 34 (L) 05/12/2021    HDL 32 03/05/2018    LDLCALC 90.6 05/12/2021    LDLCALC 106 03/05/2018    TRIG 122 05/12/2021    TRIG 151 03/05/2018          Imaging:     EF   Date Value Ref Range Status   11/21/2021 55 % Final         Assessment:     1. Hypertension associated with diabetes    2. Hyperlipidemia LDL goal <70    3. Aortic valve stenosis,  etiology of cardiac valve disease unspecified    4. Controlled type 2 diabetes mellitus with diabetic nephropathy, without long-term current use of insulin        Plan:          Aortic valve stenosis, etiology of cardiac valve disease unspecified  No A2 on examination. Aortic valve area less than 1  Discussed extensively regarding the procedures that he needs to go for work up of TAVR including diagnostic left heart cath.  Patient and his wife understands the risks associated with procedures. They felt like they need more time to discuss among themself and with their son regarding  pursuing procedures adn reach out to us once they make a final decision     Hypertension associated with diabetes  Blood pressure is well controlled.  Continue hydrochlorothiazide, losartan    Hyperlipidemia LDL goal <70  Continue Pravachol      History of paroxysmal atrial fibrillation.    Currently he is not taking any medications.  Recommend to see Dr. Sullivan to restart his Eliquis    Controlled type 2 diabetes mellitus with diabetic nephropathy, without long-term current use of insulin  Continue Januvia    CKD stage 4 - Cr 1.7     The patient Siddharth Urias was seen, assessed, evaluated and examined with the presence of the attending provider Dr. Emanuel   Return to clinic in 1 months     Ryan Rehman MD  Cardiology Fellow    I have personally taken the history and examined this patient and agree with the resident's note as stated above.  The patient reports FRY and had a TTE on 11/21/22 that demonstrated a mean aortic valve gradientof 39mmHg and an SIVAN=0.68.  He was referred for diagnostic cardiac cath.  I discussed diagnostic cardiac cath as part of evaluation for possible SAVR or TAVR. After a detailed discussion the patient and his wife decided they would like to discuss thsi further with their family and follow-up once they make a decision.  All of the patient's questions were answered.

## 2022-07-07 ENCOUNTER — OFFICE VISIT (OUTPATIENT)
Dept: CARDIOLOGY | Facility: CLINIC | Age: 87
End: 2022-07-07
Payer: MEDICARE

## 2022-07-07 VITALS
SYSTOLIC BLOOD PRESSURE: 133 MMHG | DIASTOLIC BLOOD PRESSURE: 62 MMHG | OXYGEN SATURATION: 95 % | WEIGHT: 203.25 LBS | BODY MASS INDEX: 32.66 KG/M2 | HEIGHT: 66 IN | HEART RATE: 41 BPM

## 2022-07-07 DIAGNOSIS — N18.31 STAGE 3A CHRONIC KIDNEY DISEASE: ICD-10-CM

## 2022-07-07 DIAGNOSIS — E11.21 DIABETIC NEPHROPATHY ASSOCIATED WITH TYPE 2 DIABETES MELLITUS: ICD-10-CM

## 2022-07-07 DIAGNOSIS — E08.65 DIABETES MELLITUS DUE TO UNDERLYING CONDITION WITH HYPERGLYCEMIA, WITHOUT LONG-TERM CURRENT USE OF INSULIN: ICD-10-CM

## 2022-07-07 DIAGNOSIS — J41.0 SIMPLE CHRONIC BRONCHITIS: ICD-10-CM

## 2022-07-07 DIAGNOSIS — I35.0 AORTIC STENOSIS, SEVERE: ICD-10-CM

## 2022-07-07 DIAGNOSIS — E66.9 OBESITY (BMI 30-39.9): ICD-10-CM

## 2022-07-07 DIAGNOSIS — E11.21 CONTROLLED TYPE 2 DIABETES MELLITUS WITH DIABETIC NEPHROPATHY, WITHOUT LONG-TERM CURRENT USE OF INSULIN: ICD-10-CM

## 2022-07-07 DIAGNOSIS — E11.59 HYPERTENSION ASSOCIATED WITH DIABETES: ICD-10-CM

## 2022-07-07 DIAGNOSIS — I73.9 PAD (PERIPHERAL ARTERY DISEASE): ICD-10-CM

## 2022-07-07 DIAGNOSIS — I48.0 PAROXYSMAL ATRIAL FIBRILLATION: Primary | ICD-10-CM

## 2022-07-07 DIAGNOSIS — I70.0 AORTIC CALCIFICATION: ICD-10-CM

## 2022-07-07 DIAGNOSIS — I15.2 HYPERTENSION ASSOCIATED WITH DIABETES: ICD-10-CM

## 2022-07-07 PROCEDURE — 99999 PR PBB SHADOW E&M-EST. PATIENT-LVL IV: CPT | Mod: PBBFAC,,, | Performed by: INTERNAL MEDICINE

## 2022-07-07 PROCEDURE — 99215 OFFICE O/P EST HI 40 MIN: CPT | Mod: S$PBB,,, | Performed by: INTERNAL MEDICINE

## 2022-07-07 PROCEDURE — 99215 PR OFFICE/OUTPT VISIT, EST, LEVL V, 40-54 MIN: ICD-10-PCS | Mod: S$PBB,,, | Performed by: INTERNAL MEDICINE

## 2022-07-07 PROCEDURE — 99999 PR PBB SHADOW E&M-EST. PATIENT-LVL IV: ICD-10-PCS | Mod: PBBFAC,,, | Performed by: INTERNAL MEDICINE

## 2022-07-07 PROCEDURE — 99214 OFFICE O/P EST MOD 30 MIN: CPT | Mod: PBBFAC | Performed by: INTERNAL MEDICINE

## 2022-07-09 PROBLEM — I73.9 PAD (PERIPHERAL ARTERY DISEASE): Status: ACTIVE | Noted: 2022-07-09

## 2022-07-09 NOTE — PROGRESS NOTES
Subjective:    Patient ID:  Siddharth Urias is a 88 y.o. male who presents for follow-up of Valvular Heart Disease and Coronary Artery Disease    Referring physician: Dr Laurie MD    HPI  Mr. Urias is an 88-year-old male with past history of hypertension, paroxysmal atrial fibrillation, hyperlipidemia, severe aortic valve stenosis,CKD stage 4  presents as a referral for coronary angiography as part of a work-up for possible TAVR or SAVR. Coronary angiography was attempted at [facility] but aborted but was due to severe calcification and tortuosity of the abdominal aorta, left iliac and left CFA. Additionally his right CFA was occluded with heavy calcification.  He was also found to have with calcifications of the subclavian artery and arch of the aorta following which CTA of the thorax, abdomen, and pelvis with runoff to evaluate for the patency of the vessels was ordered.    Mr. Urias currently denies having any chest pain but complains of having shortness of breath on moderate exertion.  He denies LE edema, orthopnea, or PND. He denies palpitations, syncope, or near syncopal events.  He was diagnosed with DVT previously for which he was treated with xarelto  and repeat ultrasound was negative for DVT .  In november  he was found to have paroxysmal atrial fibrillation when he was admitted for sepsis from cholangitis .  He was discharged on Eliquis 2.5 mg but patient stated that he is not taking his anti-coagulant during his 6/16/22 .  He has a list of medications which does not have Eliquis in it.    The patient was unsure if he wanted to pursue coronary angiography and possible TAVR/ SAVR during his 6/16/22 clinic visit and returns today with his wife and son to readdress this.    Past Medical History:   Diagnosis Date    Afib     Arthritis     Chronic kidney disease     COPD (chronic obstructive pulmonary disease)     Diabetes mellitus, type 2     2/2011    Fatty liver     History of blood clots      2020 Dr. Rodriguez     Irradiation cystitis     Prostate cancer 2009    s/p XRT (T1C, [place] 8)    Radiation proctitis     Sleep apnea      Past Surgical History:   Procedure Laterality Date    ANGIOGRAM, CORONARY, WITH LEFT HEART CATHETERIZATION N/A 5/17/2022    Procedure: Angiogram, Coronary, with Left Heart Cath;  Surgeon: Kamlesh Sullivan MD;  Location: [facility] CATH/EP LAB;  Service: Cardiology;  Laterality: N/A;    APPENDECTOMY      CARPAL TUNNEL RELEASE Left 2/5/2021    Procedure: RELEASE, CARPAL TUNNEL;  Surgeon: Jayro Hawkins II, MD;  Location: [facility] OR;  Service: Orthopedics;  Laterality: Left;    CATARACT EXTRACTION Bilateral     COLONOSCOPY N/A 12/14/2018    Procedure: COLONOSCOPY;  Surgeon: Noel Aguiar MD;  Location: [facility] ENDO;  Service: Endoscopy;  Laterality: N/A;    ERCP N/A 11/23/2021    Procedure: ERCP (ENDOSCOPIC RETROGRADE CHOLANGIOPANCREATOGRAPHY);  Surgeon: Marshall Gardner III, MD;  Location: [facility];  Service: Endoscopy;  Laterality: N/A;    ESOPHAGOGASTRODUODENOSCOPY N/A 1/24/2019    Procedure: EGD (ESOPHAGOGASTRODUODENOSCOPY);  Surgeon: Noel Aguiar MD;  Location: [facility];  Service: Endoscopy;  Laterality: N/A;    EYE SURGERY      FLEXIBLE SIGMOIDOSCOPY N/A 1/22/2019    Procedure: SIGMOIDOSCOPY, FLEXIBLE;  Surgeon: Noel Aguiar MD;  Location: [facility] ENDO;  Service: Endoscopy;  Laterality: N/A;    HERNIA REPAIR      bilateral inguinal    LAPAROSCOPIC CHOLECYSTECTOMY N/A 11/24/2021    Procedure: CHOLECYSTECTOMY, LAPAROSCOPIC;  Surgeon: Jay Cabrera Jr., MD;  Location: [facility] OR;  Service: General;  Laterality: N/A;    TONSILLECTOMY       Current Outpatient Medications on File Prior to Visit   Medication Sig Dispense Refill    allopurinol (ZYLOPRIM) 300 MG tablet Take 300 mg by mouth once daily.      aspirin 81 MG Chew Take 81 mg by mouth once daily.      cyanocobalamin (VITAMIN B-12) 1000 MCG tablet Take 100 mcg by mouth once daily.      JANUVIA 100  mg Tab Take 1 tablet (100 mg total) by mouth once daily. 90 tablet 3    losartan (COZAAR) 25 MG tablet Take 1 tablet (25 mg total) by mouth once daily.      pantoprazole (PROTONIX) 40 MG tablet TAKE 1 TABLET DAILY (Patient taking differently: Take 40 mg by mouth once daily.) 90 tablet 3    potassium chloride (MICRO-K) 10 MEQ CpSR Take 10 mEq by mouth once.      pravastatin (PRAVACHOL) 40 MG tablet TAKE 1 TABLET ONCE DAILY (Patient taking differently: Take 40 mg by mouth once daily.) 90 tablet 3    solifenacin (VESICARE) 5 MG tablet TAKE 1 TABLET DAILY (Patient taking differently: Take 5 mg by mouth once daily.) 90 tablet 3    tamsulosin (FLOMAX) 0.4 mg Cap TAKE 1 CAPSULE ONCE DAILY 90 capsule 3    albuterol (PROVENTIL/VENTOLIN HFA) 90 mcg/actuation inhaler INHALE 2 PUFFS INTO THE LUNGS EVERY 6 HOURS AS NEEDED FOR WHEEZE (Patient not taking: Reported on 2022) 18 g 1    fluticasone-umeclidin-vilanter (TRELEGY ELLIPTA) 100-62.5-25 mcg DsDv Inhale 1 puff into the lungs once daily. (Patient not taking: Reported on 2022) 180 each 3    hydroCHLOROthiazide (MICROZIDE) 12.5 mg capsule Take 12.5 mg by mouth once daily.       No current facility-administered medications on file prior to visit.     Review of patient's allergies indicates:   Allergen Reactions    No known drug allergies      Social History     Tobacco Use    Smoking status: Former Smoker     Packs/day: 1.50     Years: 60.00     Pack years: 90.00     Types: Cigarettes     Start date:      Quit date: 2008     Years since quittin.5    Smokeless tobacco: Never Used   Substance Use Topics    Alcohol use: Yes     Alcohol/week: 1.0 standard drink     Types: 1 Glasses of wine per week     Comment: social - at speical events    Drug use: No     Family History   Problem Relation Age of Onset    Diabetes Mother     Diabetes Brother         Review of Systems   Constitutional: Positive for malaise/fatigue. Negative for decreased appetite,  "diaphoresis, fever, weight gain and weight loss.   HENT: Negative for congestion, nosebleeds and sore throat.    Eyes: Negative for blurred vision, vision loss in left eye, vision loss in right eye and visual disturbance.   Cardiovascular: Negative for chest pain, claudication, dyspnea on exertion, leg swelling, near-syncope, orthopnea, palpitations, paroxysmal nocturnal dyspnea and syncope.   Respiratory: Negative for cough, hemoptysis, shortness of breath and wheezing.    Endocrine: Negative for polyuria.   Hematologic/Lymphatic: Does not bruise/bleed easily.   Skin: Negative for nail changes and rash.   Musculoskeletal: Negative for back pain, muscle cramps and myalgias.   Gastrointestinal: Negative for abdominal pain, change in bowel habit, diarrhea, heartburn, hematemesis, hematochezia, melena, nausea and vomiting.   Genitourinary: Negative for bladder incontinence, dysuria, frequency and hematuria.   Psychiatric/Behavioral: Negative for depression.   Allergic/Immunologic: Negative for hives.        Objective:  Vitals:    07/07/22 1123 07/07/22 1126   BP: (!) 146/70 133/62   BP Location: Left arm Right arm   Patient Position: Sitting Sitting   BP Method: Large (Automatic) Large (Automatic)   Pulse: 63 (!) 41   SpO2: 95%    Weight: 92.2 kg (203 lb 4.2 oz)    Height: 5' 6" (1.676 m)          Physical Exam  Constitutional:       Appearance: Normal appearance. He is well-developed.   HENT:      Head: Normocephalic and atraumatic.   Eyes:      Pupils: Pupils are equal, round, and reactive to light.   Neck:      Thyroid: No thyromegaly.      Vascular: No JVD.   Cardiovascular:      Rate and Rhythm: Normal rate and regular rhythm.      Chest Wall: PMI is displaced.      Pulses:           Carotid pulses are 2+ on the right side and 2+ on the left side.       Radial pulses are 2+ on the right side and 2+ on the left side.        Femoral pulses are 1+ on the right side and 2+ on the left side.     Heart sounds: S1 " normal. Murmur heard.    Harsh mid to late systolic murmur is present with a grade of 5/6 at the upper right sternal border radiating to the neck. Absent A2     No friction rub. No gallop.   Pulmonary:      Effort: Pulmonary effort is normal.      Breath sounds: Normal breath sounds. No wheezing, rhonchi or rales.   Abdominal:      General: Bowel sounds are normal. There is no distension.      Palpations: Abdomen is soft.      Tenderness: There is no abdominal tenderness.   Musculoskeletal:      Cervical back: Neck supple.   Skin:     General: Skin is warm and dry.      Findings: No erythema.   Neurological:      Mental Status: He is alert and oriented to person, place, and time.      Gait: Gait normal.           Assessment:       1. Paroxysmal atrial fibrillation    2. Aortic stenosis, severe    3. Aortic calcification and ectasia    4. Simple chronic bronchitis    5. Hypertension associated with diabetes    6. Stage 3a chronic kidney disease    7. 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 Controlled type 2 diabetes mellitus with diabetic nephropathy, without long-term current use of insulin    9. Obesity (BMI 30-39.9)    10. Diabetes mellitus due to underlying condition with hyperglycemia, without long-term current use of insulin    11. PAD (peripheral artery disease)         Plan:       1) Aortic valve stenosis.  The patient has severe aortic valve stenosis by physical exam and by 11/21/2021 TTE.  I discussed the patient's physical activity level and the symptoms of aortic valve stenosis.  The patient reports that he lives a relatively sedentary life.  His activities are largely walking around his home.  He reports that he is very content with his current activity level.  I discussed the potential of his exercise tolerance increasing after aortic valve replacement.  The patient along with his wife and son watched the [facility] TAVR video in clinic.  After an extensive discussion with his  family the patient decided that he wanted to continue with medical management and that he is not interested in any invasive procedures including coronary angiography, TAVR or SAVR.  I emphasize that our clinic is available for the patient if he changes his mind in that we respect his wishes and his autonomy to make his own decision regarding treatment of his aortic valve disease.    2) CAD.  The patient has multiple risk factors for CAD.  He is asymptomatic with regard to angina  -continue enteric-coated aspirin 81 mg p.o. q.day  -continue statin    3) PAD.  The patient denies claudication or CLI symptoms;  -recommend lifestyle modification including walking program for PID  -continue statin  -continue ARB    4) CKD stage 3. Avoid nephrotoxic medications    5) dyslipidemia.  05/12/2021 lipid profile reviewed  -continue Pravachol 40 mg p.o. q.day    6) type 2 diabetes.  The patient's most recent hemoglobin A1c in epic is on 03/05/2018 and was 6.6  -agree with tight glycemic control    7) hypertension.  The patient's blood pressure is elevated in clinic today; will defer to his primary cardiologist for blood pressure medication titration; in the interim continue hydrochlorothiazide 12.5 mg p.o. q.day and losartan 25 mg p.o. q.day    8) BMI 32.8.  Recommend heart healthy diet     All the patient's and his family's questions were answered.

## 2022-07-20 ENCOUNTER — OFFICE VISIT (OUTPATIENT)
Dept: FAMILY MEDICINE | Facility: CLINIC | Age: 87
End: 2022-07-20
Payer: MEDICARE

## 2022-07-20 VITALS
SYSTOLIC BLOOD PRESSURE: 110 MMHG | HEART RATE: 60 BPM | BODY MASS INDEX: 32.78 KG/M2 | HEIGHT: 66 IN | WEIGHT: 203.94 LBS | DIASTOLIC BLOOD PRESSURE: 68 MMHG | RESPIRATION RATE: 18 BRPM | TEMPERATURE: 98 F | OXYGEN SATURATION: 95 %

## 2022-07-20 DIAGNOSIS — N18.30 STAGE 3 CHRONIC KIDNEY DISEASE, UNSPECIFIED WHETHER STAGE 3A OR 3B CKD: ICD-10-CM

## 2022-07-20 DIAGNOSIS — I48.91 ATRIAL FIBRILLATION, UNSPECIFIED TYPE: ICD-10-CM

## 2022-07-20 DIAGNOSIS — K52.9 CHRONIC DIARRHEA: ICD-10-CM

## 2022-07-20 DIAGNOSIS — E11.9 DIABETIC EYE EXAM: ICD-10-CM

## 2022-07-20 DIAGNOSIS — K62.7 RADIATION INDUCED PROCTITIS: ICD-10-CM

## 2022-07-20 DIAGNOSIS — E11.9 ENCOUNTER FOR DIABETIC FOOT EXAM: ICD-10-CM

## 2022-07-20 DIAGNOSIS — N18.30 CONTROLLED TYPE 2 DIABETES MELLITUS WITH STAGE 3 CHRONIC KIDNEY DISEASE, WITHOUT LONG-TERM CURRENT USE OF INSULIN: ICD-10-CM

## 2022-07-20 DIAGNOSIS — I15.2 HYPERTENSION ASSOCIATED WITH DIABETES: ICD-10-CM

## 2022-07-20 DIAGNOSIS — E53.8 B12 DEFICIENCY: ICD-10-CM

## 2022-07-20 DIAGNOSIS — E11.59 HYPERTENSION ASSOCIATED WITH DIABETES: ICD-10-CM

## 2022-07-20 DIAGNOSIS — K76.0 FATTY LIVER: ICD-10-CM

## 2022-07-20 DIAGNOSIS — R05.9 COUGH: ICD-10-CM

## 2022-07-20 DIAGNOSIS — D50.0 IRON DEFICIENCY ANEMIA DUE TO CHRONIC BLOOD LOSS: ICD-10-CM

## 2022-07-20 DIAGNOSIS — I35.0 AORTIC VALVE STENOSIS, ETIOLOGY OF CARDIAC VALVE DISEASE UNSPECIFIED: Primary | ICD-10-CM

## 2022-07-20 DIAGNOSIS — E78.5 HYPERLIPIDEMIA LDL GOAL <70: ICD-10-CM

## 2022-07-20 DIAGNOSIS — I73.9 PAD (PERIPHERAL ARTERY DISEASE): ICD-10-CM

## 2022-07-20 DIAGNOSIS — Z01.00 DIABETIC EYE EXAM: ICD-10-CM

## 2022-07-20 DIAGNOSIS — E55.9 VITAMIN D DEFICIENCY: ICD-10-CM

## 2022-07-20 DIAGNOSIS — E11.22 CONTROLLED TYPE 2 DIABETES MELLITUS WITH STAGE 3 CHRONIC KIDNEY DISEASE, WITHOUT LONG-TERM CURRENT USE OF INSULIN: ICD-10-CM

## 2022-07-20 DIAGNOSIS — E66.9 OBESITY (BMI 30-39.9): ICD-10-CM

## 2022-07-20 PROCEDURE — 99215 OFFICE O/P EST HI 40 MIN: CPT | Mod: PBBFAC,PO | Performed by: FAMILY MEDICINE

## 2022-07-20 PROCEDURE — 99999 PR PBB SHADOW E&M-EST. PATIENT-LVL V: CPT | Mod: PBBFAC,,, | Performed by: FAMILY MEDICINE

## 2022-07-20 PROCEDURE — 99214 PR OFFICE/OUTPT VISIT, EST, LEVL IV, 30-39 MIN: ICD-10-PCS | Mod: S$PBB,,, | Performed by: FAMILY MEDICINE

## 2022-07-20 PROCEDURE — 99999 PR PBB SHADOW E&M-EST. PATIENT-LVL V: ICD-10-PCS | Mod: PBBFAC,,, | Performed by: FAMILY MEDICINE

## 2022-07-20 PROCEDURE — 99214 OFFICE O/P EST MOD 30 MIN: CPT | Mod: S$PBB,,, | Performed by: FAMILY MEDICINE

## 2022-07-20 RX ORDER — BENZONATATE 200 MG/1
200 CAPSULE ORAL 3 TIMES DAILY PRN
Qty: 30 CAPSULE | Refills: 0 | Status: SHIPPED | OUTPATIENT
Start: 2022-07-20 | End: 2022-07-30

## 2022-07-20 NOTE — PROGRESS NOTES
Subjective:       Patient ID: Siddharth Urias is a 88 y.o. male.    Chief Complaint: Follow-up (6mth f/u )    HPI  Review of Systems   Constitutional: Negative for fatigue and unexpected weight change.   Respiratory: Negative for chest tightness and shortness of breath.    Cardiovascular: Negative for chest pain, palpitations and leg swelling.   Gastrointestinal: Positive for diarrhea. Negative for abdominal pain.   Musculoskeletal: Negative for arthralgias.   Neurological: Negative for dizziness, syncope, light-headedness and headaches.       Patient Active Problem List   Diagnosis    Unspecified venous (peripheral) insufficiency    Hypertension associated with diabetes    Fatty liver    Irradiation cystitis    Hyperlipidemia LDL goal <70    Type 2 diabetes mellitus, controlled, with renal complications    Diabetic nephropathy    Chronic kidney disease, stage 3    Bilateral renal cysts    Obesity (BMI 30-39.9)    Radiation proctitis    NISH (iron deficiency anemia)    History of DVT of lower extremity    Centrilobular emphysema    COPD (chronic obstructive pulmonary disease)    Aortic calcification and ectasia    Embolism and thrombosis of artery    Bilateral carpal tunnel syndrome    Vitamin D deficiency    B12 deficiency    Carpal tunnel syndrome of left wrist    Elevated troponin    KAT (acute kidney injury)    Hypomagnesemia    Aortic stenosis    Elevated bilirubin    Aortic stenosis, severe    Atrial fibrillation    Critical aortic valve stenosis    Diabetes mellitus due to underlying condition with hyperglycemia, without long-term current use of insulin    Obstructive sleep apnea    PAD (peripheral artery disease)     Patient is here for a chronic conditions follow up.   Has chronic diarrhea.  Has had gi testing Dr. Rodgers. May have radiation proctitis. Fecal incontinence after prostate cancer treatment. External beam radiation 2011. Now [facility]        History:    Had admission [facility] 11/20/2021  10 days for sepsis related to cholecystitis s/p lap sujatha    Ortho- Dr. Hawkins did CT release left 2/5/21  Right one now needing surgery     Reviewed labs 4/2021=b12 now 444     EYe Dr SUYAPA denson 2 months ago      Seen in ED 8/2020 with fever and elevated LFTs. COVID screen neg. lfts returned to normal 9/20. 1 blood culture returned with micrococcus. Advised to go back to ED but refused since was doing better. U/s abd 8/2020 The gallbladder demonstrates sludge without discrete gallstones, there is gallbladder wall thickening however there is no evidence for pericholecystic fluid, or biliary dilatation and the technologist indicates a negative sonographic Amor sign.  Close clinical and historical correlation otherwise needed.   Enlarged appearance of the liver and spleen with mild increased echogenicity of the liver that may relate to diffuse fatty infiltrate.   Abdominal aortic aneurysm now measures up to 3.9 cm compared to 3.4 cm on the prior study, suggesting interval enlargement, close clinical and historical correlation and follow-up is recommended.     Heme/onC Dr. Rodriguez Diagnosed with acute DVT due to sedentary activity 1/19 on xarelto. Recently discontinue 8/2020. Following for prostate cancer and b12 def     GI Dr. Aguiar treating for post radiation proctitis after prostate ca treated 2010, chronic diarrhea     Cardiology Dr. Sullivan/vianey Victor PAF, PAD and AS. Coronary angiography was attempted at [facility] but aborted but was due to severe calcification and tortuosity of the abdominal aorta, left iliac and left CFA. Additionally his right CFA was occluded with heavy calcification.  He was also found to have with calcifications of the subclavian artery and arch of the aorta following which CTA of the thorax, abdomen, and pelvis with runoff to evaluate for the patency of the vessels was ordered. After an extensive discussion with his family the patient  decided that he wanted to continue with medical management and that he is not interested in any invasive procedures including coronary angiography, TAVR or SAVR.      Urology Dr. Mcrae-prostate cancer     Pulm Dr. Henry treating SANDRA, COPD     Nephro Dr. Calvert CKD stage 3/4     Endocrine (Dr. Whitney  In remote past) type 2 DM-last a1c 6.7 8/2020 -lipids at goal 9/2020. On pravastatin, ARB and Asa.  Eye exam with Dr. Quesada 8/15/19    Podiatry Dr. Gordon -last ov 10/18        Dr. Sears PM & R left CTS  Objective:      Physical Exam  Vitals and nursing note reviewed.   Constitutional:       Appearance: He is well-developed.   Cardiovascular:      Rate and Rhythm: Normal rate and regular rhythm.      Heart sounds: Normal heart sounds.   Pulmonary:      Effort: Pulmonary effort is normal.      Breath sounds: Normal breath sounds.   Skin:     General: Skin is warm and dry.   Neurological:      Mental Status: He is alert and oriented to person, place, and time.         Assessment:       1. Aortic valve stenosis, etiology of cardiac valve disease unspecified    2. Atrial fibrillation, unspecified type    3. B12 deficiency    4. Stage 3 chronic kidney disease, unspecified whether stage 3a or 3b CKD    5. Controlled type 2 diabetes mellitus with stage 3 chronic kidney disease, without long-term current use of insulin    6. Hyperlipidemia LDL goal <70    7. Hypertension associated with diabetes    8. PAD (peripheral artery disease)    9. Obesity (BMI 30-39.9)    10. Vitamin D deficiency    11. Fatty liver    12. Iron deficiency anemia due to chronic blood loss    13. Diabetic eye exam    14. Encounter for diabetic foot exam    15. Chronic diarrhea    16. Radiation induced proctitis    17. Cough        Plan:         1. Aortic valve stenosis, etiology of cardiac valve disease unspecified  Cont card care and monitoring    2. Atrial fibrillation, unspecified type  Cont current mgmt    3. B12 deficiency  Screen and treat as  "indicated:    - CBC Auto Differential; Future  - Vitamin B12; Future    4. Stage 3 chronic kidney disease, unspecified whether stage 3a or 3b CKD  Stable and chronic.  Will continue to monitor q3-6 months and control chronic conditions as optimally as possible to preserve function.      5. Controlled type 2 diabetes mellitus with stage 3 chronic kidney disease, without long-term current use of insulin  Stable condition.  Continue current medications.  Will adjust based on lab findings or if condition changes.    - Hemoglobin A1C; Future  - Microalbumin/Creatinine Ratio, Urine; Future    6. Hyperlipidemia LDL goal <70  Stable condition.  Continue current medications.  Will adjust based on lab findings or if condition changes.    - Comprehensive Metabolic Panel; Future  - Lipid Panel; Future    7. Hypertension associated with diabetes  Controlled on current medications.  Continue current medications.      8. PAD (peripheral artery disease)  Cont to lower risk factors    9. Obesity (BMI 30-39.9)  Counseled patient on his ideal body weight, health consequences of being obese and current recommendations including weekly exercise and a heart healthy diet.  Current BMI is:Estimated body mass index is 32.91 kg/m² as calculated from the following:    Height as of this encounter: 5' 6" (1.676 m).    Weight as of this encounter: 92.5 kg (203 lb 14.8 oz)..  Patient is aware that ideal BMI < 25 or Weight in (lb) to have BMI = 25: 154.6.        10. Vitamin D deficiency  Screen and treat as indicated:    - Vitamin D; Future    11. Fatty liver  Your liver enzymes are slightly elevated.  Avoid liver irritants like tylenol and alcohol, eat a low fat diet and work toward an ideal body weight to help lower liver inflammation.       12. Iron deficiency anemia due to chronic blood loss  Screen and treat as indicated:    - Ferritin; Future  - Iron and TIBC; Future    13. Diabetic eye exam  refer  - Ambulatory referral/consult to " Optometry; Future    14. Encounter for diabetic foot exam  refer  - Ambulatory referral/consult to Podiatry; Future    15. Chronic diarrhea  refer  - Ambulatory referral/consult to Gastroenterology; Future    16. Radiation induced proctitis  Refer for consult  - Ambulatory referral/consult to Gastroenterology; Future    17. Cough  Use prn  - benzonatate (TESSALON) 200 MG capsule; Take 1 capsule (200 mg total) by mouth 3 (three) times daily as needed for Cough.  Dispense: 30 capsule; Refill: 0        Time spent with patient: 20 minutes    Patient with be reevaluated in 4 months or sooner prn    Greater than 50% of this visit was spent counseling as described in above documentation:Yes

## 2022-07-21 ENCOUNTER — TELEPHONE (OUTPATIENT)
Dept: FAMILY MEDICINE | Facility: CLINIC | Age: 87
End: 2022-07-21
Payer: MEDICARE

## 2022-07-26 ENCOUNTER — TELEPHONE (OUTPATIENT)
Dept: FAMILY MEDICINE | Facility: CLINIC | Age: 87
End: 2022-07-26
Payer: MEDICARE

## 2022-08-03 ENCOUNTER — OFFICE VISIT (OUTPATIENT)
Dept: GASTROENTEROLOGY | Facility: CLINIC | Age: 87
End: 2022-08-03
Payer: MEDICARE

## 2022-08-03 VITALS — HEIGHT: 66 IN | WEIGHT: 203.94 LBS | BODY MASS INDEX: 32.78 KG/M2

## 2022-08-03 DIAGNOSIS — K62.7 RADIATION INDUCED PROCTITIS: ICD-10-CM

## 2022-08-03 DIAGNOSIS — R15.9 INCONTINENCE OF FECES, UNSPECIFIED FECAL INCONTINENCE TYPE: ICD-10-CM

## 2022-08-03 DIAGNOSIS — Z90.49 S/P CHOLECYSTECTOMY: ICD-10-CM

## 2022-08-03 DIAGNOSIS — K52.9 CHRONIC DIARRHEA: Primary | ICD-10-CM

## 2022-08-03 PROCEDURE — 99214 PR OFFICE/OUTPT VISIT, EST, LEVL IV, 30-39 MIN: ICD-10-PCS | Mod: S$PBB,,, | Performed by: NURSE PRACTITIONER

## 2022-08-03 PROCEDURE — 99214 OFFICE O/P EST MOD 30 MIN: CPT | Mod: PBBFAC,PN | Performed by: NURSE PRACTITIONER

## 2022-08-03 PROCEDURE — 99214 OFFICE O/P EST MOD 30 MIN: CPT | Mod: S$PBB,,, | Performed by: NURSE PRACTITIONER

## 2022-08-03 PROCEDURE — 99999 PR PBB SHADOW E&M-EST. PATIENT-LVL IV: ICD-10-PCS | Mod: PBBFAC,,, | Performed by: NURSE PRACTITIONER

## 2022-08-03 PROCEDURE — 99999 PR PBB SHADOW E&M-EST. PATIENT-LVL IV: CPT | Mod: PBBFAC,,, | Performed by: NURSE PRACTITIONER

## 2022-08-03 RX ORDER — MONTELUKAST SODIUM 4 MG/1
1 TABLET, CHEWABLE ORAL 2 TIMES DAILY
Qty: 60 TABLET | Refills: 2 | Status: SHIPPED | OUTPATIENT
Start: 2022-08-03 | End: 2022-01-01

## 2022-08-03 NOTE — PATIENT INSTRUCTIONS
Uncertain Causes of Diarrhea (Adult)    Diarrhea is when stools are loose and watery. This can be caused by:  Viral infections  Bacterial infections  Food poisoning  Parasites  Irritable bowel syndrome (IBS)  Inflammatory bowel diseases such as ulcerative colitis, Crohn's disease, and celiac disease  Food intolerance, such as to lactose, the sugar found in milk and milk products  Reaction to medicines like antibiotics, laxatives, cancer drugs, and antacids  Along with diarrhea, you may also have:  Abdominal pain and cramping  Nausea and vomiting  Loss of bowel control  Fever and chills  Bloody stools  In some cases, antibiotics may help to treat diarrhea. You may have a stool sample test. This is done to see what is causing your diarrhea, and if antibiotics will help treat it. The results of a stool sample test may take up to 2 days. The healthcare provider may not give you antibiotics until he or she has the stool test results.  Diarrhea can cause dehydration. This is the loss of too much water and other fluids from the body. When this occurs, body fluid must be replaced. This can be done with oral rehydration solutions. Oral rehydration solutions are available at drugstores and grocery stores without a prescription.  Home care  Follow all instructions given by your healthcare provider. Rest at home for the next 24 hours, or until you feel better. Avoid caffeine, tobacco, and alcohol. These can make diarrhea, cramping, and pain worse.  If taking medicines:  Dont take over-the-counter diarrhea or nausea medicines unless your healthcare provider tells you to.  You may use acetaminophen or NSAID medicines like ibuprofen or naproxen to reduce pain and fever. Dont use these if you have chronic liver or kidney disease, or ever had a stomach ulcer or gastrointestinal bleeding. Don't use NSAID medicines if you are already taking one for another condition (like arthritis) or are on daily aspirin therapy (such as for heart  disease or after a stroke). Talk with your healthcare provider first.  If antibiotics were prescribed, be sure you take them until they are finished. Dont stop taking them even when you feel better. Antibiotics must be taken as a full course.  To prevent the spread of illness:  Remember that washing with soap and water and using alcohol-based  is the best way to prevent the spread of infection.  Clean the toilet after each use.  Wash your hands before eating.  Wash your hands before and after preparing food. Keep in mind that people with diarrhea or vomiting should not prepare food for others.  Wash your hands after using cutting boards, countertops, and knives that have been in contact with raw foods.  Wash and then peel fruits and vegetables.  Keep uncooked meats away from cooked and ready-to-eat foods.  Use a food thermometer when cooking. Cook poultry to at least 165°F (74°C). Cook ground meat (beef, veal, pork, lamb) to at least 160°F (71°C). Cook fresh beef, veal, lamb, and pork to at least 145°F (63°C).  Dont eat raw or undercooked eggs (poached or jacklyn side up), poultry, meat, or unpasteurized milk and juices.  Food and drinks  The main goal while treating vomiting or diarrhea is to prevent dehydration. This is done by taking small amounts of liquids often.  Keep in mind that liquids are more important than food right now.  Drink only small amounts of liquids at a time.  Dont force yourself to eat, especially if you are having cramping, vomiting, or diarrhea. Dont eat large amounts at a time, even if you are hungry.  If you eat, avoid fatty, greasy, spicy, or fried foods.  Dont eat dairy foods or drink milk if you have diarrhea. These can make diarrhea worse.  During the first 24 hours you can try:  Oral rehydration solutions. Do not use sports drinks. They have too much sugar and not enough electrolytes.  Soft drinks without caffeine  Ginger ale  Water (plain or flavored)  Decaf tea or  coffee  Clear broth, consommé, or bouillon  Gelatin, popsicles, or frozen fruit juice bars  The second 24 hours, if you are feeling better, you can add:  Hot cereal, plain toast, bread, rolls, or crackers  Plain noodles, rice, mashed potatoes, chicken noodle soup, or rice soup  Unsweetened canned fruit (no pineapple)  Bananas  As you recover:  Limit fat intake to less than 15 grams per day. Dont eat margarine, butter, oils, mayonnaise, sauces, gravies, fried foods, peanut butter, meat, poultry, or fish.  Limit fiber. Dont eat raw or cooked vegetables, fresh fruits except bananas, or bran cereals.  Limit caffeine and chocolate.  Limit dairy.  Dont use spices or seasonings except salt.  Go back to your normal diet over time, as you feel better and your symptoms improve.  If the symptoms come back, go back to a simple diet or clear liquids.  Follow-up care  Follow up with your healthcare provider, or as advised. If a stool sample was taken or cultures were done, call the healthcare provider for the results as instructed.  Call 911  Call 911 if you have any of these symptoms:  Trouble breathing  Confusion  Extreme drowsiness or trouble walking  Loss of consciousness  Rapid heart rate  Chest pain  Stiff neck  Seizure  When to seek medical advice  Call your healthcare provider right away if any of these occur:  Abdominal pain that gets worse  Constant lower right abdominal pain  Continued vomiting and inability to keep liquids down  Diarrhea more than 5 times a day  Blood in vomit or stool  Dark urine or no urine for 8 hours, dry mouth and tongue, tiredness, weakness, or dizziness  Drowsiness  New rash  You dont get better in 2 to 3 days  Fever of 100.4°F (38°C) or higher that doesnt get lower with medicine  Date Last Reviewed: 1/3/2016  © 9612-0574 [employer]. [address] [place] [address], [place] [address]. All rights reserved. This information is not intended as a substitute for professional medical care.  Always follow your healthcare professional's instructions.

## 2022-08-03 NOTE — PROGRESS NOTES
Subjective:       Patient ID: Siddharth Urias is a 88 y.o. male Body mass index is 32.91 kg/m².    Chief Complaint: Diarrhea    This patient is new to me.  Referring Provider: Dr. Martine Maxwell for chronic diarrhea & radiation induced proctitis.  Established patient of Dr. Nation (last in 2/2019).     Patient is here with his wife, whom assisted with history. She reports she is trying to get medical records for another physician that is requesting his medical records, but she is unsure what records he wants besides reporting he asked for prior diagnosis. Patient has been seen by an outside GI provider, Dr. Rodgers, since he last saw Dr. Nation. Informed patient and his wife that Dr. Nation previously diagnosed the patient with radiation proctitis and our staff gave them the phone number to medical records department to get any medical records that they need.    Diarrhea   This is a chronic problem. The current episode started more than 1 year ago (started several years ago; wife reports started ever since he had radiation treatment for prostate cancer). Episode frequency: intermittent diarrhea; last diarrhea episode was 2 days ago; 2-3 loose stools a day with fecal incontinence, bowel movements are otherwise once daily of formed stool. The problem has been unchanged. Pertinent negatives include no abdominal pain, chills, coughing, fever, vomiting or weight loss. Exacerbated by: depends on what he eats (overeating) Risk factors: s/p cholecystectomy 11/2021. Treatments tried: PAST: align probiotic, librax, levsin, questran, carafate, nexium, prilosec. The treatment provided no relief. There is no history of inflammatory bowel disease. 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   HENT: Negative for sore throat and trouble swallowing.    Respiratory: Negative for cough, choking and shortness of breath.     Cardiovascular: Negative for chest pain.   Gastrointestinal: Positive for diarrhea. Negative for abdominal pain, anal bleeding, blood in stool, constipation, nausea, rectal pain and vomiting.   Genitourinary: Negative for difficulty urinating, dysuria and flank pain.   Neurological: Negative for weakness.       Past Medical History:   Diagnosis Date    Afib     Arthritis     Choledocholithiasis     Chronic kidney disease     Colon polyp     COPD (chronic obstructive pulmonary disease)     Diabetes mellitus, type 2     2/2011    Diverticulosis     Fatty liver     Hepatomegaly     History of blood clots     2020 Dr. Rodriguez     Irradiation cystitis     Prostate cancer 2009    s/p XRT (T1C, [place] 8)    Radiation proctitis     Sleep apnea      Past Surgical History:   Procedure Laterality Date    ANGIOGRAM, CORONARY, WITH LEFT HEART CATHETERIZATION N/A 05/17/2022    Procedure: Angiogram, Coronary, with Left Heart Cath;  Surgeon: Kamlesh Sullivan MD;  Location: [facility] CATH/EP LAB;  Service: Cardiology;  Laterality: N/A;    APPENDECTOMY      CARPAL TUNNEL RELEASE Left 02/05/2021    Procedure: RELEASE, CARPAL TUNNEL;  Surgeon: Jayro Hawkins II, MD;  Location: [facility] OR;  Service: Orthopedics;  Laterality: Left;    CATARACT EXTRACTION Bilateral     COLONOSCOPY N/A 12/14/2018    Procedure: COLONOSCOPY;  Surgeon: Noel Aguiar MD;  Location: [facility];  Service: Endoscopy;  Laterality: N/A;    COLONOSCOPY  06/21/2021    Dr. Rodgers, in media: 2 colon polyps removed; diverticulosis, erythema in transverse colon; biopsy: tubular adenomas, ranomd colon unremarkable    ERCP N/A 11/23/2021    Procedure: ERCP (ENDOSCOPIC RETROGRADE CHOLANGIOPANCREATOGRAPHY);  Surgeon: Marshall Gardner III, MD;  Location: [facility];  Service: Endoscopy;  Laterality: N/A;    ESOPHAGOGASTRODUODENOSCOPY N/A 01/24/2019    Procedure: EGD (ESOPHAGOGASTRODUODENOSCOPY);  Surgeon: Noel Aguiar MD;  Location: [facility];   Service: Endoscopy;  Laterality: N/A;    EYE SURGERY      FLEXIBLE SIGMOIDOSCOPY N/A 2019    Procedure: SIGMOIDOSCOPY, FLEXIBLE;  Surgeon: Noel Aguiar MD;  Location: [facility];  Service: Endoscopy;  Laterality: N/A;    HERNIA REPAIR      bilateral inguinal    LAPAROSCOPIC CHOLECYSTECTOMY N/A 2021    Procedure: CHOLECYSTECTOMY, LAPAROSCOPIC;  Surgeon: Jay Cabrera Jr., MD;  Location: [facility];  Service: General;  Laterality: N/A;    TONSILLECTOMY       Family History   Problem Relation Age of Onset    Diabetes Mother     Diabetes Brother      Social History     Tobacco Use    Smoking status: Former Smoker     Packs/day: 1.50     Years: 60.00     Pack years: 90.00     Types: Cigarettes     Start date:      Quit date: 2008     Years since quittin.5    Smokeless tobacco: Never Used   Substance Use Topics    Alcohol use: Yes     Alcohol/week: 1.0 standard drink     Types: 1 Glasses of wine per week     Comment: social - at speical events    Drug use: No     Wt Readings from Last 10 Encounters:   22 92.5 kg (203 lb 14.8 oz)   22 92.5 kg (203 lb 14.8 oz)   22 92.2 kg (203 lb 4.2 oz)   22 93.2 kg (205 lb 7.5 oz)   22 95.7 kg (211 lb)   22 93.9 kg (207 lb)   22 93.4 kg (205 lb 14.6 oz)   21 98.2 kg (216 lb 7.9 oz)   21 98.2 kg (216 lb 7.9 oz)   10/15/21 97.2 kg (214 lb 4.6 oz)     Lab Results   Component Value Date    WBC 8.24 2022    HGB 15.9 2022    HCT 50.4 2022    MCV 80 (L) 2022     2022     CMP  Sodium   Date Value Ref Range Status   2022 139 136 - 145 mmol/L Final     Potassium   Date Value Ref Range Status   2022 4.4 3.5 - 5.1 mmol/L Final     Chloride   Date Value Ref Range Status   2022 106 95 - 110 mmol/L Final     CO2   Date Value Ref Range Status   2022 23 23 - 29 mmol/L Final     Glucose   Date Value Ref Range Status   2022 117 (H) 70 - 110  mg/dL Final     BUN   Date Value Ref Range Status   05/13/2022 29 (H) 8 - 23 mg/dL Final   03/05/2018 24 (A) 4 - 21 mg/dL Final     Creatinine   Date Value Ref Range Status   05/13/2022 1.7 (H) 0.5 - 1.4 mg/dL Final     Calcium   Date Value Ref Range Status   05/13/2022 8.7 8.7 - 10.5 mg/dL Final     Total Protein   Date Value Ref Range Status   05/13/2022 8.0 6.0 - 8.4 g/dL Final     Albumin   Date Value Ref Range Status   05/13/2022 4.0 3.5 - 5.2 g/dL Final     Total Bilirubin   Date Value Ref Range Status   05/13/2022 1.0 0.1 - 1.0 mg/dL Final     Comment:     For infants and newborns, interpretation of results should be based  on gestational age, weight and in agreement with clinical  observations.    Premature Infant recommended reference ranges:  Up to 24 hours.............<8.0 mg/dL  Up to 48 hours............<12.0 mg/dL  3-5 days..................<15.0 mg/dL  6-29 days.................<15.0 mg/dL       Alkaline Phosphatase   Date Value Ref Range Status   05/13/2022 54 (L) 55 - 135 U/L Final     AST   Date Value Ref Range Status   05/13/2022 19 10 - 40 U/L Final     ALT   Date Value Ref Range Status   05/13/2022 20 10 - 44 U/L Final     Anion Gap   Date Value Ref Range Status   05/13/2022 10 8 - 16 mmol/L Final     eGFR if    Date Value Ref Range Status   05/13/2022 40.7 (A) >60 mL/min/1.73 m^2 Final     eGFR if non    Date Value Ref Range Status   05/13/2022 35.2 (A) >60 mL/min/1.73 m^2 Final     Comment:     Calculation used to obtain the estimated glomerular filtration  rate (eGFR) is the CKD-EPI equation.        Lab Results   Component Value Date    LIPASE 28 08/08/2020     Lab Results   Component Value Date    TSH 1.667 01/31/2022     Reviewed prior medical records including radiology report of 11/21/2021 MRI MRCP; 11/20/2021 limited abdominal ultrasound; & endoscopy history (see surgical history/procedures).    Objective:      Physical Exam  Vitals and nursing note  reviewed.   Constitutional:       General: He is not in acute distress.     Appearance: Normal appearance. He is well-developed. He is not diaphoretic.   HENT:      Mouth/Throat:      Comments: Patient is wearing a face mask, which covers patient's mouth and nose, due to COVID 19 concerns.  Eyes:      General: No scleral icterus.     Conjunctiva/sclera: Conjunctivae normal.   Pulmonary:      Effort: Pulmonary effort is normal. No respiratory distress.      Breath sounds: Normal breath sounds. No wheezing.   Abdominal:      General: Bowel sounds are normal. There is no distension or abdominal bruit.      Palpations: Abdomen is soft. Abdomen is not rigid. There is no mass.      Tenderness: There is no abdominal tenderness. There is no guarding or rebound. Negative signs include Amor's sign and McBurney's sign.   Skin:     General: Skin is warm and dry.      Coloration: Skin is not pale.      Findings: No erythema or rash.      Comments: Non-jaundiced   Neurological:      Mental Status: He is alert and oriented to person, place, and time.   Psychiatric:         Behavior: Behavior normal.         Thought Content: Thought content normal.         Judgment: Judgment normal.         Assessment:       1. Chronic diarrhea    2. Radiation induced proctitis    3. S/P cholecystectomy    4. Incontinence of feces, unspecified fecal incontinence type        Plan:       Chronic diarrhea & Radiation induced proctitis  -     Pancreatic elastase, fecal; Future; Expected date: 08/03/2022  -     Stool Exam-Ova,Cysts,Parasites; Future; Expected date: 08/03/2022  -     Fecal fat, qualitative; Future; Expected date: 08/03/2022  -     Giardia / Cryptosporidum, EIA; Future; Expected date: 08/03/2022  -     pH, stool; Future; Expected date: 08/03/2022  -     Rotavirus antigen, stool; Future; Expected date: 08/03/2022  -     WBC, Stool; Future; Expected date: 08/03/2022  -     Stool culture; Future; Expected date: 08/03/2022  -      Clostridium difficile EIA; Future; Expected date: 08/03/2022  -     Adenovirus Molecular Detection, PCR, Non-Blood Stool; Future; Expected date: 08/03/2022  - START    colestipoL (COLESTID) 1 gram Tab; Take 1 tablet (1 g total) by mouth 2 (two) times daily. Take other oral medications >1h before or >4h after colestipol  Dispense: 60 tablet; Refill: 2  - Recommend follow-up with Dr. Nation for continued evaluation and management.  - Recommend follow-up with urology for continued evaluation and management of history of prostate cancer.    S/P cholecystectomy  -  START   colestipoL (COLESTID) 1 gram Tab; Take 1 tablet (1 g total) by mouth 2 (two) times daily. Take other oral medications >1h before or >4h after colestipol  Dispense: 60 tablet; Refill: 2    Incontinence of feces, unspecified fecal incontinence type  - recommended increase fiber in diet, especially soluble fiber since this can help bulk up the stool consistency and may help to slow down how fast the stool goes through the colon and can prevent diarrhea; Recommend high fiber diet (20-30 grams of fiber daily)/OTC fiber supplements daily as directed, such as Benefiber or Metamucil.    Follow up in about 1 month (around 9/3/2022), or if symptoms worsen or fail to improve, for follow-up with Dr. Nation for continued evaluation and management.      If no improvement in symptoms or symptoms worsen, call/follow-up at clinic or go to ER.        50 minutes of total time spent on the encounter, which includes face to face time and non-face to face time preparing to see the patient (e.g., review of tests), Obtaining and/or reviewing separately obtained history, Documenting clinical information in the electronic or other health record, Independently interpreting results (not separately reported) and communicating results to the patient/family/caregiver, or Care coordination (not separately reported).

## 2022-08-12 ENCOUNTER — TELEPHONE (OUTPATIENT)
Dept: GASTROENTEROLOGY | Facility: CLINIC | Age: 87
End: 2022-08-12
Payer: MEDICARE

## 2022-08-12 NOTE — TELEPHONE ENCOUNTER
Please call to inform & review the results with the patient- stool studies showed some white blood cells which can indicate inflammation and stool studies suggest acidic stool; otherwise, negative/normal results. Acidic stool can be seen in certain types of malabsorption such as lactose. Trial of dairy free or low dairy/low carb diet may be helpful.  Recommend colonoscopy to further evaluate findings and diarrhea.    Continue with previous recommendations. If no improvement in symptoms or symptoms worsen, call/follow-up at clinic or go to ER.    Thanks,

## 2022-08-18 NOTE — PROGRESS NOTES
Subjective:      Patient ID: Siddharth Urias is a 88 y.o. male.    Chief Complaint: No chief complaint on file.    Siddharth is a 88 y.o. male who presents new to the clinic for evaluation and treatment of high risk feet. He was to be accompanied by his wife for her appointment also but ill today. Previously seen by Dr. Gordon ,but it has been almost 3 yrs.ago.     Siddharth has a past medical history of Afib, Arthritis, Choledocholithiasis, Chronic kidney disease, Colon polyp, COPD (chronic obstructive pulmonary disease), Diabetes mellitus, type 2, Diverticulosis, Fatty liver, Hepatomegaly, History of blood clots, Irradiation cystitis, Prostate cancer (2009), Radiation proctitis, and Sleep apnea. Goes to [facility] if needs providers unavailable in [place].    The patient's chief complaint is long, thick toenails.  Tends to be symptomatic in shoes w/ ambulation/pressure.This patient has documented high risk feet requiring routine maintenance secondary to diabetes mellitis and those secondary complications of diabetes, as mentioned..    PCP: Martine Maxwell MD    Date Last Seen by PCP: 7/20/22    Current shoe gear:  sandal    Hemoglobin A1C   Date Value Ref Range Status   01/31/2022 6.3 (H) 4.0 - 5.6 % Final     Comment:     ADA Screening Guidelines:  5.7-6.4%  Consistent with prediabetes  >or=6.5%  Consistent with diabetes    High levels of fetal hemoglobin interfere with the HbA1C  assay. Heterozygous hemoglobin variants (HbS, HgC, etc)do  not significantly interfere with this assay.   However, presence of multiple variants may affect accuracy.     05/12/2021 7.4 (H) 4.0 - 5.6 % Final     Comment:     ADA Screening Guidelines:  5.7-6.4%  Consistent with prediabetes  >or=6.5%  Consistent with diabetes    High levels of fetal hemoglobin interfere with the HbA1C  assay. Heterozygous hemoglobin variants (HbS, HgC, etc)do  not significantly interfere with this assay.   However, presence of multiple variants may affect  accuracy.     08/11/2020 6.7 (H) 4.0 - 5.6 % Final     Comment:     ADA Screening Guidelines:  5.7-6.4%  Consistent with prediabetes  >or=6.5%  Consistent with diabetes  High levels of fetal hemoglobin interfere with the HbA1C  assay. Heterozygous hemoglobin variants (HbS, HgC, etc)do  not significantly interfere with this assay.   However, presence of multiple variants may affect accuracy.     03/05/2018 6.6 % Final      Objective:      Review of Systems   Constitutional: Negative for malaise/fatigue.   Cardiovascular:  Negative for claudication and leg swelling.   Skin:  Positive for color change, dry skin and nail changes. Negative for itching, rash, suspicious lesions and unusual hair distribution.   Gastrointestinal:  Positive for diarrhea.   Neurological:  Negative for focal weakness, loss of balance, numbness, paresthesias, sensory change and weakness.   Psychiatric/Behavioral:  The patient is not nervous/anxious.    Physical Exam  Vitals reviewed.   Constitutional:       General: He is not in acute distress.     Appearance: He is well-developed. He is obese.   Cardiovascular:      Pulses:           Dorsalis pedis pulses are 1+ on the right side and 1+ on the left side.   Musculoskeletal:         General: No swelling or tenderness.      Right lower leg: No edema.      Left lower leg: No edema.   Feet:      Right foot:      Protective Sensation: 2 sites tested.  2 sites sensed.      Skin integrity: Skin integrity normal.      Toenail Condition: Right toenails are long. Fungal disease present.     Left foot:      Protective Sensation: 2 sites tested.  2 sites sensed.      Skin integrity: Skin integrity normal.      Toenail Condition: Left toenails are long. Fungal disease present.     Comments: Toenails 1st R, 2nd,-5th  B/L are hypertrophic, thickened 2-3 mm, dystrophic, discolored, w/ crumbly subungual debris. Tender to distal nail plate pressure, without periungual skin abnormality of each. L hallux nail bed  w/ nail spicule only.     Skin:     General: Skin is dry.      Capillary Refill: Capillary refill takes 2 to 3 seconds.      Findings: No bruising, erythema, lesion or rash.   Neurological:      Mental Status: He is alert and oriented to person, place, and time.      Sensory: No sensory deficit.      Motor: No weakness.      Gait: Gait normal.   Psychiatric:         Mood and Affect: Mood and affect normal. Mood is not anxious.         Behavior: Behavior normal. Behavior is cooperative.         Assessment:      Encounter Diagnoses   Name Primary?    Encounter for diabetic foot exam     Nail dystrophy Yes    Type II diabetes mellitus with neurological manifestations     PAD (peripheral artery disease)     Pain due to onychomycosis of toenails of both feet        Problem List Items Addressed This Visit          Cardiac/Vascular    PAD (peripheral artery disease)     Other Visit Diagnoses       Nail dystrophy    -  Primary    Encounter for diabetic foot exam        Type II diabetes mellitus with neurological manifestations        Relevant Orders    NAIL DEBRIDEMENT    Pain due to onychomycosis of toenails of both feet        Relevant Orders    NAIL DEBRIDEMENT           Plan:       Diagnoses and all orders for this visit:    Nail dystrophy    Encounter for diabetic foot exam  -     Ambulatory referral/consult to Podiatry    Type II diabetes mellitus with neurological manifestations  -     NAIL DEBRIDEMENT    PAD (peripheral artery disease)    Pain due to onychomycosis of toenails of both feet  -     NAIL DEBRIDEMENT    I counseled the patient on his conditions, their implications and medical management.    - Shoe inspection. Diabetic Foot Education. Patient reminded of the importance of good nutrition and blood sugar control to help prevent podiatric complications of diabetes. Patient instructed on proper foot hygeine. We discussed wearing proper shoe gear, daily foot inspections, never walking without protective shoe  gear, annual DM foot care, sooner prn.      - With patient's permission, nails were aggressively reduced and debrided x 9 to their soft tissue attachment mechanically , removing all offending nail and debris. Patient relates relief following the procedure.

## 2022-08-26 PROBLEM — H91.90 HEARING LOSS: Status: ACTIVE | Noted: 2022-01-01

## 2022-08-26 PROBLEM — H61.22 HEARING LOSS DUE TO CERUMEN IMPACTION, LEFT: Status: ACTIVE | Noted: 2022-01-01

## 2022-08-26 PROBLEM — Z97.4 WEARS HEARING AID IN BOTH EARS: Status: ACTIVE | Noted: 2022-01-01

## 2022-08-26 PROBLEM — H83.3X3 NOISE-INDUCED HEARING LOSS OF BOTH EARS: Status: ACTIVE | Noted: 2022-01-01

## 2022-08-26 PROBLEM — H91.13 PRESBYCUSIS, BILATERAL: Status: ACTIVE | Noted: 2022-01-01

## 2022-08-26 NOTE — PROCEDURES
"Ear Cerumen Removal    Date/Time: 8/26/2022 1:40 PM  Performed by: Tboy Contreras MD  Authorized by: Toby Contreras MD     Time out: Immediately prior to procedure a "time out" was called to verify the correct patient, procedure, equipment, support staff and site/side marked as required.    Consent Done?:  Yes (Verbal)    Local anesthetic:  None  Location details:  Left ear  Procedure type: curette    Procedure type comment:  Using binocular microscopy and combination of micro instruments and suction cerumen and external auditory canal contents were cleared without trauma or complication  Cerumen  Removal Results:  Cerumen completely removed  Patient tolerance:  Patient tolerated the procedure well with no immediate complications      "

## 2022-08-26 NOTE — PROGRESS NOTES
[facility]    Subjective:      Patient: Siddharth Urias Patient PCP: Martine Maxwell MD         :  3/25/1934     Sex:  male      MRN:  [id number]          Date of Visit: 2022      Chief Complaint: Hearing Loss (Hearing loss for about 2 years. ( Lost his hearing aids.))      Patient ID: Siddharth Urias is a 88 y.o. male lifelong NON-smoker with a history of hypertension, hypercholesterolemia, aortic stenosis, atrial fibrillation, and diabetes mellitus self-referred for hearing loss.b Previously wore HA's.  No audiogram for review.  No head/neck imaging. [facility]  [facility] which [facility].  Hearing loss does impact communication.  Bigger issues with isolation etc d/t complications of XRT for prostate CA with fecal incontinence.  Seeing Dr. Barcenas for anoscopy next month.    Review of Systems   Constitutional: Negative.    HENT: Positive for hearing loss and tinnitus. Negative for ear discharge and ear pain.    Eyes: Negative.    Respiratory: Negative.    Cardiovascular: Negative.    Gastrointestinal: Positive for diarrhea (incontenence).   Endocrine: Negative.    Genitourinary: Negative.    Musculoskeletal: Negative.    Skin: Negative.    Allergic/Immunologic: Negative.    Neurological: Negative.    Hematological: Negative.    Psychiatric/Behavioral: Negative.         Past Medical History  He has a past medical history of Afib, Arthritis, Choledocholithiasis, Chronic kidney disease, Colon polyp, COPD (chronic obstructive pulmonary disease), Diabetes mellitus, type 2, Diverticulosis, Fatty liver, Hepatomegaly, History of blood clots, Irradiation cystitis, Prostate cancer, Radiation proctitis, and Sleep apnea.    Family / Surgical / Social History  His family history includes Diabetes in his brother and mother.    Past Surgical History:   Procedure Laterality Date    ANGIOGRAM, CORONARY, WITH LEFT HEART CATHETERIZATION N/A 2022     Procedure: Angiogram, Coronary, with Left Heart Cath;  Surgeon: Kamlesh Sullivan MD;  Location: [facility] CATH/EP LAB;  Service: Cardiology;  Laterality: N/A;    APPENDECTOMY      CARPAL TUNNEL RELEASE Left 2021    Procedure: RELEASE, CARPAL TUNNEL;  Surgeon: Jayro Hawkins II, MD;  Location: [facility];  Service: Orthopedics;  Laterality: Left;    CATARACT EXTRACTION Bilateral     COLONOSCOPY N/A 2018    Procedure: COLONOSCOPY;  Surgeon: Noel Aguiar MD;  Location: [facility] ENDO;  Service: Endoscopy;  Laterality: N/A;    COLONOSCOPY  2021    Dr. Rodgers, in media: 2 colon polyps removed; diverticulosis, erythema in transverse colon; biopsy: tubular adenomas, ranomd colon unremarkable    ERCP N/A 2021    Procedure: ERCP (ENDOSCOPIC RETROGRADE CHOLANGIOPANCREATOGRAPHY);  Surgeon: Marshall Gardner III, MD;  Location: [facility];  Service: Endoscopy;  Laterality: N/A;    ESOPHAGOGASTRODUODENOSCOPY N/A 2019    Procedure: EGD (ESOPHAGOGASTRODUODENOSCOPY);  Surgeon: Noel Aguiar MD;  Location: [facility];  Service: Endoscopy;  Laterality: N/A;    EYE SURGERY      FLEXIBLE SIGMOIDOSCOPY N/A 2019    Procedure: SIGMOIDOSCOPY, FLEXIBLE;  Surgeon: Noel Aguiar MD;  Location: [facility];  Service: Endoscopy;  Laterality: N/A;    HERNIA REPAIR      bilateral inguinal    LAPAROSCOPIC CHOLECYSTECTOMY N/A 2021    Procedure: CHOLECYSTECTOMY, LAPAROSCOPIC;  Surgeon: Jay Cabrera Jr., MD;  Location: [facility];  Service: General;  Laterality: N/A;    TONSILLECTOMY         Social History     Tobacco Use    Smoking status: Former Smoker     Packs/day: 1.50     Years: 60.00     Pack years: 90.00     Types: Cigarettes     Start date:      Quit date: 2008     Years since quittin.6    Smokeless tobacco: Never Used   Substance and Sexual Activity    Alcohol use: Yes     Alcohol/week: 1.0 standard drink     Types: 1 Glasses of wine per week     Comment: social -  at speical events    Drug use: No    Sexual activity: Not Currently     Partners: Female       Medications  He has a current medication list which includes the following prescription(s): albuterol, allopurinol, aspirin, colestipol, cyanocobalamin, fluticasone-umeclidin-vilanter, hydrochlorothiazide, januvia, losartan, pantoprazole, potassium chloride, pravastatin, solifenacin, and tamsulosin.      Allergies  Review of patient's allergies indicates:   Allergen Reactions    No known drug allergies        All medications, allergies, and past history have been reviewed.    Objective:      Vitals:  Vitals - 1 value per visit 8/18/2022 8/26/2022 8/26/2022   SYSTOLIC 148 - -   DIASTOLIC 84 - -   Pulse 71 - -   Temp - - 98.4   Resp - - -   SPO2 - - -   Weight (lb) 201.6 - 203.37   Weight (kg) 91.445 - 92.25   Height 68 - 68   BMI (Calculated) 30.7 - 30.9   VISIT REPORT - - -   Pain Score  - 0 -   Some recent data might be hidden       Body surface area is 2.1 meters squared.    Physical Exam:    GENERAL  APPEARANCE -  alert, appears stated age and cooperative  BARRIER(S) TO COMMUNICATION -  none VOICE - appropriate for age and gender    INTEGUMENTARY  no suspicious head and neck lesions    HEENT  HEAD: Normocephalic, without obvious abnormality, atraumatic  FACE: INSPECTION - Symmetric, no signs of trauma, no suspicious lesion(s)    EYES  Normal occular alignment and mobility with no visible nystagmus at rest    EARS/NOSE/MOUTH/THROAT  EARS  PINNAE AND EXTERNAL EARS - no suspicious lesion OTOSCOPIC EXAM (surgical microscopy was used for visualization/instrumentation): EAR EXAM - wax impaction on left cleared with micro and alligators w/o trauma to normal EAC, TM and ME's bilaterally  HEARING - functional with firm speech     NOSE AND SINUSES  EXTERNAL NOSE - Grossly normal for age/sex    CHEST AND LUNG   INSPECTION & AUSCULTATION - normal effort, no stridor    CARDIOVASCULAR  AUSCULTATION & PERIPHERAL VASCULAR - regular  rate and rhythm.    NEUROLOGIC  MENTAL STATUS - alert, interactive CRANIAL NERVES - normal    LYMPHATIC  HEAD AND NECK - non-palpable      Procedure(s):  Cerumen removal performed.  See procedure note.    Labs:  WBC   Date Value Ref Range Status   05/13/2022 8.24 3.90 - 12.70 K/uL Final     Hemoglobin   Date Value Ref Range Status   05/13/2022 15.9 14.0 - 18.0 g/dL Final     Platelets   Date Value Ref Range Status   05/13/2022 170 150 - 450 K/uL Final     Creatinine   Date Value Ref Range Status   05/13/2022 1.7 (H) 0.5 - 1.4 mg/dL Final     TSH   Date Value Ref Range Status   01/31/2022 1.667 0.400 - 4.000 uIU/mL Final     Glucose   Date Value Ref Range Status   05/13/2022 117 (H) 70 - 110 mg/dL Final     Hemoglobin A1C   Date Value Ref Range Status   01/31/2022 6.3 (H) 4.0 - 5.6 % Final     Comment:     ADA Screening Guidelines:  5.7-6.4%  Consistent with prediabetes  >or=6.5%  Consistent with diabetes    High levels of fetal hemoglobin interfere with the HbA1C  assay. Heterozygous hemoglobin variants (HbS, HgC, etc)do  not significantly interfere with this assay.   However, presence of multiple variants may affect accuracy.           Assessment:      Problem List Items Addressed This Visit        ENT    Wears hearing aid in both ears    Hearing loss due to cerumen impaction, left    Presbycusis, bilateral    Hearing loss - Primary    Noise-induced hearing loss of both ears               Plan:        [facility] site where he is comfortable for repeat evaluation of hearing and replacement of hearing aids for which he is service connected.  Wax impaction cleared on the left side.  Routine ear care as outlined.

## 2022-08-26 NOTE — PATIENT INSTRUCTIONS
[facility] site where he is comfortable for repeat evaluation of hearing and replacement of hearing aids for which he is service connected.  Wax impaction cleared on the left side.  Routine ear care as outlined.    ROUTINE EAR CARE    Keep the ears dry in general.  Water and soap dry the ears increases scaling by stripping away the natural oils of the ears.    A twisted single ply of facial tissue can be used to wick out moisture on the rare occasion when water gets stuck in the ear; or the water can be displaced with concentrated alcohol like OTC SwimEar or a few drops of 72-95% isopropyl alcohol to fill the ear canal.  Regular use will cause excess drying of the ears.    The ear should be kept moist in general with mineral oil. Three to four drops into the ear canal 2 to 3 times a week or even every night.    If the ears need to be irrigated use either a 50 50 mixture of white vinegar and distilled water or 50 50 mixture of alcohol and white vinegar.    Painful draining ears that do not resolve with conservative care could represent infection and may need microscopic office debridement/clearing of the wax, and topical antibiotic drops with or without steroids may need to be prescribed.

## 2022-08-28 NOTE — PROCEDURES
NAIL DEBRIDEMENT    Date/Time: 8/18/2022 2:30 PM  Performed by: Nella Polanco DPM  Authorized by: Nella Polanco DPM     Consent Done?:  Yes (Verbal)    Nail Care Type:  Debride(Left 2nd Toe, Left 3rd Toe, Left 4th Toe, Right 3rd Toe, Right 2nd Toe, Right 1st Toe, Left 5th Toe, Right 4th Toe and Right 5th Toe)  Patient tolerance:  Patient tolerated the procedure well with no immediate complications

## 2022-09-14 NOTE — TELEPHONE ENCOUNTER
I am sorry to hear this.  I have enrolled you in the COVID monitoring program and you will receive a text asking about your symptoms daily for 14 days. If you should indicate worsening symptoms then a triage nurse will call to give you further instructions.   Take the Paxlovid viral treatment as prescribed.          Most cases of COVID-19 cause a common cold type of illness that is self-limited.  You are advised to act as if you have COVID-19 if sick and self-quarantine in the home until 5 days after respiratory symptoms and 1 days after resolution of fever.  Household contacts should avoid contact. If possible limit activity to 1 closed room and use  separate bathroom from asymptomatic household contacts.  Monitor for symptoms of worsening illness such as shortness of breath, labored breathing, dehydration, sudden confusion or lethargy (difficulty rousing).  If occur seek medical attention immediately in emergency room.   You are welcome to contact us for advice at any time.     General home care guidelines for cough and congestion:increase fluid intake, get plenty of rest, and use OTC cough and cold medications for relief of symptoms.  I recommended guafenesin for congestion and dextromethorphan as directed for cough.  Brands like Mucinex DM or Chloricidin HBP or similar are recommended.  Avoidance of decongestants is recommended for patients with heart problems and hypertension.  Extra vitamin C may also benefit.  Return to clinic or the ED or urgent care if symptoms last longer than 10 days or sooner if worsen with symptoms like fever > 100.4, severe sinus pain or headache, thick yellow nasal discharge or sputum, dehydration, sudden confusion or mental status changes, shortness of breath, labored breathing, or lethargy. Anti-fever medications can be alternated like OTC ibuprofen or tylenol as needed and as directed unless told to avoid these by your doctor.     Recovered patients: Patients who have recovered  from COVID-19 can continue to have detectable SARS-CoV-2 RNA in upper respiratory specimens for up to 3 months after illness onset. However, replication-competent virus has not been reliably recovered from such patients, and they are not likely infectious.    For more information look up https://www.cdc.gov/coronavirus/2019-ncov/if-you-are-sick/index.html

## 2022-09-14 NOTE — TELEPHONE ENCOUNTER
Pt called for enrollment for he and his wife for enrollment to covid surveillance program #1 and no answer left VM on home number and sons number to reach out to [facility] for assistance if needed. Pt sent my chart message to portal and will attempt to call back again tomorrow        Reason for Disposition   Message left on identified voicemail    Protocols used: No Contact or Duplicate Contact Call-A-[place]

## 2022-09-14 NOTE — TELEPHONE ENCOUNTER
Returned call and spoke to patient wife regarding patient symptoms. Patient wife stated that patient tested positive for Covid on Monday 9/12/22 and was given Tessalon Perles and Paxlovid. Patient wife is asking if there's anything else he can take. Please advise..

## 2022-09-14 NOTE — TELEPHONE ENCOUNTER
----- Message from Yuridia Valencia sent at 9/14/2022 10:28 AM CDT -----  Contact: wife  Type:  Patient Call          Who Called: wife         Does the patient know what this is regarding?: requesting a call back said she and her  tested positive on 9/12 and she don't know what else to do she need someone to call her she called on yesterday and has since been waiting on a call  back ;please advise          Would the patient rather a call back or a response via [facility]? Call           Best Call Back Number:[phone]            Additional Information:

## 2022-09-15 NOTE — TELEPHONE ENCOUNTER
2nd attempt to call Pt for enrollment into cc program.    Spoke to wife. Wife denies triage at this time for Pt and accepts hsm but declines cc program. Education provided on cc and hsm programs. Pt removed from cc and placed into hsm as preferred by spouse. Spouse educated on covid, isolation, to respond to hsm texts if needed or to call ooc at 1 [phone] if having any questions or concerns or needs for triage. Pt's main number changed to mobile phone to accept daily texts. Encounter routed to pcp/staff.  Reason for Disposition   Message left with person in household    Protocols used: No Contact or Duplicate Contact Call-A-OH

## 2022-09-19 NOTE — TELEPHONE ENCOUNTER
----- Message from Tana Mcdaniel sent at 9/19/2022  1:21 PM CDT -----  Regarding: rescheduling appointment  Contact: patient  Patient need to reschedule appointment, please call back at [phone] (home)

## 2022-09-19 NOTE — TELEPHONE ENCOUNTER
[place]    PCP:  Dr. Martine Maxwell    Diagnosed with Covid on 9/12.  Denies any symptoms at this time.  SpO2=96% SC=65.  Per protocol, care advised is home care.  Instructed to call for worsening/questions/concerns.  VU.     Reason for Disposition   [1] COVID-19 diagnosed by positive lab test (e.g., PCR, rapid self-test kit) AND [2] mild symptoms (e.g., cough, fever, others) AND [3] no complications or SOB    Additional Information   Negative: SEVERE difficulty breathing (e.g., struggling for each breath, speaks in single words)   Negative: Difficult to awaken or acting confused (e.g., disoriented, slurred speech)   Negative: Bluish (or gray) lips or face now   Negative: Shock suspected (e.g., cold/pale/clammy skin, too weak to stand, low BP, rapid pulse)   Negative: Sounds like a life-threatening emergency to the triager   Negative: SEVERE or constant chest pain or pressure  (Exception: Mild central chest pain, present only when coughing.)   Negative: MODERATE difficulty breathing (e.g., speaks in phrases, SOB even at rest, pulse 100-120)   Negative: Headache and stiff neck (can't touch chin to chest)   Negative: Oxygen level (e.g., pulse oximetry) 90 percent or lower   Negative: Chest pain or pressure   Negative: Patient sounds very sick or weak to the triager   Negative: MILD difficulty breathing (e.g., minimal/no SOB at rest, SOB with walking, pulse <100)   Negative: Fever > 103 F (39.4 C)   Negative: [1] Fever > 101 F (38.3 C) AND [2] over 60 years of age   Negative: [1] Fever > 100.0 F (37.8 C) AND [2] bedridden (e.g., nursing home patient, CVA, chronic illness, recovering from surgery)   Negative: HIGH RISK for severe COVID complications (e.g., weak immune system, age > 64 years, obesity with BMI > 25, pregnant, chronic lung disease or other chronic medical condition) (Exception: Already seen by PCP and no new or worsening symptoms.)   Negative: [1] HIGH RISK patient AND [2] influenza is widespread in the community  AND [3] ONE OR MORE respiratory symptoms: cough, sore throat, runny or stuffy nose   Negative: [1] HIGH RISK patient AND [2] influenza exposure within the last 7 days AND [3] ONE OR MORE respiratory symptoms: cough, sore throat, runny or stuffy nose   Negative: Oxygen level (e.g., pulse oximetry) 91 to 94 percent   Negative: [1] COVID-19 infection suspected by caller or triager AND [2] mild symptoms (cough, fever, or others) AND [3] negative COVID-19 rapid test   Negative: Fever present > 3 days (72 hours)   Negative: [1] Fever returns after gone for over 24 hours AND [2] symptoms worse or not improved   Negative: [1] Continuous (nonstop) coughing interferes with work or school AND [2] no improvement using cough treatment per Care Advice   Negative: Cough present > 3 weeks   Negative: [1] COVID-19 diagnosed by positive lab test (e.g., PCR, rapid self-test kit) AND [2] NO symptoms (e.g., cough, fever, others)    Protocols used: Coronavirus (COVID-19) Diagnosed or [id number]

## 2022-09-19 NOTE — TELEPHONE ENCOUNTER
Returned call to patient. Spoke with his wife. Patient needs to reschedule colonoscopy. Patient and his wife are very confused and the patient states that he is unsure if he wants the procedure done.

## 2022-09-21 NOTE — TELEPHONE ENCOUNTER
----- Message from Holly Hudson sent at 9/21/2022  1:29 PM CDT -----  .Type:  Patient Call Back    Who Called: PT WIFE       Does the patient know what this is regarding?: SCHEDULING     Would the patient rather a call back yes     Best Call Back Number: [phone]    Additional Information: Thank You

## 2022-09-21 NOTE — TELEPHONE ENCOUNTER
Spoke with patient's wife to reschedule his colonoscopy that he missed on 9/19. New date set for 11/14. Attempted to give prep instructions but line disconnected.

## 2022-09-22 NOTE — PROGRESS NOTES
HPI    DLS:  8/15/2019- annual exam for DM.     States lost his specs. Has been having trouble with visoin since loosing   them. No readers. Pt states DM is stable. Denies pain/ FOL/ floaters. No   gtts.     Hemoglobin A1C       Date                     Value               Ref Range             Status                01/31/2022               6.3 (H)             4.0 - 5.6 %           Final                   05/12/2021               7.4 (H)             4.0 - 5.6 %           Final                   08/11/2020               6.7 (H)             4.0 - 5.6 %           Final               Last edited by Nestor Quesada, OD on 9/22/2022  1:25 PM.            Assessment /Plan     For exam results, see Encounter Report.    Diabetes mellitus type 2 without retinopathy    Diabetic eye exam  -     Ambulatory referral/consult to Optometry    Pseudophakia    Refractive error    Dry eye syndrome, bilateral      1. Diabetes mellitus type 2 without retinopathy  2. Diabetic eye exam  Discussed possible ocular affects of uncontrolled blood sugar with patient. Recommended continued strong blood sugar control and continued care with PCP. Monitor yearly.     3. Pseudophakia  S/p cataract extraction, no PCO    4. Refractive error  Dispensed updated spectacle Rx. Discussed various spectacle lens options. Discussed adaptation period to new specs.     5. Dry eye syndrome, bilateral  Significant JASWINDER OS > OD. Discussed ocular affects of dry eyes. Recommend OTC Refresh Liquigel artificial tears 4 times a day in both eyes. Discussed chronicity of JASWINDER. RTC if symptoms not alleviated by continued use of artificial tears.         RTC in 1 year for comprehensive eye exam, or sooner prn.

## 2022-09-28 NOTE — PATIENT INSTRUCTIONS
Push fluids intake.  Drink plenty of water.     Contact me or your PCP if any worsening or for any new concerns as we discussed.

## 2022-09-28 NOTE — TELEPHONE ENCOUNTER
Patient c/o a new onset of urinary incontinence. Patient also has slight hematuria. Advised per protocol to be seen in the clinic now. An appointment was made for the patient. Advised the patient to call back with any further questions or if symptoms worsen.    Reason for Disposition   Can't control passage of urine (i.e., urinary incontinence) and new-onset (< 2 weeks) or worsening    Additional Information   Negative: Shock suspected (e.g., cold/pale/clammy skin, too weak to stand, low BP, rapid pulse)   Negative: Sounds like a life-threatening emergency to the triager   Negative: Unable to urinate (or only a few drops) > 4 hours and bladder feels very full (e.g., palpable bladder or strong urge to urinate)   Negative: Decreased urination and drinking very little and dehydration suspected (e.g., dark urine, no urine > 12 hours, very dry mouth, very lightheaded)   Negative: Patient sounds very sick or weak to the triager   Negative: Fever > 100.4 F  (38.0 C)   Negative: Side (flank) or lower back pain present    Protocols used: Urinary Symptoms-A-OH

## 2022-09-28 NOTE — PROGRESS NOTES
Subjective:       Patient ID: Siddharth Urias is a 88 y.o. male.    Chief Complaint: Hematuria (Blood in urine this morning as well as not being able to hold it in )    New to me patient here for UC visit.  Onset today - gross hematuria and frequency.  No pain, N or fever.    Hematuria  Pertinent negatives include no abdominal pain, fever or nausea.   Review of Systems   Constitutional:  Negative for fever.   Respiratory:  Negative for shortness of breath.    Cardiovascular:  Negative for chest pain.   Gastrointestinal:  Negative for abdominal pain and nausea.   Genitourinary:  Positive for hematuria.   Skin:  Negative for rash.   Neurological:  Negative for numbness.   All other systems reviewed and are negative.    Objective:      Physical Exam  Constitutional:       General: He is not in acute distress.     Appearance: He is well-developed.   Cardiovascular:      Rate and Rhythm: Normal rate. Rhythm irregularly irregular.      Heart sounds: Murmur heard.   Pulmonary:      Effort: Pulmonary effort is normal.      Breath sounds: Normal breath sounds.   Abdominal:      Palpations: Abdomen is soft.      Tenderness: There is no right CVA tenderness or left CVA tenderness.       Assessment:       1. Gross hematuria    2. Urinary tract infection with hematuria, site unspecified          Plan:       Gross hematuria  -     POCT Urinalysis  -     Urine culture  -     doxycycline (MONODOX) 100 MG capsule; Take 1 capsule (100 mg total) by mouth 2 (two) times daily.  Dispense: 20 capsule; Refill: 0    Urinary tract infection with hematuria, site unspecified  -     doxycycline (MONODOX) 100 MG capsule; Take 1 capsule (100 mg total) by mouth 2 (two) times daily.  Dispense: 20 capsule; Refill: 0      Patient Instructions   Push fluids intake.  Drink plenty of water.     Contact me or your PCP if any worsening or for any new concerns as we discussed.        4 = Moderately ill – overt symptoms causing noticeable, but modest, functional impairment or distress; symptom level may warrant medication 4 = Moderately ill – overt symptoms causing noticeable, but modest, functional impairment or distress; symptom level may warrant medication 4 = Moderately ill – overt symptoms causing noticeable, but modest, functional impairment or distress; symptom level may warrant medication 4 = Moderately ill – overt symptoms causing noticeable, but modest, functional impairment or distress; symptom level may warrant medication 4 = Moderately ill – overt symptoms causing noticeable, but modest, functional impairment or distress; symptom level may warrant medication 4 = Moderately ill – overt symptoms causing noticeable, but modest, functional impairment or distress; symptom level may warrant medication 4 = Moderately ill – overt symptoms causing noticeable, but modest, functional impairment or distress; symptom level may warrant medication 4 = Moderately ill – overt symptoms causing noticeable, but modest, functional impairment or distress; symptom level may warrant medication 4 = Moderately ill – overt symptoms causing noticeable, but modest, functional impairment or distress; symptom level may warrant medication 4 = Moderately ill – overt symptoms causing noticeable, but modest, functional impairment or distress; symptom level may warrant medication 4 = Moderately ill – overt symptoms causing noticeable, but modest, functional impairment or distress; symptom level may warrant medication

## 2022-10-03 NOTE — TELEPHONE ENCOUNTER
Call placed to patient. Call answered by patient's wife (Enedina) who states patient completed antibiotic therapy, and symptoms have resolved. Will send follow up message to Dr. Matthew for notification.

## 2022-10-03 NOTE — TELEPHONE ENCOUNTER
----- Message from Jayro Matthew MD sent at 10/3/2022  4:16 PM CDT -----    ----- Message -----  From: Jayro Matthew MD  Sent: 10/3/2022   4:13 PM CDT  To: [facility] Staff    Urine culture grew E Coli bacteria.  How are his symptoms now?

## 2022-10-21 NOTE — PROGRESS NOTES
Subjective:       Patient ID: Siddharth Urias is a 88 y.o. male.    Chief Complaints:  patient with history of DVT with repeat ultrasound had   swelling went for ultrasound showed DVT patient has been on anticoagulant for 6 months repeat ultrasound negative recheck came off anticoagulant hypercoagulable work negative     mpression 2019       Positive examination demonstrating DVT within the right common femoral and proximal femoral veins.      onXarelto here  For follow-up  No prior history  No family history  of hypercoagulability      HPI:   Patient states he is quite sedentary he does not ambulate much his legs do not hurt  He has no shortness of breath discomfort fever chills or rigors at this time  He feels well attempts to ambulate more  Eager to know his hypercoagulable workup  Denies chest pain palpitations dizziness  Denies altered bowel bladder habits  Denies headaches blurring of vision passing out episodes   gall bladder removal  was hospitalized for 2 weeks with sepsis  Social History     Socioeconomic History    Marital status:    Tobacco Use    Smoking status: Former     Packs/day: 1.50     Years: 60.00     Pack years: 90.00     Types: Cigarettes     Start date:      Quit date: 2008     Years since quittin.8    Smokeless tobacco: Never   Substance and Sexual Activity    Alcohol use: Yes     Alcohol/week: 1.0 standard drink     Types: 1 Glasses of wine per week     Comment: social - at speical events    Drug use: No    Sexual activity: Not Currently     Partners: Female     Family History   Problem Relation Age of Onset    Diabetes Mother     Diabetes Brother      Past Surgical History:   Procedure Laterality Date    ANGIOGRAM, CORONARY, WITH LEFT HEART CATHETERIZATION N/A 2022    Procedure: Angiogram, Coronary, with Left Heart Cath;  Surgeon: Kamlesh Sullivan MD;  Location: [facility] CATH/EP LAB;  Service: Cardiology;  Laterality: N/A;    APPENDECTOMY      CARPAL  TUNNEL RELEASE Left 02/05/2021    Procedure: RELEASE, CARPAL TUNNEL;  Surgeon: Jayro Hawkins II, MD;  Location: [facility] OR;  Service: Orthopedics;  Laterality: Left;    CATARACT EXTRACTION Bilateral     COLONOSCOPY N/A 12/14/2018    Procedure: COLONOSCOPY;  Surgeon: Noel Aguiar MD;  Location: [facility] ENDO;  Service: Endoscopy;  Laterality: N/A;    COLONOSCOPY  06/21/2021    Dr. Rodgers, in media: 2 colon polyps removed; diverticulosis, erythema in transverse colon; biopsy: tubular adenomas, ranomd colon unremarkable    ERCP N/A 11/23/2021    Procedure: ERCP (ENDOSCOPIC RETROGRADE CHOLANGIOPANCREATOGRAPHY);  Surgeon: Marshall Gardner III, MD;  Location: [facility] ENDO;  Service: Endoscopy;  Laterality: N/A;    ESOPHAGOGASTRODUODENOSCOPY N/A 01/24/2019    Procedure: EGD (ESOPHAGOGASTRODUODENOSCOPY);  Surgeon: Noel Aguiar MD;  Location: [facility] ENDO;  Service: Endoscopy;  Laterality: N/A;    EYE SURGERY      FLEXIBLE SIGMOIDOSCOPY N/A 01/22/2019    Procedure: SIGMOIDOSCOPY, FLEXIBLE;  Surgeon: Noel Aguiar MD;  Location: [facility] ENDO;  Service: Endoscopy;  Laterality: N/A;    HERNIA REPAIR      bilateral inguinal    LAPAROSCOPIC CHOLECYSTECTOMY N/A 11/24/2021    Procedure: CHOLECYSTECTOMY, LAPAROSCOPIC;  Surgeon: Jay Cabrera Jr., MD;  Location: [facility] OR;  Service: General;  Laterality: N/A;    TONSILLECTOMY       Past Medical History:   Diagnosis Date    Afib     Arthritis     Choledocholithiasis     Chronic kidney disease     Colon polyp     COPD (chronic obstructive pulmonary disease)     Diabetes mellitus, type 2     2/2011    Diverticulosis     Fatty liver     Hepatomegaly     History of blood clots     2020 Dr. Rodriguez     Irradiation cystitis     Prostate cancer 2009    s/p XRT (T1C, João 8)    Radiation proctitis     Sleep apnea        Current Outpatient Medications:     albuterol (PROVENTIL/VENTOLIN HFA) 90 mcg/actuation inhaler, INHALE 2 PUFFS INTO THE LUNGS EVERY 6 HOURS AS NEEDED FOR WHEEZE,  Disp: 18 g, Rfl: 1    allopurinol (ZYLOPRIM) 300 MG tablet, Take 300 mg by mouth once daily., Disp: , Rfl:     aspirin 81 MG Chew, Take 81 mg by mouth once daily., Disp: , Rfl:     cyanocobalamin (VITAMIN B-12) 1000 MCG tablet, Take 100 mcg by mouth once daily., Disp: , Rfl:     doxycycline (MONODOX) 100 MG capsule, Take 1 capsule (100 mg total) by mouth 2 (two) times daily., Disp: 20 capsule, Rfl: 0    fluticasone-umeclidin-vilanter (TRELEGY ELLIPTA) 100-62.5-25 mcg DsDv, Inhale 1 puff into the lungs once daily., Disp: 180 each, Rfl: 3    hydroCHLOROthiazide (MICROZIDE) 12.5 mg capsule, Take 12.5 mg by mouth once daily., Disp: , Rfl:     JANUVIA 100 mg Tab, Take 1 tablet (100 mg total) by mouth once daily., Disp: 90 tablet, Rfl: 3    losartan (COZAAR) 25 MG tablet, Take 1 tablet (25 mg total) by mouth once daily., Disp: , Rfl:     pantoprazole (PROTONIX) 40 MG tablet, TAKE 1 TABLET DAILY (Patient taking differently: Take 40 mg by mouth once daily.), Disp: 90 tablet, Rfl: 3    potassium chloride (MICRO-K) 10 MEQ CpSR, Take 10 mEq by mouth once., Disp: , Rfl:     pravastatin (PRAVACHOL) 40 MG tablet, TAKE 1 TABLET ONCE DAILY (Patient taking differently: Take 40 mg by mouth once daily.), Disp: 90 tablet, Rfl: 3    pulse oximeter (PULSE OXIMETER) device, Use twice daily at 8 AM and 3 PM and record the value in [facility] as directed., Disp: 1 each, Rfl: 0    solifenacin (VESICARE) 5 MG tablet, TAKE 1 TABLET DAILY (Patient taking differently: Take 5 mg by mouth once daily.), Disp: 90 tablet, Rfl: 3    tamsulosin (FLOMAX) 0.4 mg Cap, TAKE 1 CAPSULE ONCE DAILY, Disp: 90 capsule, Rfl: 3    colestipoL (COLESTID) 1 gram Tab, Take 1 tablet (1 g total) by mouth 2 (two) times daily. Take other oral medications >1h before or >4h after colestipol, Disp: 60 tablet, Rfl: 2  Review of patient's allergies indicates:   Allergen Reactions    No known drug allergies          REVIEW OF SYSTEMS:     CONSTITUTIONAL:  Elderly gentleman The  patient denies any weight change. There is no apparent    change in appetite, fever, night sweats, headaches, fatigue, dizziness, or    weakness.      SKIN: Denies rash, issues with nails, non-healing sores, bleeding, blotching    skin or abnormal bruising. Denies new moles or changes to existing moles.      BREASTS: There is no swelling around breasts or nipple discharge.    EYES: Denies eye pain, blurred vision, swelling, redness or discharge.      ENT AND MOUTH: Denies runny nose, stuffiness, sinus trouble or sores. Denies    nosebleeds. Denies, hoarseness, change in voice or swelling in front of the    neck.      CARDIOVASCULAR: Denies chest pain, discomfort or palpitations. Denies neck    swelling or episodes of passing out.      RESPIRATORY: Denies cough, sputum production, blood in sputum, and denies    shortness of breath.      GI: Denies trouble swallowing, indigestion, heartburn, abdominal pain, nausea,  or  vomiting, has significant diarrhea since radiation was completed for prostate cancer, no blood in stool, discoloration of  stools, or increased abdominal girth.      GENITOURINARY: No discharge. No pelvic pain or lumps. No rash around groin or  lesions. Has urinary frequency, occasional hesitation, no painful urination or blood in    urine. Denies incontinence. No problems with intercourse.      MUSCULOSKELETA occasional  neck or back pain. Denies weakness in arms or legs,     Denies swelling or abnormal glands.  Some swelling of right lower extremity multiple distended veins with discoloration to feet from venous stasis    NEUROLOGICAL: Denies tingling, numbness, altered mentation changes to nerve    function in the face, weakness to one or both of the body. Denies changes to    gait and denies multiple falls or accidents.      PSYCHIATRIC: Denies nervousness, anxiety, hallucinations, depression, suicidal    ideation, trouble sleeping or changes in behavior noticed by family.          PHYSICAL EXAM:      Wt Readings from Last 3 Encounters:   10/21/22 92.2 kg (203 lb 4.2 oz)   09/28/22 91 kg (200 lb 9.9 oz)   09/12/22 92.1 kg (203 lb 0.7 oz)     Temp Readings from Last 3 Encounters:   10/21/22 97.1 °F (36.2 °C) (Temporal)   09/28/22 97.6 °F (36.4 °C) (Oral)   09/12/22 97.7 °F (36.5 °C) (Oral)     BP Readings from Last 3 Encounters:   10/21/22 134/85   09/28/22 (!) 122/50   09/12/22 (!) 160/82     Pulse Readings from Last 3 Encounters:   10/21/22 (!) 50   09/28/22 60   09/12/22 65     GENERAL: Comfortable looking patient.  Elderly gentleman Patient is in no distress. Morbidly obese  Awake, alert and oriented to time, person and place.  No anxiety, or agitation.      HEENT: Normal conjunctivae and eyelids. WNL.  PERRLA 3 to 4 mm. No icterus, no pallor, no congestion, and no discharge noted.     NECK:  Supple. Trachea is central.  No crepitus.  No JVD or masses.    RESPIRATORY:  No intercostal retractions.  No dullness to percussion.  Chest is clear to auscultation.  No rales, rhonchi or wheezes.  No crepitus.  Good air entry bilaterally.    CARDIOVASCULAR:  S1 and S2 are normally heard without murmurs or gallops.  All peripheral pulses are present.    ABDOMEN:  Normal abdomen.  No hepatosplenomegaly.  No free fluid.  Bowel sounds are present.  No hernia noted. No masses.  No rebound or tenderness.  No guarding or rigidity.  Umbilicus is midline.    LYMPHATICS:  No axillary, cervical, supraclavicular, submental, or inguinal lymphadenopathy.    SKIN/MUSCULOSKELETAL:  Legs showing distended veins There is no evidence of excoriation marks or ecchmosis.  No rashes.  No cyanosis.  No clubbing.  No joint or skeletal deformities noted.  Normal range of motion.    NEUROLOGIC:  Higher functions are appropriate.  No cranial nerve deficits.  Normal coco.  Normal strength.  Motor and sensory functions are normal.  Deep tendon reflexes are normal.    GENITAL/RECTAL:  Exams are deferred.      Laboratory:     CBC:  Lab Results    Component Value Date    WBC 8.87 10/20/2022    RBC 5.90 10/20/2022    HGB 15.5 10/20/2022    HCT 50.3 10/20/2022    MCV 85 10/20/2022    MCH 26.3 (L) 10/20/2022    MCHC 30.8 (L) 10/20/2022    RDW 16.1 (H) 10/20/2022     10/20/2022    MPV 11.1 10/20/2022    GRAN 5.5 10/20/2022    GRAN 61.8 10/20/2022    LYMPH 2.2 10/20/2022    LYMPH 25.3 10/20/2022    MONO 0.8 10/20/2022    MONO 9.4 10/20/2022    EOS 0.2 10/20/2022    BASO 0.04 10/20/2022    EOSINOPHIL 2.5 10/20/2022    BASOPHIL 0.5 10/20/2022       BMP: BMP  Lab Results   Component Value Date     10/20/2022    K 5.0 10/20/2022     10/20/2022    CO2 23 10/20/2022    BUN 26 (H) 10/20/2022    CREATININE 2.1 (H) 10/20/2022    CALCIUM 9.1 10/20/2022    ANIONGAP 11 10/20/2022    ESTGFRAFRICA 40.7 (A) 05/13/2022    EGFRNONAA 35.2 (A) 05/13/2022       LFT:   Lab Results   Component Value Date    ALT 16 10/20/2022    AST 19 10/20/2022    ALKPHOS 69 10/20/2022    BILITOT 0.5 10/20/2022     Impression August 2019 ultrasound       Considerable interval decrease of the deep venous thrombosis right femoral vein compared to the prior exam with just very small amount of residual eccentric thrombus or wall thickening proximal femoral vein today.     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   Impression: 8/2020     The gallbladder demonstrates sludge without discrete gallstones, there is gallbladder wall thickening however there is no evidence for pericholecystic fluid, or biliary dilatation and the technologist indicates a negative sonographic Amor sign.  Close clinical and historical correlation otherwise needed.     Enlarged appearance of the liver and spleen with mild increased echogenicity of the liver that may relate to diffuse fatty infiltrate.     Abdominal aortic aneurysm now measures up to 3.9 cm compared to 3.4 cm on the prior study, suggesting interval  enlargement, close clinical and historical correlation and follow-up is recommended.    Impression: 8/2020     No evidence of deep venous thrombosis in either lower extremity.  Five Leiden negative prothrombin mutation negative, protein C normal, anti thrombin 3 normal, protein S normal homocystine is normal  Patient's anticardiolipin level is 13.4 marginally elevated and therefore is an inconclusive results  PSA normal  Assessment/Plan:      DVT right leg with sedentary lifestyle repeat ultrasounds a year of anticoagulant  shows DVT resolved    discontinued Xarelto patient needs to ambulate more exercises legs more I have explained to them that sedentary lifestyle could cause recurrence of DVT  Marginally elevated anticardiolipin level unclear of fits clinical relevance if patient has recurrent DVT will need to subject to lifelong anticoagulation for now our plan was to discuss this with patient and observe  However since hospital stay in 3/22 pt as been seen by DR Kamlesh Sullivan and has afib and critical arotic stenosis, needs to  Consider anticoag and must follow in his clinic   started  b12 sl 1000 mcg daily b12 level was in 250 range now improved to 508 continue B12  Recheck in 6 months CBC CMP  B12   hx of prostate ca psa being monirored psa1.1 4/22  Continue PCP for fatty liver diabetes depression chronic kidney disease follow with Urology for prostate cancer continue with obstructive sleep apnea follow-up continue weight loss attempts  Continue management of dyslipidemia and hypertension   Ckd : mild cont to monitor  Managed venous stasis with elevating legs and stockings  His diabetes remains out of control because of his eating habits patient has chronic  He is on januvia diarrhea and attributes that to his diet and prostate radiation patient counseled about weight loss and diabetes management  Needs valve replacemenr offered by cardiology [facility], pt optd not to due to age, has no  symptoms   Having  colonsocpy for unncotroled diarrhea in 11/22  Advance Care planning/ directives /living will/patient's wishes discussed with patient.  Patient has been given guidelines and instructions on completing these directives  COVID social distancing, face mask use, hand washing techniques and personal hygiene routine discussed with patient  Good exercise, nutrition and weight management discussed with patient  Health maintenance activities and follow-up with PCPs recommendations discussed with patient

## 2022-11-03 NOTE — TELEPHONE ENCOUNTER
Caller has questions about mixing miralax prep per the instruction sheet. Caller states that she has the instruction packet, but doesn't believe that she should mix the entire bottle of Miralax with Gatorade. On call provider contacted, stated that the entire bottle should be mixed with the Gatorade.  Pt advised per protocol and verbalized understanding.   Reason for Disposition   Health Information question, no triage required and triager able to answer question    Additional Information   Negative: RN needs further essential information from caller in order to complete triage   Negative: Requesting regular office appointment   Negative: [1] Caller requesting NON-URGENT health information AND [2] PCP's office is the best resource    Protocols used: Information Only Call-A-

## 2022-11-03 NOTE — TELEPHONE ENCOUNTER
Placed call to patient and his wife to confirm that prep instructions are understood. Understanding was verbalized.

## 2022-11-04 NOTE — ANESTHESIA PREPROCEDURE EVALUATION
11/04/2022  Siddharth Urias is a 88 y.o., male.      Pre-op Assessment    I have reviewed the Patient Summary Reports.     I have reviewed the Nursing Notes. I have reviewed the NPO Status.   I have reviewed the Medications.     Review of Systems  Anesthesia Hx:  No problems with previous Anesthesia    Social:  Non-Smoker    Cardiovascular:   Hypertension, well controlled Dysrhythmias atrial fibrillation    Pulmonary:   COPD, moderate Sleep Apnea    Renal/:   Chronic Renal Disease (irradiation cystitis, prostate CA), CKD    Hepatic/GI:   Liver Disease,    Musculoskeletal:   Arthritis     Neurological:   Neuromuscular Disease,    Endocrine:   Diabetes, well controlled, type 2        Physical Exam  General: Well nourished, Cooperative, Alert and Oriented    Airway:  Mallampati: II   Mouth Opening: Normal  TM Distance: Normal  Neck ROM: Normal ROM        Anesthesia Plan  Type of Anesthesia, risks & benefits discussed:    Anesthesia Type: Gen ETT, Gen Supraglottic Airway, Gen Natural Airway, MAC  Intra-op Monitoring Plan: Standard ASA Monitors  Post Op Pain Control Plan: multimodal analgesia  Induction:  IV  Airway Plan: Direct, Video and Fiberoptic, Post-Induction  Informed Consent: Informed consent signed with the Patient and all parties understand the risks and agree with anesthesia plan.  All questions answered.   ASA Score: 3    Ready For Surgery From Anesthesia Perspective.     .

## 2022-11-04 NOTE — ANESTHESIA POSTPROCEDURE EVALUATION
Anesthesia Post Evaluation    Patient: Siddharth Urias    Procedure(s) Performed: Procedure(s) (LRB):  COLONOSCOPY (N/A)    Final Anesthesia Type: general      Patient location during evaluation: PACU  Patient participation: Yes- Able to Participate  Level of consciousness: awake and alert and oriented  Post-procedure vital signs: reviewed and stable  Pain management: adequate  Airway patency: patent    PONV status at discharge: No PONV  Anesthetic complications: no      Cardiovascular status: blood pressure returned to baseline and stable  Respiratory status: unassisted and spontaneous ventilation  Hydration status: euvolemic  Follow-up not needed.          Vitals Value Taken Time   /75 11/04/22 1328   Temp 36.5 °C (97.7 °F) 11/04/22 1315   Pulse 64 11/04/22 1328   Resp 16 11/04/22 1328   SpO2 94 % 11/04/22 1328         Event Time   Out of Recovery 14:00:00         Pain/Edward Score: Edward Score: 10 (11/4/2022  1:28 PM)

## 2022-11-04 NOTE — PLAN OF CARE
Vss, maya po fluids, denies pain, ambulates easily. IV removed, catheter intact. Discharge instructions provided and states understanding. States ready to go home.  Discharged from facility with family per wheelchair.

## 2022-11-04 NOTE — TRANSFER OF CARE
Anesthesia Transfer of Care Note    Patient: Siddharth Urias    Procedure(s) Performed: Procedure(s) (LRB):  COLONOSCOPY (N/A)    Patient location: GI    Anesthesia Type: general    Transport from OR: Transported from OR on room air with adequate spontaneous ventilation    Post pain: adequate analgesia    Post assessment: no apparent anesthetic complications    Post vital signs: stable    Level of consciousness: sedated    Nausea/Vomiting: no nausea/vomiting    Complications: none    Transfer of care protocol was followed      Last vitals:   Visit Vitals  BP (!) 166/67   Pulse 87   Temp 36.1 °C (97 °F) (Skin)   Resp 18   Wt 95.3 kg (210 lb)   SpO2 (!) 93%   BMI 32.89 kg/m²

## 2022-11-04 NOTE — PROVATION PATIENT INSTRUCTIONS
Discharge Summary/Instructions after an Endoscopic Procedure  Patient Name: Siddharth Urias  Patient MRN: [id number]  Patient YOB: 1934  Friday, November 4, 2022  Noel Nation MD  Dear patient,  As a result of recent federal legislation (The Federal Cures Act), you may   receive lab or pathology results from your procedure in your [facility]   account before your physician is able to contact you. Your physician or   their representative will relay the results to you with their   recommendations at their soonest availability.  Thank you,  RESTRICTIONS:  During your procedure today, you received medications for sedation.  These   medications may affect your judgment, balance and coordination.  Therefore,   for 24 hours, you have the following restrictions:   - DO NOT drive a car, operate machinery, make legal/financial decisions,   sign important papers or drink alcohol.    ACTIVITY:  Today: no heavy lifting, straining or running due to procedural   sedation/anesthesia.  The following day: return to full activity including work.  DIET:  Eat and drink normally unless instructed otherwise.     TREATMENT FOR COMMON SIDE EFFECTS:  - Mild abdominal pain, nausea, belching, bloating or excessive gas:  rest,   eat lightly and use a heating pad.  - Sore Throat: treat with throat lozenges and/or gargle with warm salt   water.  - Because air was used during the procedure, expelling large amounts of air   from your rectum or belching is normal.  - If a bowel prep was taken, you may not have a bowel movement for 1-3 days.    This is normal.  SYMPTOMS TO WATCH FOR AND REPORT TO YOUR PHYSICIAN:  1. Abdominal pain or bloating, other than gas cramps.  2. Chest pain.  3. Back pain.  4. Signs of infection such as: chills or fever occurring within 24 hours   after the procedure.  5. Rectal bleeding, which would show as bright red, maroon, or black stools.   (A tablespoon of blood from the rectum is not serious, especially  if   hemorrhoids are present.)  6. Vomiting.  7. Weakness or dizziness.  GO DIRECTLY TO THE NEAREST EMERGENCY ROOM IF YOU HAVE ANY OF THE FOLLOWING:      Difficulty breathing              Chills and/or fever over 101 F   Persistent vomiting and/or vomiting blood   Severe abdominal pain   Severe chest pain   Black, tarry stools   Bleeding- more than one tablespoon   Any other symptom or condition that you feel may need urgent attention  Your doctor recommends these additional instructions:  If any biopsies were taken, your doctors clinic will contact you in 1 to 2   weeks with any results.  - Patient has a contact number available for emergencies.  The signs and   symptoms of potential delayed complications were discussed with the   patient.  Return to normal activities tomorrow.  Written discharge   instructions were provided to the patient.   - High fiber diet.   - Continue present medications.   - Await pathology results.   - No repeat colonoscopy due to age.   - Discharge patient to home (ambulatory).   - Return to my office after studies are complete.  For questions, problems or results please call your physician - Noel Nation MD at Work:  [phone].  [facility] PHONE NUMBER: [phone]  IF A COMPLICATION OR EMERGENCY SITUATION ARISES AND YOU ARE UNABLE TO REACH   YOUR PHYSICIAN - GO DIRECTLY TO THE EMERGENCY ROOM.  Noel Nation MD  11/4/2022 1:08:23 PM  This report has been verified and signed electronically.  Dear patient,  As a result of recent federal legislation (The Federal Cures Act), you may   receive lab or pathology results from your procedure in your [facility]   account before your physician is able to contact you. Your physician or   their representative will relay the results to you with their   recommendations at their soonest availability.  Thank you,  PROVATION

## 2022-11-04 NOTE — H&P
CC: Diarrhea    88 year old male with above. States that symptoms are ongoing, no alleviating/exacerbating factors. No family history of colorectal CA. Positive personal history of polyps. No bleeding or weight loss.     ROS:  No headache, no fever/chills, no chest pain/SOB, no nausea/vomiting/diarrhea/constipation/GI bleeding/abdominal pain, no dysuria/hematuria.    VSSAF   Exam:   Alert and oriented x 3; no apparent distress   PERRLA, sclera anicteric  CV: Regular rate/rhythm, normal PMI   Lungs: Clear bilaterally with no wheeze/rales   Abdomen: Soft, NT/ND, normal bowel sounds   Ext: No cyanosis, clubbing     Impression:   As above    Plan:   Proceed with endoscopy. Further recs to follow.

## 2022-11-08 NOTE — PROGRESS NOTES
Please advise patient that polyp pathology was reviewed and is benign and is the adenomatous type which is precancerous/risk factor for cancer.  No evidence of colitis on biopsies. Repeat colonoscopy not recommended due to age.

## 2022-11-11 NOTE — PROGRESS NOTES
[facility] ORTHOPEDICS  [address]  [place] [address]  Phone: [phone]   [phone]    Patient's PCP: Martine Maxwell MD  Referring Provider: No ref. provider found    Subjective:      Chief Complaint:   Chief Complaint   Patient presents with    Right Hand - Pain     Right hand/wrist pain, started at elbow on Monday, swelling at wrist on Tuesday, pain with operning fingers,        Past Medical History:   Diagnosis Date    Afib     Arthritis     Choledocholithiasis     Chronic kidney disease     Colon polyp     COPD (chronic obstructive pulmonary disease)     Diabetes mellitus, type 2     2/2011    Diverticulosis     Fatty liver     Hepatomegaly     History of blood clots     2020 Dr. Rodriguez     Irradiation cystitis     Prostate cancer 2009    s/p XRT (T1C, [place] 8)    Radiation proctitis     Sleep apnea        Past Surgical History:   Procedure Laterality Date    ANGIOGRAM, CORONARY, WITH LEFT HEART CATHETERIZATION N/A 05/17/2022    Procedure: Angiogram, Coronary, with Left Heart Cath;  Surgeon: Kamlesh Sullivan MD;  Location: [facility] CATH/EP LAB;  Service: Cardiology;  Laterality: N/A;    APPENDECTOMY      CARPAL TUNNEL RELEASE Left 02/05/2021    Procedure: RELEASE, CARPAL TUNNEL;  Surgeon: Jayro Hawkins II, MD;  Location: [facility] OR;  Service: Orthopedics;  Laterality: Left;    CATARACT EXTRACTION Bilateral     COLONOSCOPY N/A 12/14/2018    Procedure: COLONOSCOPY;  Surgeon: Noel Aguiar MD;  Location: [facility] ENDO;  Service: Endoscopy;  Laterality: N/A;    COLONOSCOPY  06/21/2021    Dr. Rodgers, in media: 2 colon polyps removed; diverticulosis, erythema in transverse colon; biopsy: tubular adenomas, ranomd colon unremarkable    COLONOSCOPY N/A 11/4/2022    Procedure: COLONOSCOPY;  Surgeon: Noel Aguiar MD;  Location: [facility] ENDO;  Service: Endoscopy;  Laterality: N/A;    ERCP N/A 11/23/2021    Procedure: ERCP (ENDOSCOPIC RETROGRADE CHOLANGIOPANCREATOGRAPHY);  Surgeon: Marshall HALEY  Kendra GARCIA MD;  Location: [facility] ENDO;  Service: Endoscopy;  Laterality: N/A;    ESOPHAGOGASTRODUODENOSCOPY N/A 01/24/2019    Procedure: EGD (ESOPHAGOGASTRODUODENOSCOPY);  Surgeon: Noel Aguiar MD;  Location: [facility] ENDO;  Service: Endoscopy;  Laterality: N/A;    EYE SURGERY      FLEXIBLE SIGMOIDOSCOPY N/A 01/22/2019    Procedure: SIGMOIDOSCOPY, FLEXIBLE;  Surgeon: Noel Aguiar MD;  Location: [facility] ENDO;  Service: Endoscopy;  Laterality: N/A;    HERNIA REPAIR      bilateral inguinal    LAPAROSCOPIC CHOLECYSTECTOMY N/A 11/24/2021    Procedure: CHOLECYSTECTOMY, LAPAROSCOPIC;  Surgeon: Jay Cabrera Jr., MD;  Location: [facility] OR;  Service: General;  Laterality: N/A;    TONSILLECTOMY         Current Outpatient Medications   Medication Sig    albuterol (PROVENTIL/VENTOLIN HFA) 90 mcg/actuation inhaler INHALE 2 PUFFS INTO THE LUNGS EVERY 6 HOURS AS NEEDED FOR WHEEZE    allopurinol (ZYLOPRIM) 300 MG tablet Take 300 mg by mouth once daily.    aspirin 81 MG Chew Take 81 mg by mouth once daily.    cyanocobalamin (VITAMIN B-12) 1000 MCG tablet Take 100 mcg by mouth once daily.    JANUVIA 100 mg Tab Take 1 tablet (100 mg total) by mouth once daily.    losartan (COZAAR) 25 MG tablet Take 1 tablet (25 mg total) by mouth once daily.    pantoprazole (PROTONIX) 40 MG tablet TAKE 1 TABLET DAILY (Patient taking differently: Take 40 mg by mouth once daily.)    pravastatin (PRAVACHOL) 40 MG tablet TAKE 1 TABLET ONCE DAILY (Patient taking differently: Take 40 mg by mouth once daily.)    pulse oximeter (PULSE OXIMETER) device Use twice daily at 8 AM and 3 PM and record the value in [facility] as directed.    solifenacin (VESICARE) 5 MG tablet TAKE 1 TABLET DAILY (Patient taking differently: Take 5 mg by mouth once daily.)    tamsulosin (FLOMAX) 0.4 mg Cap TAKE 1 CAPSULE ONCE DAILY    colestipoL (COLESTID) 1 gram Tab TAKE 1 TABLET BY MOUTH 2 TIMES DAILY. TAKE OTHER ORAL MEDICATIONS >1H BEFORE OR >4H AFTER COLESTIPOL (Patient not  taking: Reported on 2022)    doxycycline (MONODOX) 100 MG capsule Take 1 capsule (100 mg total) by mouth 2 (two) times daily. (Patient not taking: Reported on 2022)    fluticasone-umeclidin-vilanter (TRELEGY ELLIPTA) 100-62.5-25 mcg DsDv Inhale 1 puff into the lungs once daily.    hydroCHLOROthiazide (MICROZIDE) 12.5 mg capsule Take 12.5 mg by mouth once daily.    methylPREDNISolone (MEDROL DOSEPACK) 4 mg tablet use as directed    potassium chloride (MICRO-K) 10 MEQ CpSR Take 10 mEq by mouth once.     No current facility-administered medications for this visit.       Review of patient's allergies indicates:   Allergen Reactions    No known drug allergies        Family History   Problem Relation Age of Onset    Diabetes Mother     Diabetes Brother        Social History     Socioeconomic History    Marital status:    Tobacco Use    Smoking status: Former     Packs/day: 1.50     Years: 60.00     Pack years: 90.00     Types: Cigarettes     Start date:      Quit date: 2008     Years since quittin.8    Smokeless tobacco: Never   Substance and Sexual Activity    Alcohol use: Yes     Alcohol/week: 1.0 standard drink     Types: 1 Glasses of wine per week     Comment: social - at speical events    Drug use: No    Sexual activity: Not Currently     Partners: Female       Prior to meeting with the patient I reviewed the medical chart in [facility]. This included reviewing the previous progress notes from our office, review of the patient's last appointment with their primary care provider, review of any visits to the emergency room, and review of any pain management appointments or procedures.    History of present illness:  88-year-old male presents to clinic today for evaluation of complaints of right forearm and wrist pain and swelling.  Started acutely this week.  Some difficulty with his grasp or  secondary to the pain.  No reported trauma.      Review of Systems:    Constitutional: Negative  for chills, fever and weight loss.   HENT: Negative for congestion.    Eyes: Negative for discharge and redness.   Respiratory: Negative for cough and shortness of breath.    Cardiovascular: Negative for chest pain.   Gastrointestinal: Negative for nausea and vomiting.   Musculoskeletal: See HPI.   Skin: Negative for rash.   Neurological: Negative for headaches.   Endo/Heme/Allergies: Does not bruise/bleed easily.   Psychiatric/Behavioral: The patient is not nervous/anxious.    All other systems reviewed and are negative.       Objective:      Physical Examination:    Vital Signs:    Vitals:    11/11/22 0902   BP: (!) 130/58       Body mass index is 31.32 kg/m².    This a well-developed, well nourished patient in no acute distress.  They are alert and oriented and cooperative to examination.     Examination of the right upper extremity, he has multiple skin lesions mostly due to his age and lack of elasticity.  No open wounds.  No signs symptoms of infection.  He does have some mild swelling over the dorsum of the right hand and wrist.  He is able to make a fist, he is able to oppose the fingers.      Pertinent New Results:        XRAY Report / Interpretation:   AP lateral oblique views of the right hand taken today in the office demonstrate moderate to severe osteoarthritic changes in the fingers hand and wrist.          Assessment:       1. Arthritis of right wrist      Plan:     Arthritis of right wrist  -     X-Ray Hand 3 view Right  -     methylPREDNISolone (MEDROL DOSEPACK) 4 mg tablet; use as directed  Dispense: 1 each; Refill: 0      Follow up in about 4 weeks (around 12/9/2022) for right wrist f/u.    88-year-old male with nontraumatic right hand and wrist pain for 4 days.  X-rays demonstrate advanced osteoarthritis in the carpal bones of the wrist as well as the metacarpal and phalanges.  Patient is diabetic, his last A1c was from earlier this year it was in the 6 is.  He also has some chronic kidney  disease with elevated BUN and creatinine.  We will give him a Medrol Dosepak as opposed to oral anti-inflammatories.  He states he routinely does not check his sugar but we did caution him that if for some reason he checked his sugar may be elevated.  This should subside quickly after he finishes the taper.  In addition we discussed topical anti-inflammatory gel, diclofenac.  The risk of systemic absorption is low.  But to use the minimum dose for the shortest amount of time topically.  We will check him back in a few weeks see how he is doing.    [unfilled]  JESUS Matthews, PACourtC    This note was created using [employer] voice recognition software that occasionally misinterprets words or phrases.

## 2022-11-22 PROBLEM — R79.89 ELEVATED TROPONIN: Status: RESOLVED | Noted: 2021-11-20 | Resolved: 2022-01-01

## 2022-11-22 NOTE — PROGRESS NOTES
Subjective:       Patient ID: Siddharth Urias is a 88 y.o. male.    Chief Complaint: Follow-up (4mth f/u )    HPI  Review of Systems   Constitutional:  Negative for fatigue and unexpected weight change.   Respiratory:  Negative for chest tightness and shortness of breath.    Cardiovascular:  Negative for chest pain, palpitations and leg swelling.   Gastrointestinal:  Negative for abdominal pain.   Musculoskeletal:  Negative for arthralgias.   Neurological:  Negative for dizziness, syncope, light-headedness and headaches.     Patient Active Problem List   Diagnosis    Unspecified venous (peripheral) insufficiency    Hypertension associated with diabetes    Fatty liver    Irradiation cystitis    Hyperlipidemia LDL goal <70    Type 2 diabetes mellitus, controlled, with renal complications    Diabetic nephropathy    Chronic kidney disease, stage 3    Bilateral renal cysts    Obesity (BMI 30-39.9)    Radiation proctitis    NISH (iron deficiency anemia)    History of DVT of lower extremity    Centrilobular emphysema    COPD (chronic obstructive pulmonary disease)    Aortic calcification and ectasia    Embolism and thrombosis of artery    Bilateral carpal tunnel syndrome    Vitamin D deficiency    B12 deficiency    Carpal tunnel syndrome of left wrist    KAT (acute kidney injury)    Hypomagnesemia    Aortic stenosis    Elevated bilirubin    Aortic stenosis, severe    Atrial fibrillation    Critical aortic valve stenosis    Diabetes mellitus due to underlying condition with hyperglycemia, without long-term current use of insulin    Obstructive sleep apnea    PAD (peripheral artery disease)    Wears hearing aid in both ears    Hearing loss due to cerumen impaction, left    Presbycusis, bilateral    Hearing loss    Noise-induced hearing loss of both ears     Patient is here for a chronic conditions follow up.    Reviewed labs 11/2022-ckd stage 3 b      Has chronic diarrhea.  Has had gi testing Dr. Rodgers. May have  radiation proctitis. Fecal incontinence after prostate cancer treatment. External beam radiation 2011. Now establishing with Dr. Ayala On at [facility]   colonoscopy 11/2022 Dr. Aguiar 3 polyps-benign no further surveillance needed due to age        History:    Had admission [facility] 11/20/2021  10 days for sepsis related to cholecystitis s/p lap sujatha     Ortho- Dr. Hawkins did CT release left 2/5/21  Right one now needing surgery     Reviewed labs 4/2021=b12 now 444     EYe Dr SUYAPA denson 2 months ago      Seen in ED 8/2020 with fever and elevated LFTs. COVID screen neg. lfts returned to normal 9/20. 1 blood culture returned with micrococcus. Advised to go back to ED but refused since was doing better. U/s abd 8/2020 The gallbladder demonstrates sludge without discrete gallstones, there is gallbladder wall thickening however there is no evidence for pericholecystic fluid, or biliary dilatation and the technologist indicates a negative sonographic Amor sign.  Close clinical and historical correlation otherwise needed.   Enlarged appearance of the liver and spleen with mild increased echogenicity of the liver that may relate to diffuse fatty infiltrate.   Abdominal aortic aneurysm now measures up to 3.9 cm compared to 3.4 cm on the prior study, suggesting interval enlargement, close clinical and historical correlation and follow-up is recommended.     Heme/onC Dr. Rodriguez Diagnosed with acute DVT due to sedentary activity 1/19 on xarelto. Recently discontinue 8/2020. Following for prostate cancer and b12 def     GI Dr. Aguiar treating for post radiation proctitis after prostate ca treated 2010, chronic diarrhea     Cardiology Dr. Sullivan/now Victor PAF, PAD and AS. Coronary angiography was attempted at [facility] but aborted but was due to severe calcification and tortuosity of the abdominal aorta, left iliac and left CFA. Additionally his right CFA was occluded with heavy calcification.  He was also found to have with  calcifications of the subclavian artery and arch of the aorta following which CTA of the thorax, abdomen, and pelvis with runoff to evaluate for the patency of the vessels was ordered. After an extensive discussion with his family the patient decided that he wanted to continue with medical management and that he is not interested in any invasive procedures including coronary angiography, TAVR or SAVR.      Urology Dr. Mcrae-prostate cancer     Pulm Dr. Henry treating SANDRA, COPD     Nephro Dr. Calvert CKD stage 3/4     Endocrine (Dr. Whitney  In remote past) now me type 2 DM-last a1c 6.2. lipids at goal . On pravastatin, ARB and Asa.  Eye exam with Dr. Quesada   Podiatry Dr. eHidi Sears PM & R left CTS  Objective:      Physical Exam  Vitals and nursing note reviewed.   Constitutional:       Appearance: He is well-developed.   Cardiovascular:      Rate and Rhythm: Normal rate and regular rhythm.      Heart sounds: Normal heart sounds.   Pulmonary:      Effort: Pulmonary effort is normal.      Breath sounds: Normal breath sounds.   Skin:     General: Skin is warm and dry.   Neurological:      Mental Status: He is alert and oriented to person, place, and time.       Assessment:       1. Hypertension associated with diabetes    2. Controlled type 2 diabetes mellitus with diabetic nephropathy, without long-term current use of insulin    3. Fatty liver    4. Obesity (BMI 30-39.9)    5. Hyperlipidemia LDL goal <70    6. Stage 3a chronic kidney disease    7. Iron deficiency anemia due to chronic blood loss    8. Simple chronic bronchitis    9. Vitamin D deficiency    10. B12 deficiency    11. Gout, unspecified cause, unspecified chronicity, unspecified site        Plan:         1. Hypertension associated with diabetes  Controlled on current medications.  Continue current medications.      2. Controlled type 2 diabetes mellitus with diabetic nephropathy, without long-term current use of insulin  Controlled on  "current medications.  Continue current medications.    - Comprehensive Metabolic Panel; Future  - Hemoglobin A1C; Future  - Microalbumin/Creatinine Ratio, Urine; Future    3. Fatty liver  Nl lfts. Cont to monitor and avoid liver irritants    4. Obesity (BMI 30-39.9)  Counseled patient on his ideal body weight, health consequences of being obese and current recommendations including weekly exercise and a heart healthy diet.  Current BMI is:Estimated body mass index is 31.25 kg/m² as calculated from the following:    Height as of this encounter: 5' 7" (1.702 m).    Weight as of this encounter: 90.5 kg (199 lb 8.3 oz)..  Patient is aware that ideal BMI < 25 or Weight in (lb) to have BMI = 25: 159.3.      5. Hyperlipidemia LDL goal <70  Controlled on current medications.  Continue current medications.    - Lipid Panel; Future    6. Stage 3a chronic kidney disease  Stable and chronic.  Will continue to monitor q3-6 months and control chronic conditions as optimally as possible to preserve function.      7. Iron deficiency anemia due to chronic blood loss  Nl iron.  Cont to monitor    8. Simple chronic bronchitis  Cont current mgmt    9. Vitamin D deficiency  Controlled on current medications.  Continue current medications.      10. B12 deficiency  Controlled on current medications.  Continue current medications.      11. Gout, unspecified cause, unspecified chronicity, unspecified site  Stable condition.  Continue current medications.  Will adjust based on lab findings or if condition changes.    - Uric Acid; Future      Time spent with patient: 20 minutes    Patient with be reevaluated in 4 months or sooner prn    Greater than 50% of this visit was spent counseling as described in above documentation:Yes    "

## 2023-01-01 ENCOUNTER — TELEPHONE (OUTPATIENT)
Dept: FAMILY MEDICINE | Facility: CLINIC | Age: 88
End: 2023-01-01
Payer: MEDICARE

## 2023-01-01 ENCOUNTER — LAB VISIT (OUTPATIENT)
Dept: LAB | Facility: HOSPITAL | Age: 88
End: 2023-01-01
Payer: MEDICARE

## 2023-01-01 ENCOUNTER — HOSPITAL ENCOUNTER (INPATIENT)
Facility: HOSPITAL | Age: 88
LOS: 2 days | Discharge: HOME HEALTH | DRG: 291 | End: 2023-06-21
Attending: EMERGENCY MEDICINE | Admitting: EMERGENCY MEDICINE
Payer: MEDICARE

## 2023-01-01 ENCOUNTER — OFFICE VISIT (OUTPATIENT)
Dept: ORTHOPEDICS | Facility: CLINIC | Age: 88
End: 2023-01-01
Payer: MEDICARE

## 2023-01-01 ENCOUNTER — NURSE TRIAGE (OUTPATIENT)
Dept: ADMINISTRATIVE | Facility: CLINIC | Age: 88
End: 2023-01-01
Payer: MEDICARE

## 2023-01-01 ENCOUNTER — HOSPITAL ENCOUNTER (INPATIENT)
Facility: HOSPITAL | Age: 88
LOS: 7 days | Discharge: NURSING, REHAB OR LONG-TERM CARE | DRG: 683 | End: 2023-05-25
Attending: EMERGENCY MEDICINE | Admitting: INTERNAL MEDICINE
Payer: MEDICARE

## 2023-01-01 ENCOUNTER — TELEPHONE (OUTPATIENT)
Dept: CARDIOLOGY | Facility: CLINIC | Age: 88
End: 2023-01-01
Payer: MEDICARE

## 2023-01-01 ENCOUNTER — OFFICE VISIT (OUTPATIENT)
Dept: CARDIOLOGY | Facility: CLINIC | Age: 88
End: 2023-01-01
Payer: MEDICARE

## 2023-01-01 ENCOUNTER — HOSPITAL ENCOUNTER (OUTPATIENT)
Dept: PREADMISSION TESTING | Facility: HOSPITAL | Age: 88
Discharge: HOME | End: 2023-02-22
Attending: ORTHOPAEDIC SURGERY
Payer: MEDICARE

## 2023-01-01 ENCOUNTER — ANESTHESIA EVENT (OUTPATIENT)
Dept: SURGERY | Facility: HOSPITAL | Age: 88
End: 2023-01-01
Payer: MEDICARE

## 2023-01-01 ENCOUNTER — EXTERNAL HOME HEALTH (OUTPATIENT)
Dept: HOME HEALTH SERVICES | Facility: HOSPITAL | Age: 88
End: 2023-01-01
Payer: MEDICARE

## 2023-01-01 ENCOUNTER — DOCUMENTATION ONLY (OUTPATIENT)
Dept: CARDIOLOGY | Facility: CLINIC | Age: 88
End: 2023-01-01
Payer: MEDICARE

## 2023-01-01 ENCOUNTER — PATIENT OUTREACH (OUTPATIENT)
Dept: ADMINISTRATIVE | Facility: CLINIC | Age: 88
End: 2023-01-01
Payer: MEDICARE

## 2023-01-01 ENCOUNTER — DOCUMENT SCAN (OUTPATIENT)
Dept: HOME HEALTH SERVICES | Facility: HOSPITAL | Age: 88
End: 2023-01-01
Payer: MEDICARE

## 2023-01-01 ENCOUNTER — TELEPHONE (OUTPATIENT)
Dept: HEMATOLOGY/ONCOLOGY | Facility: CLINIC | Age: 88
End: 2023-01-01
Payer: MEDICARE

## 2023-01-01 ENCOUNTER — TELEPHONE (OUTPATIENT)
Dept: FAMILY MEDICINE | Facility: CLINIC | Age: 88
End: 2023-01-01

## 2023-01-01 ENCOUNTER — LAB VISIT (OUTPATIENT)
Dept: LAB | Facility: HOSPITAL | Age: 88
End: 2023-01-01
Attending: FAMILY MEDICINE
Payer: MEDICARE

## 2023-01-01 ENCOUNTER — OUTPATIENT CASE MANAGEMENT (OUTPATIENT)
Dept: ADMINISTRATIVE | Facility: OTHER | Age: 88
End: 2023-01-01
Payer: MEDICARE

## 2023-01-01 ENCOUNTER — APPOINTMENT (OUTPATIENT)
Dept: LAB | Facility: HOSPITAL | Age: 88
End: 2023-01-01
Attending: NURSE PRACTITIONER
Payer: MEDICARE

## 2023-01-01 ENCOUNTER — HOSPITAL ENCOUNTER (OUTPATIENT)
Dept: CARDIOLOGY | Facility: CLINIC | Age: 88
Discharge: HOME | End: 2023-06-30
Payer: MEDICARE

## 2023-01-01 ENCOUNTER — HOSPITAL ENCOUNTER (OUTPATIENT)
Dept: RADIOLOGY | Facility: HOSPITAL | Age: 88
Discharge: HOME | End: 2023-02-22
Attending: ORTHOPAEDIC SURGERY
Payer: MEDICARE

## 2023-01-01 ENCOUNTER — OFFICE VISIT (OUTPATIENT)
Dept: ELECTROPHYSIOLOGY | Facility: CLINIC | Age: 88
End: 2023-01-01
Payer: MEDICARE

## 2023-01-01 ENCOUNTER — TELEPHONE (OUTPATIENT)
Dept: ORTHOPEDICS | Facility: CLINIC | Age: 88
End: 2023-01-01

## 2023-01-01 ENCOUNTER — TELEPHONE (OUTPATIENT)
Dept: ELECTROPHYSIOLOGY | Facility: CLINIC | Age: 88
End: 2023-01-01
Payer: MEDICARE

## 2023-01-01 ENCOUNTER — PATIENT MESSAGE (OUTPATIENT)
Dept: ADMINISTRATIVE | Facility: OTHER | Age: 88
End: 2023-01-01
Payer: MEDICARE

## 2023-01-01 ENCOUNTER — HOSPITAL ENCOUNTER (OUTPATIENT)
Dept: CARDIOLOGY | Facility: CLINIC | Age: 88
Discharge: HOME | End: 2023-08-17
Payer: MEDICARE

## 2023-01-01 ENCOUNTER — ANESTHESIA (OUTPATIENT)
Dept: SURGERY | Facility: HOSPITAL | Age: 88
End: 2023-01-01
Payer: MEDICARE

## 2023-01-01 ENCOUNTER — TELEPHONE (OUTPATIENT)
Dept: CARDIOLOGY | Facility: CLINIC | Age: 88
End: 2023-01-01

## 2023-01-01 ENCOUNTER — OFFICE VISIT (OUTPATIENT)
Dept: FAMILY MEDICINE | Facility: CLINIC | Age: 88
End: 2023-01-01
Payer: MEDICARE

## 2023-01-01 ENCOUNTER — HOSPITAL ENCOUNTER (OUTPATIENT)
Facility: HOSPITAL | Age: 88
Discharge: HOME | End: 2023-03-01
Attending: ORTHOPAEDIC SURGERY | Admitting: ORTHOPAEDIC SURGERY
Payer: MEDICARE

## 2023-01-01 VITALS
WEIGHT: 173.19 LBS | DIASTOLIC BLOOD PRESSURE: 50 MMHG | BODY MASS INDEX: 30.59 KG/M2 | SYSTOLIC BLOOD PRESSURE: 88 MMHG | WEIGHT: 183.63 LBS | HEART RATE: 30 BPM | HEART RATE: 87 BPM | HEIGHT: 65 IN | DIASTOLIC BLOOD PRESSURE: 60 MMHG | OXYGEN SATURATION: 96 % | OXYGEN SATURATION: 98 % | BODY MASS INDEX: 27.18 KG/M2 | HEIGHT: 67 IN | SYSTOLIC BLOOD PRESSURE: 134 MMHG

## 2023-01-01 VITALS
HEART RATE: 84 BPM | SYSTOLIC BLOOD PRESSURE: 124 MMHG | HEIGHT: 67 IN | BODY MASS INDEX: 27.47 KG/M2 | WEIGHT: 175 LBS | DIASTOLIC BLOOD PRESSURE: 78 MMHG

## 2023-01-01 VITALS
BODY MASS INDEX: 26.68 KG/M2 | RESPIRATION RATE: 18 BRPM | OXYGEN SATURATION: 100 % | HEIGHT: 67 IN | SYSTOLIC BLOOD PRESSURE: 96 MMHG | DIASTOLIC BLOOD PRESSURE: 50 MMHG | TEMPERATURE: 98 F | WEIGHT: 170 LBS | HEART RATE: 90 BPM

## 2023-01-01 VITALS
DIASTOLIC BLOOD PRESSURE: 77 MMHG | SYSTOLIC BLOOD PRESSURE: 129 MMHG | HEIGHT: 67 IN | HEART RATE: 78 BPM | WEIGHT: 199 LBS | OXYGEN SATURATION: 94 % | RESPIRATION RATE: 16 BRPM | TEMPERATURE: 98 F | BODY MASS INDEX: 31.23 KG/M2

## 2023-01-01 VITALS
BODY MASS INDEX: 29.47 KG/M2 | TEMPERATURE: 98 F | OXYGEN SATURATION: 94 % | RESPIRATION RATE: 17 BRPM | HEIGHT: 68 IN | WEIGHT: 194.44 LBS | HEART RATE: 77 BPM | SYSTOLIC BLOOD PRESSURE: 127 MMHG | DIASTOLIC BLOOD PRESSURE: 53 MMHG

## 2023-01-01 VITALS
OXYGEN SATURATION: 95 % | WEIGHT: 181.19 LBS | HEART RATE: 62 BPM | SYSTOLIC BLOOD PRESSURE: 120 MMHG | HEIGHT: 67 IN | BODY MASS INDEX: 28.44 KG/M2 | DIASTOLIC BLOOD PRESSURE: 56 MMHG

## 2023-01-01 VITALS
WEIGHT: 185.63 LBS | BODY MASS INDEX: 29.13 KG/M2 | SYSTOLIC BLOOD PRESSURE: 64 MMHG | HEART RATE: 66 BPM | HEIGHT: 67 IN | DIASTOLIC BLOOD PRESSURE: 40 MMHG | OXYGEN SATURATION: 96 % | TEMPERATURE: 98 F

## 2023-01-01 VITALS
RESPIRATION RATE: 16 BRPM | BODY MASS INDEX: 31.23 KG/M2 | DIASTOLIC BLOOD PRESSURE: 79 MMHG | TEMPERATURE: 98 F | OXYGEN SATURATION: 99 % | HEIGHT: 67 IN | WEIGHT: 199 LBS | HEART RATE: 73 BPM | SYSTOLIC BLOOD PRESSURE: 116 MMHG

## 2023-01-01 VITALS
HEIGHT: 67 IN | DIASTOLIC BLOOD PRESSURE: 79 MMHG | BODY MASS INDEX: 31.23 KG/M2 | WEIGHT: 199 LBS | SYSTOLIC BLOOD PRESSURE: 122 MMHG

## 2023-01-01 VITALS — HEIGHT: 68 IN | WEIGHT: 183 LBS | BODY MASS INDEX: 27.74 KG/M2

## 2023-01-01 VITALS — BODY MASS INDEX: 31.23 KG/M2 | WEIGHT: 199 LBS | HEIGHT: 67 IN

## 2023-01-01 DIAGNOSIS — I48.91 ATRIAL FIBRILLATION, UNSPECIFIED TYPE: ICD-10-CM

## 2023-01-01 DIAGNOSIS — Z01.818 PREOP TESTING: ICD-10-CM

## 2023-01-01 DIAGNOSIS — N39.0 URINARY TRACT INFECTION, SITE NOT SPECIFIED: Primary | ICD-10-CM

## 2023-01-01 DIAGNOSIS — J44.9 CHRONIC OBSTRUCTIVE PULMONARY DISEASE, UNSPECIFIED COPD TYPE: ICD-10-CM

## 2023-01-01 DIAGNOSIS — R07.9 CHEST PAIN: ICD-10-CM

## 2023-01-01 DIAGNOSIS — I38 HEART FAILURE DUE TO VALVULAR DISEASE, UNSPECIFIED HEART FAILURE TYPE: Primary | ICD-10-CM

## 2023-01-01 DIAGNOSIS — I49.3 PVC (PREMATURE VENTRICULAR CONTRACTION): ICD-10-CM

## 2023-01-01 DIAGNOSIS — R00.1 BRADYCARDIA: ICD-10-CM

## 2023-01-01 DIAGNOSIS — Z90.49 S/P CHOLECYSTECTOMY: ICD-10-CM

## 2023-01-01 DIAGNOSIS — I95.1 ORTHOSTATIC HYPOTENSION: ICD-10-CM

## 2023-01-01 DIAGNOSIS — I87.2 VENOUS (PERIPHERAL) INSUFFICIENCY: ICD-10-CM

## 2023-01-01 DIAGNOSIS — E87.70 FLUID OVERLOAD: ICD-10-CM

## 2023-01-01 DIAGNOSIS — N18.31 STAGE 3A CHRONIC KIDNEY DISEASE: ICD-10-CM

## 2023-01-01 DIAGNOSIS — G56.01 CARPAL TUNNEL SYNDROME, RIGHT: ICD-10-CM

## 2023-01-01 DIAGNOSIS — K52.9 CHRONIC DIARRHEA: ICD-10-CM

## 2023-01-01 DIAGNOSIS — I35.0 AORTIC STENOSIS, SEVERE: Primary | ICD-10-CM

## 2023-01-01 DIAGNOSIS — I74.9 EMBOLISM AND THROMBOSIS OF ARTERY: ICD-10-CM

## 2023-01-01 DIAGNOSIS — I25.118 CORONARY ARTERY DISEASE OF NATIVE ARTERY OF NATIVE HEART WITH STABLE ANGINA PECTORIS: Primary | ICD-10-CM

## 2023-01-01 DIAGNOSIS — G56.01 CARPAL TUNNEL SYNDROME, RIGHT: Primary | ICD-10-CM

## 2023-01-01 DIAGNOSIS — E11.59 HYPERTENSION ASSOCIATED WITH DIABETES: ICD-10-CM

## 2023-01-01 DIAGNOSIS — I49.9 ARRHYTHMIA: ICD-10-CM

## 2023-01-01 DIAGNOSIS — E11.21 CONTROLLED TYPE 2 DIABETES MELLITUS WITH DIABETIC NEPHROPATHY, WITHOUT LONG-TERM CURRENT USE OF INSULIN: ICD-10-CM

## 2023-01-01 DIAGNOSIS — N17.9 ACUTE RENAL FAILURE: ICD-10-CM

## 2023-01-01 DIAGNOSIS — N18.9 ACUTE KIDNEY INJURY SUPERIMPOSED ON CHRONIC KIDNEY DISEASE: ICD-10-CM

## 2023-01-01 DIAGNOSIS — N18.9 CHRONIC KIDNEY DISEASE, UNSPECIFIED CKD STAGE: ICD-10-CM

## 2023-01-01 DIAGNOSIS — I48.0 PAF (PAROXYSMAL ATRIAL FIBRILLATION): Primary | ICD-10-CM

## 2023-01-01 DIAGNOSIS — K21.9 GASTROESOPHAGEAL REFLUX DISEASE WITHOUT ESOPHAGITIS: ICD-10-CM

## 2023-01-01 DIAGNOSIS — I35.0 SEVERE AORTIC STENOSIS: ICD-10-CM

## 2023-01-01 DIAGNOSIS — E78.5 HYPERLIPIDEMIA LDL GOAL <70: ICD-10-CM

## 2023-01-01 DIAGNOSIS — I50.40 COMBINED SYSTOLIC AND DIASTOLIC HEART FAILURE, UNSPECIFIED HF CHRONICITY: ICD-10-CM

## 2023-01-01 DIAGNOSIS — R54 FRAIL ELDERLY: ICD-10-CM

## 2023-01-01 DIAGNOSIS — I48.0 PAF (PAROXYSMAL ATRIAL FIBRILLATION): ICD-10-CM

## 2023-01-01 DIAGNOSIS — I50.9 CONGESTIVE HEART FAILURE, UNSPECIFIED HF CHRONICITY, UNSPECIFIED HEART FAILURE TYPE: Primary | ICD-10-CM

## 2023-01-01 DIAGNOSIS — J44.1 COPD EXACERBATION: Primary | ICD-10-CM

## 2023-01-01 DIAGNOSIS — I48.0 PAROXYSMAL ATRIAL FIBRILLATION: ICD-10-CM

## 2023-01-01 DIAGNOSIS — I73.9 PAD (PERIPHERAL ARTERY DISEASE): ICD-10-CM

## 2023-01-01 DIAGNOSIS — I50.9 HEART FAILURE DUE TO VALVULAR DISEASE, UNSPECIFIED HEART FAILURE TYPE: Primary | ICD-10-CM

## 2023-01-01 DIAGNOSIS — N17.9 ACUTE KIDNEY INJURY: Primary | ICD-10-CM

## 2023-01-01 DIAGNOSIS — E87.5 HYPERKALEMIA: ICD-10-CM

## 2023-01-01 DIAGNOSIS — I35.0 AORTIC STENOSIS, SEVERE: ICD-10-CM

## 2023-01-01 DIAGNOSIS — Z98.890 S/P CARPAL TUNNEL RELEASE: Primary | ICD-10-CM

## 2023-01-01 DIAGNOSIS — R53.1 WEAKNESS: ICD-10-CM

## 2023-01-01 DIAGNOSIS — I50.9 CONGESTIVE HEART FAILURE, UNSPECIFIED HF CHRONICITY, UNSPECIFIED HEART FAILURE TYPE: ICD-10-CM

## 2023-01-01 DIAGNOSIS — N17.9 ACUTE KIDNEY INJURY: ICD-10-CM

## 2023-01-01 DIAGNOSIS — I15.2 HYPERTENSION ASSOCIATED WITH DIABETES: ICD-10-CM

## 2023-01-01 DIAGNOSIS — I21.4 NSTEMI (NON-ST ELEVATED MYOCARDIAL INFARCTION): ICD-10-CM

## 2023-01-01 DIAGNOSIS — G56.01 CARPAL TUNNEL SYNDROME ON RIGHT: Primary | ICD-10-CM

## 2023-01-01 DIAGNOSIS — C61 PROSTATE CANCER: ICD-10-CM

## 2023-01-01 DIAGNOSIS — I50.43 ACUTE ON CHRONIC COMBINED SYSTOLIC AND DIASTOLIC HEART FAILURE DUE TO VALVULAR DISEASE: ICD-10-CM

## 2023-01-01 DIAGNOSIS — R09.02 HYPOXIA: ICD-10-CM

## 2023-01-01 DIAGNOSIS — E87.6 HYPOPOTASSEMIA: Primary | ICD-10-CM

## 2023-01-01 DIAGNOSIS — I50.32 CHRONIC DIASTOLIC HEART FAILURE: Primary | ICD-10-CM

## 2023-01-01 DIAGNOSIS — I38 ACUTE ON CHRONIC COMBINED SYSTOLIC AND DIASTOLIC HEART FAILURE DUE TO VALVULAR DISEASE: ICD-10-CM

## 2023-01-01 DIAGNOSIS — N17.9 ACUTE KIDNEY INJURY SUPERIMPOSED ON CHRONIC KIDNEY DISEASE: ICD-10-CM

## 2023-01-01 DIAGNOSIS — N18.9 ACUTE ON CHRONIC RENAL FAILURE: ICD-10-CM

## 2023-01-01 DIAGNOSIS — K62.7 RADIATION INDUCED PROCTITIS: ICD-10-CM

## 2023-01-01 DIAGNOSIS — M19.041 PRIMARY OSTEOARTHRITIS OF RIGHT HAND: ICD-10-CM

## 2023-01-01 DIAGNOSIS — N17.9 ACUTE ON CHRONIC RENAL FAILURE: ICD-10-CM

## 2023-01-01 DIAGNOSIS — G56.01 CARPAL TUNNEL SYNDROME ON RIGHT: ICD-10-CM

## 2023-01-01 DIAGNOSIS — I50.9 CONGESTIVE HEART FAILURE: ICD-10-CM

## 2023-01-01 DIAGNOSIS — N18.30 STAGE 3 CHRONIC KIDNEY DISEASE, UNSPECIFIED WHETHER STAGE 3A OR 3B CKD: ICD-10-CM

## 2023-01-01 DIAGNOSIS — M17.12 PRIMARY OSTEOARTHRITIS OF LEFT KNEE: Primary | ICD-10-CM

## 2023-01-01 DIAGNOSIS — I25.10 CAD (CORONARY ARTERY DISEASE): ICD-10-CM

## 2023-01-01 DIAGNOSIS — R07.89 OTHER CHEST PAIN: ICD-10-CM

## 2023-01-01 DIAGNOSIS — R06.02 SHORTNESS OF BREATH: ICD-10-CM

## 2023-01-01 DIAGNOSIS — I50.33 ACUTE ON CHRONIC DIASTOLIC HEART FAILURE: Primary | ICD-10-CM

## 2023-01-01 DIAGNOSIS — I48.91 ATRIAL FIBRILLATION, UNSPECIFIED TYPE: Primary | ICD-10-CM

## 2023-01-01 DIAGNOSIS — I95.9 HYPOTENSION, UNSPECIFIED HYPOTENSION TYPE: ICD-10-CM

## 2023-01-01 DIAGNOSIS — Z74.8 ASSISTANCE WITH TRANSPORTATION: Primary | ICD-10-CM

## 2023-01-01 DIAGNOSIS — I35.0 CRITICAL AORTIC VALVE STENOSIS: ICD-10-CM

## 2023-01-01 DIAGNOSIS — I35.0 AORTIC VALVE STENOSIS, ETIOLOGY OF CARDIAC VALVE DISEASE UNSPECIFIED: ICD-10-CM

## 2023-01-01 DIAGNOSIS — I49.8 OTHER CARDIAC ARRHYTHMIA: Primary | ICD-10-CM

## 2023-01-01 DIAGNOSIS — M19.031 ARTHRITIS OF RIGHT WRIST: Primary | ICD-10-CM

## 2023-01-01 LAB
ALBUMIN SERPL BCP-MCNC: 2.6 G/DL (ref 3.5–5.2)
ALBUMIN SERPL BCP-MCNC: 2.7 G/DL (ref 3.5–5.2)
ALBUMIN SERPL BCP-MCNC: 2.7 G/DL (ref 3.5–5.2)
ALBUMIN SERPL BCP-MCNC: 2.9 G/DL (ref 3.5–5.2)
ALBUMIN SERPL BCP-MCNC: 2.9 G/DL (ref 3.5–5.2)
ALBUMIN SERPL BCP-MCNC: 3.1 G/DL (ref 3.5–5.2)
ALBUMIN SERPL BCP-MCNC: 3.6 G/DL (ref 3.5–5.2)
ALP SERPL-CCNC: 60 U/L (ref 55–135)
ALP SERPL-CCNC: 65 U/L (ref 55–135)
ALP SERPL-CCNC: 66 U/L (ref 55–135)
ALP SERPL-CCNC: 71 U/L (ref 55–135)
ALP SERPL-CCNC: 72 U/L (ref 55–135)
ALP SERPL-CCNC: 76 U/L (ref 55–135)
ALT SERPL W/O P-5'-P-CCNC: 10 U/L (ref 10–44)
ALT SERPL W/O P-5'-P-CCNC: 11 U/L (ref 10–44)
ALT SERPL W/O P-5'-P-CCNC: 12 U/L (ref 10–44)
ALT SERPL W/O P-5'-P-CCNC: 13 U/L (ref 10–44)
ALT SERPL W/O P-5'-P-CCNC: 16 U/L (ref 10–44)
ALT SERPL W/O P-5'-P-CCNC: 28 U/L (ref 10–44)
ANION GAP SERPL CALC-SCNC: 10 MMOL/L (ref 8–16)
ANION GAP SERPL CALC-SCNC: 11 MMOL/L (ref 8–16)
ANION GAP SERPL CALC-SCNC: 12 MMOL/L (ref 8–16)
ANION GAP SERPL CALC-SCNC: 13 MMOL/L (ref 8–16)
ANION GAP SERPL CALC-SCNC: 15 MMOL/L (ref 8–16)
ANION GAP SERPL CALC-SCNC: 19 MMOL/L (ref 8–16)
ANION GAP SERPL CALC-SCNC: 7 MMOL/L (ref 8–16)
ANION GAP SERPL CALC-SCNC: 9 MMOL/L (ref 8–16)
ANION GAP SERPL CALC-SCNC: 9 MMOL/L (ref 8–16)
APTT PPP: 26.9 SEC (ref 21–32)
APTT PPP: 35.2 SEC (ref 21–32)
ASCENDING AORTA: 3.47 CM
AST SERPL-CCNC: 11 U/L (ref 10–40)
AST SERPL-CCNC: 12 U/L (ref 10–40)
AST SERPL-CCNC: 13 U/L (ref 10–40)
AST SERPL-CCNC: 13 U/L (ref 10–40)
AST SERPL-CCNC: 14 U/L (ref 10–40)
AST SERPL-CCNC: 22 U/L (ref 10–40)
AV INDEX (PROSTH): 0.22
AV MEAN GRADIENT: 50 MMHG
AV PEAK GRADIENT: 80 MMHG
AV VALVE AREA: 0.93 CM2
AV VELOCITY RATIO: 0.22
BACTERIA #/AREA URNS AUTO: ABNORMAL /HPF
BACTERIA BLD CULT: NORMAL
BACTERIA BLD CULT: NORMAL
BACTERIA UR CULT: NO GROWTH
BASOPHILS # BLD AUTO: 0.02 K/UL (ref 0–0.2)
BASOPHILS # BLD AUTO: 0.03 K/UL (ref 0–0.2)
BASOPHILS # BLD AUTO: 0.04 K/UL (ref 0–0.2)
BASOPHILS # BLD AUTO: 0.05 K/UL (ref 0–0.2)
BASOPHILS # BLD AUTO: 0.12 K/UL (ref 0–0.2)
BASOPHILS NFR BLD: 0.2 % (ref 0–1.9)
BASOPHILS NFR BLD: 0.3 % (ref 0–1.9)
BASOPHILS NFR BLD: 0.3 % (ref 0–1.9)
BASOPHILS NFR BLD: 0.4 % (ref 0–1.9)
BASOPHILS NFR BLD: 0.6 % (ref 0–1.9)
BILIRUB SERPL-MCNC: 0.6 MG/DL (ref 0.1–1)
BILIRUB SERPL-MCNC: 0.6 MG/DL (ref 0.1–1)
BILIRUB SERPL-MCNC: 0.8 MG/DL (ref 0.1–1)
BILIRUB SERPL-MCNC: 0.8 MG/DL (ref 0.1–1)
BILIRUB SERPL-MCNC: 0.9 MG/DL (ref 0.1–1)
BILIRUB SERPL-MCNC: 1.1 MG/DL (ref 0.1–1)
BILIRUB UR QL STRIP: NEGATIVE
BNP SERPL-MCNC: 1220 PG/ML (ref 0–99)
BNP SERPL-MCNC: 231 PG/ML (ref 0–99)
BSA FOR ECHO PROCEDURE: 1.98 M2
BUN SERPL-MCNC: 44 MG/DL (ref 8–23)
BUN SERPL-MCNC: 45 MG/DL (ref 8–23)
BUN SERPL-MCNC: 45 MG/DL (ref 8–23)
BUN SERPL-MCNC: 47 MG/DL (ref 8–23)
BUN SERPL-MCNC: 58 MG/DL (ref 8–23)
BUN SERPL-MCNC: 59 MG/DL (ref 8–23)
BUN SERPL-MCNC: 61 MG/DL (ref 8–23)
BUN SERPL-MCNC: 61 MG/DL (ref 8–23)
BUN SERPL-MCNC: 63 MG/DL (ref 8–23)
BUN SERPL-MCNC: 64 MG/DL (ref 8–23)
BUN SERPL-MCNC: 66 MG/DL (ref 8–23)
BUN SERPL-MCNC: 70 MG/DL (ref 8–23)
BUN SERPL-MCNC: 75 MG/DL (ref 8–23)
BUN SERPL-MCNC: 77 MG/DL (ref 8–23)
BUN SERPL-MCNC: 82 MG/DL (ref 8–23)
BUN SERPL-MCNC: 84 MG/DL (ref 8–23)
BUN SERPL-MCNC: 88 MG/DL (ref 8–23)
CALCIUM SERPL-MCNC: 7.9 MG/DL (ref 8.7–10.5)
CALCIUM SERPL-MCNC: 7.9 MG/DL (ref 8.7–10.5)
CALCIUM SERPL-MCNC: 8.1 MG/DL (ref 8.7–10.5)
CALCIUM SERPL-MCNC: 8.2 MG/DL (ref 8.7–10.5)
CALCIUM SERPL-MCNC: 8.4 MG/DL (ref 8.7–10.5)
CALCIUM SERPL-MCNC: 8.5 MG/DL (ref 8.7–10.5)
CALCIUM SERPL-MCNC: 8.6 MG/DL (ref 8.7–10.5)
CALCIUM SERPL-MCNC: 8.6 MG/DL (ref 8.7–10.5)
CALCIUM SERPL-MCNC: 8.7 MG/DL (ref 8.7–10.5)
CALCIUM SERPL-MCNC: 8.8 MG/DL (ref 8.7–10.5)
CHLORIDE SERPL-SCNC: 101 MMOL/L (ref 95–110)
CHLORIDE SERPL-SCNC: 102 MMOL/L (ref 95–110)
CHLORIDE SERPL-SCNC: 103 MMOL/L (ref 95–110)
CHLORIDE SERPL-SCNC: 104 MMOL/L (ref 95–110)
CHLORIDE SERPL-SCNC: 106 MMOL/L (ref 95–110)
CHLORIDE SERPL-SCNC: 91 MMOL/L (ref 95–110)
CHLORIDE SERPL-SCNC: 95 MMOL/L (ref 95–110)
CHLORIDE SERPL-SCNC: 96 MMOL/L (ref 95–110)
CHLORIDE SERPL-SCNC: 97 MMOL/L (ref 95–110)
CHLORIDE SERPL-SCNC: 99 MMOL/L (ref 95–110)
CLARITY UR REFRACT.AUTO: CLEAR
CLARITY UR: CLEAR
CLARITY UR: CLEAR
CO2 SERPL-SCNC: 16 MMOL/L (ref 23–29)
CO2 SERPL-SCNC: 19 MMOL/L (ref 23–29)
CO2 SERPL-SCNC: 21 MMOL/L (ref 23–29)
CO2 SERPL-SCNC: 22 MMOL/L (ref 23–29)
CO2 SERPL-SCNC: 23 MMOL/L (ref 23–29)
CO2 SERPL-SCNC: 23 MMOL/L (ref 23–29)
CO2 SERPL-SCNC: 24 MMOL/L (ref 23–29)
CO2 SERPL-SCNC: 25 MMOL/L (ref 23–29)
CO2 SERPL-SCNC: 25 MMOL/L (ref 23–29)
CO2 SERPL-SCNC: 26 MMOL/L (ref 23–29)
CO2 SERPL-SCNC: 27 MMOL/L (ref 23–29)
CO2 SERPL-SCNC: 27 MMOL/L (ref 23–29)
CO2 SERPL-SCNC: 28 MMOL/L (ref 23–29)
CO2 SERPL-SCNC: 30 MMOL/L (ref 23–29)
COLOR UR AUTO: ABNORMAL
COLOR UR: YELLOW
COLOR UR: YELLOW
CREAT SERPL-MCNC: 1.5 MG/DL (ref 0.5–1.4)
CREAT SERPL-MCNC: 1.6 MG/DL (ref 0.5–1.4)
CREAT SERPL-MCNC: 1.7 MG/DL (ref 0.5–1.4)
CREAT SERPL-MCNC: 1.8 MG/DL (ref 0.5–1.4)
CREAT SERPL-MCNC: 1.9 MG/DL (ref 0.5–1.4)
CREAT SERPL-MCNC: 1.9 MG/DL (ref 0.5–1.4)
CREAT SERPL-MCNC: 2 MG/DL (ref 0.5–1.4)
CREAT SERPL-MCNC: 2.2 MG/DL (ref 0.5–1.4)
CREAT SERPL-MCNC: 2.2 MG/DL (ref 0.5–1.4)
CREAT SERPL-MCNC: 2.3 MG/DL (ref 0.5–1.4)
CREAT SERPL-MCNC: 2.3 MG/DL (ref 0.5–1.4)
CREAT SERPL-MCNC: 2.4 MG/DL (ref 0.5–1.4)
CREAT SERPL-MCNC: 2.6 MG/DL (ref 0.5–1.4)
CREAT SERPL-MCNC: 2.7 MG/DL (ref 0.5–1.4)
CREAT SERPL-MCNC: 2.9 MG/DL (ref 0.5–1.4)
CREAT UR-MCNC: 84 MG/DL (ref 23–375)
CV ECHO LV RWT: 0.34 CM
DIFFERENTIAL METHOD: ABNORMAL
DOP CALC AO PEAK VEL: 4.46 M/S
DOP CALC AO VTI: 116.35 CM
DOP CALC LVOT AREA: 4.2 CM2
DOP CALC LVOT DIAMETER: 2.3 CM
DOP CALC LVOT PEAK VEL: 0.97 M/S
DOP CALC LVOT STROKE VOLUME: 108.18 CM3
DOP CALCLVOT PEAK VEL VTI: 26.05 CM
E WAVE DECELERATION TIME: 338.51 MSEC
E/A RATIO: 0.82
ECHO LV POSTERIOR WALL: 0.93 CM (ref 0.6–1.1)
EJECTION FRACTION: 45 %
EOSINOPHIL # BLD AUTO: 0.1 K/UL (ref 0–0.5)
EOSINOPHIL # BLD AUTO: 0.2 K/UL (ref 0–0.5)
EOSINOPHIL NFR BLD: 0.2 % (ref 0–8)
EOSINOPHIL NFR BLD: 0.6 % (ref 0–8)
EOSINOPHIL NFR BLD: 0.6 % (ref 0–8)
EOSINOPHIL NFR BLD: 0.9 % (ref 0–8)
EOSINOPHIL NFR BLD: 0.9 % (ref 0–8)
EOSINOPHIL NFR BLD: 1 % (ref 0–8)
EOSINOPHIL NFR BLD: 1 % (ref 0–8)
EOSINOPHIL NFR BLD: 1.1 % (ref 0–8)
EOSINOPHIL NFR BLD: 1.1 % (ref 0–8)
EOSINOPHIL NFR BLD: 1.2 % (ref 0–8)
EOSINOPHIL NFR BLD: 1.6 % (ref 0–8)
EOSINOPHIL NFR BLD: 1.7 % (ref 0–8)
EOSINOPHIL NFR BLD: 1.8 % (ref 0–8)
EOSINOPHIL NFR BLD: 1.8 % (ref 0–8)
EOSINOPHIL NFR BLD: 2 % (ref 0–8)
ERYTHROCYTE [DISTWIDTH] IN BLOOD BY AUTOMATED COUNT: 15.4 % (ref 11.5–14.5)
ERYTHROCYTE [DISTWIDTH] IN BLOOD BY AUTOMATED COUNT: 15.5 % (ref 11.5–14.5)
ERYTHROCYTE [DISTWIDTH] IN BLOOD BY AUTOMATED COUNT: 15.5 % (ref 11.5–14.5)
ERYTHROCYTE [DISTWIDTH] IN BLOOD BY AUTOMATED COUNT: 15.6 % (ref 11.5–14.5)
ERYTHROCYTE [DISTWIDTH] IN BLOOD BY AUTOMATED COUNT: 15.7 % (ref 11.5–14.5)
ERYTHROCYTE [DISTWIDTH] IN BLOOD BY AUTOMATED COUNT: 15.7 % (ref 11.5–14.5)
ERYTHROCYTE [DISTWIDTH] IN BLOOD BY AUTOMATED COUNT: 15.9 % (ref 11.5–14.5)
ERYTHROCYTE [DISTWIDTH] IN BLOOD BY AUTOMATED COUNT: 16.3 % (ref 11.5–14.5)
ERYTHROCYTE [DISTWIDTH] IN BLOOD BY AUTOMATED COUNT: 18.3 % (ref 11.5–14.5)
ERYTHROCYTE [DISTWIDTH] IN BLOOD BY AUTOMATED COUNT: 18.5 % (ref 11.5–14.5)
ERYTHROCYTE [DISTWIDTH] IN BLOOD BY AUTOMATED COUNT: 18.6 % (ref 11.5–14.5)
ERYTHROCYTE [DISTWIDTH] IN BLOOD BY AUTOMATED COUNT: 18.7 % (ref 11.5–14.5)
ERYTHROCYTE [DISTWIDTH] IN BLOOD BY AUTOMATED COUNT: 19 % (ref 11.5–14.5)
ERYTHROCYTE [DISTWIDTH] IN BLOOD BY AUTOMATED COUNT: 19.1 % (ref 11.5–14.5)
EST. GFR  (NO RACE VARIABLE): 20 ML/MIN/1.73 M^2
EST. GFR  (NO RACE VARIABLE): 21.8 ML/MIN/1.73 M^2
EST. GFR  (NO RACE VARIABLE): 22.9 ML/MIN/1.73 M^2
EST. GFR  (NO RACE VARIABLE): 25.2 ML/MIN/1.73 M^2
EST. GFR  (NO RACE VARIABLE): 26.5 ML/MIN/1.73 M^2
EST. GFR  (NO RACE VARIABLE): 26.5 ML/MIN/1.73 M^2
EST. GFR  (NO RACE VARIABLE): 27.9 ML/MIN/1.73 M^2
EST. GFR  (NO RACE VARIABLE): 27.9 ML/MIN/1.73 M^2
EST. GFR  (NO RACE VARIABLE): 31.3 ML/MIN/1.73 M^2
EST. GFR  (NO RACE VARIABLE): 33.3 ML/MIN/1.73 M^2
EST. GFR  (NO RACE VARIABLE): 33.3 ML/MIN/1.73 M^2
EST. GFR  (NO RACE VARIABLE): 35.5 ML/MIN/1.73 M^2
EST. GFR  (NO RACE VARIABLE): 38.1 ML/MIN/1.73 M^2
EST. GFR  (NO RACE VARIABLE): 40.9 ML/MIN/1.73 M^2
EST. GFR  (NO RACE VARIABLE): 44.2 ML/MIN/1.73 M^2
ESTIMATED AVG GLUCOSE: 143 MG/DL (ref 68–131)
FRACTIONAL SHORTENING: 30 % (ref 28–44)
GLUCOSE SERPL-MCNC: 104 MG/DL (ref 70–110)
GLUCOSE SERPL-MCNC: 112 MG/DL (ref 70–110)
GLUCOSE SERPL-MCNC: 115 MG/DL (ref 70–110)
GLUCOSE SERPL-MCNC: 122 MG/DL (ref 70–110)
GLUCOSE SERPL-MCNC: 123 MG/DL (ref 70–110)
GLUCOSE SERPL-MCNC: 124 MG/DL (ref 70–110)
GLUCOSE SERPL-MCNC: 125 MG/DL (ref 70–110)
GLUCOSE SERPL-MCNC: 125 MG/DL (ref 70–110)
GLUCOSE SERPL-MCNC: 129 MG/DL (ref 70–110)
GLUCOSE SERPL-MCNC: 129 MG/DL (ref 70–110)
GLUCOSE SERPL-MCNC: 132 MG/DL (ref 70–110)
GLUCOSE SERPL-MCNC: 132 MG/DL (ref 70–110)
GLUCOSE SERPL-MCNC: 133 MG/DL (ref 70–110)
GLUCOSE SERPL-MCNC: 133 MG/DL (ref 70–110)
GLUCOSE SERPL-MCNC: 136 MG/DL (ref 70–110)
GLUCOSE SERPL-MCNC: 137 MG/DL (ref 70–110)
GLUCOSE SERPL-MCNC: 139 MG/DL (ref 70–110)
GLUCOSE SERPL-MCNC: 139 MG/DL (ref 70–110)
GLUCOSE SERPL-MCNC: 144 MG/DL (ref 70–110)
GLUCOSE SERPL-MCNC: 151 MG/DL (ref 70–110)
GLUCOSE SERPL-MCNC: 151 MG/DL (ref 70–110)
GLUCOSE SERPL-MCNC: 153 MG/DL (ref 70–110)
GLUCOSE SERPL-MCNC: 155 MG/DL (ref 70–110)
GLUCOSE SERPL-MCNC: 158 MG/DL (ref 70–110)
GLUCOSE SERPL-MCNC: 158 MG/DL (ref 70–110)
GLUCOSE SERPL-MCNC: 161 MG/DL (ref 70–110)
GLUCOSE SERPL-MCNC: 163 MG/DL (ref 70–110)
GLUCOSE SERPL-MCNC: 164 MG/DL (ref 70–110)
GLUCOSE SERPL-MCNC: 167 MG/DL (ref 70–110)
GLUCOSE SERPL-MCNC: 170 MG/DL (ref 70–110)
GLUCOSE SERPL-MCNC: 194 MG/DL (ref 70–110)
GLUCOSE SERPL-MCNC: 235 MG/DL (ref 70–110)
GLUCOSE SERPL-MCNC: 246 MG/DL (ref 70–110)
GLUCOSE SERPL-MCNC: 79 MG/DL (ref 70–110)
GLUCOSE SERPL-MCNC: 85 MG/DL (ref 70–110)
GLUCOSE SERPL-MCNC: 86 MG/DL (ref 70–110)
GLUCOSE SERPL-MCNC: 88 MG/DL (ref 70–110)
GLUCOSE SERPL-MCNC: 89 MG/DL (ref 70–110)
GLUCOSE SERPL-MCNC: 89 MG/DL (ref 70–110)
GLUCOSE SERPL-MCNC: 98 MG/DL (ref 70–110)
GLUCOSE UR QL STRIP: NEGATIVE
HAV IGM SERPL QL IA: NORMAL
HBA1C MFR BLD: 6.6 % (ref 4.5–6.2)
HBV CORE IGM SERPL QL IA: NORMAL
HBV SURFACE AG SERPL QL IA: NORMAL
HCT VFR BLD AUTO: 34.9 % (ref 40–54)
HCT VFR BLD AUTO: 35.2 % (ref 40–54)
HCT VFR BLD AUTO: 36 % (ref 40–54)
HCT VFR BLD AUTO: 36.1 % (ref 40–54)
HCT VFR BLD AUTO: 36.1 % (ref 40–54)
HCT VFR BLD AUTO: 38.5 % (ref 40–54)
HCT VFR BLD AUTO: 38.7 % (ref 40–54)
HCT VFR BLD AUTO: 39.9 % (ref 40–54)
HCT VFR BLD AUTO: 39.9 % (ref 40–54)
HCT VFR BLD AUTO: 40.4 % (ref 40–54)
HCT VFR BLD AUTO: 40.9 % (ref 40–54)
HCT VFR BLD AUTO: 41 % (ref 40–54)
HCT VFR BLD AUTO: 41.7 % (ref 40–54)
HCT VFR BLD AUTO: 41.8 % (ref 40–54)
HCT VFR BLD AUTO: 43.1 % (ref 40–54)
HCT VFR BLD AUTO: 43.4 % (ref 40–54)
HCT VFR BLD AUTO: 48.6 % (ref 40–54)
HCV AB SERPL QL IA: NORMAL
HGB BLD-MCNC: 10.5 G/DL (ref 14–18)
HGB BLD-MCNC: 10.7 G/DL (ref 14–18)
HGB BLD-MCNC: 11.2 G/DL (ref 14–18)
HGB BLD-MCNC: 11.3 G/DL (ref 14–18)
HGB BLD-MCNC: 11.3 G/DL (ref 14–18)
HGB BLD-MCNC: 12 G/DL (ref 14–18)
HGB BLD-MCNC: 12.2 G/DL (ref 14–18)
HGB BLD-MCNC: 12.4 G/DL (ref 14–18)
HGB BLD-MCNC: 12.6 G/DL (ref 14–18)
HGB BLD-MCNC: 12.6 G/DL (ref 14–18)
HGB BLD-MCNC: 12.7 G/DL (ref 14–18)
HGB BLD-MCNC: 12.8 G/DL (ref 14–18)
HGB BLD-MCNC: 13.6 G/DL (ref 14–18)
HGB BLD-MCNC: 13.7 G/DL (ref 14–18)
HGB BLD-MCNC: 15.5 G/DL (ref 14–18)
HGB UR QL STRIP: ABNORMAL
HGB UR QL STRIP: NEGATIVE
HGB UR QL STRIP: NEGATIVE
HIV 1+2 AB+HIV1 P24 AG SERPL QL IA: NORMAL
HYALINE CASTS UR QL AUTO: 5 /LPF
IMM GRANULOCYTES # BLD AUTO: 0.04 K/UL (ref 0–0.04)
IMM GRANULOCYTES # BLD AUTO: 0.05 K/UL (ref 0–0.04)
IMM GRANULOCYTES # BLD AUTO: 0.06 K/UL (ref 0–0.04)
IMM GRANULOCYTES # BLD AUTO: 0.08 K/UL (ref 0–0.04)
IMM GRANULOCYTES # BLD AUTO: 0.09 K/UL (ref 0–0.04)
IMM GRANULOCYTES # BLD AUTO: 0.09 K/UL (ref 0–0.04)
IMM GRANULOCYTES # BLD AUTO: 0.1 K/UL (ref 0–0.04)
IMM GRANULOCYTES # BLD AUTO: 0.1 K/UL (ref 0–0.04)
IMM GRANULOCYTES # BLD AUTO: 0.12 K/UL (ref 0–0.04)
IMM GRANULOCYTES # BLD AUTO: 0.14 K/UL (ref 0–0.04)
IMM GRANULOCYTES # BLD AUTO: 0.75 K/UL (ref 0–0.04)
IMM GRANULOCYTES NFR BLD AUTO: 0.4 % (ref 0–0.5)
IMM GRANULOCYTES NFR BLD AUTO: 0.5 % (ref 0–0.5)
IMM GRANULOCYTES NFR BLD AUTO: 0.5 % (ref 0–0.5)
IMM GRANULOCYTES NFR BLD AUTO: 0.6 % (ref 0–0.5)
IMM GRANULOCYTES NFR BLD AUTO: 0.7 % (ref 0–0.5)
IMM GRANULOCYTES NFR BLD AUTO: 0.8 % (ref 0–0.5)
IMM GRANULOCYTES NFR BLD AUTO: 0.8 % (ref 0–0.5)
IMM GRANULOCYTES NFR BLD AUTO: 0.9 % (ref 0–0.5)
IMM GRANULOCYTES NFR BLD AUTO: 1 % (ref 0–0.5)
IMM GRANULOCYTES NFR BLD AUTO: 3.5 % (ref 0–0.5)
INR PPP: 1.2 (ref 0.8–1.2)
INTERVENTRICULAR SEPTUM: 0.77 CM (ref 0.6–1.1)
KETONES UR QL STRIP: NEGATIVE
LA MAJOR: 7.12 CM
LA MINOR: 6.86 CM
LA WIDTH: 5.09 CM
LACTATE SERPL-SCNC: 1.5 MMOL/L (ref 0.5–2.2)
LACTATE SERPL-SCNC: 1.5 MMOL/L (ref 0.5–2.2)
LACTATE SERPL-SCNC: 1.7 MMOL/L (ref 0.5–1.9)
LEFT ATRIUM SIZE: 3.48 CM
LEFT ATRIUM VOLUME INDEX MOD: 62.5 ML/M2
LEFT ATRIUM VOLUME INDEX: 54 ML/M2
LEFT ATRIUM VOLUME MOD: 121.84 CM3
LEFT ATRIUM VOLUME: 105.21 CM3
LEFT INTERNAL DIMENSION IN SYSTOLE: 3.8 CM (ref 2.1–4)
LEFT VENTRICLE DIASTOLIC VOLUME INDEX: 74.26 ML/M2
LEFT VENTRICLE DIASTOLIC VOLUME: 144.81 ML
LEFT VENTRICLE MASS INDEX: 87 G/M2
LEFT VENTRICLE SYSTOLIC VOLUME INDEX: 31.7 ML/M2
LEFT VENTRICLE SYSTOLIC VOLUME: 61.88 ML
LEFT VENTRICULAR INTERNAL DIMENSION IN DIASTOLE: 5.46 CM (ref 3.5–6)
LEFT VENTRICULAR MASS: 170.57 G
LEUKOCYTE ESTERASE UR QL STRIP: ABNORMAL
LEUKOCYTE ESTERASE UR QL STRIP: NEGATIVE
LEUKOCYTE ESTERASE UR QL STRIP: NEGATIVE
LYMPHOCYTES # BLD AUTO: 1 K/UL (ref 1–4.8)
LYMPHOCYTES # BLD AUTO: 1.2 K/UL (ref 1–4.8)
LYMPHOCYTES # BLD AUTO: 1.5 K/UL (ref 1–4.8)
LYMPHOCYTES # BLD AUTO: 1.7 K/UL (ref 1–4.8)
LYMPHOCYTES # BLD AUTO: 1.8 K/UL (ref 1–4.8)
LYMPHOCYTES # BLD AUTO: 1.8 K/UL (ref 1–4.8)
LYMPHOCYTES # BLD AUTO: 1.9 K/UL (ref 1–4.8)
LYMPHOCYTES # BLD AUTO: 1.9 K/UL (ref 1–4.8)
LYMPHOCYTES # BLD AUTO: 2 K/UL (ref 1–4.8)
LYMPHOCYTES # BLD AUTO: 2 K/UL (ref 1–4.8)
LYMPHOCYTES # BLD AUTO: 2.4 K/UL (ref 1–4.8)
LYMPHOCYTES NFR BLD: 10.7 % (ref 18–48)
LYMPHOCYTES NFR BLD: 11.2 % (ref 18–48)
LYMPHOCYTES NFR BLD: 11.2 % (ref 18–48)
LYMPHOCYTES NFR BLD: 12.1 % (ref 18–48)
LYMPHOCYTES NFR BLD: 12.2 % (ref 18–48)
LYMPHOCYTES NFR BLD: 12.3 % (ref 18–48)
LYMPHOCYTES NFR BLD: 12.7 % (ref 18–48)
LYMPHOCYTES NFR BLD: 14 % (ref 18–48)
LYMPHOCYTES NFR BLD: 14.2 % (ref 18–48)
LYMPHOCYTES NFR BLD: 15.4 % (ref 18–48)
LYMPHOCYTES NFR BLD: 15.8 % (ref 18–48)
LYMPHOCYTES NFR BLD: 15.9 % (ref 18–48)
LYMPHOCYTES NFR BLD: 18.4 % (ref 18–48)
LYMPHOCYTES NFR BLD: 8.8 % (ref 18–48)
LYMPHOCYTES NFR BLD: 9.1 % (ref 18–48)
LYMPHOCYTES NFR BLD: 9.3 % (ref 18–48)
LYMPHOCYTES NFR BLD: 9.3 % (ref 18–48)
MAGNESIUM SERPL-MCNC: 1.8 MG/DL (ref 1.6–2.6)
MAGNESIUM SERPL-MCNC: 1.9 MG/DL (ref 1.6–2.6)
MAGNESIUM SERPL-MCNC: 1.9 MG/DL (ref 1.6–2.6)
MAGNESIUM SERPL-MCNC: 2 MG/DL (ref 1.6–2.6)
MAGNESIUM SERPL-MCNC: 2.1 MG/DL (ref 1.6–2.6)
MAGNESIUM SERPL-MCNC: 2.1 MG/DL (ref 1.6–2.6)
MAGNESIUM SERPL-MCNC: 2.2 MG/DL (ref 1.6–2.6)
MCH RBC QN AUTO: 25.6 PG (ref 27–31)
MCH RBC QN AUTO: 25.7 PG (ref 27–31)
MCH RBC QN AUTO: 25.7 PG (ref 27–31)
MCH RBC QN AUTO: 25.8 PG (ref 27–31)
MCH RBC QN AUTO: 25.9 PG (ref 27–31)
MCH RBC QN AUTO: 25.9 PG (ref 27–31)
MCH RBC QN AUTO: 26 PG (ref 27–31)
MCH RBC QN AUTO: 26 PG (ref 27–31)
MCH RBC QN AUTO: 26.1 PG (ref 27–31)
MCH RBC QN AUTO: 26.2 PG (ref 27–31)
MCH RBC QN AUTO: 26.4 PG (ref 27–31)
MCH RBC QN AUTO: 26.5 PG (ref 27–31)
MCH RBC QN AUTO: 26.6 PG (ref 27–31)
MCHC RBC AUTO-ENTMCNC: 30.1 G/DL (ref 32–36)
MCHC RBC AUTO-ENTMCNC: 30.1 G/DL (ref 32–36)
MCHC RBC AUTO-ENTMCNC: 30.3 G/DL (ref 32–36)
MCHC RBC AUTO-ENTMCNC: 30.4 G/DL (ref 32–36)
MCHC RBC AUTO-ENTMCNC: 30.7 G/DL (ref 32–36)
MCHC RBC AUTO-ENTMCNC: 31 G/DL (ref 32–36)
MCHC RBC AUTO-ENTMCNC: 31 G/DL (ref 32–36)
MCHC RBC AUTO-ENTMCNC: 31.1 G/DL (ref 32–36)
MCHC RBC AUTO-ENTMCNC: 31.3 G/DL (ref 32–36)
MCHC RBC AUTO-ENTMCNC: 31.3 G/DL (ref 32–36)
MCHC RBC AUTO-ENTMCNC: 31.4 G/DL (ref 32–36)
MCHC RBC AUTO-ENTMCNC: 31.7 G/DL (ref 32–36)
MCHC RBC AUTO-ENTMCNC: 31.8 G/DL (ref 32–36)
MCHC RBC AUTO-ENTMCNC: 31.8 G/DL (ref 32–36)
MCHC RBC AUTO-ENTMCNC: 31.9 G/DL (ref 32–36)
MCV RBC AUTO: 82 FL (ref 82–98)
MCV RBC AUTO: 83 FL (ref 82–98)
MCV RBC AUTO: 84 FL (ref 82–98)
MCV RBC AUTO: 85 FL (ref 82–98)
MCV RBC AUTO: 86 FL (ref 82–98)
MICROSCOPIC COMMENT: ABNORMAL
MONOCYTES # BLD AUTO: 0.6 K/UL (ref 0.3–1)
MONOCYTES # BLD AUTO: 0.7 K/UL (ref 0.3–1)
MONOCYTES # BLD AUTO: 0.8 K/UL (ref 0.3–1)
MONOCYTES # BLD AUTO: 1 K/UL (ref 0.3–1)
MONOCYTES # BLD AUTO: 1.1 K/UL (ref 0.3–1)
MONOCYTES # BLD AUTO: 1.2 K/UL (ref 0.3–1)
MONOCYTES # BLD AUTO: 1.3 K/UL (ref 0.3–1)
MONOCYTES # BLD AUTO: 1.3 K/UL (ref 0.3–1)
MONOCYTES # BLD AUTO: 1.4 K/UL (ref 0.3–1)
MONOCYTES NFR BLD: 10.1 % (ref 4–15)
MONOCYTES NFR BLD: 10.1 % (ref 4–15)
MONOCYTES NFR BLD: 10.3 % (ref 4–15)
MONOCYTES NFR BLD: 6.5 % (ref 4–15)
MONOCYTES NFR BLD: 6.6 % (ref 4–15)
MONOCYTES NFR BLD: 7.1 % (ref 4–15)
MONOCYTES NFR BLD: 7.3 % (ref 4–15)
MONOCYTES NFR BLD: 7.6 % (ref 4–15)
MONOCYTES NFR BLD: 7.7 % (ref 4–15)
MONOCYTES NFR BLD: 7.8 % (ref 4–15)
MONOCYTES NFR BLD: 7.8 % (ref 4–15)
MONOCYTES NFR BLD: 8.1 % (ref 4–15)
MONOCYTES NFR BLD: 8.1 % (ref 4–15)
MONOCYTES NFR BLD: 8.3 % (ref 4–15)
MONOCYTES NFR BLD: 8.4 % (ref 4–15)
MONOCYTES NFR BLD: 8.7 % (ref 4–15)
MONOCYTES NFR BLD: 9.9 % (ref 4–15)
MV PEAK A VEL: 1.11 M/S
MV PEAK E VEL: 0.91 M/S
MV STENOSIS PRESSURE HALF TIME: 98.17 MS
MV VALVE AREA P 1/2 METHOD: 2.24 CM2
NEUTROPHILS # BLD AUTO: 11.9 K/UL (ref 1.8–7.7)
NEUTROPHILS # BLD AUTO: 12.1 K/UL (ref 1.8–7.7)
NEUTROPHILS # BLD AUTO: 17.1 K/UL (ref 1.8–7.7)
NEUTROPHILS # BLD AUTO: 6.7 K/UL (ref 1.8–7.7)
NEUTROPHILS # BLD AUTO: 7.1 K/UL (ref 1.8–7.7)
NEUTROPHILS # BLD AUTO: 7.4 K/UL (ref 1.8–7.7)
NEUTROPHILS # BLD AUTO: 7.6 K/UL (ref 1.8–7.7)
NEUTROPHILS # BLD AUTO: 8.2 K/UL (ref 1.8–7.7)
NEUTROPHILS # BLD AUTO: 8.4 K/UL (ref 1.8–7.7)
NEUTROPHILS # BLD AUTO: 8.6 K/UL (ref 1.8–7.7)
NEUTROPHILS # BLD AUTO: 8.7 K/UL (ref 1.8–7.7)
NEUTROPHILS # BLD AUTO: 9 K/UL (ref 1.8–7.7)
NEUTROPHILS # BLD AUTO: 9.2 K/UL (ref 1.8–7.7)
NEUTROPHILS # BLD AUTO: 9.4 K/UL (ref 1.8–7.7)
NEUTROPHILS # BLD AUTO: 9.5 K/UL (ref 1.8–7.7)
NEUTROPHILS # BLD AUTO: 9.6 K/UL (ref 1.8–7.7)
NEUTROPHILS # BLD AUTO: 9.8 K/UL (ref 1.8–7.7)
NEUTROPHILS NFR BLD: 72.2 % (ref 38–73)
NEUTROPHILS NFR BLD: 72.5 % (ref 38–73)
NEUTROPHILS NFR BLD: 73.3 % (ref 38–73)
NEUTROPHILS NFR BLD: 74.1 % (ref 38–73)
NEUTROPHILS NFR BLD: 74.6 % (ref 38–73)
NEUTROPHILS NFR BLD: 76.4 % (ref 38–73)
NEUTROPHILS NFR BLD: 76.6 % (ref 38–73)
NEUTROPHILS NFR BLD: 77.1 % (ref 38–73)
NEUTROPHILS NFR BLD: 77.5 % (ref 38–73)
NEUTROPHILS NFR BLD: 78 % (ref 38–73)
NEUTROPHILS NFR BLD: 78.2 % (ref 38–73)
NEUTROPHILS NFR BLD: 78.8 % (ref 38–73)
NEUTROPHILS NFR BLD: 78.8 % (ref 38–73)
NEUTROPHILS NFR BLD: 79.1 % (ref 38–73)
NEUTROPHILS NFR BLD: 79.9 % (ref 38–73)
NEUTROPHILS NFR BLD: 80.9 % (ref 38–73)
NEUTROPHILS NFR BLD: 81.1 % (ref 38–73)
NITRITE UR QL STRIP: NEGATIVE
NRBC BLD-RTO: 0 /100 WBC
OSMOLALITY SERPL: 303 MOSM/KG (ref 280–300)
OSMOLALITY UR: 367 MOSM/KG (ref 50–1200)
PH UR STRIP: 5 [PH] (ref 5–8)
PH UR STRIP: 6 [PH] (ref 5–8)
PH UR STRIP: 6 [PH] (ref 5–8)
PHOSPHATE SERPL-MCNC: 4.1 MG/DL (ref 2.7–4.5)
PHOSPHATE SERPL-MCNC: 4.5 MG/DL (ref 2.7–4.5)
PLATELET # BLD AUTO: 142 K/UL (ref 150–450)
PLATELET # BLD AUTO: 149 K/UL (ref 150–450)
PLATELET # BLD AUTO: 155 K/UL (ref 150–450)
PLATELET # BLD AUTO: 158 K/UL (ref 150–450)
PLATELET # BLD AUTO: 160 K/UL (ref 150–450)
PLATELET # BLD AUTO: 161 K/UL (ref 150–450)
PLATELET # BLD AUTO: 166 K/UL (ref 150–450)
PLATELET # BLD AUTO: 171 K/UL (ref 150–450)
PLATELET # BLD AUTO: 171 K/UL (ref 150–450)
PLATELET # BLD AUTO: 174 K/UL (ref 150–450)
PLATELET # BLD AUTO: 174 K/UL (ref 150–450)
PLATELET # BLD AUTO: 175 K/UL (ref 150–450)
PLATELET # BLD AUTO: 204 K/UL (ref 150–450)
PLATELET # BLD AUTO: 249 K/UL (ref 150–450)
PLATELET # BLD AUTO: 287 K/UL (ref 150–450)
PLATELET # BLD AUTO: 304 K/UL (ref 150–450)
PLATELET # BLD AUTO: 363 K/UL (ref 150–450)
PMV BLD AUTO: 10.7 FL (ref 9.2–12.9)
PMV BLD AUTO: 10.9 FL (ref 9.2–12.9)
PMV BLD AUTO: 10.9 FL (ref 9.2–12.9)
PMV BLD AUTO: 11.2 FL (ref 9.2–12.9)
PMV BLD AUTO: 11.3 FL (ref 9.2–12.9)
PMV BLD AUTO: 11.5 FL (ref 9.2–12.9)
PMV BLD AUTO: 11.6 FL (ref 9.2–12.9)
PMV BLD AUTO: 11.8 FL (ref 9.2–12.9)
PMV BLD AUTO: 11.9 FL (ref 9.2–12.9)
PMV BLD AUTO: 11.9 FL (ref 9.2–12.9)
PMV BLD AUTO: 12 FL (ref 9.2–12.9)
PMV BLD AUTO: 12.1 FL (ref 9.2–12.9)
PMV BLD AUTO: 12.5 FL (ref 9.2–12.9)
POC CARDIAC TROPONIN I: 0.06 NG/ML (ref 0–0.08)
POTASSIUM SERPL-SCNC: 3.5 MMOL/L (ref 3.5–5.1)
POTASSIUM SERPL-SCNC: 3.6 MMOL/L (ref 3.5–5.1)
POTASSIUM SERPL-SCNC: 3.7 MMOL/L (ref 3.5–5.1)
POTASSIUM SERPL-SCNC: 3.8 MMOL/L (ref 3.5–5.1)
POTASSIUM SERPL-SCNC: 4 MMOL/L (ref 3.5–5.1)
POTASSIUM SERPL-SCNC: 4 MMOL/L (ref 3.5–5.1)
POTASSIUM SERPL-SCNC: 4.1 MMOL/L (ref 3.5–5.1)
POTASSIUM SERPL-SCNC: 4.1 MMOL/L (ref 3.5–5.1)
POTASSIUM SERPL-SCNC: 4.3 MMOL/L (ref 3.5–5.1)
POTASSIUM SERPL-SCNC: 4.4 MMOL/L (ref 3.5–5.1)
POTASSIUM SERPL-SCNC: 4.5 MMOL/L (ref 3.5–5.1)
POTASSIUM SERPL-SCNC: 4.5 MMOL/L (ref 3.5–5.1)
POTASSIUM SERPL-SCNC: 4.6 MMOL/L (ref 3.5–5.1)
POTASSIUM SERPL-SCNC: 4.6 MMOL/L (ref 3.5–5.1)
POTASSIUM SERPL-SCNC: 4.7 MMOL/L (ref 3.5–5.1)
POTASSIUM SERPL-SCNC: 4.7 MMOL/L (ref 3.5–5.1)
POTASSIUM SERPL-SCNC: 4.8 MMOL/L (ref 3.5–5.1)
POTASSIUM SERPL-SCNC: 5.4 MMOL/L (ref 3.5–5.1)
POTASSIUM SERPL-SCNC: 5.9 MMOL/L (ref 3.5–5.1)
PROCALCITONIN SERPL IA-MCNC: 0.16 NG/ML
PROT SERPL-MCNC: 5.8 G/DL (ref 6–8.4)
PROT SERPL-MCNC: 6 G/DL (ref 6–8.4)
PROT SERPL-MCNC: 6 G/DL (ref 6–8.4)
PROT SERPL-MCNC: 6.1 G/DL (ref 6–8.4)
PROT SERPL-MCNC: 6.6 G/DL (ref 6–8.4)
PROT SERPL-MCNC: 7.7 G/DL (ref 6–8.4)
PROT UR QL STRIP: ABNORMAL
PROT UR QL STRIP: NEGATIVE
PROT UR QL STRIP: NEGATIVE
PROTHROMBIN TIME: 12.7 SEC (ref 9–12.5)
RA MAJOR: 5.12 CM
RA PRESSURE: 3 MMHG
RA WIDTH: 3.43 CM
RBC # BLD AUTO: 4.1 M/UL (ref 4.6–6.2)
RBC # BLD AUTO: 4.15 M/UL (ref 4.6–6.2)
RBC # BLD AUTO: 4.28 M/UL (ref 4.6–6.2)
RBC # BLD AUTO: 4.31 M/UL (ref 4.6–6.2)
RBC # BLD AUTO: 4.33 M/UL (ref 4.6–6.2)
RBC # BLD AUTO: 4.59 M/UL (ref 4.6–6.2)
RBC # BLD AUTO: 4.62 M/UL (ref 4.6–6.2)
RBC # BLD AUTO: 4.68 M/UL (ref 4.6–6.2)
RBC # BLD AUTO: 4.82 M/UL (ref 4.6–6.2)
RBC # BLD AUTO: 4.85 M/UL (ref 4.6–6.2)
RBC # BLD AUTO: 4.86 M/UL (ref 4.6–6.2)
RBC # BLD AUTO: 4.87 M/UL (ref 4.6–6.2)
RBC # BLD AUTO: 4.92 M/UL (ref 4.6–6.2)
RBC # BLD AUTO: 4.99 M/UL (ref 4.6–6.2)
RBC # BLD AUTO: 5.23 M/UL (ref 4.6–6.2)
RBC # BLD AUTO: 5.25 M/UL (ref 4.6–6.2)
RBC # BLD AUTO: 5.93 M/UL (ref 4.6–6.2)
RBC #/AREA URNS AUTO: 5 /HPF (ref 0–4)
RIGHT VENTRICULAR END-DIASTOLIC DIMENSION: 3.69 CM
SAMPLE: NORMAL
SINUS: 3.6 CM
SODIUM SERPL-SCNC: 128 MMOL/L (ref 136–145)
SODIUM SERPL-SCNC: 133 MMOL/L (ref 136–145)
SODIUM SERPL-SCNC: 134 MMOL/L (ref 136–145)
SODIUM SERPL-SCNC: 134 MMOL/L (ref 136–145)
SODIUM SERPL-SCNC: 135 MMOL/L (ref 136–145)
SODIUM SERPL-SCNC: 136 MMOL/L (ref 136–145)
SODIUM SERPL-SCNC: 136 MMOL/L (ref 136–145)
SODIUM SERPL-SCNC: 137 MMOL/L (ref 136–145)
SODIUM SERPL-SCNC: 138 MMOL/L (ref 136–145)
SODIUM SERPL-SCNC: 139 MMOL/L (ref 136–145)
SODIUM SERPL-SCNC: 139 MMOL/L (ref 136–145)
SODIUM SERPL-SCNC: 140 MMOL/L (ref 136–145)
SODIUM SERPL-SCNC: 140 MMOL/L (ref 136–145)
SODIUM SERPL-SCNC: 142 MMOL/L (ref 136–145)
SODIUM SERPL-SCNC: 142 MMOL/L (ref 136–145)
SODIUM UR-SCNC: <10 MMOL/L (ref 20–250)
SP GR UR STRIP: 1.01 (ref 1–1.03)
SQUAMOUS #/AREA URNS AUTO: 0 /HPF
STJ: 2.89 CM
TRICUSPID ANNULAR PLANE SYSTOLIC EXCURSION: 2.11 CM
TROPONIN I SERPL DL<=0.01 NG/ML-MCNC: 0.05 NG/ML (ref 0–0.03)
TROPONIN I SERPL DL<=0.01 NG/ML-MCNC: 0.06 NG/ML (ref 0–0.03)
TROPONIN I SERPL DL<=0.01 NG/ML-MCNC: 0.08 NG/ML (ref 0–0.03)
TROPONIN I SERPL DL<=0.01 NG/ML-MCNC: 0.15 NG/ML (ref 0–0.03)
TROPONIN I SERPL DL<=0.01 NG/ML-MCNC: 0.16 NG/ML (ref 0–0.03)
TROPONIN I SERPL DL<=0.01 NG/ML-MCNC: 0.2 NG/ML (ref 0–0.03)
TROPONIN I SERPL HS-MCNC: 43.5 PG/ML (ref 0–14.9)
TROPONIN I SERPL HS-MCNC: 47.3 PG/ML (ref 0–14.9)
TROPONIN I SERPL HS-MCNC: 49.1 PG/ML (ref 0–14.9)
URATE SERPL-MCNC: 9.8 MG/DL (ref 3.4–7)
URN SPEC COLLECT METH UR: ABNORMAL
URN SPEC COLLECT METH UR: ABNORMAL
URN SPEC COLLECT METH UR: NORMAL
UROBILINOGEN UR STRIP-ACNC: NEGATIVE EU/DL
UROBILINOGEN UR STRIP-ACNC: NEGATIVE EU/DL
WBC # BLD AUTO: 10.29 K/UL (ref 3.9–12.7)
WBC # BLD AUTO: 10.63 K/UL (ref 3.9–12.7)
WBC # BLD AUTO: 10.68 K/UL (ref 3.9–12.7)
WBC # BLD AUTO: 10.81 K/UL (ref 3.9–12.7)
WBC # BLD AUTO: 11.16 K/UL (ref 3.9–12.7)
WBC # BLD AUTO: 11.6 K/UL (ref 3.9–12.7)
WBC # BLD AUTO: 12.36 K/UL (ref 3.9–12.7)
WBC # BLD AUTO: 12.38 K/UL (ref 3.9–12.7)
WBC # BLD AUTO: 12.42 K/UL (ref 3.9–12.7)
WBC # BLD AUTO: 12.94 K/UL (ref 3.9–12.7)
WBC # BLD AUTO: 13.07 K/UL (ref 3.9–12.7)
WBC # BLD AUTO: 15.1 K/UL (ref 3.9–12.7)
WBC # BLD AUTO: 15.66 K/UL (ref 3.9–12.7)
WBC # BLD AUTO: 21.4 K/UL (ref 3.9–12.7)
WBC # BLD AUTO: 8.51 K/UL (ref 3.9–12.7)
WBC # BLD AUTO: 9.09 K/UL (ref 3.9–12.7)
WBC # BLD AUTO: 9.65 K/UL (ref 3.9–12.7)
WBC #/AREA URNS AUTO: 1 /HPF (ref 0–5)

## 2023-01-01 PROCEDURE — 97530 THERAPEUTIC ACTIVITIES: CPT | Mod: CQ

## 2023-01-01 PROCEDURE — 83880 ASSAY OF NATRIURETIC PEPTIDE: CPT

## 2023-01-01 PROCEDURE — 25000003 PHARM REV CODE 250

## 2023-01-01 PROCEDURE — G0179 PR HOME HEALTH MD RECERTIFICATION: ICD-10-PCS | Mod: ,,, | Performed by: FAMILY MEDICINE

## 2023-01-01 PROCEDURE — 36415 COLL VENOUS BLD VENIPUNCTURE: CPT | Performed by: STUDENT IN AN ORGANIZED HEALTH CARE EDUCATION/TRAINING PROGRAM

## 2023-01-01 PROCEDURE — 99233 PR SUBSEQUENT HOSPITAL CARE,LEVL III: ICD-10-PCS | Mod: ,,, | Performed by: INTERNAL MEDICINE

## 2023-01-01 PROCEDURE — 84145 PROCALCITONIN (PCT): CPT | Performed by: NURSE PRACTITIONER

## 2023-01-01 PROCEDURE — 25000003 PHARM REV CODE 250: Performed by: NURSE ANESTHETIST, CERTIFIED REGISTERED

## 2023-01-01 PROCEDURE — 99233 PR SUBSEQUENT HOSPITAL CARE,LEVL III: ICD-10-PCS | Mod: GC,,, | Performed by: INTERNAL MEDICINE

## 2023-01-01 PROCEDURE — 25000003 PHARM REV CODE 250: Performed by: FAMILY MEDICINE

## 2023-01-01 PROCEDURE — 82272 OCCULT BLD FECES 1-3 TESTS: CPT

## 2023-01-01 PROCEDURE — 97162 PT EVAL MOD COMPLEX 30 MIN: CPT

## 2023-01-01 PROCEDURE — 94640 AIRWAY INHALATION TREATMENT: CPT

## 2023-01-01 PROCEDURE — 83735 ASSAY OF MAGNESIUM: CPT | Performed by: NURSE PRACTITIONER

## 2023-01-01 PROCEDURE — 87389 HIV-1 AG W/HIV-1&-2 AB AG IA: CPT | Performed by: EMERGENCY MEDICINE

## 2023-01-01 PROCEDURE — 94799 UNLISTED PULMONARY SVC/PX: CPT

## 2023-01-01 PROCEDURE — 36415 COLL VENOUS BLD VENIPUNCTURE: CPT | Performed by: INTERNAL MEDICINE

## 2023-01-01 PROCEDURE — 80053 COMPREHEN METABOLIC PANEL: CPT

## 2023-01-01 PROCEDURE — 99153 MOD SED SAME PHYS/QHP EA: CPT | Performed by: INTERNAL MEDICINE

## 2023-01-01 PROCEDURE — 93010 ELECTROCARDIOGRAM REPORT: CPT | Mod: ,,, | Performed by: INTERNAL MEDICINE

## 2023-01-01 PROCEDURE — 99223 1ST HOSP IP/OBS HIGH 75: CPT | Mod: GC,,, | Performed by: STUDENT IN AN ORGANIZED HEALTH CARE EDUCATION/TRAINING PROGRAM

## 2023-01-01 PROCEDURE — D9220A PRA ANESTHESIA: ICD-10-PCS | Mod: CRNA,,, | Performed by: NURSE ANESTHETIST, CERTIFIED REGISTERED

## 2023-01-01 PROCEDURE — 99232 SBSQ HOSP IP/OBS MODERATE 35: CPT | Mod: ,,, | Performed by: INTERNAL MEDICINE

## 2023-01-01 PROCEDURE — 84484 ASSAY OF TROPONIN QUANT: CPT | Mod: 91

## 2023-01-01 PROCEDURE — 25000003 PHARM REV CODE 250: Performed by: STUDENT IN AN ORGANIZED HEALTH CARE EDUCATION/TRAINING PROGRAM

## 2023-01-01 PROCEDURE — 85025 COMPLETE CBC W/AUTO DIFF WBC: CPT | Performed by: NURSE PRACTITIONER

## 2023-01-01 PROCEDURE — 93010 EKG 12-LEAD: ICD-10-PCS | Mod: ,,, | Performed by: INTERNAL MEDICINE

## 2023-01-01 PROCEDURE — 25000003 PHARM REV CODE 250: Performed by: INTERNAL MEDICINE

## 2023-01-01 PROCEDURE — 25000003 PHARM REV CODE 250: Performed by: NURSE PRACTITIONER

## 2023-01-01 PROCEDURE — 97535 SELF CARE MNGMENT TRAINING: CPT

## 2023-01-01 PROCEDURE — 85025 COMPLETE CBC W/AUTO DIFF WBC: CPT | Performed by: FAMILY MEDICINE

## 2023-01-01 PROCEDURE — 36415 COLL VENOUS BLD VENIPUNCTURE: CPT | Performed by: NURSE PRACTITIONER

## 2023-01-01 PROCEDURE — 96375 TX/PRO/DX INJ NEW DRUG ADDON: CPT

## 2023-01-01 PROCEDURE — 83735 ASSAY OF MAGNESIUM: CPT

## 2023-01-01 PROCEDURE — 99215 OFFICE O/P EST HI 40 MIN: CPT | Mod: PBBFAC

## 2023-01-01 PROCEDURE — 21000000 HC CCU ICU ROOM CHARGE

## 2023-01-01 PROCEDURE — 99222 1ST HOSP IP/OBS MODERATE 55: CPT | Mod: ,,, | Performed by: INTERNAL MEDICINE

## 2023-01-01 PROCEDURE — 36415 COLL VENOUS BLD VENIPUNCTURE: CPT | Performed by: EMERGENCY MEDICINE

## 2023-01-01 PROCEDURE — 82040 ASSAY OF SERUM ALBUMIN: CPT | Performed by: INTERNAL MEDICINE

## 2023-01-01 PROCEDURE — 27000221 HC OXYGEN, UP TO 24 HOURS

## 2023-01-01 PROCEDURE — 84100 ASSAY OF PHOSPHORUS: CPT

## 2023-01-01 PROCEDURE — 97530 THERAPEUTIC ACTIVITIES: CPT

## 2023-01-01 PROCEDURE — 81001 URINALYSIS AUTO W/SCOPE: CPT

## 2023-01-01 PROCEDURE — 84484 ASSAY OF TROPONIN QUANT: CPT | Performed by: NURSE PRACTITIONER

## 2023-01-01 PROCEDURE — 63600175 PHARM REV CODE 636 W HCPCS: Performed by: NURSE PRACTITIONER

## 2023-01-01 PROCEDURE — 80048 BASIC METABOLIC PNL TOTAL CA: CPT | Mod: XB | Performed by: STUDENT IN AN ORGANIZED HEALTH CARE EDUCATION/TRAINING PROGRAM

## 2023-01-01 PROCEDURE — 99214 OFFICE O/P EST MOD 30 MIN: CPT | Mod: PBBFAC | Performed by: INTERNAL MEDICINE

## 2023-01-01 PROCEDURE — 99999 PR PBB SHADOW E&M-EST. PATIENT-LVL III: CPT | Mod: PBBFAC,,, | Performed by: INTERNAL MEDICINE

## 2023-01-01 PROCEDURE — 93010 ELECTROCARDIOGRAM REPORT: CPT | Mod: 76,,, | Performed by: INTERNAL MEDICINE

## 2023-01-01 PROCEDURE — 99233 SBSQ HOSP IP/OBS HIGH 50: CPT | Mod: GC,,, | Performed by: STUDENT IN AN ORGANIZED HEALTH CARE EDUCATION/TRAINING PROGRAM

## 2023-01-01 PROCEDURE — G0179 MD RECERTIFICATION HHA PT: HCPCS | Mod: ,,, | Performed by: FAMILY MEDICINE

## 2023-01-01 PROCEDURE — 99900031 HC PATIENT EDUCATION (STAT)

## 2023-01-01 PROCEDURE — 99232 PR SUBSEQUENT HOSPITAL CARE,LEVL II: ICD-10-PCS | Mod: ,,, | Performed by: INTERNAL MEDICINE

## 2023-01-01 PROCEDURE — 99900035 HC TECH TIME PER 15 MIN (STAT)

## 2023-01-01 PROCEDURE — 85025 COMPLETE CBC W/AUTO DIFF WBC: CPT | Mod: 91 | Performed by: NURSE PRACTITIONER

## 2023-01-01 PROCEDURE — 36415 COLL VENOUS BLD VENIPUNCTURE: CPT

## 2023-01-01 PROCEDURE — 83735 ASSAY OF MAGNESIUM: CPT | Performed by: EMERGENCY MEDICINE

## 2023-01-01 PROCEDURE — 80048 BASIC METABOLIC PNL TOTAL CA: CPT | Mod: 91 | Performed by: NURSE PRACTITIONER

## 2023-01-01 PROCEDURE — 99233 SBSQ HOSP IP/OBS HIGH 50: CPT | Mod: 25,,, | Performed by: INTERNAL MEDICINE

## 2023-01-01 PROCEDURE — 84300 ASSAY OF URINE SODIUM: CPT | Performed by: NURSE PRACTITIONER

## 2023-01-01 PROCEDURE — 99999 PR PBB SHADOW E&M-EST. PATIENT-LVL III: ICD-10-PCS | Mod: PBBFAC,,, | Performed by: INTERNAL MEDICINE

## 2023-01-01 PROCEDURE — 25000003 PHARM REV CODE 250: Performed by: ANESTHESIOLOGY

## 2023-01-01 PROCEDURE — 96374 THER/PROPH/DIAG INJ IV PUSH: CPT

## 2023-01-01 PROCEDURE — 93005 ELECTROCARDIOGRAM TRACING: CPT | Mod: PBBFAC | Performed by: INTERNAL MEDICINE

## 2023-01-01 PROCEDURE — 99214 OFFICE O/P EST MOD 30 MIN: CPT | Mod: S$PBB,,, | Performed by: NURSE PRACTITIONER

## 2023-01-01 PROCEDURE — 85025 COMPLETE CBC W/AUTO DIFF WBC: CPT | Performed by: INTERNAL MEDICINE

## 2023-01-01 PROCEDURE — 85730 THROMBOPLASTIN TIME PARTIAL: CPT | Performed by: NURSE PRACTITIONER

## 2023-01-01 PROCEDURE — 99214 OFFICE O/P EST MOD 30 MIN: CPT | Mod: S$PBB,,,

## 2023-01-01 PROCEDURE — 99152 PR MOD CONSCIOUS SEDATION, SAME PHYS, 5+ YRS, FIRST 15 MIN: ICD-10-PCS | Mod: ,,, | Performed by: INTERNAL MEDICINE

## 2023-01-01 PROCEDURE — 80048 BASIC METABOLIC PNL TOTAL CA: CPT | Performed by: NURSE PRACTITIONER

## 2023-01-01 PROCEDURE — 85025 COMPLETE CBC W/AUTO DIFF WBC: CPT | Mod: 91

## 2023-01-01 PROCEDURE — 25000242 PHARM REV CODE 250 ALT 637 W/ HCPCS: Performed by: STUDENT IN AN ORGANIZED HEALTH CARE EDUCATION/TRAINING PROGRAM

## 2023-01-01 PROCEDURE — 93005 ELECTROCARDIOGRAM TRACING: CPT

## 2023-01-01 PROCEDURE — 84484 ASSAY OF TROPONIN QUANT: CPT | Mod: 91 | Performed by: STUDENT IN AN ORGANIZED HEALTH CARE EDUCATION/TRAINING PROGRAM

## 2023-01-01 PROCEDURE — C1887 CATHETER, GUIDING: HCPCS | Performed by: INTERNAL MEDICINE

## 2023-01-01 PROCEDURE — G0378 HOSPITAL OBSERVATION PER HR: HCPCS

## 2023-01-01 PROCEDURE — 25500020 PHARM REV CODE 255: Performed by: INTERNAL MEDICINE

## 2023-01-01 PROCEDURE — 99999 PR PBB SHADOW E&M-EST. PATIENT-LVL V: CPT | Mod: PBBFAC,,, | Performed by: NURSE PRACTITIONER

## 2023-01-01 PROCEDURE — 99223 PR INITIAL HOSPITAL CARE,LEVL III: ICD-10-PCS | Mod: ,,, | Performed by: INTERNAL MEDICINE

## 2023-01-01 PROCEDURE — 85025 COMPLETE CBC W/AUTO DIFF WBC: CPT

## 2023-01-01 PROCEDURE — 21400001 HC TELEMETRY ROOM

## 2023-01-01 PROCEDURE — D9220A PRA ANESTHESIA: Mod: ANES,,, | Performed by: ANESTHESIOLOGY

## 2023-01-01 PROCEDURE — 99233 SBSQ HOSP IP/OBS HIGH 50: CPT | Mod: ,,, | Performed by: INTERNAL MEDICINE

## 2023-01-01 PROCEDURE — 87086 URINE CULTURE/COLONY COUNT: CPT | Performed by: NURSE PRACTITIONER

## 2023-01-01 PROCEDURE — 97110 THERAPEUTIC EXERCISES: CPT | Mod: CQ

## 2023-01-01 PROCEDURE — 63600175 PHARM REV CODE 636 W HCPCS

## 2023-01-01 PROCEDURE — 80053 COMPREHEN METABOLIC PANEL: CPT | Performed by: EMERGENCY MEDICINE

## 2023-01-01 PROCEDURE — 99214 PR OFFICE/OUTPT VISIT, EST, LEVL IV, 30-39 MIN: ICD-10-PCS | Mod: S$PBB,,, | Performed by: INTERNAL MEDICINE

## 2023-01-01 PROCEDURE — 97110 THERAPEUTIC EXERCISES: CPT

## 2023-01-01 PROCEDURE — 93010 EKG 12-LEAD: ICD-10-PCS | Mod: S$PBB,,, | Performed by: INTERNAL MEDICINE

## 2023-01-01 PROCEDURE — 96361 HYDRATE IV INFUSION ADD-ON: CPT

## 2023-01-01 PROCEDURE — 99214 PR OFFICE/OUTPT VISIT, EST, LEVL IV, 30-39 MIN: ICD-10-PCS | Mod: S$PBB,,, | Performed by: NURSE PRACTITIONER

## 2023-01-01 PROCEDURE — 82570 ASSAY OF URINE CREATININE: CPT | Performed by: NURSE PRACTITIONER

## 2023-01-01 PROCEDURE — 99215 PR OFFICE/OUTPT VISIT, EST, LEVL V, 40-54 MIN: ICD-10-PCS | Mod: S$PBB,,, | Performed by: INTERNAL MEDICINE

## 2023-01-01 PROCEDURE — 84550 ASSAY OF BLOOD/URIC ACID: CPT | Performed by: EMERGENCY MEDICINE

## 2023-01-01 PROCEDURE — 36000706: Performed by: ORTHOPAEDIC SURGERY

## 2023-01-01 PROCEDURE — 81003 URINALYSIS AUTO W/O SCOPE: CPT | Performed by: NURSE PRACTITIONER

## 2023-01-01 PROCEDURE — 99999 PR PBB SHADOW E&M-EST. PATIENT-LVL V: ICD-10-PCS | Mod: PBBFAC,,,

## 2023-01-01 PROCEDURE — 99213 OFFICE O/P EST LOW 20 MIN: CPT | Mod: 25,S$GLB,, | Performed by: ORTHOPAEDIC SURGERY

## 2023-01-01 PROCEDURE — 80048 BASIC METABOLIC PNL TOTAL CA: CPT | Performed by: INTERNAL MEDICINE

## 2023-01-01 PROCEDURE — 99152 MOD SED SAME PHYS/QHP 5/>YRS: CPT | Mod: ,,, | Performed by: INTERNAL MEDICINE

## 2023-01-01 PROCEDURE — 93005 ELECTROCARDIOGRAM TRACING: CPT | Performed by: INTERNAL MEDICINE

## 2023-01-01 PROCEDURE — 99285 PR EMERGENCY DEPT VISIT,LEVEL V: ICD-10-PCS | Mod: ,,, | Performed by: EMERGENCY MEDICINE

## 2023-01-01 PROCEDURE — 94761 N-INVAS EAR/PLS OXIMETRY MLT: CPT

## 2023-01-01 PROCEDURE — 99999 PR PBB SHADOW E&M-EST. PATIENT-LVL IV: CPT | Mod: PBBFAC,,, | Performed by: INTERNAL MEDICINE

## 2023-01-01 PROCEDURE — 80048 BASIC METABOLIC PNL TOTAL CA: CPT | Mod: XB

## 2023-01-01 PROCEDURE — 27201423 OPTIME MED/SURG SUP & DEVICES STERILE SUPPLY: Performed by: INTERNAL MEDICINE

## 2023-01-01 PROCEDURE — 71000015 HC POSTOP RECOV 1ST HR: Performed by: ORTHOPAEDIC SURGERY

## 2023-01-01 PROCEDURE — 99205 PR OFFICE/OUTPT VISIT, NEW, LEVL V, 60-74 MIN: ICD-10-PCS | Mod: S$PBB,,, | Performed by: INTERNAL MEDICINE

## 2023-01-01 PROCEDURE — 86580 TB INTRADERMAL TEST: CPT | Performed by: STUDENT IN AN ORGANIZED HEALTH CARE EDUCATION/TRAINING PROGRAM

## 2023-01-01 PROCEDURE — 99213 PR OFFICE/OUTPT VISIT, EST, LEVL III, 20-29 MIN: ICD-10-PCS | Mod: 25,S$GLB,, | Performed by: PHYSICIAN ASSISTANT

## 2023-01-01 PROCEDURE — 63600175 PHARM REV CODE 636 W HCPCS: Performed by: EMERGENCY MEDICINE

## 2023-01-01 PROCEDURE — 25000003 PHARM REV CODE 250: Performed by: ORTHOPAEDIC SURGERY

## 2023-01-01 PROCEDURE — 99213 OFFICE O/P EST LOW 20 MIN: CPT | Mod: PBBFAC | Performed by: INTERNAL MEDICINE

## 2023-01-01 PROCEDURE — 93454 CORONARY ARTERY ANGIO S&I: CPT | Performed by: INTERNAL MEDICINE

## 2023-01-01 PROCEDURE — 37000009 HC ANESTHESIA EA ADD 15 MINS: Performed by: ORTHOPAEDIC SURGERY

## 2023-01-01 PROCEDURE — 83935 ASSAY OF URINE OSMOLALITY: CPT | Performed by: NURSE PRACTITIONER

## 2023-01-01 PROCEDURE — 85025 COMPLETE CBC W/AUTO DIFF WBC: CPT | Performed by: EMERGENCY MEDICINE

## 2023-01-01 PROCEDURE — 63600175 PHARM REV CODE 636 W HCPCS: Performed by: STUDENT IN AN ORGANIZED HEALTH CARE EDUCATION/TRAINING PROGRAM

## 2023-01-01 PROCEDURE — 99024 PR POST-OP FOLLOW-UP VISIT: ICD-10-PCS | Mod: S$GLB,POP,, | Performed by: PHYSICIAN ASSISTANT

## 2023-01-01 PROCEDURE — 99223 PR INITIAL HOSPITAL CARE,LEVL III: ICD-10-PCS | Mod: GC,,, | Performed by: STUDENT IN AN ORGANIZED HEALTH CARE EDUCATION/TRAINING PROGRAM

## 2023-01-01 PROCEDURE — 36000707: Performed by: ORTHOPAEDIC SURGERY

## 2023-01-01 PROCEDURE — 99233 SBSQ HOSP IP/OBS HIGH 50: CPT | Mod: GC,,, | Performed by: INTERNAL MEDICINE

## 2023-01-01 PROCEDURE — 97166 OT EVAL MOD COMPLEX 45 MIN: CPT

## 2023-01-01 PROCEDURE — 25000242 PHARM REV CODE 250 ALT 637 W/ HCPCS: Performed by: INTERNAL MEDICINE

## 2023-01-01 PROCEDURE — 93454 PR CATH PLACE/CORONARY ANGIO, IMG SUPER/INTERP: ICD-10-PCS | Mod: 26,,, | Performed by: INTERNAL MEDICINE

## 2023-01-01 PROCEDURE — G0180 PR HOME HEALTH MD CERTIFICATION: ICD-10-PCS | Mod: ,,, | Performed by: FAMILY MEDICINE

## 2023-01-01 PROCEDURE — 84484 ASSAY OF TROPONIN QUANT: CPT

## 2023-01-01 PROCEDURE — 83605 ASSAY OF LACTIC ACID: CPT | Performed by: NURSE PRACTITIONER

## 2023-01-01 PROCEDURE — 93010 EKG 12-LEAD: ICD-10-PCS | Mod: ,,, | Performed by: SPECIALIST

## 2023-01-01 PROCEDURE — 83605 ASSAY OF LACTIC ACID: CPT

## 2023-01-01 PROCEDURE — 63600175 PHARM REV CODE 636 W HCPCS: Performed by: INTERNAL MEDICINE

## 2023-01-01 PROCEDURE — 97165 OT EVAL LOW COMPLEX 30 MIN: CPT

## 2023-01-01 PROCEDURE — 93010 EKG 12-LEAD: ICD-10-PCS | Mod: 76,,, | Performed by: INTERNAL MEDICINE

## 2023-01-01 PROCEDURE — 93005 ELECTROCARDIOGRAM TRACING: CPT | Performed by: SPECIALIST

## 2023-01-01 PROCEDURE — 25000242 PHARM REV CODE 250 ALT 637 W/ HCPCS

## 2023-01-01 PROCEDURE — 80053 COMPREHEN METABOLIC PANEL: CPT | Mod: 91

## 2023-01-01 PROCEDURE — 99214 OFFICE O/P EST MOD 30 MIN: CPT | Mod: S$PBB,,, | Performed by: INTERNAL MEDICINE

## 2023-01-01 PROCEDURE — 99285 EMERGENCY DEPT VISIT HI MDM: CPT | Mod: 25

## 2023-01-01 PROCEDURE — 99152 MOD SED SAME PHYS/QHP 5/>YRS: CPT | Performed by: INTERNAL MEDICINE

## 2023-01-01 PROCEDURE — 99213 OFFICE O/P EST LOW 20 MIN: CPT | Mod: 25,S$GLB,, | Performed by: PHYSICIAN ASSISTANT

## 2023-01-01 PROCEDURE — 36415 COLL VENOUS BLD VENIPUNCTURE: CPT | Performed by: FAMILY MEDICINE

## 2023-01-01 PROCEDURE — 85730 THROMBOPLASTIN TIME PARTIAL: CPT | Performed by: INTERNAL MEDICINE

## 2023-01-01 PROCEDURE — 99999 PR PBB SHADOW E&M-EST. PATIENT-LVL IV: ICD-10-PCS | Mod: PBBFAC,,, | Performed by: INTERNAL MEDICINE

## 2023-01-01 PROCEDURE — 20610 LARGE JOINT ASPIRATION/INJECTION: L KNEE: ICD-10-PCS | Mod: LT,S$GLB,, | Performed by: PHYSICIAN ASSISTANT

## 2023-01-01 PROCEDURE — 99999 PR PBB SHADOW E&M-EST. PATIENT-LVL V: CPT | Mod: PBBFAC,,,

## 2023-01-01 PROCEDURE — 99233 PR SUBSEQUENT HOSPITAL CARE,LEVL III: ICD-10-PCS | Mod: GC,,, | Performed by: STUDENT IN AN ORGANIZED HEALTH CARE EDUCATION/TRAINING PROGRAM

## 2023-01-01 PROCEDURE — 97161 PT EVAL LOW COMPLEX 20 MIN: CPT

## 2023-01-01 PROCEDURE — 87040 BLOOD CULTURE FOR BACTERIA: CPT | Performed by: NURSE PRACTITIONER

## 2023-01-01 PROCEDURE — 27201423 OPTIME MED/SURG SUP & DEVICES STERILE SUPPLY: Performed by: ORTHOPAEDIC SURGERY

## 2023-01-01 PROCEDURE — 99205 OFFICE O/P NEW HI 60 MIN: CPT | Mod: S$PBB,,, | Performed by: INTERNAL MEDICINE

## 2023-01-01 PROCEDURE — 80074 ACUTE HEPATITIS PANEL: CPT | Performed by: FAMILY MEDICINE

## 2023-01-01 PROCEDURE — 12000002 HC ACUTE/MED SURGE SEMI-PRIVATE ROOM

## 2023-01-01 PROCEDURE — G0180 MD CERTIFICATION HHA PATIENT: HCPCS | Mod: ,,, | Performed by: FAMILY MEDICINE

## 2023-01-01 PROCEDURE — 99285 EMERGENCY DEPT VISIT HI MDM: CPT | Mod: ,,, | Performed by: EMERGENCY MEDICINE

## 2023-01-01 PROCEDURE — 99999 PR PBB SHADOW E&M-EST. PATIENT-LVL V: ICD-10-PCS | Mod: PBBFAC,,, | Performed by: NURSE PRACTITIONER

## 2023-01-01 PROCEDURE — 99214 PR OFFICE/OUTPT VISIT, EST, LEVL IV, 30-39 MIN: ICD-10-PCS | Mod: S$PBB,,,

## 2023-01-01 PROCEDURE — 93010 ELECTROCARDIOGRAM REPORT: CPT | Mod: S$PBB,,, | Performed by: INTERNAL MEDICINE

## 2023-01-01 PROCEDURE — 99215 OFFICE O/P EST HI 40 MIN: CPT | Mod: PBBFAC,PO | Performed by: NURSE PRACTITIONER

## 2023-01-01 PROCEDURE — 80048 BASIC METABOLIC PNL TOTAL CA: CPT | Performed by: FAMILY MEDICINE

## 2023-01-01 PROCEDURE — 85610 PROTHROMBIN TIME: CPT | Performed by: INTERNAL MEDICINE

## 2023-01-01 PROCEDURE — C1894 INTRO/SHEATH, NON-LASER: HCPCS | Performed by: INTERNAL MEDICINE

## 2023-01-01 PROCEDURE — 83930 ASSAY OF BLOOD OSMOLALITY: CPT | Performed by: EMERGENCY MEDICINE

## 2023-01-01 PROCEDURE — 99222 PR INITIAL HOSPITAL CARE,LEVL II: ICD-10-PCS | Mod: ,,, | Performed by: INTERNAL MEDICINE

## 2023-01-01 PROCEDURE — 84100 ASSAY OF PHOSPHORUS: CPT | Performed by: INTERNAL MEDICINE

## 2023-01-01 PROCEDURE — 71000016 HC POSTOP RECOV ADDL HR: Performed by: ORTHOPAEDIC SURGERY

## 2023-01-01 PROCEDURE — 37000008 HC ANESTHESIA 1ST 15 MINUTES: Performed by: ORTHOPAEDIC SURGERY

## 2023-01-01 PROCEDURE — 99024 POSTOP FOLLOW-UP VISIT: CPT | Mod: S$GLB,POP,, | Performed by: PHYSICIAN ASSISTANT

## 2023-01-01 PROCEDURE — 84484 ASSAY OF TROPONIN QUANT: CPT | Mod: 91 | Performed by: EMERGENCY MEDICINE

## 2023-01-01 PROCEDURE — 30200315 PPD INTRADERMAL TEST REV CODE 302: Performed by: STUDENT IN AN ORGANIZED HEALTH CARE EDUCATION/TRAINING PROGRAM

## 2023-01-01 PROCEDURE — 99215 OFFICE O/P EST HI 40 MIN: CPT | Mod: S$PBB,,, | Performed by: INTERNAL MEDICINE

## 2023-01-01 PROCEDURE — 20605 DRAIN/INJ JOINT/BURSA W/O US: CPT | Mod: RT,S$GLB,, | Performed by: ORTHOPAEDIC SURGERY

## 2023-01-01 PROCEDURE — 20605 INTERMEDIATE JOINT ASPIRATION/INJECTION: R RADIOCARPAL: ICD-10-PCS | Mod: RT,S$GLB,, | Performed by: ORTHOPAEDIC SURGERY

## 2023-01-01 PROCEDURE — 25000003 PHARM REV CODE 250: Performed by: EMERGENCY MEDICINE

## 2023-01-01 PROCEDURE — 11000001 HC ACUTE MED/SURG PRIVATE ROOM

## 2023-01-01 PROCEDURE — 71046 X-RAY EXAM CHEST 2 VIEWS: CPT | Mod: TC

## 2023-01-01 PROCEDURE — D9220A PRA ANESTHESIA: Mod: CRNA,,, | Performed by: NURSE ANESTHETIST, CERTIFIED REGISTERED

## 2023-01-01 PROCEDURE — 93454 CORONARY ARTERY ANGIO S&I: CPT | Mod: 26,,, | Performed by: INTERNAL MEDICINE

## 2023-01-01 PROCEDURE — 93010 RHYTHM STRIP: ICD-10-PCS | Mod: S$PBB,,, | Performed by: INTERNAL MEDICINE

## 2023-01-01 PROCEDURE — 63600175 PHARM REV CODE 636 W HCPCS: Performed by: NURSE ANESTHETIST, CERTIFIED REGISTERED

## 2023-01-01 PROCEDURE — 20610 DRAIN/INJ JOINT/BURSA W/O US: CPT | Mod: LT,S$GLB,, | Performed by: PHYSICIAN ASSISTANT

## 2023-01-01 PROCEDURE — 83036 HEMOGLOBIN GLYCOSYLATED A1C: CPT | Performed by: NURSE PRACTITIONER

## 2023-01-01 PROCEDURE — 93010 ELECTROCARDIOGRAM REPORT: CPT | Mod: ,,, | Performed by: SPECIALIST

## 2023-01-01 PROCEDURE — D9220A PRA ANESTHESIA: ICD-10-PCS | Mod: ANES,,, | Performed by: ANESTHESIOLOGY

## 2023-01-01 PROCEDURE — C1769 GUIDE WIRE: HCPCS | Performed by: INTERNAL MEDICINE

## 2023-01-01 PROCEDURE — 83880 ASSAY OF NATRIURETIC PEPTIDE: CPT | Performed by: EMERGENCY MEDICINE

## 2023-01-01 PROCEDURE — 99223 1ST HOSP IP/OBS HIGH 75: CPT | Mod: ,,, | Performed by: INTERNAL MEDICINE

## 2023-01-01 PROCEDURE — 99213 PR OFFICE/OUTPT VISIT, EST, LEVL III, 20-29 MIN: ICD-10-PCS | Mod: 25,S$GLB,, | Performed by: ORTHOPAEDIC SURGERY

## 2023-01-01 RX ORDER — METOPROLOL TARTRATE 25 MG/1
12.5 TABLET ORAL 2 TIMES DAILY
Status: DISCONTINUED | OUTPATIENT
Start: 2023-01-01 | End: 2023-01-01

## 2023-01-01 RX ORDER — NALOXONE HCL 0.4 MG/ML
0.02 VIAL (ML) INJECTION
Status: DISCONTINUED | OUTPATIENT
Start: 2023-01-01 | End: 2023-01-01 | Stop reason: HOSPADM

## 2023-01-01 RX ORDER — TRIAMCINOLONE ACETONIDE 40 MG/ML
40 INJECTION, SUSPENSION INTRA-ARTICULAR; INTRAMUSCULAR
Status: DISCONTINUED | OUTPATIENT
Start: 2023-01-01 | End: 2023-01-01 | Stop reason: HOSPADM

## 2023-01-01 RX ORDER — SODIUM CHLORIDE 9 MG/ML
INJECTION, SOLUTION INTRAVENOUS CONTINUOUS
Status: DISCONTINUED | OUTPATIENT
Start: 2023-01-01 | End: 2023-01-01

## 2023-01-01 RX ORDER — SOLIFENACIN SUCCINATE 5 MG/1
5 TABLET, FILM COATED ORAL DAILY
Qty: 90 TABLET | Refills: 3 | Status: SHIPPED | OUTPATIENT
Start: 2023-01-01

## 2023-01-01 RX ORDER — PROPOFOL 10 MG/ML
VIAL (ML) INTRAVENOUS
Status: DISCONTINUED | OUTPATIENT
Start: 2023-01-01 | End: 2023-01-01

## 2023-01-01 RX ORDER — PRAVASTATIN SODIUM 40 MG/1
40 TABLET ORAL DAILY
Qty: 90 TABLET | Refills: 3 | Status: ON HOLD | OUTPATIENT
Start: 2023-01-01 | End: 2023-01-01 | Stop reason: HOSPADM

## 2023-01-01 RX ORDER — OXYCODONE HYDROCHLORIDE 5 MG/1
10 TABLET ORAL EVERY 6 HOURS PRN
Status: DISCONTINUED | OUTPATIENT
Start: 2023-01-01 | End: 2023-01-01 | Stop reason: HOSPADM

## 2023-01-01 RX ORDER — DEXTROSE 40 %
30 GEL (GRAM) ORAL
Status: DISCONTINUED | OUTPATIENT
Start: 2023-01-01 | End: 2023-01-01 | Stop reason: HOSPADM

## 2023-01-01 RX ORDER — ENOXAPARIN SODIUM 100 MG/ML
40 INJECTION SUBCUTANEOUS EVERY 24 HOURS
Status: DISCONTINUED | OUTPATIENT
Start: 2023-01-01 | End: 2023-01-01

## 2023-01-01 RX ORDER — BUDESONIDE 0.5 MG/2ML
0.5 INHALANT ORAL EVERY 12 HOURS
Status: DISCONTINUED | OUTPATIENT
Start: 2023-01-01 | End: 2023-01-01 | Stop reason: HOSPADM

## 2023-01-01 RX ORDER — FUROSEMIDE 40 MG/1
40 TABLET ORAL DAILY
Status: ON HOLD | COMMUNITY
End: 2023-01-01 | Stop reason: SDUPTHER

## 2023-01-01 RX ORDER — CARVEDILOL 3.12 MG/1
3.12 TABLET ORAL 2 TIMES DAILY
Status: DISCONTINUED | OUTPATIENT
Start: 2023-01-01 | End: 2023-01-01 | Stop reason: HOSPADM

## 2023-01-01 RX ORDER — METOPROLOL SUCCINATE 25 MG/1
25 TABLET, EXTENDED RELEASE ORAL DAILY
Status: DISCONTINUED | OUTPATIENT
Start: 2023-01-01 | End: 2023-01-01

## 2023-01-01 RX ORDER — IODIXANOL 320 MG/ML
INJECTION, SOLUTION INTRAVASCULAR
Status: DISCONTINUED | OUTPATIENT
Start: 2023-01-01 | End: 2023-01-01 | Stop reason: HOSPADM

## 2023-01-01 RX ORDER — IPRATROPIUM BROMIDE 0.5 MG/2.5ML
0.5 SOLUTION RESPIRATORY (INHALATION) EVERY 12 HOURS
Status: DISCONTINUED | OUTPATIENT
Start: 2023-01-01 | End: 2023-01-01 | Stop reason: HOSPADM

## 2023-01-01 RX ORDER — OXYCODONE HYDROCHLORIDE 5 MG/1
5 TABLET ORAL EVERY 6 HOURS PRN
Status: DISCONTINUED | OUTPATIENT
Start: 2023-01-01 | End: 2023-01-01 | Stop reason: HOSPADM

## 2023-01-01 RX ORDER — FUROSEMIDE 10 MG/ML
40 INJECTION INTRAMUSCULAR; INTRAVENOUS 2 TIMES DAILY
Status: DISCONTINUED | OUTPATIENT
Start: 2023-01-01 | End: 2023-01-01

## 2023-01-01 RX ORDER — CARVEDILOL 3.12 MG/1
3.12 TABLET ORAL 2 TIMES DAILY
Qty: 60 TABLET | Refills: 11 | Status: SHIPPED | OUTPATIENT
Start: 2023-01-01 | End: 2023-01-01 | Stop reason: ALTCHOICE

## 2023-01-01 RX ORDER — FUROSEMIDE 40 MG/1
20 TABLET ORAL DAILY
Status: ON HOLD
Start: 2023-01-01 | End: 2023-01-01 | Stop reason: SDUPTHER

## 2023-01-01 RX ORDER — LOSARTAN POTASSIUM 25 MG/1
25 TABLET ORAL DAILY
Qty: 90 TABLET | Refills: 3 | Status: SHIPPED | OUTPATIENT
Start: 2023-01-01 | End: 2023-01-01 | Stop reason: ALTCHOICE

## 2023-01-01 RX ORDER — ACETAMINOPHEN 325 MG/1
650 TABLET ORAL EVERY 4 HOURS PRN
Status: DISCONTINUED | OUTPATIENT
Start: 2023-01-01 | End: 2023-01-01 | Stop reason: HOSPADM

## 2023-01-01 RX ORDER — ONDANSETRON 2 MG/ML
4 INJECTION INTRAMUSCULAR; INTRAVENOUS EVERY 6 HOURS PRN
Status: DISCONTINUED | OUTPATIENT
Start: 2023-01-01 | End: 2023-01-01 | Stop reason: HOSPADM

## 2023-01-01 RX ORDER — FUROSEMIDE 40 MG/1
40 TABLET ORAL DAILY
Qty: 30 TABLET | Refills: 11 | Status: SHIPPED | OUTPATIENT
Start: 2023-01-01 | End: 2023-01-01

## 2023-01-01 RX ORDER — ALBUTEROL SULFATE 0.83 MG/ML
2.5 SOLUTION RESPIRATORY (INHALATION) EVERY 6 HOURS PRN
Status: DISCONTINUED | OUTPATIENT
Start: 2023-01-01 | End: 2023-01-01 | Stop reason: HOSPADM

## 2023-01-01 RX ORDER — CALCIUM GLUCONATE 20 MG/ML
1 INJECTION, SOLUTION INTRAVENOUS ONCE
Status: COMPLETED | OUTPATIENT
Start: 2023-01-01 | End: 2023-01-01

## 2023-01-01 RX ORDER — SODIUM CHLORIDE 9 MG/ML
INJECTION, SOLUTION INTRAVENOUS ONCE
Status: COMPLETED | OUTPATIENT
Start: 2023-01-01 | End: 2023-01-01

## 2023-01-01 RX ORDER — ONDANSETRON 2 MG/ML
4 INJECTION INTRAMUSCULAR; INTRAVENOUS DAILY PRN
Status: DISCONTINUED | OUTPATIENT
Start: 2023-01-01 | End: 2023-01-01 | Stop reason: HOSPADM

## 2023-01-01 RX ORDER — DOXYCYCLINE HYCLATE 100 MG
100 TABLET ORAL 2 TIMES DAILY
Status: ON HOLD | COMMUNITY
Start: 2023-01-01 | End: 2023-01-01 | Stop reason: HOSPADM

## 2023-01-01 RX ORDER — GLUCAGON 1 MG
1 KIT INJECTION
Status: DISCONTINUED | OUTPATIENT
Start: 2023-01-01 | End: 2023-01-01 | Stop reason: HOSPADM

## 2023-01-01 RX ORDER — ALLOPURINOL 100 MG/1
200 TABLET ORAL DAILY
Status: DISCONTINUED | OUTPATIENT
Start: 2023-01-01 | End: 2023-01-01 | Stop reason: HOSPADM

## 2023-01-01 RX ORDER — PANTOPRAZOLE SODIUM 40 MG/1
40 TABLET, DELAYED RELEASE ORAL DAILY
Qty: 90 TABLET | Refills: 3 | Status: SHIPPED | OUTPATIENT
Start: 2023-01-01 | End: 2024-05-17

## 2023-01-01 RX ORDER — TALC
6 POWDER (GRAM) TOPICAL NIGHTLY PRN
Status: DISCONTINUED | OUTPATIENT
Start: 2023-01-01 | End: 2023-01-01 | Stop reason: HOSPADM

## 2023-01-01 RX ORDER — OXYCODONE HYDROCHLORIDE 5 MG/1
5 TABLET ORAL
Status: DISCONTINUED | OUTPATIENT
Start: 2023-01-01 | End: 2023-01-01 | Stop reason: HOSPADM

## 2023-01-01 RX ORDER — ALLOPURINOL 100 MG/1
200 TABLET ORAL
COMMUNITY
Start: 2023-01-01

## 2023-01-01 RX ORDER — FUROSEMIDE 10 MG/ML
40 INJECTION INTRAMUSCULAR; INTRAVENOUS ONCE
Status: COMPLETED | OUTPATIENT
Start: 2023-01-01 | End: 2023-01-01

## 2023-01-01 RX ORDER — CEFAZOLIN SODIUM 1 G/3ML
INJECTION, POWDER, FOR SOLUTION INTRAMUSCULAR; INTRAVENOUS
Status: DISCONTINUED | OUTPATIENT
Start: 2023-01-01 | End: 2023-01-01

## 2023-01-01 RX ORDER — MONTELUKAST SODIUM 4 MG/1
TABLET, CHEWABLE ORAL
Qty: 180 TABLET | Refills: 1 | Status: SHIPPED | OUTPATIENT
Start: 2023-01-01

## 2023-01-01 RX ORDER — ONDANSETRON 2 MG/ML
INJECTION INTRAMUSCULAR; INTRAVENOUS
Status: DISCONTINUED | OUTPATIENT
Start: 2023-01-01 | End: 2023-01-01

## 2023-01-01 RX ORDER — FAMOTIDINE 10 MG/ML
INJECTION INTRAVENOUS
Status: DISCONTINUED | OUTPATIENT
Start: 2023-01-01 | End: 2023-01-01

## 2023-01-01 RX ORDER — FENTANYL CITRATE 50 UG/ML
25 INJECTION, SOLUTION INTRAMUSCULAR; INTRAVENOUS EVERY 5 MIN PRN
Status: DISCONTINUED | OUTPATIENT
Start: 2023-01-01 | End: 2023-01-01 | Stop reason: HOSPADM

## 2023-01-01 RX ORDER — DEXTROSE 40 %
15 GEL (GRAM) ORAL
Status: DISCONTINUED | OUTPATIENT
Start: 2023-01-01 | End: 2023-01-01 | Stop reason: HOSPADM

## 2023-01-01 RX ORDER — MIDAZOLAM HYDROCHLORIDE 1 MG/ML
INJECTION INTRAMUSCULAR; INTRAVENOUS
Status: DISCONTINUED | OUTPATIENT
Start: 2023-01-01 | End: 2023-01-01 | Stop reason: HOSPADM

## 2023-01-01 RX ORDER — SODIUM BICARBONATE 1 MEQ/ML
50 SYRINGE (ML) INTRAVENOUS
Status: COMPLETED | OUTPATIENT
Start: 2023-01-01 | End: 2023-01-01

## 2023-01-01 RX ORDER — FLUTICASONE FUROATE AND VILANTEROL 100; 25 UG/1; UG/1
1 POWDER RESPIRATORY (INHALATION) DAILY
Status: DISCONTINUED | OUTPATIENT
Start: 2023-01-01 | End: 2023-01-01 | Stop reason: HOSPADM

## 2023-01-01 RX ORDER — FUROSEMIDE 40 MG/1
20 TABLET ORAL DAILY PRN
Start: 2023-01-01 | End: 2023-01-01

## 2023-01-01 RX ORDER — IPRATROPIUM BROMIDE 0.5 MG/2.5ML
0.5 SOLUTION RESPIRATORY (INHALATION) EVERY 6 HOURS
Status: DISCONTINUED | OUTPATIENT
Start: 2023-01-01 | End: 2023-01-01

## 2023-01-01 RX ORDER — POLYETHYLENE GLYCOL 3350 17 G/17G
17 POWDER, FOR SOLUTION ORAL 2 TIMES DAILY PRN
Status: DISCONTINUED | OUTPATIENT
Start: 2023-01-01 | End: 2023-01-01 | Stop reason: HOSPADM

## 2023-01-01 RX ORDER — AMOXICILLIN AND CLAVULANATE POTASSIUM 875; 125 MG/1; MG/1
1 TABLET, FILM COATED ORAL 2 TIMES DAILY
Qty: 10 TABLET | Refills: 0 | Status: ON HOLD | OUTPATIENT
Start: 2023-01-01 | End: 2023-01-01

## 2023-01-01 RX ORDER — HEPARIN SODIUM 5000 [USP'U]/ML
5000 INJECTION, SOLUTION INTRAVENOUS; SUBCUTANEOUS EVERY 8 HOURS
Status: DISCONTINUED | OUTPATIENT
Start: 2023-01-01 | End: 2023-01-01

## 2023-01-01 RX ORDER — LIDOCAINE HYDROCHLORIDE 10 MG/ML
INJECTION INFILTRATION; PERINEURAL
Status: DISCONTINUED | OUTPATIENT
Start: 2023-01-01 | End: 2023-01-01 | Stop reason: HOSPADM

## 2023-01-01 RX ORDER — FUROSEMIDE 40 MG/1
40 TABLET ORAL DAILY
Status: DISCONTINUED | OUTPATIENT
Start: 2023-01-01 | End: 2023-01-01

## 2023-01-01 RX ORDER — OXYCODONE AND ACETAMINOPHEN 7.5; 325 MG/1; MG/1
1 TABLET ORAL EVERY 6 HOURS PRN
Qty: 14 TABLET | Refills: 0 | Status: ON HOLD | OUTPATIENT
Start: 2023-01-01 | End: 2023-01-01

## 2023-01-01 RX ORDER — PRAVASTATIN SODIUM 40 MG/1
40 TABLET ORAL DAILY
Status: DISCONTINUED | OUTPATIENT
Start: 2023-01-01 | End: 2023-01-01 | Stop reason: HOSPADM

## 2023-01-01 RX ORDER — MUPIROCIN 20 MG/G
OINTMENT TOPICAL 2 TIMES DAILY
Status: DISPENSED | OUTPATIENT
Start: 2023-01-01 | End: 2023-01-01

## 2023-01-01 RX ORDER — PRAVASTATIN SODIUM 40 MG/1
40 TABLET ORAL DAILY
Qty: 90 TABLET | Refills: 3 | Status: CANCELLED | OUTPATIENT
Start: 2023-01-01

## 2023-01-01 RX ORDER — PANTOPRAZOLE SODIUM 40 MG/1
40 TABLET, DELAYED RELEASE ORAL DAILY
Status: DISCONTINUED | OUTPATIENT
Start: 2023-01-01 | End: 2023-01-01 | Stop reason: HOSPADM

## 2023-01-01 RX ORDER — ALBUTEROL SULFATE 90 UG/1
2 AEROSOL, METERED RESPIRATORY (INHALATION) EVERY 6 HOURS PRN
Status: DISCONTINUED | OUTPATIENT
Start: 2023-01-01 | End: 2023-01-01 | Stop reason: HOSPADM

## 2023-01-01 RX ORDER — FUROSEMIDE 10 MG/ML
40 INJECTION INTRAMUSCULAR; INTRAVENOUS
Status: DISCONTINUED | OUTPATIENT
Start: 2023-01-01 | End: 2023-01-01

## 2023-01-01 RX ORDER — BUDESONIDE 0.5 MG/2ML
0.5 INHALANT ORAL EVERY 12 HOURS
Status: DISCONTINUED | OUTPATIENT
Start: 2023-01-01 | End: 2023-01-01

## 2023-01-01 RX ORDER — METOPROLOL TARTRATE 25 MG/1
25 TABLET, FILM COATED ORAL 2 TIMES DAILY
Status: DISCONTINUED | OUTPATIENT
Start: 2023-01-01 | End: 2023-01-01 | Stop reason: HOSPADM

## 2023-01-01 RX ORDER — SODIUM CHLORIDE 0.9 % (FLUSH) 0.9 %
10 SYRINGE (ML) INJECTION
Status: DISCONTINUED | OUTPATIENT
Start: 2023-01-01 | End: 2023-01-01 | Stop reason: HOSPADM

## 2023-01-01 RX ORDER — METOPROLOL SUCCINATE 25 MG/1
12.5 TABLET, EXTENDED RELEASE ORAL DAILY
Qty: 45 TABLET | Refills: 3 | Status: SHIPPED | OUTPATIENT
Start: 2023-01-01 | End: 2024-07-05

## 2023-01-01 RX ORDER — NAPROXEN SODIUM 220 MG/1
81 TABLET, FILM COATED ORAL DAILY
Status: DISCONTINUED | OUTPATIENT
Start: 2023-01-01 | End: 2023-01-01 | Stop reason: HOSPADM

## 2023-01-01 RX ORDER — FUROSEMIDE 20 MG/1
20 TABLET ORAL DAILY
Qty: 30 TABLET | Refills: 0 | Status: SHIPPED | OUTPATIENT
Start: 2023-01-01

## 2023-01-01 RX ORDER — SODIUM CHLORIDE 0.9 % (FLUSH) 0.9 %
10 SYRINGE (ML) INJECTION EVERY 12 HOURS PRN
Status: DISCONTINUED | OUTPATIENT
Start: 2023-01-01 | End: 2023-01-01 | Stop reason: HOSPADM

## 2023-01-01 RX ORDER — TAMSULOSIN HYDROCHLORIDE 0.4 MG/1
1 CAPSULE ORAL DAILY
Status: DISCONTINUED | OUTPATIENT
Start: 2023-01-01 | End: 2023-01-01 | Stop reason: HOSPADM

## 2023-01-01 RX ORDER — CHLORHEXIDINE GLUCONATE ORAL RINSE 1.2 MG/ML
15 SOLUTION DENTAL 2 TIMES DAILY
Status: DISPENSED | OUTPATIENT
Start: 2023-01-01 | End: 2023-01-01

## 2023-01-01 RX ORDER — SODIUM CHLORIDE 0.9 G/100ML
IRRIGANT IRRIGATION
Status: DISCONTINUED | OUTPATIENT
Start: 2023-01-01 | End: 2023-01-01 | Stop reason: HOSPADM

## 2023-01-01 RX ORDER — ARFORMOTEROL TARTRATE 15 UG/2ML
15 SOLUTION RESPIRATORY (INHALATION) 2 TIMES DAILY
Status: DISCONTINUED | OUTPATIENT
Start: 2023-01-01 | End: 2023-01-01 | Stop reason: HOSPADM

## 2023-01-01 RX ORDER — DEXAMETHASONE SODIUM PHOSPHATE 4 MG/ML
INJECTION, SOLUTION INTRA-ARTICULAR; INTRALESIONAL; INTRAMUSCULAR; INTRAVENOUS; SOFT TISSUE
Status: DISCONTINUED | OUTPATIENT
Start: 2023-01-01 | End: 2023-01-01

## 2023-01-01 RX ORDER — MAGNESIUM SULFATE 1 G/100ML
1 INJECTION INTRAVENOUS ONCE
Status: COMPLETED | OUTPATIENT
Start: 2023-01-01 | End: 2023-01-01

## 2023-01-01 RX ORDER — ARFORMOTEROL TARTRATE 15 UG/2ML
15 SOLUTION RESPIRATORY (INHALATION) 2 TIMES DAILY
Status: DISCONTINUED | OUTPATIENT
Start: 2023-01-01 | End: 2023-01-01

## 2023-01-01 RX ORDER — DIPHENHYDRAMINE HYDROCHLORIDE 50 MG/ML
12.5 INJECTION INTRAMUSCULAR; INTRAVENOUS
Status: DISCONTINUED | OUTPATIENT
Start: 2023-01-01 | End: 2023-01-01 | Stop reason: HOSPADM

## 2023-01-01 RX ORDER — DIPHENHYDRAMINE HCL 25 MG
50 CAPSULE ORAL
Status: DISCONTINUED | OUTPATIENT
Start: 2023-01-01 | End: 2023-01-01 | Stop reason: HOSPADM

## 2023-01-01 RX ORDER — FUROSEMIDE 10 MG/ML
40 INJECTION INTRAMUSCULAR; INTRAVENOUS DAILY
Status: DISCONTINUED | OUTPATIENT
Start: 2023-01-01 | End: 2023-01-01

## 2023-01-01 RX ORDER — CEFAZOLIN SODIUM 2 G/50ML
2 SOLUTION INTRAVENOUS
Status: DISCONTINUED | OUTPATIENT
Start: 2023-01-01 | End: 2023-01-01 | Stop reason: HOSPADM

## 2023-01-01 RX ORDER — TAMSULOSIN HYDROCHLORIDE 0.4 MG/1
1 CAPSULE ORAL DAILY
Qty: 90 CAPSULE | Refills: 3 | Status: SHIPPED | OUTPATIENT
Start: 2023-01-01

## 2023-01-01 RX ORDER — ACETAMINOPHEN 10 MG/ML
INJECTION, SOLUTION INTRAVENOUS
Status: DISCONTINUED | OUTPATIENT
Start: 2023-01-01 | End: 2023-01-01

## 2023-01-01 RX ORDER — FENTANYL CITRATE 50 UG/ML
INJECTION, SOLUTION INTRAMUSCULAR; INTRAVENOUS
Status: DISCONTINUED | OUTPATIENT
Start: 2023-01-01 | End: 2023-01-01

## 2023-01-01 RX ORDER — SIMETHICONE 80 MG
1 TABLET,CHEWABLE ORAL 4 TIMES DAILY PRN
Status: DISCONTINUED | OUTPATIENT
Start: 2023-01-01 | End: 2023-01-01 | Stop reason: HOSPADM

## 2023-01-01 RX ORDER — METOPROLOL SUCCINATE 25 MG/1
25 TABLET, EXTENDED RELEASE ORAL DAILY
Status: ON HOLD | COMMUNITY
End: 2023-01-01 | Stop reason: HOSPADM

## 2023-01-01 RX ORDER — SODIUM CHLORIDE, SODIUM LACTATE, POTASSIUM CHLORIDE, CALCIUM CHLORIDE 600; 310; 30; 20 MG/100ML; MG/100ML; MG/100ML; MG/100ML
INJECTION, SOLUTION INTRAVENOUS CONTINUOUS
Status: DISCONTINUED | OUTPATIENT
Start: 2023-01-01 | End: 2023-01-01

## 2023-01-01 RX ORDER — TAMSULOSIN HYDROCHLORIDE 0.4 MG/1
0.4 CAPSULE ORAL DAILY
Qty: 7 CAPSULE | Refills: 0 | Status: ON HOLD | OUTPATIENT
Start: 2023-01-01 | End: 2023-01-01 | Stop reason: HOSPADM

## 2023-01-01 RX ORDER — SODIUM CHLORIDE 9 MG/ML
INJECTION, SOLUTION INTRAVENOUS CONTINUOUS
Status: ACTIVE | OUTPATIENT
Start: 2023-01-01 | End: 2023-01-01

## 2023-01-01 RX ORDER — POTASSIUM CHLORIDE 20 MEQ/1
20 TABLET, EXTENDED RELEASE ORAL 2 TIMES DAILY
Qty: 60 TABLET | Refills: 1 | Status: SHIPPED | OUTPATIENT
Start: 2023-01-01 | End: 2023-01-01 | Stop reason: HOSPADM

## 2023-01-01 RX ADMIN — TRIAMCINOLONE ACETONIDE 40 MG: 40 INJECTION, SUSPENSION INTRA-ARTICULAR; INTRAMUSCULAR at 12:01

## 2023-01-01 RX ADMIN — IPRATROPIUM BROMIDE 0.5 MG: 0.5 SOLUTION RESPIRATORY (INHALATION) at 07:05

## 2023-01-01 RX ADMIN — MUPIROCIN 1 G: 20 OINTMENT TOPICAL at 08:05

## 2023-01-01 RX ADMIN — ASPIRIN 81 MG CHEWABLE TABLET 81 MG: 81 TABLET CHEWABLE at 09:05

## 2023-01-01 RX ADMIN — ARFORMOTEROL TARTRATE 15 MCG: 15 SOLUTION RESPIRATORY (INHALATION) at 07:05

## 2023-01-01 RX ADMIN — ASPIRIN 81 MG 81 MG: 81 TABLET ORAL at 08:06

## 2023-01-01 RX ADMIN — CEFAZOLIN 2 G: 330 INJECTION, POWDER, FOR SOLUTION INTRAMUSCULAR; INTRAVENOUS at 09:03

## 2023-01-01 RX ADMIN — SODIUM CHLORIDE: 0.9 INJECTION, SOLUTION INTRAVENOUS at 12:05

## 2023-01-01 RX ADMIN — PRAVASTATIN SODIUM 40 MG: 40 TABLET ORAL at 08:05

## 2023-01-01 RX ADMIN — MUPIROCIN 1 G: 20 OINTMENT TOPICAL at 10:05

## 2023-01-01 RX ADMIN — PROPOFOL 50 MG: 10 INJECTION, EMULSION INTRAVENOUS at 09:03

## 2023-01-01 RX ADMIN — FENTANYL CITRATE 25 MCG: 50 INJECTION INTRAMUSCULAR; INTRAVENOUS at 09:03

## 2023-01-01 RX ADMIN — APIXABAN 2.5 MG: 2.5 TABLET, FILM COATED ORAL at 08:05

## 2023-01-01 RX ADMIN — APIXABAN 5 MG: 5 TABLET, FILM COATED ORAL at 08:05

## 2023-01-01 RX ADMIN — PRAVASTATIN SODIUM 40 MG: 40 TABLET ORAL at 08:06

## 2023-01-01 RX ADMIN — IPRATROPIUM BROMIDE 0.5 MG: 0.5 SOLUTION RESPIRATORY (INHALATION) at 08:05

## 2023-01-01 RX ADMIN — TAMSULOSIN HYDROCHLORIDE 0.4 MG: 0.4 CAPSULE ORAL at 01:05

## 2023-01-01 RX ADMIN — SODIUM CHLORIDE, SODIUM LACTATE, POTASSIUM CHLORIDE, AND CALCIUM CHLORIDE: .6; .31; .03; .02 INJECTION, SOLUTION INTRAVENOUS at 09:03

## 2023-01-01 RX ADMIN — ALLOPURINOL 200 MG: 100 TABLET ORAL at 09:05

## 2023-01-01 RX ADMIN — METOPROLOL TARTRATE 25 MG: 25 TABLET, FILM COATED ORAL at 08:05

## 2023-01-01 RX ADMIN — BUDESONIDE INHALATION 0.5 MG: 0.5 SUSPENSION RESPIRATORY (INHALATION) at 07:05

## 2023-01-01 RX ADMIN — APIXABAN 5 MG: 5 TABLET, FILM COATED ORAL at 09:05

## 2023-01-01 RX ADMIN — FUROSEMIDE 40 MG: 10 INJECTION, SOLUTION INTRAMUSCULAR; INTRAVENOUS at 09:06

## 2023-01-01 RX ADMIN — DEXAMETHASONE SODIUM PHOSPHATE 4 MG: 4 INJECTION, SOLUTION INTRAMUSCULAR; INTRAVENOUS at 09:03

## 2023-01-01 RX ADMIN — CHLORHEXIDINE GLUCONATE 15 ML: 1.2 RINSE ORAL at 09:05

## 2023-01-01 RX ADMIN — IPRATROPIUM BROMIDE 0.5 MG: 0.5 SOLUTION RESPIRATORY (INHALATION) at 01:05

## 2023-01-01 RX ADMIN — CHLORHEXIDINE GLUCONATE 15 ML: 1.2 RINSE ORAL at 08:05

## 2023-01-01 RX ADMIN — TAMSULOSIN HYDROCHLORIDE 0.4 MG: 0.4 CAPSULE ORAL at 09:05

## 2023-01-01 RX ADMIN — CALCIUM GLUCONATE 1 G: 20 INJECTION, SOLUTION INTRAVENOUS at 11:05

## 2023-01-01 RX ADMIN — MUPIROCIN 1 G: 20 OINTMENT TOPICAL at 09:05

## 2023-01-01 RX ADMIN — TAMSULOSIN HYDROCHLORIDE 0.4 MG: 0.4 CAPSULE ORAL at 08:05

## 2023-01-01 RX ADMIN — ALLOPURINOL 50 MG: 300 TABLET ORAL at 08:06

## 2023-01-01 RX ADMIN — ASPIRIN 81 MG CHEWABLE TABLET 81 MG: 81 TABLET CHEWABLE at 08:05

## 2023-01-01 RX ADMIN — METOPROLOL TARTRATE 12.5 MG: 25 TABLET, FILM COATED ORAL at 08:05

## 2023-01-01 RX ADMIN — TAMSULOSIN HYDROCHLORIDE 0.4 MG: 0.4 CAPSULE ORAL at 08:06

## 2023-01-01 RX ADMIN — CARVEDILOL 3.12 MG: 3.12 TABLET, FILM COATED ORAL at 09:06

## 2023-01-01 RX ADMIN — ENOXAPARIN SODIUM 40 MG: 100 INJECTION SUBCUTANEOUS at 05:05

## 2023-01-01 RX ADMIN — APIXABAN 10 MG: 5 TABLET, FILM COATED ORAL at 06:06

## 2023-01-01 RX ADMIN — SODIUM BICARBONATE 50 MEQ: 84 INJECTION INTRAVENOUS at 09:05

## 2023-01-01 RX ADMIN — INSULIN HUMAN 8.16 UNITS: 100 INJECTION, SOLUTION PARENTERAL at 10:05

## 2023-01-01 RX ADMIN — PANTOPRAZOLE SODIUM 40 MG: 40 TABLET, DELAYED RELEASE ORAL at 05:05

## 2023-01-01 RX ADMIN — ALLOPURINOL 200 MG: 100 TABLET ORAL at 01:05

## 2023-01-01 RX ADMIN — BUDESONIDE INHALATION 0.5 MG: 0.5 SUSPENSION RESPIRATORY (INHALATION) at 08:05

## 2023-01-01 RX ADMIN — FLUTICASONE FUROATE AND VILANTEROL TRIFENATATE 1 PUFF: 100; 25 POWDER RESPIRATORY (INHALATION) at 09:06

## 2023-01-01 RX ADMIN — ONDANSETRON 4 MG: 2 INJECTION INTRAMUSCULAR; INTRAVENOUS at 09:03

## 2023-01-01 RX ADMIN — TUBERCULIN PURIFIED PROTEIN DERIVATIVE 5 UNITS: 5 INJECTION, SOLUTION INTRADERMAL at 03:05

## 2023-01-01 RX ADMIN — OXYCODONE HYDROCHLORIDE 10 MG: 5 TABLET ORAL at 12:05

## 2023-01-01 RX ADMIN — ARFORMOTEROL TARTRATE 15 MCG: 15 SOLUTION RESPIRATORY (INHALATION) at 08:05

## 2023-01-01 RX ADMIN — PANTOPRAZOLE SODIUM 40 MG: 40 TABLET, DELAYED RELEASE ORAL at 06:05

## 2023-01-01 RX ADMIN — SODIUM CHLORIDE: 0.9 INJECTION, SOLUTION INTRAVENOUS at 08:05

## 2023-01-01 RX ADMIN — FUROSEMIDE 40 MG: 10 INJECTION, SOLUTION INTRAMUSCULAR; INTRAVENOUS at 07:06

## 2023-01-01 RX ADMIN — FAMOTIDINE 20 MG: 10 INJECTION, SOLUTION INTRAVENOUS at 09:03

## 2023-01-01 RX ADMIN — TRAZODONE HYDROCHLORIDE 25 MG: 50 TABLET ORAL at 08:05

## 2023-01-01 RX ADMIN — MAGNESIUM SULFATE 1 G: 1 INJECTION INTRAVENOUS at 05:05

## 2023-01-01 RX ADMIN — APIXABAN 5 MG: 5 TABLET, FILM COATED ORAL at 11:05

## 2023-01-01 RX ADMIN — METOPROLOL TARTRATE 12.5 MG: 25 TABLET, FILM COATED ORAL at 12:06

## 2023-01-01 RX ADMIN — OXYCODONE HYDROCHLORIDE 5 MG: 5 TABLET ORAL at 10:03

## 2023-01-01 RX ADMIN — TRAZODONE HYDROCHLORIDE 25 MG: 50 TABLET ORAL at 10:05

## 2023-01-01 RX ADMIN — FUROSEMIDE 40 MG: 10 INJECTION, SOLUTION INTRAMUSCULAR; INTRAVENOUS at 06:06

## 2023-01-01 RX ADMIN — ALLOPURINOL 200 MG: 100 TABLET ORAL at 08:05

## 2023-01-01 RX ADMIN — ASPIRIN 81 MG CHEWABLE TABLET 81 MG: 81 TABLET CHEWABLE at 01:05

## 2023-01-01 RX ADMIN — SODIUM ZIRCONIUM CYCLOSILICATE 10 G: 5 POWDER, FOR SUSPENSION ORAL at 09:05

## 2023-01-01 RX ADMIN — FUROSEMIDE 40 MG: 10 INJECTION, SOLUTION INTRAMUSCULAR; INTRAVENOUS at 04:06

## 2023-01-01 RX ADMIN — MUPIROCIN 1 G: 20 OINTMENT TOPICAL at 11:05

## 2023-01-01 RX ADMIN — ACETAMINOPHEN 1000 MG: 10 INJECTION, SOLUTION INTRAVENOUS at 09:03

## 2023-01-01 RX ADMIN — CHLORHEXIDINE GLUCONATE 15 ML: 1.2 RINSE ORAL at 10:05

## 2023-01-01 RX ADMIN — PRAVASTATIN SODIUM 40 MG: 40 TABLET ORAL at 10:05

## 2023-01-01 RX ADMIN — APIXABAN 10 MG: 5 TABLET, FILM COATED ORAL at 08:06

## 2023-01-01 RX ADMIN — FENTANYL CITRATE 50 MCG: 50 INJECTION INTRAMUSCULAR; INTRAVENOUS at 09:03

## 2023-01-01 RX ADMIN — SODIUM CHLORIDE: 0.9 INJECTION, SOLUTION INTRAVENOUS at 11:05

## 2023-01-01 RX ADMIN — SODIUM CHLORIDE, SODIUM LACTATE, POTASSIUM CHLORIDE, AND CALCIUM CHLORIDE 1000 ML: .6; .31; .03; .02 INJECTION, SOLUTION INTRAVENOUS at 05:05

## 2023-01-01 RX ADMIN — CHLORHEXIDINE GLUCONATE 15 ML: 1.2 RINSE ORAL at 11:05

## 2023-01-01 RX ADMIN — PRAVASTATIN SODIUM 40 MG: 40 TABLET ORAL at 11:05

## 2023-01-01 RX ADMIN — SODIUM ZIRCONIUM CYCLOSILICATE 10 G: 5 POWDER, FOR SUSPENSION ORAL at 11:05

## 2023-01-01 RX ADMIN — TRAZODONE HYDROCHLORIDE 25 MG: 50 TABLET ORAL at 09:05

## 2023-01-01 RX ADMIN — SACUBITRIL AND VALSARTAN 1 TABLET: 24; 26 TABLET, FILM COATED ORAL at 12:06

## 2023-01-01 RX ADMIN — SODIUM CHLORIDE: 0.9 INJECTION, SOLUTION INTRAVENOUS at 01:05

## 2023-01-01 RX ADMIN — TRIAMCINOLONE ACETONIDE 40 MG: 40 INJECTION, SUSPENSION INTRA-ARTICULAR; INTRAMUSCULAR at 12:06

## 2023-01-01 RX ADMIN — APIXABAN 10 MG: 5 TABLET, FILM COATED ORAL at 09:06

## 2023-01-01 RX ADMIN — DEXTROSE MONOHYDRATE 50 G: 25 INJECTION, SOLUTION INTRAVENOUS at 11:05

## 2023-01-01 RX ADMIN — SODIUM CHLORIDE 500 ML: 9 INJECTION, SOLUTION INTRAVENOUS at 10:06

## 2023-01-01 RX ADMIN — SODIUM CHLORIDE, POTASSIUM CHLORIDE, SODIUM LACTATE AND CALCIUM CHLORIDE 500 ML: 600; 310; 30; 20 INJECTION, SOLUTION INTRAVENOUS at 05:06

## 2023-01-01 RX ADMIN — APIXABAN 2.5 MG: 2.5 TABLET, FILM COATED ORAL at 09:05

## 2023-01-01 RX ADMIN — APIXABAN 2.5 MG: 2.5 TABLET, FILM COATED ORAL at 10:06

## 2023-01-01 RX ADMIN — APIXABAN 2.5 MG: 2.5 TABLET, FILM COATED ORAL at 08:06

## 2023-01-01 RX ADMIN — FLUTICASONE FUROATE AND VILANTEROL TRIFENATATE 1 PUFF: 100; 25 POWDER RESPIRATORY (INHALATION) at 03:06

## 2023-01-01 RX ADMIN — ENOXAPARIN SODIUM 40 MG: 100 INJECTION SUBCUTANEOUS at 08:05

## 2023-01-31 NOTE — PROCEDURES
Intermediate Joint Aspiration/Injection: R radiocarpal    Date/Time: 1/31/2023 12:15 PM  Performed by: Shaun Leonardo MD  Authorized by: Shaun Leonardo MD     Consent Done?:  Yes (Verbal)  Indications:  Arthritis and pain  Site marked: The procedure site was marked    Timeout: Prior to procedure the correct patient, procedure, and site was verified      Location:  Wrist  Site:  R radiocarpal  Prep: Patient was prepped and draped in usual sterile fashion    Needle size:  25 G  Medications:  40 mg triamcinolone acetonide 40 mg/mL  Patient tolerance:  Patient tolerated the procedure well with no immediate complications

## 2023-01-31 NOTE — PROGRESS NOTES
[facility]    Subjective:     Chief Complaint:   Chief Complaint   Patient presents with    Right Hand - Pain     Right hand pain and swelling.  Had a medrol dosepack in November which he cannot remember if it helped. Has had 2 incidents where the hand significantly swelled and he was unable to move it. The swelling went down on its own, but he is concerned       Past Medical History:   Diagnosis Date    Afib     Arthritis     Choledocholithiasis     Chronic kidney disease     Colon polyp     COPD (chronic obstructive pulmonary disease)     Diabetes mellitus, type 2     2/2011    Diverticulosis     Fatty liver     Hepatomegaly     History of blood clots     2020 Dr. Rodriguez     Irradiation cystitis     Prostate cancer 2009    s/p XRT (T1C, João 8)    Radiation proctitis     Sleep apnea        Past Surgical History:   Procedure Laterality Date    ANGIOGRAM, CORONARY, WITH LEFT HEART CATHETERIZATION N/A 05/17/2022    Procedure: Angiogram, Coronary, with Left Heart Cath;  Surgeon: Kamlesh Sullivan MD;  Location: [facility] CATH/EP LAB;  Service: Cardiology;  Laterality: N/A;    APPENDECTOMY      CARPAL TUNNEL RELEASE Left 02/05/2021    Procedure: RELEASE, CARPAL TUNNEL;  Surgeon: Jayro Hawkins II, MD;  Location: [facility];  Service: Orthopedics;  Laterality: Left;    CATARACT EXTRACTION Bilateral     COLONOSCOPY N/A 12/14/2018    Procedure: COLONOSCOPY;  Surgeon: Noel Aguiar MD;  Location: [facility];  Service: Endoscopy;  Laterality: N/A;    COLONOSCOPY  06/21/2021    Dr. Rodgers, in media: 2 colon polyps removed; diverticulosis, erythema in transverse colon; biopsy: tubular adenomas, ranomd colon unremarkable    COLONOSCOPY N/A 11/4/2022    Procedure: COLONOSCOPY;  Surgeon: Noel Aguiar MD;  Location: [facility];  Service: Endoscopy;  Laterality: N/A;    ERCP N/A 11/23/2021    Procedure: ERCP (ENDOSCOPIC RETROGRADE CHOLANGIOPANCREATOGRAPHY);  Surgeon: Marshall Gardner III, MD;  Location: [facility]  ENDO;  Service: Endoscopy;  Laterality: N/A;    ESOPHAGOGASTRODUODENOSCOPY N/A 01/24/2019    Procedure: EGD (ESOPHAGOGASTRODUODENOSCOPY);  Surgeon: Noel Aguiar MD;  Location: [facility] ENDO;  Service: Endoscopy;  Laterality: N/A;    EYE SURGERY      FLEXIBLE SIGMOIDOSCOPY N/A 01/22/2019    Procedure: SIGMOIDOSCOPY, FLEXIBLE;  Surgeon: Noel Aguiar MD;  Location: [facility] ENDO;  Service: Endoscopy;  Laterality: N/A;    HERNIA REPAIR      bilateral inguinal    LAPAROSCOPIC CHOLECYSTECTOMY N/A 11/24/2021    Procedure: CHOLECYSTECTOMY, LAPAROSCOPIC;  Surgeon: Jay Cabrera Jr., MD;  Location: [facility] OR;  Service: General;  Laterality: N/A;    TONSILLECTOMY         Current Outpatient Medications   Medication Sig    albuterol (PROVENTIL/VENTOLIN HFA) 90 mcg/actuation inhaler INHALE 2 PUFFS INTO THE LUNGS EVERY 6 HOURS AS NEEDED FOR WHEEZE    allopurinol (ZYLOPRIM) 300 MG tablet Take 300 mg by mouth once daily.    aspirin 81 MG Chew Take 81 mg by mouth once daily.    colestipoL (COLESTID) 1 gram Tab TAKE 1 TABLET BY MOUTH 2 TIMES DAILY. TAKE OTHER ORAL MEDICATIONS >1H BEFORE OR >4H AFTER COLESTIPOL    cyanocobalamin (VITAMIN B-12) 1000 MCG tablet Take 100 mcg by mouth once daily.    doxycycline (MONODOX) 100 MG capsule Take 1 capsule (100 mg total) by mouth 2 (two) times daily.    fluticasone-umeclidin-vilanter (TRELEGY ELLIPTA) 100-62.5-25 mcg DsDv Inhale 1 puff into the lungs once daily.    hydroCHLOROthiazide (MICROZIDE) 12.5 mg capsule Take 12.5 mg by mouth once daily.    losartan (COZAAR) 25 MG tablet Take 1 tablet (25 mg total) by mouth once daily.    methylPREDNISolone (MEDROL DOSEPACK) 4 mg tablet use as directed    pantoprazole (PROTONIX) 40 MG tablet TAKE 1 TABLET DAILY (Patient taking differently: Take 40 mg by mouth once daily.)    potassium chloride (MICRO-K) 10 MEQ CpSR Take 10 mEq by mouth once.    pravastatin (PRAVACHOL) 40 MG tablet TAKE 1 TABLET ONCE DAILY (Patient taking differently: Take 40 mg by  mouth once daily.)    pulse oximeter (PULSE OXIMETER) device Use twice daily at 8 AM and 3 PM and [facility] as directed.    solifenacin (VESICARE) 5 MG tablet TAKE 1 TABLET DAILY (Patient taking differently: Take 5 mg by mouth once daily.)    tamsulosin (FLOMAX) 0.4 mg Cap TAKE 1 CAPSULE ONCE DAILY    traZODone (DESYREL) 50 MG tablet Take 1 tablet (50 mg total) by mouth nightly as needed for Insomnia.     No current facility-administered medications for this visit.       Review of patient's allergies indicates:   Allergen Reactions    No known drug allergies        Family History   Problem Relation Age of Onset    Diabetes Mother     Diabetes Brother        Social History     Socioeconomic History    Marital status:    Tobacco Use    Smoking status: Former     Packs/day: 1.50     Years: 60.00     Pack years: 90.00     Types: Cigarettes     Start date: 1944     Quit date: 1/2/2008     Years since quitting: 15.0    Smokeless tobacco: Never   Substance and Sexual Activity    Alcohol use: Yes     Alcohol/week: 1.0 standard drink     Types: 1 Glasses of wine per week     Comment: social - at speical events    Drug use: No    Sexual activity: Not Currently     Partners: Female       History of present illness:  Mr. El comes in today with a chief complaint of right wrist and hand pain and numbness in the median nerve distribution of his right hand.  He is had right wrist and hand pain for quite some time.  He was seen in the office here approximately 3 months ago given a Medrol Dosepak to treat his symptoms.  He does not recollect how efficacious it was at that time.  Had a nerve conduction study to his right upper extremity approximately 2 years ago which did reveal severe carpal tunnel syndrome.  He is interested in carpal tunnel release for treating this.    Review of Systems:    Constitution: Negative for chills, fever, and sweats.  Negative for unexplained weight loss.    HENT:  Negative  for headaches and blurry vision.    Cardiovascular:Negative for chest pain or irregular heart beat. Negative for hypertension.    Respiratory:  Negative for cough and shortness of breath.    Gastrointestinal: Negative for abdominal pain, heartburn, melena, nausea, and vomitting.    Genitourinary:  Negative bladder incontinence and dysuria.    Musculoskeletal:  See HPI for details.     Neurological: Negative for numbness.    Psychiatric/Behavioral: Negative for depression.  The patient is not nervous/anxious.      Endocrine: Negative for polyuria    Hematologic/Lymphatic: Negative for bleeding problem.  Does not bruise/bleed easily.    Skin: Negative for poor would healing and rash    Objective:      Physical Examination:    Vital Signs:    Vitals:    01/31/23 1209   BP: 122/79       Body mass index is 31.17 kg/m².    This a well-developed, well nourished patient in no acute distress.  They are alert and oriented and cooperative to examination.        Right hand exam:  Skin to the right hand is clean dry and intact.  There is no erythema or ecchymosis.  There are no signs or symptoms of infection.  Patient is neurovascularly intact throughout the right upper extremity.  Patient does have a positive Tinel sign over the right carpal tunnel.  He has reproduction of symptoms into the median nerve distribution of his right hand with prolonged wrist flexion consistent with a positive Phalen sign.  He does have atrophy of his thenar eminence.  He has diffuse tenderness to palpation about the dorsum of the right wrist.    Pertinent New Results:    XRAY Report / Interpretation:   No new radiographs were taken on today's clinic visit.  However did review images taken of his right wrist approximately 3 months ago which do reveal severe arthrosis throughout right wrist and carpal bones of the right hand and wrist.  Also reviewed EMG/NCS to his right upper extremity from February 2021 which did reveal severe right carpal tunnel  "syndrome.    Assessment/Plan:      1. Right hand and wrist osteoarthritis.    2. Right carpal tunnel syndrome, severe.      I injected the patient's right wrist from a dorsal approach with 40 mg of Kenalog and lidocaine.  He tolerated this well.  For his right carpal tunnel syndrome risks and benefits of proceeding with carpal tunnel release were discussed with him today in detail.  We discussed with him the risk of continued symptoms due to the severity of his osteoarthritis.  All of their questions were answered in regards to the procedure.  He clearly understood and wished to proceed.  Surgical consents were obtained today.    Risks of the procedure were reviewed with the patient.  This includes, but is not limited to: infection, bleeding, pain (to include pillar pain), swelling, decreased range of motion, nerve injury/damage (in particular the recurrent branch of the median nerve), wound dehiscence, reflex sympathetic dystrophy, and possible need for further surgery.    Jorge Sabillon, Physician Assistant, served in the capacity as a "scribe" for this patient encounter.  A "face-to-face" encounter occurred with Dr. Shaun Leonardo on this date.  The treatment plan and medical decision-making is outlined above. Patient was seen and examined with a chaperone.       This note was created using Dragon voice recognition software that occasionally misinterpreted phrases or words.        "

## 2023-01-31 NOTE — H&P (VIEW-ONLY)
[facility]    Subjective:     Chief Complaint:   Chief Complaint   Patient presents with    Right Hand - Pain     Right hand pain and swelling.  Had a medrol dosepack in November which he cannot remember if it helped. Has had 2 incidents where the hand significantly swelled and he was unable to move it. The swelling went down on its own, but he is concerned       Past Medical History:   Diagnosis Date    Afib     Arthritis     Choledocholithiasis     Chronic kidney disease     Colon polyp     COPD (chronic obstructive pulmonary disease)     Diabetes mellitus, type 2     2/2011    Diverticulosis     Fatty liver     Hepatomegaly     History of blood clots     2020 Dr. Rodriguez     Irradiation cystitis     Prostate cancer 2009    s/p XRT (T1C, João 8)    Radiation proctitis     Sleep apnea        Past Surgical History:   Procedure Laterality Date    ANGIOGRAM, CORONARY, WITH LEFT HEART CATHETERIZATION N/A 05/17/2022    Procedure: Angiogram, Coronary, with Left Heart Cath;  Surgeon: Kamlesh Sullivan MD;  Location: [facility] CATH/EP LAB;  Service: Cardiology;  Laterality: N/A;    APPENDECTOMY      CARPAL TUNNEL RELEASE Left 02/05/2021    Procedure: RELEASE, CARPAL TUNNEL;  Surgeon: Jayro Hawkins II, MD;  Location: [facility];  Service: Orthopedics;  Laterality: Left;    CATARACT EXTRACTION Bilateral     COLONOSCOPY N/A 12/14/2018    Procedure: COLONOSCOPY;  Surgeon: Noel Aguiar MD;  Location: [facility];  Service: Endoscopy;  Laterality: N/A;    COLONOSCOPY  06/21/2021    Dr. Rodgers, in media: 2 colon polyps removed; diverticulosis, erythema in transverse colon; biopsy: tubular adenomas, ranomd colon unremarkable    COLONOSCOPY N/A 11/4/2022    Procedure: COLONOSCOPY;  Surgeon: Noel Aguiar MD;  Location: [facility];  Service: Endoscopy;  Laterality: N/A;    ERCP N/A 11/23/2021    Procedure: ERCP (ENDOSCOPIC RETROGRADE CHOLANGIOPANCREATOGRAPHY);  Surgeon: Marshall Gardner III, MD;  Location: [facility]  ENDO;  Service: Endoscopy;  Laterality: N/A;    ESOPHAGOGASTRODUODENOSCOPY N/A 01/24/2019    Procedure: EGD (ESOPHAGOGASTRODUODENOSCOPY);  Surgeon: Noel Aguiar MD;  Location: [facility] ENDO;  Service: Endoscopy;  Laterality: N/A;    EYE SURGERY      FLEXIBLE SIGMOIDOSCOPY N/A 01/22/2019    Procedure: SIGMOIDOSCOPY, FLEXIBLE;  Surgeon: Noel Aguiar MD;  Location: [facility] ENDO;  Service: Endoscopy;  Laterality: N/A;    HERNIA REPAIR      bilateral inguinal    LAPAROSCOPIC CHOLECYSTECTOMY N/A 11/24/2021    Procedure: CHOLECYSTECTOMY, LAPAROSCOPIC;  Surgeon: Jay Cabrera Jr., MD;  Location: [facility] OR;  Service: General;  Laterality: N/A;    TONSILLECTOMY         Current Outpatient Medications   Medication Sig    albuterol (PROVENTIL/VENTOLIN HFA) 90 mcg/actuation inhaler INHALE 2 PUFFS INTO THE LUNGS EVERY 6 HOURS AS NEEDED FOR WHEEZE    allopurinol (ZYLOPRIM) 300 MG tablet Take 300 mg by mouth once daily.    aspirin 81 MG Chew Take 81 mg by mouth once daily.    colestipoL (COLESTID) 1 gram Tab TAKE 1 TABLET BY MOUTH 2 TIMES DAILY. TAKE OTHER ORAL MEDICATIONS >1H BEFORE OR >4H AFTER COLESTIPOL    cyanocobalamin (VITAMIN B-12) 1000 MCG tablet Take 100 mcg by mouth once daily.    doxycycline (MONODOX) 100 MG capsule Take 1 capsule (100 mg total) by mouth 2 (two) times daily.    fluticasone-umeclidin-vilanter (TRELEGY ELLIPTA) 100-62.5-25 mcg DsDv Inhale 1 puff into the lungs once daily.    hydroCHLOROthiazide (MICROZIDE) 12.5 mg capsule Take 12.5 mg by mouth once daily.    losartan (COZAAR) 25 MG tablet Take 1 tablet (25 mg total) by mouth once daily.    methylPREDNISolone (MEDROL DOSEPACK) 4 mg tablet use as directed    pantoprazole (PROTONIX) 40 MG tablet TAKE 1 TABLET DAILY (Patient taking differently: Take 40 mg by mouth once daily.)    potassium chloride (MICRO-K) 10 MEQ CpSR Take 10 mEq by mouth once.    pravastatin (PRAVACHOL) 40 MG tablet TAKE 1 TABLET ONCE DAILY (Patient taking differently: Take 40 mg by  mouth once daily.)    pulse oximeter (PULSE OXIMETER) device Use twice daily at 8 AM and 3 PM and [facility] as directed.    solifenacin (VESICARE) 5 MG tablet TAKE 1 TABLET DAILY (Patient taking differently: Take 5 mg by mouth once daily.)    tamsulosin (FLOMAX) 0.4 mg Cap TAKE 1 CAPSULE ONCE DAILY    traZODone (DESYREL) 50 MG tablet Take 1 tablet (50 mg total) by mouth nightly as needed for Insomnia.     No current facility-administered medications for this visit.       Review of patient's allergies indicates:   Allergen Reactions    No known drug allergies        Family History   Problem Relation Age of Onset    Diabetes Mother     Diabetes Brother        Social History     Socioeconomic History    Marital status:    Tobacco Use    Smoking status: Former     Packs/day: 1.50     Years: 60.00     Pack years: 90.00     Types: Cigarettes     Start date: 1944     Quit date: 1/2/2008     Years since quitting: 15.0    Smokeless tobacco: Never   Substance and Sexual Activity    Alcohol use: Yes     Alcohol/week: 1.0 standard drink     Types: 1 Glasses of wine per week     Comment: social - at speical events    Drug use: No    Sexual activity: Not Currently     Partners: Female       History of present illness:  Mr. El comes in today with a chief complaint of right wrist and hand pain and numbness in the median nerve distribution of his right hand.  He is had right wrist and hand pain for quite some time.  He was seen in the office here approximately 3 months ago given a Medrol Dosepak to treat his symptoms.  He does not recollect how efficacious it was at that time.  Had a nerve conduction study to his right upper extremity approximately 2 years ago which did reveal severe carpal tunnel syndrome.  He is interested in carpal tunnel release for treating this.    Review of Systems:    Constitution: Negative for chills, fever, and sweats.  Negative for unexplained weight loss.    HENT:  Negative  for headaches and blurry vision.    Cardiovascular:Negative for chest pain or irregular heart beat. Negative for hypertension.    Respiratory:  Negative for cough and shortness of breath.    Gastrointestinal: Negative for abdominal pain, heartburn, melena, nausea, and vomitting.    Genitourinary:  Negative bladder incontinence and dysuria.    Musculoskeletal:  See HPI for details.     Neurological: Negative for numbness.    Psychiatric/Behavioral: Negative for depression.  The patient is not nervous/anxious.      Endocrine: Negative for polyuria    Hematologic/Lymphatic: Negative for bleeding problem.  Does not bruise/bleed easily.    Skin: Negative for poor would healing and rash    Objective:      Physical Examination:    Vital Signs:    Vitals:    01/31/23 1209   BP: 122/79       Body mass index is 31.17 kg/m².    This a well-developed, well nourished patient in no acute distress.  They are alert and oriented and cooperative to examination.        Right hand exam:  Skin to the right hand is clean dry and intact.  There is no erythema or ecchymosis.  There are no signs or symptoms of infection.  Patient is neurovascularly intact throughout the right upper extremity.  Patient does have a positive Tinel sign over the right carpal tunnel.  He has reproduction of symptoms into the median nerve distribution of his right hand with prolonged wrist flexion consistent with a positive Phalen sign.  He does have atrophy of his thenar eminence.  He has diffuse tenderness to palpation about the dorsum of the right wrist.    Pertinent New Results:    XRAY Report / Interpretation:   No new radiographs were taken on today's clinic visit.  However did review images taken of his right wrist approximately 3 months ago which do reveal severe arthrosis throughout right wrist and carpal bones of the right hand and wrist.  Also reviewed EMG/NCS to his right upper extremity from February 2021 which did reveal severe right carpal tunnel  "syndrome.    Assessment/Plan:      1. Right hand and wrist osteoarthritis.    2. Right carpal tunnel syndrome, severe.      I injected the patient's right wrist from a dorsal approach with 40 mg of Kenalog and lidocaine.  He tolerated this well.  For his right carpal tunnel syndrome risks and benefits of proceeding with carpal tunnel release were discussed with him today in detail.  We discussed with him the risk of continued symptoms due to the severity of his osteoarthritis.  All of their questions were answered in regards to the procedure.  He clearly understood and wished to proceed.  Surgical consents were obtained today.    Risks of the procedure were reviewed with the patient.  This includes, but is not limited to: infection, bleeding, pain (to include pillar pain), swelling, decreased range of motion, nerve injury/damage (in particular the recurrent branch of the median nerve), wound dehiscence, reflex sympathetic dystrophy, and possible need for further surgery.    Jorge Sabillon, Physician Assistant, served in the capacity as a "scribe" for this patient encounter.  A "face-to-face" encounter occurred with Dr. Shaun Leonardo on this date.  The treatment plan and medical decision-making is outlined above. Patient was seen and examined with a chaperone.       This note was created using Dragon voice recognition software that occasionally misinterpreted phrases or words.        "

## 2023-02-02 NOTE — TELEPHONE ENCOUNTER
----- Message from Tana Mcdaniel sent at 2/2/2023 10:03 AM CST -----  Regarding: medical clearance  Contact: Wife, Edith Sharma want to speak with a nurse regarding scheduling medical clearance appointment, patient surgery date is March 1st please call back at [phone] (home)

## 2023-02-03 NOTE — TELEPHONE ENCOUNTER
Lm for patients wife that jerrell has already cleared patient and also stated if she has any questions to call back.

## 2023-02-06 NOTE — TELEPHONE ENCOUNTER
----- Message from Janel Torres sent at 2/6/2023 10:46 AM CST -----  Regarding: Question about Sx  Pt's spouse called and had a question about 's Sx trini'd 03/01. Phone # [phone]. -LAURENT

## 2023-02-07 NOTE — TELEPHONE ENCOUNTER
----- Message from Brigette Yan sent at 2/7/2023 12:17 PM CST -----  [phone]-please call him about a cardio clearance, he wants to know if you have it in hand

## 2023-03-01 NOTE — ANESTHESIA PROCEDURE NOTES
Peripheral Block    Patient location during procedure: OR    Reason for block: primary anesthetic    Diagnosis: R CTS      Staffing  Authorizing Provider: Medardo Salinas MD  Performing Provider: Medardo Salinas MD    Preanesthetic Checklist  Completed: patient identified, IV checked, site marked, risks and benefits discussed, surgical consent, monitors and equipment checked, pre-op evaluation and timeout performed  Peripheral Block  Patient position: supine  Patient monitoring: heart rate, cardiac monitor, continuous pulse ox, continuous capnometry and frequent blood pressure checks  Block type: Gianna block  Laterality: right  Injection technique: single shot  Needle  Needle localization: anatomical landmarks     Assessment  Paresthesia pain: none  Heart rate change: no  Slow fractionated injection: yes  Pain Tolerance: comfortable throughout block and no complaints      Additional Notes  Aguilar block  Patient in on standard monitors and oxygen via nasal cannula. Consent confirmed and timeout performed. Patient with previously placed 22-gauge Jelco in dorsum of right hand. Tourniquet applied to upper arm and extremity exsanguinated with Esmarch and then tourniquet inflated to 250 mmHg.  50 ML's of 0.5% preservative-free lidocaine injected easily via 22-gauge Jelco in dorsum of right hand.  No swelling or discomfort with injection. Patient tolerated the procedure well and was hemodynamically stable throughout.

## 2023-03-01 NOTE — TRANSFER OF CARE
"Anesthesia Transfer of Care Note    Patient: Siddharth Urias    Procedure(s) Performed: Procedure(s) (LRB):  RELEASE, CARPAL TUNNEL, RIGHT (Right)    Patient location: PACU    Anesthesia Type: general and MAC    Transport from OR: Transported from OR on room air with adequate spontaneous ventilation    Post pain: adequate analgesia    Post assessment: no apparent anesthetic complications    Post vital signs: stable    Level of consciousness: awake    Nausea/Vomiting: no nausea/vomiting    Complications: none    Transfer of care protocol was followed      Last vitals:   Visit Vitals  BP (!) 161/85 (BP Location: Left arm, Patient Position: Sitting)   Pulse 63   Temp 36.4 °C (97.6 °F) (Oral)   Resp 18   Ht 5' 7" (1.702 m)   Wt 90.3 kg (199 lb)   SpO2 (!) 94%   BMI 31.17 kg/m²     "

## 2023-03-01 NOTE — ANESTHESIA PREPROCEDURE EVALUATION
03/01/2023  Siddharth Urias is a 88 y.o., male.      Patient Active Problem List   Diagnosis    Unspecified venous (peripheral) insufficiency    Hypertension associated with diabetes    Fatty liver    Irradiation cystitis    Hyperlipidemia LDL goal <70    Type 2 diabetes mellitus, controlled, with renal complications    Diabetic nephropathy    Chronic kidney disease, stage 3    Bilateral renal cysts    Obesity (BMI 30-39.9)    Radiation proctitis    NISH (iron deficiency anemia)    History of DVT of lower extremity    Centrilobular emphysema    COPD (chronic obstructive pulmonary disease)    Aortic calcification and ectasia    Embolism and thrombosis of artery    Bilateral carpal tunnel syndrome    Vitamin D deficiency    B12 deficiency    Carpal tunnel syndrome of left wrist    KAT (acute kidney injury)    Hypomagnesemia    Aortic stenosis    Elevated bilirubin    Aortic stenosis, severe    Atrial fibrillation    Critical aortic valve stenosis    Diabetes mellitus due to underlying condition with hyperglycemia, without long-term current use of insulin    Obstructive sleep apnea    PAD (peripheral artery disease)    Wears hearing aid in both ears    Hearing loss due to cerumen impaction, left    Presbycusis, bilateral    Hearing loss    Noise-induced hearing loss of both ears       Past Surgical History:   Procedure Laterality Date    ANGIOGRAM, CORONARY, WITH LEFT HEART CATHETERIZATION N/A 05/17/2022    Procedure: Angiogram, Coronary, with Left Heart Cath;  Surgeon: Kamlesh Sullivan MD;  Location: [facility] CATH/EP LAB;  Service: Cardiology;  Laterality: N/A;    APPENDECTOMY      CARPAL TUNNEL RELEASE Left 02/05/2021    Procedure: RELEASE, CARPAL TUNNEL;  Surgeon: Jayro Hawkins II, MD;  Location: [facility] OR;  Service: Orthopedics;  Laterality: Left;    CATARACT  EXTRACTION Bilateral     COLONOSCOPY N/A 12/14/2018    Procedure: COLONOSCOPY;  Surgeon: Noel Aguiar MD;  Location: [facility] ENDO;  Service: Endoscopy;  Laterality: N/A;    COLONOSCOPY  06/21/2021    Dr. Rodgers, in media: 2 colon polyps removed; diverticulosis, erythema in transverse colon; biopsy: tubular adenomas, ranomd colon unremarkable    COLONOSCOPY N/A 11/4/2022    Procedure: COLONOSCOPY;  Surgeon: Noel Aguiar MD;  Location: [facility] ENDO;  Service: Endoscopy;  Laterality: N/A;    ERCP N/A 11/23/2021    Procedure: ERCP (ENDOSCOPIC RETROGRADE CHOLANGIOPANCREATOGRAPHY);  Surgeon: Marshall Gardner III, MD;  Location: [facility] ENDO;  Service: Endoscopy;  Laterality: N/A;    ESOPHAGOGASTRODUODENOSCOPY N/A 01/24/2019    Procedure: EGD (ESOPHAGOGASTRODUODENOSCOPY);  Surgeon: Noel Aguiar MD;  Location: [facility] ENDO;  Service: Endoscopy;  Laterality: N/A;    EYE SURGERY      FLEXIBLE SIGMOIDOSCOPY N/A 01/22/2019    Procedure: SIGMOIDOSCOPY, FLEXIBLE;  Surgeon: Noel Aguiar MD;  Location: [facility] ENDO;  Service: Endoscopy;  Laterality: N/A;    HERNIA REPAIR      bilateral inguinal    LAPAROSCOPIC CHOLECYSTECTOMY N/A 11/24/2021    Procedure: CHOLECYSTECTOMY, LAPAROSCOPIC;  Surgeon: Jay Cabrera Jr., MD;  Location: [facility] OR;  Service: General;  Laterality: N/A;    TONSILLECTOMY          Tobacco Use:  The patient  reports that he quit smoking about 15 years ago. His smoking use included cigarettes. He started smoking about 79 years ago. He has a 90.00 pack-year smoking history. He has never used smokeless tobacco.     Results for orders placed or performed during the hospital encounter of 02/22/23   EKG 12-lead    Collection Time: 02/22/23  2:16 PM    Narrative    Test Reason : G56.01,Z01.818,    Vent. Rate : 082 BPM     Atrial Rate : 096 BPM     P-R Int : 250 ms          QRS Dur : 102 ms      QT Int : 368 ms       P-R-T Axes : 000 026 085 degrees     QTc Int : 429 ms    Sinus rhythm with marked  sinus arrythmia with 1st degree A-V block with  occasional Premature ventricular complexes  Cannot rule out Inferior infarct ,age undetermined  Abnormal ECG  When compared with ECG of 13-MAY-2022 10:40,  Premature atrial complexes are no longer Present  Minimal criteria for Inferior infarct are now Present  Nonspecific T wave abnormality has replaced inverted T waves in Inferior  leads  Nonspecific T wave abnormality, improved in Lateral leads  Confirmed by Dano Powell MD ([id number]) on 2/25/2023 5:53:35 PM    Referred By:             Confirmed By:Dano Powell MD             Lab Results   Component Value Date    WBC 7.92 02/22/2023    HGB 16.2 02/22/2023    HCT 53.9 02/22/2023    MCV 85 02/22/2023     02/22/2023     BMP  Lab Results   Component Value Date     02/22/2023    K 4.2 02/22/2023    CL 99 02/22/2023    CO2 27 02/22/2023    BUN 26 (H) 02/22/2023    CREATININE 1.6 (H) 02/22/2023    CALCIUM 9.1 02/22/2023    ANIONGAP 12 02/22/2023    GLU 93 02/22/2023     (H) 11/14/2022     (H) 10/20/2022       Results for orders placed during the hospital encounter of 11/20/21    Echo    Interpretation Summary  · The estimated PA systolic pressure is 39 mmHg.  · The left ventricle is normal in size with concentric remodeling and normal systolic function.  · The estimated ejection fraction is 55%.  · Normal left ventricular diastolic function.  · Normal right ventricular size with normal right ventricular systolic function.  · Moderate left atrial enlargement.  · There is severe aortic valve stenosis.  · Aortic valve area is 0.68 cm2; peak velocity is 3.81 m/s; mean gradient is 39 mmHg.  · Mild aortic regurgitation.  · Mild tricuspid regurgitation.  · There is mild pulmonary hypertension.            Pre-op Assessment    I have reviewed the Patient Summary Reports.    I have reviewed the Nursing Notes. I have reviewed the NPO Status.   I have reviewed the Medications.     Review of  Systems  Anesthesia Hx:  No previous Anesthesia  Denies Family Hx of Anesthesia complications.   Denies Personal Hx of Anesthesia complications.   Social:  Former Smoker, Alcohol Use    Hematology/Oncology:        Hematology Comments: History of DVT of lower extremity --  Cancer in past history:  Oncology Comments: Prostate cancer     EENT/Dental:   Eyes: Visual Impairment Has Bilateral and S/P Extraction - Bilateral Catarract  Ears General/Symptom(s) Hearing Impairment:    Cardiovascular:   Hypertension, well controlled Valvular problems/Murmurs, AS, AI Dysrhythmias atrial fibrillation hyperlipidemia ECG has been reviewed. Aortic valve area is 0.68 cm2; peak velocity is 3.81 m/s; mean gradient is 39 mmHg.  Mild aortic regurgitation.    Unspecified venous (peripheral) insufficiency   Pulmonary:   COPD Sleep Apnea Centrilobular emphysema    Trelegy Ellipta use     Albuterol Inhaler use    Education provided regarding risk of obstructive sleep apnea     Renal/:   Chronic Renal Disease, CKD Chronic kidney disease, stage 3   Hepatic/GI:   Liver Disease, Fatty liver   Musculoskeletal:   Arthritis     Neurological:   Neuromuscular Disease, Carpal tunnel syndrome    Diabetic nephropathy  Peripheral Neuropathy    Endocrine:   Diabetes, type 2        Physical Exam  General:  Obesity      Airway/Jaw/Neck:  Airway Findings: Mouth Opening: Normal   Tongue: Normal   Mallampati: III TM Distance: Normal, at least 6 cm   Jaw/Neck Findings:  Neck ROM: Normal ROM       Dental:  Dental Findings: In tact     Chest/Lungs:  Chest/Lungs Findings: Clear to auscultation, Normal Respiratory Rate      Heart/Vascular:  Heart Findings: Rate: Normal  Rhythm: Regular Rhythm  Sounds: Normal  Heart Murmur  Systolic        Mental Status:  Mental Status Findings:  Cooperative, Alert and Oriented         Anesthesia Plan  Type of Anesthesia, risks & benefits discussed:  Anesthesia Type:  MAC, Regional    Patient's Preference:   Plan  Factors:          Intra-op Monitoring Plan: standard ASA monitors and Standard ASA Monitors  Intra-op Monitoring Plan Comments:   Post Op Pain Control Plan: IV/PO Opioids PRN and multimodal analgesia  Post Op Pain Control Plan Comments:     Induction:   IV  Beta Blocker:         Informed Consent: Informed consent signed with the Patient and all parties understand the risks and agree with anesthesia plan.  All questions answered.  Anesthesia consent signed with patient.  ASA Score: 4     Day of Surgery Review of History & Physical:        Anesthesia Plan Notes:   MAC  [place] Block - Right  No Versed   Minimal Fentanyl  IV tylenol  Sleep apnea precautions         Ready For Surgery From Anesthesia Perspective.           Physical Exam  General: Obesity    Airway:  Mallampati: III   Mouth Opening: Normal  TM Distance: Normal, at least 6 cm  Tongue: Normal  Neck ROM: Normal ROM    Dental:  In tact    Chest/Lungs:  Clear to auscultation, Normal Respiratory Rate    Heart:  Rate: Normal  Rhythm: Regular Rhythm  Sounds: Normal  Murmur: Systolic;          Anesthesia Plan  Type of Anesthesia, risks & benefits discussed:    Anesthesia Type: MAC, Regional  Intra-op Monitoring Plan: standard ASA monitors and Standard ASA Monitors  Post Op Pain Control Plan: IV/PO Opioids PRN and multimodal analgesia  Induction:  IV  Informed Consent: Informed consent signed with the Patient and all parties understand the risks and agree with anesthesia plan.  All questions answered.   ASA Score: 4  Anesthesia Plan Notes:   MAC  [place] Block - Right  No Versed   Minimal Fentanyl  IV tylenol  Sleep apnea precautions     Ready For Surgery From Anesthesia Perspective.       .

## 2023-03-01 NOTE — OP NOTE
[facility]  Surgery Department  Operative Note    SUMMARY     Date of Procedure: 3/1/2023     Procedure:  Carpal tunnel release right    Surgeon(s) and Role:     * Shaun Leonardo MD - Primary    Assisting Surgeon:  Paz    Pre-Operative Diagnosis: Carpal tunnel syndrome, right [G56.01]  Preop testing [Z01.818]    Post-Operative Diagnosis: Post-Op Diagnosis Codes:     * Carpal tunnel syndrome, right [G56.01]     * Preop testing [Z01.818]    Anesthesia: Local MAC    Intraoperative Findings:  Carpal tunnel syndrome    Description of the Findings of the Procedure: Patient was placed on the operative table in supine position.  The right hand was prepped and draped in the usual sterile manner for surgery.  An incision was made in line with the 3rd ray.  It was carried down sharply through the skin.  The longitudinal fibers the palmaris fascia visualized and divided.  The transverse carpal ligament was easily seen.  A small rent was made in the transverse carpal ligament and hemostat was slipped under the transverse carpal ligament.  A knife was now used to cut down onto the transverse carpal ligament.  We now divided the most proximal and distal aspects of the transverse carpal ligament using Metzenbaum scissors.  All cutting was done on the ulnar border of the nerve we cut only what we could directly visualized.  We copiously irrigated.  The skin was closed with 4-0 nylon sutures.  Sterile dressings were applied and the patient was noted to be in stable condition.     Complications: No    Estimated Blood Loss (EBL): * No values recorded between 3/1/2023  9:55 AM and 3/1/2023 10:01 AM *           Implants: * No implants in log *    Specimens:   Specimen (24h ago, onward)      None                    Condition: Good    Disposition: PACU - hemodynamically stable.              Discharge Note    SUMMARY     Admit Date: 3/1/2023    Discharge Date and Time:  03/01/2023 10:01 AM    Hospital Course (synopsis of  major diagnoses, care, treatment, and services provided during the course of the hospital stay): Uneventful      Final Diagnosis: Post-Op Diagnosis Codes:     * Carpal tunnel syndrome, right [G56.01]     * Preop testing [Z01.818]    Disposition: Home or Self Care    Follow Up/Patient Instructions:     Medications:  Reconciled Home Medications:   Current Discharge Medication List        CONTINUE these medications which have NOT CHANGED    Details   allopurinol (ZYLOPRIM) 300 MG tablet Take 300 mg by mouth once daily.      colestipoL (COLESTID) 1 gram Tab TAKE 1 TABLET BY MOUTH 2 TIMES DAILY. TAKE OTHER ORAL MEDICATIONS >1H BEFORE OR >4H AFTER COLESTIPOL  Qty: 180 tablet, Refills: 0    Associated Diagnoses: Chronic diarrhea; S/P cholecystectomy      cyanocobalamin (VITAMIN B-12) 1000 MCG tablet Take 100 mcg by mouth once daily.      fluticasone-umeclidin-vilanter (TRELEGY ELLIPTA) 100-62.5-25 mcg DsDv Inhale 1 puff into the lungs once daily.  Qty: 180 each, Refills: 3    Associated Diagnoses: Chronic obstructive pulmonary disease, unspecified COPD type      hydroCHLOROthiazide (MICROZIDE) 12.5 mg capsule Take 12.5 mg by mouth once daily.      losartan (COZAAR) 25 MG tablet Take 1 tablet (25 mg total) by mouth once daily.    Comments: Hold for next 3 days      pantoprazole (PROTONIX) 40 MG tablet TAKE 1 TABLET DAILY  Qty: 90 tablet, Refills: 3      potassium chloride (MICRO-K) 10 MEQ CpSR Take 10 mEq by mouth once.      pravastatin (PRAVACHOL) 40 MG tablet TAKE 1 TABLET ONCE DAILY  Qty: 90 tablet, Refills: 3      solifenacin (VESICARE) 5 MG tablet TAKE 1 TABLET DAILY  Qty: 90 tablet, Refills: 3      tamsulosin (FLOMAX) 0.4 mg Cap TAKE 1 CAPSULE ONCE DAILY  Qty: 90 capsule, Refills: 3      traZODone (DESYREL) 50 MG tablet Take 1 tablet (50 mg total) by mouth nightly as needed for Insomnia.  Qty: 90 tablet, Refills: 3      albuterol (PROVENTIL/VENTOLIN HFA) 90 mcg/actuation inhaler INHALE 2 PUFFS INTO THE LUNGS EVERY 6  HOURS AS NEEDED FOR WHEEZE  Qty: 18 g, Refills: 1    Associated Diagnoses: COPD exacerbation; Chronic obstructive pulmonary disease, unspecified COPD type      aspirin 81 MG Chew Take 81 mg by mouth once daily.      methylPREDNISolone (MEDROL DOSEPACK) 4 mg tablet use as directed  Qty: 1 each, Refills: 0    Associated Diagnoses: Arthritis of right wrist      oxyCODONE-acetaminophen (PERCOCET) 7.5-325 mg per tablet Take 1 tablet by mouth every 6 (six) hours as needed for Pain.  Qty: 14 tablet, Refills: 0    Associated Diagnoses: Carpal tunnel syndrome, right      pulse oximeter (PULSE OXIMETER) device Use twice daily at 8 AM and 3 PM and record the value in MyChart as directed.  Qty: 1 each, Refills: 0    Comments: This is a NO CHARGE item.  Please override price to zero.  DO NOT PRINT.  NORMAL MODE e-PRESCRIBE ONLY.  Associated Diagnoses: COVID-19           Discharge Procedure Orders   Diet Adult Regular     Leave dressing on - Keep it clean, dry, and intact until clinic visit     Activity as tolerated

## 2023-03-15 NOTE — PROGRESS NOTES
[facility] ORTHOPEDICS  [address]  [place] [address]  Phone: [phone]   [phone]    Patient's PCP:Martine Maxwell MD  Referring Provider: No ref. provider found    POST-OP Note:    Subjective:        Chief Complaint:   Chief Complaint   Patient presents with    Right Wrist - Post-op Evaluation     Right Carpal Tunnel Release 03/01/23, patient is doing ok, he is here to get his sutures removed.       Past Medical History:   Diagnosis Date    Afib     Arthritis     Choledocholithiasis     Chronic kidney disease     Colon polyp     COPD (chronic obstructive pulmonary disease)     Diabetes mellitus, type 2     2/2011    Diverticulosis     Fatty liver     Hepatomegaly     History of blood clots     2020 Dr. Rodriguez     Irradiation cystitis     Prostate cancer 2009    s/p XRT (T1C, Wheeler 8)    Radiation proctitis     Sleep apnea        Past Surgical History:   Procedure Laterality Date    ANGIOGRAM, CORONARY, WITH LEFT HEART CATHETERIZATION N/A 05/17/2022    Procedure: Angiogram, Coronary, with Left Heart Cath;  Surgeon: Kamlesh Sullivan MD;  Location: [facility] CATH/EP LAB;  Service: Cardiology;  Laterality: N/A;    APPENDECTOMY      CARPAL TUNNEL RELEASE Left 02/05/2021    Procedure: RELEASE, CARPAL TUNNEL;  Surgeon: Jayro Hawkins II, MD;  Location: [facility] OR;  Service: Orthopedics;  Laterality: Left;    CARPAL TUNNEL RELEASE Right 3/1/2023    Procedure: RELEASE, CARPAL TUNNEL;  Surgeon: Shaun Leonardo MD;  Location: [facility] OR;  Service: Orthopedics;  Laterality: Right;    CATARACT EXTRACTION Bilateral     COLONOSCOPY N/A 12/14/2018    Procedure: COLONOSCOPY;  Surgeon: Noel Aguiar MD;  Location: [facility];  Service: Endoscopy;  Laterality: N/A;    COLONOSCOPY  06/21/2021    Dr. Rodgers, in media: 2 colon polyps removed; diverticulosis, erythema in transverse colon; biopsy: tubular adenomas, ranomd colon unremarkable    COLONOSCOPY N/A 11/4/2022    Procedure: COLONOSCOPY;  Surgeon: Noel NORMAN  MD Stefania;  Location: [facility] ENDO;  Service: Endoscopy;  Laterality: N/A;    ERCP N/A 11/23/2021    Procedure: ERCP (ENDOSCOPIC RETROGRADE CHOLANGIOPANCREATOGRAPHY);  Surgeon: Marshall Gardner III, MD;  Location: [facility] ENDO;  Service: Endoscopy;  Laterality: N/A;    ESOPHAGOGASTRODUODENOSCOPY N/A 01/24/2019    Procedure: EGD (ESOPHAGOGASTRODUODENOSCOPY);  Surgeon: Noel Aguiar MD;  Location: [facility] ENDO;  Service: Endoscopy;  Laterality: N/A;    EYE SURGERY      FLEXIBLE SIGMOIDOSCOPY N/A 01/22/2019    Procedure: SIGMOIDOSCOPY, FLEXIBLE;  Surgeon: Noel Aguiar MD;  Location: [facility] ENDO;  Service: Endoscopy;  Laterality: N/A;    HERNIA REPAIR      bilateral inguinal    LAPAROSCOPIC CHOLECYSTECTOMY N/A 11/24/2021    Procedure: CHOLECYSTECTOMY, LAPAROSCOPIC;  Surgeon: Jay Cabrera Jr., MD;  Location: [facility] OR;  Service: General;  Laterality: N/A;    TONSILLECTOMY         Current Outpatient Medications   Medication Sig    albuterol (PROVENTIL/VENTOLIN HFA) 90 mcg/actuation inhaler INHALE 2 PUFFS INTO THE LUNGS EVERY 6 HOURS AS NEEDED FOR WHEEZE    allopurinol (ZYLOPRIM) 300 MG tablet Take 300 mg by mouth once daily.    aspirin 81 MG Chew Take 81 mg by mouth once daily.    colestipoL (COLESTID) 1 gram Tab TAKE 1 TABLET BY MOUTH 2 TIMES DAILY. TAKE OTHER ORAL MEDICATIONS >1H BEFORE OR >4H AFTER COLESTIPOL    cyanocobalamin (VITAMIN B-12) 1000 MCG tablet Take 100 mcg by mouth once daily.    fluticasone-umeclidin-vilanter (TRELEGY ELLIPTA) 100-62.5-25 mcg DsDv Inhale 1 puff into the lungs once daily.    hydroCHLOROthiazide (MICROZIDE) 12.5 mg capsule Take 12.5 mg by mouth once daily.    losartan (COZAAR) 25 MG tablet Take 1 tablet (25 mg total) by mouth once daily.    methylPREDNISolone (MEDROL DOSEPACK) 4 mg tablet use as directed    oxyCODONE-acetaminophen (PERCOCET) 7.5-325 mg per tablet Take 1 tablet by mouth every 6 (six) hours as needed for Pain.    pantoprazole (PROTONIX) 40 MG tablet TAKE 1 TABLET  DAILY (Patient taking differently: Take 40 mg by mouth once daily.)    potassium chloride (MICRO-K) 10 MEQ CpSR Take 10 mEq by mouth once.    pravastatin (PRAVACHOL) 40 MG tablet TAKE 1 TABLET ONCE DAILY (Patient taking differently: Take 40 mg by mouth once daily.)    pulse oximeter (PULSE OXIMETER) device Use twice daily at 8 AM and 3 PM and record the value in MyChart as directed.    solifenacin (VESICARE) 5 MG tablet TAKE 1 TABLET DAILY (Patient taking differently: Take 5 mg by mouth once daily.)    tamsulosin (FLOMAX) 0.4 mg Cap TAKE 1 CAPSULE ONCE DAILY    traZODone (DESYREL) 50 MG tablet Take 1 tablet (50 mg total) by mouth nightly as needed for Insomnia.     No current facility-administered medications for this visit.       Review of patient's allergies indicates:   Allergen Reactions    No known drug allergies        Family History   Problem Relation Age of Onset    Diabetes Mother     Diabetes Brother        Social History     Socioeconomic History    Marital status:    Tobacco Use    Smoking status: Former     Packs/day: 1.50     Years: 60.00     Pack years: 90.00     Types: Cigarettes     Start date: 1944     Quit date: 1/2/2008     Years since quitting: 15.2    Smokeless tobacco: Never   Substance and Sexual Activity    Alcohol use: Yes     Alcohol/week: 1.0 standard drink     Types: 1 Glasses of wine per week     Comment: social - at speical events    Drug use: No    Sexual activity: Not Currently     Partners: Female       History of present illness:  88-year-old male, returns to clinic today for his right hand, status post right carpal tunnel release done on March 1st.  He has had improvement in his pain and numbness and tingling in his hand and fingers since the surgery.  He is here today for wound check and suture removal.      Review of Systems:    Musculoskeletal:  See HPI       Objective:        Physical Examination:    Vital Signs: There were no vitals filed for this visit.    Body mass  index is 31.17 kg/m².    This a well-developed, well nourished patient in no acute distress.  They are alert and oriented and cooperative to examination.        Examination of the right hand, volar surgical incision well healed.  Skin is dry and intact, erythema ecchymosis no signs symptoms of infection.  Interrupted sutures are in place.  These were removed today easily without complication.  No evidence of wound failure dehiscence.  Excellent range of motion, he can make a fist and can oppose all fingers.  Distally neurovascularly intact.    Pertinent New Results:     XRAY Report / Interpretation:        Assessment:       1. S/P carpal tunnel release      Plan:     S/P carpal tunnel release        Follow up in about 4 weeks (around 4/12/2023) for PO Right Carpal Tunnel Release 03/01/23, Tues/Thurs.    Stable status post right carpal tunnel release, wounds look great.  Has had improvement in his clinical symptoms prior to surgery.  We discussed wound care, gentle scrubbing mild soap water pat dry.  He may apply his favorite ointment or lotion to the area to help with desensitization.  Follow up with us p.r.n..      Barak Hernandez, JESUS, PACourtC    This note was created using [employer] voice recognition software that occasionally misinterprets words or phrases.

## 2023-03-28 NOTE — TELEPHONE ENCOUNTER
----- Message from Nivia Orantes sent at 3/28/2023  9:41 AM CDT -----  Contact: Patient's wife  Type:  Sooner Apoointment Request      Name of Caller: patient' wife, Enedina     When is the first available appointment? 8/25    Symptoms: 4 month f/u     Would the patient rather a call back or a response via [employer]? Call     Best Call Back Number:[phone] (home)      Additional Information:

## 2023-03-28 NOTE — TELEPHONE ENCOUNTER
Returned patients call in regards to needing an appointment. No answer , left voicemail to return call.

## 2023-04-06 NOTE — TELEPHONE ENCOUNTER
----- Message from Saadia Sheldon sent at 4/6/2023 12:31 PM CDT -----  Contact: [phone]  Type: Needs Medical Advice  Who Called:  Pts wife Naila    Best Call Back Number: cell [phone] or home [phone]    Additional Information: Naila  is calling because she would like pt to have lab work done since his appt  was cxled on 03/28.

## 2023-04-06 NOTE — TELEPHONE ENCOUNTER
Called patient's wife and advised the appointment that is scheduled is the soonest appointment available. Patient was not happy but accepted the appointment. Verbalized understanding. Apologized.

## 2023-04-11 NOTE — TELEPHONE ENCOUNTER
----- Message from Reanna Ingram sent at 4/11/2023 10:53 AM CDT -----  Contact: self  Type: Needs Medical Advice    Who Called:  pt    Symptoms (please be specific):  Cough, yellow mucus    How long has patient had these symptoms:  x 1 week    Pharmacy name and phone #:    [id number] - [place] - [address]  [address]  [place] [address]    Phone: [phone] Fax: [phone]          Best Call Back Number: [phone]    Additional Information: Pt would like a rx called in for symptoms  Thank you

## 2023-04-11 NOTE — TELEPHONE ENCOUNTER
Patient called and stated he has had a productive cough x 1 week. States mucus is yellow in color. No fever. No chest pain. Has tried medicines over the counter. Nothing is helping. Unable to complete virtual or evisit. Can not come in for an appointment due to him and his wife being older and they drive each other. Wants to know if something could be called in for his cough. Will forward message to provider for further assistance.

## 2023-04-11 NOTE — TELEPHONE ENCOUNTER
COPD exacerbation suspected.  Augmentin 875 bid x 5 days sent in to pharmacy. General home care guidelines for cough and congestion:increase fluid intake, get plenty of rest, and use OTC cough and cold medications for relief of symptoms.  I recommended guafenesin for congestion and dextromethorphan as directed for cough.  Brands like Mucinex DM or Chloricidin HBP or similar are recommended.  Avoidance of decongestants is recommended for patients with heart problems and hypertension.  Extra vitamin C may also benefit.  Return to clinic if symptoms last longer than 10 days or sooner if worsen with symptoms like fever > 100.4, severe sinus pain or headache, thick yellow nasal discharge or sputum, dehydration, sudden confusion or mental status changes, shortness of breath, labored breathing, or lethargy. Anti-fever medications can be alternated like OTC ibuprofen or tylenol as needed and as directed unless told to avoid these by your doctor.

## 2023-04-12 NOTE — TELEPHONE ENCOUNTER
----- Message from Tami Nieto sent at 4/12/2023 11:30 AM CDT -----  Contact: self  Type: Sooner Appointment Request        Caller is requesting a sooner appointment. Caller declined first available appointment listed below. Caller will not accept being placed on the waitlist and is requesting a message be sent to doctor.        Name of Caller: patient   Best Call Back Number: [phone]  Additional Information: Plz call pt back to get medically advised. Thanks

## 2023-04-18 NOTE — TELEPHONE ENCOUNTER
Rescheduled appt with pt's spouse.    ----- Message from Hedy Rosario sent at 4/18/2023  9:51 AM CDT -----  Type: Needs Medical Advice  Who Called:  Edith (spouse)  Symptoms (please be specific):    How long has patient had these symptoms:    Pharmacy name and phone #:    Best Call Back Number: [phone]  Additional Information: Edith is requesting a call back to reschedule her  appt. On 4/18 at 1:40.

## 2023-04-23 PROBLEM — J81.1 PULMONARY EDEMA: Status: ACTIVE | Noted: 2021-11-21

## 2023-04-24 PROBLEM — R79.89 ELEVATED TROPONIN: Status: ACTIVE | Noted: 2023-01-01

## 2023-04-24 PROBLEM — I50.31 ACUTE HEART FAILURE WITH PRESERVED EJECTION FRACTION: Status: ACTIVE | Noted: 2023-01-01

## 2023-04-24 PROBLEM — I48.0 PAF (PAROXYSMAL ATRIAL FIBRILLATION): Status: ACTIVE | Noted: 2023-01-01

## 2023-04-24 PROBLEM — I10 HYPERTENSION: Status: ACTIVE | Noted: 2023-01-01

## 2023-04-24 PROBLEM — I35.0 SEVERE AORTIC STENOSIS: Status: ACTIVE | Noted: 2023-01-01

## 2023-04-26 PROBLEM — J81.1 PULMONARY EDEMA: Status: RESOLVED | Noted: 2021-11-21 | Resolved: 2023-01-01

## 2023-04-27 NOTE — PROGRESS NOTES
C3 nurse spoke with Siddharth Urias   for a TCC post hospital discharge follow up call. The patient has a scheduled [facility] appointment with Martine Maxwell MD  He has apt with Tristen Wood  on 05/05 @1130  . Spoke to his wife she had questiona about some of his apt. I answered what she asked, she said she was getting his meds. Then said she could not answer questions about the meds and he could not either she was not able to complete TCC Call.

## 2023-04-27 NOTE — TELEPHONE ENCOUNTER
----- Message from Rabia Wild sent at 4/26/2023  5:19 PM CDT -----  Contact: [phone]  Pt being d/c from [facility] 4/26/23 and needs hosp f/u within 7 days but nothing is available with anyone at the office. Can you please call pt to discuss appt? Thanks

## 2023-05-01 PROBLEM — I50.23 ACUTE ON CHRONIC SYSTOLIC CONGESTIVE HEART FAILURE: Status: ACTIVE | Noted: 2023-01-01

## 2023-05-01 PROBLEM — I21.4 NSTEMI (NON-ST ELEVATED MYOCARDIAL INFARCTION): Status: ACTIVE | Noted: 2023-01-01

## 2023-05-01 NOTE — TELEPHONE ENCOUNTER
Recommend they bring all meds to appt and bring a family member to assist and get new instructions

## 2023-05-02 PROBLEM — I21.4 NSTEMI (NON-ST ELEVATED MYOCARDIAL INFARCTION): Status: RESOLVED | Noted: 2023-01-01 | Resolved: 2023-01-01

## 2023-05-02 PROBLEM — I50.31 ACUTE HEART FAILURE WITH PRESERVED EJECTION FRACTION (HFPEF): Status: RESOLVED | Noted: 2023-01-01 | Resolved: 2023-01-01

## 2023-05-03 NOTE — TELEPHONE ENCOUNTER
ALEENA Owens Staff  Cc: TAY Alfaro Staff    ----- Message from Jennifer Edmondson RN sent at 5/3/2023 11:29 AM CDT -----  Regarding: Hospital follow up  Good morning,     I enrolled this patient into OPCM today and spoke with his wife, lBu. She stated that they are having a difficult time at home. Mrs. Urias reports that the patient is to weak to walk or help move himself at all. She is having to struggle to get him to the bathroom. Their son lives close by but works full time. She does not think they will be able to make his appointment on Friday. She also said they have an appointment in [place] on Friday but she could not tell me where exactly.   I am going to refer to SW for assistance as well since the wife said she does not drive and son works full time.   Would this patient benefit from getting HH involved or maybe Care at Home for an NP to go out and see him?    Please call and advise patient if needed.    Thank you for your assistance,   Jennifer Edmondson RN  Outpatient Complex Care Management  [phone]

## 2023-05-03 NOTE — LETTER
May 3, 2023             Dear Mr. Siddharth Urias,    Welcome to [facility] Complex Care Management Program.  It was a pleasure talking with you today.  My name is Jennifer Edmondson, and I look forward to being your Care Manager.  My goal is to help you function at the healthiest and highest level possible.  You can contact me directly at [phone].    As an [facility] patient, some of the services we may be able to provide include:      Development of an individualized care plan with a Registered Nurse    Connection with a    Connection with available resources and services     Coordinate communication among your care team members    Provide coaching and education    Help you understand your doctors treatment plan   Help you obtain information about your insurance coverage.     All services provided by [facility] Complex Care Managers and other care team members are coordinated with and communicated to your primary care team.      As part of your enrollment, you will be receiving education materials and more information about these services in your [facility] account, by phone or through the mail.  If you do not wish to participate or receive information, please contact our office at [phone].      Sincerely,        Jennifer Edmondson RN  [facility]   Out-patient RN Complex Care Manager

## 2023-05-03 NOTE — PROGRESS NOTES
Outpatient Care Management  Initial Patient Assessment    Patient: Siddharth Urias  MRN: [id number]  Date of Service: 05/03/2023  Completed by: Jennifer Edmondson RN  Referral Date: 04/25/2023  Program: High Risk  Status: Ongoing  Effective Dates: 5/3/2023 - present  Responsible Staff: No information to display        Reason for Visit   Patient presents with    OPCM Enrollment Call    Nursing Assessment       Brief Summary:  Siddharth Urias was referred by Dr. Scott for chronic bronchitis. Patient qualifies for program based on high risk score of 64.6%. spouse stated that the pt is very fatigued and weak. This is her biggest concern at this time.    Active problem list, medical, surgical and social history reviewed. Active comorbidities include hearing loss bilateral, a-fib, hypertension, CKD, and type 2 DM. Areas of need identified by patient include help at home and potential transportation.   Med rec done.   Referred pt to  for assistance  Sent message to PCP about concerns at home and about pt not being well enough to make appt on Friday.   Will mail pill box      Disability Status  Hearing Difficulty or Deaf: yes  Hearing Management: other  Visual Difficulty or Blind: no  Difficulty Concentrating, Remembering or Making Decisions: no  Communication Difficulty: no  Eating/Swallowing Difficulty: no  Walking or Climbing Stairs Difficulty: yes  Walking or Climbing Stairs: ambulation difficulty, requires equipment; ambulation difficulty, assistance 1 person; transferring difficulty, requires equipment; transferring difficulty, assistance 1 person; stair climbing difficulty, requires equipment; stair climbing difficulty, assistance 1 person  Mobility Management: rolling walker, straight cane ( only use as needed)  Dressing/Bathing Difficulty: yes  Dressing/Bathing: bathing difficulty, requires equipment; bathing difficulty, assistance 1 person; dressing difficulty, assistance 1 person  Toileting : Assistance  Required  Difficulty Managing Errands Independently: yes  Equipment Currently Used at Home: walker, rolling; glucometer  Change in Functional Status Since Onset of Current Illness/Injury: yes

## 2023-05-03 NOTE — TELEPHONE ENCOUNTER
ED 05/01/2023    Spoke with spouse, she had questions regarding what medications patient should be taking.  I discussed the discharge summary from hospital.  We discussed in detail what medications to take and how.  She verbalized understanding.

## 2023-05-03 NOTE — TELEPHONE ENCOUNTER
Cherise Alfaro Staff  Caller: Unspecified (Today, 12:32 PM)    ----- Message from Cherise Delatorre sent at 5/3/2023 12:32 PM CDT -----  Type: Needs Medical Advice  Who Called:  pt wife, Edith  Symptoms (please be specific):  said she would like to speak to the NP--said she need to know what this appt is for--I told her hospital f/u--said she need to speak to the office not the --please call and advise  Best Call Back Number: [phone] (home)     Additional Information: thank you

## 2023-05-03 NOTE — TELEPHONE ENCOUNTER
Call placed to patient's wife (Edith). Explained appointment on 5-5-23 is for hospital follow up. Mrs. Sharma states patient will not be able to come in for appointment. Requested to cancel appointment. Appointment canceled as per request.

## 2023-05-04 NOTE — TELEPHONE ENCOUNTER
----- Message from Enrrique Engel LPN sent at 5/3/2023  5:33 PM CDT -----  Regarding: FW: Hospital follow up    ----- Message -----  From: Jennifer Edmondson RN  Sent: 5/3/2023  11:36 AM CDT  To: Hans WALLS Staff, Israel Alfaro Staff  Subject: Hospital follow up                               Good morning,     I enrolled this patient into OPCM today and spoke with his wife, Blu. She stated that they are having a difficult time at home. Mrs. Urias reports that the patient is to weak to walk or help move himself at all. She is having to struggle to get him to the bathroom. Their son lives close by but works full time. She does not think they will be able to make his appointment on Friday. She also said they have an appointment in [place] on Friday but she could not tell me where exactly.   I am going to refer to  for assistance as well since the wife said she does not drive and son works full time.   Would this patient benefit from getting HH involved or maybe Care at Home for an NP to go out and see him?    Please call and advise patient if needed.    Thank you for your assistance,   Jennifer Edmondson RN  Outpatient Complex Care Management  [phone]

## 2023-05-04 NOTE — TELEPHONE ENCOUNTER
----- Message from Everette Royal sent at 5/4/2023  2:57 PM CDT -----  Contact: pt wife  Pt wife requesting call back RE: speak in regards to appt on 5/8    Confirmed contact below:  Contact Name:Siddharth Urias  Phone Number: [phone]

## 2023-05-05 NOTE — TELEPHONE ENCOUNTER
----- Message from Lamberto Estevez sent at 5/5/2023  2:20 PM CDT -----  Regarding: info to provider  Contact: [phone] cell of daughter  Type: Needs Medical Advice    Who Called:  daughter // jf    Symptoms (please be specific):  water on lungs    How long has patient had these symptoms:  2wks    Best Call Back Number:  [phone] cell of daughter    Additional Information: was found at [facility] and look like pt needs hosp f/u took please call daughter.

## 2023-05-05 NOTE — TELEPHONE ENCOUNTER
Called patient and daughter in regards to needing to speak with a nurse in regards to home health. Advised daughter that father has an order for home health and they will be in contact to come out.   Stated I will also send a message to staff for them to call.

## 2023-05-05 NOTE — TELEPHONE ENCOUNTER
----- Message from Roberto Young sent at 5/5/2023 12:49 PM CDT -----  Regarding: Plan Of Care  Contact: [phone]  Calling in regards to pt getting hospital follow up and discuss home health care that has been ordered. Please call pt to discuss plan of care and visits that are needed.

## 2023-05-05 NOTE — TELEPHONE ENCOUNTER
----- Message from Emilie Larose, Patient Care Assistant sent at 5/5/2023  2:08 PM CDT -----  Regarding: advice  Contact: jf ray's daughter  Type: Needs Medical Advice    Who Called:  jf ray's daughter    Best Call Back Number: [phone] (home)     Additional Information: jf ray's daughter states she would like a callback regarding when home health is coming to the pt's home. Please call pt to advise. Thanks!

## 2023-05-05 NOTE — TELEPHONE ENCOUNTER
----- Message from Jolie Jones sent at 5/5/2023  1:15 PM CDT -----  Contact: pt daughter [phone] or [phone]  Type: Needs Medical Advice  Who Called:  pt daughter  Best Call Back Number: [phone] or [phone]    Additional Information: Pt daughter is calling the office asking to speak with the nurse. Please call back and advise.

## 2023-05-08 PROBLEM — E87.70 FLUID OVERLOAD: Status: ACTIVE | Noted: 2023-01-01

## 2023-05-08 PROBLEM — M25.562 LEFT KNEE PAIN: Status: ACTIVE | Noted: 2023-01-01

## 2023-05-08 PROBLEM — E87.5 HYPERKALEMIA: Status: ACTIVE | Noted: 2023-01-01

## 2023-05-08 PROBLEM — I50.33 ACUTE ON CHRONIC DIASTOLIC HEART FAILURE: Status: ACTIVE | Noted: 2023-01-01

## 2023-05-09 PROBLEM — R00.1 BRADYCARDIA: Status: ACTIVE | Noted: 2023-01-01

## 2023-05-09 PROBLEM — I82.4Y9 ACUTE DEEP VEIN THROMBOSIS (DVT) OF PROXIMAL VEIN OF LOWER EXTREMITY: Status: ACTIVE | Noted: 2023-01-01

## 2023-05-09 PROBLEM — E87.70 FLUID OVERLOAD: Status: RESOLVED | Noted: 2023-01-01 | Resolved: 2023-01-01

## 2023-05-09 PROBLEM — E87.5 HYPERKALEMIA: Status: RESOLVED | Noted: 2023-01-01 | Resolved: 2023-01-01

## 2023-05-09 PROBLEM — R00.1 BRADYCARDIA: Status: RESOLVED | Noted: 2023-01-01 | Resolved: 2023-01-01

## 2023-05-09 PROBLEM — M10.9 GOUT ATTACK: Status: ACTIVE | Noted: 2023-01-01

## 2023-05-09 PROBLEM — I48.91 ATRIAL FIBRILLATION: Status: RESOLVED | Noted: 2021-11-23 | Resolved: 2023-01-01

## 2023-05-11 NOTE — PROGRESS NOTES
Outpatient Care Management   - High Risk Patient Assessment    Patient: Siddharth Urias  MRN:  [id number]  Date of Service:  5/11/2023  Completed by:  Emy Lincoln LCSW  Referral Date: 04/25/2023    Reason for Visit   Patient presents with    OPCM SW First Assessment Attempt     5/11/2023  1st attempt to complete Initial Assessment  for Outpatient Care Management, left message.  Will mail unable to assess letter in pt portal.       Social Work Assessment - High Risk    Plan Of Care     5/11/2023  Brief Summary:  received a referral from OPCM RN Jennifer Edmondson for the following patient identified psycho-social needs Transportation and help at home. Care plan was created in collaboration with patient/caregiver input.  completed the SDOH questionnaire.

## 2023-05-11 NOTE — TELEPHONE ENCOUNTER
No care due was identified.  [facility] Embedded Care Due Messages. Reference number: [id number].   5/11/2023 12:50:24 PM CDT

## 2023-05-11 NOTE — TELEPHONE ENCOUNTER
----- Message from Jessica Juarez sent at 5/11/2023 10:08 AM CDT -----  Contact: Patient's wife  Type:  RX Refill Request    Who Called:  Patient's wife  Refill or New Rx:  Refill from the Hospital   RX Name and Strength:  pravastatin (PRAVACHOL) 40 MG tablet    How is the patient currently taking it? (ex. 1XDay):  TAKE 1 TABLET ONCE DAILY   Is this a 30 day or 90 day RX:  90 tablet 3  Preferred Pharmacy with phone number:    [facility]/pharmacy #[id number] - [place] - [address]  [address]  [place] [address]  Phone: [phone] Fax: [phone]  Local or Mail Order:  Local  Ordering Provider:  Hans Mccracken Call Back Number:  [phone]  Additional Information:  Please call the pt back to advise. Thanks!

## 2023-05-11 NOTE — LETTER
Siddharth Urias  [address] [address]    Dear Siddharth Urias,     Welcome to [facility] Outpatient Care Management Program. We are here to assist patients with multiple long-term (chronic) conditions who often need more personalized healthcare.    It was a pleasure talking with you today. My name is Emy Lincoln LCSW. I look forward to working with you as your Care Manager. I will be contacting you by telephone routinely to help coordinate care and resolve issues.    My goal is to help you function at the healthiest and highest level possible. You can contact me directly at [phone].    As an [facility] patient with [employer], some of the services we provide, at no cost to you, include:      Development of an individualized care plan with a Registered Nurse    Connection with a    Assistance from a Community Health Worker   Connection with available resources and services     Coordinate communication among your care team members    Provide coaching and education    Help you understand your doctors treatment plan   Help you obtain information about your insurance coverage.     All services provided by [facility]s Outpatient Care Managers and other care team members are coordinated with and communicated to your primary care team.      As part of your enrollment, you will be receiving education materials and more information about these services in your My [facility] account, by phone, or through the mail. If you do not wish to participate or receive information, you can Opt Out by contacting our office at [phone].      Sincerely,        Emy Lincoln LCSW  [facility]   Outpatient Care Management

## 2023-05-15 NOTE — TELEPHONE ENCOUNTER
Spoke with patient's spouse.  Refill approval sent to provider.      ----- Message from Gini Lamas MA sent at 5/15/2023 11:17 AM CDT -----  Regarding: Reill needed  Patient is requesting refill on   pravastatin (PRAVACHOL) 40 MG tablet    Please call and advise    Enedina Urias (Spouse)   [phone] (Home Phone)

## 2023-05-15 NOTE — TELEPHONE ENCOUNTER
----- Message from Val Charles sent at 5/15/2023  3:37 PM CDT -----  Contact: pt  Type: Needs Medical Advice  Who Called:  pt     Best Call Back Number: [phone]    Additional Information: pt would like to speak with provider or staff regarding medication it is important he states. Please advise.

## 2023-05-16 NOTE — TELEPHONE ENCOUNTER
----- Message from Lashaun Tarango sent at 5/16/2023  9:58 AM CDT -----  Contact: Spouse/ Edith [phone]  Consult    He was discharged recently and was put on a no sodium diet and she wants advice on what this diet entails.    Thank you

## 2023-05-16 NOTE — TELEPHONE ENCOUNTER
Hospital follow up scheduled for Tuesday, 5/23/23 @ 3pm w/ Dr. Maxwell.  Home OT, Bebe, reporting pt            B/P 102/60 and  P 38 with no signs of SOB/chest pain or any other signs of distress.  Pt does not want to go back to hospital at this time; hospital follow up rescheduled for Thursday, 5/18/23 @3pm w/ JOSHUA Wood.  Pt encouraged to elevate legs above heart  Pulse up to 63 @ this time w/ position change per OT.  Pt advised to go to hospital for eval per Dr. Maxwell.  Pt wife stated she will take pt to hospital if Pulse drops again.

## 2023-05-18 PROBLEM — R54 FRAIL ELDERLY: Status: ACTIVE | Noted: 2023-01-01

## 2023-05-18 PROBLEM — E87.1 HYPONATREMIA: Status: ACTIVE | Noted: 2023-01-01

## 2023-05-18 PROBLEM — I50.32 CHRONIC DIASTOLIC HEART FAILURE: Status: ACTIVE | Noted: 2023-01-01

## 2023-05-18 PROBLEM — I95.1 ORTHOSTATIC HYPOTENSION: Status: ACTIVE | Noted: 2023-01-01

## 2023-05-18 PROBLEM — D72.829 LEUKOCYTOSIS: Status: ACTIVE | Noted: 2023-01-01

## 2023-05-18 PROBLEM — N18.30 ACUTE RENAL FAILURE SUPERIMPOSED ON STAGE 3 CHRONIC KIDNEY DISEASE: Status: ACTIVE | Noted: 2021-11-20

## 2023-05-18 PROBLEM — I95.9 HYPOTENSION: Status: ACTIVE | Noted: 2023-01-01

## 2023-05-18 PROBLEM — I82.409 ACUTE DVT (DEEP VENOUS THROMBOSIS): Status: ACTIVE | Noted: 2023-01-01

## 2023-05-18 NOTE — PROGRESS NOTES
Subjective:       Patient ID: Siddharth Urias is a 89 y.o. male.    Chief Complaint: hospital follow up    HPI    Admission Date: 5/8/2023  Hospital Length of Stay: 0 days  Discharge Date and Time:  05/09/2023 4:03 PM    Patient presents today for hospital follow up. Last visit with PCP- on . past medical history of Afib, Arthritis, Choledocholithiasis, Chronic kidney disease, Colon polyp, COPD (chronic obstructive pulmonary disease), Diabetes mellitus, type 2, Diverticulosis, Fatty liver, Hepatomegaly, History of blood clots, Irradiation cystitis, Prostate cancer (2009), Radiation proctitis, and Sleep apnea.      Patient has a recent hospital stay for acute on chronic diastolic heart failure. Patient was treated with IV diuresis beta-blocker was held.  Cardiology consulted and cleared for discharge with changing Lasix to Bumex recommending discontinuing metoprolol.  Gout attack improved with colchicine and will be discharged with a Medrol Dosepak and allopurinol.  PT and OT consulted recommended home with home health    [facility]       7/7/22 Cardiology- Emanuel: that he is not interested in any invasive procedures including coronary angiography, TAVR or SAVR.    Past Medical History:   Diagnosis Date    Afib     Arthritis     Choledocholithiasis     Chronic kidney disease     Colon polyp     COPD (chronic obstructive pulmonary disease)     Diabetes mellitus, type 2     2/2011    Diverticulosis     Fatty liver     Hepatomegaly     History of blood clots     2020 Dr. Rordiguez     Irradiation cystitis     Prostate cancer 2009    s/p XRT (T1C, João 8)    Radiation proctitis     Sleep apnea        Review of patient's allergies indicates:   Allergen Reactions    No known drug allergies        No current facility-administered medications for this visit.  No current outpatient medications on file.    Facility-Administered Medications Ordered in Other Visits:     0.9%  NaCl infusion, , Intravenous,  Continuous, Erica Oliver NP, Last Rate: 50 mL/hr at 05/18/23 2322, New Bag at 05/18/23 2322    acetaminophen tablet 650 mg, 650 mg, Oral, Q4H PRN, Erica Oliver NP    albuterol nebulizer solution 2.5 mg, 2.5 mg, Nebulization, Q6H PRN, Erica Oliver NP    allopurinoL tablet 200 mg, 200 mg, Oral, Daily, Erica Oliver NP, 200 mg at 05/19/23 0904    apixaban tablet 5 mg, 5 mg, Oral, BID, Erica Oliver NP, 5 mg at 05/19/23 0905    budesonide nebulizer solution 0.5 mg, 0.5 mg, Nebulization, Q12H, 0.5 mg at 05/19/23 0724 **AND** ipratropium 0.02 % nebulizer solution 0.5 mg, 0.5 mg, Nebulization, Q6H, 0.5 mg at 05/19/23 0725 **AND** arformoteroL nebulizer solution 15 mcg, 15 mcg, Nebulization, BID, Nick Chaney MD, 15 mcg at 05/19/23 0724    aspirin chewable tablet 81 mg, 81 mg, Oral, Daily, Erica Oliver NP, 81 mg at 05/19/23 0905    chlorhexidine 0.12 % solution 15 mL, 15 mL, Mouth/Throat, BID, Karie Lovelace PharmD, 15 mL at 05/19/23 1109    [COMPLETED] dextrose 50% injection 50 g, 50 g, Intravenous, Once, 50 g at 05/18/23 2342 **AND** dextrose 50% injection 25 g, 25 g, Intravenous, PRN **AND** [COMPLETED] insulin regular injection 8.16 Units 0.0816 mL, 0.1 Units/kg, Intravenous, Once, Erica Oliver NP, 8.16 Units at 05/18/23 2230    mupirocin 2 % ointment, , Nasal, BID, Karie Lovelace PharmD, 1 g at 05/19/23 1109    naloxone 0.4 mg/mL injection 0.02 mg, 0.02 mg, Intravenous, PRN, Erica Oliver NP    ondansetron injection 4 mg, 4 mg, Intravenous, Q6H PRN, Erica Oliver NP    oxyCODONE immediate release tablet 10 mg, 10 mg, Oral, Q6H PRN, Erica Oliver NP    oxyCODONE immediate release tablet 5 mg, 5 mg, Oral, Q6H PRN, Erica Oliver NP    pantoprazole EC tablet 40 mg, 40 mg, Oral, Daily, Erica Oliver NP, 40 mg at 05/19/23 0518    polyethylene glycol packet 17 g, 17 g, Oral, BID PRN, Erica Oliver NP    pravastatin tablet 40 mg, 40 mg, Oral, Daily,  "Erica Oliver NP, 40 mg at 05/18/23 2341    simethicone chewable tablet 80 mg, 1 tablet, Oral, QID PRN, Erica Oliver NP    sodium chloride 0.9% flush 10 mL, 10 mL, Intravenous, PRN, Erica Oliver NP    tamsulosin 24 hr capsule 0.4 mg, 1 capsule, Oral, Daily, Erica Oliver NP, 0.4 mg at 05/19/23 0904    trazodone split tablet 25 mg, 25 mg, Oral, Nightly PRN, Erica Oliver NP    Review of Systems   Constitutional:  Negative for unexpected weight change.   HENT:  Negative for trouble swallowing.    Eyes:  Negative for visual disturbance.   Respiratory:  Negative for shortness of breath.    Cardiovascular:  Negative for chest pain, palpitations and leg swelling.   Gastrointestinal:  Negative for blood in stool.   Genitourinary:  Negative for hematuria.   Skin:  Negative for rash.   Allergic/Immunologic: Negative for immunocompromised state.   Neurological:  Negative for headaches.   Hematological:  Does not bruise/bleed easily.   Psychiatric/Behavioral:  Negative for agitation. The patient is not nervous/anxious.      Objective:      BP (!) 64/40 (BP Location: Right arm, Patient Position: Sitting, BP Method: Medium (Manual))   Pulse 66   Temp 97.6 °F (36.4 °C) (Oral)   Ht 5' 7" (1.702 m)   Wt 84.2 kg (185 lb 10 oz)   SpO2 96%   BMI 29.07 kg/m²   Physical Exam  Constitutional:       Appearance: He is well-developed.   Eyes:      Conjunctiva/sclera: Conjunctivae normal.      Pupils: Pupils are equal, round, and reactive to light.   Cardiovascular:      Rate and Rhythm: Normal rate and regular rhythm.      Heart sounds: Normal heart sounds.   Pulmonary:      Effort: Pulmonary effort is normal.   Musculoskeletal:         General: Normal range of motion.   Neurological:      Mental Status: He is alert and oriented to person, place, and time.   Psychiatric:         Behavior: Behavior normal.         Thought Content: Thought content normal.         Judgment: Judgment normal.       Assessment:     "   1. Congestive heart failure, unspecified HF chronicity, unspecified heart failure type    2. Hypotension, unspecified hypotension type    3. Chronic obstructive pulmonary disease, unspecified COPD type    4. Hypertension associated with diabetes    5. Stage 3a chronic kidney disease    6. Controlled type 2 diabetes mellitus with diabetic nephropathy, without long-term current use of insulin    7. Atrial fibrillation, unspecified type    8. Radiation induced proctitis    9. Gastroesophageal reflux disease without esophagitis    10. Embolism and thrombosis of artery    11. Prostate cancer        Plan:       Congestive heart failure, unspecified HF chronicity, unspecified heart failure type  Stable, needs to schedule with Dr.Cyril Sullivan  Continue daily weights  Low sodium diet  Hypotension, unspecified hypotension type  -     Refer to Emergency Dept.  BP Readings from Last 3 Encounters:       05/18/23 (!) 64/40   05/09/23 129/60      Chronic obstructive pulmonary disease, unspecified COPD type  -     fluticasone-umeclidin-vilanter (TRELEGY ELLIPTA) 100-62.5-25 mcg DsDv; Inhale 1 puff into the lungs once daily.  Dispense: 28 each; Refill: 0  Stable, continue resp inhaler  Hypertension associated with diabetes  -     losartan (COZAAR) 25 MG tablet; Take 1 tablet (25 mg total) by mouth once daily.  Dispense: 90 tablet; Refill: 3    Stage 3a chronic kidney disease  Stable and chronic.  Will continue to monitor q3-6 months and control chronic conditions as optimally as possible to preserve function.   Controlled type 2 diabetes mellitus with diabetic nephropathy, without long-term current use of insulin  Stable, continue management  Hemoglobin A1C   Date Value Ref Range Status   05/19/2023 6.6 (H) 4.5 - 6.2 % Final     Comment:     According to ADA guidelines, hemoglobin A1C <7.0% represents  optimal control in non-pregnant diabetic patients.  Different  metrics may apply to specific populations.   Standards of Medical  Care in Diabetes - 2016.    For the purpose of screening for the presence of diabetes:  <5.7%     Consistent with the absence of diabetes  5.7-6.4%  Consistent with increasing risk for diabetes   (prediabetes)  >or=6.5%  Consistent with diabetes    Currently no consensus exists for use of hemoglobin A1C  for diagnosis of diabetes for children.     05/01/2023 6.4 (H) 4.0 - 5.6 % Final     Comment:     ADA Screening Guidelines:  5.7-6.4%  Consistent with prediabetes  >or=6.5%  Consistent with diabetes    High levels of fetal hemoglobin interfere with the HbA1C  assay. Heterozygous hemoglobin variants (HbS, HgC, etc)do  not significantly interfere with this assay.   However, presence of multiple variants may affect accuracy.     04/23/2023 6.2 (H) 4.0 - 5.6 % Final     Comment:     ADA Screening Guidelines:  5.7-6.4%  Consistent with prediabetes  >or=6.5%  Consistent with diabetes    High levels of fetal hemoglobin interfere with the HbA1C  assay. Heterozygous hemoglobin variants (HbS, HgC, etc)do  not significantly interfere with this assay.   However, presence of multiple variants may affect accuracy.     03/05/2018 6.6 % Final      Atrial fibrillation, unspecified type  Stable, on eliquis, lasix  Radiation induced proctitis  -     tamsulosin (FLOMAX) 0.4 mg Cap; Take 1 capsule (0.4 mg total) by mouth once daily.  Dispense: 90 capsule; Refill: 3  -     solifenacin (VESICARE) 5 MG tablet; Take 1 tablet (5 mg total) by mouth once daily.  Dispense: 90 tablet; Refill: 3  -     tamsulosin (FLOMAX) 0.4 mg Cap; Take 1 capsule (0.4 mg total) by mouth once daily. for 7 days  Dispense: 7 capsule; Refill: 0  Stable, continue medication  Gastroesophageal reflux disease without esophagitis  -     pantoprazole (PROTONIX) 40 MG tablet; Take 1 tablet (40 mg total) by mouth once daily.  Dispense: 90 tablet; Refill: 3  Stable, continue medication  Embolism and thrombosis of artery  Stable, continue medication  Prostate cancer  -      solifenacin (VESICARE) 5 MG tablet; Take 1 tablet (5 mg total) by mouth once daily.  Dispense: 90 tablet; Refill: 3          Patient readiness: acceptance and barriers:none    During the course of the visit the patient was educated and counseled about the following:     Diabetes:  Discussed general issues about diabetes pathophysiology and management.  Addressed ADA diet.  Suggested low cholesterol diet.  Obesity:   General weight loss/lifestyle modification strategies discussed (elicit support from others; identify saboteurs; non-food rewards, etc).  Regular aerobic exercise program discussed.    Goals: Diabetes: Maintain Hemoglobin A1C below 7 and Obesity: Reduce calorie intake and BMI    Did patient meet goals/outcomes: No    The following self management tools provided: blood pressure log    Patient Instructions (the written plan) was given to the patient/family.     Time spent with patient: 15 minutes    Barriers to medications present (no )    Adverse reactions to current medications (no)    Over the counter medications reviewed (Yes)

## 2023-05-18 NOTE — Clinical Note
The catheter insertion attempt was made into the and was inserted over the wire into the right coronary artery.

## 2023-05-18 NOTE — Clinical Note
The catheter was inserted into the and was inserted over the wire into the ostium   right coronary artery. An angiography was performed of the right coronary arteries. The angiography was performed via power injection. The injected amount was 6 mL contrast at 3 mL/s.

## 2023-05-18 NOTE — Clinical Note
The catheter was inserted into the and was inserted over the wire into the ostium   left main. An angiography was performed of the left coronary arteries. Multiple views were taken. The angiography was performed via power injection. The injected amount was 8 mL contrast at 4 mL/s.

## 2023-05-18 NOTE — ED PROVIDER NOTES
Encounter Date: 5/18/2023       History     Chief Complaint   Patient presents with    Hypotension     Low blood pressure at doctors appointment today     89-year-old male with previous history of COPD and atrial fibrillation came here sent by primary care provider's office.  Patient was there for his 1st appointment to see a new provider and patient has low blood pressure so he was sent here.  Patient denies any complaints.  Denies fever chills or nausea vomiting chest pain or shortness of breath or abdominal pain.  Patient denies any weakness or numbness that are new.  Patient walks slow with a walker and patient states he is feeling well.  Patient said recently he ate lot of crawfish and after that had to be treated in the hospital and felt better and was discharged and now today had his follow-up appointment.  Patient denies any complaints at this time.  Patient's blood pressure was low while at doctor's office    Review of patient's allergies indicates:   Allergen Reactions    No known drug allergies      Past Medical History:   Diagnosis Date    Afib     Arthritis     Choledocholithiasis     Chronic kidney disease     Colon polyp     COPD (chronic obstructive pulmonary disease)     Diabetes mellitus, type 2     2/2011    Diverticulosis     Fatty liver     Hepatomegaly     History of blood clots     2020 Dr. Rodriguez     Irradiation cystitis     Prostate cancer 2009    s/p XRT (T1C, João 8)    Radiation proctitis     Sleep apnea      Past Surgical History:   Procedure Laterality Date    ANGIOGRAM, CORONARY, WITH LEFT HEART CATHETERIZATION N/A 05/17/2022    Procedure: Angiogram, Coronary, with Left Heart Cath;  Surgeon: Kamlesh Sullivan MD;  Location: [facility] CATH/EP LAB;  Service: Cardiology;  Laterality: N/A;    APPENDECTOMY      CARPAL TUNNEL RELEASE Left 02/05/2021    Procedure: RELEASE, CARPAL TUNNEL;  Surgeon: Jayro Hawkins II, MD;  Location: [facility];  Service: Orthopedics;  Laterality: Left;    CARPAL  TUNNEL RELEASE Right 3/1/2023    Procedure: RELEASE, CARPAL TUNNEL;  Surgeon: Shaun Leonardo MD;  Location: [facility] OR;  Service: Orthopedics;  Laterality: Right;    CATARACT EXTRACTION Bilateral     COLONOSCOPY N/A 12/14/2018    Procedure: COLONOSCOPY;  Surgeon: Noel Aguiar MD;  Location: [facility] ENDO;  Service: Endoscopy;  Laterality: N/A;    COLONOSCOPY  06/21/2021    Dr. Rodgers, in media: 2 colon polyps removed; diverticulosis, erythema in transverse colon; biopsy: tubular adenomas, ranomd colon unremarkable    COLONOSCOPY N/A 11/4/2022    Procedure: COLONOSCOPY;  Surgeon: Noel Aguiar MD;  Location: [facility] ENDO;  Service: Endoscopy;  Laterality: N/A;    ERCP N/A 11/23/2021    Procedure: ERCP (ENDOSCOPIC RETROGRADE CHOLANGIOPANCREATOGRAPHY);  Surgeon: Marshall Gardner III, MD;  Location: [facility] ENDO;  Service: Endoscopy;  Laterality: N/A;    ESOPHAGOGASTRODUODENOSCOPY N/A 01/24/2019    Procedure: EGD (ESOPHAGOGASTRODUODENOSCOPY);  Surgeon: Noel Aguiar MD;  Location: [facility] ENDO;  Service: Endoscopy;  Laterality: N/A;    EYE SURGERY      FLEXIBLE SIGMOIDOSCOPY N/A 01/22/2019    Procedure: SIGMOIDOSCOPY, FLEXIBLE;  Surgeon: Noel Aguiar MD;  Location: [facility] ENDO;  Service: Endoscopy;  Laterality: N/A;    HERNIA REPAIR      bilateral inguinal    LAPAROSCOPIC CHOLECYSTECTOMY N/A 11/24/2021    Procedure: CHOLECYSTECTOMY, LAPAROSCOPIC;  Surgeon: Jay Cabrera Jr., MD;  Location: [facility] OR;  Service: General;  Laterality: N/A;    TONSILLECTOMY       Family History   Problem Relation Age of Onset    Diabetes Mother     Diabetes Brother      Social History     Tobacco Use    Smoking status: Former     Packs/day: 1.50     Years: 60.00     Pack years: 90.00     Types: Cigarettes     Start date: 1944     Quit date: 1/2/2008     Years since quitting: 15.3    Smokeless tobacco: Never   Substance Use Topics    Alcohol use: Yes     Alcohol/week: 1.0 standard drink     Types: 1 Glasses of wine per week      Comment: social - at speical events    Drug use: No     Review of Systems   Constitutional:  Positive for fatigue.   HENT: Negative.     Eyes: Negative.    Respiratory: Negative.     Cardiovascular: Negative.  Negative for chest pain.   Gastrointestinal: Negative.    Endocrine: Negative.    Genitourinary: Negative.    Musculoskeletal: Negative.    Skin: Negative.    Allergic/Immunologic: Negative.    Neurological: Negative.    Hematological: Negative.    Psychiatric/Behavioral: Negative.     All other systems reviewed and are negative.    Physical Exam     Initial Vitals [05/18/23 1717]   BP Pulse Resp Temp SpO2   (!) 86/64 (!) 40 18 97.4 °F (36.3 °C) (!) 92 %      MAP       --         Physical Exam    Nursing note and vitals reviewed.  Constitutional: He appears well-developed and well-nourished.   HENT:   Head: Normocephalic and atraumatic.   Nose: Nose normal.   Eyes: Conjunctivae and EOM are normal.   Neck: No tracheal deviation present.   Normal range of motion.  Cardiovascular:  Intact distal pulses.     Exam reveals no friction rub.       Murmur heard.  Systolic murmur with irregularly irregular rhythm with heart rate around 90 - 100   Pulmonary/Chest: Breath sounds normal. No respiratory distress. He has no wheezes. He has no rales.   Abdominal: Abdomen is soft. He exhibits no distension. There is no abdominal tenderness.   Musculoskeletal:         General: Normal range of motion.      Cervical back: Normal range of motion.     Neurological: He is alert and oriented to person, place, and time. He has normal strength. No cranial nerve deficit or sensory deficit. GCS score is 15. GCS eye subscore is 4. GCS verbal subscore is 5. GCS motor subscore is 6.   Skin: Skin is warm and dry. Capillary refill takes less than 2 seconds.   Psychiatric: He has a normal mood and affect. Thought content normal.       ED Course   Procedures  Labs Reviewed   CBC W/ AUTO DIFFERENTIAL - Abnormal; Notable for the following  components:       Result Value    WBC 15.66 (*)     MCH 26.1 (*)     MCHC 31.9 (*)     RDW 16.3 (*)     Immature Granulocytes 0.9 (*)     Gran # (ANC) 12.1 (*)     Immature Grans (Abs) 0.14 (*)     Mono # 1.3 (*)     Gran % 77.5 (*)     Lymph % 12.3 (*)     All other components within normal limits   COMPREHENSIVE METABOLIC PANEL - Abnormal; Notable for the following components:    Sodium 128 (*)     Potassium 5.9 (*)     Chloride 91 (*)     BUN 84 (*)     Creatinine 2.9 (*)     Total Bilirubin 1.1 (*)     eGFR 20.0 (*)     All other components within normal limits   TROPONIN I HIGH SENSITIVITY - Abnormal; Notable for the following components:    Troponin I High Sensitivity 49.1 (*)     All other components within normal limits   B-TYPE NATRIURETIC PEPTIDE - Abnormal; Notable for the following components:     (*)     All other components within normal limits   B-TYPE NATRIURETIC PEPTIDE   MAGNESIUM   MAGNESIUM     EKG Readings: (Independently Interpreted)   Initial Reading: No STEMI. Rhythm: Atrial Fibrillation. Ectopy: Multifocal PVCs. Conduction: Normal.     Imaging Results              X-Ray Chest AP Portable (Final result)  Result time 05/18/23 17:54:24   Procedure changed from X-Ray Chest PA And Lateral     Final result by Randell Doss MD (05/18/23 17:54:24)                   Narrative:      EXAM: XR CHEST AP PORTABLE    HISTORY: Hypotension    COMPARISON:Chest x-ray dated 2/22/2023    FINDINGS: The lungs are somewhat poorly inflated but appear free of infiltrates. There are no pleural effusions. The heart is moderately enlarged and unchanged. The pulmonary vasculature is within normal limits.    IMPRESSION:   Moderate cardiomegaly. No evidence of acute disease within the chest.    Electronically signed by:  Chas Doss MD  5/18/2023 5:54 PM CDT Workstation: [id number]                                     Medications   sodium bicarbonate 8.4 % (1 mEq/mL) injection 50 mEq (has no administration in  time range)   lactated ringers bolus 1,000 mL (0 mLs Intravenous Stopped 5/18/23 2715)     Medical Decision Making:   Differential Diagnosis:   89-year-old male presented emergency department with hypotension.  Patient had brief episode of bradycardia.  Patient has orthostatic symptoms and presentation.  Patient hydrated and patient felt better and blood pressure improved.  Screening labs and workup reviewed and patient has evidence of acute kidney injury and hyperkalemia.  Troponin also elevated.  Will further monitor patient and hydrate patient.  Hospital Medicine consulted for evaluation for admission and further management.  Independently Interpreted Test(s):   I have ordered and independently interpreted X-rays - see prior notes.  I have ordered and independently interpreted EKG Reading(s) - see prior notes  Clinical Tests:   Lab Tests: Reviewed  Radiological Study: Reviewed  Medical Tests: Reviewed                        Clinical Impression:   Final diagnoses:  [R06.02] Shortness of breath  [R53.1] Weakness  [N17.9] Acute kidney injury (Primary)  [I95.1] Orthostatic hypotension  [E87.5] Hyperkalemia        ED Disposition Condition    Admit Stable                Disha lCark MD  05/18/23 1393

## 2023-05-18 NOTE — Clinical Note
110 ml of contrast were injected throughout the case. 45 mL of contrast was the total wasted during the case. 155 mL was the total amount used during the case.

## 2023-05-18 NOTE — Clinical Note
The catheter insertion attempt was made into the ostium   right coronary artery. An angiography was performed Multiple views were taken.

## 2023-05-18 NOTE — Clinical Note
The catheter insertion attempt was made into the and was inserted over the wire into the ostium   left main. An angiography was performed of the left coronary arteries. Multiple views were taken. The angiography was performed via power injection.

## 2023-05-19 NOTE — PLAN OF CARE
[facility]  Initial Discharge Assessment     Assessment completed at bedside with patient and family.  Patient states he is active with home health but does not remember name of agency.  Chart review shows [facility] was arranged last admission at another facility; however, I was unable to verify this with agency.  Will follow up.  Demographics verified as listed below.  Patient lives home with his wife and he uses a rolling walker as needed for mobility.  Patient last seen by PCP yesterday.     Primary Care Provider: Martine Maxwell MD    Admission Diagnosis: Acute on chronic renal failure [N17.9, N18.9]    Admission Date: 5/18/2023  Expected Discharge Date:     Transition of Care Barriers: None    Payor: MEDICARE / Plan: MEDICARE PART A & B / Product Type: Government /     Extended Emergency Contact Information  Primary Emergency Contact: Enedina Urias  Address: [address] [place] [address] [facility]  Home Phone: [phone]  Mobile Phone: [phone]  Relation: Spouse   needed? No  Secondary Emergency Contact: FER URIAS  Mobile Phone: [phone]  Relation: Son  Preferred language: English   needed? No    Discharge Plan A: Home Health  Discharge Plan B: Home with family      [id number] - [place], [place] - [address]  [address] [address]  Phone: [phone] Fax: [phone]    [facility] [place] [facility] [address] [facility] AT Portal to Registered [facility] Sites  [facility]  [place] [address]  Phone: [phone] Fax: [phone]      Initial Assessment (most recent)       Adult Discharge Assessment - 05/19/23 1248          Discharge Assessment    Assessment Type Discharge Planning Assessment     Confirmed/corrected address, phone number and insurance Yes     Confirmed Demographics Correct on Facesheet     Source of Information  patient;family     People in Home spouse     Facility Arrived From: Home     Do you expect to return to your current living situation? Yes     Do you have help at home or someone to help you manage your care at home? Yes     Prior to hospitilization cognitive status: Alert/Oriented;No Deficits     Current cognitive status: Alert/Oriented;No Deficits     Walking or Climbing Stairs ambulation difficulty, requires equipment     Mobility Management Rolling Walker     Dressing/Bathing bathing difficulty, requires equipment     Equipment Currently Used at Home walker, rolling;cane, straight;3-in-1 commode     Readmission within 30 days? Yes     Patient currently being followed by outpatient case management? No     Do you currently have service(s) that help you manage your care at home? Yes     Name and Contact number of agency [facility]     Is the pt/caregiver preference to resume services with current agency Yes     Do you take prescription medications? Yes     Do you have prescription coverage? Yes     Coverage [employer] and      Do you have any problems affording any of your prescribed medications? No     Is the patient taking medications as prescribed? yes     Who is going to help you get home at discharge? Family     How do you get to doctors appointments? family or friend will provide     Are you on dialysis? No     Do you take coumadin? No     Discharge Plan A Home Health     Discharge Plan B Home with family     DME Needed Upon Discharge  none     Discharge Plan discussed with: Patient;Adult children     Transition of Care Barriers None

## 2023-05-19 NOTE — CONSULTS
Educated pt and his wife on diet for diabetes and CHF (Low Na+) verbally and provided handouts along with my contact information if needed. Pts family came in the room while I was there and helped me not help pts wife to not feel overwhelmed. She has been doing a very good job so far and is 89 years old and I encouraged her to just read over the information as if it were a good book and if she found things she needed to change to make them one at a time. She verbalized understanding and intent to comply.

## 2023-05-19 NOTE — CONSULTS
INPATIENT NEPHROLOGY CONSULT   [facility]    Patient Name: Siddharth Urias  Date: 05/19/2023    Reason for consultation: KAT    Chief Complaint:   Chief Complaint   Patient presents with    Hypotension     Low blood pressure at doctors appointment today       History of Present Illness:  Siddharth Urias Is an 89-year-old male with chronic medical problems including HFpEF, atrial fibrillation CKD 3, peripheral arterial disease with likely occlusion of the right CFA/external iliac, DVT, COPD, diabetes, diverticulosis, hepatic steatosis, and prostate cancer in 2009 who presents to the emergency room tonight sent primary care provider for hypotension.  He was at the doctor's office to establish care for a hospital follow-up and when they checked his blood pressure it was systolic 60.  Patient was asymptomatic and was sent to the emergency room for evaluation. Patient has had 3 hospitalizations in the last 3 weeks for heart failure exacerbation.  He had elevated BNP, pulmonary edema on x-ray and was diuresed and discharged home on 04/26 with oxygen supplementation. He returned to the hospital on 05/01 having not use his oxygen and not filled his new prescriptions, was diuresed again encouraged to use his home oxygen.  He was discharged on 05/02 and returned to the hospital on 05/08 with bradycardia with heart rate in the 30s and left knee and ankle pain. On 05/01 he was found to have a nonocclusive thrombus in the right popliteal vein.  Metoprolol was held and he was switched to Bumex.  Eliquis was increased to 5 mg twice daily and he had reportedly been in sinus rhythm when seen by Cardiology.  He was treated for gout with colchicine and a Medrol Dosepak and allopurinol. His wife said he has been doing well at home since the last discharge. In the ED, lab work with leukocytosis, KAT. He was bradycardic with orthostasis and hypotension with SBP 80s as well. Chest x-ray with moderate  cardiomegaly, free of infiltrates no pleural effusions, moderately enlarged heart and pulmonary vasculature within normal limits. We are consulted for renal management.      4/2023 echo  The left ventricle is normal in size with mild concentric hypertrophy and low normal systolic function.  The estimated ejection fraction is 50-55%.  Indeterminate left ventricular diastolic function.  There is mild mitral stenosis.  The mean diastolic gradient across the mitral valve is 4 mmHg at a heart rate of 78 bpm.  Mild-to-moderate mitral regurgitation.  Mild aortic regurgitation.  There is severe aortic valve stenosis.  Aortic valve area is 0.46 cm2; peak velocity is 4.4 m/s; mean gradient is 50 mmHg. Of note, gradients were measured following a post PVC beat.  Normal right ventricular size with normal right ventricular systolic function.  Mild to moderate tricuspid regurgitation.  The estimated PA systolic pressure is at least 39 mmHg.  Elevated central venous pressure (15 mmHg).  The ascending aorta is mildly dilated.    Interval History:  5/19- HR , , on 1L NC, UOP 400cc    Plan of Care:    Assessment:  KAT/CKD III due to CRS  Azotemia/Orthostatic hypotension due to volume depletion  Valvular CHF (severe AS)  BPH  Hyponatremia  Hyperkalemia  SHPT  Anemia of CKD    Plan:    - agree with NS 50cc/hr  - hold losartan, diuretics and KCl supplement today  - hold flomax today  - stop lokelma  - change to daily labs  - renal diet, add 1.5L fluid restrictoin  - BMM parameters at goal  - H/H at goal    Thank you for allowing us to participate in this patient's care. We will continue to follow.    Vital Signs:  Temp Readings from Last 3 Encounters:   05/19/23 98 °F (36.7 °C) (Oral)   05/18/23 97.6 °F (36.4 °C) (Oral)   05/09/23 98.4 °F (36.9 °C) (Oral)       Pulse Readings from Last 3 Encounters:   05/19/23 (!) 40   05/18/23 66   05/09/23 (!) 52       BP Readings from Last 3 Encounters:   05/19/23 (!) 121/54   05/18/23  (!) 64/40   05/09/23 129/60       Weight:  Wt Readings from Last 3 Encounters:   05/18/23 83.4 kg (183 lb 13.8 oz)   05/18/23 84.2 kg (185 lb 10 oz)   05/08/23 84.7 kg (186 lb 11.7 oz)       INS/OUTS:  I/O last 3 completed shifts:  In: 150 [P.O.:150]  Out: 400 [Urine:400]  I/O this shift:  In: -   Out: 100 [Urine:100]    Past Medical & Surgical History:  Past Medical History:   Diagnosis Date    Afib     Arthritis     Choledocholithiasis     Chronic kidney disease     Colon polyp     COPD (chronic obstructive pulmonary disease)     Diabetes mellitus, type 2     2/2011    Diverticulosis     Fatty liver     Hepatomegaly     History of blood clots     2020 Dr. Rodriguez     Irradiation cystitis     Prostate cancer 2009    s/p XRT (T1C, [place] 8)    Radiation proctitis     Sleep apnea        Past Surgical History:   Procedure Laterality Date    ANGIOGRAM, CORONARY, WITH LEFT HEART CATHETERIZATION N/A 05/17/2022    Procedure: Angiogram, Coronary, with Left Heart Cath;  Surgeon: Kamlesh Sullivan MD;  Location: [facility] CATH/EP LAB;  Service: Cardiology;  Laterality: N/A;    APPENDECTOMY      CARPAL TUNNEL RELEASE Left 02/05/2021    Procedure: RELEASE, CARPAL TUNNEL;  Surgeon: Jayro Hawkins II, MD;  Location: [facility] OR;  Service: Orthopedics;  Laterality: Left;    CARPAL TUNNEL RELEASE Right 3/1/2023    Procedure: RELEASE, CARPAL TUNNEL;  Surgeon: Shaun Leonardo MD;  Location: [facility] OR;  Service: Orthopedics;  Laterality: Right;    CATARACT EXTRACTION Bilateral     COLONOSCOPY N/A 12/14/2018    Procedure: COLONOSCOPY;  Surgeon: Noel Aguiar MD;  Location: [facility] ENDO;  Service: Endoscopy;  Laterality: N/A;    COLONOSCOPY  06/21/2021    Dr. Rodgers, in media: 2 colon polyps removed; diverticulosis, erythema in transverse colon; biopsy: tubular adenomas, ranomd colon unremarkable    COLONOSCOPY N/A 11/4/2022    Procedure: COLONOSCOPY;  Surgeon: Noel Aguiar MD;  Location: [facility] ENDO;  Service: Endoscopy;  Laterality: N/A;     ERCP N/A 11/23/2021    Procedure: ERCP (ENDOSCOPIC RETROGRADE CHOLANGIOPANCREATOGRAPHY);  Surgeon: Marshall Gardner III, MD;  Location: [facility] ENDO;  Service: Endoscopy;  Laterality: N/A;    ESOPHAGOGASTRODUODENOSCOPY N/A 01/24/2019    Procedure: EGD (ESOPHAGOGASTRODUODENOSCOPY);  Surgeon: Noel Aguiar MD;  Location: [facility] ENDO;  Service: Endoscopy;  Laterality: N/A;    EYE SURGERY      FLEXIBLE SIGMOIDOSCOPY N/A 01/22/2019    Procedure: SIGMOIDOSCOPY, FLEXIBLE;  Surgeon: Noel Aguiar MD;  Location: [facility] ENDO;  Service: Endoscopy;  Laterality: N/A;    HERNIA REPAIR      bilateral inguinal    LAPAROSCOPIC CHOLECYSTECTOMY N/A 11/24/2021    Procedure: CHOLECYSTECTOMY, LAPAROSCOPIC;  Surgeon: Jay Cabrera Jr., MD;  Location: [facility] OR;  Service: General;  Laterality: N/A;    TONSILLECTOMY         Past Social History:  Social History     Socioeconomic History    Marital status:    Tobacco Use    Smoking status: Former     Packs/day: 1.50     Years: 60.00     Pack years: 90.00     Types: Cigarettes     Start date: 1944     Quit date: 1/2/2008     Years since quitting: 15.3    Smokeless tobacco: Never   Substance and Sexual Activity    Alcohol use: Yes     Alcohol/week: 1.0 standard drink     Types: 1 Glasses of wine per week     Comment: social - at speical events    Drug use: No    Sexual activity: Not Currently     Partners: Female     Social Determinants of Health     Financial Resource Strain: Low Risk     Difficulty of Paying Living Expenses: Not very hard   Food Insecurity: No Food Insecurity    Worried About Running Out of Food in the Last Year: Never true    Ran Out of Food in the Last Year: Never true   Transportation Needs: No Transportation Needs    Lack of Transportation (Medical): No    Lack of Transportation (Non-Medical): No   Physical Activity: Inactive    Days of Exercise per Week: 0 days    Minutes of Exercise per Session: 0 min   Stress: No Stress Concern Present    Feeling of  Stress : Only a little   Social Connections: Socially Integrated    Frequency of Communication with Friends and Family: More than three times a week    Frequency of Social Gatherings with Friends and Family: More than three times a week    Attends Taoist Services: More than 4 times per year    Active Member of Clubs or Organizations: Yes    Attends Club or Organization Meetings: More than 4 times per year    Marital Status:    Housing Stability: Low Risk     Unable to Pay for Housing in the Last Year: No    Number of Places Lived in the Last Year: 1    Unstable Housing in the Last Year: No       Medications:  Scheduled Meds:   allopurinoL  200 mg Oral Daily    apixaban  5 mg Oral BID    budesonide  0.5 mg Nebulization Q12H    And    ipratropium  0.5 mg Nebulization Q6H    And    arformoteroL  15 mcg Nebulization BID    aspirin  81 mg Oral Daily    pantoprazole  40 mg Oral Daily    pravastatin  40 mg Oral Daily    sodium zirconium cyclosilicate  10 g Oral TID    tamsulosin  1 capsule Oral Daily     Continuous Infusions:   sodium chloride 0.9% 50 mL/hr at 05/18/23 2322     PRN Meds:.acetaminophen, albuterol sulfate, [COMPLETED] dextrose 50% **AND** dextrose 50% **AND** [COMPLETED] insulin regular, naloxone, ondansetron, oxyCODONE, oxyCODONE, polyethylene glycol, simethicone, sodium chloride 0.9%, traZODone  No current facility-administered medications on file prior to encounter.     Current Outpatient Medications on File Prior to Encounter   Medication Sig Dispense Refill    albuterol (PROVENTIL/VENTOLIN HFA) 90 mcg/actuation inhaler INHALE 2 PUFFS INTO THE LUNGS EVERY 6 HOURS AS NEEDED FOR WHEEZE (Patient taking differently: Inhale 2 puffs into the lungs every 6 (six) hours as needed.) 18 g 1    allopurinoL (ZYLOPRIM) 100 MG tablet Take 2 tablets (200 mg total) by mouth once daily. 60 tablet 2    apixaban (ELIQUIS) 5 mg Tab Take 1 tablet (5 mg total) by mouth 2 (two) times daily. 60 tablet 2    aspirin 81  MG Chew Take 81 mg by mouth once daily.      bumetanide (BUMEX) 1 MG tablet Take 1 tablet (1 mg total) by mouth once daily. 30 tablet 2    colestipoL (COLESTID) 1 gram Tab TAKE 1 TABLET BY MOUTH 2 TIMES DAILY. TAKE OTHER ORAL MEDICATIONS >1H BEFORE OR >4H AFTER COLESTIPOL (Patient taking differently: Take 1 g by mouth 2 (two) times daily.) 180 tablet 1    fluticasone-umeclidin-vilanter (TRELEGY ELLIPTA) 100-62.5-25 mcg DsDv Inhale 1 puff into the lungs once daily. 180 each 3    fluticasone-umeclidin-vilanter (TRELEGY ELLIPTA) 100-62.5-25 mcg DsDv Inhale 1 puff into the lungs once daily. 28 each 0    furosemide (LASIX) 40 MG tablet Take 40 mg by mouth once daily.      losartan (COZAAR) 25 MG tablet Take 1 tablet (25 mg total) by mouth once daily. 90 tablet 3    metoprolol succinate (TOPROL-XL) 25 MG 24 hr tablet Take 25 mg by mouth once daily.      miconazole NITRATE 2 % (MICOTIN) 2 % top powder Apply topically 2 (two) times daily. Apply to groin twice a day x1 week for 7 days 71 g 0    pantoprazole (PROTONIX) 40 MG tablet Take 1 tablet (40 mg total) by mouth once daily. 90 tablet 3    potassium chloride SA (K-DUR,KLOR-CON) 20 MEQ tablet Take 1 tablet (20 mEq total) by mouth 2 (two) times daily. 60 tablet 1    pravastatin (PRAVACHOL) 40 MG tablet Take 1 tablet (40 mg total) by mouth once daily. 90 tablet 3    solifenacin (VESICARE) 5 MG tablet Take 1 tablet (5 mg total) by mouth once daily. 90 tablet 3    tamsulosin (FLOMAX) 0.4 mg Cap Take 1 capsule (0.4 mg total) by mouth once daily. 90 capsule 3    tamsulosin (FLOMAX) 0.4 mg Cap Take 1 capsule (0.4 mg total) by mouth once daily. for 7 days 7 capsule 0    traZODone (DESYREL) 50 MG tablet Take 1 tablet (50 mg total) by mouth nightly as needed for Insomnia. 90 tablet 3       Allergies:  No known drug allergies    Past Family History:  Reviewed; refer to Hospitalist Admission Note    Review of Systems:  Review of Systems - All 14 systems reviewed and negative, except  "as noted in HPI    Physical Exam:  BP (!) 121/54   Pulse (!) 40   Temp 98 °F (36.7 °C) (Oral)   Resp 20   Ht 5' 8" (1.727 m)   Wt 83.4 kg (183 lb 13.8 oz)   SpO2 99%   BMI 27.96 kg/m²     General Appearance:    NAD, AAO x 3, cooperative, appears stated age   Head:    Normocephalic, atraumatic   Eyes:    PER, EOMI, and conjunctiva/sclera clear bilaterally        Mouth:   Moist mucus membranes, no thrush or oral lesions, normal      dentition   Back:     No CVA tenderness   Lungs:     Clear to auscultation bilaterally, no wheeze, crackles, rales      or rhonchi, symmetric air movement, respirations unlabored   Heart:    Regular rate and rhythm, S1 and S2 normal, ARLENE, rub   or gallop   Abdomen:     Soft, non-tender, non-distended, bowel sounds active all four   quadrants, no RT or guarding, no masses, no organomegaly   Extremities:   Warm and well perfused, distal pulses intact, no cyanosis or    peripheral edema   MSK:   No joint or muscle swelling, tenderness or deformity   Skin:   Skin color, texture, turgor normal, no rashes or lesions   Neurologic/Psychiatric:   CNII-XII intact, normal strength and sensation       throughout, no asterixis; normal affect, memory, judgement     and insight     Results:  Lab Results   Component Value Date     (L) 05/19/2023    K 4.5 05/19/2023    CL 95 05/19/2023    CO2 26 05/19/2023    BUN 77 (H) 05/19/2023    CREATININE 2.4 (H) 05/19/2023    CALCIUM 8.4 (L) 05/19/2023    ANIONGAP 13 05/19/2023    ESTGFRAFRICA 40.7 (A) 05/13/2022    EGFRNONAA 35.2 (A) 05/13/2022       Lab Results   Component Value Date    CALCIUM 8.4 (L) 05/19/2023    PHOS 4.5 05/18/2023       Recent Labs   Lab 05/19/23  0452   WBC 10.29   RBC 5.23   HGB 13.6*   HCT 43.4      MCV 83   MCH 26.0*   MCHC 31.3*       I have personally reviewed pertinent radiological imaging and reports.    I have spent > 70 minutes providing care for this patient for the above diagnoses. These services have included " chart/data/imaging review, evaluation, exam, formulation of plan, , note preparation, and discussions with staff involved in this patient's care.    Linda Rosenthal MD MPH  [facility] [address] [address]  [phone] (p)  [phone] (f)

## 2023-05-19 NOTE — NURSING
Nurses Note -- 4 Eyes      5/19/2023   5:41 AM      Skin assessed during: Admit      [x] No Altered Skin Integrity Present    []Prevention Measures Documented      [] Yes- Altered Skin Integrity Present or Discovered   [] LDA Added if Not in Epic (Describe Wound)   [] New Altered Skin Integrity was Present on Admit and Documented in LDA   [] Wound Image Taken    Wound Care Consulted? No    Attending Nurse:  Clarissa Bashir RN     Second RN/Staff Member:  Donny Elliott RN

## 2023-05-19 NOTE — SUBJECTIVE & OBJECTIVE
Past Medical History:   Diagnosis Date    Afib     Arthritis     Choledocholithiasis     Chronic kidney disease     Colon polyp     COPD (chronic obstructive pulmonary disease)     Diabetes mellitus, type 2     2/2011    Diverticulosis     Fatty liver     Hepatomegaly     History of blood clots     2020 Dr. Rodriguez     Irradiation cystitis     Prostate cancer 2009    s/p XRT (T1C, [place] 8)    Radiation proctitis     Sleep apnea        Past Surgical History:   Procedure Laterality Date    ANGIOGRAM, CORONARY, WITH LEFT HEART CATHETERIZATION N/A 05/17/2022    Procedure: Angiogram, Coronary, with Left Heart Cath;  Surgeon: Kamlesh Sullivan MD;  Location: [facility] CATH/EP LAB;  Service: Cardiology;  Laterality: N/A;    APPENDECTOMY      CARPAL TUNNEL RELEASE Left 02/05/2021    Procedure: RELEASE, CARPAL TUNNEL;  Surgeon: Jayro Hawkins II, MD;  Location: [facility] OR;  Service: Orthopedics;  Laterality: Left;    CARPAL TUNNEL RELEASE Right 3/1/2023    Procedure: RELEASE, CARPAL TUNNEL;  Surgeon: Shaun Leonardo MD;  Location: [facility] OR;  Service: Orthopedics;  Laterality: Right;    CATARACT EXTRACTION Bilateral     COLONOSCOPY N/A 12/14/2018    Procedure: COLONOSCOPY;  Surgeon: Noel Aguiar MD;  Location: [facility] ENDO;  Service: Endoscopy;  Laterality: N/A;    COLONOSCOPY  06/21/2021    Dr. Rodgers, in media: 2 colon polyps removed; diverticulosis, erythema in transverse colon; biopsy: tubular adenomas, ranomd colon unremarkable    COLONOSCOPY N/A 11/4/2022    Procedure: COLONOSCOPY;  Surgeon: Noel Aguiar MD;  Location: [facility] ENDO;  Service: Endoscopy;  Laterality: N/A;    ERCP N/A 11/23/2021    Procedure: ERCP (ENDOSCOPIC RETROGRADE CHOLANGIOPANCREATOGRAPHY);  Surgeon: Marshall Gardner III, MD;  Location: [facility] ENDO;  Service: Endoscopy;  Laterality: N/A;    ESOPHAGOGASTRODUODENOSCOPY N/A 01/24/2019    Procedure: EGD (ESOPHAGOGASTRODUODENOSCOPY);  Surgeon: Noel Aguiar MD;  Location: [facility] ENDO;  Service: Endoscopy;   Laterality: N/A;    EYE SURGERY      FLEXIBLE SIGMOIDOSCOPY N/A 01/22/2019    Procedure: SIGMOIDOSCOPY, FLEXIBLE;  Surgeon: Noel Aguiar MD;  Location: [facility];  Service: Endoscopy;  Laterality: N/A;    HERNIA REPAIR      bilateral inguinal    LAPAROSCOPIC CHOLECYSTECTOMY N/A 11/24/2021    Procedure: CHOLECYSTECTOMY, LAPAROSCOPIC;  Surgeon: Jay Cabrera Jr., MD;  Location: [facility];  Service: General;  Laterality: N/A;    TONSILLECTOMY         Review of patient's allergies indicates:   Allergen Reactions    No known drug allergies        No current facility-administered medications on file prior to encounter.     Current Outpatient Medications on File Prior to Encounter   Medication Sig    albuterol (PROVENTIL/VENTOLIN HFA) 90 mcg/actuation inhaler INHALE 2 PUFFS INTO THE LUNGS EVERY 6 HOURS AS NEEDED FOR WHEEZE (Patient taking differently: Inhale 2 puffs into the lungs every 6 (six) hours as needed.)    allopurinoL (ZYLOPRIM) 100 MG tablet Take 2 tablets (200 mg total) by mouth once daily.    apixaban (ELIQUIS) 5 mg Tab Take 1 tablet (5 mg total) by mouth 2 (two) times daily.    aspirin 81 MG Chew Take 81 mg by mouth once daily.    bumetanide (BUMEX) 1 MG tablet Take 1 tablet (1 mg total) by mouth once daily.    colestipoL (COLESTID) 1 gram Tab TAKE 1 TABLET BY MOUTH 2 TIMES DAILY. TAKE OTHER ORAL MEDICATIONS >1H BEFORE OR >4H AFTER COLESTIPOL (Patient taking differently: Take 1 g by mouth 2 (two) times daily.)    fluticasone-umeclidin-vilanter (TRELEGY ELLIPTA) 100-62.5-25 mcg DsDv Inhale 1 puff into the lungs once daily.    fluticasone-umeclidin-vilanter (TRELEGY ELLIPTA) 100-62.5-25 mcg DsDv Inhale 1 puff into the lungs once daily.    furosemide (LASIX) 40 MG tablet Take 40 mg by mouth once daily.    losartan (COZAAR) 25 MG tablet Take 1 tablet (25 mg total) by mouth once daily.    metoprolol succinate (TOPROL-XL) 25 MG 24 hr tablet Take 25 mg by mouth once daily.    miconazole NITRATE 2 %  (MICOTIN) 2 % top powder Apply topically 2 (two) times daily. Apply to groin twice a day x1 week for 7 days    pantoprazole (PROTONIX) 40 MG tablet Take 1 tablet (40 mg total) by mouth once daily.    potassium chloride SA (K-DUR,KLOR-CON) 20 MEQ tablet Take 1 tablet (20 mEq total) by mouth 2 (two) times daily.    pravastatin (PRAVACHOL) 40 MG tablet Take 1 tablet (40 mg total) by mouth once daily.    solifenacin (VESICARE) 5 MG tablet Take 1 tablet (5 mg total) by mouth once daily.    tamsulosin (FLOMAX) 0.4 mg Cap Take 1 capsule (0.4 mg total) by mouth once daily.    tamsulosin (FLOMAX) 0.4 mg Cap Take 1 capsule (0.4 mg total) by mouth once daily. for 7 days    traZODone (DESYREL) 50 MG tablet Take 1 tablet (50 mg total) by mouth nightly as needed for Insomnia.    [DISCONTINUED] cyanocobalamin (VITAMIN B-12) 1000 MCG tablet Take 100 mcg by mouth once daily.    [DISCONTINUED] losartan (COZAAR) 25 MG tablet Take 1 tablet (25 mg total) by mouth once daily.    [DISCONTINUED] methylPREDNISolone (MEDROL DOSEPACK) 4 mg tablet use as directed    [DISCONTINUED] pantoprazole (PROTONIX) 40 MG tablet TAKE 1 TABLET DAILY (Patient taking differently: Take 40 mg by mouth once daily.)    [DISCONTINUED] solifenacin (VESICARE) 5 MG tablet TAKE 1 TABLET DAILY (Patient taking differently: Take 5 mg by mouth once daily.)    [DISCONTINUED] tamsulosin (FLOMAX) 0.4 mg Cap TAKE 1 CAPSULE ONCE DAILY     Family History       Problem Relation (Age of Onset)    Diabetes Mother, Brother          Tobacco Use    Smoking status: Former     Packs/day: 1.50     Years: 60.00     Pack years: 90.00     Types: Cigarettes     Start date: 1944     Quit date: 1/2/2008     Years since quitting: 15.3    Smokeless tobacco: Never   Substance and Sexual Activity    Alcohol use: Yes     Alcohol/week: 1.0 standard drink     Types: 1 Glasses of wine per week     Comment: social - at speical events    Drug use: No    Sexual activity: Not Currently     Partners:  Female     Review of Systems   All other systems reviewed and are negative.  Twelve point review of systems obtained and negative except as stated above in HPI      Objective:     Vital Signs (Most Recent):  Temp: 97.4 °F (36.3 °C) (05/18/23 1717)  Pulse: 62 (05/18/23 2130)  Resp: 20 (05/18/23 2130)  BP: 111/61 (05/18/23 2130)  SpO2: (!) 94 % (05/18/23 2130) Vital Signs (24h Range):  Temp:  [97.4 °F (36.3 °C)-97.6 °F (36.4 °C)] 97.4 °F (36.3 °C)  Pulse:  [35-95] 62  Resp:  [16-80] 20  SpO2:  [92 %-96 %] 94 %  BP: ()/(40-65) 111/61     Weight: 81.6 kg (180 lb)  Body mass index is 27.37 kg/m².     Physical Exam  Vitals and nursing note reviewed.   Constitutional:       General: He is awake.      Appearance: Normal appearance. He is obese. He is ill-appearing.      Comments: Chronically ill-appearing elderly male, he is lying in bed awake and alert pleasant and conversant, his wife son are at bedside and also provides history.   HENT:      Head: Normocephalic.      Right Ear: Decreased hearing noted.      Left Ear: Decreased hearing noted.      Nose: Nose normal.      Mouth/Throat:      Lips: Pink.      Mouth: Mucous membranes are dry.   Eyes:      General: Lids are normal. Vision grossly intact. Gaze aligned appropriately.   Cardiovascular:      Rate and Rhythm: Normal rate. Rhythm irregularly irregular. FrequentExtrasystoles are present.     Pulses:           Dorsalis pedis pulses are 1+ on the right side and 1+ on the left side.      Heart sounds: Murmur heard.   Pulmonary:      Effort: Pulmonary effort is normal.      Breath sounds: Normal breath sounds. No decreased breath sounds, wheezing, rhonchi or rales.      Comments: Bilateral breath sounds are clear to auscultation, no tachypnea or increased work breathing, converses easily.  Chest:      Chest wall: No tenderness.   Abdominal:      General: Abdomen is protuberant. Bowel sounds are normal. There is no distension.      Palpations: Abdomen is soft.       Tenderness: There is no abdominal tenderness.      Comments: No tenderness to palpation.   Genitourinary:     Comments: He is wearing a brief.  Musculoskeletal:         General: No swelling or deformity. Normal range of motion.      Cervical back: Normal range of motion.      Right lower leg: No edema.      Left lower leg: No edema.      Comments: Moves extremities with equal strength, generally weak.  No redness, swelling or tenderness to left ankle, foot or knee.   Feet:      Comments: Toenails are thickened and yellow, there is a scab on right 2nd toe.  There is a scratch on the dorsum of the right foot.  Skin:     General: Skin is warm and dry.      Capillary Refill: Capillary refill takes less than 2 seconds.      Comments: Right 2nd toe with scab.  Scratch to right dorsum of foot.   Neurological:      Mental Status: He is alert and oriented to person, place, and time.      GCS: GCS eye subscore is 4. GCS verbal subscore is 5. GCS motor subscore is 6.      Sensory: Sensation is intact.      Motor: Weakness present.   Psychiatric:         Attention and Perception: Attention and perception normal.         Mood and Affect: Mood and affect normal.         Speech: Speech normal.         Behavior: Behavior normal. Behavior is cooperative.         Thought Content: Thought content normal.         Cognition and Memory: Cognition and memory normal.         Judgment: Judgment normal.              Significant Labs: All pertinent labs within the past 24 hours have been reviewed.    A1C:   Recent Labs   Lab 03/23/23  0851 04/23/23  1207 05/01/23  0914   HGBA1C 6.3* 6.2* 6.4*     Bilirubin:   Recent Labs   Lab 04/23/23  0930 05/01/23  0247 05/08/23  1225 05/18/23  1725   BILITOT 0.6 0.8 0.5 1.1*     BMP:   Recent Labs   Lab 05/18/23  1725      *   K 5.9*   CL 91*   CO2 26   BUN 84*   CREATININE 2.9*   CALCIUM 8.8   MG 2.0     CBC:   Recent Labs   Lab 05/18/23  1725   WBC 15.66*   HGB 15.5   HCT 48.6         CMP:   Recent Labs   Lab 05/18/23  1725   *   K 5.9*   CL 91*   CO2 26      BUN 84*   CREATININE 2.9*   CALCIUM 8.8   PROT 7.7   ALBUMIN 3.6   BILITOT 1.1*   ALKPHOS 60   AST 22   ALT 28   ANIONGAP 11     Cardiac Markers:   Recent Labs   Lab 05/18/23  1727   *     Magnesium:   Recent Labs   Lab 05/18/23  1725   MG 2.0     Troponin:   Recent Labs   Lab 05/18/23  1725 05/18/23  2118   TROPONINIHS 49.1* 47.3*     Urine Studies:   Recent Labs   Lab 05/18/23  2127   COLORU Yellow   APPEARANCEUA Clear   PHUR 6.0   SPECGRAV 1.010   PROTEINUA Trace*   GLUCUA Negative   KETONESU Negative   BILIRUBINUA Negative   OCCULTUA Negative   NITRITE Negative   UROBILINOGEN Negative   LEUKOCYTESUR Negative       Significant Imaging: I have reviewed all pertinent imaging results/findings within the past 24 hours.      FINDINGS: The lungs are somewhat poorly inflated but appear free of infiltrates. There are no pleural effusions. The heart is moderately enlarged and unchanged. The pulmonary vasculature is within normal limits.     IMPRESSION:   Moderate cardiomegaly. No evidence of acute disease within the chest.

## 2023-05-19 NOTE — CONSULTS
Critical access hospital  Department of Cardiology  Consult Note      PATIENT NAME: Siddharth Urias  MRN: [id number]  TODAY'S DATE: 05/19/2023  ADMIT DATE: 5/18/2023                          CONSULT REQUESTED BY: Chip Patricio MD    SUBJECTIVE     PRINCIPAL PROBLEM: Acute renal failure superimposed on stage 3 chronic kidney disease      REASON FOR CONSULT:  Hypotension      FROM [facility] NOTE 6 MONTHS AGO    1. Paroxysmal atrial fibrillation    2. Aortic stenosis, severe    3. Aortic calcification and ectasia    4. Simple chronic bronchitis    5. Hypertension associated with diabetes    6. Stage 3a chronic kidney disease    7. 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 Controlled type 2 diabetes mellitus with diabetic nephropathy, without long-term current use of insulin    9. Obesity (BMI 30-39.9)    10. Diabetes mellitus due to underlying condition with hyperglycemia, without long-term current use of insulin    11. PAD (peripheral artery disease)       Plan:       1) Aortic valve stenosis.  The patient has severe aortic valve stenosis by physical exam and by 11/21/2021 TTE.  I discussed the patient's physical activity level and the symptoms of aortic valve stenosis.  The patient reports that he lives a relatively sedentary life.  His activities are largely walking around his home.  He reports that he is very content with his current activity level.  I discussed the potential of his exercise tolerance increasing after aortic valve replacement.  The patient along with his wife and son [place] [facility] in [place].  After an extensive discussion with his family the patient decided that he wanted to continue with medical management and that he is not interested in any invasive procedures including coronary angiography, TAVR or SAVR.  I emphasize that our clinic is available for the patient if he changes his mind in that we respect his wishes and his autonomy to make his own  decision regarding treatment of his aortic valve disease.     2) CAD.  The patient has multiple risk factors for CAD.  He is asymptomatic with regard to angina  -continue enteric-coated aspirin 81 mg p.o. q.day  -continue statin     3) PAD.  The patient denies claudication or CLI symptoms;  -recommend lifestyle modification including walking program for PID  -continue statin  -continue ARB     4) CKD stage 3. Avoid nephrotoxic medications     5) dyslipidemia.  05/12/2021 lipid profile reviewed  -continue Pravachol 40 mg p.o. q.day     6) type 2 diabetes.  The patient's most recent hemoglobin A1c in epic is on 03/05/2018 and was 6.6  -agree with tight glycemic control     7) hypertension.  The patient's blood pressure is elevated in clinic today; will defer to his primary cardiologist for blood pressure medication titration; in the interim continue hydrochlorothiazide 12.5 mg p.o. q.day and losartan 25 mg p.o. q.day     8) BMI 32.8.  Recommend heart healthy diet      All the patient's and his family's questions were        FROM H AND P      Hypotension       Low blood pressure at doctors appointment today         HPI: Siddharth Urias Is an 89-year-old male with chronic medical problems including HFpEF, atrial fibrillation CKD 3, peripheral arterial disease with likely occlusion of the right CFA/external iliac, DVT, COPD, diabetes, diverticulosis, hepatic steatosis, and prostate cancer in 2009 who presents to the emergency room tonight sent primary care provider for hypotension.  He was at the doctor's office to establish care for a hospital follow-up and when they checked his blood pressure it was systolic 60.  Patient was asymptomatic and was sent to the emergency room for evaluation.      Patient has had 3 hospitalizations in the last 3 weeks for heart failure exacerbation.  He had elevated BNP, pulmonary edema on x-ray and was diuresed and discharged home on 04/26 with oxygen supplementation.  He returns to the  hospital on 05/01 having not use his oxygen and not filled his new prescriptions, was diuresed again encouraged to use his home oxygen.  He was discharged on 05/02 and returned to the hospital on 05/08 with bradycardia with heart rate in the 30s and left knee and ankle pain.  On 05/01 he was found to have a nonocclusive thrombus in the right popliteal vein.  Metoprolol was held and he was switched to Bumex.  Eliquis was increased to 5 mg twice daily and he had reportedly been in sinus rhythm when seen by Cardiology.  He was treated for gout with colchicine and a Medrol Dosepak and allopurinol.     His wife said he has been doing well at home since the last discharge.  She denies that he has had any fevers, he has been using his oxygen about 75% of the time when he is up and about in his house, he is eating and drinking and voiding well, denies diarrhea, abdominal pain, chest, shortness at breath that is unusual, swelling, he has an occasional cough with clear sputum.  He denies headaches or dizziness.  He said the pain from gout in his left knee and left ankle is resolved. He has no complaints of chest pain or palpitations, headaches, lightheadedness or dizziness. He uses a walker to get around and is somewhat weak but doesn't really go anywhere.     In the ED, lab work with WBC elevated at 15.66, platelets 287, H&H 15.5/48.6, BUN elevated 84, creatinine elevated 2.9, glucose 104, sodium low at 128, potassium 5.9, chloride 91 and serum bicarb 26, , troponin 49.1, calcium 8.8 and albumin 3.6, total bilirubin 1.1, estimated GFR is 20 and estimated creatinine clearance is 16 7.  Temperature 97.4°, heart rate 75, blood pressure 106/52, respiratory rate 16 and O2 sat 90 5% on room air.  His initial blood pressure was 86/64 and heart rate was 40, most recent blood pressure 111/61, heart rate 60-80, respiratory rate 18 to 20 and O2 sat 94% on room air.  Chest x-ray with moderate cardiomegaly, free of infiltrates no  pleural effusions, moderately enlarged heart and pulmonary vasculature within normal limits.  He will be admitted to the hospitalist service for further evaluation and treatment to the step-down unit with consult to Nephrology and Cardiology.            Review of patient's allergies indicates:   Allergen Reactions    No known drug allergies        Past Medical History:   Diagnosis Date    Afib     Arthritis     Choledocholithiasis     Chronic kidney disease     Colon polyp     COPD (chronic obstructive pulmonary disease)     Diabetes mellitus, type 2     2/2011    Diverticulosis     Fatty liver     Hepatomegaly     History of blood clots     2020 Dr. Rodriguez     Irradiation cystitis     Prostate cancer 2009    s/p XRT (T1C, [place] 8)    Radiation proctitis     Sleep apnea      Past Surgical History:   Procedure Laterality Date    ANGIOGRAM, CORONARY, WITH LEFT HEART CATHETERIZATION N/A 05/17/2022    Procedure: Angiogram, Coronary, with Left Heart Cath;  Surgeon: Kamlesh Sullivan MD;  Location: [facility] CATH/EP LAB;  Service: Cardiology;  Laterality: N/A;    APPENDECTOMY      CARPAL TUNNEL RELEASE Left 02/05/2021    Procedure: RELEASE, CARPAL TUNNEL;  Surgeon: Jayro Hawkins II, MD;  Location: [facility] OR;  Service: Orthopedics;  Laterality: Left;    CARPAL TUNNEL RELEASE Right 3/1/2023    Procedure: RELEASE, CARPAL TUNNEL;  Surgeon: Shaun Leonardo MD;  Location: [facility] OR;  Service: Orthopedics;  Laterality: Right;    CATARACT EXTRACTION Bilateral     COLONOSCOPY N/A 12/14/2018    Procedure: COLONOSCOPY;  Surgeon: Noel Aguiar MD;  Location: [facility] ENDO;  Service: Endoscopy;  Laterality: N/A;    COLONOSCOPY  06/21/2021    Dr. Rodgers, in media: 2 colon polyps removed; diverticulosis, erythema in transverse colon; biopsy: tubular adenomas, ranomd colon unremarkable    COLONOSCOPY N/A 11/4/2022    Procedure: COLONOSCOPY;  Surgeon: Noel Aguiar MD;  Location: [facility] ENDO;  Service: Endoscopy;  Laterality: N/A;    ERCP N/A  11/23/2021    Procedure: ERCP (ENDOSCOPIC RETROGRADE CHOLANGIOPANCREATOGRAPHY);  Surgeon: Marshall Gardner III, MD;  Location: [facility] ENDO;  Service: Endoscopy;  Laterality: N/A;    ESOPHAGOGASTRODUODENOSCOPY N/A 01/24/2019    Procedure: EGD (ESOPHAGOGASTRODUODENOSCOPY);  Surgeon: Noel Aguiar MD;  Location: [facility] ENDO;  Service: Endoscopy;  Laterality: N/A;    EYE SURGERY      FLEXIBLE SIGMOIDOSCOPY N/A 01/22/2019    Procedure: SIGMOIDOSCOPY, FLEXIBLE;  Surgeon: Noel Aguiar MD;  Location: [facility] ENDO;  Service: Endoscopy;  Laterality: N/A;    HERNIA REPAIR      bilateral inguinal    LAPAROSCOPIC CHOLECYSTECTOMY N/A 11/24/2021    Procedure: CHOLECYSTECTOMY, LAPAROSCOPIC;  Surgeon: Jay Cabrera Jr., MD;  Location: [facility] OR;  Service: General;  Laterality: N/A;    TONSILLECTOMY       Social History     Tobacco Use    Smoking status: Former     Packs/day: 1.50     Years: 60.00     Pack years: 90.00     Types: Cigarettes     Start date: 1944     Quit date: 1/2/2008     Years since quitting: 15.3    Smokeless tobacco: Never   Substance Use Topics    Alcohol use: Yes     Alcohol/week: 1.0 standard drink     Types: 1 Glasses of wine per week     Comment: social - at speical events    Drug use: No        REVIEW OF SYSTEMS    Denies Syncope  Denies SOB  Denies CP    OBJECTIVE     VITAL SIGNS (Most Recent)  Temp: 98 °F (36.7 °C) (05/19/23 0701)  Pulse: (!) 40 (05/19/23 0726)  Resp: 20 (05/19/23 0726)  BP: (!) 121/54 (05/19/23 0500)  SpO2: 99 % (05/19/23 0726)    VENTILATION STATUS  Resp: 20 (05/19/23 0726)  SpO2: 99 % (05/19/23 0726)           I & O (Last 24H):  Intake/Output Summary (Last 24 hours) at 5/19/2023 0839  Last data filed at 5/19/2023 0724  Gross per 24 hour   Intake 150 ml   Output 500 ml   Net -350 ml       WEIGHTS  Wt Readings from Last 3 Encounters:   05/18/23 2300 83.4 kg (183 lb 13.8 oz)   05/18/23 1713 81.6 kg (180 lb)   05/18/23 1518 84.2 kg (185 lb 10 oz)   05/08/23 1656 84.7 kg (186 lb  11.7 oz)   05/08/23 1154 77.1 kg (170 lb)       PHYSICAL EXAM  GENERAL: well built, well nourished, well-developed in no apparent distress alert and oriented.   HEENT: Normocephalic. Pupils normal and conjunctivae normal.  Mucous membranes normal, no cyanosis or icterus, trachea central,no pallor or icterus is noted..   NECK: No JVD. No bruit..   THYROID: Thyroid not enlarged. No nodules present..   CARDIAC: Regular rate and rhythm. S1 is normal.S2 is normal.No gallops, clicks or murmurs noted at this time.  CHEST ANATOMY: normal.   LUNGS: Clear to auscultation. No wheezing or rhonchi..   ABDOMEN: Soft no masses or organomegaly.  No abdomen pulsations or bruits.  Normal bowel sounds. No pulsations and no masses felt, No guarding or rebound.   URINARY: No martinez catheter   EXTREMITIES: No cyanosis, clubbing or edema noted at this time., no calf tenderness bilaterally.   PERIPHERAL VASCULAR SYSTEM: Good palpable distal pulses.   CENTRAL NERVOUS SYSTEM: No focal motor or sensory deficits noted.   SKIN: Skin without lesions, moist, well perfused.   MUSCLE STRENGTH & TONE: No noteable weakness, atrophy or abnormal movement.     HOME MEDICATIONS:  No current facility-administered medications on file prior to encounter.     Current Outpatient Medications on File Prior to Encounter   Medication Sig Dispense Refill    albuterol (PROVENTIL/VENTOLIN HFA) 90 mcg/actuation inhaler INHALE 2 PUFFS INTO THE LUNGS EVERY 6 HOURS AS NEEDED FOR WHEEZE (Patient taking differently: Inhale 2 puffs into the lungs every 6 (six) hours as needed.) 18 g 1    allopurinoL (ZYLOPRIM) 100 MG tablet Take 2 tablets (200 mg total) by mouth once daily. 60 tablet 2    apixaban (ELIQUIS) 5 mg Tab Take 1 tablet (5 mg total) by mouth 2 (two) times daily. 60 tablet 2    aspirin 81 MG Chew Take 81 mg by mouth once daily.      bumetanide (BUMEX) 1 MG tablet Take 1 tablet (1 mg total) by mouth once daily. 30 tablet 2    colestipoL (COLESTID) 1 gram Tab TAKE 1  TABLET BY MOUTH 2 TIMES DAILY. TAKE OTHER ORAL MEDICATIONS >1H BEFORE OR >4H AFTER COLESTIPOL (Patient taking differently: Take 1 g by mouth 2 (two) times daily.) 180 tablet 1    fluticasone-umeclidin-vilanter (TRELEGY ELLIPTA) 100-62.5-25 mcg DsDv Inhale 1 puff into the lungs once daily. 180 each 3    fluticasone-umeclidin-vilanter (TRELEGY ELLIPTA) 100-62.5-25 mcg DsDv Inhale 1 puff into the lungs once daily. 28 each 0    furosemide (LASIX) 40 MG tablet Take 40 mg by mouth once daily.      losartan (COZAAR) 25 MG tablet Take 1 tablet (25 mg total) by mouth once daily. 90 tablet 3    metoprolol succinate (TOPROL-XL) 25 MG 24 hr tablet Take 25 mg by mouth once daily.      miconazole NITRATE 2 % (MICOTIN) 2 % top powder Apply topically 2 (two) times daily. Apply to groin twice a day x1 week for 7 days 71 g 0    pantoprazole (PROTONIX) 40 MG tablet Take 1 tablet (40 mg total) by mouth once daily. 90 tablet 3    potassium chloride SA (K-DUR,KLOR-CON) 20 MEQ tablet Take 1 tablet (20 mEq total) by mouth 2 (two) times daily. 60 tablet 1    pravastatin (PRAVACHOL) 40 MG tablet Take 1 tablet (40 mg total) by mouth once daily. 90 tablet 3    solifenacin (VESICARE) 5 MG tablet Take 1 tablet (5 mg total) by mouth once daily. 90 tablet 3    tamsulosin (FLOMAX) 0.4 mg Cap Take 1 capsule (0.4 mg total) by mouth once daily. 90 capsule 3    tamsulosin (FLOMAX) 0.4 mg Cap Take 1 capsule (0.4 mg total) by mouth once daily. for 7 days 7 capsule 0    traZODone (DESYREL) 50 MG tablet Take 1 tablet (50 mg total) by mouth nightly as needed for Insomnia. 90 tablet 3       SCHEDULED MEDS:   allopurinoL  200 mg Oral Daily    apixaban  5 mg Oral BID    budesonide  0.5 mg Nebulization Q12H    And    ipratropium  0.5 mg Nebulization Q6H    And    arformoteroL  15 mcg Nebulization BID    aspirin  81 mg Oral Daily    pantoprazole  40 mg Oral Daily    pravastatin  40 mg Oral Daily    sodium zirconium cyclosilicate  10 g Oral TID    tamsulosin  1  capsule Oral Daily       CONTINUOUS INFUSIONS:   sodium chloride 0.9% 50 mL/hr at 05/18/23 2322       PRN MEDS:acetaminophen, albuterol sulfate, [COMPLETED] dextrose 50% **AND** dextrose 50% **AND** [COMPLETED] insulin regular, naloxone, ondansetron, oxyCODONE, oxyCODONE, polyethylene glycol, simethicone, sodium chloride 0.9%, traZODone    LABS AND DIAGNOSTICS     CBC LAST 3 DAYS  Recent Labs   Lab 05/18/23 1725 05/19/23  0452   WBC 15.66* 10.29   RBC 5.93 5.23   HGB 15.5 13.6*   HCT 48.6 43.4   MCV 82 83   MCH 26.1* 26.0*   MCHC 31.9* 31.3*   RDW 16.3* 15.6*    249   MPV 10.9 11.2   GRAN 77.5*  12.1* 73.3*  7.6   LYMPH 12.3*  1.9 14.2*  1.5   MONO 8.4  1.3* 10.1  1.0   BASO 0.05 0.02   NRBC 0 0       COAGULATION LAST 3 DAYS  No results for input(s): LABPT, INR, APTT in the last 168 hours.    CHEMISTRY LAST 3 DAYS  Recent Labs   Lab 05/18/23 1725 05/19/23 0110 05/19/23  0452   * 133* 137   K 5.9* 4.8 4.8   CL 91* 96 97   CO2 26 26 30*   ANIONGAP 11 11 10   BUN 84* 88* 82*   CREATININE 2.9* 2.7* 2.6*    158* 85   CALCIUM 8.8 8.4* 8.6*   MG 2.0  --  2.0   ALBUMIN 3.6  --   --    PROT 7.7  --   --    ALKPHOS 60  --   --    ALT 28  --   --    AST 22  --   --    BILITOT 1.1*  --   --        CARDIAC PROFILE LAST 3 DAYS  Recent Labs   Lab 05/18/23 1725 05/18/23 1727 05/18/23 2118 05/19/23  0110   BNP  --  231*  --   --    TROPONINIHS 49.1*  --  47.3* 43.5*       ENDOCRINE LAST 3 DAYS  No results for input(s): TSH, PROCAL in the last 168 hours.    LAST ARTERIAL BLOOD GAS  ABG  No results for input(s): PH, PO2, PCO2, HCO3, BE in the last 168 hours.    LAST 7 DAYS MICROBIOLOGY   Microbiology Results (last 7 days)       Procedure Component Value Units Date/Time    Blood culture [[id number]] Collected: 05/18/23 2207    Order Status: Completed Specimen: Blood from Peripheral, Wrist, Right Updated: 05/19/23 0517     Blood Culture, Routine No Growth to date    Blood culture [[id number]] Collected:  05/18/23 2207    Order Status: Completed Specimen: Blood from Peripheral, Forearm, Right Updated: 05/19/23 0517     Blood Culture, Routine No Growth to date            MOST RECENT IMAGING  US Retroperitoneal Complete  EXAM DESCRIPTION:  US RETROPERITONEAL COMPLETE    CLINICAL HISTORY:  89 years  Male  elevated creatinine    COMPARISON:  None    TECHNIQUE:  Multiple ultrasound images of the kidneys were obtained.    FINDINGS:    Right Kidney: Measures 9.9 x 4.7 x 4.8cm.  No shadowing calculi.  No hydronephrosis. No suspicious mass.    Left Kidney: Measures 10.2 x 5 x 4.8cm.  No shadowing calculi.  No hydronephrosis. No suspicious mass.    Bladder:  Thin walled as visualized.    IMPRESSION:  No acute findings.    Electronically signed by:  Emery Ortiz MD  5/18/2023 10:12 PM CDT Workstation: [id number]  X-Ray Chest AP Portable  EXAM: XR CHEST AP PORTABLE    HISTORY: Hypotension    COMPARISON:Chest x-ray dated 2/22/2023    FINDINGS: The lungs are somewhat poorly inflated but appear free of infiltrates. There are no pleural effusions. The heart is moderately enlarged and unchanged. The pulmonary vasculature is within normal limits.    IMPRESSION:   Moderate cardiomegaly. No evidence of acute disease within the chest.    Electronically signed by:  Chas Doss MD  5/18/2023 5:54 PM CDT Workstation: [id number]      ECHOCARDIOGRAM RESULTS (last 5)  Results for orders placed during the hospital encounter of 04/23/23    Echo    Interpretation Summary  · The left ventricle is normal in size with mild concentric hypertrophy and low normal systolic function.  · The estimated ejection fraction is 50-55%.  · Indeterminate left ventricular diastolic function.  · There is mild mitral stenosis.  · The mean diastolic gradient across the mitral valve is 4 mmHg at a heart rate of 78 bpm.  · Mild-to-moderate mitral regurgitation.  · Mild aortic regurgitation.  · There is severe aortic valve stenosis.  · Aortic valve area is  0.46 cm2; peak velocity is 4.4 m/s; mean gradient is 50 mmHg. Of note, gradients were measured following a post PVC beat.  · Normal right ventricular size with normal right ventricular systolic function.  · Mild to moderate tricuspid regurgitation.  · The estimated PA systolic pressure is at least 39 mmHg.  · Elevated central venous pressure (15 mmHg).  · The ascending aorta is mildly dilated.      Results for orders placed during the hospital encounter of 11/20/21    Echo    Interpretation Summary  · The estimated PA systolic pressure is 39 mmHg.  · The left ventricle is normal in size with concentric remodeling and normal systolic function.  · The estimated ejection fraction is 55%.  · Normal left ventricular diastolic function.  · Normal right ventricular size with normal right ventricular systolic function.  · Moderate left atrial enlargement.  · There is severe aortic valve stenosis.  · Aortic valve area is 0.68 cm2; peak velocity is 3.81 m/s; mean gradient is 39 mmHg.  · Mild aortic regurgitation.  · Mild tricuspid regurgitation.  · There is mild pulmonary hypertension.      Results for orders placed in visit on 09/10/19    Echo Color Flow Doppler? Yes    Interpretation Summary  · Normal left ventricular systolic function. The estimated ejection fraction is 70%  · Concentric left ventricular remodeling.  · Normal LV diastolic function.  · Normal right ventricular systolic function.  · Moderate aortic valve stenosis.  · Aortic valve area is 1.46 cm2; peak velocity is 3.46 m/s; mean gradient is 30 mmHg.  · The aortic valve is trileaflet but malformed.  · Mitral Valve: The following structural abnormalities were observed: non-specific thickening. There is mild mitral annular calcification. Normal leaflet mobility  · Normal central venous pressure (3 mm Hg).  · The estimated PA systolic pressure is 14 mm Hg      CURRENT/PREVIOUS VISIT EKG  Results for orders placed or performed during the hospital encounter of  05/18/23   EKG 12-lead    Collection Time: 05/18/23  5:30 PM    Narrative    Test Reason : R06.02,    Vent. Rate : 086 BPM     Atrial Rate : 086 BPM     P-R Int : 294 ms          QRS Dur : 096 ms      QT Int : 370 ms       P-R-T Axes : 000 043 232 degrees     QTc Int : 442 ms    Sinus rhythm with 1st degree A-V block with frequent Premature ventricular  complexes  ST and T wave abnormality, consider inferolateral ischemia  Abnormal ECG  When compared with ECG of 08-MAY-2023 12:41,  Criteria for Anteroseptal infarct are no longer Present  T wave inversion now evident in Anterior-lateral leads    Referred By: [email or web address]   SELF           Confirmed By:            ASSESSMENT/PLAN:     Active Hospital Problems    Diagnosis    *Acute renal failure superimposed on stage 3 chronic kidney disease    Frail elderly    Orthostatic hypotension    Hyponatremia    Leukocytosis    Acute DVT (deep venous thrombosis)    Chronic diastolic heart failure    Hyperkalemia    Elevated troponin    PAF (paroxysmal atrial fibrillation)    PAD (peripheral artery disease)    Aortic stenosis, severe    COPD (chronic obstructive pulmonary disease)       ASSESSMENT & PLAN:     Hypotension secondary to KAT and Dehydration  Hyponatremia  Orthostatic Hypotension  Recent DVT- On eliquis  Chronic Diastolic Heart Failure  Severe AS  Mild-Moderate MR  Hyperkalemia  PAF  [place]      RECOMMENDATIONS:      Patient is slightly orthostatic and has KAT  He also does have severe AS that he was seen by Dr. Emanuel at [facility] for possible CATH to evaluate as Dr. CEDRIC Sullivan could not complete because of severe calcified arteries on access areas.   Patient told Dr. Emanuel 6 months ago he would like medical management. Dr Tyson will talk to patient son later today.     Nicol Rocha NP  Department of Cardiology  Date of Service: 05/19/2023

## 2023-05-19 NOTE — CONSULTS
Short x-cover neph note.    Spoke to the floor nurse, Dr. Rosenthal will see patient today.    Joey Hassan MD

## 2023-05-19 NOTE — H&P
[facility]  History & Physical    Patient Name: Siddharth Urias  MRN: [id number]  Patient Class: IP- Inpatient  Admission Date: 5/18/2023  Attending Physician: Nick Chaney MD   Primary Care Provider: Martine Maxwell MD         Patient information was obtained from patient, relative(s), past medical records and ER records.     Subjective:     Principal Problem:Acute renal failure superimposed on stage 3 chronic kidney disease    Chief Complaint:   Chief Complaint   Patient presents with    Hypotension     Low blood pressure at doctors appointment today        HPI: Siddharth Urias Is an 89-year-old male with chronic medical problems including HFpEF, atrial fibrillation CKD 3, peripheral arterial disease with likely occlusion of the right CFA/external iliac, DVT, COPD, diabetes, diverticulosis, hepatic steatosis, and prostate cancer in 2009 who presents to the emergency room tonight sent primary care provider for hypotension.  He was at the doctor's office to establish care for a hospital follow-up and when they checked his blood pressure it was systolic 60.  Patient was asymptomatic and was sent to the emergency room for evaluation.     Patient has had 3 hospitalizations in the last 3 weeks for heart failure exacerbation.  He had elevated BNP, pulmonary edema on x-ray and was diuresed and discharged home on 04/26 with oxygen supplementation.  He returns to the hospital on 05/01 having not use his oxygen and not filled his new prescriptions, was diuresed again encouraged to use his home oxygen.  He was discharged on 05/02 and returned to the hospital on 05/08 with bradycardia with heart rate in the 30s and left knee and ankle pain.  On 05/01 he was found to have a nonocclusive thrombus in the right popliteal vein.  Metoprolol was held and he was switched to Bumex.  Eliquis was increased to 5 mg twice daily and he had reportedly been in sinus rhythm when seen by Cardiology.   He was treated for gout with colchicine and a Medrol Dosepak and allopurinol.    His wife said he has been doing well at home since the last discharge.  She denies that he has had any fevers, he has been using his oxygen about 75% of the time when he is up and about in his house, he is eating and drinking and voiding well, denies diarrhea, abdominal pain, chest, shortness at breath that is unusual, swelling, he has an occasional cough with clear sputum.  He denies headaches or dizziness.  He said the pain from gout in his left knee and left ankle is resolved. He has no complaints of chest pain or palpitations, headaches, lightheadedness or dizziness. He uses a walker to get around and is somewhat weak but doesn't really go anywhere.    In the ED, lab work with WBC elevated at 15.66, platelets 287, H&H 15.5/48.6, BUN elevated 84, creatinine elevated 2.9, glucose 104, sodium low at 128, potassium 5.9, chloride 91 and serum bicarb 26, , troponin 49.1, calcium 8.8 and albumin 3.6, total bilirubin 1.1, estimated GFR is 20 and estimated creatinine clearance is 16 7.  Temperature 97.4°, heart rate 75, blood pressure 106/52, respiratory rate 16 and O2 sat 90 5% on room air.  His initial blood pressure was 86/64 and heart rate was 40, most recent blood pressure 111/61, heart rate 60-80, respiratory rate 18 to 20 and O2 sat 94% on room air.  Chest x-ray with moderate cardiomegaly, free of infiltrates no pleural effusions, moderately enlarged heart and pulmonary vasculature within normal limits.  He will be admitted to the hospitalist service for further evaluation and treatment to the step-down unit with consult to Nephrology and Cardiology.       Past Medical History:   Diagnosis Date    Afib     Arthritis     Choledocholithiasis     Chronic kidney disease     Colon polyp     COPD (chronic obstructive pulmonary disease)     Diabetes mellitus, type 2     2/2011    Diverticulosis     Fatty liver      Hepatomegaly     History of blood clots     2020 Dr. Rodriguez     Irradiation cystitis     Prostate cancer 2009    s/p XRT (T1C, [place] 8)    Radiation proctitis     Sleep apnea        Past Surgical History:   Procedure Laterality Date    ANGIOGRAM, CORONARY, WITH LEFT HEART CATHETERIZATION N/A 05/17/2022    Procedure: Angiogram, Coronary, with Left Heart Cath;  Surgeon: Kamlesh Sullivan MD;  Location: [facility] CATH/EP LAB;  Service: Cardiology;  Laterality: N/A;    APPENDECTOMY      CARPAL TUNNEL RELEASE Left 02/05/2021    Procedure: RELEASE, CARPAL TUNNEL;  Surgeon: Jayro Hawkins II, MD;  Location: [facility] OR;  Service: Orthopedics;  Laterality: Left;    CARPAL TUNNEL RELEASE Right 3/1/2023    Procedure: RELEASE, CARPAL TUNNEL;  Surgeon: Shaun Leonardo MD;  Location: [facility] OR;  Service: Orthopedics;  Laterality: Right;    CATARACT EXTRACTION Bilateral     COLONOSCOPY N/A 12/14/2018    Procedure: COLONOSCOPY;  Surgeon: Noel Aguiar MD;  Location: [facility] ENDO;  Service: Endoscopy;  Laterality: N/A;    COLONOSCOPY  06/21/2021    Dr. Rodgers, in media: 2 colon polyps removed; diverticulosis, erythema in transverse colon; biopsy: tubular adenomas, ranomd colon unremarkable    COLONOSCOPY N/A 11/4/2022    Procedure: COLONOSCOPY;  Surgeon: Noel Aguiar MD;  Location: [facility];  Service: Endoscopy;  Laterality: N/A;    ERCP N/A 11/23/2021    Procedure: ERCP (ENDOSCOPIC RETROGRADE CHOLANGIOPANCREATOGRAPHY);  Surgeon: Marshall Gardner III, MD;  Location: [facility] ENDO;  Service: Endoscopy;  Laterality: N/A;    ESOPHAGOGASTRODUODENOSCOPY N/A 01/24/2019    Procedure: EGD (ESOPHAGOGASTRODUODENOSCOPY);  Surgeon: Noel Aguiar MD;  Location: [facility] ENDO;  Service: Endoscopy;  Laterality: N/A;    EYE SURGERY      FLEXIBLE SIGMOIDOSCOPY N/A 01/22/2019    Procedure: SIGMOIDOSCOPY, FLEXIBLE;  Surgeon: Noel Aguiar MD;  Location: [facility] ENDO;  Service: Endoscopy;  Laterality: N/A;    HERNIA REPAIR       bilateral inguinal    LAPAROSCOPIC CHOLECYSTECTOMY N/A 11/24/2021    Procedure: CHOLECYSTECTOMY, LAPAROSCOPIC;  Surgeon: Jay Cabrera Jr., MD;  Location: [facility];  Service: General;  Laterality: N/A;    TONSILLECTOMY         Review of patient's allergies indicates:   Allergen Reactions    No known drug allergies      Scheduled Meds:   [START ON 5/19/2023] allopurinoL  200 mg Oral Daily    apixaban  5 mg Oral BID    [START ON 5/19/2023] budesonide  0.5 mg Nebulization Q12H    And    [START ON 5/19/2023] ipratropium  0.5 mg Nebulization Q6H    And    [START ON 5/19/2023] arformoteroL  15 mcg Nebulization BID    [START ON 5/19/2023] aspirin  81 mg Oral Daily    calcium gluconate IVPB  1 g Intravenous Once    dextrose 50%  50 g Intravenous Once    And    insulin regular  0.1 Units/kg Intravenous Once    [START ON 5/19/2023] pantoprazole  40 mg Oral Daily    pravastatin  40 mg Oral Daily    sodium zirconium cyclosilicate  10 g Oral TID    [START ON 5/19/2023] tamsulosin  1 capsule Oral Daily     Continuous Infusions:   sodium chloride 0.9%       PRN Meds:.acetaminophen, albuterol sulfate, dextrose 50% **AND** dextrose 50% **AND** insulin regular, naloxone, ondansetron, oxyCODONE, oxyCODONE, polyethylene glycol, simethicone, sodium chloride 0.9%, traZODone        Current Outpatient Medications on File Prior to Encounter   Medication Sig    albuterol (PROVENTIL/VENTOLIN HFA) 90 mcg/actuation inhaler INHALE 2 PUFFS INTO THE LUNGS EVERY 6 HOURS AS NEEDED FOR WHEEZE (Patient taking differently: Inhale 2 puffs into the lungs every 6 (six) hours as needed.)    allopurinoL (ZYLOPRIM) 100 MG tablet Take 2 tablets (200 mg total) by mouth once daily.    apixaban (ELIQUIS) 5 mg Tab Take 1 tablet (5 mg total) by mouth 2 (two) times daily.    aspirin 81 MG Chew Take 81 mg by mouth once daily.    bumetanide (BUMEX) 1 MG tablet Take 1 tablet (1 mg total) by mouth once daily.    colestipoL (COLESTID) 1 gram  Tab TAKE 1 TABLET BY MOUTH 2 TIMES DAILY. TAKE OTHER ORAL MEDICATIONS >1H BEFORE OR >4H AFTER COLESTIPOL (Patient taking differently: Take 1 g by mouth 2 (two) times daily.)    fluticasone-umeclidin-vilanter (TRELEGY ELLIPTA) 100-62.5-25 mcg DsDv Inhale 1 puff into the lungs once daily.    fluticasone-umeclidin-vilanter (TRELEGY ELLIPTA) 100-62.5-25 mcg DsDv Inhale 1 puff into the lungs once daily.    furosemide (LASIX) 40 MG tablet Take 40 mg by mouth once daily.    losartan (COZAAR) 25 MG tablet Take 1 tablet (25 mg total) by mouth once daily.    metoprolol succinate (TOPROL-XL) 25 MG 24 hr tablet Take 25 mg by mouth once daily.    miconazole NITRATE 2 % (MICOTIN) 2 % top powder Apply topically 2 (two) times daily. Apply to groin twice a day x1 week for 7 days    pantoprazole (PROTONIX) 40 MG tablet Take 1 tablet (40 mg total) by mouth once daily.    potassium chloride SA (K-DUR,KLOR-CON) 20 MEQ tablet Take 1 tablet (20 mEq total) by mouth 2 (two) times daily.    pravastatin (PRAVACHOL) 40 MG tablet Take 1 tablet (40 mg total) by mouth once daily.    solifenacin (VESICARE) 5 MG tablet Take 1 tablet (5 mg total) by mouth once daily.    tamsulosin (FLOMAX) 0.4 mg Cap Take 1 capsule (0.4 mg total) by mouth once daily.    tamsulosin (FLOMAX) 0.4 mg Cap Take 1 capsule (0.4 mg total) by mouth once daily. for 7 days    traZODone (DESYREL) 50 MG tablet Take 1 tablet (50 mg total) by mouth nightly as needed for Insomnia.    [DISCONTINUED] cyanocobalamin (VITAMIN B-12) 1000 MCG tablet Take 100 mcg by mouth once daily.    [DISCONTINUED] losartan (COZAAR) 25 MG tablet Take 1 tablet (25 mg total) by mouth once daily.    [DISCONTINUED] methylPREDNISolone (MEDROL DOSEPACK) 4 mg tablet use as directed    [DISCONTINUED] pantoprazole (PROTONIX) 40 MG tablet TAKE 1 TABLET DAILY (Patient taking differently: Take 40 mg by mouth once daily.)    [DISCONTINUED] solifenacin (VESICARE) 5 MG tablet TAKE 1 TABLET DAILY  (Patient taking differently: Take 5 mg by mouth once daily.)    [DISCONTINUED] tamsulosin (FLOMAX) 0.4 mg Cap TAKE 1 CAPSULE ONCE DAILY     Family History       Problem Relation (Age of Onset)    Diabetes Mother, Brother          Tobacco Use    Smoking status: Former     Packs/day: 1.50     Years: 60.00     Pack years: 90.00     Types: Cigarettes     Start date: 1944     Quit date: 1/2/2008     Years since quitting: 15.3    Smokeless tobacco: Never   Substance and Sexual Activity    Alcohol use: Yes     Alcohol/week: 1.0 standard drink     Types: 1 Glasses of wine per week     Comment: social - at speical events    Drug use: No    Sexual activity: Not Currently     Partners: Female     Review of Systems   All other systems reviewed and are negative.  Twelve point review of systems obtained and negative except as stated above in HPI      Objective:     Vital Signs (Most Recent):  Temp: 97.4 °F (36.3 °C) (05/18/23 1717)  Pulse: 62 (05/18/23 2130)  Resp: 20 (05/18/23 2130)  BP: 111/61 (05/18/23 2130)  SpO2: (!) 94 % (05/18/23 2130) Vital Signs (24h Range):  Temp:  [97.4 °F (36.3 °C)-97.6 °F (36.4 °C)] 97.4 °F (36.3 °C)  Pulse:  [35-95] 62  Resp:  [16-80] 20  SpO2:  [92 %-96 %] 94 %  BP: ()/(40-65) 111/61     Weight: 81.6 kg (180 lb)  Body mass index is 27.37 kg/m².     Physical Exam  Vitals and nursing note reviewed.   Constitutional:       General: He is awake.      Appearance: Normal appearance. He is obese. He is ill-appearing.      Comments: Chronically ill-appearing elderly male, he is lying in bed awake and alert pleasant and conversant, his wife son are at bedside and also provides history.   HENT:      Head: Normocephalic.      Right Ear: Decreased hearing noted.      Left Ear: Decreased hearing noted.      Nose: Nose normal.      Mouth/Throat:      Lips: Pink.      Mouth: Mucous membranes are dry.   Eyes:      General: Lids are normal. Vision grossly intact. Gaze aligned appropriately.    Cardiovascular:      Rate and Rhythm: Normal rate. Rhythm irregularly irregular. FrequentExtrasystoles are present.     Pulses:           Dorsalis pedis pulses are 1+ on the right side and 1+ on the left side.      Heart sounds: Murmur heard.   Pulmonary:      Effort: Pulmonary effort is normal.      Breath sounds: Normal breath sounds. No decreased breath sounds, wheezing, rhonchi or rales.      Comments: Bilateral breath sounds are clear to auscultation, no tachypnea or increased work breathing, converses easily.  Chest:      Chest wall: No tenderness.   Abdominal:      General: Abdomen is protuberant. Bowel sounds are normal. There is no distension.      Palpations: Abdomen is soft.      Tenderness: There is no abdominal tenderness.      Comments: No tenderness to palpation.   Genitourinary:     Comments: He is wearing a brief.  Musculoskeletal:         General: No swelling or deformity. Normal range of motion.      Cervical back: Normal range of motion.      Right lower leg: No edema.      Left lower leg: No edema.      Comments: Moves extremities with equal strength, generally weak.  No redness, swelling or tenderness to left ankle, foot or knee.   Feet:      Comments: Toenails are thickened and yellow, there is a scab on right 2nd toe.  There is a scratch on the dorsum of the right foot.  Skin:     General: Skin is warm and dry.      Capillary Refill: Capillary refill takes less than 2 seconds.      Comments: Right 2nd toe with scab.  Scratch to right dorsum of foot.   Neurological:      Mental Status: He is alert and oriented to person, place, and time.      GCS: GCS eye subscore is 4. GCS verbal subscore is 5. GCS motor subscore is 6.      Sensory: Sensation is intact.      Motor: Weakness present.   Psychiatric:         Attention and Perception: Attention and perception normal.         Mood and Affect: Mood and affect normal.         Speech: Speech normal.         Behavior: Behavior normal. Behavior is  cooperative.         Thought Content: Thought content normal.         Cognition and Memory: Cognition and memory normal.         Judgment: Judgment normal.              Significant Labs: All pertinent labs within the past 24 hours have been reviewed.    A1C:   Recent Labs   Lab 03/23/23  0851 04/23/23  1207 05/01/23  0914   HGBA1C 6.3* 6.2* 6.4*     Bilirubin:   Recent Labs   Lab 04/23/23  0930 05/01/23  0247 05/08/23  1225 05/18/23  1725   BILITOT 0.6 0.8 0.5 1.1*     BMP:   Recent Labs   Lab 05/18/23  1725      *   K 5.9*   CL 91*   CO2 26   BUN 84*   CREATININE 2.9*   CALCIUM 8.8   MG 2.0     CBC:   Recent Labs   Lab 05/18/23  1725   WBC 15.66*   HGB 15.5   HCT 48.6        CMP:   Recent Labs   Lab 05/18/23  1725   *   K 5.9*   CL 91*   CO2 26      BUN 84*   CREATININE 2.9*   CALCIUM 8.8   PROT 7.7   ALBUMIN 3.6   BILITOT 1.1*   ALKPHOS 60   AST 22   ALT 28   ANIONGAP 11     Cardiac Markers:   Recent Labs   Lab 05/18/23  1727   *     Magnesium:   Recent Labs   Lab 05/18/23  1725   MG 2.0     Troponin:   Recent Labs   Lab 05/18/23  1725 05/18/23  2118   TROPONINIHS 49.1* 47.3*     Urine Studies:   Recent Labs   Lab 05/18/23 2127   COLORU Yellow   APPEARANCEUA Clear   PHUR 6.0   SPECGRAV 1.010   PROTEINUA Trace*   GLUCUA Negative   KETONESU Negative   BILIRUBINUA Negative   OCCULTUA Negative   NITRITE Negative   UROBILINOGEN Negative   LEUKOCYTESUR Negative       Significant Imaging: I have reviewed all pertinent imaging results/findings within the past 24 hours.      FINDINGS: The lungs are somewhat poorly inflated but appear free of infiltrates. There are no pleural effusions. The heart is moderately enlarged and unchanged. The pulmonary vasculature is within normal limits.     IMPRESSION:   Moderate cardiomegaly. No evidence of acute disease within the chest.     Assessment/Plan:     * Acute renal failure superimposed on stage 3 chronic kidney disease  BUN/CR 84/2.9 with  estimated GFR 20, estimated creatinine clearance 16.7, he has a widely ranging creatinine at times greater than 32 but more recently ranging from 1.6-2.1.  He has been hospitalized 3 times in the last month with diuresis and most recently changed to Bumex, BNP has been greater than 3000, today BNP is 231, he seems a little intravascularly depleted, given 500 cc lactated Ringer's in ED, we will give normal saline at 50 cc an hour, monitor on step-down unit, consult to Nephrology for evaluation, obtain retroperitoneal ultrasound, monitor closely for volume overload, place condom catheter and monitor urine output, we will place urinary catheter if necessary for accurate I&O, hold losartan, hold potassium supplement      Leukocytosis  WBC elevated at 15.66, no pneumonia symptoms, urine with no signs of infection, no fevers, send blood cultures, lactic acid is 1.7, procalcitonin is pending, could be from stress response from hypotension, repeat CBC in a.m.      Hyponatremia  Sodium 128, Serum osmolality 303, urine osmolality 367, urine sodium less than 10 and urine creatinine 84, uric acid 9.8, gentle fluid administration, BNP q.4 hours      Orthostatic hypotension  Presumed orthostatic hypotension from volume depletion, orthostatics in a.m. after gentle fluid administration overnight      Frail elderly  Consult PT/OT for evaluation when stable      Acute DVT (deep venous thrombosis)  Diagnosed with DVT of right popliteal on 05/09 and left femoral vein on 05/01, Eliquis was increased to 5 mg twice daily from 2.5 mg twice daily per Cardiology, resume Eliquis      US LOWER EXTREMITY VEINS LEFT 5/9/23  FINDINGS:  Left thigh veins: The common femoral, popliteal, upper greater saphenous, and deep femoral veins are patent and free of thrombus. The veins are normally compressible and have normal phasic flow and augmentation response.  There is nonocclusive thrombus within the distal femoral vein which is noncompressible.   Left  calf veins: The visualized calf veins are patent.   Contralateral CFV: The contralateral (right) common femoral vein is patent and free of thrombus.     Miscellaneous: None     Impression:   Nonocclusive DVT distal left femoral vein which may be chronic.      US LOWER EXTREMITY VEINS BILATERAL 5/1/2023  FINDINGS:  Right thigh veins: There is a nonocclusive thrombus within the right popliteal vein.  The common femoral, femoral, upper greater saphenous, and deep femoral veins are patent and free of thrombus, these veins are normally compressible and have normal phasic flow and augmentation response.   Right calf veins: The visualized calf veins are patent.   Left thigh veins: The common femoral, femoral, popliteal, upper greater saphenous, and deep femoral veins are patent and free of thrombus. The veins are normally compressible and have normal phasic flow and augmentation response.   Left calf veins: The visualized calf veins are patent.   Miscellaneous: None     Impression:   Nonocclusive thrombus within the right popliteal vein.      Chronic diastolic heart failure  Patient with HFpEF with 3 exacerbations in the last month, recently changed to Bumex, he is incontinent of urine but his wife said he seems to have good urine output.  hold diuretics, , consult Cardiology, daily weights, strict I&O      Most recent echo from 04/24/2023  The left ventricle is normal in size with mild concentric hypertrophy and low normal systolic function.  The estimated ejection fraction is 50-55%.  Indeterminate left ventricular diastolic function.  There is mild mitral stenosis.  The mean diastolic gradient across the mitral valve is 4 mmHg at a heart rate of 78 bpm.  Mild-to-moderate mitral regurgitation.  Mild aortic regurgitation.  There is severe aortic valve stenosis.  Aortic valve area is 0.46 cm2; peak velocity is 4.4 m/s; mean gradient is 50 mmHg. Of note, gradients were measured following a post PVC beat.  Normal right  ventricular size with normal right ventricular systolic function.  Mild to moderate tricuspid regurgitation.  The estimated PA systolic pressure is at least 39 mmHg.  Elevated central venous pressure (15 mmHg).  The ascending aorta is mildly dilated.      Hyperkalemia  Potassium 5.9, Creatinine 2.9, he was given 1 amp of sodium bicarb, ordered dextrose 50 g x 1 and regular insulin 8.16 units and 1 g calcium gluconate, then start Lokelma 10 g t.i.d. q.4 BNP, glucose checks every 30 minutes x2 then every hour x6 to monitor for hypoglycemia, hold potassium supplement hold losartan    PAF (paroxysmal atrial fibrillation)  Patient with paroxysmal atrial fibrillation, he is on Eliquis, metoprolol was recently stopped because of heart rate in the 30s, Twelve lead EKG today shows sinus rhythm with first-degree AV block with frequent PVCs, monitor on telemetry, consult Cardiology        Elevated troponin  Initial high sensitivity troponin 49.1, repeat high sensitivity troponin 47.3, patient denies chest pain      PAD (peripheral artery disease)  History peripheral arterial disease, avoid SCDs      Aortic stenosis, severe  Apparently patient has declined invasive treatment for severe aortic stenosis, consult Cardiology      COPD (chronic obstructive pulmonary disease)  Stable COPD, resume controller inhaler, albuterol as needed for wheezing or shortness of breath, continue oxygen to maintain sat greater than 90%      VTE Risk Mitigation (From admission, onward)         Ordered     apixaban tablet 5 mg  2 times daily         05/18/23 2154     Reason for No Pharmacological VTE Prophylaxis  Once        Question:  Reasons:  Answer:  Already adequately anticoagulated on oral Anticoagulants    05/18/23 2154     IP VTE HIGH RISK PATIENT  Once         05/18/23 2154               Patient was examined at 2040    Code discussion with patient and/or caregiver regarding intubation, CPR, medications and emergency life support.  Patient  elected to be: FULL CODE          Erica Oliver NP  Department of Hospital Medicine  [facility]

## 2023-05-19 NOTE — HPI
Siddharth Urias Is an 89-year-old male with chronic medical problems including HFpEF, atrial fibrillation CKD 3, peripheral arterial disease with likely occlusion of the right CFA/external iliac, DVT, COPD, diabetes, diverticulosis, hepatic steatosis, and prostate cancer in 2009 who presents to the emergency room tonight sent primary care provider for hypotension.  He was at the doctor's office to establish care for a hospital follow-up and when they checked his blood pressure it was systolic 60.  Patient was asymptomatic and was sent to the emergency room for evaluation.     Patient has had 3 hospitalizations in the last 3 weeks for heart failure exacerbation.  He had elevated BNP, pulmonary edema on x-ray and was diuresed and discharged home on 04/26 with oxygen supplementation.  He returns to the hospital on 05/01 having not use his oxygen and not filled his new prescriptions, was diuresed again encouraged to use his home oxygen.  He was discharged on 05/02 and returned to the hospital on 05/08 with bradycardia with heart rate in the 30s and left knee and ankle pain.  On 05/01 he was found to have a nonocclusive thrombus in the right popliteal vein.  Metoprolol was held and he was switched to Bumex.  Eliquis was increased to 5 mg twice daily and he had reportedly been in sinus rhythm when seen by Cardiology.  He was treated for gout with colchicine and a Medrol Dosepak and allopurinol.    His wife said he has been doing well at home since the last discharge.  She denies that he has had any fevers, he has been using his oxygen about 75% of the time when he is up and about in his house, he is eating and drinking and voiding well, denies diarrhea, abdominal pain, chest, shortness at breath that is unusual, swelling, he has an occasional cough with clear sputum.  He denies headaches or dizziness.  He said the pain from gout in his left knee and left ankle is resolved. He has no complaints of chest pain or  palpitations, headaches, lightheadedness or dizziness. He uses a walker to get around and is somewhat weak but doesn't really go anywhere.    In the ED, lab work with WBC elevated at 15.66, platelets 287, H&H 15.5/48.6, BUN elevated 84, creatinine elevated 2.9, glucose 104, sodium low at 128, potassium 5.9, chloride 91 and serum bicarb 26, , troponin 49.1, calcium 8.8 and albumin 3.6, total bilirubin 1.1, estimated GFR is 20 and estimated creatinine clearance is 16 7.  Temperature 97.4°, heart rate 75, blood pressure 106/52, respiratory rate 16 and O2 sat 90 5% on room air.  His initial blood pressure was 86/64 and heart rate was 40, most recent blood pressure 111/61, heart rate 60-80, respiratory rate 18 to 20 and O2 sat 94% on room air.  Chest x-ray with moderate cardiomegaly, free of infiltrates no pleural effusions, moderately enlarged heart and pulmonary vasculature within normal limits.  He will be admitted to the hospitalist service for further evaluation and treatment to the step-down unit with consult to Nephrology and Cardiology.

## 2023-05-19 NOTE — PATIENT INSTRUCTIONS
Alexy Messina,     If you are due for any health screening(s) below please notify me so we can arrange them to be ordered and scheduled to maintain your health. Most healthy patients complete it. Don't lose out on improving your health.     All of your core healthy metrics are met.

## 2023-05-19 NOTE — PROGRESS NOTES
[facility] Medicine  Progress Note    Patient Name: Siddharth Urias  MRN: [id number]  Patient Class: IP- Inpatient   Admission Date: 5/18/2023  Length of Stay: 1 days  Attending Physician: Chip Patricio MD  Primary Care Provider: Martine Maxwell MD        Subjective:     Principal Problem:Acute renal failure superimposed on stage 3 chronic kidney disease        HPI:  Siddharth Urias Is an 89-year-old male with chronic medical problems including HFpEF, atrial fibrillation CKD 3, peripheral arterial disease with likely occlusion of the right CFA/external iliac, DVT, COPD, diabetes, diverticulosis, hepatic steatosis, and prostate cancer in 2009 who presents to the emergency room tonight sent primary care provider for hypotension.  He was at the doctor's office to establish care for a hospital follow-up and when they checked his blood pressure it was systolic 60.  Patient was asymptomatic and was sent to the emergency room for evaluation.     Patient has had 3 hospitalizations in the last 3 weeks for heart failure exacerbation.  He had elevated BNP, pulmonary edema on x-ray and was diuresed and discharged home on 04/26 with oxygen supplementation.  He returns to the hospital on 05/01 having not use his oxygen and not filled his new prescriptions, was diuresed again encouraged to use his home oxygen.  He was discharged on 05/02 and returned to the hospital on 05/08 with bradycardia with heart rate in the 30s and left knee and ankle pain.  On 05/01 he was found to have a nonocclusive thrombus in the right popliteal vein.  Metoprolol was held and he was switched to Bumex.  Eliquis was increased to 5 mg twice daily and he had reportedly been in sinus rhythm when seen by Cardiology.  He was treated for gout with colchicine and a Medrol Dosepak and allopurinol.    His wife said he has been doing well at home since the last discharge.  She denies that he has had any fevers, he has been using his  oxygen about 75% of the time when he is up and about in his house, he is eating and drinking and voiding well, denies diarrhea, abdominal pain, chest, shortness at breath that is unusual, swelling, he has an occasional cough with clear sputum.  He denies headaches or dizziness.  He said the pain from gout in his left knee and left ankle is resolved. He has no complaints of chest pain or palpitations, headaches, lightheadedness or dizziness. He uses a walker to get around and is somewhat weak but doesn't really go anywhere.    In the ED, lab work with WBC elevated at 15.66, platelets 287, H&H 15.5/48.6, BUN elevated 84, creatinine elevated 2.9, glucose 104, sodium low at 128, potassium 5.9, chloride 91 and serum bicarb 26, , troponin 49.1, calcium 8.8 and albumin 3.6, total bilirubin 1.1, estimated GFR is 20 and estimated creatinine clearance is 16 7.  Temperature 97.4°, heart rate 75, blood pressure 106/52, respiratory rate 16 and O2 sat 90 5% on room air.  His initial blood pressure was 86/64 and heart rate was 40, most recent blood pressure 111/61, heart rate 60-80, respiratory rate 18 to 20 and O2 sat 94% on room air.  Chest x-ray with moderate cardiomegaly, free of infiltrates no pleural effusions, moderately enlarged heart and pulmonary vasculature within normal limits.  He will be admitted to the hospitalist service for further evaluation and treatment to the step-down unit with consult to Nephrology and Cardiology.       Overview/Hospital Course:  No notes on file    Interval History: No acute events overnight. He is feeling better today with IVF. Renal function improving, sodium is improving. Seems his problem was dehydration. He is feeling better overall    Review of Systems   All other systems reviewed and are negative.  Objective:     Vital Signs (Most Recent):  Temp: 98 °F (36.7 °C) (05/19/23 0701)  Pulse: (!) 40 (05/19/23 0726)  Resp: 20 (05/19/23 0726)  BP: (!) 121/54 (05/19/23 0500)  SpO2: 99 %  (05/19/23 4938) Vital Signs (24h Range):  Temp:  [97.4 °F (36.3 °C)-98.2 °F (36.8 °C)] 98 °F (36.7 °C)  Pulse:  [] 40  Resp:  [14-80] 20  SpO2:  [87 %-100 %] 99 %  BP: ()/(40-79) 121/54     Weight: 83.4 kg (183 lb 13.8 oz)  Body mass index is 27.96 kg/m².    Intake/Output Summary (Last 24 hours) at 5/19/2023 1117  Last data filed at 5/19/2023 0724  Gross per 24 hour   Intake 150 ml   Output 500 ml   Net -350 ml         Physical Exam  Vitals reviewed.   Constitutional:       Appearance: Normal appearance.   HENT:      Head: Normocephalic and atraumatic.      Nose: Nose normal.      Mouth/Throat:      Mouth: Mucous membranes are moist.   Eyes:      Pupils: Pupils are equal, round, and reactive to light.   Cardiovascular:      Rate and Rhythm: Normal rate. Rhythm irregular.      Pulses: Normal pulses.      Heart sounds: Murmur heard.     No friction rub. No gallop.   Pulmonary:      Effort: Pulmonary effort is normal. No respiratory distress.      Breath sounds: Normal breath sounds.      Comments: Nasal cannula in place  Abdominal:      General: Abdomen is flat. Bowel sounds are normal. There is no distension.      Palpations: Abdomen is soft.      Tenderness: There is no abdominal tenderness.   Musculoskeletal:         General: No swelling. Normal range of motion.      Cervical back: Normal range of motion and neck supple.   Skin:     General: Skin is warm and dry.   Neurological:      General: No focal deficit present.      Mental Status: He is alert and oriented to person, place, and time.   Psychiatric:         Mood and Affect: Mood normal.         Behavior: Behavior normal.         Thought Content: Thought content normal.         Judgment: Judgment normal.           Significant Labs: All pertinent labs within the past 24 hours have been reviewed.    Significant Imaging: I have reviewed all pertinent imaging results/findings within the past 24 hours.      Assessment/Plan:      * Acute renal failure  superimposed on stage 3 chronic kidney disease  Seems due to dehydration / diuresis. He had developed diarrhea yesterday which may have contributed.   - getting additional IVF  - Na improving  - trend renal function  - appreciate nephrology recommendations  - Will need to be careful not to volume overload him.   - may need to adjust diuretics going forward.       Leukocytosis  Improved with hydration      Hyponatremia  Sodium 128, now improving  - hypovolemic hyponatremia  - improved with hydration      Orthostatic hypotension  Presumed orthostatic hypotension from volume depletion, orthostatics in a.m. after gentle fluid administration overnight      Frail elderly  Consult PT/OT for evaluation when stable      Acute DVT (deep venous thrombosis)  Diagnosed with DVT of right popliteal on 05/09 and left femoral vein on 05/01, Eliquis was increased to 5 mg twice daily from 2.5 mg twice daily per Cardiology, resume Eliquis      US LOWER EXTREMITY VEINS LEFT 5/9/23  FINDINGS:  Left thigh veins: The common femoral, popliteal, upper greater saphenous, and deep femoral veins are patent and free of thrombus. The veins are normally compressible and have normal phasic flow and augmentation response.  There is nonocclusive thrombus within the distal femoral vein which is noncompressible.   Left calf veins: The visualized calf veins are patent.   Contralateral CFV: The contralateral (right) common femoral vein is patent and free of thrombus.     Miscellaneous: None     Impression:   Nonocclusive DVT distal left femoral vein which may be chronic.      US LOWER EXTREMITY VEINS BILATERAL 5/1/2023  FINDINGS:  Right thigh veins: There is a nonocclusive thrombus within the right popliteal vein.  The common femoral, femoral, upper greater saphenous, and deep femoral veins are patent and free of thrombus, these veins are normally compressible and have normal phasic flow and augmentation response.   Right calf veins: The visualized calf  veins are patent.   Left thigh veins: The common femoral, femoral, popliteal, upper greater saphenous, and deep femoral veins are patent and free of thrombus. The veins are normally compressible and have normal phasic flow and augmentation response.   Left calf veins: The visualized calf veins are patent.   Miscellaneous: None     Impression:   Nonocclusive thrombus within the right popliteal vein.      Chronic diastolic heart failure  Patient with HFpEF with 3 exacerbations in the last month, recently changed to Bumex, he is incontinent of urine but his wife said he seems to have good urine output. Overdiuresed as above  - can hold off on cardiology consult for now      Most recent echo from 04/24/2023  The left ventricle is normal in size with mild concentric hypertrophy and low normal systolic function.  The estimated ejection fraction is 50-55%.  Indeterminate left ventricular diastolic function.  There is mild mitral stenosis.  The mean diastolic gradient across the mitral valve is 4 mmHg at a heart rate of 78 bpm.  Mild-to-moderate mitral regurgitation.  Mild aortic regurgitation.  There is severe aortic valve stenosis.  Aortic valve area is 0.46 cm2; peak velocity is 4.4 m/s; mean gradient is 50 mmHg. Of note, gradients were measured following a post PVC beat.  Normal right ventricular size with normal right ventricular systolic function.  Mild to moderate tricuspid regurgitation.  The estimated PA systolic pressure is at least 39 mmHg.  Elevated central venous pressure (15 mmHg).  The ascending aorta is mildly dilated.      Hyperkalemia  Potassium 5.9, Creatinine 2.9, he was given 1 amp of sodium bicarb, ordered dextrose 50 g x 1 and regular insulin 8.16 units and 1 g calcium gluconate, then start Lokelma 10 g t.i.d. q.4 BNP, glucose checks every 30 minutes x2 then every hour x6 to monitor for hypoglycemia, hold potassium supplement hold losartan    PAF (paroxysmal atrial fibrillation)  Patient with  paroxysmal atrial fibrillation, he is on Eliquis, metoprolol was recently stopped because of heart rate in the 30s, Twelve lead EKG today shows sinus rhythm with first-degree AV block with frequent PVCs, monitor on telemetr  - will add back low dose BB if needed        Elevated troponin  Initial high sensitivity troponin 49.1, repeat high sensitivity troponin 47.3, patient denies chest pain      PAD (peripheral artery disease)  History peripheral arterial disease, avoid SCDs      Aortic stenosis, severe  Apparently patient has declined invasive treatment for severe aortic stenosis and he reiterates this again to me today      COPD (chronic obstructive pulmonary disease)  Stable COPD, resume controller inhaler, albuterol as needed for wheezing or shortness of breath, continue oxygen to maintain sat greater than 90%        VTE Risk Mitigation (From admission, onward)         Ordered     apixaban tablet 5 mg  2 times daily         05/18/23 2154     Reason for No Pharmacological VTE Prophylaxis  Once        Question:  Reasons:  Answer:  Already adequately anticoagulated on oral Anticoagulants    05/18/23 2154     IP VTE HIGH RISK PATIENT  Once         05/18/23 2154                Discharge Planning   MAGDA:      Code Status: Full Code   Is the patient medically ready for discharge?:     Reason for patient still in hospital (select all that apply): Treatment                     Chip Patricio MD  Department of Hospital Medicine   [facility]

## 2023-05-19 NOTE — PLAN OF CARE
Problem: Adult Inpatient Plan of Care  Goal: Plan of Care Review  Outcome: Ongoing, Progressing  Goal: Patient-Specific Goal (Individualized)  Outcome: Ongoing, Progressing  Goal: Absence of Hospital-Acquired Illness or Injury  Outcome: Ongoing, Progressing  Goal: Optimal Comfort and Wellbeing  Outcome: Ongoing, Progressing  Goal: Readiness for Transition of Care  Outcome: Ongoing, Progressing     Problem: Infection  Goal: Absence of Infection Signs and Symptoms  Outcome: Ongoing, Progressing     Problem: Diabetes Comorbidity  Goal: Blood Glucose Level Within Targeted Range  Outcome: Ongoing, Progressing     Problem: Fluid and Electrolyte Imbalance (Acute Kidney Injury/Impairment)  Goal: Fluid and Electrolyte Balance  Outcome: Ongoing, Progressing     Problem: Oral Intake Inadequate (Acute Kidney Injury/Impairment)  Goal: Optimal Nutrition Intake  Outcome: Ongoing, Progressing     Problem: Renal Function Impairment (Acute Kidney Injury/Impairment)  Goal: Effective Renal Function  Outcome: Ongoing, Progressing     Problem: Fall Injury Risk  Goal: Absence of Fall and Fall-Related Injury  Outcome: Ongoing, Progressing     Problem: Skin Injury Risk Increased  Goal: Skin Health and Integrity  Outcome: Ongoing, Progressing

## 2023-05-19 NOTE — PROGRESS NOTES
Automatic Inhaler to Nebulizer Interchange    fluticasone/umeclidinium/vilanterol (Trelegy) 100 mcg/ 62.5 mcg/ 25 mcg changed to budesonide 0.5 mg twice daily AND ipratropium 0.5 mg every 6 hour scheduled AND arformoterol 15 mcg twice daily per [facility] Automatic Therapeutic Substitutions Protocol.    Please contact pharmacy at extension [phone] with any questions.     Thank you,   Jenni Hernandez

## 2023-05-19 NOTE — SUBJECTIVE & OBJECTIVE
Interval History: No acute events overnight. He is feeling better today with IVF. Renal function improving, sodium is improving. Seems his problem was dehydration. He is feeling better overall    Review of Systems   All other systems reviewed and are negative.  Objective:     Vital Signs (Most Recent):  Temp: 98 °F (36.7 °C) (05/19/23 0701)  Pulse: (!) 40 (05/19/23 0726)  Resp: 20 (05/19/23 0726)  BP: (!) 121/54 (05/19/23 0500)  SpO2: 99 % (05/19/23 0726) Vital Signs (24h Range):  Temp:  [97.4 °F (36.3 °C)-98.2 °F (36.8 °C)] 98 °F (36.7 °C)  Pulse:  [] 40  Resp:  [14-80] 20  SpO2:  [87 %-100 %] 99 %  BP: ()/(40-79) 121/54     Weight: 83.4 kg (183 lb 13.8 oz)  Body mass index is 27.96 kg/m².    Intake/Output Summary (Last 24 hours) at 5/19/2023 1117  Last data filed at 5/19/2023 0724  Gross per 24 hour   Intake 150 ml   Output 500 ml   Net -350 ml         Physical Exam  Vitals reviewed.   Constitutional:       Appearance: Normal appearance.   HENT:      Head: Normocephalic and atraumatic.      Nose: Nose normal.      Mouth/Throat:      Mouth: Mucous membranes are moist.   Eyes:      Pupils: Pupils are equal, round, and reactive to light.   Cardiovascular:      Rate and Rhythm: Normal rate. Rhythm irregular.      Pulses: Normal pulses.      Heart sounds: Murmur heard.     No friction rub. No gallop.   Pulmonary:      Effort: Pulmonary effort is normal. No respiratory distress.      Breath sounds: Normal breath sounds.      Comments: Nasal cannula in place  Abdominal:      General: Abdomen is flat. Bowel sounds are normal. There is no distension.      Palpations: Abdomen is soft.      Tenderness: There is no abdominal tenderness.   Musculoskeletal:         General: No swelling. Normal range of motion.      Cervical back: Normal range of motion and neck supple.   Skin:     General: Skin is warm and dry.   Neurological:      General: No focal deficit present.      Mental Status: He is alert and oriented to  person, place, and time.   Psychiatric:         Mood and Affect: Mood normal.         Behavior: Behavior normal.         Thought Content: Thought content normal.         Judgment: Judgment normal.           Significant Labs: All pertinent labs within the past 24 hours have been reviewed.    Significant Imaging: I have reviewed all pertinent imaging results/findings within the past 24 hours.

## 2023-05-19 NOTE — TELEPHONE ENCOUNTER
----- Message from Ashlei Amaral sent at 5/19/2023  8:57 AM CDT -----  Contact: Pt Wife  FYI    Pt was seen in the office on yesterday, was instructed to go to ER.    Pt was kept in [facility] Rm. [id number]    Thank you...

## 2023-05-19 NOTE — PLAN OF CARE
Verified with agency that patient is active with [facility].  [facility]        05/19/23 1030   Post-Acute Status   Post-Acute Authorization Home [facility]   Home Health Status Referrals Sent

## 2023-05-19 NOTE — CARE UPDATE
05/19/23 0724   Patient Assessment/Suction   Level of Consciousness (AVPU) alert   Respiratory Effort Normal;Unlabored   Expansion/Accessory Muscles/Retractions no use of accessory muscles;no retractions;expansion symmetric   All Lung Fields Breath Sounds clear   Rhythm/Pattern, Respiratory unlabored;pattern regular;depth regular;chest wiggle adequate;no shortness of breath reported   Cough Frequency no cough   PRE-TX-O2   Device (Oxygen Therapy) nasal cannula   Flow (L/min) 1   SpO2 97 %   Pulse Oximetry Type Continuous   $ Pulse Oximetry - Multiple Charge Pulse Oximetry - Multiple   Pulse (!) 118   Resp 14   Aerosol Therapy   $ Aerosol Therapy Charges Aerosol Treatment   Daily Review of Necessity (SVN) completed   Respiratory Treatment Status (SVN) given   Treatment Route (SVN) oxygen;mask   Patient Position (SVN) HOB elevated   Post Treatment Assessment (SVN) increased aeration   Signs of Intolerance (SVN) none   Breath Sounds Post-Respiratory Treatment   Throughout All Fields Post-Treatment All Fields   Throughout All Fields Post-Treatment aeration increased   Post-treatment Heart Rate (beats/min) 114   Post-treatment Resp Rate (breaths/min) 27   Education   $ Education Bronchodilator;15 min   Respiratory Evaluation   $ Care Plan Tech Time 15 min

## 2023-05-20 NOTE — PROGRESS NOTES
Formerly [facility], [facility] Medicine  Progress Note    Patient Name: Siddharth Urias  MRN: [id number]  Patient Class: IP- Inpatient   Admission Date: 5/18/2023  Length of Stay: 2 days  Attending Physician: Chip Patricio MD  Primary Care Provider: Martine Maxwell MD        Subjective:     Principal Problem:Acute renal failure superimposed on stage 3 chronic kidney disease        HPI:  Siddharth Urias Is an 89-year-old male with chronic medical problems including HFpEF, atrial fibrillation CKD 3, peripheral arterial disease with likely occlusion of the right CFA/external iliac, DVT, COPD, diabetes, diverticulosis, hepatic steatosis, and prostate cancer in 2009 who presents to the emergency room tonight sent primary care provider for hypotension.  He was at the doctor's office to establish care for a hospital follow-up and when they checked his blood pressure it was systolic 60.  Patient was asymptomatic and was sent to the emergency room for evaluation.     Patient has had 3 hospitalizations in the last 3 weeks for heart failure exacerbation.  He had elevated BNP, pulmonary edema on x-ray and was diuresed and discharged home on 04/26 with oxygen supplementation.  He returns to the hospital on 05/01 having not use his oxygen and not filled his new prescriptions, was diuresed again encouraged to use his home oxygen.  He was discharged on 05/02 and returned to the hospital on 05/08 with bradycardia with heart rate in the 30s and left knee and ankle pain.  On 05/01 he was found to have a nonocclusive thrombus in the right popliteal vein.  Metoprolol was held and he was switched to Bumex.  Eliquis was increased to 5 mg twice daily and he had reportedly been in sinus rhythm when seen by Cardiology.  He was treated for gout with colchicine and a Medrol Dosepak and allopurinol.    His wife said he has been doing well at home since the last discharge.  She denies that he has had any fevers, he has been using his  oxygen about 75% of the time when he is up and about in his house, he is eating and drinking and voiding well, denies diarrhea, abdominal pain, chest, shortness at breath that is unusual, swelling, he has an occasional cough with clear sputum.  He denies headaches or dizziness.  He said the pain from gout in his left knee and left ankle is resolved. He has no complaints of chest pain or palpitations, headaches, lightheadedness or dizziness. He uses a walker to get around and is somewhat weak but doesn't really go anywhere.    In the ED, lab work with WBC elevated at 15.66, platelets 287, H&H 15.5/48.6, BUN elevated 84, creatinine elevated 2.9, glucose 104, sodium low at 128, potassium 5.9, chloride 91 and serum bicarb 26, , troponin 49.1, calcium 8.8 and albumin 3.6, total bilirubin 1.1, estimated GFR is 20 and estimated creatinine clearance is 16 7.  Temperature 97.4°, heart rate 75, blood pressure 106/52, respiratory rate 16 and O2 sat 90 5% on room air.  His initial blood pressure was 86/64 and heart rate was 40, most recent blood pressure 111/61, heart rate 60-80, respiratory rate 18 to 20 and O2 sat 94% on room air.  Chest x-ray with moderate cardiomegaly, free of infiltrates no pleural effusions, moderately enlarged heart and pulmonary vasculature within normal limits.  He will be admitted to the hospitalist service for further evaluation and treatment to the step-down unit with consult to Nephrology and Cardiology.       Overview/Hospital Course:  No notes on file    Interval History: No acute events overnight. Plan for angiogram next week.  Patient has changed his mind and is now interested in cardiac intervention.  Renal function improving with IV fluids.  Careful to avoid fluid overload    Review of Systems   All other systems reviewed and are negative.  Objective:     Vital Signs (Most Recent):  Temp: 97.6 °F (36.4 °C) (05/20/23 1101)  Pulse: 101 (05/20/23 1307)  Resp: (!) 22 (05/20/23 1307)  BP:  126/72 (05/20/23 1200)  SpO2: 98 % (05/20/23 1307) Vital Signs (24h Range):  Temp:  [97.6 °F (36.4 °C)-99 °F (37.2 °C)] 97.6 °F (36.4 °C)  Pulse:  [] 101  Resp:  [13-31] 22  SpO2:  [90 %-100 %] 98 %  BP: ()/(50-98) 126/72     Weight: 83.4 kg (183 lb 13.8 oz)  Body mass index is 27.96 kg/m².    Intake/Output Summary (Last 24 hours) at 5/20/2023 1343  Last data filed at 5/20/2023 0908  Gross per 24 hour   Intake 1691.67 ml   Output 1450 ml   Net 241.67 ml           Physical Exam  Vitals reviewed.   Constitutional:       Appearance: Normal appearance.   HENT:      Head: Normocephalic and atraumatic.      Nose: Nose normal.      Mouth/Throat:      Mouth: Mucous membranes are moist.   Eyes:      Pupils: Pupils are equal, round, and reactive to light.   Cardiovascular:      Rate and Rhythm: Normal rate. Rhythm irregular.      Pulses: Normal pulses.      Heart sounds: Murmur heard.     No friction rub. No gallop.   Pulmonary:      Effort: Pulmonary effort is normal. No respiratory distress.      Breath sounds: Normal breath sounds.      Comments: Nasal cannula in place  Abdominal:      General: Abdomen is flat. Bowel sounds are normal. There is no distension.      Palpations: Abdomen is soft.      Tenderness: There is no abdominal tenderness.   Musculoskeletal:         General: No swelling. Normal range of motion.      Cervical back: Normal range of motion and neck supple.   Skin:     General: Skin is warm and dry.   Neurological:      General: No focal deficit present.      Mental Status: He is alert and oriented to person, place, and time.   Psychiatric:         Mood and Affect: Mood normal.         Behavior: Behavior normal.         Thought Content: Thought content normal.         Judgment: Judgment normal.           Significant Labs: All pertinent labs within the past 24 hours have been reviewed.    Significant Imaging: I have reviewed all pertinent imaging results/findings within the past 24  hours.      Assessment/Plan:      * Acute renal failure superimposed on stage 3 chronic kidney disease  Seems due to dehydration / diuresis. He had developed diarrhea yesterday which may have contributed.  - creatinine improving with volume   - getting additional IVF  - Na improving  - trend renal function  - appreciate nephrology recommendations  - Will need to be careful not to volume overload him.   - may need to adjust diuretics going forward.       Leukocytosis  Improved with hydration      Hyponatremia  Sodium 128, now improving  - hypovolemic hyponatremia  - improved with hydration      Orthostatic hypotension  Presumed orthostatic hypotension from volume depletion, orthostatics in a.m. after gentle fluid administration overnight      Frail elderly  Consult PT/OT for evaluation when stable      Acute DVT (deep venous thrombosis)  Diagnosed with DVT of right popliteal on 05/09 and left femoral vein on 05/01, Eliquis was increased to 5 mg twice daily from 2.5 mg twice daily per Cardiology, resume Eliquis      US LOWER EXTREMITY VEINS LEFT 5/9/23  FINDINGS:  Left thigh veins: The common femoral, popliteal, upper greater saphenous, and deep femoral veins are patent and free of thrombus. The veins are normally compressible and have normal phasic flow and augmentation response.  There is nonocclusive thrombus within the distal femoral vein which is noncompressible.   Left calf veins: The visualized calf veins are patent.   Contralateral CFV: The contralateral (right) common femoral vein is patent and free of thrombus.     Miscellaneous: None     Impression:   Nonocclusive DVT distal left femoral vein which may be chronic.      US LOWER EXTREMITY VEINS BILATERAL 5/1/2023  FINDINGS:  Right thigh veins: There is a nonocclusive thrombus within the right popliteal vein.  The common femoral, femoral, upper greater saphenous, and deep femoral veins are patent and free of thrombus, these veins are normally compressible and  have normal phasic flow and augmentation response.   Right calf veins: The visualized calf veins are patent.   Left thigh veins: The common femoral, femoral, popliteal, upper greater saphenous, and deep femoral veins are patent and free of thrombus. The veins are normally compressible and have normal phasic flow and augmentation response.   Left calf veins: The visualized calf veins are patent.   Miscellaneous: None     Impression:   Nonocclusive thrombus within the right popliteal vein.      Chronic diastolic heart failure  Patient with HFpEF with 3 exacerbations in the last month, recently changed to Bumex, he is incontinent of urine but his wife said he seems to have good urine output. Overdiuresed as above  - can hold off on cardiology consult for now      Most recent echo from 04/24/2023  The left ventricle is normal in size with mild concentric hypertrophy and low normal systolic function.  The estimated ejection fraction is 50-55%.  Indeterminate left ventricular diastolic function.  There is mild mitral stenosis.  The mean diastolic gradient across the mitral valve is 4 mmHg at a heart rate of 78 bpm.  Mild-to-moderate mitral regurgitation.  Mild aortic regurgitation.  There is severe aortic valve stenosis.  Aortic valve area is 0.46 cm2; peak velocity is 4.4 m/s; mean gradient is 50 mmHg. Of note, gradients were measured following a post PVC beat.  Normal right ventricular size with normal right ventricular systolic function.  Mild to moderate tricuspid regurgitation.  The estimated PA systolic pressure is at least 39 mmHg.  Elevated central venous pressure (15 mmHg).  The ascending aorta is mildly dilated.      Hyperkalemia  Potassium 5.9, Creatinine 2.9, he was given 1 amp of sodium bicarb, ordered dextrose 50 g x 1 and regular insulin 8.16 units and 1 g calcium gluconate, then start Lokelma 10 g t.i.d. q.4 BNP, glucose checks every 30 minutes x2 then every hour x6 to monitor for hypoglycemia, hold  potassium supplement hold losartan    PAF (paroxysmal atrial fibrillation)  Patient with paroxysmal atrial fibrillation, he is on Eliquis, metoprolol was recently stopped because of heart rate in the 30s, Twelve lead EKG today shows sinus rhythm with first-degree AV block with frequent PVCs, monitor on telemetr  - will add back low dose BB if needed        Elevated troponin  Initial high sensitivity troponin 49.1, repeat high sensitivity troponin 47.3, patient denies chest pain  Cardiology planning angiogram early next week      PAD (peripheral artery disease)  History peripheral arterial disease, avoid SCDs      Aortic stenosis, severe  He and his wife discuss further with Cardiology and they are planning to pursue further intervention      COPD (chronic obstructive pulmonary disease)  Stable COPD, resume controller inhaler, albuterol as needed for wheezing or shortness of breath, continue oxygen to maintain sat greater than 90%        VTE Risk Mitigation (From admission, onward)         Ordered     apixaban tablet 2.5 mg  2 times daily         05/19/23 1514     Reason for No Pharmacological VTE Prophylaxis  Once        Question:  Reasons:  Answer:  Already adequately anticoagulated on oral Anticoagulants    05/18/23 2154     IP VTE HIGH RISK PATIENT  Once         05/18/23 2154                Discharge Planning   MAGDA: 5/21/2023     Code Status: Full Code   Is the patient medically ready for discharge?:     Reason for patient still in hospital (select all that apply): Treatment  Discharge Plan A: Home Health                  Chip Patricio MD  Department of Hospital Medicine   [facility]

## 2023-05-20 NOTE — PLAN OF CARE
05/20/23 0721   Patient Assessment/Suction   Level of Consciousness (AVPU) alert   Respiratory Effort Unlabored   All Lung Fields Breath Sounds clear   Cough Frequency no cough   PRE-TX-O2   Device (Oxygen Therapy) nasal cannula   $ Is the patient on Low Flow Oxygen? Yes   Flow (L/min) 1   SpO2 (!) 94 %   Pulse Oximetry Type Continuous   $ Pulse Oximetry - Multiple Charge Pulse Oximetry - Multiple   Pulse 93   Resp 20   Aerosol Therapy   $ Aerosol Therapy Charges Aerosol Treatment   Respiratory Treatment Status (SVN) given   Treatment Route (SVN) mask   Patient Position (SVN) HOB elevated   Post Treatment Assessment (SVN) breath sounds unchanged   Signs of Intolerance (SVN) none   Breath Sounds Post-Respiratory Treatment   Post-treatment Heart Rate (beats/min) 98   Post-treatment Resp Rate (breaths/min) 20   Respiratory Evaluation   $ Care Plan Tech Time 15 min

## 2023-05-20 NOTE — PROGRESS NOTES
[facility]  Cardiology  Progress Note    Patient Name: Siddharth Urias  MRN: [id number]  Admission Date: 5/18/2023  Hospital Length of Stay: 2 days  Code Status: Full Code   Attending Physician: Chip Patricio MD   Primary Care Physician: Martine Maxwell MD  Expected Discharge Date: 5/21/2023  Principal Problem:Acute renal failure superimposed on stage 3 chronic kidney disease    Subjective:     Doing well.  No acute events.  Continues to be in sinus rhythm with frequent PVCs.  Kidney function better with diuresis.  Patient does not report any significant shortness of breath.    Review of Systems   All other systems reviewed and are negative.  Objective:     Vital Signs (Most Recent):  Temp: 99 °F (37.2 °C) (05/20/23 0701)  Pulse: 62 (05/20/23 1053)  Resp: (!) 31 (05/20/23 1053)  BP: (!) 116/58 (05/20/23 1053)  SpO2: 97 % (05/20/23 1053) Vital Signs (24h Range):  Temp:  [97.8 °F (36.6 °C)-99 °F (37.2 °C)] 99 °F (37.2 °C)  Pulse:  [] 62  Resp:  [13-31] 31  SpO2:  [90 %-100 %] 97 %  BP: ()/(50-98) 116/58     Weight: 83.4 kg (183 lb 13.8 oz)  Body mass index is 27.96 kg/m².    SpO2: 97 %         Intake/Output Summary (Last 24 hours) at 5/20/2023 1133  Last data filed at 5/20/2023 0908  Gross per 24 hour   Intake 1691.67 ml   Output 1450 ml   Net 241.67 ml       Lines/Drains/Airways       Peripheral Intravenous Line  Duration                  Peripheral IV - Single Lumen 05/18/23 1725 20 G Right Antecubital 1 day                    Physical Exam  Vitals reviewed.   Constitutional:       Appearance: Normal appearance.   HENT:      Mouth/Throat:      Mouth: Mucous membranes are moist.   Eyes:      Extraocular Movements: Extraocular movements intact.      Pupils: Pupils are equal, round, and reactive to light.   Cardiovascular:      Rate and Rhythm: Normal rate and regular rhythm.      Heart sounds: Murmur heard.     No gallop.   Pulmonary:      Effort: Pulmonary effort is normal.      Breath  sounds: Normal breath sounds.   Abdominal:      General: Abdomen is flat.      Palpations: Abdomen is soft.   Musculoskeletal:      Cervical back: Normal range of motion.   Skin:     General: Skin is warm and dry.   Neurological:      General: No focal deficit present.      Mental Status: He is alert and oriented to person, place, and time.   Psychiatric:         Mood and Affect: Mood normal.       Significant Labs: BMP:   Recent Labs   Lab 05/18/23  1725 05/19/23  0110 05/19/23  0452 05/19/23  0841 05/20/23  0526      < > 85 129* 139*   *   < > 137 134* 134*   K 5.9*   < > 4.8 4.5 4.8   CL 91*   < > 97 95 99   CO2 26   < > 30* 26 24   BUN 84*   < > 82* 77* 61*   CREATININE 2.9*   < > 2.6* 2.4* 2.0*   CALCIUM 8.8   < > 8.6* 8.4* 7.9*   MG 2.0  --  2.0  --  1.8    < > = values in this interval not displayed.    and CBC   Recent Labs   Lab 05/18/23  1725 05/19/23  0452 05/20/23  0526   WBC 15.66* 10.29 12.42   HGB 15.5 13.6* 13.7*   HCT 48.6 43.4 43.1    249 204       Significant Imaging: Echocardiogram: 2D echo with color flow doppler: No results found for this or any previous visit. and Transthoracic echo (TTE) complete (Cupid Only):   Results for orders placed or performed during the hospital encounter of 04/23/23   Echo   Result Value Ref Range    BSA 2.08 m2    TDI SEPTAL 0.03 m/s    LV LATERAL E/E' RATIO 34.33 m/s    LV SEPTAL E/E' RATIO 34.33 m/s    IVC diameter 2.49 cm    Left Ventricular Outflow Tract Mean Velocity 0.55 cm/s    Left Ventricular Outflow Tract Mean Gradient 1.34 mmHg    AORTIC VALVE CUSP SEPERATION 0.84 cm    TDI LATERAL 0.03 m/s    LVIDd 4.90 3.5 - 6.0 cm    IVS 1.25 (A) 0.6 - 1.1 cm    Posterior Wall 1.36 (A) 0.6 - 1.1 cm    LVIDs 4.28 (A) 2.1 - 4.0 cm    FS 13 28 - 44 %    Ascending aorta 4.20 cm    LV mass 255.12 g    LA size 3.38 cm    RVDD 2.43 cm    Left Ventricle Relative Wall Thickness 0.56 cm    AV regurgitation pressure 1/2 time [id number] ms    AV mean  gradient 50 mmHg    AV valve area 0.46 cm2    AV Velocity Ratio 0.17     AV index (prosthetic) 0.19     MV valve area p 1/2 method 3.28 cm2    E/A ratio 1.06     Mean e' 0.03 m/s    E wave deceleration time 231.22 msec    LVOT diameter 1.74 cm    LVOT area 2.4 cm2    LVOT peak rojas 0.74 m/s    LVOT peak VTI 19.20 cm    Ao peak rojas 4.4 m/s    Ao VTI 99.70 cm    Mr max rojas 5.44 m/s    LVOT stroke volume 45.63 cm3    AV peak gradient 77 mmHg    E/E' ratio 34.33 m/s    MV Peak E Rojas 1.03 m/s    AR Max Rojas 3.17 m/s    TR Max Rojas 2.44 m/s    MV stenosis pressure 1/2 time 67.05 ms    MV Peak A Rojas 0.97 m/s    LV Systolic Volume 82.14 mL    LV Systolic Volume Index 40.5 mL/m2    LV Diastolic Volume 112.89 mL    LV Diastolic Volume Index 55.61 mL/m2    LV Mass Index 126 g/m2    RA Major Axis 4.88 cm    Left Atrium Minor Axis 3.70 cm    Left Atrium Major Axis 6.23 cm    Triscuspid Valve Regurgitation Peak Gradient 24 mmHg    LA Volume Index (Mod) 27.1 mL/m2    LA volume (mod) 55.00 cm3    RA Width 3.88 cm    Right Atrial Pressure (from IVC) 15 mmHg    EF 50 %    MV mean gradient 4 mmHg    TV rest pulmonary artery pressure 39 mmHg    Mitral Valve Heart Rate 78 bpm    Narrative    · The left ventricle is normal in size with mild concentric hypertrophy   and low normal systolic function.  · The estimated ejection fraction is 50-55%.  · Indeterminate left ventricular diastolic function.  · There is mild mitral stenosis.  · The mean diastolic gradient across the mitral valve is 4 mmHg at a heart   rate of 78 bpm.  · Mild-to-moderate mitral regurgitation.  · Mild aortic regurgitation.  · There is severe aortic valve stenosis.  · Aortic valve area is 0.46 cm2; peak velocity is 4.4 m/s; mean gradient   is 50 mmHg. Of note, gradients were measured following a post PVC beat.  · Normal right ventricular size with normal right ventricular systolic   function.  · Mild to moderate tricuspid regurgitation.  · The estimated PA systolic  pressure is at least 39 mmHg.  · Elevated central venous pressure (15 mmHg).  · The ascending aorta is mildly dilated.        Assessment and Plan:     Admitted with hypotension, blood pressures systolic 60s  KAT, improving with IV diuresis  Severe aortic stenosis  Frequent PVCs  History of paroxysmal atrial fibrillation  Severe PAD, unable to perform angiography from femoral access in 2021  DVT, recent, nonocclusive in right popliteal vein  Severe CKD    Plan:  - continue with renal optimization.  Can consider Nephrology consult.  Again discussed with patient regarding his options.  Will need TAVR for aortic stenosis.  Patient reports that he would like to pursue the treatment for it.  Informed him that this would involve performing an angiography.  Can be done while he is here if the renal function continues to improve.  Patient will be still at high risk of contrast induced nephropathy if we pursue with invasive angiography.  Discussed with him that we will consider performing angiography from radial access if possible on Monday with plans to perform a possible PCI in a staged manner given his CKD.  If he can be revascularized adequately, he will need to follow-up with structural Cardiology at [facility] for further evaluation for TAVR.      Patient reports he will think about it and let us know after discussion with his wife.  He was encouraged to have a discussion with his son as well.    Active Diagnoses:    Diagnosis Date Noted POA    PRINCIPAL PROBLEM:  Acute renal failure superimposed on stage 3 chronic kidney disease [N17.9, N18.30] 11/20/2021 Yes    Frail elderly [R54] 05/18/2023 Yes    Orthostatic hypotension [I95.1] 05/18/2023 Yes    Hyponatremia [E87.1] 05/18/2023 Yes    Leukocytosis [D72.829] 05/18/2023 Yes    Acute DVT (deep venous thrombosis) [I82.409] 05/09/2023 Yes    Chronic diastolic heart failure [I50.32] 05/08/2023 Yes    Hyperkalemia [E87.5] 05/08/2023 Yes    Elevated troponin [R77.8]  04/24/2023 Yes    PAF (paroxysmal atrial fibrillation) [I48.0] 04/24/2023 Yes    PAD (peripheral artery disease) [I73.9] 07/09/2022 Yes    Aortic stenosis, severe [I35.0] 11/23/2021 Yes    COPD (chronic obstructive pulmonary disease) [J44.9] 12/11/2019 Yes      Problems Resolved During this Admission:       VTE Risk Mitigation (From admission, onward)           Ordered     apixaban tablet 2.5 mg  2 times daily         05/19/23 1514     Reason for No Pharmacological VTE Prophylaxis  Once        Question:  Reasons:  Answer:  Already adequately anticoagulated on oral Anticoagulants    05/18/23 2154     IP VTE HIGH RISK PATIENT  Once         05/18/23 2154                    Cesar Tyson MD  Cardiology  [facility]

## 2023-05-20 NOTE — SUBJECTIVE & OBJECTIVE
Interval History: No acute events overnight. Plan for angiogram next week.  Patient has changed his mind and is now interested in cardiac intervention.  Renal function improving with IV fluids.  Careful to avoid fluid overload    Review of Systems   All other systems reviewed and are negative.  Objective:     Vital Signs (Most Recent):  Temp: 97.6 °F (36.4 °C) (05/20/23 1101)  Pulse: 101 (05/20/23 1307)  Resp: (!) 22 (05/20/23 1307)  BP: 126/72 (05/20/23 1200)  SpO2: 98 % (05/20/23 1307) Vital Signs (24h Range):  Temp:  [97.6 °F (36.4 °C)-99 °F (37.2 °C)] 97.6 °F (36.4 °C)  Pulse:  [] 101  Resp:  [13-31] 22  SpO2:  [90 %-100 %] 98 %  BP: ()/(50-98) 126/72     Weight: 83.4 kg (183 lb 13.8 oz)  Body mass index is 27.96 kg/m².    Intake/Output Summary (Last 24 hours) at 5/20/2023 1343  Last data filed at 5/20/2023 0908  Gross per 24 hour   Intake 1691.67 ml   Output 1450 ml   Net 241.67 ml           Physical Exam  Vitals reviewed.   Constitutional:       Appearance: Normal appearance.   HENT:      Head: Normocephalic and atraumatic.      Nose: Nose normal.      Mouth/Throat:      Mouth: Mucous membranes are moist.   Eyes:      Pupils: Pupils are equal, round, and reactive to light.   Cardiovascular:      Rate and Rhythm: Normal rate. Rhythm irregular.      Pulses: Normal pulses.      Heart sounds: Murmur heard.     No friction rub. No gallop.   Pulmonary:      Effort: Pulmonary effort is normal. No respiratory distress.      Breath sounds: Normal breath sounds.      Comments: Nasal cannula in place  Abdominal:      General: Abdomen is flat. Bowel sounds are normal. There is no distension.      Palpations: Abdomen is soft.      Tenderness: There is no abdominal tenderness.   Musculoskeletal:         General: No swelling. Normal range of motion.      Cervical back: Normal range of motion and neck supple.   Skin:     General: Skin is warm and dry.   Neurological:      General: No focal deficit present.       Mental Status: He is alert and oriented to person, place, and time.   Psychiatric:         Mood and Affect: Mood normal.         Behavior: Behavior normal.         Thought Content: Thought content normal.         Judgment: Judgment normal.           Significant Labs: All pertinent labs within the past 24 hours have been reviewed.    Significant Imaging: I have reviewed all pertinent imaging results/findings within the past 24 hours.

## 2023-05-20 NOTE — RESPIRATORY THERAPY
05/19/23 1932   Patient Assessment/Suction   Level of Consciousness (AVPU) alert   Respiratory Effort Unlabored;Normal   Expansion/Accessory Muscles/Retractions no use of accessory muscles   All Lung Fields Breath Sounds Anterior:;clear   Rhythm/Pattern, Respiratory unlabored;pattern regular;depth regular;no shortness of breath reported   Cough Frequency infrequent   PRE-TX-O2   Device (Oxygen Therapy) nasal cannula   Flow (L/min) 1   SpO2 100 %   Pulse Oximetry Type Continuous   $ Pulse Oximetry - Multiple Charge Pulse Oximetry - Multiple   Pulse 87   Resp 13   Aerosol Therapy   $ Aerosol Therapy Charges Aerosol Treatment   Daily Review of Necessity (SVN) completed   Respiratory Treatment Status (SVN) given   Treatment Route (SVN) oxygen;mask   Patient Position (SVN) HOB elevated   Post Treatment Assessment (SVN) breath sounds unchanged   Signs of Intolerance (SVN) none   Breath Sounds Post-Respiratory Treatment   Throughout All Fields Post-Treatment All Fields   Throughout All Fields Post-Treatment Anterior:;no change   Post-treatment Heart Rate (beats/min) 80   Post-treatment Resp Rate (breaths/min) 16   Education   $ Education Bronchodilator;DME Oxygen;15 min   Respiratory Evaluation   $ Care Plan Tech Time 15 min   $ Eval/Re-eval Charges Re-evaluation

## 2023-05-20 NOTE — PROGRESS NOTES
INPATIENT NEPHROLOGY Progress Note   [facility]    Patient Name: Siddharth Urias  Date: 05/20/2023    Reason for consultation: KAT    Chief Complaint:   Chief Complaint   Patient presents with    Hypotension     Low blood pressure at doctors appointment today       History of Present Illness:  Siddharth Urias Is an 89-year-old male with chronic medical problems including HFpEF, atrial fibrillation CKD 3, peripheral arterial disease with likely occlusion of the right CFA/external iliac, DVT, COPD, diabetes, diverticulosis, hepatic steatosis, and prostate cancer in 2009 who presents to the emergency room tonight sent primary care provider for hypotension.  He was at the doctor's office to establish care for a hospital follow-up and when they checked his blood pressure it was systolic 60.  Patient was asymptomatic and was sent to the emergency room for evaluation. Patient has had 3 hospitalizations in the last 3 weeks for heart failure exacerbation.  He had elevated BNP, pulmonary edema on x-ray and was diuresed and discharged home on 04/26 with oxygen supplementation. He returned to the hospital on 05/01 having not use his oxygen and not filled his new prescriptions, was diuresed again encouraged to use his home oxygen.  He was discharged on 05/02 and returned to the hospital on 05/08 with bradycardia with heart rate in the 30s and left knee and ankle pain. On 05/01 he was found to have a nonocclusive thrombus in the right popliteal vein.  Metoprolol was held and he was switched to Bumex.  Eliquis was increased to 5 mg twice daily and he had reportedly been in sinus rhythm when seen by Cardiology.  He was treated for gout with colchicine and a Medrol Dosepak and allopurinol. His wife said he has been doing well at home since the last discharge. In the ED, lab work with leukocytosis, KAT. He was bradycardic with orthostasis and hypotension with SBP 80s as well. Chest x-ray with moderate  cardiomegaly, free of infiltrates no pleural effusions, moderately enlarged heart and pulmonary vasculature within normal limits. We are consulted for renal management.      4/2023 echo  The left ventricle is normal in size with mild concentric hypertrophy and low normal systolic function.  The estimated ejection fraction is 50-55%.  Indeterminate left ventricular diastolic function.  There is mild mitral stenosis.  The mean diastolic gradient across the mitral valve is 4 mmHg at a heart rate of 78 bpm.  Mild-to-moderate mitral regurgitation.  Mild aortic regurgitation.  There is severe aortic valve stenosis.  Aortic valve area is 0.46 cm2; peak velocity is 4.4 m/s; mean gradient is 50 mmHg. Of note, gradients were measured following a post PVC beat.  Normal right ventricular size with normal right ventricular systolic function.  Mild to moderate tricuspid regurgitation.  The estimated PA systolic pressure is at least 39 mmHg.  Elevated central venous pressure (15 mmHg).  The ascending aorta is mildly dilated.    Interval History:  5/19- HR , , on 1L NC, UOP 400cc  5/20- HR 90-120s, SBP all over the place but generally better, on 2L NC, UOP 1.5L, feels better, asked nurse to check orthos and patient needs to start some PT/OT to better guide medication resumption    Plan of Care:    Assessment:  KAT/CKD III due to CRS  Azotemia/Orthostatic hypotension due to volume depletion  Valvular CHF (severe AS)  BPH  Hyponatremia  Hyperkalemia  SHPT  Anemia of CKD    Plan:    - renal function is improved with gentle IVFs- can stop  - check orthos today- work with PT/OT  - monitor BP/volume status today to decide which medications to resume- I am thinking flomax for urinary retention and a low dose diuretic to start- would keep off losartan- will defer beta blocker to cardiology  - continue renal diet, 1.5L fluid restrictoin  - BMM parameters at goal  - H/H at goal    Thank you for allowing us to participate in this  patient's care. We will continue to follow.    Vital Signs:  Temp Readings from Last 3 Encounters:   05/20/23 99 °F (37.2 °C) (Axillary)   05/18/23 97.6 °F (36.4 °C) (Oral)   05/09/23 98.4 °F (36.9 °C) (Oral)       Pulse Readings from Last 3 Encounters:   05/20/23 (!) 126   05/18/23 66   05/09/23 (!) 52       BP Readings from Last 3 Encounters:   05/20/23 119/65   05/18/23 (!) 64/40   05/09/23 129/60       Weight:  Wt Readings from Last 3 Encounters:   05/18/23 83.4 kg (183 lb 13.8 oz)   05/18/23 84.2 kg (185 lb 10 oz)   05/08/23 84.7 kg (186 lb 11.7 oz)       INS/OUTS:  I/O last 3 completed shifts:  In: 1481.7 [P.O.:550; I.V.:931.7]  Out: 1950 [Urine:1950]  I/O this shift:  In: 360 [P.O.:360]  Out: -     Medications:  Scheduled Meds:   allopurinoL  200 mg Oral Daily    apixaban  2.5 mg Oral BID    budesonide  0.5 mg Nebulization Q12H    And    ipratropium  0.5 mg Nebulization Q6H    And    arformoteroL  15 mcg Nebulization BID    aspirin  81 mg Oral Daily    chlorhexidine  15 mL Mouth/Throat BID    mupirocin   Nasal BID    pantoprazole  40 mg Oral Daily    pravastatin  40 mg Oral Daily    tamsulosin  1 capsule Oral Daily     Continuous Infusions:      PRN Meds:.acetaminophen, albuterol sulfate, naloxone, ondansetron, oxyCODONE, oxyCODONE, polyethylene glycol, simethicone, sodium chloride 0.9%, traZODone  No current facility-administered medications on file prior to encounter.     Current Outpatient Medications on File Prior to Encounter   Medication Sig Dispense Refill    albuterol (PROVENTIL/VENTOLIN HFA) 90 mcg/actuation inhaler INHALE 2 PUFFS INTO THE LUNGS EVERY 6 HOURS AS NEEDED FOR WHEEZE (Patient taking differently: Inhale 2 puffs into the lungs every 6 (six) hours as needed.) 18 g 1    allopurinoL (ZYLOPRIM) 100 MG tablet Take 2 tablets (200 mg total) by mouth once daily. 60 tablet 2    apixaban (ELIQUIS) 5 mg Tab Take 1 tablet (5 mg total) by mouth 2 (two) times daily. 60 tablet 2    aspirin 81 MG Chew  "Take 81 mg by mouth once daily.      bumetanide (BUMEX) 1 MG tablet Take 1 tablet (1 mg total) by mouth once daily. 30 tablet 2    colestipoL (COLESTID) 1 gram Tab TAKE 1 TABLET BY MOUTH 2 TIMES DAILY. TAKE OTHER ORAL MEDICATIONS >1H BEFORE OR >4H AFTER COLESTIPOL (Patient taking differently: Take 1 g by mouth 2 (two) times daily.) 180 tablet 1    fluticasone-umeclidin-vilanter (TRELEGY ELLIPTA) 100-62.5-25 mcg DsDv Inhale 1 puff into the lungs once daily. 180 each 3    fluticasone-umeclidin-vilanter (TRELEGY ELLIPTA) 100-62.5-25 mcg DsDv Inhale 1 puff into the lungs once daily. 28 each 0    furosemide (LASIX) 40 MG tablet Take 40 mg by mouth once daily.      losartan (COZAAR) 25 MG tablet Take 1 tablet (25 mg total) by mouth once daily. 90 tablet 3    metoprolol succinate (TOPROL-XL) 25 MG 24 hr tablet Take 25 mg by mouth once daily.      miconazole NITRATE 2 % (MICOTIN) 2 % top powder Apply topically 2 (two) times daily. Apply to groin twice a day x1 week for 7 days 71 g 0    pantoprazole (PROTONIX) 40 MG tablet Take 1 tablet (40 mg total) by mouth once daily. 90 tablet 3    potassium chloride SA (K-DUR,KLOR-CON) 20 MEQ tablet Take 1 tablet (20 mEq total) by mouth 2 (two) times daily. 60 tablet 1    pravastatin (PRAVACHOL) 40 MG tablet Take 1 tablet (40 mg total) by mouth once daily. 90 tablet 3    solifenacin (VESICARE) 5 MG tablet Take 1 tablet (5 mg total) by mouth once daily. 90 tablet 3    tamsulosin (FLOMAX) 0.4 mg Cap Take 1 capsule (0.4 mg total) by mouth once daily. 90 capsule 3    tamsulosin (FLOMAX) 0.4 mg Cap Take 1 capsule (0.4 mg total) by mouth once daily. for 7 days 7 capsule 0    traZODone (DESYREL) 50 MG tablet Take 1 tablet (50 mg total) by mouth nightly as needed for Insomnia. 90 tablet 3       Review of Systems:  Neg    Physical Exam:  /65   Pulse (!) 126   Temp 99 °F (37.2 °C) (Axillary)   Resp (!) 31   Ht 5' 8" (1.727 m)   Wt 83.4 kg (183 lb 13.8 oz)   SpO2 (!) 90%   BMI 27.96 " kg/m²     General Appearance:    NAD, AAO x 3, cooperative, appears stated age   Head:    Normocephalic, atraumatic   Eyes:    PER, EOMI, and conjunctiva/sclera clear bilaterally        Mouth:   Moist mucus membranes, no thrush or oral lesions, normal      dentition   Back:     No CVA tenderness   Lungs:     Clear to auscultation bilaterally, no wheeze, crackles, rales      or rhonchi, symmetric air movement, respirations unlabored   Heart:    Regular rate and rhythm, S1 and S2 normal, ARLENE, rub   or gallop   Abdomen:     Soft, non-tender, non-distended, bowel sounds active all four   quadrants, no RT or guarding, no masses, no organomegaly   Extremities:   Warm and well perfused, distal pulses intact, no cyanosis or    peripheral edema   MSK:   No joint or muscle swelling, tenderness or deformity   Skin:   Skin color, texture, turgor normal, no rashes or lesions   Neurologic/Psychiatric:   CNII-XII intact, normal strength and sensation       throughout, no asterixis; normal affect, memory, judgement     and insight     Results:  Lab Results   Component Value Date     (L) 05/20/2023    K 4.8 05/20/2023    CL 99 05/20/2023    CO2 24 05/20/2023    BUN 61 (H) 05/20/2023    CREATININE 2.0 (H) 05/20/2023    CALCIUM 7.9 (L) 05/20/2023    ANIONGAP 11 05/20/2023    ESTGFRAFRICA 40.7 (A) 05/13/2022    EGFRNONAA 35.2 (A) 05/13/2022       Lab Results   Component Value Date    CALCIUM 7.9 (L) 05/20/2023    PHOS 4.5 05/18/2023       Recent Labs   Lab 05/20/23  0526   WBC 12.42   RBC 5.25   HGB 13.7*   HCT 43.1      MCV 82   MCH 26.1*   MCHC 31.8*         I have personally reviewed pertinent radiological imaging and reports.    I have spent > 35 minutes providing care for this patient for the above diagnoses. These services have included chart/data/imaging review, evaluation, exam, formulation of plan, , note preparation, and discussions with staff involved in this patient's care.    Linda Rosenthal MD  MP[facility]  [facility]  [address] [place] [address]  [phone] (p)  [phone] (f)

## 2023-05-21 NOTE — RESPIRATORY THERAPY
05/21/23 0721   Patient Assessment/Suction   Level of Consciousness (AVPU) alert   Respiratory Effort Normal;Unlabored   Expansion/Accessory Muscles/Retractions no use of accessory muscles   All Lung Fields Breath Sounds Anterior:;Posterior:;Lateral:;equal bilaterally   LLL Breath Sounds crackles, coarse   RLL Breath Sounds crackles, coarse   Rhythm/Pattern, Respiratory no shortness of breath reported;depth regular;pattern regular;unlabored   Cough Frequency no cough   Skin Integrity   $ Wound Care Tech Time 15 min   Area Observed Behind ear;Cheek;Upper lip;Nares   Skin Appearance without discoloration   PRE-TX-O2   Device (Oxygen Therapy) nasal cannula   $ Is the patient on Low Flow Oxygen? Yes   Flow (L/min) 2   SpO2 96 %   Pulse Oximetry Type Continuous   $ Pulse Oximetry - Multiple Charge Pulse Oximetry - Multiple   Pulse 103   Resp (!) 22   Aerosol Therapy   $ Aerosol Therapy Charges Aerosol Treatment   Daily Review of Necessity (SVN) completed   Respiratory Treatment Status (SVN) given   Treatment Route (SVN) mask;oxygen   Patient Position (SVN) HOB elevated   Post Treatment Assessment (SVN) increased aeration   Signs of Intolerance (SVN) none   Breath Sounds Post-Respiratory Treatment   Throughout All Fields Post-Treatment All Fields   Throughout All Fields Post-Treatment Anterior:;Posterior:;Lateral:;aeration increased   Post-treatment Heart Rate (beats/min) 96   Post-treatment Resp Rate (breaths/min) 20

## 2023-05-21 NOTE — PROGRESS NOTES
INPATIENT NEPHROLOGY Progress Note   [facility]    Patient Name: Siddharth Urias  Date: 05/21/2023    Reason for consultation: KAT    Chief Complaint:   Chief Complaint   Patient presents with    Hypotension     Low blood pressure at doctors appointment today       History of Present Illness:  Siddharth Urias Is an 89-year-old male with chronic medical problems including HFpEF, atrial fibrillation CKD 3, peripheral arterial disease with likely occlusion of the right CFA/external iliac, DVT, COPD, diabetes, diverticulosis, hepatic steatosis, and prostate cancer in 2009 who presents to the emergency room tonight sent primary care provider for hypotension.  He was at the doctor's office to establish care for a hospital follow-up and when they checked his blood pressure it was systolic 60.  Patient was asymptomatic and was sent to the emergency room for evaluation. Patient has had 3 hospitalizations in the last 3 weeks for heart failure exacerbation.  He had elevated BNP, pulmonary edema on x-ray and was diuresed and discharged home on 04/26 with oxygen supplementation. He returned to the hospital on 05/01 having not use his oxygen and not filled his new prescriptions, was diuresed again encouraged to use his home oxygen.  He was discharged on 05/02 and returned to the hospital on 05/08 with bradycardia with heart rate in the 30s and left knee and ankle pain. On 05/01 he was found to have a nonocclusive thrombus in the right popliteal vein.  Metoprolol was held and he was switched to Bumex.  Eliquis was increased to 5 mg twice daily and he had reportedly been in sinus rhythm when seen by Cardiology.  He was treated for gout with colchicine and a Medrol Dosepak and allopurinol. His wife said he has been doing well at home since the last discharge. In the ED, lab work with leukocytosis, KAT. He was bradycardic with orthostasis and hypotension with SBP 80s as well. Chest x-ray with moderate  cardiomegaly, free of infiltrates no pleural effusions, moderately enlarged heart and pulmonary vasculature within normal limits. We are consulted for renal management.      4/2023 echo  The left ventricle is normal in size with mild concentric hypertrophy and low normal systolic function.  The estimated ejection fraction is 50-55%.  Indeterminate left ventricular diastolic function.  There is mild mitral stenosis.  The mean diastolic gradient across the mitral valve is 4 mmHg at a heart rate of 78 bpm.  Mild-to-moderate mitral regurgitation.  Mild aortic regurgitation.  There is severe aortic valve stenosis.  Aortic valve area is 0.46 cm2; peak velocity is 4.4 m/s; mean gradient is 50 mmHg. Of note, gradients were measured following a post PVC beat.  Normal right ventricular size with normal right ventricular systolic function.  Mild to moderate tricuspid regurgitation.  The estimated PA systolic pressure is at least 39 mmHg.  Elevated central venous pressure (15 mmHg).  The ascending aorta is mildly dilated.    Interval History:  5/19- HR , , on 1L NC, UOP 400cc  5/20- HR 90-120s, SBP all over the place but generally better, on 2L NC, UOP 1.5L, feels better, asked nurse to check orthos and patient needs to start some PT/OT to better guide medication resumption  5/21- VSS, on 2L NC, UOP 1.2L, reviewed cards note- patient needs a TAVR and will need [facility] first- GENESIS discussed with patient- may proceed with [facility] Tues he says- we discussed doing some IVFs Monday night    Plan of Care:    Assessment:  KAT/CKD III due to CRS  Azotemia/Orthostatic hypotension due to volume depletion and severe AS  Valvular CHF (severe AS)  BPH  Hyponatremia  Hyperkalemia  SHPT  Anemia of CKD    Plan:    - renal function is improved with gentle hydration- discussed risk of GENESIS- would do gentle NS for about 8-12 hours night before procedure  - orthos negative- continue to work with PT/OT  - reviewed cardiology note regarding [facility]  and TAVR- defer medication optimization to them in terms of beta blocker, diuretic- keep losartan on hold  - continue flomax- no nsaids  - continue renal diet, 1.5L fluid restrictoin  - BMM parameters at goal  - H/H at goal    Thank you for allowing us to participate in this patient's care. We will continue to follow.    Vital Signs:  Temp Readings from Last 3 Encounters:   05/21/23 98.8 °F (37.1 °C) (Temporal)   05/18/23 97.6 °F (36.4 °C) (Oral)   05/09/23 98.4 °F (36.9 °C) (Oral)       Pulse Readings from Last 3 Encounters:   05/21/23 84   05/18/23 66   05/09/23 (!) 52       BP Readings from Last 3 Encounters:   05/21/23 (!) 130/93   05/18/23 (!) 64/40   05/09/23 129/60       Weight:  Wt Readings from Last 3 Encounters:   05/18/23 83.4 kg (183 lb 13.8 oz)   05/18/23 84.2 kg (185 lb 10 oz)   05/08/23 84.7 kg (186 lb 11.7 oz)       INS/OUTS:  I/O last 3 completed shifts:  In: 880 [P.O.:880]  Out: 2025 [Urine:2025]  I/O this shift:  In: 260 [P.O.:260]  Out: 100 [Urine:100]    Medications:  Scheduled Meds:   allopurinoL  200 mg Oral Daily    apixaban  2.5 mg Oral BID    budesonide  0.5 mg Nebulization Q12H    And    ipratropium  0.5 mg Nebulization Q12H    And    arformoteroL  15 mcg Nebulization BID    aspirin  81 mg Oral Daily    chlorhexidine  15 mL Mouth/Throat BID    mupirocin   Nasal BID    pantoprazole  40 mg Oral Daily    pravastatin  40 mg Oral Daily    tamsulosin  1 capsule Oral Daily     Continuous Infusions:      PRN Meds:.acetaminophen, albuterol sulfate, naloxone, ondansetron, oxyCODONE, oxyCODONE, polyethylene glycol, simethicone, sodium chloride 0.9%, traZODone  No current facility-administered medications on file prior to encounter.     Current Outpatient Medications on File Prior to Encounter   Medication Sig Dispense Refill    albuterol (PROVENTIL/VENTOLIN HFA) 90 mcg/actuation inhaler INHALE 2 PUFFS INTO THE LUNGS EVERY 6 HOURS AS NEEDED FOR WHEEZE (Patient taking differently: Inhale 2 puffs into  the lungs every 6 (six) hours as needed.) 18 g 1    allopurinoL (ZYLOPRIM) 100 MG tablet Take 2 tablets (200 mg total) by mouth once daily. 60 tablet 2    apixaban (ELIQUIS) 5 mg Tab Take 1 tablet (5 mg total) by mouth 2 (two) times daily. 60 tablet 2    aspirin 81 MG Chew Take 81 mg by mouth once daily.      bumetanide (BUMEX) 1 MG tablet Take 1 tablet (1 mg total) by mouth once daily. 30 tablet 2    colestipoL (COLESTID) 1 gram Tab TAKE 1 TABLET BY MOUTH 2 TIMES DAILY. TAKE OTHER ORAL MEDICATIONS >1H BEFORE OR >4H AFTER COLESTIPOL (Patient taking differently: Take 1 g by mouth 2 (two) times daily.) 180 tablet 1    fluticasone-umeclidin-vilanter (TRELEGY ELLIPTA) 100-62.5-25 mcg DsDv Inhale 1 puff into the lungs once daily. 180 each 3    fluticasone-umeclidin-vilanter (TRELEGY ELLIPTA) 100-62.5-25 mcg DsDv Inhale 1 puff into the lungs once daily. 28 each 0    furosemide (LASIX) 40 MG tablet Take 40 mg by mouth once daily.      losartan (COZAAR) 25 MG tablet Take 1 tablet (25 mg total) by mouth once daily. 90 tablet 3    metoprolol succinate (TOPROL-XL) 25 MG 24 hr tablet Take 25 mg by mouth once daily.      miconazole NITRATE 2 % (MICOTIN) 2 % top powder Apply topically 2 (two) times daily. Apply to groin twice a day x1 week for 7 days 71 g 0    pantoprazole (PROTONIX) 40 MG tablet Take 1 tablet (40 mg total) by mouth once daily. 90 tablet 3    potassium chloride SA (K-DUR,KLOR-CON) 20 MEQ tablet Take 1 tablet (20 mEq total) by mouth 2 (two) times daily. 60 tablet 1    pravastatin (PRAVACHOL) 40 MG tablet Take 1 tablet (40 mg total) by mouth once daily. 90 tablet 3    solifenacin (VESICARE) 5 MG tablet Take 1 tablet (5 mg total) by mouth once daily. 90 tablet 3    tamsulosin (FLOMAX) 0.4 mg Cap Take 1 capsule (0.4 mg total) by mouth once daily. 90 capsule 3    tamsulosin (FLOMAX) 0.4 mg Cap Take 1 capsule (0.4 mg total) by mouth once daily. for 7 days 7 capsule 0    traZODone (DESYREL) 50 MG tablet Take 1 tablet  "(50 mg total) by mouth nightly as needed for Insomnia. 90 tablet 3       Review of Systems:  Neg    Physical Exam:  BP (!) 130/93   Pulse 84   Temp 98.8 °F (37.1 °C) (Temporal)   Resp (!) 22   Ht 5' 8" (1.727 m)   Wt 83.4 kg (183 lb 13.8 oz)   SpO2 95%   BMI 27.96 kg/m²     General Appearance:    NAD, AAO x 3, cooperative, appears stated age   Head:    Normocephalic, atraumatic   Eyes:    PER, EOMI, and conjunctiva/sclera clear bilaterally        Mouth:   Moist mucus membranes, no thrush or oral lesions, normal      dentition   Back:     No CVA tenderness   Lungs:     Clear to auscultation bilaterally, no wheeze, crackles, rales      or rhonchi, symmetric air movement, respirations unlabored   Heart:    Regular rate and rhythm, S1 and S2 normal, ARLENE, rub   or gallop   Abdomen:     Soft, non-tender, non-distended, bowel sounds active all four   quadrants, no RT or guarding, no masses, no organomegaly   Extremities:   Warm and well perfused, distal pulses intact, no cyanosis or    peripheral edema   MSK:   No joint or muscle swelling, tenderness or deformity   Skin:   Skin color, texture, turgor normal, no rashes or lesions   Neurologic/Psychiatric:   CNII-XII intact, normal strength and sensation       throughout, no asterixis; normal affect, memory, judgement     and insight     Results:  Lab Results   Component Value Date     05/21/2023    K 4.1 05/21/2023     05/21/2023    CO2 26 05/21/2023    BUN 44 (H) 05/21/2023    CREATININE 1.6 (H) 05/21/2023    CALCIUM 8.1 (L) 05/21/2023    ANIONGAP 11 05/21/2023    ESTGFRAFRICA 40.7 (A) 05/13/2022    EGFRNONAA 35.2 (A) 05/13/2022       Lab Results   Component Value Date    CALCIUM 8.1 (L) 05/21/2023    PHOS 4.5 05/18/2023       Recent Labs   Lab 05/21/23  0710   WBC 10.63   RBC 4.86   HGB 12.7*   HCT 39.9*      MCV 82   MCH 26.1*   MCHC 31.8*         I have personally reviewed pertinent radiological imaging and reports.    I have spent > 35 minutes " providing care for this patient for the above diagnoses. These services have included chart/data/imaging review, evaluation, exam, formulation of plan, , note preparation, and discussions with staff involved in this patient's care.    Linda Rosenthal MD MPH  [facility]  [address] [place] [id number]  [phone] (p)  [phone] (f)

## 2023-05-21 NOTE — PLAN OF CARE
POC reviewed with pt and family, verbalized understanding. Plan for angiogram tomorrow. Pt up to chair with x2 assist at lunch today. Safety maintained throughout the shift.  Problem: Adult Inpatient Plan of Care  Goal: Plan of Care Review  Outcome: Ongoing, Progressing  Goal: Optimal Comfort and Wellbeing  Outcome: Ongoing, Progressing  Goal: Readiness for Transition of Care  Outcome: Ongoing, Progressing

## 2023-05-21 NOTE — PT/OT/SLP EVAL
"Physical Therapy Evaluation    Patient Name:  Siddharth Urias   MRN:  [id number]    Recommendations:     Discharge Recommendations: nursing facility, skilled   Discharge Equipment Recommendations: other (see comments) (TBD at next level of care)   Barriers to discharge:  pending medical procedures    Assessment:     Siddharth Urias is a 89 y.o. male admitted with a medical diagnosis of Acute renal failure superimposed on stage 3 chronic kidney disease.  He presents with the following impairments/functional limitations: weakness, impaired endurance, impaired functional mobility, gait instability, impaired balance, decreased lower extremity function, decreased safety awareness, impaired cardiopulmonary response to activity.    Rehab Prognosis: Good; patient would benefit from acute skilled PT services to address these deficits and reach maximum level of function.    Recent Surgery: * No surgery found *      Plan:     During this hospitalization, patient to be seen 5 x/week to address the identified rehab impairments via therapeutic activities, therapeutic exercises, neuromuscular re-education, gait training and progress toward the following goals:    Plan of Care Expires:  06/24/23    Subjective     Chief Complaint: stiffness /c movement, has been in bed x4 days  Patient/Family Comments/goals: "I'm supposed to have this heart valve thing on Tuesday."  Pain/Comfort:  Pain Rating 1: 4/10 ("stiffness")  Location 1:  (global)    Patients cultural, spiritual, Gnosticist conflicts given the current situation: no    Living Environment:  Pt lives /c wife in [facility] /c [facility]  Prior to admission, patients level of function was Mallory.  Equipment used at home: walker, rolling.  DME owned (not currently used): none.  Upon discharge, patient will have assistance from wife (non-physical).    Objective:     Communicated with nurse prior to session.  Patient found HOB elevated with bed alarm, blood pressure cuff, martinez catheter, " oxygen, peripheral IV, pulse ox (continuous), telemetry  upon PT entry to room.    General Precautions: Standard, fall  Orthopedic Precautions:N/A   Braces: N/A  Respiratory Status: Nasal cannula, flow 2 L/min; Pt tolerated assessment on RA.    Exams:  Cognitive Exam:  Patient is oriented to Person, Place, and Situation  Gross Motor Coordination:  WFL  RLE ROM: WFL  RLE Strength: WFL  LLE ROM: WFL  LLE Strength: WFL    Functional Mobility:  Bed Mobility:     Rolling Left:  minimum assistance  Supine to Sit: minimum assistance  Transfers:     Sit to Stand:  minimum assistance with rolling walker  Bed to Chair: minimum assistance with  rolling walker  using  Stand Pivot  Balance: standing supported static: Fair-      AM-PAC 6 CLICK MOBILITY  Total Score:14       Treatment & Education:  PT edu pt on potential d/c disposition expecting he will need at short-term rehabilitation post-op    Patient left up in chair with all lines intact and nurse notified.    GOALS:   Multidisciplinary Problems       Physical Therapy Goals          Problem: Physical Therapy    Goal Priority Disciplines Outcome Goal Variances Interventions   Physical Therapy Goal     PT, PT/OT      Description: Goals to be met by: 23     Patient will increase functional independence with mobility by performin. Supine to sit with Modified [place]  2. Sit to supine with Modified [place]  3. Sit to stand transfer with Modified [place]  4. Bed to chair transfer with Modified [place] using Rolling Walker  5. Gait  x 50 feet with Contact Guard Assistance using Rolling Walker.                          History:     Past Medical History:   Diagnosis Date    Afib     Arthritis     Choledocholithiasis     Chronic kidney disease     Colon polyp     COPD (chronic obstructive pulmonary disease)     Diabetes mellitus, type 2     2011    Diverticulosis     Fatty liver     Hepatomegaly     History of blood clots      Dr. Rodriguez      Irradiation cystitis     Prostate cancer 2009    s/p XRT (T1C, João 8)    Radiation proctitis     Sleep apnea        Past Surgical History:   Procedure Laterality Date    ANGIOGRAM, CORONARY, WITH LEFT HEART CATHETERIZATION N/A 05/17/2022    Procedure: Angiogram, Coronary, with Left Heart Cath;  Surgeon: Kamlesh Sullivan MD;  Location: [facility] CATH/EP LAB;  Service: Cardiology;  Laterality: N/A;    APPENDECTOMY      CARPAL TUNNEL RELEASE Left 02/05/2021    Procedure: RELEASE, CARPAL TUNNEL;  Surgeon: Jayro Hawkins II, MD;  Location: [facility] OR;  Service: Orthopedics;  Laterality: Left;    CARPAL TUNNEL RELEASE Right 3/1/2023    Procedure: RELEASE, CARPAL TUNNEL;  Surgeon: Shaun Leonardo MD;  Location: [facility] OR;  Service: Orthopedics;  Laterality: Right;    CATARACT EXTRACTION Bilateral     COLONOSCOPY N/A 12/14/2018    Procedure: COLONOSCOPY;  Surgeon: Noel Aguiar MD;  Location: [facility] ENDO;  Service: Endoscopy;  Laterality: N/A;    COLONOSCOPY  06/21/2021    Dr. Rodgers, in media: 2 colon polyps removed; diverticulosis, erythema in transverse colon; biopsy: tubular adenomas, ranomd colon unremarkable    COLONOSCOPY N/A 11/4/2022    Procedure: COLONOSCOPY;  Surgeon: Noel Aguiar MD;  Location: [facility] ENDO;  Service: Endoscopy;  Laterality: N/A;    ERCP N/A 11/23/2021    Procedure: ERCP (ENDOSCOPIC RETROGRADE CHOLANGIOPANCREATOGRAPHY);  Surgeon: Marshall Gardner III, MD;  Location: [facility] ENDO;  Service: Endoscopy;  Laterality: N/A;    ESOPHAGOGASTRODUODENOSCOPY N/A 01/24/2019    Procedure: EGD (ESOPHAGOGASTRODUODENOSCOPY);  Surgeon: Noel Aguiar MD;  Location: [facility] ENDO;  Service: Endoscopy;  Laterality: N/A;    EYE SURGERY      FLEXIBLE SIGMOIDOSCOPY N/A 01/22/2019    Procedure: SIGMOIDOSCOPY, FLEXIBLE;  Surgeon: Noel Aguiar MD;  Location: [facility] ENDO;  Service: Endoscopy;  Laterality: N/A;    HERNIA REPAIR      bilateral inguinal    LAPAROSCOPIC CHOLECYSTECTOMY N/A 11/24/2021    Procedure:  CHOLECYSTECTOMY, LAPAROSCOPIC;  Surgeon: Jay Cabrera Jr., MD;  Location: [facility];  Service: General;  Laterality: N/A;    TONSILLECTOMY         Time Tracking:     PT Received On: 05/21/23  PT Start Time: 1032     PT Stop Time: 1055  PT Total Time (min): 23 min     Billable Minutes: Evaluation 13 and Therapeutic Activity 10      05/21/2023

## 2023-05-21 NOTE — SUBJECTIVE & OBJECTIVE
Interval History: No acute events overnight, plans for angiogram tomorrow. Up in chair today. Will likely need SNF    Review of Systems   All other systems reviewed and are negative.  Objective:     Vital Signs (Most Recent):  Temp: 98.8 °F (37.1 °C) (05/21/23 0701)  Pulse: 84 (05/21/23 1015)  Resp: (!) 22 (05/21/23 1015)  BP: (!) 130/93 (05/21/23 1015)  SpO2: 95 % (05/21/23 1015) Vital Signs (24h Range):  Temp:  [97.4 °F (36.3 °C)-99.2 °F (37.3 °C)] 98.8 °F (37.1 °C)  Pulse:  [] 84  Resp:  [12-28] 22  SpO2:  [91 %-100 %] 95 %  BP: (111-159)/() 130/93     Weight: 83.4 kg (183 lb 13.8 oz)  Body mass index is 27.96 kg/m².    Intake/Output Summary (Last 24 hours) at 5/21/2023 1121  Last data filed at 5/21/2023 1019  Gross per 24 hour   Intake 620 ml   Output 1375 ml   Net -755 ml           Physical Exam  Vitals reviewed.   Constitutional:       Appearance: Normal appearance.   HENT:      Head: Normocephalic and atraumatic.      Nose: Nose normal.      Mouth/Throat:      Mouth: Mucous membranes are moist.   Eyes:      Pupils: Pupils are equal, round, and reactive to light.   Cardiovascular:      Rate and Rhythm: Normal rate. Rhythm irregular.      Pulses: Normal pulses.      Heart sounds: Murmur heard.     No friction rub. No gallop.   Pulmonary:      Effort: Pulmonary effort is normal. No respiratory distress.      Breath sounds: Normal breath sounds.      Comments: Nasal cannula in place  Abdominal:      General: Abdomen is flat. Bowel sounds are normal. There is no distension.      Palpations: Abdomen is soft.      Tenderness: There is no abdominal tenderness.   Musculoskeletal:         General: No swelling. Normal range of motion.      Cervical back: Normal range of motion and neck supple.   Skin:     General: Skin is warm and dry.   Neurological:      General: No focal deficit present.      Mental Status: He is alert and oriented to person, place, and time.   Psychiatric:         Mood and Affect: Mood  normal.         Behavior: Behavior normal.         Thought Content: Thought content normal.         Judgment: Judgment normal.           Significant Labs: All pertinent labs within the past 24 hours have been reviewed.    Significant Imaging: I have reviewed all pertinent imaging results/findings within the past 24 hours.

## 2023-05-21 NOTE — RESPIRATORY THERAPY
05/20/23 1953   Patient Assessment/Suction   Level of Consciousness (AVPU) alert   Respiratory Effort Unlabored   Expansion/Accessory Muscles/Retractions no use of accessory muscles   All Lung Fields Breath Sounds coarse   PRE-TX-O2   Device (Oxygen Therapy) nasal cannula   $ Is the patient on Low Flow Oxygen? Yes   Flow (L/min) 2   SpO2 (!) 93 %   Pulse Oximetry Type Continuous   $ Pulse Oximetry - Multiple Charge Pulse Oximetry - Multiple   Pulse 92   Resp 12   Aerosol Therapy   $ Aerosol Therapy Charges Aerosol Treatment   Daily Review of Necessity (SVN) completed   Respiratory Treatment Status (SVN) given   Treatment Route (SVN) mask;oxygen   Patient Position (SVN) HOB elevated   Post Treatment Assessment (SVN) breath sounds unchanged   Signs of Intolerance (SVN) none   Breath Sounds Post-Respiratory Treatment   Throughout All Fields Post-Treatment no change   Post-treatment Heart Rate (beats/min) 92   Post-treatment Resp Rate (breaths/min) 18   Education   $ Education Bronchodilator;15 min   Respiratory Evaluation   $ Care Plan Tech Time 15 min   $ Eval/Re-eval Charges Re-evaluation

## 2023-05-21 NOTE — PROGRESS NOTES
[facility]  Cardiology  Progress Note    Patient Name: Siddharth Urias  MRN: [id number]  Admission Date: 5/18/2023  Hospital Length of Stay: 3 days  Code Status: Full Code   Attending Physician: Chip Patricio MD   Primary Care Physician: Martine Maxwell MD  Expected Discharge Date: 5/21/2023  Principal Problem:Acute renal failure superimposed on stage 3 chronic kidney disease    Subjective:     Doing well. Kidney are optimal at this time.    Review of Systems   All other systems reviewed and are negative.  Objective:     Vital Signs (Most Recent):  Temp: 97.7 °F (36.5 °C) (05/21/23 1100)  Pulse: 90 (05/21/23 1100)  Resp: (!) 22 (05/21/23 1100)  BP: 108/61 (05/21/23 1100)  SpO2: 96 % (05/21/23 1100) Vital Signs (24h Range):  Temp:  [97.4 °F (36.3 °C)-99.2 °F (37.3 °C)] 97.7 °F (36.5 °C)  Pulse:  [] 90  Resp:  [12-28] 22  SpO2:  [91 %-100 %] 96 %  BP: (108-159)/() 108/61     Weight: 83.4 kg (183 lb 13.8 oz)  Body mass index is 27.96 kg/m².    SpO2: 96 %         Intake/Output Summary (Last 24 hours) at 5/21/2023 1140  Last data filed at 5/21/2023 1019  Gross per 24 hour   Intake 620 ml   Output 1375 ml   Net -755 ml         Lines/Drains/Airways       Peripheral Intravenous Line  Duration                  Peripheral IV - Single Lumen 05/21/23 1027 20 G Anterior;Left Forearm <1 day                    Physical Exam  Vitals reviewed.   Constitutional:       Appearance: Normal appearance.   HENT:      Mouth/Throat:      Mouth: Mucous membranes are moist.   Eyes:      Extraocular Movements: Extraocular movements intact.      Pupils: Pupils are equal, round, and reactive to light.   Cardiovascular:      Rate and Rhythm: Normal rate and regular rhythm.      Heart sounds: Murmur heard.     No gallop.   Pulmonary:      Effort: Pulmonary effort is normal.      Breath sounds: Normal breath sounds.   Abdominal:      General: Abdomen is flat.      Palpations: Abdomen is soft.   Musculoskeletal:       Cervical back: Normal range of motion.   Skin:     General: Skin is warm and dry.   Neurological:      General: No focal deficit present.      Mental Status: He is alert and oriented to person, place, and time.   Psychiatric:         Mood and Affect: Mood normal.       Significant Labs: BMP:   Recent Labs   Lab 05/20/23  0526 05/21/23  0710   * 144*   * 139   K 4.8 4.1   CL 99 102   CO2 24 26   BUN 61* 44*   CREATININE 2.0* 1.6*   CALCIUM 7.9* 8.1*   MG 1.8 2.0      and CBC   Recent Labs   Lab 05/20/23  0526 05/21/23  0710   WBC 12.42 10.63   HGB 13.7* 12.7*   HCT 43.1 39.9*    174         Significant Imaging: Echocardiogram: 2D echo with color flow doppler: No results found for this or any previous visit. and Transthoracic echo (TTE) complete (Cupid Only):   Results for orders placed or performed during the hospital encounter of 04/23/23   Echo   Result Value Ref Range    BSA 2.08 m2    TDI SEPTAL 0.03 m/s    LV LATERAL E/E' RATIO 34.33 m/s    LV SEPTAL E/E' RATIO 34.33 m/s    IVC diameter 2.49 cm    Left Ventricular Outflow Tract Mean Velocity 0.55 cm/s    Left Ventricular Outflow Tract Mean Gradient 1.34 mmHg    AORTIC VALVE CUSP SEPERATION 0.84 cm    TDI LATERAL 0.03 m/s    LVIDd 4.90 3.5 - 6.0 cm    IVS 1.25 (A) 0.6 - 1.1 cm    Posterior Wall 1.36 (A) 0.6 - 1.1 cm    LVIDs 4.28 (A) 2.1 - 4.0 cm    FS 13 28 - 44 %    Ascending aorta 4.20 cm    LV mass 255.12 g    LA size 3.38 cm    RVDD 2.43 cm    Left Ventricle Relative Wall Thickness 0.56 cm    AV regurgitation pressure 1/2 time [id number] ms    AV mean gradient 50 mmHg    AV valve area 0.46 cm2    AV Velocity Ratio 0.17     AV index (prosthetic) 0.19     MV valve area p 1/2 method 3.28 cm2    E/A ratio 1.06     Mean e' 0.03 m/s    E wave deceleration time 231.22 msec    LVOT diameter 1.74 cm    LVOT area 2.4 cm2    LVOT peak yanelis 0.74 m/s    LVOT peak VTI 19.20 cm    Ao peak yanelis 4.4 m/s    Ao VTI 99.70 cm    Mr max yanelis 5.44 m/s     LVOT stroke volume 45.63 cm3    AV peak gradient 77 mmHg    E/E' ratio 34.33 m/s    MV Peak E Rojas 1.03 m/s    AR Max Rojas 3.17 m/s    TR Max Rojas 2.44 m/s    MV stenosis pressure 1/2 time 67.05 ms    MV Peak A Rojas 0.97 m/s    LV Systolic Volume 82.14 mL    LV Systolic Volume Index 40.5 mL/m2    LV Diastolic Volume 112.89 mL    LV Diastolic Volume Index 55.61 mL/m2    LV Mass Index 126 g/m2    RA Major Axis 4.88 cm    Left Atrium Minor Axis 3.70 cm    Left Atrium Major Axis 6.23 cm    Triscuspid Valve Regurgitation Peak Gradient 24 mmHg    LA Volume Index (Mod) 27.1 mL/m2    LA volume (mod) 55.00 cm3    RA Width 3.88 cm    Right Atrial Pressure (from IVC) 15 mmHg    EF 50 %    MV mean gradient 4 mmHg    TV rest pulmonary artery pressure 39 mmHg    Mitral Valve Heart Rate 78 bpm    Narrative    · The left ventricle is normal in size with mild concentric hypertrophy   and low normal systolic function.  · The estimated ejection fraction is 50-55%.  · Indeterminate left ventricular diastolic function.  · There is mild mitral stenosis.  · The mean diastolic gradient across the mitral valve is 4 mmHg at a heart   rate of 78 bpm.  · Mild-to-moderate mitral regurgitation.  · Mild aortic regurgitation.  · There is severe aortic valve stenosis.  · Aortic valve area is 0.46 cm2; peak velocity is 4.4 m/s; mean gradient   is 50 mmHg. Of note, gradients were measured following a post PVC beat.  · Normal right ventricular size with normal right ventricular systolic   function.  · Mild to moderate tricuspid regurgitation.  · The estimated PA systolic pressure is at least 39 mmHg.  · Elevated central venous pressure (15 mmHg).  · The ascending aorta is mildly dilated.        Assessment and Plan:     Admitted with hypotension, blood pressures systolic 60s  KAT, improving with IV diuresis  Severe aortic stenosis  Frequent PVCs  History of paroxysmal atrial fibrillation  Severe PAD, unable to perform angiography from femoral access in  2021  DVT, recent, nonocclusive in right popliteal vein  Severe CKD    Plan:    Kidneys are probably optimal at this point consider diagnostic angiogram tomorrow.  Discussed with patient the risks and benefits of the procedure including, but not limited to, the following 1:1000 risk of Heart attack, stroke and death with 3-5% Risk of bleeding, vessel damage, and the need for emergent CABG Surgery.  All questions have been answered to patient's satisfaction.  Informed consent obtained        Active Diagnoses:    Diagnosis Date Noted POA    PRINCIPAL PROBLEM:  Acute renal failure superimposed on stage 3 chronic kidney disease [N17.9, N18.30] 11/20/2021 Yes    Frail elderly [R54] 05/18/2023 Yes    Orthostatic hypotension [I95.1] 05/18/2023 Yes    Hyponatremia [E87.1] 05/18/2023 Yes    Leukocytosis [D72.829] 05/18/2023 Yes    Acute DVT (deep venous thrombosis) [I82.409] 05/09/2023 Yes    Chronic diastolic heart failure [I50.32] 05/08/2023 Yes    Hyperkalemia [E87.5] 05/08/2023 Yes    Elevated troponin [R77.8] 04/24/2023 Yes    PAF (paroxysmal atrial fibrillation) [I48.0] 04/24/2023 Yes    PAD (peripheral artery disease) [I73.9] 07/09/2022 Yes    Aortic stenosis, severe [I35.0] 11/23/2021 Yes    COPD (chronic obstructive pulmonary disease) [J44.9] 12/11/2019 Yes      Problems Resolved During this Admission:       VTE Risk Mitigation (From admission, onward)           Ordered     Reason for No Pharmacological VTE Prophylaxis  Once        Question:  Reasons:  Answer:  Already adequately anticoagulated on oral Anticoagulants    05/18/23 2154     IP VTE HIGH RISK PATIENT  Once         05/18/23 2154                    Nicol Rocha NP  Cardiology  [facility]

## 2023-05-21 NOTE — PROGRESS NOTES
Pharmacist Renal Dose Adjustment Note    Siddharth Urias is a 89 y.o. male being treated with the medication Enoxaparin    Patient Data:    Vital Signs (Most Recent):  Temp: 97.7 °F (36.5 °C) (05/21/23 1100)  Pulse: 90 (05/21/23 1100)  Resp: (!) 22 (05/21/23 1100)  BP: 108/61 (05/21/23 1100)  SpO2: 96 % (05/21/23 1100) Vital Signs (72h Range):  Temp:  [97.1 °F (36.2 °C)-99.2 °F (37.3 °C)]   Pulse:  []   Resp:  [12-80]   BP: ()/()   SpO2:  [75 %-100 %]      Recent Labs   Lab 05/19/23  0841 05/20/23  0526 05/21/23  0710   CREATININE 2.4* 2.0* 1.6*     Serum creatinine: 1.6 mg/dL (H) 05/21/23 0710  Estimated creatinine clearance: 32.9 mL/min (A)    Enoxaparin 30mg Q24H will be changed to Enoxaparin 40mg Q24H     Pharmacist's Name: Yolande Oconnor  Pharmacist's Extension: [id number]

## 2023-05-21 NOTE — PROGRESS NOTES
[facility]  Progress Note    Patient Name: Siddharth Urias  MRN: [id number]  Patient Class: IP- Inpatient   Admission Date: 5/18/2023  Length of Stay: 3 days  Attending Physician: Chip Patricio MD  Primary Care Provider: Martine Maxwell MD        Subjective:     Principal Problem:Acute renal failure superimposed on stage 3 chronic kidney disease        HPI:  Siddharth Urias Is an 89-year-old male with chronic medical problems including HFpEF, atrial fibrillation CKD 3, peripheral arterial disease with likely occlusion of the right CFA/external iliac, DVT, COPD, diabetes, diverticulosis, hepatic steatosis, and prostate cancer in 2009 who presents to the emergency room tonight sent primary care provider for hypotension.  He was at the doctor's office to establish care for a hospital follow-up and when they checked his blood pressure it was systolic 60.  Patient was asymptomatic and was sent to the emergency room for evaluation.     Patient has had 3 hospitalizations in the last 3 weeks for heart failure exacerbation.  He had elevated BNP, pulmonary edema on x-ray and was diuresed and discharged home on 04/26 with oxygen supplementation.  He returns to the hospital on 05/01 having not use his oxygen and not filled his new prescriptions, was diuresed again encouraged to use his home oxygen.  He was discharged on 05/02 and returned to the hospital on 05/08 with bradycardia with heart rate in the 30s and left knee and ankle pain.  On 05/01 he was found to have a nonocclusive thrombus in the right popliteal vein.  Metoprolol was held and he was switched to Bumex.  Eliquis was increased to 5 mg twice daily and he had reportedly been in sinus rhythm when seen by Cardiology.  He was treated for gout with colchicine and a Medrol Dosepak and allopurinol.    His wife said he has been doing well at home since the last discharge.  She denies that he has had any fevers, he has been using his  oxygen about 75% of the time when he is up and about in his house, he is eating and drinking and voiding well, denies diarrhea, abdominal pain, chest, shortness at breath that is unusual, swelling, he has an occasional cough with clear sputum.  He denies headaches or dizziness.  He said the pain from gout in his left knee and left ankle is resolved. He has no complaints of chest pain or palpitations, headaches, lightheadedness or dizziness. He uses a walker to get around and is somewhat weak but doesn't really go anywhere.    In the ED, lab work with WBC elevated at 15.66, platelets 287, H&H 15.5/48.6, BUN elevated 84, creatinine elevated 2.9, glucose 104, sodium low at 128, potassium 5.9, chloride 91 and serum bicarb 26, , troponin 49.1, calcium 8.8 and albumin 3.6, total bilirubin 1.1, estimated GFR is 20 and estimated creatinine clearance is 16 7.  Temperature 97.4°, heart rate 75, blood pressure 106/52, respiratory rate 16 and O2 sat 90 5% on room air.  His initial blood pressure was 86/64 and heart rate was 40, most recent blood pressure 111/61, heart rate 60-80, respiratory rate 18 to 20 and O2 sat 94% on room air.  Chest x-ray with moderate cardiomegaly, free of infiltrates no pleural effusions, moderately enlarged heart and pulmonary vasculature within normal limits.  He will be admitted to the hospitalist service for further evaluation and treatment to the step-down unit with consult to Nephrology and Cardiology.       Overview/Hospital Course:  No notes on file    Interval History: No acute events overnight, plans for angiogram tomorrow. Up in chair today. Will likely need SNF    Review of Systems   All other systems reviewed and are negative.  Objective:     Vital Signs (Most Recent):  Temp: 98.8 °F (37.1 °C) (05/21/23 0701)  Pulse: 84 (05/21/23 1015)  Resp: (!) 22 (05/21/23 1015)  BP: (!) 130/93 (05/21/23 1015)  SpO2: 95 % (05/21/23 1015) Vital Signs (24h Range):  Temp:  [97.4 °F (36.3 °C)-99.2 °F  (37.3 °C)] 98.8 °F (37.1 °C)  Pulse:  [] 84  Resp:  [12-28] 22  SpO2:  [91 %-100 %] 95 %  BP: (111-159)/() 130/93     Weight: 83.4 kg (183 lb 13.8 oz)  Body mass index is 27.96 kg/m².    Intake/Output Summary (Last 24 hours) at 5/21/2023 1121  Last data filed at 5/21/2023 1019  Gross per 24 hour   Intake 620 ml   Output 1375 ml   Net -755 ml           Physical Exam  Vitals reviewed.   Constitutional:       Appearance: Normal appearance.   HENT:      Head: Normocephalic and atraumatic.      Nose: Nose normal.      Mouth/Throat:      Mouth: Mucous membranes are moist.   Eyes:      Pupils: Pupils are equal, round, and reactive to light.   Cardiovascular:      Rate and Rhythm: Normal rate. Rhythm irregular.      Pulses: Normal pulses.      Heart sounds: Murmur heard.     No friction rub. No gallop.   Pulmonary:      Effort: Pulmonary effort is normal. No respiratory distress.      Breath sounds: Normal breath sounds.      Comments: Nasal cannula in place  Abdominal:      General: Abdomen is flat. Bowel sounds are normal. There is no distension.      Palpations: Abdomen is soft.      Tenderness: There is no abdominal tenderness.   Musculoskeletal:         General: No swelling. Normal range of motion.      Cervical back: Normal range of motion and neck supple.   Skin:     General: Skin is warm and dry.   Neurological:      General: No focal deficit present.      Mental Status: He is alert and oriented to person, place, and time.   Psychiatric:         Mood and Affect: Mood normal.         Behavior: Behavior normal.         Thought Content: Thought content normal.         Judgment: Judgment normal.           Significant Labs: All pertinent labs within the past 24 hours have been reviewed.    Significant Imaging: I have reviewed all pertinent imaging results/findings within the past 24 hours.      Assessment/Plan:      * Acute renal failure superimposed on stage 3 chronic kidney disease  Seems due to dehydration  / diuresis. He had developed diarrhea yesterday which may have contributed.  - creatinine continues improving  - holding further IVF  - Na improving  - trend renal function  - appreciate nephrology recommendations  - Will need to be careful not to volume overload him.   - may need to adjust diuretics going forward.       Leukocytosis  Improved with hydration      Hyponatremia  Sodium 128, now improving  - hypovolemic hyponatremia  - improved with hydration      Orthostatic hypotension  Presumed orthostatic hypotension from volume depletion, orthostatics in a.m. after gentle fluid administration overnight      Frail elderly  Consult PT/OT   Will plan for SNF      Acute DVT (deep venous thrombosis)  Diagnosed with DVT of right popliteal on 05/09 and left femoral vein on 05/01, Eliquis was increased to 5 mg twice daily from 2.5 mg twice daily per Cardiology, resume Eliquis      US LOWER EXTREMITY VEINS LEFT 5/9/23  FINDINGS:  Left thigh veins: The common femoral, popliteal, upper greater saphenous, and deep femoral veins are patent and free of thrombus. The veins are normally compressible and have normal phasic flow and augmentation response.  There is nonocclusive thrombus within the distal femoral vein which is noncompressible.   Left calf veins: The visualized calf veins are patent.   Contralateral CFV: The contralateral (right) common femoral vein is patent and free of thrombus.     Miscellaneous: None     Impression:   Nonocclusive DVT distal left femoral vein which may be chronic.      US LOWER EXTREMITY VEINS BILATERAL 5/1/2023  FINDINGS:  Right thigh veins: There is a nonocclusive thrombus within the right popliteal vein.  The common femoral, femoral, upper greater saphenous, and deep femoral veins are patent and free of thrombus, these veins are normally compressible and have normal phasic flow and augmentation response.   Right calf veins: The visualized calf veins are patent.   Left thigh veins: The common  femoral, femoral, popliteal, upper greater saphenous, and deep femoral veins are patent and free of thrombus. The veins are normally compressible and have normal phasic flow and augmentation response.   Left calf veins: The visualized calf veins are patent.   Miscellaneous: None     Impression:   Nonocclusive thrombus within the right popliteal vein.      Chronic diastolic heart failure  Patient with HFpEF with 3 exacerbations in the last month, recently changed to Bumex, he is incontinent of urine but his wife said he seems to have good urine output. Overdiuresed as above  - can hold off on cardiology consult for now      Most recent echo from 04/24/2023  The left ventricle is normal in size with mild concentric hypertrophy and low normal systolic function.  The estimated ejection fraction is 50-55%.  Indeterminate left ventricular diastolic function.  There is mild mitral stenosis.  The mean diastolic gradient across the mitral valve is 4 mmHg at a heart rate of 78 bpm.  Mild-to-moderate mitral regurgitation.  Mild aortic regurgitation.  There is severe aortic valve stenosis.  Aortic valve area is 0.46 cm2; peak velocity is 4.4 m/s; mean gradient is 50 mmHg. Of note, gradients were measured following a post PVC beat.  Normal right ventricular size with normal right ventricular systolic function.  Mild to moderate tricuspid regurgitation.  The estimated PA systolic pressure is at least 39 mmHg.  Elevated central venous pressure (15 mmHg).  The ascending aorta is mildly dilated.      Hyperkalemia  Potassium 5.9, Creatinine 2.9, he was given 1 amp of sodium bicarb, ordered dextrose 50 g x 1 and regular insulin 8.16 units and 1 g calcium gluconate, then start Lokelma 10 g t.i.d. q.4 BNP, glucose checks every 30 minutes x2 then every hour x6 to monitor for hypoglycemia, hold potassium supplement hold losartan    PAF (paroxysmal atrial fibrillation)  Patient with paroxysmal atrial fibrillation, he is on Eliquis,  metoprolol was recently stopped because of heart rate in the 30s, Twelve lead EKG today shows sinus rhythm with first-degree AV block with frequent PVCs, monitor on telemetr  - will add back low dose BB if needed        Elevated troponin  Initial high sensitivity troponin 49.1, repeat high sensitivity troponin 47.3, patient denies chest pain  Cardiology planning angiogram tomorrow      PAD (peripheral artery disease)  History peripheral arterial disease, avoid SCDs      Aortic stenosis, severe  He and his wife discuss further with Cardiology and they are planning to pursue further intervention  LHC in the am  NPO midnight      COPD (chronic obstructive pulmonary disease)  Stable COPD, resume controller inhaler, albuterol as needed for wheezing or shortness of breath, continue oxygen to maintain sat greater than 90%        VTE Risk Mitigation (From admission, onward)         Ordered     apixaban tablet 2.5 mg  2 times daily         05/19/23 1514     Reason for No Pharmacological VTE Prophylaxis  Once        Question:  Reasons:  Answer:  Already adequately anticoagulated on oral Anticoagulants    05/18/23 2154     IP VTE HIGH RISK PATIENT  Once         05/18/23 2154                Discharge Planning   MAGDA: 5/21/2023     Code Status: Full Code   Is the patient medically ready for discharge?:     Reason for patient still in hospital (select all that apply): Treatment  Discharge Plan A: Home Health                  Chip Patricio MD  Department of Hospital Medicine   [facility]

## 2023-05-22 NOTE — PLAN OF CARE
05/22/23 0812   Patient Assessment/Suction   Level of Consciousness (AVPU) alert   Respiratory Effort Unlabored   Expansion/Accessory Muscles/Retractions no use of accessory muscles   All Lung Fields Breath Sounds clear;diminished   Rhythm/Pattern, Respiratory unlabored;pattern regular;depth regular   Cough Frequency no cough   PRE-TX-O2   Device (Oxygen Therapy) nasal cannula   $ Is the patient on Low Flow Oxygen? Yes   Flow (L/min) 3   SpO2 100 %   Pulse Oximetry Type Continuous   $ Pulse Oximetry - Multiple Charge Pulse Oximetry - Multiple   Oximetry Probe Site Assessed   Pulse 84   Resp (!) 22   Aerosol Therapy   $ Aerosol Therapy Charges Aerosol Treatment   Daily Review of Necessity (SVN) completed   Respiratory Treatment Status (SVN) given   Treatment Route (SVN) mask   Patient Position (SVN) semi-Esquivel's   Post Treatment Assessment (SVN) breath sounds improved   Signs of Intolerance (SVN) none   Breath Sounds Post-Respiratory Treatment   Throughout All Fields Post-Treatment All Fields   Throughout All Fields Post-Treatment aeration increased   Post-treatment Heart Rate (beats/min) 68   Post-treatment Resp Rate (breaths/min) 16   Education   $ Education Bronchodilator;15 min   Respiratory Evaluation   $ Care Plan Tech Time 15 min   $ Eval/Re-eval Charges Re-evaluation   Evaluation For Re-Eval 3 day

## 2023-05-22 NOTE — PROGRESS NOTES
[facility]  Cardiology  Progress Note    Patient Name: Siddharth Urias  MRN: [id number]  Admission Date: 5/18/2023  Hospital Length of Stay: 4 days  Code Status: Full Code   Attending Physician: Chip Patricio MD   Primary Care Physician: Martine Maxwell MD  Expected Discharge Date: 5/23/2023  Principal Problem:Acute renal failure superimposed on stage 3 chronic kidney disease    Subjective:     05/22/2023:  Denies any chest pain.     Review of Systems   All other systems reviewed and are negative.  Objective:     Vital Signs (Most Recent):  Temp: 97.6 °F (36.4 °C) (05/22/23 0701)  Pulse: 99 (05/22/23 0900)  Resp: 15 (05/22/23 0900)  BP: (!) 112/59 (05/22/23 0900)  SpO2: 100 % (05/22/23 0900) Vital Signs (24h Range):  Temp:  [97.6 °F (36.4 °C)-98.5 °F (36.9 °C)] 97.6 °F (36.4 °C)  Pulse:  [36-99] 99  Resp:  [15-37] 15  SpO2:  [93 %-100 %] 100 %  BP: (102-137)/(51-81) 112/59     Weight: 83.4 kg (183 lb 13.8 oz)  Body mass index is 27.96 kg/m².    SpO2: 100 %         Intake/Output Summary (Last 24 hours) at 5/22/2023 1019  Last data filed at 5/22/2023 0844  Gross per 24 hour   Intake 640 ml   Output 1050 ml   Net -410 ml         Lines/Drains/Airways       Peripheral Intravenous Line  Duration                  Peripheral IV - Single Lumen 05/21/23 1027 20 G Anterior;Left Forearm <1 day         Peripheral IV - Single Lumen 05/22/23 0845 20 G Posterior;Right Forearm <1 day                  PHYSICAL EXAM  CONSTITUTIONAL: Well built, well nourished in no apparent distress  HEENT: No pallor  NECK: no JVD  LUNGS: CTA b/l  HEART: regular rate and rhythm, S1, S2 normal, 3/6 ESM in aortic area   ABDOMEN: soft, non-tender; bowel sounds normal  EXTREMITIES: No edema  NEURO: AAO X 3     Significant Labs: BMP:   Recent Labs   Lab 05/21/23  0710 05/22/23  0510 05/22/23  0849   * 125* 151*    136 135*   K 4.1 4.6 4.4    99 99   CO2 26 28 26   BUN 44* 45* 47*   CREATININE 1.6* 1.7* 1.9*   CALCIUM  8.1* 8.2* 8.2*   MG 2.0 2.0  --       and CBC   Recent Labs   Lab 05/21/23  0710 05/22/23  0510 05/22/23  0849   WBC 10.63 11.60 11.16   HGB 12.7* 12.0* 12.4*   HCT 39.9* 38.7* 40.9    158 171         Significant Imaging: Echocardiogram: 2D echo with color flow doppler: No results found for this or any previous visit. and Transthoracic echo (TTE) complete (Cupid Only):   Results for orders placed or performed during the hospital encounter of 04/23/23   Echo   Result Value Ref Range    BSA 2.08 m2    TDI SEPTAL 0.03 m/s    LV LATERAL E/E' RATIO 34.33 m/s    LV SEPTAL E/E' RATIO 34.33 m/s    IVC diameter 2.49 cm    Left Ventricular Outflow Tract Mean Velocity 0.55 cm/s    Left Ventricular Outflow Tract Mean Gradient 1.34 mmHg    AORTIC VALVE CUSP SEPERATION 0.84 cm    TDI LATERAL 0.03 m/s    LVIDd 4.90 3.5 - 6.0 cm    IVS 1.25 (A) 0.6 - 1.1 cm    Posterior Wall 1.36 (A) 0.6 - 1.1 cm    LVIDs 4.28 (A) 2.1 - 4.0 cm    FS 13 28 - 44 %    Ascending aorta 4.20 cm    LV mass 255.12 g    LA size 3.38 cm    RVDD 2.43 cm    Left Ventricle Relative Wall Thickness 0.56 cm    AV regurgitation pressure 1/2 time [id number] ms    AV mean gradient 50 mmHg    AV valve area 0.46 cm2    AV Velocity Ratio 0.17     AV index (prosthetic) 0.19     MV valve area p 1/2 method 3.28 cm2    E/A ratio 1.06     Mean e' 0.03 m/s    E wave deceleration time 231.22 msec    LVOT diameter 1.74 cm    LVOT area 2.4 cm2    LVOT peak rojas 0.74 m/s    LVOT peak VTI 19.20 cm    Ao peak rojas 4.4 m/s    Ao VTI 99.70 cm    Mr max rojas 5.44 m/s    LVOT stroke volume 45.63 cm3    AV peak gradient 77 mmHg    E/E' ratio 34.33 m/s    MV Peak E Rojas 1.03 m/s    AR Max Rojas 3.17 m/s    TR Max Rojas 2.44 m/s    MV stenosis pressure 1/2 time 67.05 ms    MV Peak A Rojas 0.97 m/s    LV Systolic Volume 82.14 mL    LV Systolic Volume Index 40.5 mL/m2    LV Diastolic Volume 112.89 mL    LV Diastolic Volume Index 55.61 mL/m2    LV Mass Index 126 g/m2    RA Major Axis  4.88 cm    Left Atrium Minor Axis 3.70 cm    Left Atrium Major Axis 6.23 cm    Triscuspid Valve Regurgitation Peak Gradient 24 mmHg    LA Volume Index (Mod) 27.1 mL/m2    LA volume (mod) 55.00 cm3    RA Width 3.88 cm    Right Atrial Pressure (from IVC) 15 mmHg    EF 50 %    MV mean gradient 4 mmHg    TV rest pulmonary artery pressure 39 mmHg    Mitral Valve Heart Rate 78 bpm    Narrative    · The left ventricle is normal in size with mild concentric hypertrophy   and low normal systolic function.  · The estimated ejection fraction is 50-55%.  · Indeterminate left ventricular diastolic function.  · There is mild mitral stenosis.  · The mean diastolic gradient across the mitral valve is 4 mmHg at a heart   rate of 78 bpm.  · Mild-to-moderate mitral regurgitation.  · Mild aortic regurgitation.  · There is severe aortic valve stenosis.  · Aortic valve area is 0.46 cm2; peak velocity is 4.4 m/s; mean gradient   is 50 mmHg. Of note, gradients were measured following a post PVC beat.  · Normal right ventricular size with normal right ventricular systolic   function.  · Mild to moderate tricuspid regurgitation.  · The estimated PA systolic pressure is at least 39 mmHg.  · Elevated central venous pressure (15 mmHg).  · The ascending aorta is mildly dilated.        Assessment and Plan:     Severe aortic stenosis  KAT on CKD- improved   Hypotension- improved   Frequent PVCs  History of paroxysmal atrial fibrillation  Severe PAD, unable to perform angiography from femoral access in 2021  DVT      Plan:  Renal function at pt's baseline  discussed with patient and patient's family at bedside in detail regarding the benefits and risks of coronary angiogram including the risk of contrast induced nephropathy.  Patient and patient's family agreed to undergo coronary angiogram. All questions have been answered to patient's satisfaction.  Informed consent obtained        Active Diagnoses:    Diagnosis Date Noted POA    PRINCIPAL  PROBLEM:  Acute renal failure superimposed on stage 3 chronic kidney disease [N17.9, N18.30] 11/20/2021 Yes    Frail elderly [R54] 05/18/2023 Yes    Orthostatic hypotension [I95.1] 05/18/2023 Yes    Hyponatremia [E87.1] 05/18/2023 Yes    Leukocytosis [D72.829] 05/18/2023 Yes    Acute DVT (deep venous thrombosis) [I82.409] 05/09/2023 Yes    Chronic diastolic heart failure [I50.32] 05/08/2023 Yes    Hyperkalemia [E87.5] 05/08/2023 Yes    Elevated troponin [R77.8] 04/24/2023 Yes    PAF (paroxysmal atrial fibrillation) [I48.0] 04/24/2023 Yes    PAD (peripheral artery disease) [I73.9] 07/09/2022 Yes    Aortic stenosis, severe [I35.0] 11/23/2021 Yes    COPD (chronic obstructive pulmonary disease) [J44.9] 12/11/2019 Yes      Problems Resolved During this Admission:       VTE Risk Mitigation (From admission, onward)           Ordered     enoxaparin injection 40 mg  Every 24 hours         05/21/23 1159     Reason for No Pharmacological VTE Prophylaxis  Once        Question:  Reasons:  Answer:  Already adequately anticoagulated on oral Anticoagulants    05/18/23 2154     IP VTE HIGH RISK PATIENT  Once         05/18/23 2154                    Xavier Solis MD  Cardiology  [facility]  5/22/23

## 2023-05-22 NOTE — PLAN OF CARE
POC reviewed with pt and family, verbalized understanding. Pt went for angiogram today. TR Band is off, no bleeding. Neurovascular checks wdl. Safety maintained throughout the shift.   Problem: Fluid and Electrolyte Imbalance (Acute Kidney Injury/Impairment)  Goal: Fluid and Electrolyte Balance  Outcome: Ongoing, Progressing     Problem: Oral Intake Inadequate (Acute Kidney Injury/Impairment)  Goal: Optimal Nutrition Intake  Outcome: Ongoing, Progressing     Problem: Renal Function Impairment (Acute Kidney Injury/Impairment)  Goal: Effective Renal Function  Outcome: Ongoing, Progressing     Problem: Skin Injury Risk Increased  Goal: Skin Health and Integrity  Outcome: Ongoing, Progressing

## 2023-05-22 NOTE — NURSING
Pt back in the room, awake, alert/orientedX4. TR band in place; R radial. POC-medical management. 50cc NS started.

## 2023-05-22 NOTE — SUBJECTIVE & OBJECTIVE
Interval History: He is doing well today. Plans for angiogram tomorrow. Planning for SNF    Review of Systems   All other systems reviewed and are negative.  Objective:     Vital Signs (Most Recent):  Temp: 97.6 °F (36.4 °C) (05/22/23 0701)  Pulse: 99 (05/22/23 0900)  Resp: 15 (05/22/23 0900)  BP: (!) 112/59 (05/22/23 0900)  SpO2: 100 % (05/22/23 0900) Vital Signs (24h Range):  Temp:  [97.6 °F (36.4 °C)-98.5 °F (36.9 °C)] 97.6 °F (36.4 °C)  Pulse:  [36-99] 99  Resp:  [15-37] 15  SpO2:  [93 %-100 %] 100 %  BP: (102-137)/(51-81) 112/59     Weight: 83.4 kg (183 lb 13.8 oz)  Body mass index is 27.96 kg/m².    Intake/Output Summary (Last 24 hours) at 5/22/2023 1017  Last data filed at 5/22/2023 0844  Gross per 24 hour   Intake 640 ml   Output 1150 ml   Net -510 ml           Physical Exam  Vitals reviewed.   Constitutional:       Appearance: Normal appearance.   HENT:      Head: Normocephalic and atraumatic.      Nose: Nose normal.      Mouth/Throat:      Mouth: Mucous membranes are moist.   Eyes:      Pupils: Pupils are equal, round, and reactive to light.   Cardiovascular:      Rate and Rhythm: Normal rate. Rhythm irregular.      Pulses: Normal pulses.      Heart sounds: Murmur heard.     No friction rub. No gallop.   Pulmonary:      Effort: Pulmonary effort is normal. No respiratory distress.      Breath sounds: Normal breath sounds.      Comments: Nasal cannula in place  Abdominal:      General: Abdomen is flat. Bowel sounds are normal. There is no distension.      Palpations: Abdomen is soft.      Tenderness: There is no abdominal tenderness.   Musculoskeletal:         General: No swelling. Normal range of motion.      Cervical back: Normal range of motion and neck supple.   Skin:     General: Skin is warm and dry.   Neurological:      General: No focal deficit present.      Mental Status: He is alert and oriented to person, place, and time.   Psychiatric:         Mood and Affect: Mood normal.         Behavior:  Behavior normal.         Thought Content: Thought content normal.         Judgment: Judgment normal.           Significant Labs: All pertinent labs within the past 24 hours have been reviewed.    Significant Imaging: I have reviewed all pertinent imaging results/findings within the past 24 hours.

## 2023-05-22 NOTE — CARE UPDATE
05/21/23 2027   Patient Assessment/Suction   Level of Consciousness (AVPU) alert   Respiratory Effort Unlabored   Expansion/Accessory Muscles/Retractions no use of accessory muscles   All Lung Fields Breath Sounds coarse   Rhythm/Pattern, Respiratory no shortness of breath reported   Cough Frequency infrequent   Cough Type no productive sputum   PRE-TX-O2   Device (Oxygen Therapy) nasal cannula   $ Is the patient on Low Flow Oxygen? Yes   Flow (L/min) 2   SpO2 99 %   Pulse Oximetry Type Continuous   $ Pulse Oximetry - Multiple Charge Pulse Oximetry - Multiple   Pulse 83   Resp (!) 22   Positioning   Head of Bed (HOB) Positioning HOB elevated;HOB at 30 degrees   Aerosol Therapy   $ Aerosol Therapy Charges Aerosol Treatment   Daily Review of Necessity (SVN) completed   Respiratory Treatment Status (SVN) given   Treatment Route (SVN) mask   Patient Position (SVN) semi-Esquivel's   Post Treatment Assessment (SVN) breath sounds improved   Signs of Intolerance (SVN) none   Breath Sounds Post-Respiratory Treatment   Throughout All Fields Post-Treatment All Fields   Throughout All Fields Post-Treatment aeration increased   Post-treatment Heart Rate (beats/min) 85   Post-treatment Resp Rate (breaths/min) 22   Education   $ Education Bronchodilator;15 min   Respiratory Evaluation   $ Care Plan Tech Time 15 min   $ Eval/Re-eval Charges Re-evaluation

## 2023-05-22 NOTE — PLAN OF CARE
Problem: Occupational Therapy  Goal: Occupational Therapy Goal  Description: Goals to be met by: 6/21/2023     Patient will increase functional independence with ADLs by performing:    UE Dressing with Modified [place].  LE Dressing with Modified [place].  Grooming while standing at sink with Modified [place].  Toileting from bedside commode with Modified [place] for hygiene and clothing management.   Supine to sit with Modified [place].  Toilet transfer to bedside commode with Modified [place].  Upper extremity exercise program x10 reps per handout, with independence.    Outcome: Ongoing, Progressing

## 2023-05-22 NOTE — PT/OT/SLP PROGRESS
Physical Therapy      Patient Name:  Siddharth Urias   MRN:  [id number]    Patient not seen today secondary to physical therapy deferred today; pt off unit for angiogram. Will follow-up 5/23/23.

## 2023-05-22 NOTE — PLAN OF CARE
Problem: Adult Inpatient Plan of Care  Goal: Plan of Care Review  Outcome: Ongoing, Progressing  Goal: Patient-Specific Goal (Individualized)  Outcome: Ongoing, Progressing  Goal: Absence of Hospital-Acquired Illness or Injury  Outcome: Ongoing, Progressing  Goal: Optimal Comfort and Wellbeing  Outcome: Ongoing, Progressing  Goal: Readiness for Transition of Care  Outcome: Ongoing, Progressing     Problem: Infection  Goal: Absence of Infection Signs and Symptoms  Outcome: Ongoing, Progressing     Problem: Diabetes Comorbidity  Goal: Blood Glucose Level Within Targeted Range  Outcome: Ongoing, Progressing     Problem: Fluid and Electrolyte Imbalance (Acute Kidney Injury/Impairment)  Goal: Fluid and Electrolyte Balance  Outcome: Ongoing, Progressing     Problem: Oral Intake Inadequate (Acute Kidney Injury/Impairment)  Goal: Optimal Nutrition Intake  Outcome: Ongoing, Progressing     Problem: Renal Function Impairment (Acute Kidney Injury/Impairment)  Goal: Effective Renal Function  Outcome: Ongoing, Progressing     Problem: Fall Injury Risk  Goal: Absence of Fall and Fall-Related Injury  Outcome: Ongoing, Progressing     Problem: Skin Injury Risk Increased  Goal: Skin Health and Integrity  Outcome: Ongoing, Progressing     Problem: Skin Injury Risk Increased  Goal: Skin Health and Integrity  Outcome: Ongoing, Progressing

## 2023-05-22 NOTE — PLAN OF CARE
Referral Note:    Ref:  Lakisha Carrasquillo    Reason for referral:  KAT, LM and RCA stenosis, Severe AS, and heart failure    Chart reviewed and case discussed with Dr. Solis before films could be reviewed.  Patient was offered cath and possible PCI and TAVR evaluation a year ago by Dr. Dio Emanuel but declined.  Was admitted to [facility] with CHF and KAT (Cr 2.8) and was eventually cathed today with severe ostial RCA stenosis and severe distal LM stenosis.  He also has severe PAD with RCFA and R iliac artery oclusion, and heavy calcification of entire L iliac and CFA making diagnostic cath from femoral access impossible last year.    IMP:  Elderly man with known  severe AS who declined evaluation last year.  Severe PAD making TF TAVR unlikely.  Severe ostial RCA and distal LM CAD.  KAT with cath today.  Creatinine post cath pending.    Rec:    Options will be limited in this man but we are happy to see him and evaluate him.  Would recommend he be managed conservatively until he has survived the current contrast load with CKD 4  Unlikely to be TF TAVR candidate based on severe PAD.  He must be full code for the foreseeable future for any interventional therapy.  I'm happy to discuss:  Mobile:   [phone]

## 2023-05-22 NOTE — PROGRESS NOTES
INPATIENT NEPHROLOGY Progress Note   [facility]    Patient Name: Siddharth Urias  Date: 05/22/2023    Reason for consultation: KAT    Chief Complaint:   Chief Complaint   Patient presents with    Hypotension     Low blood pressure at doctors appointment today       History of Present Illness:  Siddharth Urias Is an 89-year-old male with chronic medical problems including HFpEF, atrial fibrillation CKD 3, peripheral arterial disease with likely occlusion of the right CFA/external iliac, DVT, COPD, diabetes, diverticulosis, hepatic steatosis, and prostate cancer in 2009 who presents to the emergency room tonight sent primary care provider for hypotension.  He was at the doctor's office to establish care for a hospital follow-up and when they checked his blood pressure it was systolic 60.  Patient was asymptomatic and was sent to the emergency room for evaluation. Patient has had 3 hospitalizations in the last 3 weeks for heart failure exacerbation.  He had elevated BNP, pulmonary edema on x-ray and was diuresed and discharged home on 04/26 with oxygen supplementation. He returned to the hospital on 05/01 having not use his oxygen and not filled his new prescriptions, was diuresed again encouraged to use his home oxygen.  He was discharged on 05/02 and returned to the hospital on 05/08 with bradycardia with heart rate in the 30s and left knee and ankle pain. On 05/01 he was found to have a nonocclusive thrombus in the right popliteal vein.  Metoprolol was held and he was switched to Bumex.  Eliquis was increased to 5 mg twice daily and he had reportedly been in sinus rhythm when seen by Cardiology.  He was treated for gout with colchicine and a Medrol Dosepak and allopurinol. His wife said he has been doing well at home since the last discharge. In the ED, lab work with leukocytosis, KAT. He was bradycardic with orthostasis and hypotension with SBP 80s as well. Chest x-ray with moderate  cardiomegaly, free of infiltrates no pleural effusions, moderately enlarged heart and pulmonary vasculature within normal limits. We are consulted for renal management.      4/2023 echo  The left ventricle is normal in size with mild concentric hypertrophy and low normal systolic function.  The estimated ejection fraction is 50-55%.  Indeterminate left ventricular diastolic function.  There is mild mitral stenosis.  The mean diastolic gradient across the mitral valve is 4 mmHg at a heart rate of 78 bpm.  Mild-to-moderate mitral regurgitation.  Mild aortic regurgitation.  There is severe aortic valve stenosis.  Aortic valve area is 0.46 cm2; peak velocity is 4.4 m/s; mean gradient is 50 mmHg. Of note, gradients were measured following a post PVC beat.  Normal right ventricular size with normal right ventricular systolic function.  Mild to moderate tricuspid regurgitation.  The estimated PA systolic pressure is at least 39 mmHg.  Elevated central venous pressure (15 mmHg).  The ascending aorta is mildly dilated.    Interval History:  5/19- HR , , on 1L NC, UOP 400cc  5/20- HR 90-120s, SBP all over the place but generally better, on 2L NC, UOP 1.5L, feels better, asked nurse to check orthos and patient needs to start some PT/OT to better guide medication resumption  5/21- VSS, on 2L NC, UOP 1.2L, reviewed cards note- patient needs a TAVR and will need C first- GENESIS discussed with patient- may proceed with [facility] Tues he says- we discussed doing some IVFs Monday night  5/22 AFVSS.  Output not recorded.  No nausea, chest pain, sob, fever, urinary or bowel complaint, new neurologic symptoms, new joint pain      Plan of Care:    Assessment:  KAT/CKD III due to CRS  Azotemia/Orthostatic hypotension due to volume depletion and severe AS  Valvular CHF (severe AS)  BPH  Hyponatremia  Hyperkalemia  SHPT  Anemia of CKD    Plan:    - renal function is improved with gentle hydration- discussed risk of GENESIS- would do  gentle NS for about 8-12 hours night before procedure  - orthos negative- continue to work with PT/OT  - reviewed cardiology note regarding LHC and TAVR- defer medication optimization to them in terms of beta blocker, diuretic- keep losartan on hold  - continue flomax- no nsaids  - continue renal diet, 1.5L fluid restrictoin  - BMM parameters at goal  - H/H at goal    Updated family at the bedside    Thank you for allowing us to participate in this patient's care. We will continue to follow.    Vital Signs:  Temp Readings from Last 3 Encounters:   05/21/23 98.2 °F (36.8 °C) (Oral)   05/18/23 97.6 °F (36.4 °C) (Oral)   05/09/23 98.4 °F (36.9 °C) (Oral)       Pulse Readings from Last 3 Encounters:   05/22/23 (!) 36   05/18/23 66   05/09/23 (!) 52       BP Readings from Last 3 Encounters:   05/22/23 (!) 112/51   05/18/23 (!) 64/40   05/09/23 129/60       Weight:  Wt Readings from Last 3 Encounters:   05/18/23 83.4 kg (183 lb 13.8 oz)   05/18/23 84.2 kg (185 lb 10 oz)   05/08/23 84.7 kg (186 lb 11.7 oz)       INS/OUTS:  I/O last 3 completed shifts:  In: 940 [P.O.:940]  Out: 1175 [Urine:1175]  No intake/output data recorded.    Medications:  Scheduled Meds:   allopurinoL  200 mg Oral Daily    budesonide  0.5 mg Nebulization Q12H    And    ipratropium  0.5 mg Nebulization Q12H    And    arformoteroL  15 mcg Nebulization BID    aspirin  81 mg Oral Daily    chlorhexidine  15 mL Mouth/Throat BID    enoxparin  40 mg Subcutaneous Q24H (prophylaxis, 1700)    mupirocin   Nasal BID    pantoprazole  40 mg Oral Daily    pravastatin  40 mg Oral Daily    tamsulosin  1 capsule Oral Daily     Continuous Infusions:      PRN Meds:.acetaminophen, albuterol sulfate, naloxone, ondansetron, oxyCODONE, oxyCODONE, polyethylene glycol, simethicone, sodium chloride 0.9%, traZODone  No current facility-administered medications on file prior to encounter.     Current Outpatient Medications on File Prior to Encounter   Medication Sig Dispense  Refill    albuterol (PROVENTIL/VENTOLIN HFA) 90 mcg/actuation inhaler INHALE 2 PUFFS INTO THE LUNGS EVERY 6 HOURS AS NEEDED FOR WHEEZE (Patient taking differently: Inhale 2 puffs into the lungs every 6 (six) hours as needed.) 18 g 1    allopurinoL (ZYLOPRIM) 100 MG tablet Take 2 tablets (200 mg total) by mouth once daily. 60 tablet 2    apixaban (ELIQUIS) 5 mg Tab Take 1 tablet (5 mg total) by mouth 2 (two) times daily. 60 tablet 2    aspirin 81 MG Chew Take 81 mg by mouth once daily.      bumetanide (BUMEX) 1 MG tablet Take 1 tablet (1 mg total) by mouth once daily. 30 tablet 2    colestipoL (COLESTID) 1 gram Tab TAKE 1 TABLET BY MOUTH 2 TIMES DAILY. TAKE OTHER ORAL MEDICATIONS >1H BEFORE OR >4H AFTER COLESTIPOL (Patient taking differently: Take 1 g by mouth 2 (two) times daily.) 180 tablet 1    fluticasone-umeclidin-vilanter (TRELEGY ELLIPTA) 100-62.5-25 mcg DsDv Inhale 1 puff into the lungs once daily. 180 each 3    fluticasone-umeclidin-vilanter (TRELEGY ELLIPTA) 100-62.5-25 mcg DsDv Inhale 1 puff into the lungs once daily. 28 each 0    furosemide (LASIX) 40 MG tablet Take 40 mg by mouth once daily.      losartan (COZAAR) 25 MG tablet Take 1 tablet (25 mg total) by mouth once daily. 90 tablet 3    metoprolol succinate (TOPROL-XL) 25 MG 24 hr tablet Take 25 mg by mouth once daily.      miconazole NITRATE 2 % (MICOTIN) 2 % top powder Apply topically 2 (two) times daily. Apply to groin twice a day x1 week for 7 days 71 g 0    pantoprazole (PROTONIX) 40 MG tablet Take 1 tablet (40 mg total) by mouth once daily. 90 tablet 3    potassium chloride SA (K-DUR,KLOR-CON) 20 MEQ tablet Take 1 tablet (20 mEq total) by mouth 2 (two) times daily. 60 tablet 1    pravastatin (PRAVACHOL) 40 MG tablet Take 1 tablet (40 mg total) by mouth once daily. 90 tablet 3    solifenacin (VESICARE) 5 MG tablet Take 1 tablet (5 mg total) by mouth once daily. 90 tablet 3    tamsulosin (FLOMAX) 0.4 mg Cap Take 1 capsule (0.4 mg total) by mouth  "once daily. 90 capsule 3    tamsulosin (FLOMAX) 0.4 mg Cap Take 1 capsule (0.4 mg total) by mouth once daily. for 7 days 7 capsule 0    traZODone (DESYREL) 50 MG tablet Take 1 tablet (50 mg total) by mouth nightly as needed for Insomnia. 90 tablet 3       Review of Systems:  Neg    Physical Exam:  BP (!) 112/51   Pulse (!) 36   Temp 98.2 °F (36.8 °C) (Oral)   Resp 17   Ht 5' 8" (1.727 m)   Wt 83.4 kg (183 lb 13.8 oz)   SpO2 (!) 93%   BMI 27.96 kg/m²     General Appearance:    NAD, AAO x 3, cooperative, appears stated age   Head:    Normocephalic, atraumatic   Eyes:    PER, EOMI, and conjunctiva/sclera clear bilaterally        Mouth:   Moist mucus membranes, no thrush or oral lesions, normal      dentition   Back:     No CVA tenderness   Lungs:     Clear to auscultation bilaterally, no wheeze, crackles, rales      or rhonchi, symmetric air movement, respirations unlabored   Heart:    Regular rate and rhythm, S1 and S2 normal, ARLENE, rub   or gallop   Abdomen:     Soft, non-tender, non-distended, bowel sounds active all four   quadrants, no RT or guarding, no masses, no organomegaly   Extremities:   Warm and well perfused, distal pulses intact, no cyanosis or    peripheral edema   MSK:   No joint or muscle swelling, tenderness or deformity   Skin:   Skin color, texture, turgor normal, no rashes or lesions   Neurologic/Psychiatric:   CNII-XII intact, normal strength and sensation       throughout, no asterixis; normal affect, memory, judgement     and insight     Results:  Lab Results   Component Value Date     05/22/2023    K 4.6 05/22/2023    CL 99 05/22/2023    CO2 28 05/22/2023    BUN 45 (H) 05/22/2023    CREATININE 1.7 (H) 05/22/2023    CALCIUM 8.2 (L) 05/22/2023    ANIONGAP 9 05/22/2023    ESTGFRAFRICA 40.7 (A) 05/13/2022    EGFRNONAA 35.2 (A) 05/13/2022       Lab Results   Component Value Date    CALCIUM 8.2 (L) 05/22/2023    PHOS 4.5 05/18/2023       Recent Labs   Lab 05/22/23  0510   WBC 11.60   RBC " 4.62   HGB 12.0*   HCT 38.7*      MCV 84   MCH 26.0*   MCHC 31.0*         I have personally reviewed pertinent radiological imaging and reports.    I have spent > 35 minutes providing care for this patient for the above diagnoses. These services have included chart/data/imaging review, evaluation, exam, formulation of plan, , note preparation, and discussions with staff involved in this patient's care.    Patient care was time spent personally by me on the following activities:   Obtaining a history  Examination of patient.  Providing medical care at the patients bedside.  Developing a treatment plan with patient or surrogate and bedside caregivers  Ordering and reviewing laboratory studies, radiographic studies, pulse oximetry.  Ordering and performing treatments and interventions.  Evaluation of patient's response to treatment.  Discussions with consultants while on the unit and immediately available to the patient.  Re-evaluation of the patient's condition.  Documentation in the medical record.       Castillo Peng MD  Nephrology  [facility]  [phone]

## 2023-05-22 NOTE — PT/OT/SLP EVAL
Occupational Therapy   Evaluation, tx    Name: Siddharth Urias  MRN: [id number]  Admitting Diagnosis: Acute renal failure superimposed on stage 3 chronic kidney disease  Recent Surgery: * No surgery found *    The primary encounter diagnosis was Acute kidney injury. Diagnoses of Shortness of breath, Weakness, Orthostatic hypotension, Hyperkalemia, Acute on chronic renal failure, Chest pain, Acute renal failure, Critical aortic valve stenosis, and Severe aortic stenosis were also pertinent to this visit.    Recommendations:     Discharge Recommendations: nursing facility, skilled  Discharge Equipment Recommendations:  hip kit, wheelchair  Barriers to discharge:  Decreased caregiver support    Assessment:     Siddharth Urias is a 89 y.o. male with a medical diagnosis of Acute renal failure superimposed on stage 3 chronic kidney disease.  He presents with Performance deficits affecting function: weakness, impaired endurance, impaired self care skills, impaired functional mobility, gait instability, impaired balance, decreased coordination, decreased upper extremity function, decreased lower extremity function, impaired cognition, decreased safety awareness, pain, decreased ROM, impaired coordination, impaired fine motor, impaired skin, edema, impaired cardiopulmonary response to activity, impaired joint extensibility.    Pt. C/o R wrist pain, tenderness to touch, inflammation and mod edema noted. Pt. C/o pain with passive supination on Right and limited R shld AROM noted but no pain with AAROM.  Pt. stated he had full use  and ROM in RUE prior to onset. Per nurse, pt maybe having gout symptoms. Pt had to use LUE (nondominant extremity) to feed himself today due to poor R hand grasp and pain. Pt O x 3, not to situation, he followed all commands but [place] and had to speak loudly to communicate with him.  Pt performed bed mobility Mod A supine<>sit, sit<>stand Min A with HHA and Mod A to side step ~4 steps to right  toward HOB. Pt is not back to  Indep baseline. Pt would benefit from SNF at discharge.       Rehab Prognosis: Good; patient would benefit from acute skilled OT services to address these deficits and reach maximum level of function.       Plan:     Patient to be seen 5 x/week to address the above listed problems via self-care/home management, therapeutic activities, therapeutic exercises  Plan of Care Expires: 06/21/23  Plan of Care Reviewed with: patient    Subjective     Chief Complaint: R wrist pain 8/10 and I have trouble with my right hand, I can't feed myself.  Patient/Family Comments/goals: To use my right arm again, get rid of pain    Occupational Profile:  Living Environment: pt lives with spouse SSH with 1 step; WIS and t/s combo  Previous level of function: Indep-Mod I ADLS, ambulated with SPC, drives, wife does household chores and meal prep; pt wears O2 at home  Roles and Routines: active  Equipment Used at Home: shower chair, grab bar, oxygen, bedside commode, walker, rolling (pt said he was not using the BSC and RW at home)  Assistance upon Discharge: spouse    Pain/Comfort:  Pain Rating 1: 8/10  Location - Side 1: Right  Location - Orientation 1: generalized  Location 1: wrist  Pain Addressed 1: Pre-medicate for activity, Reposition, Distraction, Cessation of Activity  Pain Rating Post-Intervention 1: 8/10    Patients cultural, spiritual, L[employer] conflicts given the current situation: no    Objective:     Communicated with: nurse prior to session.  Patient found HOB elevated with oxygen, pulse ox (continuous), bed alarm, blood pressure cuff, telemetry upon OT entry to room.    General Precautions: Standard, fall, diabetic  Orthopedic Precautions: N/A  Braces: N/A  Respiratory Status: Nasal cannula, flow 2 L/min    Occupational Performance:    Bed Mobility:    Patient completed Scooting/Bridging with moderate assistance  Patient completed Supine to Sit with moderate assistance  Patient completed Sit  to Supine with moderate assistance    Functional Mobility/Transfers:  Patient completed Sit <> Stand Transfer with minimum assistance  with  hand-held assist   Functional Mobility: ambulated Mod A ~4' to right toward HOB with HHA, unsteady, mild exertion    Activities of Daily Living:  Feeding:  setup to open containers, cut foods using LUE(nondominant) due to r wrist pain/difficulty grasping utensil and painful supination  Grooming: NT    Lower Body Dressing: total assistance socks  Toileting: total assistance condom catheter    Cognitive/Visual Perceptual:  Cognitive/Psychosocial Skills:     -       Oriented to: Person, Place, and Time   -       Follows Commands/attention:Follows one-step commands and Follows two-step commands  -       Communication: clear/fluent  -       Memory: NT  -       Safety awareness/insight to disability: impaired   -       Mood/Affect/Coping skills/emotional control: Cooperative  Visual/Perceptual:      -Intact .    Physical Exam:  Balance:    -       sitting: good- Supervision EOB  dynamic: fair plus   standing: CGA poor plus  dynamic: poor  Postural examination/scapula alignment:    -       Rounded shoulders  Skin integrity:  red increased warmth/ moderately inflamed wrist  Edema:  Moderate right wrist  Dominant hand:    -       right  Upper Extremity Range of Motion:     -       Right Upper Extremity: WFL except shld ~30 AROM flexion and abd; wrist neutral 2/2 pain, supination neutral due to pain  -       Left Upper Extremity: WFL  Upper Extremity Strength:    -       Right Upper Extremity: Deficits: shld 2+/5, elbow 3/5, wrist 2/5   -       Left Upper Extremity: WFL   Strength:    -       Right Upper Extremity: Deficits: fair plus  -       Left Upper Extremity: WFL  Fine Motor Coordination:    -       Impaired  Right hand, manipulation of objects poor    AMPAC 6 Click ADL:  AMPAC Total Score: 15    Treatment & Education:  Purpose of OT and POC  MARTINA HERNANDEZ ex x 10 all major planes  in shld to 90 with resistance/stiffness, elbow flex/ext, sup/pro-gentle ROM performed and was not able to go past neutral supination due to pain, wrist 5-10 degrees extension  RUE elevated /supported on pillow for jt protection and edema reduction.  Warm compress applied R wrist for pain.  All questions/concerns addressed within scope.    Patient left HOB elevated with all lines intact, call button in reach, bed alarm on, and nurse notified    GOALS:   Multidisciplinary Problems       Occupational Therapy Goals          Problem: Occupational Therapy    Goal Priority Disciplines Outcome Interventions   Occupational Therapy Goal     OT, PT/OT Ongoing, Progressing    Description: Goals to be met by: 6/21/2023     Patient will increase functional independence with ADLs by performing:    UE Dressing with Modified [place].  LE Dressing with Modified [place].  Grooming while standing at sink with Modified [place].  Toileting from bedside commode with Modified [place] for hygiene and clothing management.   Supine to sit with Modified [place].  Toilet transfer to bedside commode with Modified [place].  Upper extremity exercise program x10 reps per handout, with independence.                         History:     Past Medical History:   Diagnosis Date    Afib     Arthritis     Choledocholithiasis     Chronic kidney disease     Colon polyp     COPD (chronic obstructive pulmonary disease)     Diabetes mellitus, type 2     2/2011    Diverticulosis     Fatty liver     Hepatomegaly     History of blood clots     2020 Dr. Rodriguez     Irradiation cystitis     Prostate cancer 2009    s/p XRT (T1C, [place] 8)    Radiation proctitis     Sleep apnea          Past Surgical History:   Procedure Laterality Date    ANGIOGRAM, CORONARY, WITH LEFT HEART CATHETERIZATION N/A 05/17/2022    Procedure: Angiogram, Coronary, with Left Heart Cath;  Surgeon: Kamlesh Sullivan MD;  Location: [facility] CATH/EP LAB;  Service:  Cardiology;  Laterality: N/A;    APPENDECTOMY      CARPAL TUNNEL RELEASE Left 02/05/2021    Procedure: RELEASE, CARPAL TUNNEL;  Surgeon: Jayro Hawkins II, MD;  Location: [facility] OR;  Service: Orthopedics;  Laterality: Left;    CARPAL TUNNEL RELEASE Right 3/1/2023    Procedure: RELEASE, CARPAL TUNNEL;  Surgeon: Shaun Leonardo MD;  Location: [facility];  Service: Orthopedics;  Laterality: Right;    CATARACT EXTRACTION Bilateral     COLONOSCOPY N/A 12/14/2018    Procedure: COLONOSCOPY;  Surgeon: Noel Aguiar MD;  Location: [facility] ENDO;  Service: Endoscopy;  Laterality: N/A;    COLONOSCOPY  06/21/2021    Dr. Rodgers, in media: 2 colon polyps removed; diverticulosis, erythema in transverse colon; biopsy: tubular adenomas, ranomd colon unremarkable    COLONOSCOPY N/A 11/4/2022    Procedure: COLONOSCOPY;  Surgeon: Noel Aguiar MD;  Location: [facility] ENDO;  Service: Endoscopy;  Laterality: N/A;    ERCP N/A 11/23/2021    Procedure: ERCP (ENDOSCOPIC RETROGRADE CHOLANGIOPANCREATOGRAPHY);  Surgeon: Marshall Gardner III, MD;  Location: [facility];  Service: Endoscopy;  Laterality: N/A;    ESOPHAGOGASTRODUODENOSCOPY N/A 01/24/2019    Procedure: EGD (ESOPHAGOGASTRODUODENOSCOPY);  Surgeon: Noel Aguiar MD;  Location: [facility] ENDO;  Service: Endoscopy;  Laterality: N/A;    EYE SURGERY      FLEXIBLE SIGMOIDOSCOPY N/A 01/22/2019    Procedure: SIGMOIDOSCOPY, FLEXIBLE;  Surgeon: Noel Aguiar MD;  Location: [facility];  Service: Endoscopy;  Laterality: N/A;    HERNIA REPAIR      bilateral inguinal    LAPAROSCOPIC CHOLECYSTECTOMY N/A 11/24/2021    Procedure: CHOLECYSTECTOMY, LAPAROSCOPIC;  Surgeon: Jay Cabrera Jr., MD;  Location: [facility];  Service: General;  Laterality: N/A;    TONSILLECTOMY         Time Tracking:     OT Date of Treatment: 05/21/23  OT Start Time: 1730  OT Stop Time: 1753  OT Total Time (min): 23 min    Billable Minutes:Evaluation 10  Therapeutic Exercise 13  Total Time 23    5/21/2023

## 2023-05-22 NOTE — PROGRESS NOTES
[facility]  Progress Note    Patient Name: Siddharth Urias  MRN: [id number]  Patient Class: IP- Inpatient   Admission Date: 5/18/2023  Length of Stay: 4 days  Attending Physician: Chip Patricio MD  Primary Care Provider: Martine Maxwell MD        Subjective:     Principal Problem:Acute renal failure superimposed on stage 3 chronic kidney disease        HPI:  Siddharth Urias Is an 89-year-old male with chronic medical problems including HFpEF, atrial fibrillation CKD 3, peripheral arterial disease with likely occlusion of the right CFA/external iliac, DVT, COPD, diabetes, diverticulosis, hepatic steatosis, and prostate cancer in 2009 who presents to the emergency room tonight sent primary care provider for hypotension.  He was at the doctor's office to establish care for a hospital follow-up and when they checked his blood pressure it was systolic 60.  Patient was asymptomatic and was sent to the emergency room for evaluation.     Patient has had 3 hospitalizations in the last 3 weeks for heart failure exacerbation.  He had elevated BNP, pulmonary edema on x-ray and was diuresed and discharged home on 04/26 with oxygen supplementation.  He returns to the hospital on 05/01 having not use his oxygen and not filled his new prescriptions, was diuresed again encouraged to use his home oxygen.  He was discharged on 05/02 and returned to the hospital on 05/08 with bradycardia with heart rate in the 30s and left knee and ankle pain.  On 05/01 he was found to have a nonocclusive thrombus in the right popliteal vein.  Metoprolol was held and he was switched to Bumex.  Eliquis was increased to 5 mg twice daily and he had reportedly been in sinus rhythm when seen by Cardiology.  He was treated for gout with colchicine and a Medrol Dosepak and allopurinol.    His wife said he has been doing well at home since the last discharge.  She denies that he has had any fevers, he has been using his  oxygen about 75% of the time when he is up and about in his house, he is eating and drinking and voiding well, denies diarrhea, abdominal pain, chest, shortness at breath that is unusual, swelling, he has an occasional cough with clear sputum.  He denies headaches or dizziness.  He said the pain from gout in his left knee and left ankle is resolved. He has no complaints of chest pain or palpitations, headaches, lightheadedness or dizziness. He uses a walker to get around and is somewhat weak but doesn't really go anywhere.    In the ED, lab work with WBC elevated at 15.66, platelets 287, H&H 15.5/48.6, BUN elevated 84, creatinine elevated 2.9, glucose 104, sodium low at 128, potassium 5.9, chloride 91 and serum bicarb 26, , troponin 49.1, calcium 8.8 and albumin 3.6, total bilirubin 1.1, estimated GFR is 20 and estimated creatinine clearance is 16 7.  Temperature 97.4°, heart rate 75, blood pressure 106/52, respiratory rate 16 and O2 sat 90 5% on room air.  His initial blood pressure was 86/64 and heart rate was 40, most recent blood pressure 111/61, heart rate 60-80, respiratory rate 18 to 20 and O2 sat 94% on room air.  Chest x-ray with moderate cardiomegaly, free of infiltrates no pleural effusions, moderately enlarged heart and pulmonary vasculature within normal limits.  He will be admitted to the hospitalist service for further evaluation and treatment to the step-down unit with consult to Nephrology and Cardiology.       Overview/Hospital Course:  No notes on file    Interval History: He is doing well today. Plans for angiogram tomorrow. Planning for SNF    Review of Systems   All other systems reviewed and are negative.  Objective:     Vital Signs (Most Recent):  Temp: 97.6 °F (36.4 °C) (05/22/23 0701)  Pulse: 99 (05/22/23 0900)  Resp: 15 (05/22/23 0900)  BP: (!) 112/59 (05/22/23 0900)  SpO2: 100 % (05/22/23 0900) Vital Signs (24h Range):  Temp:  [97.6 °F (36.4 °C)-98.5 °F (36.9 °C)] 97.6 °F (36.4  °C)  Pulse:  [36-99] 99  Resp:  [15-37] 15  SpO2:  [93 %-100 %] 100 %  BP: (102-137)/(51-81) 112/59     Weight: 83.4 kg (183 lb 13.8 oz)  Body mass index is 27.96 kg/m².    Intake/Output Summary (Last 24 hours) at 5/22/2023 1017  Last data filed at 5/22/2023 0844  Gross per 24 hour   Intake 640 ml   Output 1150 ml   Net -510 ml           Physical Exam  Vitals reviewed.   Constitutional:       Appearance: Normal appearance.   HENT:      Head: Normocephalic and atraumatic.      Nose: Nose normal.      Mouth/Throat:      Mouth: Mucous membranes are moist.   Eyes:      Pupils: Pupils are equal, round, and reactive to light.   Cardiovascular:      Rate and Rhythm: Normal rate. Rhythm irregular.      Pulses: Normal pulses.      Heart sounds: Murmur heard.     No friction rub. No gallop.   Pulmonary:      Effort: Pulmonary effort is normal. No respiratory distress.      Breath sounds: Normal breath sounds.      Comments: Nasal cannula in place  Abdominal:      General: Abdomen is flat. Bowel sounds are normal. There is no distension.      Palpations: Abdomen is soft.      Tenderness: There is no abdominal tenderness.   Musculoskeletal:         General: No swelling. Normal range of motion.      Cervical back: Normal range of motion and neck supple.   Skin:     General: Skin is warm and dry.   Neurological:      General: No focal deficit present.      Mental Status: He is alert and oriented to person, place, and time.   Psychiatric:         Mood and Affect: Mood normal.         Behavior: Behavior normal.         Thought Content: Thought content normal.         Judgment: Judgment normal.           Significant Labs: All pertinent labs within the past 24 hours have been reviewed.    Significant Imaging: I have reviewed all pertinent imaging results/findings within the past 24 hours.      Assessment/Plan:      * Acute renal failure superimposed on stage 3 chronic kidney disease  Seems due to dehydration / diuresis. He had  developed diarrhea yesterday which may have contributed.  - creatinine continues improving  - holding further IVF  - Na improving  - trend renal function  - appreciate nephrology recommendations  - Will need to be careful not to volume overload him.   - will adjust diuretics on d/c      Leukocytosis  Improved with hydration      Hyponatremia  Sodium 128, now improving  - hypovolemic hyponatremia  - improved with hydration      Orthostatic hypotension  Presumed orthostatic hypotension from volume depletion, orthostatics in a.m. after gentle fluid administration overnight      Frail elderly  Consult PT/OT   Will plan for SNF      Acute DVT (deep venous thrombosis)  Diagnosed with DVT of right popliteal on 05/09 and left femoral vein on 05/01, Eliquis was increased to 5 mg twice daily from 2.5 mg twice daily per Cardiology, resume Eliquis after angiogram      US LOWER EXTREMITY VEINS LEFT 5/9/23  FINDINGS:  Left thigh veins: The common femoral, popliteal, upper greater saphenous, and deep femoral veins are patent and free of thrombus. The veins are normally compressible and have normal phasic flow and augmentation response.  There is nonocclusive thrombus within the distal femoral vein which is noncompressible.   Left calf veins: The visualized calf veins are patent.   Contralateral CFV: The contralateral (right) common femoral vein is patent and free of thrombus.     Miscellaneous: None     Impression:   Nonocclusive DVT distal left femoral vein which may be chronic.      US LOWER EXTREMITY VEINS BILATERAL 5/1/2023  FINDINGS:  Right thigh veins: There is a nonocclusive thrombus within the right popliteal vein.  The common femoral, femoral, upper greater saphenous, and deep femoral veins are patent and free of thrombus, these veins are normally compressible and have normal phasic flow and augmentation response.   Right calf veins: The visualized calf veins are patent.   Left thigh veins: The common femoral, femoral,  popliteal, upper greater saphenous, and deep femoral veins are patent and free of thrombus. The veins are normally compressible and have normal phasic flow and augmentation response.   Left calf veins: The visualized calf veins are patent.   Miscellaneous: None     Impression:   Nonocclusive thrombus within the right popliteal vein.      Chronic diastolic heart failure  Patient with HFpEF with 3 exacerbations in the last month, recently changed to Bumex, he is incontinent of urine but his wife said he seems to have good urine output. Overdiuresed as above  - can hold off on cardiology consult for now      Most recent echo from 04/24/2023  The left ventricle is normal in size with mild concentric hypertrophy and low normal systolic function.  The estimated ejection fraction is 50-55%.  Indeterminate left ventricular diastolic function.  There is mild mitral stenosis.  The mean diastolic gradient across the mitral valve is 4 mmHg at a heart rate of 78 bpm.  Mild-to-moderate mitral regurgitation.  Mild aortic regurgitation.  There is severe aortic valve stenosis.  Aortic valve area is 0.46 cm2; peak velocity is 4.4 m/s; mean gradient is 50 mmHg. Of note, gradients were measured following a post PVC beat.  Normal right ventricular size with normal right ventricular systolic function.  Mild to moderate tricuspid regurgitation.  The estimated PA systolic pressure is at least 39 mmHg.  Elevated central venous pressure (15 mmHg).  The ascending aorta is mildly dilated.      Hyperkalemia  Potassium 5.9, Creatinine 2.9, he was given 1 amp of sodium bicarb, ordered dextrose 50 g x 1 and regular insulin 8.16 units and 1 g calcium gluconate, then start Lokelma 10 g t.i.d. q.4 BNP, glucose checks every 30 minutes x2 then every hour x6 to monitor for hypoglycemia, hold potassium supplement hold losartan    PAF (paroxysmal atrial fibrillation)  Patient with paroxysmal atrial fibrillation, he is on Eliquis, metoprolol was recently  stopped because of heart rate in the 30s, Twelve lead EKG today shows sinus rhythm with first-degree AV block with frequent PVCs, monitor on telemetr  - will add back low dose BB if needed        Elevated troponin  Initial high sensitivity troponin 49.1, repeat high sensitivity troponin 47.3, patient denies chest pain  Cardiology planning angiogram today      PAD (peripheral artery disease)  History peripheral arterial disease, avoid SCDs      Aortic stenosis, severe  He and his wife discuss further with Cardiology and they are planning to pursue further intervention  LHC today  NPO midnight      COPD (chronic obstructive pulmonary disease)  Stable COPD, resume controller inhaler, albuterol as needed for wheezing or shortness of breath, continue oxygen to maintain sat greater than 90%        VTE Risk Mitigation (From admission, onward)         Ordered     enoxaparin injection 40 mg  Every 24 hours         05/21/23 1159     Reason for No Pharmacological VTE Prophylaxis  Once        Question:  Reasons:  Answer:  Already adequately anticoagulated on oral Anticoagulants    05/18/23 2154     IP VTE HIGH RISK PATIENT  Once         05/18/23 2154                Discharge Planning   MAGDA: 5/23/2023     Code Status: Full Code   Is the patient medically ready for discharge?:     Reason for patient still in hospital (select all that apply): Treatment  Discharge Plan A: Home Health                  Chip Patricio MD  Department of Hospital Medicine   [facility]

## 2023-05-23 NOTE — CARE UPDATE
05/23/23 0726   Patient Assessment/Suction   Level of Consciousness (AVPU) alert   Respiratory Effort Normal;Unlabored   Expansion/Accessory Muscles/Retractions no use of accessory muscles   All Lung Fields Breath Sounds clear   PRE-TX-O2   Device (Oxygen Therapy) nasal cannula   $ Is the patient on Low Flow Oxygen? Yes   Flow (L/min) 3   SpO2 97 %   Pulse Oximetry Type Continuous   $ Pulse Oximetry - Multiple Charge Pulse Oximetry - Multiple   Pulse 95   Resp 20   Aerosol Therapy   $ Aerosol Therapy Charges Aerosol Treatment   Daily Review of Necessity (SVN) completed   Respiratory Treatment Status (SVN) given   Treatment Route (SVN) mask;oxygen   Patient Position (SVN) HOB elevated   Post Treatment Assessment (SVN) increased aeration   Signs of Intolerance (SVN) none   Respiratory Evaluation   $ Care Plan Tech Time 15 min

## 2023-05-23 NOTE — PROGRESS NOTES
[facility]  Cardiology  Progress Note    Patient Name: Siddharth Urias  MRN: [id number]  Admission Date: 5/18/2023  Hospital Length of Stay: 5 days  Code Status: Full Code   Attending Physician: Chip Patricio MD   Primary Care Physician: Martine Maxwell MD  Expected Discharge Date: 5/23/2023  Principal Problem:Acute renal failure superimposed on stage 3 chronic kidney disease    Subjective:     5/23/23:  Short run of nonsustained vtach earlier. Noted to be resting in bed comfortably with no distress noted.     05/22/2023:  Denies any chest pain.     Review of Systems   All other systems reviewed and are negative.  Objective:     Vital Signs (Most Recent):  Temp: 97.6 °F (36.4 °C) (05/23/23 1101)  Pulse: 88 (05/23/23 0736)  Resp: 20 (05/23/23 0736)  BP: 116/61 (05/23/23 0610)  SpO2: 95 % (05/23/23 0736) Vital Signs (24h Range):  Temp:  [97.6 °F (36.4 °C)-98.7 °F (37.1 °C)] 97.6 °F (36.4 °C)  Pulse:  [] 88  Resp:  [11-25] 20  SpO2:  [94 %-100 %] 95 %  BP: ()/(52-95) 116/61     Weight: 83.4 kg (183 lb 13.8 oz)  Body mass index is 27.96 kg/m².    SpO2: 95 %         Intake/Output Summary (Last 24 hours) at 5/23/2023 1532  Last data filed at 5/23/2023 1500  Gross per 24 hour   Intake 1115 ml   Output 1600 ml   Net -485 ml         Lines/Drains/Airways       Peripheral Intravenous Line  Duration                  Peripheral IV - Single Lumen 05/21/23 1027 20 G Anterior;Left Forearm 2 days         Peripheral IV - Single Lumen 05/22/23 0845 20 G Posterior;Right Forearm 1 day                  PHYSICAL EXAM  CONSTITUTIONAL: Well built, well nourished in no apparent distress  HEENT: No pallor  NECK: no JVD  LUNGS: CTA b/l  HEART: regular rate and rhythm, S1, S2 normal, 3/6 ESM in aortic area   ABDOMEN: soft, non-tender; bowel sounds normal  EXTREMITIES: No edema  NEURO: AAO X 3     Significant Labs: BMP:   Recent Labs   Lab 05/22/23  0510 05/22/23  0849 05/23/23  0505   * 151* 123*     135* 137   K 4.6 4.4 4.3   CL 99 99 101   CO2 28 26 25   BUN 45* 47* 45*   CREATININE 1.7* 1.9* 1.7*   CALCIUM 8.2* 8.2* 7.9*   MG 2.0  --  2.1      and CBC   Recent Labs   Lab 05/22/23  0849 05/23/23  0020 05/23/23  0505   WBC 11.16 10.81 10.68   HGB 12.4* 11.3* 11.2*   HCT 40.9 36.0* 36.1*    161 160         Significant Imaging: Echocardiogram: 2D echo with color flow doppler: No results found for this or any previous visit. and Transthoracic echo (TTE) complete (Cupid Only):   Results for orders placed or performed during the hospital encounter of 04/23/23   Echo   Result Value Ref Range    BSA 2.08 m2    TDI SEPTAL 0.03 m/s    LV LATERAL E/E' RATIO 34.33 m/s    LV SEPTAL E/E' RATIO 34.33 m/s    IVC diameter 2.49 cm    Left Ventricular Outflow Tract Mean Velocity 0.55 cm/s    Left Ventricular Outflow Tract Mean Gradient 1.34 mmHg    AORTIC VALVE CUSP SEPERATION 0.84 cm    TDI LATERAL 0.03 m/s    LVIDd 4.90 3.5 - 6.0 cm    IVS 1.25 (A) 0.6 - 1.1 cm    Posterior Wall 1.36 (A) 0.6 - 1.1 cm    LVIDs 4.28 (A) 2.1 - 4.0 cm    FS 13 28 - 44 %    Ascending aorta 4.20 cm    LV mass 255.12 g    LA size 3.38 cm    RVDD 2.43 cm    Left Ventricle Relative Wall Thickness 0.56 cm    AV regurgitation pressure 1/2 time [id number] ms    AV mean gradient 50 mmHg    AV valve area 0.46 cm2    AV Velocity Ratio 0.17     AV index (prosthetic) 0.19     MV valve area p 1/2 method 3.28 cm2    E/A ratio 1.06     Mean e' 0.03 m/s    E wave deceleration time 231.22 msec    LVOT diameter 1.74 cm    LVOT area 2.4 cm2    LVOT peak rojas 0.74 m/s    LVOT peak VTI 19.20 cm    Ao peak rojas 4.4 m/s    Ao VTI 99.70 cm    Mr max rojas 5.44 m/s    LVOT stroke volume 45.63 cm3    AV peak gradient 77 mmHg    E/E' ratio 34.33 m/s    MV Peak E Rojas 1.03 m/s    AR Max Rojas 3.17 m/s    TR Max Rojas 2.44 m/s    MV stenosis pressure 1/2 time 67.05 ms    MV Peak A Rojas 0.97 m/s    LV Systolic Volume 82.14 mL    LV Systolic Volume Index 40.5 mL/m2    LV  Diastolic Volume 112.89 mL    LV Diastolic Volume Index 55.61 mL/m2    LV Mass Index 126 g/m2    RA Major Axis 4.88 cm    Left Atrium Minor Axis 3.70 cm    Left Atrium Major Axis 6.23 cm    Triscuspid Valve Regurgitation Peak Gradient 24 mmHg    LA Volume Index (Mod) 27.1 mL/m2    LA volume (mod) 55.00 cm3    RA Width 3.88 cm    Right Atrial Pressure (from IVC) 15 mmHg    EF 50 %    MV mean gradient 4 mmHg    TV rest pulmonary artery pressure 39 mmHg    Mitral Valve Heart Rate 78 bpm    Narrative    · The left ventricle is normal in size with mild concentric hypertrophy   and low normal systolic function.  · The estimated ejection fraction is 50-55%.  · Indeterminate left ventricular diastolic function.  · There is mild mitral stenosis.  · The mean diastolic gradient across the mitral valve is 4 mmHg at a heart   rate of 78 bpm.  · Mild-to-moderate mitral regurgitation.  · Mild aortic regurgitation.  · There is severe aortic valve stenosis.  · Aortic valve area is 0.46 cm2; peak velocity is 4.4 m/s; mean gradient   is 50 mmHg. Of note, gradients were measured following a post PVC beat.  · Normal right ventricular size with normal right ventricular systolic   function.  · Mild to moderate tricuspid regurgitation.  · The estimated PA systolic pressure is at least 39 mmHg.  · Elevated central venous pressure (15 mmHg).  · The ascending aorta is mildly dilated.        Assessment and Plan:     Severe aortic stenosis  KAT on CKD- improved   Hypotension- improved   Frequent PVCs  History of paroxysmal atrial fibrillation  Severe PAD, unable to perform angiography from femoral access in 2021  DVT      Plan:    Add metoprolol tartrate 12.5 mg PO BID.    Plan is to optimize as much as possible and consider outpatient follow up with TAVR clinic      Active Diagnoses:    Diagnosis Date Noted POA    PRINCIPAL PROBLEM:  Acute renal failure superimposed on stage 3 chronic kidney disease [N17.9, N18.30] 11/20/2021 Yes    Frail  elderly [R54] 05/18/2023 Yes    Orthostatic hypotension [I95.1] 05/18/2023 Yes    Hyponatremia [E87.1] 05/18/2023 Yes    Leukocytosis [D72.829] 05/18/2023 Yes    Acute DVT (deep venous thrombosis) [I82.409] 05/09/2023 Yes    Chronic diastolic heart failure [I50.32] 05/08/2023 Yes    Hyperkalemia [E87.5] 05/08/2023 Yes    Elevated troponin [R77.8] 04/24/2023 Yes    PAF (paroxysmal atrial fibrillation) [I48.0] 04/24/2023 Yes    PAD (peripheral artery disease) [I73.9] 07/09/2022 Yes    Aortic stenosis, severe [I35.0] 11/23/2021 Yes    COPD (chronic obstructive pulmonary disease) [J44.9] 12/11/2019 Yes      Problems Resolved During this Admission:       VTE Risk Mitigation (From admission, onward)           Ordered     apixaban tablet 5 mg  2 times daily         05/22/23 2238     Reason for No Pharmacological VTE Prophylaxis  Once        Question:  Reasons:  Answer:  Already adequately anticoagulated on oral Anticoagulants    05/18/23 2154     IP VTE HIGH RISK PATIENT  Once         05/18/23 2154                    Yoko Aguilar NP  Cardiology  [facility]  5/23/23      I have personally interviewed and examined the patient, I have reviewed the Nurse Practitioner's history and physical, assessment, and plan. I have personally evaluated the patient at bedside and agree with the findings and made appropriate changes as necessary in recommendations.  Case was discussed with Dr. Ledezma over the phone yesterday who recommended outpt follow up in valve clinic in 1-2 weeks to discuss regarding revascularization and valve replacement options further.   Increase BB dose as tolerated   Renal function stable after cath. F/u BUN/Cr    Xavier Solis MD  Department of Cardiology  [facility]  5/23/23

## 2023-05-23 NOTE — SUBJECTIVE & OBJECTIVE
Interval History:  No acute events overnight.  He underwent successful angiogram yesterday.  However, he has left main disease including significant stenosis at his right ostial RCA.  He needs possible TAVR but would be a high-risk candidate for intervention either way.  Cardiology has recommended follow-up with the advanced cardiology team at [facility].  The patient is planned for skilled nursing facility for rehabilitation and will then follow-up as an outpatient.  No chest pain at this time.  Renal function appears stable    Review of Systems   All other systems reviewed and are negative.  Objective:     Vital Signs (Most Recent):  Temp: 97.6 °F (36.4 °C) (05/23/23 1101)  Pulse: 88 (05/23/23 0736)  Resp: 20 (05/23/23 0736)  BP: 116/61 (05/23/23 0610)  SpO2: 95 % (05/23/23 0736) Vital Signs (24h Range):  Temp:  [97.6 °F (36.4 °C)-98.7 °F (37.1 °C)] 97.6 °F (36.4 °C)  Pulse:  [] 88  Resp:  [11-25] 20  SpO2:  [94 %-100 %] 95 %  BP: ()/(52-95) 116/61     Weight: 83.4 kg (183 lb 13.8 oz)  Body mass index is 27.96 kg/m².    Intake/Output Summary (Last 24 hours) at 5/23/2023 1534  Last data filed at 5/23/2023 1500  Gross per 24 hour   Intake 1115 ml   Output 1600 ml   Net -485 ml           Physical Exam  Vitals reviewed.   Constitutional:       Appearance: Normal appearance.   HENT:      Head: Normocephalic and atraumatic.      Nose: Nose normal.      Mouth/Throat:      Mouth: Mucous membranes are moist.   Eyes:      Pupils: Pupils are equal, round, and reactive to light.   Cardiovascular:      Rate and Rhythm: Normal rate. Rhythm irregular.      Pulses: Normal pulses.      Heart sounds: Murmur heard.     No friction rub. No gallop.   Pulmonary:      Effort: Pulmonary effort is normal. No respiratory distress.      Breath sounds: Normal breath sounds.      Comments: Nasal cannula in place  Abdominal:      General: Abdomen is flat. Bowel sounds are normal. There is no distension.      Palpations: Abdomen is soft.       Tenderness: There is no abdominal tenderness.   Musculoskeletal:         General: No swelling. Normal range of motion.      Cervical back: Normal range of motion and neck supple.   Skin:     General: Skin is warm and dry.   Neurological:      General: No focal deficit present.      Mental Status: He is alert and oriented to person, place, and time.   Psychiatric:         Mood and Affect: Mood normal.         Behavior: Behavior normal.         Thought Content: Thought content normal.         Judgment: Judgment normal.           Significant Labs: All pertinent labs within the past 24 hours have been reviewed.    Significant Imaging: I have reviewed all pertinent imaging results/findings within the past 24 hours.

## 2023-05-23 NOTE — PLAN OF CARE
KIARRA called [facility] into [facility], spoke with Janet.  SHUBHAM scanned into . [facility] faxed.

## 2023-05-23 NOTE — PT/OT/SLP PROGRESS
"Physical Therapy Treatment    Patient Name:  Siddharth Urias   MRN:  [id number]    Recommendations:     Discharge Recommendations: nursing facility, skilled  Discharge Equipment Recommendations: other (see comments) (TBD at next level of care)  Barriers to discharge:  cardiac deficits; decreased mobility; caregiver unable to meet level of physical assist needed    Assessment:     Siddharth Urias is a 89 y.o. male admitted with a medical diagnosis of Acute renal failure superimposed on stage 3 chronic kidney disease.  He presents with the following impairments/functional limitations: weakness, impaired endurance, impaired self care skills, impaired functional mobility, gait instability, decreased lower extremity function, pain, impaired cardiopulmonary response to activity, decreased ROM.    Pt agreed to physical therapy and reported "I took some steps earlier with some mari." Required modA of 2 persons from supine to sit. Upon seated EOB, noted frequent PVCs and pt c/o "winded" with sats >94%.     Pt was not progressed to standing due to continued PVCs noted on monitor and nurse unavailable to clear for increased activity. Performed seated EOB exercises and returned to supine with VC and Mathew.     Rehab Prognosis: Good; patient would benefit from acute skilled PT services to address these deficits and reach maximum level of function.    Recent Surgery: Procedure(s) (LRB):  ANGIOGRAM, CORONARY ARTERY (N/A) 1 Day Post-Op    Plan:     During this hospitalization, patient to be seen 5 x/week to address the identified rehab impairments via gait training, therapeutic activities, therapeutic exercises, neuromuscular re-education and progress toward the following goals:    Plan of Care Expires:  06/24/23    Subjective     Chief Complaint: "I don't even think they are doing anything to make me better"   Patient/Family Comments/goals: get back to walking  Pain/Comfort:  Pain Rating 1: other (see comments) (unrated; cried " out with flexion)  Location - Side 1: Left  Location - Orientation 1: generalized  Location 1: knee (arthritis)  Pain Addressed 1: Cessation of Activity      Objective:     Communicated with nurse prior to session.  Patient found HOB elevated with oxygen, pulse ox (continuous), bed alarm, blood pressure cuff, telemetry, Condom Catheter upon PT entry to room.     General Precautions: Standard, fall, diabetic  Orthopedic Precautions: N/A  Braces: N/A  Respiratory Status: Nasal cannula, flow 3 L/min     Functional Mobility:  Bed Mobility:     Scooting: seated fwd scooting, Mathew, VC for technique  Supine to Sit: moderate assistance and of 2 persons  Sit to Supine: minimum assistance and VC      AM-PAC 6 CLICK MOBILITY          Treatment & Education:  -Pt and spouse education on mobility limits not solely due to deficient muscle strength but due to cardiac concerns/monitoring; role of acute care PT; STS technique to increase independence when mobility is progressed  -Seated therex at EOB: LAQs, ankle ROM  -Pre-transfer training     Patient left HOB elevated with all lines intact, call button in reach, bed alarm on, nurse notified, and wife present..    GOALS:   Multidisciplinary Problems       Physical Therapy Goals          Problem: Physical Therapy    Goal Priority Disciplines Outcome Goal Variances Interventions   Physical Therapy Goal     PT, PT/OT      Description: Goals to be met by: 23     Patient will increase functional independence with mobility by performin. Supine to sit with Modified Lares  2. Sit to supine with Modified Lares  3. Sit to stand transfer with Modified Lares  4. Bed to chair transfer with Modified Lares using Rolling Walker  5. Gait  x 50 feet with Contact Guard Assistance using Rolling Walker.                          Time Tracking:     PT Received On: 23  PT Start Time: 1340     PT Stop Time: 1408  PT Total Time (min): 28 min     Billable Minutes:  Therapeutic Exercise 28    Treatment Type: Treatment  PT/PTA: PTA     Number of PTA visits since last PT visit: 1 05/23/2023

## 2023-05-23 NOTE — PROGRESS NOTES
[facility] Medicine  Progress Note    Patient Name: Siddharth Urias  MRN: [id number]  Patient Class: IP- Inpatient   Admission Date: 5/18/2023  Length of Stay: 5 days  Attending Physician: Chip Patricio MD  Primary Care Provider: Martine Maxwell MD        Subjective:     Principal Problem:Acute renal failure superimposed on stage 3 chronic kidney disease        HPI:  Siddharth Urias Is an 89-year-old male with chronic medical problems including HFpEF, atrial fibrillation CKD 3, peripheral arterial disease with likely occlusion of the right CFA/external iliac, DVT, COPD, diabetes, diverticulosis, hepatic steatosis, and prostate cancer in 2009 who presents to the emergency room tonight sent primary care provider for hypotension.  He was at the doctor's office to establish care for a hospital follow-up and when they checked his blood pressure it was systolic 60.  Patient was asymptomatic and was sent to the emergency room for evaluation.     Patient has had 3 hospitalizations in the last 3 weeks for heart failure exacerbation.  He had elevated BNP, pulmonary edema on x-ray and was diuresed and discharged home on 04/26 with oxygen supplementation.  He returns to the hospital on 05/01 having not use his oxygen and not filled his new prescriptions, was diuresed again encouraged to use his home oxygen.  He was discharged on 05/02 and returned to the hospital on 05/08 with bradycardia with heart rate in the 30s and left knee and ankle pain.  On 05/01 he was found to have a nonocclusive thrombus in the right popliteal vein.  Metoprolol was held and he was switched to Bumex.  Eliquis was increased to 5 mg twice daily and he had reportedly been in sinus rhythm when seen by Cardiology.  He was treated for gout with colchicine and a Medrol Dosepak and allopurinol.    His wife said he has been doing well at home since the last discharge.  She denies that he has had any fevers, he has been using his  oxygen about 75% of the time when he is up and about in his house, he is eating and drinking and voiding well, denies diarrhea, abdominal pain, chest, shortness at breath that is unusual, swelling, he has an occasional cough with clear sputum.  He denies headaches or dizziness.  He said the pain from gout in his left knee and left ankle is resolved. He has no complaints of chest pain or palpitations, headaches, lightheadedness or dizziness. He uses a walker to get around and is somewhat weak but doesn't really go anywhere.    In the ED, lab work with WBC elevated at 15.66, platelets 287, H&H 15.5/48.6, BUN elevated 84, creatinine elevated 2.9, glucose 104, sodium low at 128, potassium 5.9, chloride 91 and serum bicarb 26, , troponin 49.1, calcium 8.8 and albumin 3.6, total bilirubin 1.1, estimated GFR is 20 and estimated creatinine clearance is 16 7.  Temperature 97.4°, heart rate 75, blood pressure 106/52, respiratory rate 16 and O2 sat 90 5% on room air.  His initial blood pressure was 86/64 and heart rate was 40, most recent blood pressure 111/61, heart rate 60-80, respiratory rate 18 to 20 and O2 sat 94% on room air.  Chest x-ray with moderate cardiomegaly, free of infiltrates no pleural effusions, moderately enlarged heart and pulmonary vasculature within normal limits.  He will be admitted to the hospitalist service for further evaluation and treatment to the step-down unit with consult to Nephrology and Cardiology.       Overview/Hospital Course:  No notes on file    Interval History:  No acute events overnight.  He underwent successful angiogram yesterday.  However, he has left main disease including significant stenosis at his right ostial RCA.  He needs possible TAVR but would be a high-risk candidate for intervention either way.  Cardiology has recommended follow-up with the advanced cardiology team at [facility].  The patient is planned for skilled nursing facility for rehabilitation and will then  follow-up as an outpatient.  No chest pain at this time.  Renal function appears stable    Review of Systems   All other systems reviewed and are negative.  Objective:     Vital Signs (Most Recent):  Temp: 97.6 °F (36.4 °C) (05/23/23 1101)  Pulse: 88 (05/23/23 0736)  Resp: 20 (05/23/23 0736)  BP: 116/61 (05/23/23 0610)  SpO2: 95 % (05/23/23 0736) Vital Signs (24h Range):  Temp:  [97.6 °F (36.4 °C)-98.7 °F (37.1 °C)] 97.6 °F (36.4 °C)  Pulse:  [] 88  Resp:  [11-25] 20  SpO2:  [94 %-100 %] 95 %  BP: ()/(52-95) 116/61     Weight: 83.4 kg (183 lb 13.8 oz)  Body mass index is 27.96 kg/m².    Intake/Output Summary (Last 24 hours) at 5/23/2023 1534  Last data filed at 5/23/2023 1500  Gross per 24 hour   Intake 1115 ml   Output 1600 ml   Net -485 ml           Physical Exam  Vitals reviewed.   Constitutional:       Appearance: Normal appearance.   HENT:      Head: Normocephalic and atraumatic.      Nose: Nose normal.      Mouth/Throat:      Mouth: Mucous membranes are moist.   Eyes:      Pupils: Pupils are equal, round, and reactive to light.   Cardiovascular:      Rate and Rhythm: Normal rate. Rhythm irregular.      Pulses: Normal pulses.      Heart sounds: Murmur heard.     No friction rub. No gallop.   Pulmonary:      Effort: Pulmonary effort is normal. No respiratory distress.      Breath sounds: Normal breath sounds.      Comments: Nasal cannula in place  Abdominal:      General: Abdomen is flat. Bowel sounds are normal. There is no distension.      Palpations: Abdomen is soft.      Tenderness: There is no abdominal tenderness.   Musculoskeletal:         General: No swelling. Normal range of motion.      Cervical back: Normal range of motion and neck supple.   Skin:     General: Skin is warm and dry.   Neurological:      General: No focal deficit present.      Mental Status: He is alert and oriented to person, place, and time.   Psychiatric:         Mood and Affect: Mood normal.         Behavior: Behavior  normal.         Thought Content: Thought content normal.         Judgment: Judgment normal.           Significant Labs: All pertinent labs within the past 24 hours have been reviewed.    Significant Imaging: I have reviewed all pertinent imaging results/findings within the past 24 hours.      Assessment/Plan:      * Acute renal failure superimposed on stage 3 chronic kidney disease  Seems due to dehydration / diuresis. He had developed diarrhea yesterday which may have contributed.  - creatinine continues improving  - stable post catheterization  - holding further IVF  - Na improving  - trend renal function  - appreciate nephrology recommendations  - Will need to be careful not to volume overload him.   - will adjust diuretics on d/c      Leukocytosis  Improved with hydration      Hyponatremia  Sodium 128, now improving  - hypovolemic hyponatremia  - improved with hydration      Orthostatic hypotension  Presumed orthostatic hypotension from volume depletion, orthostatics in a.m. after gentle fluid administration overnight      Frail elderly  Consult PT/OT   Will plan for SNF      Acute DVT (deep venous thrombosis)  Diagnosed with DVT of right popliteal on 05/09 and left femoral vein on 05/01, Eliquis was increased to 5 mg twice daily from 2.5 mg twice daily per Cardiology, resume Eliquis after angiogram      US LOWER EXTREMITY VEINS LEFT 5/9/23  FINDINGS:  Left thigh veins: The common femoral, popliteal, upper greater saphenous, and deep femoral veins are patent and free of thrombus. The veins are normally compressible and have normal phasic flow and augmentation response.  There is nonocclusive thrombus within the distal femoral vein which is noncompressible.   Left calf veins: The visualized calf veins are patent.   Contralateral CFV: The contralateral (right) common femoral vein is patent and free of thrombus.     Miscellaneous: None     Impression:   Nonocclusive DVT distal left femoral vein which may be  chronic.      US LOWER EXTREMITY VEINS BILATERAL 5/1/2023  FINDINGS:  Right thigh veins: There is a nonocclusive thrombus within the right popliteal vein.  The common femoral, femoral, upper greater saphenous, and deep femoral veins are patent and free of thrombus, these veins are normally compressible and have normal phasic flow and augmentation response.   Right calf veins: The visualized calf veins are patent.   Left thigh veins: The common femoral, femoral, popliteal, upper greater saphenous, and deep femoral veins are patent and free of thrombus. The veins are normally compressible and have normal phasic flow and augmentation response.   Left calf veins: The visualized calf veins are patent.   Miscellaneous: None     Impression:   Nonocclusive thrombus within the right popliteal vein.    Resume anticoagulation      Chronic diastolic heart failure  Patient with HFpEF with 3 exacerbations in the last month, recently changed to Bumex, he is incontinent of urine but his wife said he seems to have good urine output.       Most recent echo from 04/24/2023  The left ventricle is normal in size with mild concentric hypertrophy and low normal systolic function.  The estimated ejection fraction is 50-55%.  Indeterminate left ventricular diastolic function.  There is mild mitral stenosis.  The mean diastolic gradient across the mitral valve is 4 mmHg at a heart rate of 78 bpm.  Mild-to-moderate mitral regurgitation.  Mild aortic regurgitation.  There is severe aortic valve stenosis.  Aortic valve area is 0.46 cm2; peak velocity is 4.4 m/s; mean gradient is 50 mmHg. Of note, gradients were measured following a post PVC beat.  Normal right ventricular size with normal right ventricular systolic function.  Mild to moderate tricuspid regurgitation.  The estimated PA systolic pressure is at least 39 mmHg.  Elevated central venous pressure (15 mmHg).  The ascending aorta is mildly dilated.      Hyperkalemia  Potassium 5.9,  Creatinine 2.9, he was given 1 amp of sodium bicarb, ordered dextrose 50 g x 1 and regular insulin 8.16 units and 1 g calcium gluconate, then start Lokelma 10 g t.i.d. q.4 BNP, glucose checks every 30 minutes x2 then every hour x6 to monitor for hypoglycemia, hold potassium supplement hold losartan    PAF (paroxysmal atrial fibrillation)  Patient with paroxysmal atrial fibrillation, he is on Eliquis, metoprolol was recently stopped because of heart rate in the 30s, Twelve lead EKG today shows sinus rhythm with first-degree AV block with frequent PVCs, monitor on telemetr  -  resume Eliquis        Elevated troponin  Initial high sensitivity troponin 49.1, repeat high sensitivity troponin 47.3, patient denies chest pain  Angiogram with left main disease and significant stenosis of the ostial RCA  May need surgery versus high-risk intervention at [facility] [place]  Will follow-up outpatient      PAD (peripheral artery disease)  History peripheral arterial disease, avoid SCDs      Aortic stenosis, severe  He and his wife discuss further with Cardiology and they are planning to pursue further intervention  Will need outpatient follow-up for further workup and planning for intervention      COPD (chronic obstructive pulmonary disease)  Stable COPD, resume controller inhaler, albuterol as needed for wheezing or shortness of breath, continue oxygen to maintain sat greater than 90%        VTE Risk Mitigation (From admission, onward)         Ordered     apixaban tablet 5 mg  2 times daily         05/22/23 2238     Reason for No Pharmacological VTE Prophylaxis  Once        Question:  Reasons:  Answer:  Already adequately anticoagulated on oral Anticoagulants    05/18/23 2154     IP VTE HIGH RISK PATIENT  Once         05/18/23 2154                Discharge Planning   MAGDA: 5/23/2023     Code Status: Full Code   Is the patient medically ready for discharge?:     Reason for patient still in hospital (select all that apply):  Treatment  Discharge Plan A: Rehab   Discharge Delays: None known at this time              Chip Patricio MD  Department of Hospital Medicine   [facility]

## 2023-05-23 NOTE — PLAN OF CARE
Problem: Physical Therapy  Goal: Physical Therapy Goal  Description: Goals to be met by: 23     Patient will increase functional independence with mobility by performin. Supine to sit with Modified [place]  2. Sit to supine with Modified [place]  3. Sit to stand transfer with Modified [place]  4. Bed to chair transfer with Modified [place] using Rolling Walker  5. Gait  x 50 feet with Contact Guard Assistance using Rolling Walker.     Outcome: Ongoing, Progressing

## 2023-05-23 NOTE — PROGRESS NOTES
"[facility]  Adult Nutrition   Progress Note (Initial Assessment)     SUMMARY     Recommendations  1) Continue current cardiac diet as tolerated.   2)  to obtain daily meal selections.    Goals:   Pt to meet 100% of his EEN/EPN.    Nutrition Goal Status: goal met    Communication of BIMAL Recs: other (comment)    Dietitian Rounds Brief  LOS: Pt is an 89 yowm admitted with acute renal failure superimposed on stage 3 chronic kidney disease with a PMH of HFpEF, atrial fibrillation CKD 3, peripheral arterial disease with likely occlusion of the right CFA/external iliac, DVT, COPD, diabetes, diverticulosis, hepatic steatosis, and prostate cancer in 2009 who presents to the emergency room tonight sent primary care provider for hypotension.  He was at the doctor's office to establish care for a hospital follow-up and when they checked his blood pressure it was systolic 60.  Patient was asymptomatic and was sent to the emergency room for evaluation    He has been eating 100% of his meals for the past few days and is getting ready to go to an LTAC facility. His LBM was yesterday. Will continue to follow prn.    Diet order:   Current Diet Order: Cardiac      Evaluation of Received Nutrient/Fluid Intake  Energy Calories Required: meeting needs  Protein Required: meeting needs  Fluid Required: meeting needs  Tolerance: tolerating     % Intake of Estimated Energy Needs: 75 - 100 %  % Meal Intake: 75 - 100 %      Intake/Output Summary (Last 24 hours) at 5/23/2023 1542  Last data filed at 5/23/2023 1500  Gross per 24 hour   Intake 1115 ml   Output 1600 ml   Net -485 ml        Anthropometrics  Temp: 97.6 °F (36.4 °C)  Height Method: Stated  Height: 5' 8" (172.7 cm)  Height (inches): 68 in  Weight Method: Bed Scale  Weight: 83.4 kg (183 lb 13.8 oz)  Weight (lb): 183.87 lb  Ideal Body Weight (IBW), Male: 154 lb  % Ideal Body Weight, Male (lb): 119.4 %  BMI (Calculated): 28  BMI Grade: 25 - 29.9 - overweight   "     Estimated/Assessed Needs  Weight Used For Calorie Calculations: 83.4 kg (183 lb 13.8 oz)  Energy Calorie Requirements (kcal): 1356-8344 kcals/day (20-25 kcals/kg ABW)  Energy Need Method: Kcal/kg  Protein Requirements: 56-84  g/day (0.8-1.2 g/kg IBW)  Weight Used For Protein Calculations: 70 kg (154 lb 5.2 oz)     Estimated Fluid Requirement Method: RDA Method  RDA Method (mL): 1668       Reason for Assessment  Reason For Assessment: length of stay  Diagnosis: other (see comments) (Acute renal failure superimposed on stage 3 chronic kidney disease)  Relevant Medical History: Prostate cancer, Fatty liver, Irradiation cystitis, Diabetes mellitus, type 2, Arthriti, Radiation proctitis, History of blood clots, Afib, Sleep apnea, Chronic kidney disease, COPD (chronic obstructive pulmonary disease), Choledocholithiasis, Hepatomegaly, Colon polyp, Diverticulosis    Nutrition/Diet History  Spiritual, Cultural Beliefs, Advent Practices, Values that Affect Care: no  Food Allergies: NKFA  Factors Affecting Nutritional Intake: None identified at this time    Nutrition Risk Screen  Nutrition Risk Screen: no indicators present     MST Score: 0  Have you recently lost weight without trying?: No  Weight loss score: 0  Have you been eating poorly because of a decreased appetite?: No  Appetite score: 0       Weight History:  Wt Readings from Last 5 Encounters:   05/18/23 83.4 kg (183 lb 13.8 oz)   05/18/23 84.2 kg (185 lb 10 oz)   05/08/23 84.7 kg (186 lb 11.7 oz)   05/01/23 89.7 kg (197 lb 12 oz)   04/24/23 91.2 kg (201 lb)        Lab/Procedures/Meds: Pertinent Labs/Meds Reviewed    Medications:Pertinent Medications Reviewed  Scheduled Meds:   allopurinoL  200 mg Oral Daily    apixaban  5 mg Oral BID    budesonide  0.5 mg Nebulization Q12H    And    ipratropium  0.5 mg Nebulization Q12H    And    arformoteroL  15 mcg Nebulization BID    aspirin  81 mg Oral Daily    chlorhexidine  15 mL Mouth/Throat BID    mupirocin   Nasal  BID    pantoprazole  40 mg Oral Daily    pravastatin  40 mg Oral Daily    tamsulosin  1 capsule Oral Daily     Continuous Infusions:   sodium chloride 0.9% 100 mL (05/22/23 1122)     PRN Meds:.acetaminophen, albuterol sulfate, naloxone, ondansetron, oxyCODONE, oxyCODONE, polyethylene glycol, simethicone, sodium chloride 0.9%, sodium chloride 0.9%, traZODone    Labs: Pertinent Labs Reviewed  Clinical Chemistry:  Recent Labs   Lab 05/18/23  1725 05/18/23  2118 05/19/23  0110 05/23/23  0505   *  --    < > 137   K 5.9*  --    < > 4.3   CL 91*  --    < > 101   CO2 26  --    < > 25     --    < > 123*   BUN 84*  --    < > 45*   CREATININE 2.9*  --    < > 1.7*   CALCIUM 8.8  --    < > 7.9*   PROT 7.7  --   --   --    ALBUMIN 3.6  --   --   --    BILITOT 1.1*  --   --   --    ALKPHOS 60  --   --   --    AST 22  --   --   --    ALT 28  --   --   --    ANIONGAP 11  --    < > 11   MG 2.0  --    < > 2.1   PHOS  --  4.5  --   --     < > = values in this interval not displayed.     CBC:   Recent Labs   Lab 05/23/23  0505   WBC 10.68   RBC 4.33*   HGB 11.2*   HCT 36.1*      MCV 83   MCH 25.9*   MCHC 31.0*     Cardiac Profile:  Recent Labs   Lab 05/18/23  1727   *     Diabetes:  Recent Labs   Lab 05/19/23  0452   HGBA1C 6.6*     Monitor and Evaluation  Food and Nutrient Intake: food and beverage intake, energy intake  Food and Nutrient Adminstration: diet order  Knowledge/Beliefs/Attitudes: food and nutrition knowledge/skill, beliefs and attitudes  Physical Activity and Function: nutrition-related ADLs and IADLs, factors affecting access to physical activity  Anthropometric Measurements: weight, weight change, body mass index  Biochemical Data, Medical Tests and Procedures: lipid profile, inflammatory profile, glucose/endocrine profile, gastrointestinal profile, electrolyte and renal panel  Nutrition-Focused Physical Findings: overall appearance     Nutrition Risk  Level of Risk/Frequency of Follow-up:  moderate     Nutrition Follow-Up  RD Follow-up?: Yes      Jenni Appiah, BIMAL 05/23/2023 3:42 PM

## 2023-05-23 NOTE — CARE UPDATE
05/22/23 1944   Patient Assessment/Suction   Level of Consciousness (AVPU) alert   Respiratory Effort Normal;Unlabored   Expansion/Accessory Muscles/Retractions expansion symmetric;no retractions;no use of accessory muscles   All Lung Fields Breath Sounds diminished;clear   Rhythm/Pattern, Respiratory no shortness of breath reported;depth regular;pattern regular;unlabored   Cough Frequency no cough   PRE-TX-O2   Device (Oxygen Therapy) nasal cannula   $ Is the patient on Low Flow Oxygen? Yes   Flow (L/min) 3   SpO2 95 %   Pulse Oximetry Type Continuous   $ Pulse Oximetry - Multiple Charge Pulse Oximetry - Multiple   Pulse 79   Resp (!) 22   Aerosol Therapy   $ Aerosol Therapy Charges Aerosol Treatment   Daily Review of Necessity (SVN) completed   Respiratory Treatment Status (SVN) given   Treatment Route (SVN) mask;oxygen   Patient Position (SVN) semi-Esquivel's   Post Treatment Assessment (SVN) breath sounds unchanged   Signs of Intolerance (SVN) none   Breath Sounds Post-Respiratory Treatment   Throughout All Fields Post-Treatment All Fields   Throughout All Fields Post-Treatment no change   Post-treatment Heart Rate (beats/min) 98   Post-treatment Resp Rate (breaths/min) 21   Education   $ Education Bronchodilator;15 min   Respiratory Evaluation   $ Care Plan Tech Time 15 min   $ Eval/Re-eval Charges Evaluation   Evaluation For   (care plan)

## 2023-05-23 NOTE — PT/OT/SLP PROGRESS
Occupational Therapy   Treatment    Name: Siddharth Urias  MRN: [id number]  Admitting Diagnosis:  Acute renal failure superimposed on stage 3 chronic kidney disease  1 Day Post-Op    Recommendations:     Discharge Recommendations: nursing facility, skilled  Discharge Equipment Recommendations:  hip kit, wheelchair  Barriers to discharge:  Decreased caregiver support    Assessment:     Siddharth Urisa is a 89 y.o. male with a medical diagnosis of Acute renal failure superimposed on stage 3 chronic kidney disease.  Patient reluctant with participation initially, but required encouragement with bed mobility and ADL participation. Patient was able to stand requiring Min A using RW. Patient stood for ~2 min, taking a few steps forward/backward and side stepping to L side. Performance deficits affecting function are weakness, impaired endurance, impaired self care skills, impaired functional mobility, gait instability, impaired balance, decreased lower extremity function, pain, impaired cardiopulmonary response to activity, decreased ROM.     Rehab Prognosis:  Fair; patient would benefit from acute skilled OT services to address these deficits and reach maximum level of function.       Plan:     Patient to be seen 5 x/week to address the above listed problems via self-care/home management, therapeutic activities, therapeutic exercises  Plan of Care Expires: 06/21/23  Plan of Care Reviewed with: patient, spouse, daughter    Subjective     Chief Complaint: LLE pain  Patient/Family Comments/goals: none  Pain/Comfort:  Pain Rating 1:  (not rated)  Location - Side 1: Left  Location - Orientation 1: generalized  Location 1: knee  Pain Rating Post-Intervention 1:  (not rated)    Objective:     Communicated with: nurse Stevenson prior to session.  Patient found HOB elevated with oxygen, pulse ox (continuous), Condom Catheter, blood pressure cuff, telemetry upon OT entry to room.    General Precautions: Standard, fall, diabetic     Orthopedic Precautions:N/A  Braces: N/A  Respiratory Status: Nasal cannula, flow 2 L/min     Occupational Performance:     Bed Mobility:    Patient completed Scooting/Bridging with moderate assistance  Patient completed Supine to Sit with moderate assistance with trunk support  Patient completed Sit to Supine with minimum assistance to BLE into bed    Functional Mobility/Transfers:  Patient completed Sit <> Stand Transfer with minimum assistance  with  rolling walker   Functional Mobility: Patient was able to stand and take ~4 side steps to HOB requiring CGA using RW.     Activities of Daily Living:  Grooming: stand by assistance with oral and facial hygiene while seated EOB      [facility] 6 Click ADL:      Treatment & Education:  OT ed patient on safety with walker use for functional mobility with cues for hand placement & sequencing.       Patient left HOB elevated with all lines intact, call button in reach, bed alarm on, and spouse present    GOALS:   Multidisciplinary Problems       Occupational Therapy Goals          Problem: Occupational Therapy    Goal Priority Disciplines Outcome Interventions   Occupational Therapy Goal     OT, PT/OT Ongoing, Progressing    Description: Goals to be met by: 6/21/2023     Patient will increase functional independence with ADLs by performing:    UE Dressing with Modified [place].  LE Dressing with Modified [place].  Grooming while standing at sink with Modified [place].  Toileting from bedside commode with Modified [place] for hygiene and clothing management.   Supine to sit with Modified [place].  Toilet transfer to bedside commode with Modified [place].  Upper extremity exercise program x10 reps per handout, with independence.                         Time Tracking:     OT Date of Treatment: 05/23/23  OT Start Time: 1129  OT Stop Time: 1200  OT Total Time (min): 31 min    Billable Minutes:Self Care/Home Management 31    OT/AYLEEN: OT           5/23/2023

## 2023-05-23 NOTE — PLAN OF CARE
Met with patient and family (wife, daughter) at bedside to discuss therapy recommendation of inpatient rehabilitation post-discharge.  Patient and family in agreement and verbalize [facility] as preferred facility.  Patient choice form signed accordingly and referral placed in [facility].        05/23/23 1119   Discharge Reassessment   Assessment Type Discharge Planning Reassessment   Did the patient's condition or plan change since previous assessment? Yes   Discharge Plan discussed with: Spouse/sig other;Adult children;Patient   Communicated MAGDA with patient/caregiver Yes   Discharge Plan A Rehab   Discharge Plan B Home with family;Home Health   DME Needed Upon Discharge  none   Transition of Care Barriers None   Why the patient remains in the hospital Requires continued medical care   Post-Acute Status   Post-Acute Authorization Placement   Post-Acute Placement Status Referrals Sent   Home Health Status Discharge Plan Changed   Patient choice form signed by patient/caregiver List with quality metrics by geographic area provided   Discharge Delays None known at this time

## 2023-05-23 NOTE — TELEPHONE ENCOUNTER
----- Message from Emilie Larose, Patient Care Assistant sent at 5/23/2023 11:55 AM CDT -----  Regarding: advice  Contact: Elva with [facility]  Type: Needs Medical Advice    Who Called:  Elva with [facility]    Best Call Back Number:      Additional Information: Elva with [facility] states she would like a callback regarding the pt having an an cardio event. Please call to advise. Thanks!

## 2023-05-24 NOTE — PROGRESS NOTES
[facility]  Cardiology  Progress Note    Patient Name: Siddharth Urias  MRN: [id number]  Admission Date: 5/18/2023  Hospital Length of Stay: 6 days  Code Status: Full Code   Attending Physician: Chip Patricio MD   Primary Care Physician: Martine Maxwell MD  Expected Discharge Date: 5/25/2023  Principal Problem:Acute renal failure superimposed on stage 3 chronic kidney disease    Subjective:     5/24/23:  Patient seen sitting up in bed with family at bedside.  No distress noted.  He is noted to be in sinus rhythm with frequent PVCs on telemetry but no further runs of V-tach noted in the past 24 hours.  Despite vital signs log, he has not been bradycardic.    5/23/23:  Short run of nonsustained vtach earlier. Noted to be resting in bed comfortably with no distress noted.     05/22/2023:  Denies any chest pain.     Review of Systems   All other systems reviewed and are negative.  Objective:     Vital Signs (Most Recent):  Temp: 97.6 °F (36.4 °C) (05/24/23 1300)  Pulse: (!) 46 (05/24/23 1300)  Resp: 18 (05/24/23 1300)  BP: (!) 114/52 (manual by nurse disha) (05/24/23 1300)  SpO2: 97 % (05/24/23 1300) Vital Signs (24h Range):  Temp:  [97.6 °F (36.4 °C)-99.4 °F (37.4 °C)] 97.6 °F (36.4 °C)  Pulse:  [] 46  Resp:  [10-34] 18  SpO2:  [91 %-100 %] 97 %  BP: ()/(52-93) 114/52     Weight: 83.4 kg (183 lb 13.8 oz)  Body mass index is 27.96 kg/m².    SpO2: 97 %         Intake/Output Summary (Last 24 hours) at 5/24/2023 1442  Last data filed at 5/24/2023 1301  Gross per 24 hour   Intake 570 ml   Output 1125 ml   Net -555 ml         Lines/Drains/Airways       Peripheral Intravenous Line  Duration                  Peripheral IV - Single Lumen 05/21/23 1027 20 G Anterior;Left Forearm 3 days         Peripheral IV - Single Lumen 05/22/23 0845 20 G Posterior;Right Forearm 2 days                  PHYSICAL EXAM  CONSTITUTIONAL: Well built, well nourished in no apparent distress  HEENT: No pallor  NECK: no  JVD  LUNGS: CTA b/l  HEART: regular rate and rhythm, S1, S2 normal, 3/6 ESM in aortic area   ABDOMEN: soft, non-tender; bowel sounds normal  EXTREMITIES: No edema  NEURO: AAO X 3     Significant Labs: BMP:   Recent Labs   Lab 05/23/23  0505 05/24/23  0458   * 133*    142   K 4.3 4.6    106   CO2 25 27   BUN 45* 44*   CREATININE 1.7* 1.8*   CALCIUM 7.9* 8.2*   MG 2.1 2.2      and CBC   Recent Labs   Lab 05/23/23  0020 05/23/23  0505 05/24/23  0458   WBC 10.81 10.68 9.09   HGB 11.3* 11.2* 11.3*   HCT 36.0* 36.1* 36.1*    160 166         Significant Imaging: Echocardiogram: 2D echo with color flow doppler: No results found for this or any previous visit. and Transthoracic echo (TTE) complete (Cupid Only):   Results for orders placed or performed during the hospital encounter of 04/23/23   Echo   Result Value Ref Range    BSA 2.08 m2    TDI SEPTAL 0.03 m/s    LV LATERAL E/E' RATIO 34.33 m/s    LV SEPTAL E/E' RATIO 34.33 m/s    IVC diameter 2.49 cm    Left Ventricular Outflow Tract Mean Velocity 0.55 cm/s    Left Ventricular Outflow Tract Mean Gradient 1.34 mmHg    AORTIC VALVE CUSP SEPERATION 0.84 cm    TDI LATERAL 0.03 m/s    LVIDd 4.90 3.5 - 6.0 cm    IVS 1.25 (A) 0.6 - 1.1 cm    Posterior Wall 1.36 (A) 0.6 - 1.1 cm    LVIDs 4.28 (A) 2.1 - 4.0 cm    FS 13 28 - 44 %    Ascending aorta 4.20 cm    LV mass 255.12 g    LA size 3.38 cm    RVDD 2.43 cm    Left Ventricle Relative Wall Thickness 0.56 cm    AV regurgitation pressure 1/2 time [id number] ms    AV mean gradient 50 mmHg    AV valve area 0.46 cm2    AV Velocity Ratio 0.17     AV index (prosthetic) 0.19     MV valve area p 1/2 method 3.28 cm2    E/A ratio 1.06     Mean e' 0.03 m/s    E wave deceleration time 231.22 msec    LVOT diameter 1.74 cm    LVOT area 2.4 cm2    LVOT peak yanelis 0.74 m/s    LVOT peak VTI 19.20 cm    Ao peak yanelis 4.4 m/s    Ao VTI 99.70 cm    Mr max yanelis 5.44 m/s    LVOT stroke volume 45.63 cm3    AV peak gradient  77 mmHg    E/E' ratio 34.33 m/s    MV Peak E Rojas 1.03 m/s    AR Max Rojas 3.17 m/s    TR Max Rojas 2.44 m/s    MV stenosis pressure 1/2 time 67.05 ms    MV Peak A Rojas 0.97 m/s    LV Systolic Volume 82.14 mL    LV Systolic Volume Index 40.5 mL/m2    LV Diastolic Volume 112.89 mL    LV Diastolic Volume Index 55.61 mL/m2    LV Mass Index 126 g/m2    RA Major Axis 4.88 cm    Left Atrium Minor Axis 3.70 cm    Left Atrium Major Axis 6.23 cm    Triscuspid Valve Regurgitation Peak Gradient 24 mmHg    LA Volume Index (Mod) 27.1 mL/m2    LA volume (mod) 55.00 cm3    RA Width 3.88 cm    Right Atrial Pressure (from IVC) 15 mmHg    EF 50 %    MV mean gradient 4 mmHg    TV rest pulmonary artery pressure 39 mmHg    Mitral Valve Heart Rate 78 bpm    Narrative    · The left ventricle is normal in size with mild concentric hypertrophy   and low normal systolic function.  · The estimated ejection fraction is 50-55%.  · Indeterminate left ventricular diastolic function.  · There is mild mitral stenosis.  · The mean diastolic gradient across the mitral valve is 4 mmHg at a heart   rate of 78 bpm.  · Mild-to-moderate mitral regurgitation.  · Mild aortic regurgitation.  · There is severe aortic valve stenosis.  · Aortic valve area is 0.46 cm2; peak velocity is 4.4 m/s; mean gradient   is 50 mmHg. Of note, gradients were measured following a post PVC beat.  · Normal right ventricular size with normal right ventricular systolic   function.  · Mild to moderate tricuspid regurgitation.  · The estimated PA systolic pressure is at least 39 mmHg.  · Elevated central venous pressure (15 mmHg).  · The ascending aorta is mildly dilated.        Assessment and Plan:     Severe aortic stenosis  CAD- significant left main and ostial RCA stenoses.   KAT on CKD- improved   Hypotension- improved   Frequent PVCs  History of paroxysmal atrial fibrillation  Severe PAD, unable to perform angiography from femoral access in 2021  DVT      Plan:    Increase  metoprolol tartrate to 25 mg p.o. b.i.d..  Plan to change to long-acting at discharge.  Agree with plan to possibly discharge to rehab tomorrow and then follow up with Dr. Ledezma outpatient.      Active Diagnoses:    Diagnosis Date Noted POA    PRINCIPAL PROBLEM:  Acute renal failure superimposed on stage 3 chronic kidney disease [N17.9, N18.30] 11/20/2021 Yes    Frail elderly [R54] 05/18/2023 Yes    Orthostatic hypotension [I95.1] 05/18/2023 Yes    Hyponatremia [E87.1] 05/18/2023 Yes    Leukocytosis [D72.829] 05/18/2023 Yes    Acute DVT (deep venous thrombosis) [I82.409] 05/09/2023 Yes    Chronic diastolic heart failure [I50.32] 05/08/2023 Yes    Hyperkalemia [E87.5] 05/08/2023 Yes    Elevated troponin [R77.8] 04/24/2023 Yes    PAF (paroxysmal atrial fibrillation) [I48.0] 04/24/2023 Yes    PAD (peripheral artery disease) [I73.9] 07/09/2022 Yes    Aortic stenosis, severe [I35.0] 11/23/2021 Yes    COPD (chronic obstructive pulmonary disease) [J44.9] 12/11/2019 Yes      Problems Resolved During this Admission:       VTE Risk Mitigation (From admission, onward)           Ordered     apixaban tablet 5 mg  2 times daily         05/22/23 2238     Reason for No Pharmacological VTE Prophylaxis  Once        Question:  Reasons:  Answer:  Already adequately anticoagulated on oral Anticoagulants    05/18/23 2154     IP VTE HIGH RISK PATIENT  Once         05/18/23 2154                    Yoko Aguilar NP  Cardiology  [facility]  5/24/23      I have personally interviewed and examined the patient, I have reviewed the Nurse Practitioner's history and physical, assessment, and plan. I have personally evaluated the patient at bedside and agree with the findings and made appropriate changes as necessary in recommendations.    No further NSVT on tele overnight. Increase metoprolol further and monitor overnight.   Out pt f/u with Dr. Ledezma regarding Revascularization and TAVR evaluation     Xavier Solis  MD  Department of Cardiology  [facility]  5/24/23

## 2023-05-24 NOTE — TELEPHONE ENCOUNTER
----- Message from Enrrique Engel LPN sent at 5/16/2023 12:28 PM CDT -----  Regarding: Bedside commode  Contact: Latrice [phone]  [facility] f/u scheduled for 5/17/23 w/ you; DEBRA requesting orders for bedside commode.  ----- Message -----  From: Saadia Sheldon  Sent: 5/16/2023  11:22 AM CDT  To: [facility] Staff    Type: Needs Medical Advice  Who Called:  Latrice  w/ DEBRA     Best Call Back Number: [phone]  Additional Information: Latrice requesting orders for pt to have a bedside commode sent to [facility]

## 2023-05-24 NOTE — PT/OT/SLP PROGRESS
Physical Therapy Treatment    Patient Name:  Siddharth Urias   MRN:  [id number]    Recommendations:     Discharge Recommendations: nursing facility, skilled  Discharge Equipment Recommendations: other (see comments) (TBD at next level of care)  Barriers to discharge:  increased assist with mobility, increased burden of care, decreased activity tolerance    Assessment:     Siddharth Urias is a 89 y.o. male admitted with a medical diagnosis of Acute renal failure superimposed on stage 3 chronic kidney disease.  He presents with the following impairments/functional limitations: weakness, impaired endurance, impaired self care skills, impaired functional mobility, gait instability, impaired balance, decreased lower extremity function, pain, impaired cardiopulmonary response to activity, decreased ROM.    Pt agreeable to visit. Pt having frequent PVCs but nurse okayed for pt to mobilize. Pt performed supine to sit transfer with mod assist with most assist for upper body. Pt requests that his linens be changed. Pt performed stand pivot transfer to chair with no AD and mod assist. During transfer, condom catheter became detached and urine spilled onto the floor. Pt and the flood were cleaned up and nurse entered to put on new catheter but ultimately decided to put brief on pt as he wanted to stay up in chair. Pt required mod assist x 2 with hand hold assist for sit to stand from chair. Upon standing, pt had a bladder accident and had to be cleaned up again. Pt left up in chair with chair alarm and RN present.    Rehab Prognosis: Fair; patient would benefit from acute skilled PT services to address these deficits and reach maximum level of function.    Recent Surgery: Procedure(s) (LRB):  ANGIOGRAM, CORONARY ARTERY (N/A) 2 Days Post-Op    Plan:     During this hospitalization, patient to be seen 5 x/week to address the identified rehab impairments via gait training, therapeutic activities, therapeutic exercises,  neuromuscular re-education and progress toward the following goals:    Plan of Care Expires:  23    Subjective     Chief Complaint: pt c/o left knee pain  Patient/Family Comments/goals: to sit up in chair  Pain/Comfort:  Pain Rating 1: other (see comments) (not rated)  Location - Side 1: Left  Location - Orientation 1: generalized  Location 1: knee  Pain Addressed 1: Cessation of Activity, Reposition  Pain Rating Post-Intervention 1: other (see comments) (not rated)      Objective:     Communicated with RN prior to session.  Patient found HOB elevated with oxygen, pulse ox (continuous), Condom Catheter, blood pressure cuff, telemetry upon PT entry to room.     General Precautions: Standard, fall, diabetic  Orthopedic Precautions: N/A  Braces: N/A  Respiratory Status: Nasal cannula, flow 3 L/min     Functional Mobility:  Bed Mobility:     Supine to Sit: moderate assistance  Transfers:     Sit to Stand:  moderate assistance and of 1-2 persons with hand-held assist  Bed to Chair: moderate assistance with  no AD  using  Stand Pivot  Balance: standing with HHA and Mathew      AM-PAC 6 CLICK MOBILITY          Treatment & Education:  Pt educated on importance of time OOB, importance of intermittent mobility, safe techniques for transfers/ambulation, discharge recommendations/options, and use of call light for assistance and fall prevention.      Patient left up in chair with all lines intact, call button in reach, chair alarm on, and RN present..    GOALS:   Multidisciplinary Problems       Physical Therapy Goals          Problem: Physical Therapy    Goal Priority Disciplines Outcome Goal Variances Interventions   Physical Therapy Goal     PT, PT/OT Ongoing, Progressing     Description: Goals to be met by: 23     Patient will increase functional independence with mobility by performin. Supine to sit with Modified [place]  2. Sit to supine with Modified [place]  3. Sit to stand transfer with  Modified [place]  4. Bed to chair transfer with Modified [place] using Rolling Walker  5. Gait  x 50 feet with Contact Guard Assistance using Rolling Walker.                          Time Tracking:     PT Received On: 05/24/23  PT Start Time: 1019     PT Stop Time: 1042  PT Total Time (min): 23 min     Billable Minutes: Therapeutic Activity 23    Treatment Type: Treatment  PT/PTA: PTA     Number of PTA visits since last PT visit: 2     05/24/2023

## 2023-05-24 NOTE — PLAN OF CARE
Per Adeline with [facility], family informed her they would prefer to pursue SNF placement at this time.  I attempted multiple times to reach family to discuss and get facility preference; however, I was unable to reach anyone and left voicemail for call back at number on face sheet.  When asked, patient states to ask his son.  Referral placed in [facility] for [place] facilities, will update referral when patient/family choice is determined.  PASRR and 142 in , order for PPD placed.        05/24/23 1137   Post-Acute Status   Post-Acute Authorization Placement   Post-Acute Placement Status Referrals Sent   Discharge Plan   Discharge Plan A Skilled Nursing Facility

## 2023-05-24 NOTE — SUBJECTIVE & OBJECTIVE
Interval History:  No acute events overnight, he is feeling about the same today.  Plans for rehab pending    Review of Systems   All other systems reviewed and are negative.  Objective:     Vital Signs (Most Recent):  Temp: 97.5 °F (36.4 °C) (05/24/23 1616)  Pulse: 66 (05/24/23 1616)  Resp: 18 (05/24/23 1616)  BP: 112/61 (05/24/23 1616)  SpO2: 97 % (05/24/23 1616) Vital Signs (24h Range):  Temp:  [97.5 °F (36.4 °C)-99.4 °F (37.4 °C)] 97.5 °F (36.4 °C)  Pulse:  [] 66  Resp:  [10-34] 18  SpO2:  [91 %-100 %] 97 %  BP: ()/(52-93) 112/61     Weight: 83.4 kg (183 lb 13.8 oz)  Body mass index is 27.96 kg/m².    Intake/Output Summary (Last 24 hours) at 5/24/2023 1629  Last data filed at 5/24/2023 1301  Gross per 24 hour   Intake 570 ml   Output 225 ml   Net 345 ml           Physical Exam  Vitals reviewed.   Constitutional:       Appearance: Normal appearance.   HENT:      Head: Normocephalic and atraumatic.      Nose: Nose normal.      Mouth/Throat:      Mouth: Mucous membranes are moist.   Eyes:      Pupils: Pupils are equal, round, and reactive to light.   Cardiovascular:      Rate and Rhythm: Normal rate. Rhythm irregular.      Pulses: Normal pulses.      Heart sounds: Murmur heard.     No friction rub. No gallop.   Pulmonary:      Effort: Pulmonary effort is normal. No respiratory distress.      Breath sounds: Normal breath sounds.      Comments: Nasal cannula in place  Abdominal:      General: Abdomen is flat. Bowel sounds are normal. There is no distension.      Palpations: Abdomen is soft.      Tenderness: There is no abdominal tenderness.   Musculoskeletal:         General: No swelling. Normal range of motion.      Cervical back: Normal range of motion and neck supple.   Skin:     General: Skin is warm and dry.   Neurological:      General: No focal deficit present.      Mental Status: He is alert and oriented to person, place, and time.   Psychiatric:         Mood and Affect: Mood normal.          Behavior: Behavior normal.         Thought Content: Thought content normal.         Judgment: Judgment normal.           Significant Labs: All pertinent labs within the past 24 hours have been reviewed.    Significant Imaging: I have reviewed all pertinent imaging results/findings within the past 24 hours.

## 2023-05-24 NOTE — PT/OT/SLP PROGRESS
Occupational Therapy   Treatment    Name: Siddharth Urias  MRN: [id number]  Admitting Diagnosis:  Acute renal failure superimposed on stage 3 chronic kidney disease  2 Days Post-Op    Recommendations:     Discharge Recommendations: nursing facility, skilled  Discharge Equipment Recommendations:  hip kit, wheelchair  Barriers to discharge:  Decreased caregiver support    Assessment:     Siddharth Urias is a 89 y.o. male with a medical diagnosis of Acute renal failure superimposed on stage 3 chronic kidney disease. Performance deficits affecting function are weakness, impaired endurance, impaired self care skills, impaired functional mobility, gait instability, impaired balance, decreased lower extremity function, impaired cardiopulmonary response to activity, decreased ROM.     Rehab Prognosis:  Fair; patient would benefit from acute skilled OT services to address these deficits and reach maximum level of function.       Plan:     Patient to be seen 5 x/week to address the above listed problems via self-care/home management, therapeutic activities, therapeutic exercises  Plan of Care Expires: 06/21/23  Plan of Care Reviewed with: patient    Subjective     Chief Complaint: none  Patient/Family Comments/goals: none  Pain/Comfort:  Pain Rating 1: 0/10  Pain Rating Post-Intervention 1: 0/10    Objective:     Communicated with: nurse Lopez prior to session.  Patient found up in chair with blood pressure cuff, peripheral IV, telemetry, Condom Catheter, pulse ox (continuous) upon OT entry to room.    General Precautions: Standard, fall, diabetic    Orthopedic Precautions:N/A  Braces: N/A  Respiratory Status: Room air     Occupational Performance:       Activities of Daily Living:  Grooming: supervision with oral and facial hygiene while seated in chair      [facility] 6 Click ADL:        Patient left up in chair with all lines intact, call button in reach, and chair alarm on    GOALS:   Multidisciplinary Problems        Occupational Therapy Goals          Problem: Occupational Therapy    Goal Priority Disciplines Outcome Interventions   Occupational Therapy Goal     OT, PT/OT Ongoing, Progressing    Description: Goals to be met by: 6/21/2023     Patient will increase functional independence with ADLs by performing:    UE Dressing with Modified [place].  LE Dressing with Modified [place].  Grooming while standing at sink with Modified [place].  Toileting from bedside commode with Modified [place] for hygiene and clothing management.   Supine to sit with Modified [place].  Toilet transfer to bedside commode with Modified [place].  Upper extremity exercise program x10 reps per handout, with independence.                         Time Tracking:     OT Date of Treatment: 05/24/23  OT Start Time: 1151  OT Stop Time: 1201  OT Total Time (min): 10 min    Billable Minutes:Self Care/Home Management 10    OT/AYLEEN: OT          5/24/2023

## 2023-05-24 NOTE — PROGRESS NOTES
Formerly [facility]  Progress Note    Patient Name: Siddharth Urias  MRN: [id number]  Patient Class: IP- Inpatient   Admission Date: 5/18/2023  Length of Stay: 6 days  Attending Physician: Chip Patricio MD  Primary Care Provider: Martine Maxwell MD        Subjective:     Principal Problem:Acute renal failure superimposed on stage 3 chronic kidney disease        HPI:  Siddharth Urias Is an 89-year-old male with chronic medical problems including HFpEF, atrial fibrillation CKD 3, peripheral arterial disease with likely occlusion of the right CFA/external iliac, DVT, COPD, diabetes, diverticulosis, hepatic steatosis, and prostate cancer in 2009 who presents to the emergency room tonight sent primary care provider for hypotension.  He was at the doctor's office to establish care for a hospital follow-up and when they checked his blood pressure it was systolic 60.  Patient was asymptomatic and was sent to the emergency room for evaluation.     Patient has had 3 hospitalizations in the last 3 weeks for heart failure exacerbation.  He had elevated BNP, pulmonary edema on x-ray and was diuresed and discharged home on 04/26 with oxygen supplementation.  He returns to the hospital on 05/01 having not use his oxygen and not filled his new prescriptions, was diuresed again encouraged to use his home oxygen.  He was discharged on 05/02 and returned to the hospital on 05/08 with bradycardia with heart rate in the 30s and left knee and ankle pain.  On 05/01 he was found to have a nonocclusive thrombus in the right popliteal vein.  Metoprolol was held and he was switched to Bumex.  Eliquis was increased to 5 mg twice daily and he had reportedly been in sinus rhythm when seen by Cardiology.  He was treated for gout with colchicine and a Medrol Dosepak and allopurinol.    His wife said he has been doing well at home since the last discharge.  She denies that he has had any fevers, he has been using his  oxygen about 75% of the time when he is up and about in his house, he is eating and drinking and voiding well, denies diarrhea, abdominal pain, chest, shortness at breath that is unusual, swelling, he has an occasional cough with clear sputum.  He denies headaches or dizziness.  He said the pain from gout in his left knee and left ankle is resolved. He has no complaints of chest pain or palpitations, headaches, lightheadedness or dizziness. He uses a walker to get around and is somewhat weak but doesn't really go anywhere.    In the ED, lab work with WBC elevated at 15.66, platelets 287, H&H 15.5/48.6, BUN elevated 84, creatinine elevated 2.9, glucose 104, sodium low at 128, potassium 5.9, chloride 91 and serum bicarb 26, , troponin 49.1, calcium 8.8 and albumin 3.6, total bilirubin 1.1, estimated GFR is 20 and estimated creatinine clearance is 16 7.  Temperature 97.4°, heart rate 75, blood pressure 106/52, respiratory rate 16 and O2 sat 90 5% on room air.  His initial blood pressure was 86/64 and heart rate was 40, most recent blood pressure 111/61, heart rate 60-80, respiratory rate 18 to 20 and O2 sat 94% on room air.  Chest x-ray with moderate cardiomegaly, free of infiltrates no pleural effusions, moderately enlarged heart and pulmonary vasculature within normal limits.  He will be admitted to the hospitalist service for further evaluation and treatment to the step-down unit with consult to Nephrology and Cardiology.       Overview/Hospital Course:  No notes on file    Interval History:  No acute events overnight, he is feeling about the same today.  Plans for rehab pending    Review of Systems   All other systems reviewed and are negative.  Objective:     Vital Signs (Most Recent):  Temp: 97.5 °F (36.4 °C) (05/24/23 1616)  Pulse: 66 (05/24/23 1616)  Resp: 18 (05/24/23 1616)  BP: 112/61 (05/24/23 1616)  SpO2: 97 % (05/24/23 1616) Vital Signs (24h Range):  Temp:  [97.5 °F (36.4 °C)-99.4 °F (37.4 °C)] 97.5  °F (36.4 °C)  Pulse:  [] 66  Resp:  [10-34] 18  SpO2:  [91 %-100 %] 97 %  BP: ()/(52-93) 112/61     Weight: 83.4 kg (183 lb 13.8 oz)  Body mass index is 27.96 kg/m².    Intake/Output Summary (Last 24 hours) at 5/24/2023 1629  Last data filed at 5/24/2023 1301  Gross per 24 hour   Intake 570 ml   Output 225 ml   Net 345 ml           Physical Exam  Vitals reviewed.   Constitutional:       Appearance: Normal appearance.   HENT:      Head: Normocephalic and atraumatic.      Nose: Nose normal.      Mouth/Throat:      Mouth: Mucous membranes are moist.   Eyes:      Pupils: Pupils are equal, round, and reactive to light.   Cardiovascular:      Rate and Rhythm: Normal rate. Rhythm irregular.      Pulses: Normal pulses.      Heart sounds: Murmur heard.     No friction rub. No gallop.   Pulmonary:      Effort: Pulmonary effort is normal. No respiratory distress.      Breath sounds: Normal breath sounds.      Comments: Nasal cannula in place  Abdominal:      General: Abdomen is flat. Bowel sounds are normal. There is no distension.      Palpations: Abdomen is soft.      Tenderness: There is no abdominal tenderness.   Musculoskeletal:         General: No swelling. Normal range of motion.      Cervical back: Normal range of motion and neck supple.   Skin:     General: Skin is warm and dry.   Neurological:      General: No focal deficit present.      Mental Status: He is alert and oriented to person, place, and time.   Psychiatric:         Mood and Affect: Mood normal.         Behavior: Behavior normal.         Thought Content: Thought content normal.         Judgment: Judgment normal.           Significant Labs: All pertinent labs within the past 24 hours have been reviewed.    Significant Imaging: I have reviewed all pertinent imaging results/findings within the past 24 hours.      Assessment/Plan:      * Acute renal failure superimposed on stage 3 chronic kidney disease  Seems due to dehydration / diuresis. He had  developed diarrhea yesterday which may have contributed.  - creatinine continues improving  - stable post catheterization  - holding further IVF  - Na improving  - trend renal function  - appreciate nephrology recommendations  - Will need to be careful not to volume overload him.   - will adjust diuretics on d/c      Leukocytosis  Improved with hydration      Hyponatremia  Sodium 128, now improving  - hypovolemic hyponatremia  - improved with hydration      Orthostatic hypotension  Presumed orthostatic hypotension from volume depletion, orthostatics in a.m. after gentle fluid administration overnight      Frail elderly  Consult PT/OT   Will plan for SNF      Acute DVT (deep venous thrombosis)  Diagnosed with DVT of right popliteal on 05/09 and left femoral vein on 05/01, Eliquis was increased to 5 mg twice daily from 2.5 mg twice daily per Cardiology, resume Eliquis after angiogram      US LOWER EXTREMITY VEINS LEFT 5/9/23  FINDINGS:  Left thigh veins: The common femoral, popliteal, upper greater saphenous, and deep femoral veins are patent and free of thrombus. The veins are normally compressible and have normal phasic flow and augmentation response.  There is nonocclusive thrombus within the distal femoral vein which is noncompressible.   Left calf veins: The visualized calf veins are patent.   Contralateral CFV: The contralateral (right) common femoral vein is patent and free of thrombus.     Miscellaneous: None     Impression:   Nonocclusive DVT distal left femoral vein which may be chronic.      US LOWER EXTREMITY VEINS BILATERAL 5/1/2023  FINDINGS:  Right thigh veins: There is a nonocclusive thrombus within the right popliteal vein.  The common femoral, femoral, upper greater saphenous, and deep femoral veins are patent and free of thrombus, these veins are normally compressible and have normal phasic flow and augmentation response.   Right calf veins: The visualized calf veins are patent.   Left thigh veins:  The common femoral, femoral, popliteal, upper greater saphenous, and deep femoral veins are patent and free of thrombus. The veins are normally compressible and have normal phasic flow and augmentation response.   Left calf veins: The visualized calf veins are patent.   Miscellaneous: None     Impression:   Nonocclusive thrombus within the right popliteal vein.    Resume anticoagulation      Chronic diastolic heart failure  Patient with HFpEF with 3 exacerbations in the last month, recently changed to Bumex, he is incontinent of urine but his wife said he seems to have good urine output.       Most recent echo from 04/24/2023  The left ventricle is normal in size with mild concentric hypertrophy and low normal systolic function.  The estimated ejection fraction is 50-55%.  Indeterminate left ventricular diastolic function.  There is mild mitral stenosis.  The mean diastolic gradient across the mitral valve is 4 mmHg at a heart rate of 78 bpm.  Mild-to-moderate mitral regurgitation.  Mild aortic regurgitation.  There is severe aortic valve stenosis.  Aortic valve area is 0.46 cm2; peak velocity is 4.4 m/s; mean gradient is 50 mmHg. Of note, gradients were measured following a post PVC beat.  Normal right ventricular size with normal right ventricular systolic function.  Mild to moderate tricuspid regurgitation.  The estimated PA systolic pressure is at least 39 mmHg.  Elevated central venous pressure (15 mmHg).  The ascending aorta is mildly dilated.      Hyperkalemia  Potassium 5.9, Creatinine 2.9, he was given 1 amp of sodium bicarb, ordered dextrose 50 g x 1 and regular insulin 8.16 units and 1 g calcium gluconate, then start Lokelma 10 g t.i.d. q.4 BNP, glucose checks every 30 minutes x2 then every hour x6 to monitor for hypoglycemia, hold potassium supplement hold losartan    PAF (paroxysmal atrial fibrillation)  Patient with paroxysmal atrial fibrillation, he is on Eliquis, metoprolol was recently stopped  because of heart rate in the 30s, Twelve lead EKG today shows sinus rhythm with first-degree AV block with frequent PVCs, monitor on telemetr  -  resume Eliquis        Elevated troponin  Initial high sensitivity troponin 49.1, repeat high sensitivity troponin 47.3, patient denies chest pain  Angiogram with left main disease and significant stenosis of the ostial RCA  May need surgery versus high-risk intervention at main [place]  Will follow-up outpatient      PAD (peripheral artery disease)  History peripheral arterial disease, avoid SCDs      Aortic stenosis, severe  He and his wife discuss further with Cardiology and they are planning to pursue further intervention  Will need outpatient follow-up for further workup and planning for intervention      COPD (chronic obstructive pulmonary disease)  Stable COPD, resume controller inhaler, albuterol as needed for wheezing or shortness of breath, continue oxygen to maintain sat greater than 90%        VTE Risk Mitigation (From admission, onward)         Ordered     apixaban tablet 5 mg  2 times daily         05/22/23 2238     Reason for No Pharmacological VTE Prophylaxis  Once        Question:  Reasons:  Answer:  Already adequately anticoagulated on oral Anticoagulants    05/18/23 2154     IP VTE HIGH RISK PATIENT  Once         05/18/23 2154                Discharge Planning   MAGDA: 5/25/2023     Code Status: Full Code   Is the patient medically ready for discharge?:     Reason for patient still in hospital (select all that apply): Treatment  Discharge Plan A: Skilled Nursing Facility   Discharge Delays: None known at this time              Chip Patricio MD  Department of Hospital Medicine   [facility]

## 2023-05-24 NOTE — CARE UPDATE
05/24/23 0730   Patient Assessment/Suction   Level of Consciousness (AVPU) alert   Respiratory Effort Normal;Unlabored   Expansion/Accessory Muscles/Retractions no use of accessory muscles;no retractions;expansion symmetric   All Lung Fields Breath Sounds clear;diminished   PRE-TX-O2   Device (Oxygen Therapy) nasal cannula   $ Is the patient on Low Flow Oxygen? Yes   Flow (L/min) 3   SpO2 100 %   Pulse Oximetry Type Continuous   $ Pulse Oximetry - Multiple Charge Pulse Oximetry - Multiple   Pulse 104   Resp 18   Aerosol Therapy   $ Aerosol Therapy Charges Aerosol Treatment   Daily Review of Necessity (SVN) completed   Respiratory Treatment Status (SVN) given   Treatment Route (SVN) mask;oxygen   Patient Position (SVN) HOB elevated   Post Treatment Assessment (SVN) increased aeration   Signs of Intolerance (SVN) none   Education   $ Education Bronchodilator;15 min   Respiratory Evaluation   $ Care Plan Tech Time 15 min   $ Eval/Re-eval Charges Re-evaluation

## 2023-05-24 NOTE — PROGRESS NOTES
INPATIENT NEPHROLOGY Progress Note   [facility]    Patient Name: Siddharth Urias  Date: 05/24/2023    Reason for consultation: KAT    Chief Complaint:   Chief Complaint   Patient presents with    Hypotension     Low blood pressure at doctors appointment today       History of Present Illness:  Siddharth Urias Is an 89-year-old male with chronic medical problems including HFpEF, atrial fibrillation CKD 3, peripheral arterial disease with likely occlusion of the right CFA/external iliac, DVT, COPD, diabetes, diverticulosis, hepatic steatosis, and prostate cancer in 2009 who presents to the emergency room tonight sent primary care provider for hypotension.  He was at the doctor's office to establish care for a hospital follow-up and when they checked his blood pressure it was systolic 60.  Patient was asymptomatic and was sent to the emergency room for evaluation. Patient has had 3 hospitalizations in the last 3 weeks for heart failure exacerbation.  He had elevated BNP, pulmonary edema on x-ray and was diuresed and discharged home on 04/26 with oxygen supplementation. He returned to the hospital on 05/01 having not use his oxygen and not filled his new prescriptions, was diuresed again encouraged to use his home oxygen.  He was discharged on 05/02 and returned to the hospital on 05/08 with bradycardia with heart rate in the 30s and left knee and ankle pain. On 05/01 he was found to have a nonocclusive thrombus in the right popliteal vein.  Metoprolol was held and he was switched to Bumex.  Eliquis was increased to 5 mg twice daily and he had reportedly been in sinus rhythm when seen by Cardiology.  He was treated for gout with colchicine and a Medrol Dosepak and allopurinol. His wife said he has been doing well at home since the last discharge. In the ED, lab work with leukocytosis, KAT. He was bradycardic with orthostasis and hypotension with SBP 80s as well. Chest x-ray with moderate  cardiomegaly, free of infiltrates no pleural effusions, moderately enlarged heart and pulmonary vasculature within normal limits. We are consulted for renal management.      4/2023 echo  The left ventricle is normal in size with mild concentric hypertrophy and low normal systolic function.  The estimated ejection fraction is 50-55%.  Indeterminate left ventricular diastolic function.  There is mild mitral stenosis.  The mean diastolic gradient across the mitral valve is 4 mmHg at a heart rate of 78 bpm.  Mild-to-moderate mitral regurgitation.  Mild aortic regurgitation.  There is severe aortic valve stenosis.  Aortic valve area is 0.46 cm2; peak velocity is 4.4 m/s; mean gradient is 50 mmHg. Of note, gradients were measured following a post PVC beat.  Normal right ventricular size with normal right ventricular systolic function.  Mild to moderate tricuspid regurgitation.  The estimated PA systolic pressure is at least 39 mmHg.  Elevated central venous pressure (15 mmHg).  The ascending aorta is mildly dilated.    Interval History:  5/19- HR , , on 1L NC, UOP 400cc  5/20- HR 90-120s, SBP all over the place but generally better, on 2L NC, UOP 1.5L, feels better, asked nurse to check orthos and patient needs to start some PT/OT to better guide medication resumption  5/21- VSS, on 2L NC, UOP 1.2L, reviewed cards note- patient needs a TAVR and will need C first- GENESIS discussed with patient- may proceed with [facility] Tues he says- we discussed doing some IVFs Monday night  5/22 AFVSS.  Output not recorded.  No nausea, chest pain, sob, fever, urinary or bowel complaint, new neurologic symptoms, new joint pain    5/23  AFVSS.  1000 cc uop.  No nausea, chest pain, sob, fever, urinary or bowel complaint, new neurologic symptoms, new joint pain  Angiogram yesterday with severe ostial RCA stenosis and severe distal LM stenosis.  He also has severe PAD with RCFA and R iliac artery oclusion, and heavy calcification of  entire L iliac and CFA   5/24  AFVSS.  Sleeping.  No acute events.  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    - orthos negative- continue to work with PT/OT  - reviewed cardiology note regarding LHC and TAVR- defer medication optimization to them in terms of beta blocker, diuretic- keep losartan on hold  - continue flomax- no nsaids  - continue renal diet, 1.5L fluid restrictoin  - BMM parameters at goal  - H/H at goal  --erythropoietin stimulating agent not indicated yet     Stable for d/c from renal standpoint    Thank you for allowing us to participate in this patient's care. We will continue to follow.    Vital Signs:  Temp Readings from Last 3 Encounters:   05/24/23 97.6 °F (36.4 °C) (Oral)   05/18/23 97.6 °F (36.4 °C) (Oral)   05/09/23 98.4 °F (36.9 °C) (Oral)       Pulse Readings from Last 3 Encounters:   05/24/23 (!) 46   05/18/23 66   05/09/23 (!) 52       BP Readings from Last 3 Encounters:   05/24/23 (!) 114/52   05/18/23 (!) 64/40   05/09/23 129/60       Weight:  Wt Readings from Last 3 Encounters:   05/18/23 83.4 kg (183 lb 13.8 oz)   05/18/23 84.2 kg (185 lb 10 oz)   05/08/23 84.7 kg (186 lb 11.7 oz)       INS/OUTS:  I/O last 3 completed shifts:  In: 667.5 [P.O.:515; I.V.:152.5]  Out: 1725 [Urine:1725]  I/O this shift:  In: 440 [P.O.:440]  Out: -     Medications:  Scheduled Meds:   allopurinoL  200 mg Oral Daily    apixaban  5 mg Oral BID    budesonide  0.5 mg Nebulization Q12H    And    ipratropium  0.5 mg Nebulization Q12H    And    arformoteroL  15 mcg Nebulization BID    aspirin  81 mg Oral Daily    metoprolol tartrate  12.5 mg Oral BID    pantoprazole  40 mg Oral Daily    pravastatin  40 mg Oral Daily    tamsulosin  1 capsule Oral Daily     Continuous  Infusions:   sodium chloride 0.9% 100 mL (05/22/23 1122)       PRN Meds:.acetaminophen, albuterol sulfate, melatonin, naloxone, ondansetron, oxyCODONE, oxyCODONE, polyethylene glycol, simethicone, sodium chloride 0.9%, sodium chloride 0.9%, traZODone  No current facility-administered medications on file prior to encounter.     Current Outpatient Medications on File Prior to Encounter   Medication Sig Dispense Refill    albuterol (PROVENTIL/VENTOLIN HFA) 90 mcg/actuation inhaler INHALE 2 PUFFS INTO THE LUNGS EVERY 6 HOURS AS NEEDED FOR WHEEZE (Patient taking differently: Inhale 2 puffs into the lungs every 6 (six) hours as needed.) 18 g 1    allopurinoL (ZYLOPRIM) 100 MG tablet Take 2 tablets (200 mg total) by mouth once daily. 60 tablet 2    apixaban (ELIQUIS) 5 mg Tab Take 1 tablet (5 mg total) by mouth 2 (two) times daily. 60 tablet 2    aspirin 81 MG Chew Take 81 mg by mouth once daily.      bumetanide (BUMEX) 1 MG tablet Take 1 tablet (1 mg total) by mouth once daily. 30 tablet 2    colestipoL (COLESTID) 1 gram Tab TAKE 1 TABLET BY MOUTH 2 TIMES DAILY. TAKE OTHER ORAL MEDICATIONS >1H BEFORE OR >4H AFTER COLESTIPOL (Patient taking differently: Take 1 g by mouth 2 (two) times daily.) 180 tablet 1    fluticasone-umeclidin-vilanter (TRELEGY ELLIPTA) 100-62.5-25 mcg DsDv Inhale 1 puff into the lungs once daily. 180 each 3    fluticasone-umeclidin-vilanter (TRELEGY ELLIPTA) 100-62.5-25 mcg DsDv Inhale 1 puff into the lungs once daily. 28 each 0    furosemide (LASIX) 40 MG tablet Take 40 mg by mouth once daily.      losartan (COZAAR) 25 MG tablet Take 1 tablet (25 mg total) by mouth once daily. 90 tablet 3    metoprolol succinate (TOPROL-XL) 25 MG 24 hr tablet Take 25 mg by mouth once daily.      miconazole NITRATE 2 % (MICOTIN) 2 % top powder Apply topically 2 (two) times daily. Apply to groin twice a day x1 week for 7 days 71 g 0    pantoprazole (PROTONIX) 40 MG tablet Take 1 tablet (40 mg total) by mouth once daily.  "90 tablet 3    potassium chloride SA (K-DUR,KLOR-CON) 20 MEQ tablet Take 1 tablet (20 mEq total) by mouth 2 (two) times daily. 60 tablet 1    pravastatin (PRAVACHOL) 40 MG tablet Take 1 tablet (40 mg total) by mouth once daily. 90 tablet 3    solifenacin (VESICARE) 5 MG tablet Take 1 tablet (5 mg total) by mouth once daily. 90 tablet 3    tamsulosin (FLOMAX) 0.4 mg Cap Take 1 capsule (0.4 mg total) by mouth once daily. 90 capsule 3    tamsulosin (FLOMAX) 0.4 mg Cap Take 1 capsule (0.4 mg total) by mouth once daily. for 7 days 7 capsule 0    traZODone (DESYREL) 50 MG tablet Take 1 tablet (50 mg total) by mouth nightly as needed for Insomnia. 90 tablet 3       Review of Systems:  Neg    Physical Exam:  BP (!) 114/52 Comment: manual by nurse disha  Pulse (!) 46   Temp 97.6 °F (36.4 °C) (Oral)   Resp 18   Ht 5' 8" (1.727 m)   Wt 83.4 kg (183 lb 13.8 oz)   SpO2 97%   BMI 27.96 kg/m²     General Appearance:    NAD, AAO x 3, cooperative, appears stated age   Head:    Normocephalic, atraumatic   Eyes:    PER, EOMI, and conjunctiva/sclera clear bilaterally        Mouth:   Moist mucus membranes, no thrush or oral lesions, normal      dentition   Back:     No CVA tenderness   Lungs:     Clear to auscultation bilaterally, no wheeze, crackles, rales      or rhonchi, symmetric air movement, respirations unlabored   Heart:    Regular rate and rhythm, S1 and S2 normal, ARLENE, rub   or gallop   Abdomen:     Soft, non-tender, non-distended, bowel sounds active all four   quadrants, no RT or guarding, no masses, no organomegaly   Extremities:   Warm and well perfused, distal pulses intact, no cyanosis or    peripheral edema   MSK:   No joint or muscle swelling, tenderness or deformity   Skin:   Skin color, texture, turgor normal, no rashes or lesions   Neurologic/Psychiatric:   CNII-XII intact, normal strength and sensation       throughout, no asterixis; normal affect, memory, judgement     and insight     Results:  Lab " Results   Component Value Date     05/24/2023    K 4.6 05/24/2023     05/24/2023    CO2 27 05/24/2023    BUN 44 (H) 05/24/2023    CREATININE 1.8 (H) 05/24/2023    CALCIUM 8.2 (L) 05/24/2023    ANIONGAP 9 05/24/2023    ESTGFRAFRICA 40.7 (A) 05/13/2022    EGFRNONAA 35.2 (A) 05/13/2022       Lab Results   Component Value Date    CALCIUM 8.2 (L) 05/24/2023    PHOS 4.5 05/18/2023       Recent Labs   Lab 05/24/23  0458   WBC 9.09   RBC 4.28*   HGB 11.3*   HCT 36.1*      MCV 84   MCH 26.4*   MCHC 31.3*         I have personally reviewed pertinent radiological imaging and reports.    I have spent > 35 minutes providing care for this patient for the above diagnoses. These services have included chart/data/imaging review, evaluation, exam, formulation of plan, , note preparation, and discussions with staff involved in this patient's care.    Patient care was time spent personally by me on the following activities:   Obtaining a history  Examination of patient.  Providing medical care at the patients bedside.  Developing a treatment plan with patient or surrogate and bedside caregivers  Ordering and reviewing laboratory studies, radiographic studies, pulse oximetry.  Ordering and performing treatments and interventions.  Evaluation of patient's response to treatment.  Discussions with consultants while on the unit and immediately available to the patient.  Re-evaluation of the patient's condition.  Documentation in the medical record.       Castillo Peng MD  Nephrology  [facility]  [phone]

## 2023-05-24 NOTE — ASSESSMENT & PLAN NOTE
Apparently patient has declined invasive treatment for severe aortic stenosis and he reiterates this again to me today    
Apparently patient has declined invasive treatment for severe aortic stenosis, consult Cardiology    
BUN/CR 84/2.9 with estimated GFR 20, estimated creatinine clearance 16.7, he has a widely ranging creatinine at times greater than 32 but more recently ranging from 1.6-2.1.  He has been hospitalized 3 times in the last month with diuresis and most recently changed to Bumex, BNP has been greater than 3000, today BNP is 231, he seems a little intravascularly depleted, given 500 cc lactated Ringer's in ED, we will give normal saline at 50 cc an hour, monitor on step-down unit, consult to Nephrology for evaluation, obtain retroperitoneal ultrasound, monitor closely for volume overload, place condom catheter and monitor urine output, we will place urinary catheter if necessary for accurate I&O, hold losartan, hold potassium supplement    
Consult PT/OT   Will plan for SNF    
Consult PT/OT for evaluation when stable    
Consult PT/OT for evaluation when stable    
Diagnosed with DVT of right popliteal on 05/09 and left femoral vein on 05/01, Eliquis was increased to 5 mg twice daily from 2.5 mg twice daily per Cardiology, resume Eliquis      US LOWER EXTREMITY VEINS LEFT 5/9/23  FINDINGS:  Left thigh veins: The common femoral, popliteal, upper greater saphenous, and deep femoral veins are patent and free of thrombus. The veins are normally compressible and have normal phasic flow and augmentation response.  There is nonocclusive thrombus within the distal femoral vein which is noncompressible.   Left calf veins: The visualized calf veins are patent.   Contralateral CFV: The contralateral (right) common femoral vein is patent and free of thrombus.     Miscellaneous: None     Impression:   Nonocclusive DVT distal left femoral vein which may be chronic.      US LOWER EXTREMITY VEINS BILATERAL 5/1/2023  FINDINGS:  Right thigh veins: There is a nonocclusive thrombus within the right popliteal vein.  The common femoral, femoral, upper greater saphenous, and deep femoral veins are patent and free of thrombus, these veins are normally compressible and have normal phasic flow and augmentation response.   Right calf veins: The visualized calf veins are patent.   Left thigh veins: The common femoral, femoral, popliteal, upper greater saphenous, and deep femoral veins are patent and free of thrombus. The veins are normally compressible and have normal phasic flow and augmentation response.   Left calf veins: The visualized calf veins are patent.   Miscellaneous: None     Impression:   Nonocclusive thrombus within the right popliteal vein.    
Diagnosed with DVT of right popliteal on 05/09 and left femoral vein on 05/01, Eliquis was increased to 5 mg twice daily from 2.5 mg twice daily per Cardiology, resume Eliquis after angiogram      US LOWER EXTREMITY VEINS LEFT 5/9/23  FINDINGS:  Left thigh veins: The common femoral, popliteal, upper greater saphenous, and deep femoral veins are patent and free of thrombus. The veins are normally compressible and have normal phasic flow and augmentation response.  There is nonocclusive thrombus within the distal femoral vein which is noncompressible.   Left calf veins: The visualized calf veins are patent.   Contralateral CFV: The contralateral (right) common femoral vein is patent and free of thrombus.     Miscellaneous: None     Impression:   Nonocclusive DVT distal left femoral vein which may be chronic.      US LOWER EXTREMITY VEINS BILATERAL 5/1/2023  FINDINGS:  Right thigh veins: There is a nonocclusive thrombus within the right popliteal vein.  The common femoral, femoral, upper greater saphenous, and deep femoral veins are patent and free of thrombus, these veins are normally compressible and have normal phasic flow and augmentation response.   Right calf veins: The visualized calf veins are patent.   Left thigh veins: The common femoral, femoral, popliteal, upper greater saphenous, and deep femoral veins are patent and free of thrombus. The veins are normally compressible and have normal phasic flow and augmentation response.   Left calf veins: The visualized calf veins are patent.   Miscellaneous: None     Impression:   Nonocclusive thrombus within the right popliteal vein.    
Diagnosed with DVT of right popliteal on 05/09 and left femoral vein on 05/01, Eliquis was increased to 5 mg twice daily from 2.5 mg twice daily per Cardiology, resume Eliquis after angiogram      US LOWER EXTREMITY VEINS LEFT 5/9/23  FINDINGS:  Left thigh veins: The common femoral, popliteal, upper greater saphenous, and deep femoral veins are patent and free of thrombus. The veins are normally compressible and have normal phasic flow and augmentation response.  There is nonocclusive thrombus within the distal femoral vein which is noncompressible.   Left calf veins: The visualized calf veins are patent.   Contralateral CFV: The contralateral (right) common femoral vein is patent and free of thrombus.     Miscellaneous: None     Impression:   Nonocclusive DVT distal left femoral vein which may be chronic.      US LOWER EXTREMITY VEINS BILATERAL 5/1/2023  FINDINGS:  Right thigh veins: There is a nonocclusive thrombus within the right popliteal vein.  The common femoral, femoral, upper greater saphenous, and deep femoral veins are patent and free of thrombus, these veins are normally compressible and have normal phasic flow and augmentation response.   Right calf veins: The visualized calf veins are patent.   Left thigh veins: The common femoral, femoral, popliteal, upper greater saphenous, and deep femoral veins are patent and free of thrombus. The veins are normally compressible and have normal phasic flow and augmentation response.   Left calf veins: The visualized calf veins are patent.   Miscellaneous: None     Impression:   Nonocclusive thrombus within the right popliteal vein.    Resume anticoagulation    
He and his wife discuss further with Cardiology and they are planning to pursue further intervention    
He and his wife discuss further with Cardiology and they are planning to pursue further intervention  LHC in the am  NPO midnight    
He and his wife discuss further with Cardiology and they are planning to pursue further intervention  LHC today  NPO midnight    
He and his wife discuss further with Cardiology and they are planning to pursue further intervention  Will need outpatient follow-up for further workup and planning for intervention    
History peripheral arterial disease, avoid SCDs    
Improved with hydration    
Initial high sensitivity troponin 49.1, repeat high sensitivity troponin 47.3, patient denies chest pain    
Initial high sensitivity troponin 49.1, repeat high sensitivity troponin 47.3, patient denies chest pain    
Initial high sensitivity troponin 49.1, repeat high sensitivity troponin 47.3, patient denies chest pain  Angiogram with left main disease and significant stenosis of the ostial RCA  May need surgery versus high-risk intervention at main [place]  Will follow-up outpatient    
Initial high sensitivity troponin 49.1, repeat high sensitivity troponin 47.3, patient denies chest pain  Cardiology planning angiogram early next week    
Initial high sensitivity troponin 49.1, repeat high sensitivity troponin 47.3, patient denies chest pain  Cardiology planning angiogram today    
Initial high sensitivity troponin 49.1, repeat high sensitivity troponin 47.3, patient denies chest pain  Cardiology planning angiogram tomorrow    
Patient with HFpEF with 3 exacerbations in the last month, recently changed to Bumex, he is incontinent of urine but his wife said he seems to have good urine output.       Most recent echo from 04/24/2023  The left ventricle is normal in size with mild concentric hypertrophy and low normal systolic function.  The estimated ejection fraction is 50-55%.  Indeterminate left ventricular diastolic function.  There is mild mitral stenosis.  The mean diastolic gradient across the mitral valve is 4 mmHg at a heart rate of 78 bpm.  Mild-to-moderate mitral regurgitation.  Mild aortic regurgitation.  There is severe aortic valve stenosis.  Aortic valve area is 0.46 cm2; peak velocity is 4.4 m/s; mean gradient is 50 mmHg. Of note, gradients were measured following a post PVC beat.  Normal right ventricular size with normal right ventricular systolic function.  Mild to moderate tricuspid regurgitation.  The estimated PA systolic pressure is at least 39 mmHg.  Elevated central venous pressure (15 mmHg).  The ascending aorta is mildly dilated.    
Patient with HFpEF with 3 exacerbations in the last month, recently changed to Bumex, he is incontinent of urine but his wife said he seems to have good urine output.  hold diuretics, , consult Cardiology, daily weights, strict I&O      Most recent echo from 04/24/2023  The left ventricle is normal in size with mild concentric hypertrophy and low normal systolic function.  The estimated ejection fraction is 50-55%.  Indeterminate left ventricular diastolic function.  There is mild mitral stenosis.  The mean diastolic gradient across the mitral valve is 4 mmHg at a heart rate of 78 bpm.  Mild-to-moderate mitral regurgitation.  Mild aortic regurgitation.  There is severe aortic valve stenosis.  Aortic valve area is 0.46 cm2; peak velocity is 4.4 m/s; mean gradient is 50 mmHg. Of note, gradients were measured following a post PVC beat.  Normal right ventricular size with normal right ventricular systolic function.  Mild to moderate tricuspid regurgitation.  The estimated PA systolic pressure is at least 39 mmHg.  Elevated central venous pressure (15 mmHg).  The ascending aorta is mildly dilated.    
Patient with HFpEF with 3 exacerbations in the last month, recently changed to Bumex, he is incontinent of urine but his wife said he seems to have good urine output. Overdiuresed as above  - can hold off on cardiology consult for now      Most recent echo from 04/24/2023  The left ventricle is normal in size with mild concentric hypertrophy and low normal systolic function.  The estimated ejection fraction is 50-55%.  Indeterminate left ventricular diastolic function.  There is mild mitral stenosis.  The mean diastolic gradient across the mitral valve is 4 mmHg at a heart rate of 78 bpm.  Mild-to-moderate mitral regurgitation.  Mild aortic regurgitation.  There is severe aortic valve stenosis.  Aortic valve area is 0.46 cm2; peak velocity is 4.4 m/s; mean gradient is 50 mmHg. Of note, gradients were measured following a post PVC beat.  Normal right ventricular size with normal right ventricular systolic function.  Mild to moderate tricuspid regurgitation.  The estimated PA systolic pressure is at least 39 mmHg.  Elevated central venous pressure (15 mmHg).  The ascending aorta is mildly dilated.    
Patient with paroxysmal atrial fibrillation, he is on Eliquis, metoprolol was recently stopped because of heart rate in the 30s, Twelve lead EKG today shows sinus rhythm with first-degree AV block with frequent PVCs, monitor on telemetr  -  resume Eliquis      
Patient with paroxysmal atrial fibrillation, he is on Eliquis, metoprolol was recently stopped because of heart rate in the 30s, Twelve lead EKG today shows sinus rhythm with first-degree AV block with frequent PVCs, monitor on telemetr  - will add back low dose BB if needed      
Patient with paroxysmal atrial fibrillation, he is on Eliquis, metoprolol was recently stopped because of heart rate in the 30s, Twelve lead EKG today shows sinus rhythm with first-degree AV block with frequent PVCs, monitor on telemetry, consult Cardiology      
Potassium 5.9, Creatinine 2.9, he was given 1 amp of sodium bicarb, ordered dextrose 50 g x 1 and regular insulin 8.16 units and 1 g calcium gluconate, then start Lokelma 10 g t.i.d. q.4 BNP, glucose checks every 30 minutes x2 then every hour x6 to monitor for hypoglycemia, hold potassium supplement hold losartan  
Presumed orthostatic hypotension from volume depletion, orthostatics in a.m. after gentle fluid administration overnight    
Seems due to dehydration / diuresis. He had developed diarrhea yesterday which may have contributed.   - getting additional IVF  - Na improving  - trend renal function  - appreciate nephrology recommendations  - Will need to be careful not to volume overload him.   - may need to adjust diuretics going forward.     
Seems due to dehydration / diuresis. He had developed diarrhea yesterday which may have contributed.  - creatinine continues improving  - holding further IVF  - Na improving  - trend renal function  - appreciate nephrology recommendations  - Will need to be careful not to volume overload him.   - may need to adjust diuretics going forward.     
Seems due to dehydration / diuresis. He had developed diarrhea yesterday which may have contributed.  - creatinine continues improving  - holding further IVF  - Na improving  - trend renal function  - appreciate nephrology recommendations  - Will need to be careful not to volume overload him.   - will adjust diuretics on d/c    
Seems due to dehydration / diuresis. He had developed diarrhea yesterday which may have contributed.  - creatinine continues improving  - stable post catheterization  - holding further IVF  - Na improving  - trend renal function  - appreciate nephrology recommendations  - Will need to be careful not to volume overload him.   - will adjust diuretics on d/c    
Seems due to dehydration / diuresis. He had developed diarrhea yesterday which may have contributed.  - creatinine continues improving  - stable post catheterization  - holding further IVF  - Na improving  - trend renal function  - appreciate nephrology recommendations  - Will need to be careful not to volume overload him.   - will adjust diuretics on d/c    
Seems due to dehydration / diuresis. He had developed diarrhea yesterday which may have contributed.  - creatinine improving with volume   - getting additional IVF  - Na improving  - trend renal function  - appreciate nephrology recommendations  - Will need to be careful not to volume overload him.   - may need to adjust diuretics going forward.     
Sodium 128, Serum osmolality 303, urine osmolality 367, urine sodium less than 10 and urine creatinine 84, uric acid 9.8, gentle fluid administration, BNP q.4 hours    
Sodium 128, now improving  - hypovolemic hyponatremia  - improved with hydration    
Stable COPD, resume controller inhaler, albuterol as needed for wheezing or shortness of breath, continue oxygen to maintain sat greater than 90%    
WBC elevated at 15.66, no pneumonia symptoms, urine with no signs of infection, no fevers, send blood cultures, lactic acid is 1.7, procalcitonin is pending, could be from stress response from hypotension, repeat CBC in a.m.    
Not applicable: This is a surgical and/or non-medical patient

## 2023-05-24 NOTE — PLAN OF CARE
Patient accepted to [facility] for SNF services and can admit patient tomorrow, 5/25/23 per liaison Margaret. [facility] is facility of choice per son Kvng.  Updated clinicals uploaded into [facility] referral.        05/24/23 1525   Post-Acute Status   Post-Acute Authorization Placement   Post-Acute Placement Status Set-up Complete/Auth obtained

## 2023-05-25 NOTE — DISCHARGE SUMMARY
[facility] Medicine  Discharge Summary      Patient Name: Siddharth Urias  MRN: [id number]  [facility]: [id number]  Patient Class: IP- Inpatient  Admission Date: 5/18/2023  Hospital Length of Stay: 7 days  Discharge Date and Time:  05/25/2023 10:25 AM  Attending Physician: Chip Patricio MD   Discharging Provider: Chip Patricio MD  Primary Care Provider: Martine Maxwell MD    Primary Care Team: Networked reference to record PCT     HPI:   Siddharth Urias Is an 89-year-old male with chronic medical problems including HFpEF, atrial fibrillation CKD 3, peripheral arterial disease with likely occlusion of the right CFA/external iliac, DVT, COPD, diabetes, diverticulosis, hepatic steatosis, and prostate cancer in 2009 who presents to the emergency room tonight sent primary care provider for hypotension.  He was at the doctor's office to establish care for a hospital follow-up and when they checked his blood pressure it was systolic 60.  Patient was asymptomatic and was sent to the emergency room for evaluation.     Patient has had 3 hospitalizations in the last 3 weeks for heart failure exacerbation.  He had elevated BNP, pulmonary edema on x-ray and was diuresed and discharged home on 04/26 with oxygen supplementation.  He returns to the hospital on 05/01 having not use his oxygen and not filled his new prescriptions, was diuresed again encouraged to use his home oxygen.  He was discharged on 05/02 and returned to the hospital on 05/08 with bradycardia with heart rate in the 30s and left knee and ankle pain.  On 05/01 he was found to have a nonocclusive thrombus in the right popliteal vein.  Metoprolol was held and he was switched to Bumex.  Eliquis was increased to 5 mg twice daily and he had reportedly been in sinus rhythm when seen by Cardiology.  He was treated for gout with colchicine and a Medrol Dosepak and allopurinol.    His wife said he has been doing well at home since the last  discharge.  She denies that he has had any fevers, he has been using his oxygen about 75% of the time when he is up and about in his house, he is eating and drinking and voiding well, denies diarrhea, abdominal pain, chest, shortness at breath that is unusual, swelling, he has an occasional cough with clear sputum.  He denies headaches or dizziness.  He said the pain from gout in his left knee and left ankle is resolved. He has no complaints of chest pain or palpitations, headaches, lightheadedness or dizziness. He uses a walker to get around and is somewhat weak but doesn't really go anywhere.    In the ED, lab work with WBC elevated at 15.66, platelets 287, H&H 15.5/48.6, BUN elevated 84, creatinine elevated 2.9, glucose 104, sodium low at 128, potassium 5.9, chloride 91 and serum bicarb 26, , troponin 49.1, calcium 8.8 and albumin 3.6, total bilirubin 1.1, estimated GFR is 20 and estimated creatinine clearance is 16 7.  Temperature 97.4°, heart rate 75, blood pressure 106/52, respiratory rate 16 and O2 sat 90 5% on room air.  His initial blood pressure was 86/64 and heart rate was 40, most recent blood pressure 111/61, heart rate 60-80, respiratory rate 18 to 20 and O2 sat 94% on room air.  Chest x-ray with moderate cardiomegaly, free of infiltrates no pleural effusions, moderately enlarged heart and pulmonary vasculature within normal limits.  He will be admitted to the hospitalist service for further evaluation and treatment to the step-down unit with consult to Nephrology and Cardiology.       Procedure(s) (LRB):  ANGIOGRAM, CORONARY ARTERY (N/A)      Hospital Course:   Pt is a 89 y/o M with hx of HFpEF, atrial fibrillation, CKD 3, severe aortic stenosis, peripheral arterial disease with likely occlusion of the right CFA/external iliac, DVT, COPD, diabetes, diverticulosis, hepatic steatosis, and prostate cancer in 2009.  He presented with hypotension was found to be dehydrated due to over-diuresis and  had mild acute kidney injury.  He was given IV fluids with improvement in his renal function.  After further discussion with Cardiology, he has changed his mind on wanting intervention for his severe aortic stenosis.  He underwent diagnostic angiogram which demonstrated multivessel coronary disease with left main disease and ostial RCA disease as well as severe aortic stenosis.  Dr. Solis discussed with Dr Ledezma at [facility] and they have recommended outpatient follow-up after rehab in the [facility] for further evaluation and determination of whether he would be a candidate for any further intervention.       Goals of Care Treatment Preferences:  Code Status: Full Code      Consults:   Consults (From admission, onward)        Status Ordering Provider     Inpatient consult to Cardiology  Once        Provider:  Kamlesh Sullivan MD    Completed ERICA JEREZ     Inpatient consult to Nephrology  Once        Provider:  Joey Hassan MD    Completed ERICA JEREZ     Inpatient consult to Registered Dietitian/Nutritionist  Once        Provider:  (Not yet assigned)    Completed ERICA JEREZ     Inpatient consult to Hospitalist  Once        Provider:  Erica Jerez, JOSHUA    Acknowledged ERICA JEREZ          No new Assessment & Plan notes have been filed under this hospital service since the last note was generated.  Service: Hospital Medicine    Final Active Diagnoses:    Diagnosis Date Noted POA    PRINCIPAL PROBLEM:  Acute renal failure superimposed on stage 3 chronic kidney disease [N17.9, N18.30] 11/20/2021 Yes    Frail elderly [R54] 05/18/2023 Yes    Orthostatic hypotension [I95.1] 05/18/2023 Yes    Hyponatremia [E87.1] 05/18/2023 Yes    Leukocytosis [D72.829] 05/18/2023 Yes    Acute DVT (deep venous thrombosis) [I82.409] 05/09/2023 Yes    Chronic diastolic heart failure [I50.32] 05/08/2023 Yes    Hyperkalemia [E87.5] 05/08/2023 Yes    Elevated troponin [R77.8]  04/24/2023 Yes    PAF (paroxysmal atrial fibrillation) [I48.0] 04/24/2023 Yes    PAD (peripheral artery disease) [I73.9] 07/09/2022 Yes    Aortic stenosis, severe [I35.0] 11/23/2021 Yes    COPD (chronic obstructive pulmonary disease) [J44.9] 12/11/2019 Yes      Problems Resolved During this Admission:       Discharged Condition: good    Disposition: Skilled Nursing Facility    Follow Up:   Follow-up Information     Martine Maxwell MD. Schedule an appointment as soon as possible for a visit in 1 week(s).    Specialty: Family Medicine  Contact information:  [address] [facility] [address]  [phone]             Jorge Ledezma MD. Schedule an appointment as soon as possible for a visit.    Specialty: Interventional Cardiology  Why: Make an appointment to be evaluated in the [facility]  Contact information:  [address]  [facility] [address]  [phone]                       Patient Instructions:      Ambulatory referral/consult to Home Health   Standing Status: Future   Referral Priority: Routine Referral Type: Home Health   Referral Reason: Specialty Services Required   Requested Specialty: Home Health Services   Number of Visits Requested: 1     Ambulatory referral/consult to Interventional Cardiology   Standing Status: Future   Referral Priority: Routine Referral Type: Consultation   Referral Reason: Specialty Services Required   Requested Specialty: Interventional Cardiology   Number of Visits Requested: 1     Diet Adult Regular     No dressing needed     Activity as tolerated       Significant Diagnostic Studies: Labs:   BMP:   Recent Labs   Lab 05/24/23 0458 05/25/23  0459   * 122*    138   K 4.6 4.5    104   CO2 27 27   BUN 44* 44*   CREATININE 1.8* 1.7*   CALCIUM 8.2* 8.1*   MG 2.2 2.1   , CBC   Recent Labs   Lab 05/24/23 0458 05/25/23  0459   WBC 9.09 9.65   HGB 11.3* 10.7*   HCT 36.1* 35.2*    171    and All labs within the past 24 hours have been  reviewed    Pending Diagnostic Studies:     None         Medications:  Reconciled Home Medications:      Medication List      CHANGE how you take these medications    albuterol 90 mcg/actuation inhaler  Commonly known as: PROVENTIL/VENTOLIN HFA  INHALE 2 PUFFS INTO THE LUNGS EVERY 6 HOURS AS NEEDED FOR WHEEZE  What changed: See the new instructions.     colestipoL 1 gram Tab  Commonly known as: COLESTID  TAKE 1 TABLET BY MOUTH 2 TIMES DAILY. TAKE OTHER ORAL MEDICATIONS >1H BEFORE OR >4H AFTER COLESTIPOL  What changed: See the new instructions.     furosemide 40 MG tablet  Commonly known as: LASIX  Take 0.5 tablets (20 mg total) by mouth once daily.  What changed: how much to take        CONTINUE taking these medications    allopurinoL 100 MG tablet  Commonly known as: ZYLOPRIM  Take 2 tablets (200 mg total) by mouth once daily.     apixaban 5 mg Tab  Commonly known as: ELIQUIS  Take 1 tablet (5 mg total) by mouth 2 (two) times daily.     aspirin 81 MG Chew  Take 81 mg by mouth once daily.     * fluticasone-umeclidin-vilanter 100-62.5-25 mcg Dsdv  Commonly known as: TRELEGY ELLIPTA  Inhale 1 puff into the lungs once daily.     * fluticasone-umeclidin-vilanter 100-62.5-25 mcg Dsdv  Commonly known as: TRELEGY ELLIPTA  Inhale 1 puff into the lungs once daily.     losartan 25 MG tablet  Commonly known as: COZAAR  Take 1 tablet (25 mg total) by mouth once daily.     metoprolol succinate 25 MG 24 hr tablet  Commonly known as: TOPROL-XL  Take 25 mg by mouth once daily.     miconazole NITRATE 2 % 2 % top powder  Commonly known as: MICOTIN  Apply topically 2 (two) times daily. Apply to groin twice a day x1 week for 7 days     pantoprazole 40 MG tablet  Commonly known as: PROTONIX  Take 1 tablet (40 mg total) by mouth once daily.     potassium chloride SA 20 MEQ tablet  Commonly known as: K-DUR,KLOR-CON  Take 1 tablet (20 mEq total) by mouth 2 (two) times daily.     pravastatin 40 MG tablet  Commonly known as: PRAVACHOL  Take  1 tablet (40 mg total) by mouth once daily.     solifenacin 5 MG tablet  Commonly known as: VESICARE  Take 1 tablet (5 mg total) by mouth once daily.     * tamsulosin 0.4 mg Cap  Commonly known as: FLOMAX  Take 1 capsule (0.4 mg total) by mouth once daily.     * tamsulosin 0.4 mg Cap  Commonly known as: FLOMAX  Take 1 capsule (0.4 mg total) by mouth once daily. for 7 days     traZODone 50 MG tablet  Commonly known as: DESYREL  Take 1 tablet (50 mg total) by mouth nightly as needed for Insomnia.         * This list has 4 medication(s) that are the same as other medications prescribed for you. Read the directions carefully, and ask your doctor or other care provider to review them with you.            STOP taking these medications    bumetanide 1 MG tablet  Commonly known as: BUMEX            Indwelling Lines/Drains at time of discharge:   Lines/Drains/Airways     None                 Time spent on the discharge of patient: 55 minutes         Chip Patricio MD  Department of Hospital Medicine  [facility]

## 2023-05-25 NOTE — PLAN OF CARE
Patient is cleared to admit to [facility] per liaison Margaret who is arranging transportation.  Number for report [phone], room A11.  Son Kvng updated and verbalizes agreement.     Discharge orders and chart reviewed with no further post-acute discharge needs identified at this time.  At this time, patient is cleared for discharge from Case Management standpoint.        05/25/23 1653   Final Note   Assessment Type Final Discharge Note   Anticipated Discharge Disposition SNF   Post-Acute Status   Post-Acute Authorization Placement   Post-Acute Placement Status Set-up Complete/Auth obtained   Home Health Status Discharge Plan Changed   Discharge Delays (!) Ambulance Transport/Facility Transport

## 2023-05-25 NOTE — PT/OT/SLP PROGRESS
Physical Therapy Treatment    Patient Name:  Siddharth Urias   MRN:  [id number]    Recommendations:     Discharge Recommendations: nursing facility, skilled  Discharge Equipment Recommendations: other (see comments) (TBD at next level of care)  Barriers to discharge:  increased assist with mobility    Assessment:     Siddharth Urias is a 89 y.o. male admitted with a medical diagnosis of Acute renal failure superimposed on stage 3 chronic kidney disease.  He presents with the following impairments/functional limitations: weakness, impaired endurance, impaired self care skills, impaired functional mobility, gait instability, impaired balance, decreased lower extremity function, impaired cardiopulmonary response to activity, decreased ROM.    Pt sleeping with HOB elevated, but easily alert by touching shoulder and calling name. Pt agreeable to sit up in chair. Pt required min assist for supine to sit transfer with most assist for upper body. Pt performed sit to stand with min assist and hand hold assist. Pt performed stand pivot to chair with no AD and min assist. Pt left up in chair with chair alarm on.    Rehab Prognosis: Fair; patient would benefit from acute skilled PT services to address these deficits and reach maximum level of function.    Recent Surgery: Procedure(s) (LRB):  ANGIOGRAM, CORONARY ARTERY (N/A) 3 Days Post-Op    Plan:     During this hospitalization, patient to be seen 5 x/week to address the identified rehab impairments via gait training, therapeutic activities, therapeutic exercises, neuromuscular re-education and progress toward the following goals:    Plan of Care Expires:  06/24/23    Subjective     Chief Complaint: none verbalized  Patient/Family Comments/goals: to get stronger  Pain/Comfort:  Pain Rating 1: 0/10      Objective:     Communicated with RN prior to session.  Patient found HOB elevated with peripheral IV, telemetry upon PT entry to room.     General Precautions: Standard,  fall, diabetic  Orthopedic Precautions: N/A  Braces: N/A  Respiratory Status: Nasal cannula, flow 3 L/min     Functional Mobility:  Bed Mobility:     Supine to Sit: minimum assistance  Transfers:     Sit to Stand:  minimum assistance with hand-held assist  Bed to Chair: minimum assistance with  no AD  using  Stand Pivot      AM-PAC 6 CLICK MOBILITY          Treatment & Education:  Pt educated on importance of time OOB, importance of intermittent mobility, safe techniques for transfers/ambulation, discharge recommendations/options, and use of call light for assistance and fall prevention.      Patient left up in chair with all lines intact, call button in reach, and chair alarm on..    GOALS:   Multidisciplinary Problems       Physical Therapy Goals          Problem: Physical Therapy    Goal Priority Disciplines Outcome Goal Variances Interventions   Physical Therapy Goal     PT, PT/OT Ongoing, Progressing     Description: Goals to be met by: 23     Patient will increase functional independence with mobility by performin. Supine to sit with Modified Runnels  2. Sit to supine with Modified R[place]els  3. Sit to stand transfer with Modified R[place]  4. Bed to chair transfer with Modified Runnels using Rolling Walker  5. Gait  x 50 feet with Contact Guard Assistance using Rolling Walker.                          Time Tracking:     PT Received On: 23  PT Start Time: 948     PT Stop Time: 959  PT Total Time (min): 11 min     Billable Minutes: Therapeutic Activity 11    Treatment Type: Treatment  PT/PTA: PTA     Number of PTA visits since last PT visit: 3     2023

## 2023-05-25 NOTE — CARE UPDATE
05/25/23 0854   Patient Assessment/Suction   Level of Consciousness (AVPU) alert   All Lung Fields Breath Sounds clear   PRE-TX-O2   Device (Oxygen Therapy) nasal cannula   $ Is the patient on Low Flow Oxygen? Yes   Flow (L/min) 3   SpO2 97 %   Pulse Oximetry Type Intermittent   $ Pulse Oximetry - Multiple Charge Pulse Oximetry - Multiple   Pulse 72   Resp 15   Aerosol Therapy   $ Aerosol Therapy Charges Aerosol Treatment  (uda)   Daily Review of Necessity (SVN) completed   Respiratory Treatment Status (SVN) given   Treatment Route (SVN) mask   Patient Position (SVN) semi-Esquivel's   Post Treatment Assessment (SVN) increased aeration   Signs of Intolerance (SVN) none   Breath Sounds Post-Respiratory Treatment   Throughout All Fields Post-Treatment aeration increased   Post-treatment Heart Rate (beats/min) 71   Post-treatment Resp Rate (breaths/min) 15   Education   $ Education Bronchodilator;15 min   Respiratory Evaluation   $ Care Plan Tech Time 15 min

## 2023-05-25 NOTE — PROGRESS NOTES
INPATIENT NEPHROLOGY Progress Note   [facility]    Patient Name: Siddharth Urias  Date: 05/25/2023    Reason for consultation: KAT    Chief Complaint:   Chief Complaint   Patient presents with    Hypotension     Low blood pressure at doctors appointment today       History of Present Illness:  Siddharth Urias Is an 89-year-old male with chronic medical problems including HFpEF, atrial fibrillation CKD 3, peripheral arterial disease with likely occlusion of the right CFA/external iliac, DVT, COPD, diabetes, diverticulosis, hepatic steatosis, and prostate cancer in 2009 who presents to the emergency room tonight sent primary care provider for hypotension.  He was at the doctor's office to establish care for a hospital follow-up and when they checked his blood pressure it was systolic 60.  Patient was asymptomatic and was sent to the emergency room for evaluation. Patient has had 3 hospitalizations in the last 3 weeks for heart failure exacerbation.  He had elevated BNP, pulmonary edema on x-ray and was diuresed and discharged home on 04/26 with oxygen supplementation. He returned to the hospital on 05/01 having not use his oxygen and not filled his new prescriptions, was diuresed again encouraged to use his home oxygen.  He was discharged on 05/02 and returned to the hospital on 05/08 with bradycardia with heart rate in the 30s and left knee and ankle pain. On 05/01 he was found to have a nonocclusive thrombus in the right popliteal vein.  Metoprolol was held and he was switched to Bumex.  Eliquis was increased to 5 mg twice daily and he had reportedly been in sinus rhythm when seen by Cardiology.  He was treated for gout with colchicine and a Medrol Dosepak and allopurinol. His wife said he has been doing well at home since the last discharge. In the ED, lab work with leukocytosis, KAT. He was bradycardic with orthostasis and hypotension with SBP 80s as well. Chest x-ray with moderate  cardiomegaly, free of infiltrates no pleural effusions, moderately enlarged heart and pulmonary vasculature within normal limits. We are consulted for renal management.      4/2023 echo  The left ventricle is normal in size with mild concentric hypertrophy and low normal systolic function.  The estimated ejection fraction is 50-55%.  Indeterminate left ventricular diastolic function.  There is mild mitral stenosis.  The mean diastolic gradient across the mitral valve is 4 mmHg at a heart rate of 78 bpm.  Mild-to-moderate mitral regurgitation.  Mild aortic regurgitation.  There is severe aortic valve stenosis.  Aortic valve area is 0.46 cm2; peak velocity is 4.4 m/s; mean gradient is 50 mmHg. Of note, gradients were measured following a post PVC beat.  Normal right ventricular size with normal right ventricular systolic function.  Mild to moderate tricuspid regurgitation.  The estimated PA systolic pressure is at least 39 mmHg.  Elevated central venous pressure (15 mmHg).  The ascending aorta is mildly dilated.    Interval History:  5/19- HR , , on 1L NC, UOP 400cc  5/20- HR 90-120s, SBP all over the place but generally better, on 2L NC, UOP 1.5L, feels better, asked nurse to check orthos and patient needs to start some PT/OT to better guide medication resumption  5/21- VSS, on 2L NC, UOP 1.2L, reviewed cards note- patient needs a TAVR and will need C first- GENESIS discussed with patient- may proceed with [facility] Tues he says- we discussed doing some IVFs Monday night  5/22 AFVSS.  Output not recorded.  No nausea, chest pain, sob, fever, urinary or bowel complaint, new neurologic symptoms, new joint pain    5/23  AFVSS.  1000 cc uop.  No nausea, chest pain, sob, fever, urinary or bowel complaint, new neurologic symptoms, new joint pain  Angiogram yesterday with severe ostial RCA stenosis and severe distal LM stenosis.  He also has severe PAD with RCFA and R iliac artery oclusion, and heavy calcification of  entire L iliac and CFA   5/24  AFVSS.  Sleeping.  No acute events.  1125 cc UOP  5/25  UOP not recorded.  VSS, renal function stable.  Planned disposition SNF.  No complaints.      Plan of Care:    Assessment:  KAT/CKD III due to CRS  Azotemia/Orthostatic hypotension due to volume depletion and severe AS  Valvular CHF (severe AS)  BPH  Hyponatremia  Hyperkalemia  SHPT  Anemia of CKD    Plan:    -  renal function stable after angiogram (5/22).    - orthos negative- continue to work with PT/OT  - reviewed cardiology note regarding LHC and TAVR- defer medication optimization to them in terms of beta blocker, diuretic- keep losartan on hold  - continue flomax- no nsaids  - continue renal diet, 1.5L fluid restrictoin  - BMM parameters at goal  - H/H at goal  --erythropoietin stimulating agent not indicated yet     Stable for d/c from renal standpoint    Thank you for allowing us to participate in this patient's care. We will continue to follow.    Vital Signs:  Temp Readings from Last 3 Encounters:   05/25/23 97.5 °F (36.4 °C) (Oral)   05/18/23 97.6 °F (36.4 °C) (Oral)   05/09/23 98.4 °F (36.9 °C) (Oral)       Pulse Readings from Last 3 Encounters:   05/25/23 75   05/18/23 66   05/09/23 (!) 52       BP Readings from Last 3 Encounters:   05/25/23 98/63   05/18/23 (!) 64/40   05/09/23 129/60       Weight:  Wt Readings from Last 3 Encounters:   05/25/23 88.2 kg (194 lb 7.1 oz)   05/18/23 84.2 kg (185 lb 10 oz)   05/08/23 84.7 kg (186 lb 11.7 oz)       INS/OUTS:  I/O last 3 completed shifts:  In: 3010 [P.O.:1010; IV Piggyback:2000]  Out: 425 [Urine:425]  No intake/output data recorded.    Medications:  Scheduled Meds:   allopurinoL  200 mg Oral Daily    apixaban  5 mg Oral BID    budesonide  0.5 mg Nebulization Q12H    And    ipratropium  0.5 mg Nebulization Q12H    And    arformoteroL  15 mcg Nebulization BID    aspirin  81 mg Oral Daily    metoprolol tartrate  25 mg Oral BID    pantoprazole  40 mg Oral Daily    pravastatin   40 mg Oral Daily    tamsulosin  1 capsule Oral Daily     Continuous Infusions:   sodium chloride 0.9% 100 mL (05/22/23 1122)       PRN Meds:.acetaminophen, albuterol sulfate, melatonin, naloxone, ondansetron, oxyCODONE, oxyCODONE, polyethylene glycol, simethicone, sodium chloride 0.9%, sodium chloride 0.9%, traZODone  No current facility-administered medications on file prior to encounter.     Current Outpatient Medications on File Prior to Encounter   Medication Sig Dispense Refill    albuterol (PROVENTIL/VENTOLIN HFA) 90 mcg/actuation inhaler INHALE 2 PUFFS INTO THE LUNGS EVERY 6 HOURS AS NEEDED FOR WHEEZE (Patient taking differently: Inhale 2 puffs into the lungs every 6 (six) hours as needed.) 18 g 1    allopurinoL (ZYLOPRIM) 100 MG tablet Take 2 tablets (200 mg total) by mouth once daily. 60 tablet 2    apixaban (ELIQUIS) 5 mg Tab Take 1 tablet (5 mg total) by mouth 2 (two) times daily. 60 tablet 2    aspirin 81 MG Chew Take 81 mg by mouth once daily.      bumetanide (BUMEX) 1 MG tablet Take 1 tablet (1 mg total) by mouth once daily. 30 tablet 2    colestipoL (COLESTID) 1 gram Tab TAKE 1 TABLET BY MOUTH 2 TIMES DAILY. TAKE OTHER ORAL MEDICATIONS >1H BEFORE OR >4H AFTER COLESTIPOL (Patient taking differently: Take 1 g by mouth 2 (two) times daily.) 180 tablet 1    fluticasone-umeclidin-vilanter (TRELEGY ELLIPTA) 100-62.5-25 mcg DsDv Inhale 1 puff into the lungs once daily. 180 each 3    fluticasone-umeclidin-vilanter (TRELEGY ELLIPTA) 100-62.5-25 mcg DsDv Inhale 1 puff into the lungs once daily. 28 each 0    furosemide (LASIX) 40 MG tablet Take 40 mg by mouth once daily.      losartan (COZAAR) 25 MG tablet Take 1 tablet (25 mg total) by mouth once daily. 90 tablet 3    metoprolol succinate (TOPROL-XL) 25 MG 24 hr tablet Take 25 mg by mouth once daily.      miconazole NITRATE 2 % (MICOTIN) 2 % top powder Apply topically 2 (two) times daily. Apply to groin twice a day x1 week for 7 days 71 g 0    pantoprazole  "(PROTONIX) 40 MG tablet Take 1 tablet (40 mg total) by mouth once daily. 90 tablet 3    potassium chloride SA (K-DUR,KLOR-CON) 20 MEQ tablet Take 1 tablet (20 mEq total) by mouth 2 (two) times daily. 60 tablet 1    pravastatin (PRAVACHOL) 40 MG tablet Take 1 tablet (40 mg total) by mouth once daily. 90 tablet 3    solifenacin (VESICARE) 5 MG tablet Take 1 tablet (5 mg total) by mouth once daily. 90 tablet 3    tamsulosin (FLOMAX) 0.4 mg Cap Take 1 capsule (0.4 mg total) by mouth once daily. 90 capsule 3    tamsulosin (FLOMAX) 0.4 mg Cap Take 1 capsule (0.4 mg total) by mouth once daily. for 7 days 7 capsule 0    traZODone (DESYREL) 50 MG tablet Take 1 tablet (50 mg total) by mouth nightly as needed for Insomnia. 90 tablet 3       Review of Systems:  Neg    Physical Exam:  BP 98/63 (BP Location: Left leg, Patient Position: Lying)   Pulse 75   Temp 97.5 °F (36.4 °C) (Oral)   Resp 17   Ht 5' 8" (1.727 m)   Wt 88.2 kg (194 lb 7.1 oz)   SpO2 96%   BMI 29.57 kg/m²     General Appearance:    NAD, AAO x 3, cooperative, appears stated age   Head:    Normocephalic, atraumatic   Eyes:    PER, EOMI, and conjunctiva/sclera clear bilaterally        Mouth:   Moist mucus membranes, no thrush or oral lesions, normal      dentition   Back:     No CVA tenderness   Lungs:     Clear to auscultation bilaterally, no wheeze, crackles, rales      or rhonchi, symmetric air movement, respirations unlabored   Heart:    Regular rate and rhythm, S1 and S2 normal, ARLENE, rub   or gallop   Abdomen:     Soft, non-tender, non-distended, bowel sounds active all four   quadrants, no RT or guarding, no masses, no organomegaly   Extremities:   Warm and well perfused, distal pulses intact, no cyanosis or    peripheral edema   MSK:   No joint or muscle swelling, tenderness or deformity   Skin:   Skin color, texture, turgor normal, no rashes or lesions   Neurologic/Psychiatric:   CNII-XII intact, normal strength and sensation       throughout, no " asterixis; normal affect, memory, judgement     and insight     Results:  Lab Results   Component Value Date     05/25/2023    K 4.5 05/25/2023     05/25/2023    CO2 27 05/25/2023    BUN 44 (H) 05/25/2023    CREATININE 1.7 (H) 05/25/2023    CALCIUM 8.1 (L) 05/25/2023    ANIONGAP 7 (L) 05/25/2023    ESTGFRAFRICA 40.7 (A) 05/13/2022    EGFRNONAA 35.2 (A) 05/13/2022       Lab Results   Component Value Date    CALCIUM 8.1 (L) 05/25/2023    PHOS 4.5 05/18/2023       Recent Labs   Lab 05/25/23  0459   WBC 9.65   RBC 4.15*   HGB 10.7*   HCT 35.2*      MCV 85   MCH 25.8*   MCHC 30.4*         I have personally reviewed pertinent radiological imaging and reports.    I have spent > 35 minutes providing care for this patient for the above diagnoses. These services have included chart/data/imaging review, evaluation, exam, formulation of plan, , note preparation, and discussions with staff involved in this patient's care.    Patient care was time spent personally by me on the following activities:   Obtaining a history  Examination of patient.  Providing medical care at the patients bedside.  Developing a treatment plan with patient or surrogate and bedside caregivers  Ordering and reviewing laboratory studies, radiographic studies, pulse oximetry.  Ordering and performing treatments and interventions.  Evaluation of patient's response to treatment.  Discussions with consultants while on the unit and immediately available to the patient.  Re-evaluation of the patient's condition.  Documentation in the medical record.       Castillo Peng MD  Nephrology  [facility]  [phone]

## 2023-05-25 NOTE — NURSING
Pt discharge instruction given to transport from [place]. Pt transferred via wheelchair to facility transport in stable condition with personal wheelchair at side.

## 2023-05-25 NOTE — PLAN OF CARE
Discharge summary and orders packet faxed to [facility] at [phone]. Liaison Margaret notified.        05/25/23 3884   Post-Acute Status   Post-Acute Authorization Placement   Post-Acute Placement Status Set-up Complete/Auth obtained

## 2023-05-25 NOTE — PLAN OF CARE
05/24/23 1959   Patient Assessment/Suction   Level of Consciousness (AVPU) alert   Respiratory Effort Unlabored   All Lung Fields Breath Sounds clear;diminished   Cough Type nonproductive   PRE-TX-O2   Device (Oxygen Therapy) nasal cannula   Flow (L/min) 2   SpO2 (!) 94 %   Pulse Oximetry Type Intermittent   $ Pulse Oximetry - Multiple Charge Pulse Oximetry - Multiple   Pulse (!) 54   Resp 18   Aerosol Therapy   $ Aerosol Therapy Charges Aerosol Treatment   Respiratory Treatment Status (SVN) given   Treatment Route (SVN) mask   Patient Position (SVN) HOB elevated   Post Treatment Assessment (SVN) increased aeration   Signs of Intolerance (SVN) none   Breath Sounds Post-Respiratory Treatment   Post-treatment Heart Rate (beats/min) 51   Post-treatment Resp Rate (breaths/min) 18   Respiratory Evaluation   $ Care Plan Tech Time 15 min

## 2023-05-25 NOTE — PLAN OF CARE
Problem: Occupational Therapy  Goal: Occupational Therapy Goal  Description: Goals to be met by: 6/21/2023     Patient will increase functional independence with ADLs by performing:    UE Dressing with Modified [place].  LE Dressing with Modified [place].  Grooming while standing at sink with Modified [place].  Toileting from bedside commode with Modified [place] for hygiene and clothing management.   Supine to sit with Modified [place].  Toilet transfer to bedside commode with Modified [place].  Upper extremity exercise program x10 reps per handout, with independence.    Outcome: Ongoing, Progressing

## 2023-05-25 NOTE — PT/OT/SLP PROGRESS
Occupational Therapy   Treatment    Name: Siddharth Urias  MRN: [id number]  Admitting Diagnosis:  Acute renal failure superimposed on stage 3 chronic kidney disease  3 Days Post-Op    Recommendations:     Discharge Recommendations: nursing facility, skilled  Discharge Equipment Recommendations:  hip kit, wheelchair  Barriers to discharge:       Assessment:     Siddharth Urias is a 89 y.o. male with a medical diagnosis of Acute renal failure superimposed on stage 3 chronic kidney disease.  Pt agreeable to OT therapy session this AM. Performance deficits affecting function are weakness, impaired endurance, impaired self care skills, impaired functional mobility, gait instability, impaired balance, decreased safety awareness, impaired cardiopulmonary response to activity. Pt fatigued (states he normally sleeps during the day and stays up all night), but agreeable to seated UE exercises and grooming tasks.    Rehab Prognosis:  Fair; patient would benefit from acute skilled OT services to address these deficits and reach maximum level of function.       Plan:     Patient to be seen 5 x/week to address the above listed problems via self-care/home management, therapeutic activities, therapeutic exercises  Plan of Care Expires: 06/21/23  Plan of Care Reviewed with: patient    Subjective     Chief Complaint: fatigue  Patient/Family Comments/goals: none stated  Pain/Comfort:  Pain Rating 1: 0/10    Objective:     Communicated with: nursing prior to session.  Patient found up in chair with telemetry, peripheral IV, chair check upon OT entry to room.    General Precautions: Standard, fall    Orthopedic Precautions:N/A  Braces: N/A  Respiratory Status: Nasal cannula, flow 3 L/min     Occupational Performance:     Activities of Daily Living:  Grooming: setup assistance to brush teeth seated in chair      Therapeutic Exercise:  BUE ROM exercises x 10 reps seated in all major planes/joints to increase strength and endurance  needed to complete ADLs. Pt tolerated well    Treatment & Education:  Pt educated on role of OT/POC, importance of OOB/EOB activity, use of call bell, UE exercises, and safety during ADLs, transfers, and functional mobility.    Patient left up in chair with all lines intact, call button in reach, and chair alarm on    GOALS:   Multidisciplinary Problems       Occupational Therapy Goals          Problem: Occupational Therapy    Goal Priority Disciplines Outcome Interventions   Occupational Therapy Goal     OT, PT/OT Ongoing, Progressing    Description: Goals to be met by: 6/21/2023     Patient will increase functional independence with ADLs by performing:    UE Dressing with Modified [place].  LE Dressing with Modified [place].  Grooming while standing at sink with Modified [place].  Toileting from bedside commode with Modified [place] for hygiene and clothing management.   Supine to sit with Modified [place].  Toilet transfer to bedside commode with Modified [place].  Upper extremity exercise program x10 reps per handout, with independence.                         Time Tracking:     OT Date of Treatment: 05/25/23  OT Start Time: 1026  OT Stop Time: 1037  OT Total Time (min): 11 min    Billable Minutes:Therapeutic Exercise 11    OT/AYLEEN: OT          5/25/2023

## 2023-05-25 NOTE — PLAN OF CARE
Problem: Physical Therapy  Goal: Physical Therapy Goal  Description: Goals to be met by: 23     Patient will increase functional independence with mobility by performin. Supine to sit with Modified [place]  2. Sit to supine with Modified [place]  3. Sit to stand transfer with Modified [place]  4. Bed to chair transfer with Modified [place] using Rolling Walker  5. Gait  x 50 feet with Contact Guard Assistance using Rolling Walker.     Outcome: Ongoing, Progressing

## 2023-05-25 NOTE — PLAN OF CARE
Problem: Adult Inpatient Plan of Care  Goal: Plan of Care Review  Outcome: Met  Goal: Patient-Specific Goal (Individualized)  Outcome: Met  Goal: Absence of Hospital-Acquired Illness or Injury  Outcome: Met  Goal: Optimal Comfort and Wellbeing  Outcome: Met  Goal: Readiness for Transition of Care  Outcome: Met     Problem: Infection  Goal: Absence of Infection Signs and Symptoms  Outcome: Met     Problem: Diabetes Comorbidity  Goal: Blood Glucose Level Within Targeted Range  Outcome: Met     Problem: Fluid and Electrolyte Imbalance (Acute Kidney Injury/Impairment)  Goal: Fluid and Electrolyte Balance  Outcome: Met     Problem: Oral Intake Inadequate (Acute Kidney Injury/Impairment)  Goal: Optimal Nutrition Intake  Outcome: Met     Problem: Renal Function Impairment (Acute Kidney Injury/Impairment)  Goal: Effective Renal Function  Outcome: Met     Problem: Fall Injury Risk  Goal: Absence of Fall and Fall-Related Injury  Outcome: Met     Problem: Skin Injury Risk Increased  Goal: Skin Health and Integrity  Outcome: Met

## 2023-05-25 NOTE — PLAN OF CARE
Problem: Adult Inpatient Plan of Care  Goal: Plan of Care Review  Outcome: Ongoing, Progressing  Goal: Patient-Specific Goal (Individualized)  Outcome: Ongoing, Progressing  Goal: Absence of Hospital-Acquired Illness or Injury  Outcome: Ongoing, Progressing  Goal: Optimal Comfort and Wellbeing  Outcome: Ongoing, Progressing  Goal: Readiness for Transition of Care  Outcome: Ongoing, Progressing    Problem: Fluid and Electrolyte Imbalance (Acute Kidney Injury/Impairment)  Goal: Fluid and Electrolyte Balance  Outcome: Ongoing, Progressing

## 2023-05-25 NOTE — HOSPITAL COURSE
Pt is a 88 y/o M with hx of HFpEF, atrial fibrillation, CKD 3, severe aortic stenosis, peripheral arterial disease with likely occlusion of the right CFA/external iliac, DVT, COPD, diabetes, diverticulosis, hepatic steatosis, and prostate cancer in 2009.  He presented with hypotension was found to be dehydrated due to over-diuresis and had mild acute kidney injury.  He was given IV fluids with improvement in his renal function.  After further discussion with Cardiology, he has changed his mind on wanting intervention for his severe aortic stenosis.  He underwent diagnostic angiogram which demonstrated multivessel coronary disease with left main disease and ostial RCA disease as well as severe aortic stenosis.  Dr. Solis discussed with Dr Ledezma at [facility] and they have recommended outpatient follow-up after rehab in the [facility] for further evaluation and determination of whether he would be a candidate for any further intervention.

## 2023-05-29 NOTE — TELEPHONE ENCOUNTER
Patient was referred to Dr Ledezma for TAVR eval.  Called to schedule and spoke to his wife who states he is in [place] trying to get stronger.  Her son will call back to schedule appointments.  He will need lab and clinic appointment.  Will defer CTA at this time per Dr Ledezma due to Cr 1.7 - 2.0.

## 2023-06-05 NOTE — PROGRESS NOTES
[facility]  [address] [facility] [address]  [place] [address]  Phone: [phone]   [phone]    Patient's PCP: Martine Maxwell MD  Referring Provider: No ref. provider found    Subjective:      Chief Complaint:   Chief Complaint   Patient presents with    Left Knee - Pain     Patient is having left knee pain, this pain started about one month ago, he reports that he woke up with his knee swollen, painful to walk stand and bend.       Past Medical History:   Diagnosis Date    Afib     Arthritis     Choledocholithiasis     Chronic kidney disease     Colon polyp     COPD (chronic obstructive pulmonary disease)     Diabetes mellitus, type 2     2/2011    Diverticulosis     Fatty liver     Hepatomegaly     History of blood clots     2020 Dr. Rodriguez     Irradiation cystitis     Prostate cancer 2009    s/p XRT (T1C, João 8)    Radiation proctitis     Sleep apnea        Past Surgical History:   Procedure Laterality Date    ANGIOGRAM, CORONARY, WITH LEFT HEART CATHETERIZATION N/A 05/17/2022    Procedure: Angiogram, Coronary, with Left Heart Cath;  Surgeon: Kmalesh Sullivan MD;  Location: [facility] CATH/EP LAB;  Service: Cardiology;  Laterality: N/A;    APPENDECTOMY      CARPAL TUNNEL RELEASE Left 02/05/2021    Procedure: RELEASE, CARPAL TUNNEL;  Surgeon: Jayro Hawkins II, MD;  Location: [facility] OR;  Service: Orthopedics;  Laterality: Left;    CARPAL TUNNEL RELEASE Right 3/1/2023    Procedure: RELEASE, CARPAL TUNNEL;  Surgeon: Shaun Leonardo MD;  Location: [facility] OR;  Service: Orthopedics;  Laterality: Right;    CATARACT EXTRACTION Bilateral     COLONOSCOPY N/A 12/14/2018    Procedure: COLONOSCOPY;  Surgeon: Noel Aguiar MD;  Location: [facility];  Service: Endoscopy;  Laterality: N/A;    COLONOSCOPY  06/21/2021    Dr. Rodgers, in media: 2 colon polyps removed; diverticulosis, erythema in transverse colon; biopsy: tubular adenomas, ranomd colon unremarkable    COLONOSCOPY N/A 11/4/2022    Procedure:  COLONOSCOPY;  Surgeon: Noel Aguiar MD;  Location: [facility] ENDO;  Service: Endoscopy;  Laterality: N/A;    CORONARY ANGIOGRAPHY N/A 5/22/2023    Procedure: ANGIOGRAM, CORONARY ARTERY;  Surgeon: Xavier Solis MD;  Location: [facility] CATH/EP LAB;  Service: Cardiology;  Laterality: N/A;    ERCP N/A 11/23/2021    Procedure: ERCP (ENDOSCOPIC RETROGRADE CHOLANGIOPANCREATOGRAPHY);  Surgeon: Marshall Gardner III, MD;  Location: [facility] ENDO;  Service: Endoscopy;  Laterality: N/A;    ESOPHAGOGASTRODUODENOSCOPY N/A 01/24/2019    Procedure: EGD (ESOPHAGOGASTRODUODENOSCOPY);  Surgeon: Noel Aguiar MD;  Location: [facility] ENDO;  Service: Endoscopy;  Laterality: N/A;    EYE SURGERY      FLEXIBLE SIGMOIDOSCOPY N/A 01/22/2019    Procedure: SIGMOIDOSCOPY, FLEXIBLE;  Surgeon: Noel Aguiar MD;  Location: [facility] ENDO;  Service: Endoscopy;  Laterality: N/A;    HERNIA REPAIR      bilateral inguinal    LAPAROSCOPIC CHOLECYSTECTOMY N/A 11/24/2021    Procedure: CHOLECYSTECTOMY, LAPAROSCOPIC;  Surgeon: Jay Cabrera Jr., MD;  Location: [facility] OR;  Service: General;  Laterality: N/A;    TONSILLECTOMY         Current Outpatient Medications   Medication Sig    albuterol (PROVENTIL/VENTOLIN HFA) 90 mcg/actuation inhaler INHALE 2 PUFFS INTO THE LUNGS EVERY 6 HOURS AS NEEDED FOR WHEEZE (Patient taking differently: Inhale 2 puffs into the lungs every 6 (six) hours as needed.)    allopurinoL (ZYLOPRIM) 100 MG tablet Take 2 tablets (200 mg total) by mouth once daily.    apixaban (ELIQUIS) 5 mg Tab Take 1 tablet (5 mg total) by mouth 2 (two) times daily.    aspirin 81 MG Chew Take 81 mg by mouth once daily.    colestipoL (COLESTID) 1 gram Tab TAKE 1 TABLET BY MOUTH 2 TIMES DAILY. TAKE OTHER ORAL MEDICATIONS >1H BEFORE OR >4H AFTER COLESTIPOL (Patient taking differently: Take 1 g by mouth 2 (two) times daily.)    fluticasone-umeclidin-vilanter (TRELEGY ELLIPTA) 100-62.5-25 mcg DsDv Inhale 1 puff into the lungs once daily.     furosemide (LASIX) 40 MG tablet Take 0.5 tablets (20 mg total) by mouth once daily.    losartan (COZAAR) 25 MG tablet Take 1 tablet (25 mg total) by mouth once daily.    metoprolol succinate (TOPROL-XL) 25 MG 24 hr tablet Take 25 mg by mouth once daily.    miconazole NITRATE 2 % (MICOTIN) 2 % top powder Apply topically 2 (two) times daily. Apply to groin twice a day x1 week for 7 days    pantoprazole (PROTONIX) 40 MG tablet Take 1 tablet (40 mg total) by mouth once daily.    potassium chloride SA (K-DUR,KLOR-CON) 20 MEQ tablet Take 1 tablet (20 mEq total) by mouth 2 (two) times daily.    pravastatin (PRAVACHOL) 40 MG tablet Take 1 tablet (40 mg total) by mouth once daily.    solifenacin (VESICARE) 5 MG tablet Take 1 tablet (5 mg total) by mouth once daily.    tamsulosin (FLOMAX) 0.4 mg Cap Take 1 capsule (0.4 mg total) by mouth once daily.    traZODone (DESYREL) 50 MG tablet Take 1 tablet (50 mg total) by mouth nightly as needed for Insomnia.     No current facility-administered medications for this visit.       Review of patient's allergies indicates:   Allergen Reactions    No known drug allergies        Family History   Problem Relation Age of Onset    Diabetes Mother     Diabetes Brother        Social History     Socioeconomic History    Marital status:    Tobacco Use    Smoking status: Former     Packs/day: 1.50     Years: 60.00     Pack years: 90.00     Types: Cigarettes     Start date: 1944     Quit date: 1/2/2008     Years since quitting: 15.4    Smokeless tobacco: Never   Substance and Sexual Activity    Alcohol use: Yes     Alcohol/week: 1.0 standard drink     Types: 1 Glasses of wine per week     Comment: social - at speical events    Drug use: No    Sexual activity: Not Currently     Partners: Female     Social Determinants of Health     Financial Resource Strain: Low Risk     Difficulty of Paying Living Expenses: Not very hard   Food Insecurity: No Food Insecurity    Worried About Running Out of  Food in the Last Year: Never true    Ran Out of Food in the Last Year: Never true   Transportation Needs: No Transportation Needs    Lack of Transportation (Medical): No    Lack of Transportation (Non-Medical): No   Physical Activity: Inactive    Days of Exercise per Week: 0 days    Minutes of Exercise per Session: 0 min   Stress: No Stress Concern Present    Feeling of Stress : Only a little   Social Connections: Socially Integrated    Frequency of Communication with Friends and Family: More than three times a week    Frequency of Social Gatherings with Friends and Family: More than three times a week    Attends Druze Services: More than 4 times per year    Active Member of Clubs or Organizations: Yes    Attends Club or Organization Meetings: More than 4 times per year    Marital Status:    Housing Stability: Low Risk     Unable to Pay for Housing in the Last Year: No    Number of Places Lived in the Last Year: 1    Unstable Housing in the Last Year: No       History of present illness:  Mr. Xiong comes in today with chief complaint of left knee pain and swelling.  This has been on for a week or so.  He did have a gouty flare up which has resolved.  He has had a continued effusion to the left knee with pain.  He attributes his pain and swelling secondary to increased activity while working with physical therapy in preparation for cardiac valve replacement surgery.  Does not report any recent injury or trauma to the knee.    Review of Systems:    Constitutional: Negative for chills, fever and weight loss.   HENT: Negative for congestion.    Eyes: Negative for discharge and redness.   Respiratory: Negative for cough and shortness of breath.    Cardiovascular: Negative for chest pain.   Gastrointestinal: Negative for nausea and vomiting.   Musculoskeletal: See HPI.   Skin: Negative for rash.   Neurological: Negative for headaches.   Endo/Heme/Allergies: Does not bruise/bleed easily.   Psychiatric/Behavioral:  The patient is not nervous/anxious.    All other systems reviewed and are negative.       Objective:      Physical Examination:    Vital Signs:  There were no vitals filed for this visit.    Body mass index is 27.83 kg/m².    This a well-developed, well nourished patient in no acute distress.  They are alert and oriented and cooperative to examination.     Left knee exam: Skin to the left knee is clean dry and intact.  There is no erythema or ecchymosis.  There are no signs or symptoms of infection.  Patient is neurovascularly intact throughout the left lower extremity.  Left calf is soft and nontender.  Left knee range of motion 5-100 degrees.  Left knee is stable to varus and valgus stresses.  He does have a 2+ effusion to the left knee.  He has no real tenderness to palpation either medially or laterally to the knee.    Pertinent New Results:        XRAY Report / Interpretation:   Views were taken of the left knee today:  Sunrise and a weight-bearing AP view.  These reviewed in conjunction with other images of the left knee taken 1 month ago.  I do not appreciate any acute fractures or dislocations.  Visualized soft tissues appear unremarkable.  He has moderate-severe arthrosis in the medial compartment of the left knee.      Assessment:       1. Primary osteoarthritis of left knee      Plan:     Primary osteoarthritis of left knee  -     X-Ray Knee 1 or 2 View Left  -     Large Joint Aspiration/Injection: L knee        Follow up in 3 months (on 9/5/2023), or if symptoms worsen or fail to improve.    I injected his left knee today via an anterior lateral approach with 40 mg of Kenalog and lidocaine.  He tolerated this well.  This should help subside his effusion and discomfort in the knee.  Hopefully this will afford him the opportunity to be more active in participating with his therapy regimen in preparation for his heart surgery.  We will see him back in 3 months to see how he responds to today's injection.   Ultimately, the definitive treatment recommendation to address the osteoarthritis in the knee would be a total knee arthroplasty.  However, he is pending his cardiac surgery that would take precedence in this situation.  We can repeat corticosteroid injections in the left knee if need be in the interim to get him relief throughout his cardiac surgery and subsequent rehabilitation.        JESUS Dill, PA-C    This note was created using Dyyn[employer] voice recognition software that occasionally misinterprets words or phrases.

## 2023-06-05 NOTE — PROCEDURES
Large Joint Aspiration/Injection: L knee    Date/Time: 6/5/2023 12:30 PM  Performed by: Jorge Sabillon PA-C  Authorized by: Jorge Sabillon PA-C     Consent Done?:  Yes (Verbal)  Indications:  Arthritis and pain  Site marked: the procedure site was marked    Timeout: prior to procedure the correct patient, procedure, and site was verified    Prep: patient was prepped and draped in usual sterile fashion      Local anesthesia used?: Yes    Local anesthetic:  Lidocaine 1% without epinephrine    Details:  Needle Size:  25 G  Ultrasonic Guidance for needle placement?: No    Location:  Knee  Site:  L knee  Medications:  40 mg triamcinolone acetonide 40 mg/mL  Patient tolerance:  Patient tolerated the procedure well with no immediate complications

## 2023-06-08 NOTE — PROGRESS NOTES
0/6/08/23 Attempt assessment with patient/caregiver. Pt is still at the nursing home. Has an appt next week to determine if well enough to come home. Will continue to follow. RN OPCM

## 2023-06-12 NOTE — ASSESSMENT & PLAN NOTE
Asymptomatic. Unable to assess pulses due to his inability to get onto the exam table. Per report, unable to get femoral access in [place] for LHC due to severe PAD.

## 2023-06-12 NOTE — TELEPHONE ENCOUNTER
Called and spoke to patient wife regarding message below. Message also sent to Dr. Maxwell via direct message, also provider inbox.

## 2023-06-12 NOTE — TELEPHONE ENCOUNTER
Call [facility] and ask if they have resumed care for the patient at home since discharge.  Needs immediate attention and intake to assess patient.  I will sign orders if need be. Find out if there is a family member who can come with him to his appts to discuss long term placement

## 2023-06-12 NOTE — TELEPHONE ENCOUNTER
I called [facility] and they stated they cannot resume care until they get an order. She said he was discharged from hospital without order. Once they get order they will resume care.

## 2023-06-12 NOTE — TELEPHONE ENCOUNTER
----- Message from Chas Harris sent at 6/12/2023  9:57 AM CDT -----  Regarding: questions  Type:  Needs Medical Advice    Who Called: Pt's Spouse Naila    Would the patient rather a call back or a response via [facility]? Call back  Best Call Back Number: [phone] first or [phone]     Additional Information: Sts she is very confused and needs to talk to someone asap.  The pt is supposed to come home tomorrow from rehab and she has no idea what she will need to do to take care of him or how and what to cook for him.  Sts she just needs some guidance of what to do.  Please advise -- Thank you

## 2023-06-12 NOTE — PROGRESS NOTES
Subjective:     Referring Physician: Dr Laurie SALES  Siddharth Urias is a 89 y.o. male who presents for evaluation of severe arotic stenosis. He has had two heart failure admissions this year. His last admission was in the setting of hypotension and KAT. He had a short run of vtach. LHC done during admission showed LM and RCA disease which was not intervened on. He was discharged to [place] [facility] which he was discharged from today. He is a patient of Dr Sullivan. He is feeling better overall today. He continues to get stronger. He continues to experience SOB with moderate exertion. No chest pain. No history of prior coronary stenting. Per Dr Sullivan's note, they were unable to get femoral access in 2021 due to severe PAD.     Siddharth Urias is a 89 y.o. male referred by Dr Sullivan for evaluation of severe AS (NYHA Class III sx).    The patient has undergone the following TAVR work-up:   ECHO (Date 4/24/23): SIVAN= 0.46 cm2, MG= 50mmHg, Peak Rojas= 4.4 m/s, EF= 55%.   LHC (Date 5/22/23): LM and RCA stenosis    STS: 8%   Frailty: 4/4   Iliacs are   LVOT area by CTA is   Incidental findings on CT:   CT Surgery risk assessment: inoperable risk, per Dr Madrigal  Rhythm issues: 1st deg AV block   PFTs: deferred  KCCQ/5 meter walk:   Comorbidities: age, CAD, Afib         NYHA:III CCS:0    Review of Systems   Constitutional: Negative for chills and fever.   HENT:  Negative for hearing loss.    Eyes:  Negative for blurred vision, double vision and photophobia.   Cardiovascular:  Positive for dyspnea on exertion. Negative for chest pain, claudication, cyanosis, irregular heartbeat, leg swelling, near-syncope, orthopnea, palpitations, paroxysmal nocturnal dyspnea and syncope.   Respiratory:  Negative for cough and hemoptysis.    Musculoskeletal:  Negative for falls.   Gastrointestinal:  Negative for abdominal pain, constipation, diarrhea, nausea and vomiting.   Genitourinary:  Negative for dysuria.   Neurological:   Negative for disturbances in coordination, dizziness, focal weakness, headaches, loss of balance and weakness.        Past Medical History:   Diagnosis Date    Afib     Arthritis     Choledocholithiasis     Chronic kidney disease     Colon polyp     COPD (chronic obstructive pulmonary disease)     Diabetes mellitus, type 2     2/2011    Diverticulosis     Fatty liver     Hepatomegaly     History of blood clots     2020 Dr. Rodriguez     Irradiation cystitis     Prostate cancer 2009    s/p XRT (T1C, [place] 8)    Radiation proctitis     Sleep apnea        Current Outpatient Medications:     albuterol (PROVENTIL/VENTOLIN HFA) 90 mcg/actuation inhaler, INHALE 2 PUFFS INTO THE LUNGS EVERY 6 HOURS AS NEEDED FOR WHEEZE (Patient taking differently: Inhale 2 puffs into the lungs every 6 (six) hours as needed.), Disp: 18 g, Rfl: 1    allopurinoL (ZYLOPRIM) 100 MG tablet, Take 2 tablets (200 mg total) by mouth once daily., Disp: 60 tablet, Rfl: 2    aspirin 81 MG Chew, Take 81 mg by mouth once daily., Disp: , Rfl:     colestipoL (COLESTID) 1 gram Tab, TAKE 1 TABLET BY MOUTH 2 TIMES DAILY. TAKE OTHER ORAL MEDICATIONS >1H BEFORE OR >4H AFTER COLESTIPOL (Patient taking differently: Take 1 g by mouth 2 (two) times daily.), Disp: 180 tablet, Rfl: 1    fluticasone-umeclidin-vilanter (TRELEGY ELLIPTA) 100-62.5-25 mcg DsDv, Inhale 1 puff into the lungs once daily., Disp: 180 each, Rfl: 3    furosemide (LASIX) 40 MG tablet, Take 0.5 tablets (20 mg total) by mouth once daily., Disp: , Rfl:     losartan (COZAAR) 25 MG tablet, Take 1 tablet (25 mg total) by mouth once daily., Disp: 90 tablet, Rfl: 3    metoprolol succinate (TOPROL-XL) 25 MG 24 hr tablet, Take 25 mg by mouth once daily., Disp: , Rfl:     pantoprazole (PROTONIX) 40 MG tablet, Take 1 tablet (40 mg total) by mouth once daily., Disp: 90 tablet, Rfl: 3    potassium chloride SA (K-DUR,KLOR-CON) 20 MEQ tablet, Take 1 tablet (20 mEq total) by mouth 2 (two) times daily., Disp: 60  tablet, Rfl: 1    pravastatin (PRAVACHOL) 40 MG tablet, Take 1 tablet (40 mg total) by mouth once daily., Disp: 90 tablet, Rfl: 3    solifenacin (VESICARE) 5 MG tablet, Take 1 tablet (5 mg total) by mouth once daily., Disp: 90 tablet, Rfl: 3    tamsulosin (FLOMAX) 0.4 mg Cap, Take 1 capsule (0.4 mg total) by mouth once daily., Disp: 90 capsule, Rfl: 3    traZODone (DESYREL) 50 MG tablet, Take 1 tablet (50 mg total) by mouth nightly as needed for Insomnia., Disp: 90 tablet, Rfl: 3    apixaban (ELIQUIS) 5 mg Tab, Take 1 tablet (5 mg total) by mouth 2 (two) times daily., Disp: 60 tablet, Rfl: 2    doxycycline (VIBRA-TABS) 100 MG tablet, Take 100 mg by mouth 2 (two) times daily., Disp: , Rfl:     miconazole NITRATE 2 % (MICOTIN) 2 % top powder, Apply topically 2 (two) times daily. Apply to groin twice a day x1 week for 7 days, Disp: 71 g, Rfl: 0    Objective:    Physical Exam  Vitals reviewed.   Constitutional:       General: He is not in acute distress.     Appearance: He is well-developed. He is not diaphoretic.   HENT:      Head: Normocephalic and atraumatic.   Neck:      Vascular: JVD present.   Cardiovascular:      Rate and Rhythm: Normal rate and regular rhythm.      Pulses: Intact distal pulses.      Heart sounds: Murmur heard.   Harsh midsystolic murmur is present at the upper right sternal border radiating to the neck.   Pulmonary:      Effort: Pulmonary effort is normal. No respiratory distress.   Musculoskeletal:      Cervical back: Normal range of motion.      Right lower leg: Edema present.      Left lower leg: Edema present.   Skin:     General: Skin is warm and dry.   Neurological:      Mental Status: He is alert and oriented to person, place, and time.           Vitals:    06/12/23 1342 06/12/23 1345   BP: (!) 118/57 134/60   BP Location: Left arm Right arm   Patient Position: Sitting Sitting   BP Method: Large (Automatic) Large (Automatic)   Pulse: (!) 37 87   SpO2: 98%    Weight: 83.3 kg (183 lb 10.3  "oz)    Height: 5' 4.57" (1.64 m)      Body mass index is 30.97 kg/m².    Test(s) Reviewed  I have reviewed the following in detail:  [] Stress test   [] Angiography   [x] Echocardiogram   [x] Labs   [] Other       Chemistry        Component Value Date/Time     06/12/2023 1314    K 4.7 06/12/2023 1314     06/12/2023 1314    CO2 21 (L) 06/12/2023 1314    BUN 59 (H) 06/12/2023 1314    BUN 24 (A) 03/05/2018 0000    CREATININE 1.5 (H) 06/12/2023 1314    GLU 98 06/12/2023 1314        Component Value Date/Time    CALCIUM 8.8 06/12/2023 1314    ALKPHOS 60 05/18/2023 1725    AST 22 05/18/2023 1725    ALT 28 05/18/2023 1725    BILITOT 1.1 (H) 05/18/2023 1725    ESTGFRAFRICA 40.7 (A) 05/13/2022 1134    EGFRNONAA 35.2 (A) 05/13/2022 1134            TTE 4/24/23  The left ventricle is normal in size with mild concentric hypertrophy and low normal systolic function.  The estimated ejection fraction is 50-55%.  Indeterminate left ventricular diastolic function.  There is mild mitral stenosis.  The mean diastolic gradient across the mitral valve is 4 mmHg at a heart rate of 78 bpm.  Mild-to-moderate mitral regurgitation.  Mild aortic regurgitation.  There is severe aortic valve stenosis.  Aortic valve area is 0.46 cm2; peak velocity is 4.4 m/s; mean gradient is 50 mmHg. Of note, gradients were measured following a post PVC beat.  Normal right ventricular size with normal right ventricular systolic function.  Mild to moderate tricuspid regurgitation.  The estimated PA systolic pressure is at least 39 mmHg.  Elevated central venous pressure (15 mmHg).  The ascending aorta is mildly dilated.    Assessment:   Aortic stenosis, severe  This patient is COHORT C due to his current frailty therefore he is not a valve candidate. Needs to prehab and get stronger.    Siddharth Urias is a 89 y.o. male referred by Dr Sullivan for evaluation of severe AS (NYHA Class III sx).    The patient has undergone the following TAVR work-up: "   ECHO (Date 4/24/23): SIVAN= 0.46 cm2, MG= 50mmHg, Peak Rojas= 4.4 m/s, EF= 55%.   LHC (Date 5/22/23): LM and RCA stenosis    STS: 8%   Frailty: 4/4   Iliacs are   LVOT area by CTA is   Incidental findings on CT:   CT Surgery risk assessment: inoperable risk, per Dr Madrigal  Rhythm issues: 1st deg AV block   PFTs: deferred  KCCQ/5 meter walk:   Comorbidities: age, CAD, Afib     Atrial fibrillation  On Eliquis. Consider Watchman post TAVR.     PAD (peripheral artery disease)  Asymptomatic. Unable to assess pulses due to his inability to get onto the exam table. Per report, unable to get femoral access in [place] for LHC due to severe PAD.     CKD (chronic kidney disease)  Cr 1.5    Frailty  COHORT C    Plan:     Prehab at home and call us back when he is stronger.   If he has heart failure symptoms refractory to medical therapy, we can consider admission with inpatient BAV/LHC.            Cheli Jacobo PA-C  Valve and Structural Heart Disease  [facility]    Staff:  I have personally taken the history and examined this patient and agree with the fellow's note as stated above and amended it accordingly :-)  Mr. Urias has severe, symptomatic AS, CKD 3 with Cr 1.5-2,0, urinary incontinence, 4/4 frailty, and has been hospitalized and in rehab for a month.  He also has a non-diagnostic  angiogram (jany Viz) and needs repeat RCA angiography and possible intervention.  The LCA has high grade diagonal disease best managed medically.    IMP:  Severe AS, Cohort C    Rec:   Prehab at home  Continue diuretics  Return to clinic in 2 months for RCA angiography and possible intervention and iliac and subclavian IVUS.  If he fails to prehab will offer BAV as bridge to TAVR or destination therapy.  Son to call if he needs to be hospitalized for BAV and coronary intervention.

## 2023-06-13 NOTE — TELEPHONE ENCOUNTER
I don't see an IIC for me to speak to the wife.  Can she please come with him to appt on 6/22/23?

## 2023-06-13 NOTE — TELEPHONE ENCOUNTER
Left message for E[facility] to call.   The patient was in rehab.  He was suppose to be discharge yesterday.

## 2023-06-13 NOTE — TELEPHONE ENCOUNTER
Spoke with Enedina    Patient D/C from Rehab yesterday   Betsey with [facility] will have him admitted to their care.   Scheduled hospital follow up appt on 6/22/2023 with Geoff Maxwell.   She would like to speak with Dr Maxwell to discuss his care          ----- Message from Bianca Sierra sent at 6/13/2023  3:05 PM CDT -----  Type: Needs Medical Advice  Who Called:  Pt's wife Enedina  Best Call Back Number: [phone]  Additional Information: Wife would like a call to talk to the Dr or to see the dr in person to discuss the pt going in and out of the hospital/rehab, pl call bk to advise thanks

## 2023-06-13 NOTE — TELEPHONE ENCOUNTER
Home health orders signed. Please process.  How did he get released from the hospital with no home health?

## 2023-06-18 PROBLEM — N40.0 BPH (BENIGN PROSTATIC HYPERPLASIA): Status: ACTIVE | Noted: 2023-01-01

## 2023-06-18 PROBLEM — I38 HEART FAILURE DUE TO VALVULAR DISEASE: Status: ACTIVE | Noted: 2023-01-01

## 2023-06-18 PROBLEM — I25.10 CAD (CORONARY ARTERY DISEASE): Status: ACTIVE | Noted: 2023-01-01

## 2023-06-18 PROBLEM — I82.409 DEEP VEIN THROMBOSIS (DVT) OF LOWER EXTREMITY: Status: ACTIVE | Noted: 2023-01-01

## 2023-06-18 PROBLEM — I50.9 HEART FAILURE DUE TO VALVULAR DISEASE: Status: ACTIVE | Noted: 2023-01-01

## 2023-06-18 PROBLEM — I50.9 ACUTE DECOMPENSATED HEART FAILURE: Status: ACTIVE | Noted: 2023-01-01

## 2023-06-18 PROBLEM — Z71.89 ACP (ADVANCE CARE PLANNING): Status: ACTIVE | Noted: 2023-01-01

## 2023-06-18 NOTE — ED NOTES
Pt identifiers Siddharth Urias were checked and are correct  LOC: The patient is awake, alert, aware of environment with an appropriate affect. Oriented x4, speaking appropriately  APPEARANCE: Pt resting comfortably, in no acute distress, pt is clean and well groomed, clothing properly fastened  SKIN: Skin warm, dry and intact, normal skin turgor, moist mucus membranes  RESPIRATORY: Airway is open and patent, respirations are spontaneous, even and unlabored, normal effort and rate  Crackles auscultated to all lung fields   CARDIAC: Radial pulses strong and irregular,2 + peripheral edema noted, capillary refill < 3 seconds, bilateral radial pulses 2+  ABDOMEN: Soft, nontender, nondistended. Bowel sounds present to all four quad of abd on auscultation  NEUROLOGIC: PERRL, facial expression is symmetrical, patient moving all extremities spontaneously, normal sensation in all extremities when touched with a finger.  Follows all commands appropriately  MUSCULOSKELETAL: No obvious deformities.

## 2023-06-18 NOTE — Clinical Note
Diagnosis: Fluid overload [691231]   Future Attending Provider: SANCHO CHIRINOS [[id number]]   Admitting Provider:: MARCIO GOODRICH [[id number]]

## 2023-06-18 NOTE — TELEPHONE ENCOUNTER
Wife calling to make nurse aware that pt is refusing to call 911. Reiterated previous dispo of ED/911. Verbalized understanding. States that they will wait for HH nurse on tomorrow. Wife to callback if s/s worsens. Encounter routed to provider.       Reason for Disposition   Caller has already spoken with another triager or PCP AND has further questions AND triager able to answer questions.    Protocols used: No Contact or Duplicate Contact Call-A-AH

## 2023-06-18 NOTE — PROVIDER PROGRESS NOTES - EMERGENCY DEPT.
Encounter Date: 6/18/2023    ED Physician Progress Notes         EKG - STEMI Decision  Initial Reading: No STEMI present.      Heart rate 78, sinus rhythm, first-degree AV block, bigeminy pattern similar to previous ECG, no criteria for STEMI.    Gavin Yoder DO, VEE  Emergency Staff Physician   Dept of Emergency Medicine   [facility]  Spectralink: [id number]        Disclaimer: This note has been generated using voice-recognition software. There may be typographical errors that have been missed during proof-reading.

## 2023-06-18 NOTE — TELEPHONE ENCOUNTER
Pts wife calling with pt, states that pt is having bilat edema to his feet. States she called HH nurse and they advised to call OOC. States his weight was 175lb yesterday and today is 176lb. Endorses SOB with exertion some. Per protocol advised  to ED NOW. verbalized understanding. Denies any further questions or concerns at this time, advised to call back if they have any that come up. Advised pt to call back with any other concerns or worsening symptoms. Verbalized understanding and will route message to provider.     Reason for Disposition   [1] Difficulty breathing with exertion (e.g., walking) AND [2] new-onset or worsening    Additional Information   Swelling of both ankles (i.e., pedal edema)   Negative: SEVERE difficulty breathing (e.g., struggling for each breath, speaks in single words)   Negative: Looks like a broken bone or dislocated joint (e.g., crooked or deformed)   Negative: Sounds like a life-threatening emergency to the triager   Negative: Difficulty breathing at rest   Negative: Entire foot is cool or blue in comparison to other side   Negative: [1] Can't walk or can barely walk AND [2] new-onset    Protocols used: Ankle Swelling-A-AH, Leg Swelling and Edema-A-AH

## 2023-06-18 NOTE — ED PROVIDER NOTES
Encounter Date: 6/18/2023       History     Chief Complaint   Patient presents with    Leg Swelling     Wife is concerned due to b/l leg swelling.      Siddharth Urias is a 89 y.o. male with PMH of atrial fibrillation, CKD, type 2 diabetes, COPD, [facility] for bilateral lower extremity swelling.  Patient recently released from rehab.  Patient went to rehab taking strength to tolerate TAVR.  Patient has had 1 day of bilateral lower extremity swelling.  States that he has had a cough for the last couple weeks.  Patient's wife states that he has had dizziness, patient denies any dizziness.  Denies any other symptoms.      Review of patient's allergies indicates:   Allergen Reactions    No known drug allergies      Past Medical History:   Diagnosis Date    Afib     Arthritis     Choledocholithiasis     Chronic kidney disease     Colon polyp     COPD (chronic obstructive pulmonary disease)     Diabetes mellitus, type 2     2/2011    Diverticulosis     Fatty liver     Hepatomegaly     History of blood clots     2020 Dr. Rodriguez     Irradiation cystitis     Prostate cancer 2009    s/p XRT (T1C, [place] 8)    Radiation proctitis     Sleep apnea      Past Surgical History:   Procedure Laterality Date    ANGIOGRAM, CORONARY, WITH LEFT HEART CATHETERIZATION N/A 05/17/2022    Procedure: Angiogram, Coronary, with Left Heart Cath;  Surgeon: Kamlesh Sullivan MD;  Location: [facility] CATH/EP LAB;  Service: Cardiology;  Laterality: N/A;    APPENDECTOMY      CARPAL TUNNEL RELEASE Left 02/05/2021    Procedure: RELEASE, CARPAL TUNNEL;  Surgeon: Jayro Hawknis II, MD;  Location: [facility] OR;  Service: Orthopedics;  Laterality: Left;    CARPAL TUNNEL RELEASE Right 3/1/2023    Procedure: RELEASE, CARPAL TUNNEL;  Surgeon: Shaun Leonardo MD;  Location: [facility] OR;  Service: Orthopedics;  Laterality: Right;    CATARACT EXTRACTION Bilateral     COLONOSCOPY N/A 12/14/2018    Procedure: COLONOSCOPY;  Surgeon: Noel Aguiar MD;  Location:  [facility] ENDO;  Service: Endoscopy;  Laterality: N/A;    COLONOSCOPY  06/21/2021    Dr. Rodgers, in media: 2 colon polyps removed; diverticulosis, erythema in transverse colon; biopsy: tubular adenomas, ranomd colon unremarkable    COLONOSCOPY N/A 11/4/2022    Procedure: COLONOSCOPY;  Surgeon: Noel Aguiar MD;  Location: [facility] ENDO;  Service: Endoscopy;  Laterality: N/A;    CORONARY ANGIOGRAPHY N/A 5/22/2023    Procedure: ANGIOGRAM, CORONARY ARTERY;  Surgeon: Xavier Solis MD;  Location: [facility] CATH/EP LAB;  Service: Cardiology;  Laterality: N/A;    ERCP N/A 11/23/2021    Procedure: ERCP (ENDOSCOPIC RETROGRADE CHOLANGIOPANCREATOGRAPHY);  Surgeon: Marshall Gardner III, MD;  Location: [facility] ENDO;  Service: Endoscopy;  Laterality: N/A;    ESOPHAGOGASTRODUODENOSCOPY N/A 01/24/2019    Procedure: EGD (ESOPHAGOGASTRODUODENOSCOPY);  Surgeon: Noel Aguiar MD;  Location: [facility];  Service: Endoscopy;  Laterality: N/A;    EYE SURGERY      FLEXIBLE SIGMOIDOSCOPY N/A 01/22/2019    Procedure: SIGMOIDOSCOPY, FLEXIBLE;  Surgeon: Noel Aguiar MD;  Location: [facility];  Service: Endoscopy;  Laterality: N/A;    HERNIA REPAIR      bilateral inguinal    LAPAROSCOPIC CHOLECYSTECTOMY N/A 11/24/2021    Procedure: CHOLECYSTECTOMY, LAPAROSCOPIC;  Surgeon: Jay Cabrera Jr., MD;  Location: [facility] OR;  Service: General;  Laterality: N/A;    TONSILLECTOMY       Family History   Problem Relation Age of Onset    Diabetes Mother     Diabetes Brother      Social History     Tobacco Use    Smoking status: Former     Packs/day: 1.50     Years: 60.00     Pack years: 90.00     Types: Cigarettes     Start date: 1944     Quit date: 1/2/2008     Years since quitting: 15.4    Smokeless tobacco: Never   Substance Use Topics    Alcohol use: Yes     Alcohol/week: 1.0 standard drink     Types: 1 Glasses of wine per week     Comment: social - at speical events    Drug use: No     Review of Systems   Constitutional:  Negative for  chills and fever.   HENT:  Negative for congestion.    Eyes:  Negative for visual disturbance.   Respiratory:  Positive for cough. Negative for shortness of breath.    Cardiovascular:  Negative for chest pain and leg swelling.   Gastrointestinal:  Negative for abdominal distention, abdominal pain, constipation, diarrhea, nausea and vomiting.   Endocrine: Negative for polyuria.   Genitourinary:  Negative for decreased urine volume.   Skin:  Negative for pallor.   Neurological:  Negative for facial asymmetry and headaches.   Psychiatric/Behavioral:  The patient is not nervous/anxious.      Physical Exam     Initial Vitals [06/18/23 1556]   BP Pulse Resp Temp SpO2   (!) 89/50 78 18 98.5 °F (36.9 °C) 100 %      MAP       --         Physical Exam    Nursing note and vitals reviewed.  Constitutional: He appears well-developed and well-nourished. He is cooperative.  Non-toxic appearance.   HENT:   Head: Normocephalic and atraumatic.   Eyes: Conjunctivae and EOM are normal. Pupils are equal, round, and reactive to light.   Neck: Trachea normal and phonation normal. Neck supple. No tracheal deviation present.   Cardiovascular:  Normal rate, regular rhythm, intact distal pulses and normal pulses.     Exam reveals no gallop, no S3, no S4 and no friction rub.       Murmur heard.  Systolic murmur is present.  Pulmonary/Chest: Breath sounds normal. No respiratory distress. He has no wheezes. He has no rhonchi. He has no rales.   Abdominal: Abdomen is soft. He exhibits no distension. There is no abdominal tenderness.   Musculoskeletal:      Cervical back: Neck supple.      Comments: Significant bilateral lower extremity edema up to the mid shins.     Neurological: He is alert and oriented to person, place, and time. No cranial nerve deficit or sensory deficit. GCS eye subscore is 4. GCS verbal subscore is 5. GCS motor subscore is 6.   Skin: Skin is warm, dry and intact. Capillary refill takes less than 2 seconds.   Psychiatric: He  has a normal mood and affect. His speech is normal and behavior is normal. Thought content normal.       ED Course   Procedures  Labs Reviewed   CBC W/ AUTO DIFFERENTIAL - Abnormal; Notable for the following components:       Result Value    WBC 15.10 (*)     Hemoglobin 12.6 (*)     MCH 25.6 (*)     MCHC 30.7 (*)     RDW 19.1 (*)     Immature Granulocytes 0.8 (*)     Gran # (ANC) 11.9 (*)     Immature Grans (Abs) 0.12 (*)     Mono # 1.1 (*)     Gran % 78.8 (*)     Lymph % 12.2 (*)     All other components within normal limits    Narrative:     Release to patient->Immediate   COMPREHENSIVE METABOLIC PANEL - Abnormal; Notable for the following components:    Potassium 5.4 (*)     BUN 64 (*)     Creatinine 2.0 (*)     Albumin 3.1 (*)     eGFR 31.3 (*)     All other components within normal limits    Narrative:     Release to patient->Immediate   TROPONIN I - Abnormal; Notable for the following components:    Troponin I 0.053 (*)     All other components within normal limits    Narrative:     Release to patient->Immediate   B-TYPE NATRIURETIC PEPTIDE - Abnormal; Notable for the following components:    BNP 1,220 (*)     All other components within normal limits    Narrative:     Release to patient->Immediate   URINALYSIS, REFLEX TO URINE CULTURE - Abnormal; Notable for the following components:    Occult Blood UA 1+ (*)     Leukocytes, UA Trace (*)     All other components within normal limits    Narrative:     Specimen Source->Urine   URINALYSIS MICROSCOPIC - Abnormal; Notable for the following components:    RBC, UA 5 (*)     Hyaline Casts, UA 5 (*)     All other components within normal limits    Narrative:     Specimen Source->Urine   HIV 1 / 2 ANTIBODY    Narrative:     Release to patient->Immediate   TROPONIN ISTAT   POCT TROPONIN     EKG Readings: (Independently Interpreted)   Initial Reading: No STEMI. Previous EKG: Compared with most recent EKG Rhythm: Normal Sinus Rhythm. Heart Rate: 78. Ectopy: Bigeminy PVCs.  Conduction: 1st Degree AV Block. T Waves Flipped: AVR, AVL, V1 and V2. Axis: Normal. Clinical Impression: Normal Sinus Rhythm and AV Block - 1st Degree     Imaging Results              X-Ray Chest AP Portable (Final result)  Result time 06/18/23 16:51:14      Final result by Grayson Ward MD (06/18/23 16:51:14)                   Impression:      Suspected continued small left pleural effusion with similar to slightly improved retrocardiac sub some atelectasis versus consolidative process.  Consider short-term follow-up chest radiography after therapy to ensure resolution.    Stable additional findings as above.      Electronically signed by: Grayson Ward MD  Date:    06/18/2023  Time:    16:51               Narrative:    EXAMINATION:  XR CHEST AP PORTABLE    CLINICAL HISTORY:  Chest Pain;    TECHNIQUE:  Single frontal view of the chest was performed.    COMPARISON:  Chest radiograph 05/18/2023 and CT thorax 05/01/2023    FINDINGS:  Monitoring leads overlie the chest.  Patient is slightly rotated.  Large body habitus.    Cardiomediastinal silhouette is midline and prominent with similar calcification and tortuosity of the aorta and enlarged heart.  Pulmonary vasculature and hilar contours are within normal limits.    Similar hazy opacification of the left lung base suggesting small pleural effusion with similar to slightly improved retrocardiac subsegmental atelectasis versus consolidative process.  No sizable pleural effusion or consolidation on the right.  No pneumothorax.  Few scattered linear opacities throughout the lungs consistent with minimal platelike scarring versus atelectasis similar to prior.    No acute osseous process seen.  PA and lateral views can be obtained.                                       Medications   sodium chloride 0.9% flush 10 mL (has no administration in time range)   naloxone 0.4 mg/mL injection 0.02 mg (has no administration in time range)   glucagon (human recombinant) injection 1  mg (has no administration in time range)   dextrose 10% bolus 125 mL 125 mL (has no administration in time range)   dextrose 10% bolus 250 mL 250 mL (has no administration in time range)   dextrose 40 % gel 15,000 mg (has no administration in time range)   dextrose 40 % gel 30,000 mg (has no administration in time range)   acetaminophen tablet 650 mg (has no administration in time range)   melatonin tablet 6 mg (has no administration in time range)   albuterol inhaler 2 puff (has no administration in time range)   aspirin chewable tablet 81 mg (has no administration in time range)   fluticasone furoate-vilanteroL 100-25 mcg/dose diskus inhaler 1 puff (has no administration in time range)   tiotropium bromide 2.5 mcg/actuation inhaler 2 puff (has no administration in time range)   pravastatin tablet 40 mg (has no administration in time range)   tamsulosin 24 hr capsule 0.4 mg (has no administration in time range)   metoprolol succinate (TOPROL-XL) 24 hr tablet 25 mg (has no administration in time range)   allopurinol split tablet 50 mg (has no administration in time range)   apixaban tablet 5 mg (has no administration in time range)   furosemide injection 40 mg (40 mg Intravenous Given 6/18/23 1954)     Medical Decision Making:   Initial Assessment:   89-year-old male with history of significant cardiac disease presenting for bilateral lower extremity swelling.  Differential Diagnosis:   CHF exacerbation, fluid overload, atypical ACS, electrolyte abnormality, anemia  Clinical Tests:   Lab Tests: Ordered and Reviewed  The following lab test(s) were unremarkable: CBC, CMP, Urinalysis, BNP and Troponin  Radiological Study: Ordered and Reviewed  Medical Tests: Ordered and Reviewed  ED Management:  Patient presents with symptoms concerning for fluid overload due to bilateral lower extremity edema.      Workup as detailed below in ED course.  Workup significant for elevated BNP, chest x-ray showing left pleural effusion.   Will not treat with diuresis at this time because patient has significant aortic valve disease and do not want to worsened preload and exacerbate symptoms.  Patient has worsening renal function.    Discussed patient's case with Hospital Medicine who agreed to admit patient to their service for further cardiac workup.  Patient is hemodynamically stable comfortable, he is appropriate for transfer to the floor at this time.            Attending Attestation:   Physician Attestation Statement for Resident:  As the supervising MD   Physician Attestation Statement: I have personally seen and examined this patient.   I agree with the above history.  -:   As the supervising MD I agree with the above PE.     As the supervising MD I agree with the above treatment, course, plan, and disposition.                  I have reviewed and concur with the resident's history, physical, assessment, and plan.  I have personally interviewed and examined the patient at bedside.   I did supervise any and all procedures and was present for any critical portion, and was always immediately available for help and as a resource.     The above history physical, review of symptoms, HPI and physical exam reflect my independent interpretation and evaluation.    Complexity: High Risk    Final diagnoses:  [R07.9] Chest pain  [E87.70] Fluid overload     Gavin Yoder DO, FAAEM  Emergency Staff Physician   Dept of Emergency Medicine   [facility]  Spectralink: [id number]        Disclaimer: This note has been generated using voice-recognition software. There may be typographical errors that have been missed during proof-reading.            ED Course as of 06/18/23 2329   Sun Jun 18, 2023   1722 WBC(!): 15.10 [ES]   1723 Hemoglobin(!): 12.6  Normocytic anemia consistent with baseline. [ES]   1724 X-Ray Chest AP Portable  Left pleural effusion [ES]   1741 Troponin I(!): 0.053 [ES]   1743 Potassium(!): 5.4 [ES]   1743 BUN(!): 64 [ES]   1743  Creatinine(!): 2.0  Worsening renal function [ES]   1744 BNP(!): 1,220 [ES]      ED Course User Index  [ES] Nivia Contreras MD                 Clinical Impression:   Final diagnoses:  [R07.9] Chest pain  [E87.70] Fluid overload        ED Disposition Condition    Observation Stable                Nivia Contreras MD  Resident  06/18/23 1499       Gavin Yoder DO  06/19/23 1032

## 2023-06-19 PROBLEM — E87.5 HYPERKALEMIA: Status: RESOLVED | Noted: 2023-01-01 | Resolved: 2023-01-01

## 2023-06-19 PROBLEM — I50.9 ACUTE DECOMPENSATED HEART FAILURE: Status: RESOLVED | Noted: 2023-01-01 | Resolved: 2023-01-01

## 2023-06-19 NOTE — ASSESSMENT & PLAN NOTE
TTE in April 2023 showed:   There is severe aortic valve stenosis.   Aortic valve area is 0.46 cm2; peak velocity is 4.4 m/s; mean gradient is 50 mmHg. Of note, gradients were measured following a post PVC beat.    Pt with symptoms of FRY, but denies any syncope or chest pain. Patient seen by Dr. Ledezma for TAVR evaluation, presently not a candidate, however noted that if pt continued to be hospitalized, may consider BAV as bridging therapy.    - Consider Interventional Cardiology consult if Cardiology recommend. Careful diuresis per cardiology.

## 2023-06-19 NOTE — PLAN OF CARE
Patient in bed awake and alert, no signs of distress noted, breathing even and  unlabored, pt educated on 1500 FR, verbalized understanding, condom cath in place for accurate urine output, there was 1 occurrence of condom cath falling off, diaper was heavily soiled, denies discomfort at present, 1 person assisted when ambulating to restroom, will continue to monitor.  Problem: Adult Inpatient Plan of Care  Goal: Plan of Care Review  Flowsheets (Taken 6/19/2023 1747)  Plan of Care Reviewed With: patient     Problem: Fall Injury Risk  Goal: Absence of Fall and Fall-Related Injury  Outcome: Ongoing, Progressing  Intervention: Identify and Manage Contributors  Flowsheets (Taken 6/19/2023 1747)  Self-Care Promotion: BADL personal objects within reach  Medication Review/Management: medications reviewed

## 2023-06-19 NOTE — ASSESSMENT & PLAN NOTE
He was recently seen in clinic by Dr. Ledezma for TAVR eval but deemed to frail and recommended prehab at home and to return to clinic in 2 months for  RCA angiography and possible intervention and iliac and subclavian IVUS. If he failed to prehab then he was going to be considered for BAV as bridge to TAVR or destination therapy.      Recent TAVR work-up:    ECHO (Date 4/24/23): SIVAN= 0.46 cm2, MG= 50mmHg, Peak Rojas= 4.4 m/s, EF= 55%.    LHC (Date 5/22/23): LM and RCA stenosis     STS: 8%    Frailty: 4/4    Iliacs are    LVOT area by CTA is    Incidental findings on CT:    CT Surgery risk assessment: inoperable risk, per Dr Madrigal   Rhythm issues: 1st deg AV block    PFTs: deferred   KCCQ/5 meter walk:    Comorbidities: age, CAD, Afib      Recs:   - Imaging and patient seen with Dr. Coles, as Dr. Ledezma currently out of town.   - Patient admits he is very sedentary and does not monitor his fluid intake.   - We recommend further diuresis and medical management.   - needs PT/OT  - Prehab at home and can follow up with Dr. Ledezma in 2 months unless despite medical compliance he continues to be profoundly symptomatic then a possible BAV can be considered.     Discussed with Dr. Coles. We will sign off but please contact us if any further questions arise.

## 2023-06-19 NOTE — HPI
IC Consult: Severe AS, TAVR vs BAV eval  Team: Medicine     Mr. Siddharth Urias is a pleasant 89 y.o. male who has a PMH severe AS, CAD, PAF, Diastolic HF, CKD3, PAD who presented to the ED overnight on 6/18 per notes with a c/o of shortness of breath and worsening edema.Work up in the ED found patient to have an elevated BNP>2000 w. CXR showing small left pleural effusion. Patient has had multiple admissions secondary to heart failure. He was seen last week by by Dr. Ledezma for TAVR eval but deemed to frail and recommended prehab at home and to return to clinic in 2 months for  RCA angiography and possible intervention w/ iliac and subclavian IVUS. If he failed to prehab then he was going to be considered for BAV as bridge to TAVR or destination therapy.      Patient continues to be resting comfortably and watching tv. He is hard of hearing but denies any SOB, palpitations or worsening edema. He admits increased cough with some clear sputum production. He also admits he is very sedentary at home and mostly just sits and watches tv all .    He had been given lasix 40mg Iv once with good urine output. Patient is unclear about how much lasix he takes at home.     Recent TAVR work-up:   ECHO (Date 4/24/23): SIAVN= 0.46 cm2, MG= 50mmHg, Peak Rojas= 4.4 m/s, EF= 55%.   LHC (Date 5/22/23): LM and RCA stenosis    STS: 8%   Frailty: 4/4   Iliacs are   LVOT area by CTA is   Incidental findings on CT:   CT Surgery risk assessment: inoperable risk, per Dr Madrigal  Rhythm issues: 1st deg AV block   PFTs: deferred  KCCQ/5 meter walk:   Comorbidities: age, CAD, Afib

## 2023-06-19 NOTE — ASSESSMENT & PLAN NOTE
Patient with Long standing persistent (>12 months) atrial fibrillation which is controlled currently with Beta Blocker. Patient is currently in sinus rhythm.[id number] Score: 4.  Anticoagulation indicated. Anticoagulation done with Eliquis 5 BID.    - Continue Eliquis 5 BID  - Due to persistent bradycardia, will decrease Metoprolol XL 25 to 12.5 bid  - Currently rate controlled with HR < 110

## 2023-06-19 NOTE — PLAN OF CARE
[facility] - Med Surg  Initial Discharge Assessment       Primary Care Provider: Martine Maxwell MD    Admission Diagnosis: Bradycardia [R00.1]  Congestive heart failure [I50.9]  Fluid overload [E87.70]  Chest pain [R07.9]    Admission Date: 6/18/2023  Expected Discharge Date:     Transition of Care Barriers: (P) None    Payor: MEDICARE / Plan: MEDICARE PART A & B / Product Type: Government /     Extended Emergency Contact Information  Primary Emergency Contact: Enedina Urias  Address: [address] [place] [address] [facility]  Home Phone: [phone]  Mobile Phone: [phone]  Relation: Spouse   needed? No  Secondary Emergency Contact: FER URIAS  Mobile Phone: [phone]  Relation: Son  Preferred language: English   needed? No    Discharge Plan A: (P) Home, Home with family, Home Health         [id number] - [place], [place] - [address]  [address] [address]  Phone: [phone] Fax: [phone]    [facility] [place] [facility] [address] [facility] [address] [facility] Portal to Registered [facility]  [place] [address]  Phone: [phone] Fax: [phone]      Initial Assessment (most recent)       Adult Discharge Assessment - 06/19/23 0256          Discharge Assessment    Assessment Type Discharge Planning Assessment (P)      Confirmed/corrected address, phone number and insurance Yes (P)      Confirmed Demographics Correct on Facesheet (P)      Source of Information patient;family (P)      When was your last doctors appointment? -- (P)    4 months ago    Does patient/caregiver understand observation status Yes (P)      Communicated MAGDA with patient/caregiver Yes (P)      Reason For Admission Swollen feet (P)      People in Home spouse (P)      Facility Arrived From: Home (P)      Do you expect to return to your current living situation? Yes (P)      Do you have help at  home or someone to help you manage your care at home? Yes (P)      Who are your caregiver(s) and their phone number(s)? Wife (P)      Prior to hospitilization cognitive status: Alert/Oriented (P)      Current cognitive status: Alert/Oriented (P)      Walking or Climbing Stairs ambulation difficulty, requires equipment;stair climbing difficulty, requires equipment;transferring difficulty, requires equipment (P)      Mobility Management Walker (P)      Home Accessibility wheelchair accessible (P)      Home Layout Able to live on 1st floor (P)      Equipment Currently Used at Home walker, standard (P)      Readmission within 30 days? Yes (P)      Patient currently being followed by outpatient case management? No (P)      Do you currently have service(s) that help you manage your care at home? Yes (P)      Name and Contact number of agency HH with PT/OT and Meals on Wheels (P)      Is the pt/caregiver preference to resume services with current agency Yes (P)      Do you take prescription medications? Yes (P)      Do you have prescription coverage? Yes (P)      Coverage Medicare,  (P)      Do you have any problems affording any of your prescribed medications? No (P)      Is the patient taking medications as prescribed? yes (P)      Who is going to help you get home at discharge? Wife and or son (P)      How do you get to doctors appointments? family or friend will provide (P)      Are you on dialysis? No (P)      Do you take coumadin? No (P)      Discharge Plan A Home;Home with family;Home Health (P)      DME Needed Upon Discharge  shower chair (P)      Discharge Plan discussed with: Patient;Spouse/sig other;Adult children (P)      Name(s) and Number(s) Son and Wife (P)      Transition of Care Barriers None (P)         Physical Activity    On average, how many days per week do you engage in moderate to strenuous exercise (like a brisk walk)? 5 days (P)      On average, how many minutes do you engage in exercise at  this level? 60 min (P)         Financial Resource Strain    How hard is it for you to pay for the very basics like food, housing, medical care, and heating? Not hard at all (P)         Housing Stability    In the last 12 months, was there a time when you were not able to pay the mortgage or rent on time? No (P)      In the last 12 months, how many places have you lived? 1 (P)      In the last 12 months, was there a time when you did not have a steady place to sleep or slept in a shelter (including now)? No (P)         Transportation Needs    In the past 12 months, has lack of transportation kept you from medical appointments or from getting medications? No (P)      In the past 12 months, has lack of transportation kept you from meetings, work, or from getting things needed for daily living? No (P)         Food Insecurity    Within the past 12 months, you worried that your food would run out before you got the money to buy more. Never true (P)      Within the past 12 months, the food you bought just didn't last and you didn't have money to get more. Never true (P)         Social Connections    In a typical week, how many times do you talk on the phone with family, friends, or neighbors? More than three times a week (P)      How often do you get together with friends or relatives? Never (P)      How often do you attend Scientologist or Yazidism services? Never (P)      Do you belong to any clubs or organizations such as Scientologist groups, unions, fraternal or athletic groups, or school groups? No (P)      How often do you attend meetings of the clubs or organizations you belong to? Never (P)      Are you , , , , never , or living with a partner?  (P)         Alcohol Use    Q1: How often do you have a drink containing alcohol? Never (P)      Q2: How many drinks containing alcohol do you have on a typical day when you are drinking? Patient does not drink (P)      Q3: How often do you  have six or more drinks on one occasion? Never (P)         OTHER    Name(s) of People in Home Wife (P)

## 2023-06-19 NOTE — HPI
Siddharth Urias is an 89 year old man with history of severe aortic stenosis, CHF, AF, CKD3, PAD, DVT, and COPD who presents with bilateral lower extremity edema and cough. Patient was brought in by wife after she noticed that his legs and feet were becoming progressively more swollen over the past week. Pt denies any significant respiratory symptoms however his son reports that the pt had had recent episode of productive cough with clear sputum. He was recently hospitalized 4 times for acute on chronic congestive heart failure over the last two months, consisting of shortness of breath and lower extremity swelling, receiving diuresis each time. On Echo done in April 2023, it showed he had severe aortic stenosis. He was see by Dr. Ledezma on 6/12 for TAVR evaluation, and on that visit, he was deemed to not be a candidate for TAVR in his present physical condition. Dr. Ledezma recommended that he continue prehab, but also commented on consideration of BAV as bridging therapy if patient continued to undergo hospitalization. Patient does not know exactly which medications he is taking at home, and is not sure if he is taking his Lasix as directed though recognizes the name. He denies any recent fevers, chills, chest pain, chest tightness, SOB at rest, or urinary/bowel issues.    In the ED, patient was afebrile and initially hypotensive 89/50 but improved to 116/56 shortly after, satting well on room air. Labs were notable for WBC 15 and K 5.4, and BNP 1220. Trop at 0.053. EKG with signs of acute ischemia. CXR showed some pulmonary vascular congestion when compared to prior CXRs. The ED deferred diuresis given pt's severe aortic stenosis. Patient was admitted to hospital medicine for management of acute decompensated heart failure.

## 2023-06-19 NOTE — CONSULTS
[facility] - Med Surg  Cardiology  Consult Note    Patient Name: Siddharth Urias  MRN: [id number]  Admission Date: 6/18/2023  Hospital Length of Stay: 0 days  Code Status: Full Code   Attending Provider: Jody Brown DO   Consulting Provider: Salena Smith MD  Primary Care Physician: Martine Maxwell MD  Principal Problem:Acute decompensated heart failure    Patient information was obtained from patient and ER records.     Inpatient consult to Cardiology  Consult performed by: Salena Smith MD  Consult ordered by: Kareem Mcfarlane MD        Subjective:     Chief Complaint:  Cough    HPI:   Cardiology consult: Management for acute decompensated HF setting severe AS      Mr. Siddharth Urias is a pleasant 89 y.o. male who has a PMH severe AS, CAD, PAF, Diastolic HF, CKD3, PAD who presented to the ED overnight on 6/18 with a c/o of shortness of breath and worsening edema.Work up in the ED found patient to have an elevated BNP>2000 w. CXR showing small left pleural effusion. Patient has had multiple admissions secondary to heart failure. He was recently seen in clinic by Dr. Ledezma for TAVR eval but deemed to frail and recommended prehab at home and to return to clinic in 2 months for  RCA angiography and possible intervention and iliac and subclavian IVUS. If he failed to prehab then he was going to be considered for BAV as bridge to TAVR or destination therapy.     Upon my evaluation patient was resting comfortably and watching tv. He was comfortable laying in bed on RYAN. Denied any SOB, chest pain, palpitations or worsening edema. Did admit to some cough with clear sputum.   He had been given lasix 40mg Iv once with good urine output. Patient is unclear about how much lasix he takes at home.       As per Dr. Ledezma,      The patient has undergone the following TAVR work-up:    ECHO (Date 4/24/23): SIVAN= 0.46 cm2, MG= 50mmHg, Peak Rojas= 4.4 m/s, EF= 55%.    LHC (Date 5/22/23): LM and RCA stenosis      STS: 8%    Frailty: 4/4    Iliacs are    LVOT area by CTA is    Incidental findings on CT:    CT Surgery risk assessment: inoperable risk, per Dr Madrigal   Rhythm issues: 1st deg AV block    PFTs: deferred   KCCQ/5 meter walk:    Comorbidities: age, CAD, Afib       Past Medical History:   Diagnosis Date    Afib     Arthritis     Choledocholithiasis     Chronic kidney disease     Colon polyp     COPD (chronic obstructive pulmonary disease)     Diabetes mellitus, type 2     2/2011    Diverticulosis     Fatty liver     Hepatomegaly     History of blood clots     2020 Dr. Rodriguez     Irradiation cystitis     Prostate cancer 2009    s/p XRT (T1C, [place] 8)    Radiation proctitis     Sleep apnea        Past Surgical History:   Procedure Laterality Date    ANGIOGRAM, CORONARY, WITH LEFT HEART CATHETERIZATION N/A 05/17/2022    Procedure: Angiogram, Coronary, with Left Heart Cath;  Surgeon: Kamlesh Sullivan MD;  Location: [facility] CATH/EP LAB;  Service: Cardiology;  Laterality: N/A;    APPENDECTOMY      CARPAL TUNNEL RELEASE Left 02/05/2021    Procedure: RELEASE, CARPAL TUNNEL;  Surgeon: Jayro Hawkins II, MD;  Location: [facility] OR;  Service: Orthopedics;  Laterality: Left;    CARPAL TUNNEL RELEASE Right 3/1/2023    Procedure: RELEASE, CARPAL TUNNEL;  Surgeon: Shaun Leonardo MD;  Location: [facility] OR;  Service: Orthopedics;  Laterality: Right;    CATARACT EXTRACTION Bilateral     COLONOSCOPY N/A 12/14/2018    Procedure: COLONOSCOPY;  Surgeon: Noel Aguiar MD;  Location: [facility] ENDO;  Service: Endoscopy;  Laterality: N/A;    COLONOSCOPY  06/21/2021    Dr. Rodgers, in media: 2 colon polyps removed; diverticulosis, erythema in transverse colon; biopsy: tubular adenomas, ranomd colon unremarkable    COLONOSCOPY N/A 11/4/2022    Procedure: COLONOSCOPY;  Surgeon: Noel Aguiar MD;  Location: [facility] ENDO;  Service: Endoscopy;  Laterality: N/A;    CORONARY ANGIOGRAPHY N/A 5/22/2023    Procedure:  ANGIOGRAM, CORONARY ARTERY;  Surgeon: Xavier Solis MD;  Location: [facility] CATH/EP LAB;  Service: Cardiology;  Laterality: N/A;    ERCP N/A 11/23/2021    Procedure: ERCP (ENDOSCOPIC RETROGRADE CHOLANGIOPANCREATOGRAPHY);  Surgeon: Marshall Gardner III, MD;  Location: [facility] ENDO;  Service: Endoscopy;  Laterality: N/A;    ESOPHAGOGASTRODUODENOSCOPY N/A 01/24/2019    Procedure: EGD (ESOPHAGOGASTRODUODENOSCOPY);  Surgeon: Noel Aguiar MD;  Location: [facility] ENDO;  Service: Endoscopy;  Laterality: N/A;    EYE SURGERY      FLEXIBLE SIGMOIDOSCOPY N/A 01/22/2019    Procedure: SIGMOIDOSCOPY, FLEXIBLE;  Surgeon: Noel Aguiar MD;  Location: [facility] ENDO;  Service: Endoscopy;  Laterality: N/A;    HERNIA REPAIR      bilateral inguinal    LAPAROSCOPIC CHOLECYSTECTOMY N/A 11/24/2021    Procedure: CHOLECYSTECTOMY, LAPAROSCOPIC;  Surgeon: Jay Cabrera Jr., MD;  Location: [facility] OR;  Service: General;  Laterality: N/A;    TONSILLECTOMY         Review of patient's allergies indicates:   Allergen Reactions    No known drug allergies        No current facility-administered medications on file prior to encounter.     Current Outpatient Medications on File Prior to Encounter   Medication Sig    albuterol (PROVENTIL/VENTOLIN HFA) 90 mcg/actuation inhaler INHALE 2 PUFFS INTO THE LUNGS EVERY 6 HOURS AS NEEDED FOR WHEEZE    allopurinoL (ZYLOPRIM) 100 MG tablet Take 2 tablets (200 mg total) by mouth once daily.    apixaban (ELIQUIS) 5 mg Tab Take 1 tablet (5 mg total) by mouth 2 (two) times daily.    aspirin 81 MG Chew Take 81 mg by mouth once daily.    colestipoL (COLESTID) 1 gram Tab TAKE 1 TABLET BY MOUTH 2 TIMES DAILY. TAKE OTHER ORAL MEDICATIONS >1H BEFORE OR >4H AFTER COLESTIPOL (Patient taking differently: Take 1 g by mouth 2 (two) times daily.)    fluticasone-umeclidin-vilanter (TRELEGY ELLIPTA) 100-62.5-25 mcg DsDv Inhale 1 puff into the lungs once daily.    losartan (COZAAR) 25 MG tablet Take 1  tablet (25 mg total) by mouth once daily.    pantoprazole (PROTONIX) 40 MG tablet Take 1 tablet (40 mg total) by mouth once daily.    potassium chloride SA (K-DUR,KLOR-CON) 20 MEQ tablet Take 1 tablet (20 mEq total) by mouth 2 (two) times daily.    pravastatin (PRAVACHOL) 40 MG tablet Take 1 tablet (40 mg total) by mouth once daily.    solifenacin (VESICARE) 5 MG tablet Take 1 tablet (5 mg total) by mouth once daily.    tamsulosin (FLOMAX) 0.4 mg Cap Take 1 capsule (0.4 mg total) by mouth once daily.    traZODone (DESYREL) 50 MG tablet Take 1 tablet (50 mg total) by mouth nightly as needed for Insomnia.    doxycycline (VIBRA-TABS) 100 MG tablet Take 100 mg by mouth 2 (two) times daily.    furosemide (LASIX) 40 MG tablet Take 0.5 tablets (20 mg total) by mouth once daily.    metoprolol succinate (TOPROL-XL) 25 MG 24 hr tablet Take 25 mg by mouth once daily.    miconazole NITRATE 2 % (MICOTIN) 2 % top powder Apply topically 2 (two) times daily. Apply to groin twice a day x1 week for 7 days     Family History       Problem Relation (Age of Onset)    Diabetes Mother, Brother          Tobacco Use    Smoking status: Former     Packs/day: 1.50     Years: 60.00     Pack years: 90.00     Types: Cigarettes     Start date: 1944     Quit date: 1/2/2008     Years since quitting: 15.4    Smokeless tobacco: Never   Substance and Sexual Activity    Alcohol use: Yes     Alcohol/week: 1.0 standard drink     Types: 1 Glasses of wine per week     Comment: social - at speical events    Drug use: No    Sexual activity: Not Currently     Partners: Female     ROS  Constitutional: Negative for chills and fever.   HENT:  Negative for hearing loss.    Eyes:  Negative for blurred vision, double vision and photophobia.   Cardiovascular:  Positive for dyspnea on exertion. Negative for chest pain, claudication, cyanosis, irregular heartbeat, leg swelling, near-syncope, orthopnea, palpitations, paroxysmal nocturnal dyspnea and  syncope.   Respiratory:  Negative for cough and hemoptysis.    Musculoskeletal:  Negative for falls.   Gastrointestinal:  Negative for abdominal pain, constipation, diarrhea, nausea and vomiting.   Genitourinary:  Negative for dysuria.   Neurological:  Negative for disturbances in coordination, dizziness, focal weakness, headaches, loss of balance and weakness.     Objective:     Vital Signs (Most Recent):  Temp: 98.4 °F (36.9 °C) (06/18/23 1937)  Pulse: 106 (06/18/23 1937)  Resp: (!) 22 (06/18/23 1937)  BP: 122/64 (06/18/23 1937)  SpO2: 98 % (06/18/23 1937) Vital Signs (24h Range):  Temp:  [98.2 °F (36.8 °C)-98.5 °F (36.9 °C)] 98.4 °F (36.9 °C)  Pulse:  [] 106  Resp:  [18-24] 22  SpO2:  [95 %-100 %] 98 %  BP: ()/(50-64) 122/64        There is no height or weight on file to calculate BMI.    SpO2: 98 %       No intake or output data in the 24 hours ending 06/18/23 2011    Lines/Drains/Airways       Peripheral Intravenous Line  Duration                  Peripheral IV - Single Lumen 06/18/23 1650 20 G;1 in Posterior;Proximal;Right Forearm <1 day         Peripheral IV - Single Lumen 06/18/23 1955 22 G Left;Posterior Forearm <1 day                     Physical Exam   Vitals reviewed.   Constitutional:       General: He is not in acute distress.     Appearance: He is well-developed. He is not diaphoretic.   HENT:      Head: Normocephalic and atraumatic.   Neck:      Vascular: JVD present.   Cardiovascular:      Rate and Rhythm: Normal rate and regular rhythm.      Pulses: Intact distal pulses.      Heart sounds: Murmur heard.   Harsh midsystolic murmur is present at the upper right sternal border radiating to the neck.   Pulmonary:      Effort: Pulmonary effort is normal. No respiratory distress.   Musculoskeletal:      Cervical back: Normal range of motion.      Right lower leg: Edema present.      Left lower leg: Edema present.   Skin:     General: Skin is warm and dry.   Neurological:      Mental Status:  He is alert and oriented to person, place, and time.   Significant Labs:   Recent Lab Results         06/18/23  1703   06/18/23  1649        Albumin 3.1         Alkaline Phosphatase 72         ALT 16         Anion Gap 10         AST 13         Baso # 0.03         Basophil % 0.2         BILIRUBIN TOTAL 0.6  Comment: For infants and newborns, interpretation of results should be based  on gestational age, weight and in agreement with clinical  observations.    Premature Infant recommended reference ranges:  Up to 24 hours.............<8.0 mg/dL  Up to 48 hours............<12.0 mg/dL  3-5 days..................<15.0 mg/dL  6-29 days.................<15.0 mg/dL           BNP 1,220  Comment: Values of less than 100 pg/ml are consistent with non-CHF populations.         BUN 64         Calcium 8.7         Chloride 106         CO2 24         Creatinine 2.0         Differential Method Automated         eGFR 31.3         Eos # 0.1         Eosinophil % 0.9         Glucose 88         Gran # (ANC) 11.9         Gran % 78.8         Hematocrit 41.0         Hemoglobin 12.6         HIV 1/2 Ag/Ab Non-reactive         Immature Grans (Abs) 0.12  Comment: Mild elevation in immature granulocytes is non specific and   can be seen in a variety of conditions including stress response,   acute inflammation, trauma and pregnancy. Correlation with other   laboratory and clinical findings is essential.           Immature Granulocytes 0.8         Lymph # 1.8         Lymph % 12.2         MCH 25.6         MCHC 30.7         MCV 83         Mono # 1.1         Mono % 7.1         MPV 11.5         nRBC 0         Platelets 175         POC Cardiac Troponin I   0.06       Potassium 5.4  Comment: *No Visible Hemolysis         PROTEIN TOTAL 6.6         RBC 4.92         RDW 19.1         Sample   OTHER  Comment: A single negative troponin is insufficient to rule out myocardial infarction.  The use of a serial sampling protocol is recommended practice. Correlate  results with reference intervals established for methodology used. Point of care and core laboratory   troponin results are not interchangeable.         Sodium 140         Troponin I 0.053  Comment: The reference interval for Troponin I represents the 99th percentile   cutoff   for our facility and is consistent with 3rd generation assay   performance.           WBC 15.10                     Assessment and Plan:     Acute on chronic diastolic heart failure  Mr. Siddharth Urias is a pleasant 89 y.o. male who has a PMH severe AS, CAD, PAF, Diastolic HF, CKD3, PAD who presented to the ED overnight on 6/18 with a c/o of shortness of breath and worsening edema.Work up In the ED found patient to have an elevated BNP>2000 w. He has had multiple recent admission due to decompensated HF in the setting of severe AS.     Recs:   - Can continue 40mg IV lasix  - strict I & Os  -  repeat echo once more euvolemic  - currently not a candidate for TAVR due to debility/deconditioning.   - PT/OT    Aortic stenosis, severe  He was recently seen in clinic by Dr. Ledezma for TAVR eval but deemed to frail and recommended prehab at home and to return to clinic in 2 months for  RCA angiography and possible intervention and iliac and subclavian IVUS. If he failed to prehab then he was going to be considered for BAV as bridge to TAVR or destination therapy.      The patient has undergone the following TAVR work-up:    ECHO (Date 4/24/23): SIVAN= 0.46 cm2, MG= 50mmHg, Peak Rojas= 4.4 m/s, EF= 55%.    LHC (Date 5/22/23): LM and RCA stenosis     STS: 8%    Frailty: 4/4    Iliacs are    LVOT area by CTA is    Incidental findings on CT:    CT Surgery risk assessment: inoperable risk, per Dr Madrigal   Rhythm issues: 1st deg AV block    PFTs: deferred   KCCQ/5 meter walk:    Comorbidities: age, CAD, Afib     Recs:   - Continue diuresis for now.   - PT/OT        VTE Risk Mitigation (From admission, onward)         Ordered     apixaban  tablet 2.5 mg  2 times daily         06/19/23 0732     IP VTE HIGH RISK PATIENT  Once         06/18/23 1822                Thank you for your consult.    Salena Smith MD  Cardiology   [facility] S[facility]

## 2023-06-19 NOTE — ASSESSMENT & PLAN NOTE
Pt with multiple risk factors for CAD with recent LHC done on 5/22/2023 showing significant left main disease and three vessel disease. No intervention was done at the time. Continuing evaluation for revascularization.    - Continue medical management with ASA and pravastatin

## 2023-06-19 NOTE — ASSESSMENT & PLAN NOTE
Due to age and cardiac disease, pt with significant physical debility.    - Will consult PT/OT when pt medically optimized

## 2023-06-19 NOTE — ASSESSMENT & PLAN NOTE
Chronic medical issue, stable with no recent exacerbations.    - Continue albuterol rescue inhaler  - Start Breo and Spiriva, as no Trelegy on formulary  - Supplemental O2 if needed to maintain sats > 88%

## 2023-06-19 NOTE — SUBJECTIVE & OBJECTIVE
Past Medical History:   Diagnosis Date    Afib     Arthritis     Choledocholithiasis     Chronic kidney disease     Colon polyp     COPD (chronic obstructive pulmonary disease)     Diabetes mellitus, type 2     2/2011    Diverticulosis     Fatty liver     Hepatomegaly     History of blood clots     2020 Dr. Rodriguez     Irradiation cystitis     Prostate cancer 2009    s/p XRT (T1C, [place] 8)    Radiation proctitis     Sleep apnea        Past Surgical History:   Procedure Laterality Date    ANGIOGRAM, CORONARY, WITH LEFT HEART CATHETERIZATION N/A 05/17/2022    Procedure: Angiogram, Coronary, with Left Heart Cath;  Surgeon: Kamlesh Sullivan MD;  Location: [facility] CATH/EP LAB;  Service: Cardiology;  Laterality: N/A;    APPENDECTOMY      CARPAL TUNNEL RELEASE Left 02/05/2021    Procedure: RELEASE, CARPAL TUNNEL;  Surgeon: Jayro Hawkins II, MD;  Location: [facility] OR;  Service: Orthopedics;  Laterality: Left;    CARPAL TUNNEL RELEASE Right 3/1/2023    Procedure: RELEASE, CARPAL TUNNEL;  Surgeon: Shaun Leonardo MD;  Location: [facility] OR;  Service: Orthopedics;  Laterality: Right;    CATARACT EXTRACTION Bilateral     COLONOSCOPY N/A 12/14/2018    Procedure: COLONOSCOPY;  Surgeon: Noel Aguiar MD;  Location: [facility] ENDO;  Service: Endoscopy;  Laterality: N/A;    COLONOSCOPY  06/21/2021    Dr. Rodgers, in media: 2 colon polyps removed; diverticulosis, erythema in transverse colon; biopsy: tubular adenomas, ranomd colon unremarkable    COLONOSCOPY N/A 11/4/2022    Procedure: COLONOSCOPY;  Surgeon: Noel Aguiar MD;  Location: [facility] ENDO;  Service: Endoscopy;  Laterality: N/A;    CORONARY ANGIOGRAPHY N/A 5/22/2023    Procedure: ANGIOGRAM, CORONARY ARTERY;  Surgeon: Xavier Solis MD;  Location: [facility] CATH/EP LAB;  Service: Cardiology;  Laterality: N/A;    ERCP N/A 11/23/2021    Procedure: ERCP (ENDOSCOPIC RETROGRADE CHOLANGIOPANCREATOGRAPHY);  Surgeon: Marshall Gardner III, MD;  Location: [facility] ENDO;  Service:  Endoscopy;  Laterality: N/A;    ESOPHAGOGASTRODUODENOSCOPY N/A 01/24/2019    Procedure: EGD (ESOPHAGOGASTRODUODENOSCOPY);  Surgeon: Noel Aguiar MD;  Location: [facility] ENDO;  Service: Endoscopy;  Laterality: N/A;    EYE SURGERY      FLEXIBLE SIGMOIDOSCOPY N/A 01/22/2019    Procedure: SIGMOIDOSCOPY, FLEXIBLE;  Surgeon: Noel Aguiar MD;  Location: [facility] ENDO;  Service: Endoscopy;  Laterality: N/A;    HERNIA REPAIR      bilateral inguinal    LAPAROSCOPIC CHOLECYSTECTOMY N/A 11/24/2021    Procedure: CHOLECYSTECTOMY, LAPAROSCOPIC;  Surgeon: Jay Cabrera Jr., MD;  Location: [facility] OR;  Service: General;  Laterality: N/A;    TONSILLECTOMY         Review of patient's allergies indicates:   Allergen Reactions    No known drug allergies        PTA Medications   Medication Sig    albuterol (PROVENTIL/VENTOLIN HFA) 90 mcg/actuation inhaler INHALE 2 PUFFS INTO THE LUNGS EVERY 6 HOURS AS NEEDED FOR WHEEZE    allopurinoL (ZYLOPRIM) 100 MG tablet Take 2 tablets (200 mg total) by mouth once daily.    apixaban (ELIQUIS) 5 mg Tab Take 1 tablet (5 mg total) by mouth 2 (two) times daily.    aspirin 81 MG Chew Take 81 mg by mouth once daily.    colestipoL (COLESTID) 1 gram Tab TAKE 1 TABLET BY MOUTH 2 TIMES DAILY. TAKE OTHER ORAL MEDICATIONS >1H BEFORE OR >4H AFTER COLESTIPOL (Patient taking differently: Take 1 g by mouth 2 (two) times daily.)    fluticasone-umeclidin-vilanter (TRELEGY ELLIPTA) 100-62.5-25 mcg DsDv Inhale 1 puff into the lungs once daily.    losartan (COZAAR) 25 MG tablet Take 1 tablet (25 mg total) by mouth once daily.    pantoprazole (PROTONIX) 40 MG tablet Take 1 tablet (40 mg total) by mouth once daily.    potassium chloride SA (K-DUR,KLOR-CON) 20 MEQ tablet Take 1 tablet (20 mEq total) by mouth 2 (two) times daily.    pravastatin (PRAVACHOL) 40 MG tablet Take 1 tablet (40 mg total) by mouth once daily.    solifenacin (VESICARE) 5 MG tablet Take 1 tablet (5 mg total) by mouth once daily.    tamsulosin  (FLOMAX) 0.4 mg Cap Take 1 capsule (0.4 mg total) by mouth once daily.    traZODone (DESYREL) 50 MG tablet Take 1 tablet (50 mg total) by mouth nightly as needed for Insomnia.    doxycycline (VIBRA-TABS) 100 MG tablet Take 100 mg by mouth 2 (two) times daily.    furosemide (LASIX) 40 MG tablet Take 0.5 tablets (20 mg total) by mouth once daily.    metoprolol succinate (TOPROL-XL) 25 MG 24 hr tablet Take 25 mg by mouth once daily.    miconazole NITRATE 2 % (MICOTIN) 2 % top powder Apply topically 2 (two) times daily. Apply to groin twice a day x1 week for 7 days     Family History       Problem Relation (Age of Onset)    Diabetes Mother, Brother          Tobacco Use    Smoking status: Former     Packs/day: 1.50     Years: 60.00     Pack years: 90.00     Types: Cigarettes     Start date: 1944     Quit date: 1/2/2008     Years since quitting: 15.4    Smokeless tobacco: Never   Substance and Sexual Activity    Alcohol use: Yes     Alcohol/week: 1.0 standard drink     Types: 1 Glasses of wine per week     Comment: social - at speical events    Drug use: No    Sexual activity: Not Currently     Partners: Female     ROS  Constitutional: Negative for chills and fever.   HENT:  Negative for hearing loss.    Eyes:  Negative for blurred vision, double vision and photophobia.   Cardiovascular:  Positive for dyspnea on exertion. Negative for chest pain, claudication, cyanosis, irregular heartbeat, leg swelling, near-syncope, orthopnea, palpitations, paroxysmal nocturnal dyspnea and syncope.   Respiratory:  Negative for cough and hemoptysis.    Musculoskeletal:  Negative for falls.   Gastrointestinal:  Negative for abdominal pain, constipation, diarrhea, nausea and vomiting.   Genitourinary:  Negative for dysuria.   Neurological:  Negative for disturbances in coordination, dizziness, focal weakness, headaches, loss of balance and weakness    Objective:     Vital Signs (Most Recent):  Temp: 97.7 °F (36.5 °C) (06/19/23  0722)  Pulse: 65 (06/19/23 0808)  Resp: 16 (06/19/23 0722)  BP: (!) 119/56 (06/19/23 0808)  SpO2: 96 % (06/19/23 0722) Vital Signs (24h Range):  Temp:  [96.2 °F (35.7 °C)-98.5 °F (36.9 °C)] 97.7 °F (36.5 °C)  Pulse:  [] 65  Resp:  [16-24] 16  SpO2:  [92 %-100 %] 96 %  BP: ()/(50-64) 119/56     Weight: 83.3 kg (183 lb 10.3 oz)  Body mass index is 28.76 kg/m².    SpO2: 96 %         Intake/Output Summary (Last 24 hours) at 6/19/2023 1210  Last data filed at 6/19/2023 0845  Gross per 24 hour   Intake 567 ml   Output 250 ml   Net 317 ml       Lines/Drains/Airways       Peripheral Intravenous Line  Duration                  Peripheral IV - Single Lumen 06/18/23 1650 20 G;1 in Posterior;Proximal;Right Forearm <1 day         Peripheral IV - Single Lumen 06/18/23 1955 22 G Left;Posterior Forearm <1 day                     Physical Exam      Vitals reviewed.   Constitutional:       General: He is not in acute distress.     Appearance: He is well-developed. He is not diaphoretic.   HENT:      Head: Normocephalic and atraumatic.   Neck:      Vascular: JVD present.   Cardiovascular:      Rate and Rhythm: Normal rate and regular rhythm.      Pulses: Intact distal pulses.      Heart sounds: Murmur heard.   Harsh midsystolic murmur is present at the upper right sternal border radiating to the neck.   Pulmonary:      Effort: Pulmonary effort is normal. No respiratory distress.   Musculoskeletal:      Cervical back: Normal range of motion.      Right lower leg: Edema present.      Left lower leg: Edema present.   Skin:     General: Skin is warm and dry.   Neurological:      Mental Status: He is alert and oriented to person, place, and time.   Significant Labs:   Recent Lab Results  (Last 5 results in the past 24 hours)        06/19/23  0831   06/19/23  0810   06/18/23  2129   06/18/23 2029 06/18/23  1703        Albumin 2.9         3.1       Alkaline Phosphatase 65         72       ALT 13         16       Anion Gap 12          10       Ascending aorta   3.47             Ao peak yanelis   4.46             Ao VTI   116.35             Appearance, UA       Clear         AST 13         13       AV valve area   0.93             AV mean gradient   50             AV index (prosthetic)   0.22             AV peak gradient   80             AV Velocity Ratio   0.22             Bacteria, UA       Rare         Baso # 0.03         0.03       Basophil % 0.2         0.2       Bilirubin (UA)       Negative         BILIRUBIN TOTAL 0.8  Comment: For infants and newborns, interpretation of results should be based  on gestational age, weight and in agreement with clinical  observations.    Premature Infant recommended reference ranges:  Up to 24 hours.............<8.0 mg/dL  Up to 48 hours............<12.0 mg/dL  3-5 days..................<15.0 mg/dL  6-29 days.................<15.0 mg/dL           0.6  Comment: For infants and newborns, interpretation of results should be based  on gestational age, weight and in agreement with clinical  observations.    Premature Infant recommended reference ranges:  Up to 24 hours.............<8.0 mg/dL  Up to 48 hours............<12.0 mg/dL  3-5 days..................<15.0 mg/dL  6-29 days.................<15.0 mg/dL         BNP         1,220  Comment: Values of less than 100 pg/ml are consistent with non-CHF populations.       BSA   1.98             BUN 58         64       Calcium 8.8         8.7       Chloride 106         106       CO2 24         24       Color, UA       Straw         Creatinine 1.9         2.0       Left Ventricle Relative Wall Thickness   0.34             Differential Method Automated         Automated       E/A ratio   0.82             eGFR 33.3         31.3       EF   45             Eos # 0.2         0.1       Eosinophil % 1.1         0.9       E wave deceleration time   338.51             FS   30             Glucose 115         88       Glucose, UA       Negative         Gran # (ANC) 9.4          11.9       Gran % 72.2         78.8       Hematocrit 41.8         41.0       Hemoglobin 12.6         12.6       HIV 1/2 Ag/Ab         Non-reactive       Hyaline Casts, UA       5         Immature Grans (Abs) 0.10  Comment: Mild elevation in immature granulocytes is non specific and   can be seen in a variety of conditions including stress response,   acute inflammation, trauma and pregnancy. Correlation with other   laboratory and clinical findings is essential.           0.12  Comment: Mild elevation in immature granulocytes is non specific and   can be seen in a variety of conditions including stress response,   acute inflammation, trauma and pregnancy. Correlation with other   laboratory and clinical findings is essential.         Immature Granulocytes 0.8         0.8       IVSd   0.77             Ketones, UA       Negative         LA WIDTH   5.09             Left Atrium Major Axis   7.12             Left Atrium Minor Axis   6.86             LA size   3.48             LA volume   105.21                121.84             LA vol index   54.0             LA Volume Index (Mod)   62.5             LVOT area   4.2             Leukocytes, UA       Trace         LV EDV BP   144.81             LV Diastolic Volume Index   74.26             LVIDd   5.46             LVIDs   3.80             LV mass   170.57             LV Mass Index   87             LVOT diameter   2.30             LVOT peak yanelis   0.97             LVOT stroke volume   108.18             LVOT peak VTI   26.05             LV ESV BP   61.88             LV Systolic Volume Index   31.7             Lymph # 2.4         1.8       Lymph % 18.4         12.2       Magnesium 2.0               MCH 25.9         25.6       MCHC 30.1         30.7       MCV 86         83       Microscopic Comment       SEE COMMENT  Comment: Other formed elements not mentioned in the report are not   present in the microscopic examination.            Mono # 1.0         1.1       Mono % 7.3          7.1       MPV 12.0         11.5       MV valve area p 1/2 method   2.24             MV Peak A Rojas   1.11             MV Peak E Rojas   0.91             MV stenosis pressure 1/2 time   98.17             NITRITE UA       Negative         nRBC 0         0       Occult Blood UA       1+         pH, UA       5.0         Phosphorus 4.5               Platelets 174         175       Potassium 4.7         5.4  Comment: *No Visible Hemolysis       PROTEIN TOTAL 6.1         6.6       Protein, UA       Negative  Comment: Recommend a 24 hour urine protein or a urine   protein/creatinine ratio if globulin induced proteinuria is  clinically suspected.           Posterior Wall   0.93             Right Atrial Pressure (from IVC)   3             RA Major Axis   5.12             RA Width   3.43             RBC 4.87         4.92       RBC, UA       5         RDW 19.0         19.1       RVDD   3.69             Sinus   3.60             Sodium 142         140       Specific [place], UA       1.010         Specimen UA       Urine, Clean Catch         Squam Epithel, UA       0         STJ   2.89             TAPSE   2.11             Troponin I 0.080  Comment: The reference interval for Troponin I represents the 99th percentile   cutoff   for our facility and is consistent with 3rd generation assay   performance.       0.060  Comment: The reference interval for Troponin I represents the 99th percentile   cutoff   for our facility and is consistent with 3rd generation assay   performance.       0.053  Comment: The reference interval for Troponin I represents the 99th percentile   cutoff   for our facility and is consistent with 3rd generation assay   performance.         WBC, UA       1         WBC 13.07         15.10

## 2023-06-19 NOTE — SUBJECTIVE & OBJECTIVE
Past Medical History:   Diagnosis Date    Afib     Arthritis     Choledocholithiasis     Chronic kidney disease     Colon polyp     COPD (chronic obstructive pulmonary disease)     Diabetes mellitus, type 2     2/2011    Diverticulosis     Fatty liver     Hepatomegaly     History of blood clots     2020 Dr. Rodriguez     Irradiation cystitis     Prostate cancer 2009    s/p XRT (T1C, [place] 8)    Radiation proctitis     Sleep apnea        Past Surgical History:   Procedure Laterality Date    ANGIOGRAM, CORONARY, WITH LEFT HEART CATHETERIZATION N/A 05/17/2022    Procedure: Angiogram, Coronary, with Left Heart Cath;  Surgeon: Kamlesh Sullivan MD;  Location: [facility] CATH/EP LAB;  Service: Cardiology;  Laterality: N/A;    APPENDECTOMY      CARPAL TUNNEL RELEASE Left 02/05/2021    Procedure: RELEASE, CARPAL TUNNEL;  Surgeon: Jayro Hawkins II, MD;  Location: [facility] OR;  Service: Orthopedics;  Laterality: Left;    CARPAL TUNNEL RELEASE Right 3/1/2023    Procedure: RELEASE, CARPAL TUNNEL;  Surgeon: Shaun Leonardo MD;  Location: [facility] OR;  Service: Orthopedics;  Laterality: Right;    CATARACT EXTRACTION Bilateral     COLONOSCOPY N/A 12/14/2018    Procedure: COLONOSCOPY;  Surgeon: Noel Aguiar MD;  Location: [facility] ENDO;  Service: Endoscopy;  Laterality: N/A;    COLONOSCOPY  06/21/2021    Dr. Rodgers, in media: 2 colon polyps removed; diverticulosis, erythema in transverse colon; biopsy: tubular adenomas, ranomd colon unremarkable    COLONOSCOPY N/A 11/4/2022    Procedure: COLONOSCOPY;  Surgeon: Noel Aguiar MD;  Location: [facility] ENDO;  Service: Endoscopy;  Laterality: N/A;    CORONARY ANGIOGRAPHY N/A 5/22/2023    Procedure: ANGIOGRAM, CORONARY ARTERY;  Surgeon: Xavier Solis MD;  Location: [facility] CATH/EP LAB;  Service: Cardiology;  Laterality: N/A;    ERCP N/A 11/23/2021    Procedure: ERCP (ENDOSCOPIC RETROGRADE CHOLANGIOPANCREATOGRAPHY);  Surgeon: Marshall Gardner III, MD;  Location: [facility] ENDO;  Service:  Endoscopy;  Laterality: N/A;    ESOPHAGOGASTRODUODENOSCOPY N/A 01/24/2019    Procedure: EGD (ESOPHAGOGASTRODUODENOSCOPY);  Surgeon: Noel Aguiar MD;  Location: [facility] ENDO;  Service: Endoscopy;  Laterality: N/A;    EYE SURGERY      FLEXIBLE SIGMOIDOSCOPY N/A 01/22/2019    Procedure: SIGMOIDOSCOPY, FLEXIBLE;  Surgeon: Noel Aguiar MD;  Location: [facility] ENDO;  Service: Endoscopy;  Laterality: N/A;    HERNIA REPAIR      bilateral inguinal    LAPAROSCOPIC CHOLECYSTECTOMY N/A 11/24/2021    Procedure: CHOLECYSTECTOMY, LAPAROSCOPIC;  Surgeon: Jay Cabrera Jr., MD;  Location: [facility] OR;  Service: General;  Laterality: N/A;    TONSILLECTOMY         Review of patient's allergies indicates:   Allergen Reactions    No known drug allergies        No current facility-administered medications on file prior to encounter.     Current Outpatient Medications on File Prior to Encounter   Medication Sig    albuterol (PROVENTIL/VENTOLIN HFA) 90 mcg/actuation inhaler INHALE 2 PUFFS INTO THE LUNGS EVERY 6 HOURS AS NEEDED FOR WHEEZE    allopurinoL (ZYLOPRIM) 100 MG tablet Take 2 tablets (200 mg total) by mouth once daily.    apixaban (ELIQUIS) 5 mg Tab Take 1 tablet (5 mg total) by mouth 2 (two) times daily.    aspirin 81 MG Chew Take 81 mg by mouth once daily.    colestipoL (COLESTID) 1 gram Tab TAKE 1 TABLET BY MOUTH 2 TIMES DAILY. TAKE OTHER ORAL MEDICATIONS >1H BEFORE OR >4H AFTER COLESTIPOL (Patient taking differently: Take 1 g by mouth 2 (two) times daily.)    fluticasone-umeclidin-vilanter (TRELEGY ELLIPTA) 100-62.5-25 mcg DsDv Inhale 1 puff into the lungs once daily.    losartan (COZAAR) 25 MG tablet Take 1 tablet (25 mg total) by mouth once daily.    pantoprazole (PROTONIX) 40 MG tablet Take 1 tablet (40 mg total) by mouth once daily.    potassium chloride SA (K-DUR,KLOR-CON) 20 MEQ tablet Take 1 tablet (20 mEq total) by mouth 2 (two) times daily.    pravastatin (PRAVACHOL) 40 MG tablet Take 1 tablet (40 mg total) by  mouth once daily.    solifenacin (VESICARE) 5 MG tablet Take 1 tablet (5 mg total) by mouth once daily.    tamsulosin (FLOMAX) 0.4 mg Cap Take 1 capsule (0.4 mg total) by mouth once daily.    traZODone (DESYREL) 50 MG tablet Take 1 tablet (50 mg total) by mouth nightly as needed for Insomnia.    doxycycline (VIBRA-TABS) 100 MG tablet Take 100 mg by mouth 2 (two) times daily.    furosemide (LASIX) 40 MG tablet Take 0.5 tablets (20 mg total) by mouth once daily.    metoprolol succinate (TOPROL-XL) 25 MG 24 hr tablet Take 25 mg by mouth once daily.    miconazole NITRATE 2 % (MICOTIN) 2 % top powder Apply topically 2 (two) times daily. Apply to groin twice a day x1 week for 7 days     Family History       Problem Relation (Age of Onset)    Diabetes Mother, Brother          Tobacco Use    Smoking status: Former     Packs/day: 1.50     Years: 60.00     Pack years: 90.00     Types: Cigarettes     Start date: 1944     Quit date: 1/2/2008     Years since quitting: 15.4    Smokeless tobacco: Never   Substance and Sexual Activity    Alcohol use: Yes     Alcohol/week: 1.0 standard drink     Types: 1 Glasses of wine per week     Comment: social - at speical events    Drug use: No    Sexual activity: Not Currently     Partners: Female     Review of Systems   Constitutional:  Negative for chills and fever.   HENT:  Negative for congestion and sore throat.    Eyes:  Negative for photophobia and visual disturbance.   Respiratory:  Negative for cough, chest tightness and shortness of breath.         Dyspnea on exertion   Cardiovascular:  Positive for leg swelling. Negative for chest pain and palpitations.   Gastrointestinal:  Negative for abdominal pain.   Genitourinary:  Negative for dysuria and hematuria.   Musculoskeletal:  Negative for arthralgias and back pain.   Skin:  Negative for rash and wound.   Neurological:  Negative for dizziness and syncope.   Psychiatric/Behavioral:  Negative for agitation and behavioral problems.     Objective:     Vital Signs (Most Recent):  Temp: 98.4 °F (36.9 °C) (06/18/23 1937)  Pulse: 106 (06/18/23 1937)  Resp: (!) 22 (06/18/23 1937)  BP: 122/64 (06/18/23 1937)  SpO2: 98 % (06/18/23 1937) Vital Signs (24h Range):  Temp:  [98.2 °F (36.8 °C)-98.5 °F (36.9 °C)] 98.4 °F (36.9 °C)  Pulse:  [] 106  Resp:  [18-24] 22  SpO2:  [95 %-100 %] 98 %  BP: ()/(50-64) 122/64        There is no height or weight on file to calculate BMI.     Physical Exam  Vitals reviewed.   Constitutional:       General: He is not in acute distress.     Appearance: Normal appearance.   HENT:      Head: Normocephalic and atraumatic.      Nose: No congestion or rhinorrhea.      Mouth/Throat:      Mouth: Mucous membranes are moist.      Pharynx: Oropharynx is clear.   Cardiovascular:      Rate and Rhythm: Regular rhythm. Tachycardia present.      Pulses: Normal pulses.      Heart sounds: Murmur (Crescendo decrescendo systolic murmur noted) heard.   Pulmonary:      Effort: Pulmonary effort is normal. No respiratory distress.      Breath sounds: Wheezing and rales present.      Comments: Bilateral wheezes and rales appreciated  Abdominal:      General: Bowel sounds are normal.      Palpations: Abdomen is soft.      Tenderness: There is no abdominal tenderness.   Musculoskeletal:         General: Swelling present. No tenderness.      Cervical back: Full passive range of motion without pain and normal range of motion.      Right lower leg: Edema present.      Left lower leg: Edema present.      Comments: Bilateral pitting edema   Skin:     General: Skin is warm and dry.   Neurological:      General: No focal deficit present.      Mental Status: He is alert and oriented to person, place, and time.   Psychiatric:         Mood and Affect: Mood normal.         Behavior: Behavior normal.              Significant Labs: All pertinent labs within the past 24 hours have been reviewed.    Significant Imaging: I have reviewed all pertinent  imaging results/findings within the past 24 hours.

## 2023-06-19 NOTE — PLAN OF CARE
06/19/23 0301   Post-Acute Status   Post-Acute Authorization HME;Other   HME Status Pending medical clearance/testing   Coverage Medicare   Other Status No Post-Acute Service Needs   Discharge Delays Orders Needed   Discharge Plan   Discharge Plan A Home;Home with family;Home Health     Patient lives with his wife but has support from his adult children.     Patient is now receiving home health with PT/OT and is currently getting Meals and wheels.     Patient and family is requesting DME of a shower chair on discharge.     Patient and family stated that patient can safely return to his home on discharge.    MICHAEL Moreno, MSW-LMSW  Medical Social Worker/  ER Department

## 2023-06-19 NOTE — ED NOTES
Telemetry Verification   Patient placed on Telemetry Box  Verified with War Room  Box # [id number]   Monitor Tech War room   Rate 73   Rhythm Bigeminy

## 2023-06-19 NOTE — ASSESSMENT & PLAN NOTE
Mr. Siddharth Urias is a pleasant 89 y.o. male who has a PMH severe AS, CAD, PAF, Diastolic HF, CKD3, PAD who presented to the ED overnight on 6/18 with a c/o of shortness of breath and worsening edema.Work up In the ED found patient to have an elevated BNP>2000 w. He has had multiple recent admission due to decompensated HF in the setting of severe AS.     Recs:   - Can continue 40mg IV lasix  - strict I & Os  -  repeat echo once more euvolemic  - currently not a candidate for TAVR due to debility/deconditioning.   - PT/OT

## 2023-06-19 NOTE — ASSESSMENT & PLAN NOTE
Patient presents with WBC elevated at 15, which is downtrending from WBC of 21 from 6/12. Unclear cause of leukocytosis, but suspicion of infection is low at present. No source of infection appreciated on exam.    - Daily CBCs  - Continue to monitor

## 2023-06-19 NOTE — PROGRESS NOTES
[facility] Medicine  Progress Note    Patient Name: Siddharth Urias  MRN: [id number]  Patient Class: OP- Observation   Admission Date: 6/18/2023  Length of Stay: 0 days  Attending Physician: Jody Brown DO  Primary Care Provider: Martine Maxwell MD        Subjective:     Principal Problem:Acute on chronic diastolic heart failure        HPI:  Siddharth Urias is an 89 year old man with history of severe aortic stenosis, CHF, AF, CKD3, PAD, DVT, and COPD who presents with bilateral lower extremity edema and cough. Patient was brought in by wife after she noticed that his legs and feet were becoming progressively more swollen over the past week. Pt denies any significant respiratory symptoms however his son reports that the pt had had recent episode of productive cough with clear sputum. He was recently hospitalized 4 times for acute on chronic congestive heart failure over the last two months, consisting of shortness of breath and lower extremity swelling, receiving diuresis each time. On Echo done in April 2023, it showed he had severe aortic stenosis. He was see by Dr. Ledezma on 6/12 for TAVR evaluation, and on that visit, he was deemed to not be a candidate for TAVR in his present physical condition. Dr. Ledezma recommended that he continue prehab, but also commented on consideration of BAV as bridging therapy if patient continued to undergo hospitalization. Patient does not know exactly which medications he is taking at home, and is not sure if he is taking his Lasix as directed though recognizes the name. He denies any recent fevers, chills, chest pain, chest tightness, SOB at rest, or urinary/bowel issues.    In the ED, patient was afebrile and initially hypotensive 89/50 but improved to 116/56 shortly after, satting well on room air. Labs were notable for WBC 15 and K 5.4, and BNP 1220. Trop at 0.053. EKG with signs of acute ischemia. CXR showed some pulmonary vascular congestion when compared  to prior CXRs. The ED deferred diuresis given pt's severe aortic stenosis. Patient was admitted to hospital medicine for management of acute decompensated heart failure.      Overview/Hospital Course:  No notes on file    Interval History: Patient doing well overnight, no complaints. States his LE edema is improving. Otherwise bradycardic on vitals, HDS, afebrile. Denies any new pain, cough, dyspnea or other symptoms.     Review of Systems   Constitutional:  Negative for chills and fever.   HENT:  Negative for congestion and sinus pressure.    Respiratory:  Negative for cough and shortness of breath.    Cardiovascular:  Positive for leg swelling. Negative for chest pain.   Gastrointestinal:  Negative for abdominal pain, diarrhea, nausea and vomiting.   Musculoskeletal:  Negative for back pain and gait problem.   Skin:  Negative for rash and wound.   Neurological:  Negative for dizziness and light-headedness.   Psychiatric/Behavioral:  Negative for agitation and confusion.    Objective:     Vital Signs (Most Recent):  Temp: 97.7 °F (36.5 °C) (06/19/23 0722)  Pulse: 65 (06/19/23 0808)  Resp: 16 (06/19/23 0722)  BP: (!) 119/56 (06/19/23 0808)  SpO2: 96 % (06/19/23 0722) Vital Signs (24h Range):  Temp:  [96.2 °F (35.7 °C)-98.5 °F (36.9 °C)] 97.7 °F (36.5 °C)  Pulse:  [] 65  Resp:  [16-24] 16  SpO2:  [92 %-100 %] 96 %  BP: ()/(50-64) 119/56     Weight: 83.3 kg (183 lb 10.3 oz)  Body mass index is 28.76 kg/m².    Intake/Output Summary (Last 24 hours) at 6/19/2023 1208  Last data filed at 6/19/2023 0845  Gross per 24 hour   Intake 567 ml   Output 250 ml   Net 317 ml         Physical Exam  HENT:      Head: Normocephalic.      Mouth/Throat:      Mouth: Mucous membranes are moist.   Eyes:      Pupils: Pupils are equal, round, and reactive to light.   Cardiovascular:      Rate and Rhythm: Bradycardia present. Rhythm irregular.   Pulmonary:      Effort: Pulmonary effort is normal.      Breath sounds: Rales present.    Abdominal:      General: Abdomen is flat. There is no distension.   Musculoskeletal:      Cervical back: Neck supple.      Right lower leg: Edema present.      Left lower leg: Edema present.   Skin:     Capillary Refill: Capillary refill takes less than 2 seconds.   Neurological:      General: No focal deficit present.      Mental Status: He is alert and oriented to person, place, and time.   Psychiatric:         Mood and Affect: Mood normal.           Significant Labs: All pertinent labs within the past 24 hours have been reviewed.    Significant Imaging: I have reviewed all pertinent imaging results/findings within the past 24 hours.      Assessment/Plan:      * Acute on chronic diastolic heart failure  89M with history of CAD, severe AS, AF, and HFpEF, presenting with lower extremity edema, and cough with elevated BNP and CXR showing pulmonary edema, concerning for acute on chronic congestive HF. Pt with multiple presentations and admissions for acute CHF exacerbations in the last two months. Recently seen for TAVR and revascularization evaluation, however deemed a poor candidate, requiring further rehab to improve physical status. On interview, unclear if patient has been taking medications appropriately.    Plan:  - Cardiology consulted, appreciate assistance with management of ADHF in setting of severe aortic stenosis, given concern that patient is preload dependent and may need further support to maintain cardiac output.  - IV Lasix 40mg BID per cardiology  - Repeat Echo appears similar to previous  - Strict I/O's, daily weights  - Fluid restriction, 1.5L and low Na diet  - Monitor electrolytes with daily CMP, maintain Mag > 2 and K > 4  - Continue home Metoprolol 12.5 bid  - Hold Losartan in setting of KAT      ACP (advance care planning)  Discussed with the patient and his family (wife and son) at bedside about his wishes in the event that he was to become critically ill and if he was to undergo cardiac  "arrest. The patient expressed that he would want full resuscitative efforts including chest compressions and intubation to achieve ROSC, but expressed that he would not want to remain on the ventilator for a prolonged period of time if his chances of a meaningful recovery were low. I explained that that would mean a Full Code status would be placed in the chart, and the patient and his family were in agreement.    BPH (benign prostatic hyperplasia)  Continue home tamsulosin, hold Solifenacin as may lead to poor UOP.      Deep vein thrombosis (DVT) of lower extremity  Patient with nonocclusive DVT distal left femoral vein which may be chronic. Seen on US lower extremities on two occasions in May 2023. Patient asymptomatic.     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 No intervention was done at the time. Continuing evaluation for revascularization.    - Continue medical management with ASA and pravastatin    Leukocytosis  Patient presents with WBC elevated at 15, which is downtrending from WBC of 21 from 6/12. Unclear cause of leukocytosis, but suspicion of infection is low at present. No source of infection appreciated on exam.    - Daily CBCs  - Continue to monitor  - Down-trending without treatment    Frail elderly  Due to age and cardiac disease, pt with significant physical debility.    - Will consult PT/OT when pt medically optimized      Gout attack  Continue allopurinol at reduced dose of 50mg qd      PAF (paroxysmal atrial fibrillation)  Patient with Long standing persistent (>12 months) atrial fibrillation which is controlled currently with Beta Blocker. " "Patient is currently in sinus rhythm.[id number] Score: 4.  Anticoagulation indicated. Anticoagulation done with Eliquis 5 BID.    - Continue Eliquis 5 BID  - Due to persistent bradycardia, will decrease Metoprolol XL 25 to 12.5 bid  - Currently rate controlled with HR < 110    PAD (peripheral artery disease)  See "CAD". No present symptoms of claudication.      Aortic stenosis, severe  TTE in April 2023 showed:   There is severe aortic valve stenosis.   Aortic valve area is 0.46 cm2; peak velocity is 4.4 m/s; mean gradient is 50 mmHg. Of note, gradients were measured following a post PVC beat.    Pt with symptoms of FRY, but denies any syncope or chest pain. Patient seen by Dr. Ledezma for TAVR evaluation, presently not a candidate, however noted that if pt continued to be hospitalized, may consider BAV as bridging therapy.    - Consider Interventional Cardiology consult if Cardiology recommend. Careful diuresis per cardiology.    Acute renal failure superimposed on stage 3 chronic kidney disease  Patient with stage 3 CKD, baseline Creatinine as low as 1.5, however appears to have presented with Cr frequently in the 2 range. Cr on admission is 2.0. KAT on top of CKD, likely pre-renal due to decreased perfusion 2/2 acute heart failure and cardiorenal syndrome.    - Gentle diuresis, 40 lasix bid  - Daily CMPs  - Strict I&Os  - Avoid nephrotoxic agents such as NSAIDs, gadolinium and IV radiocontrast.  - Renally dose meds to current GFR.    COPD (chronic obstructive pulmonary disease)  Chronic medical issue, stable with no recent exacerbations.    - Continue albuterol rescue inhaler  - Start Breo and Spiriva, as no Trelegy on formulary  - Supplemental O2 if needed to maintain sats > 88%      Type 2 diabetes mellitus, controlled, with renal complications  Patient's FSGs are controlled on current medication regimen.  Last A1c reviewed-   Lab Results   Component Value Date    HGBA1C 6.6 (H) 05/19/2023     Most recent " fingerstick glucose reviewed- No results for input(s): POCTGLUCOSE in the last 24 hours.  Current correctional scale  None  Maintain anti-hyperglycemic dose as follows-   Antihyperglycemics (From admission, onward)    None        Hold Oral hypoglycemics while patient is in the hospital.    - Patient not on any anti-hyperglycemics at present, diet controlled  - Continue to monitor, can add insulin if needed if pts' BGLs persistently elevated      VTE Risk Mitigation (From admission, onward)         Ordered     apixaban tablet 2.5 mg  2 times daily         06/19/23 0732     IP VTE HIGH RISK PATIENT  Once         06/18/23 1827                Discharge Planning   MAGDA: 6/21/2023     Code Status: Full Code   Is the patient medically ready for discharge?: No    Reason for patient still in hospital (select all that apply): Treatment  Discharge Plan A: Home, Home with family, Home Health   Discharge Delays: Orders Needed              Narda Milton DO  Department of Hospital Medicine   [facility] - Med Surg

## 2023-06-19 NOTE — ASSESSMENT & PLAN NOTE
TTE in April 2023 showed:   There is severe aortic valve stenosis.   Aortic valve area is 0.46 cm2; peak velocity is 4.4 m/s; mean gradient is 50 mmHg. Of note, gradients were measured following a post PVC beat.    Pt with symptoms of FRY, but denies any syncope or chest pain. Patient seen by Dr. Ledezma for TAVR evaluation, presently not a candidate, however noted that if pt continued to be hospitalized, may consider BAV as bridging therapy.    - Consider Interventional Cardiology consult if Cardiology recommend

## 2023-06-19 NOTE — HPI
Cardiology consult: Management for acute decompensated HF setting severe AS      Mr. Siddharth Urias is a pleasant 89 y.o. male who has a PMH severe AS, CAD, PAF, Diastolic HF, CKD3, PAD who presented to the ED overnight on 6/18 with a c/o of shortness of breath and worsening edema.Work up in the ED found patient to have an elevated BNP>2000 w. CXR showing small left pleural effusion. Patient has had multiple admissions secondary to heart failure. He was recently seen in clinic by Dr. Ledezma for TAVR eval but deemed to frail and recommended prehab at home and to return to clinic in 2 months for  RCA angiography and possible intervention and iliac and subclavian IVUS. If he failed to prehab then he was going to be considered for BAV as bridge to TAVR or destination therapy.     Upon my evaluation patient was resting comfortably and watching tv. He was comfortable laying in bed on RYAN. Denied any SOB, chest pain, palpitations or worsening edema. Did admit to some cough with clear sputum.   He had been given lasix 40mg Iv once with good urine output. Patient is unclear about how much lasix he takes at home.       As per Dr. Ledezma,      The patient has undergone the following TAVR work-up:   ECHO (Date 4/24/23): SIVAN= 0.46 cm2, MG= 50mmHg, Peak Rojas= 4.4 m/s, EF= 55%.   LHC (Date 5/22/23): LM and RCA stenosis    STS: 8%   Frailty: 4/4   Iliacs are   LVOT area by CTA is   Incidental findings on CT:   CT Surgery risk assessment: inoperable risk, per Dr Madrigal  Rhythm issues: 1st deg AV block   PFTs: deferred  KCCQ/5 meter walk:   Comorbidities: age, CAD, Afib

## 2023-06-19 NOTE — ASSESSMENT & PLAN NOTE
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 Seen on US lower extremities on two occasions in May 2023. Patient asymptomatic.     - Repeat US bilateral lower extremities showing previous clot dissolved, new clot on the right distal femoral  - Continue Eliquis 2.5 bid for life

## 2023-06-19 NOTE — H&P
[facility] - Emergency Dept  [facility] Medicine  History & Physical    Patient Name: Siddharth Urias  MRN: [id number]  Patient Class: OP- Observation  Admission Date: 6/18/2023  Attending Physician: Jody Brown DO   Primary Care Provider: Martine Maxwell MD         Patient information was obtained from patient, spouse/SO, relative(s), past medical records and ER records.     Subjective:     Principal Problem:Acute decompensated heart failure    Chief Complaint:   Chief Complaint   Patient presents with    Leg Swelling     Wife is concerned due to b/l leg swelling.         HPI: Siddharth Urias is an 89 year old man with history of severe aortic stenosis, CHF, AF, CKD3, PAD, DVT, and COPD who presents with bilateral lower extremity edema and cough. Patient was brought in by wife after she noticed that his legs and feet were becoming progressively more swollen over the past week. Pt denies any significant respiratory symptoms however his son reports that the pt had had recent episode of productive cough with clear sputum. He was recently hospitalized 4 times for acute on chronic congestive heart failure over the last two months, consisting of shortness of breath and lower extremity swelling, receiving diuresis each time. On Echo done in April 2023, it showed he had severe aortic stenosis. He was see by Dr. Ledezma on 6/12 for TAVR evaluation, and on that visit, he was deemed to not be a candidate for TAVR in his present physical condition. Dr. Ledezma recommended that he continue prehab, but also commented on consideration of BAV as bridging therapy if patient continued to undergo hospitalization. Patient does not know exactly which medications he is taking at home, and is not sure if he is taking his Lasix as directed though recognizes the name. He denies any recent fevers, chills, chest pain, chest tightness, SOB at rest, or urinary/bowel issues.    In the ED, patient was afebrile and initially hypotensive 89/50 but  improved to 116/56 shortly after, satting well on room air. Labs were notable for WBC 15 and K 5.4, and BNP 1220. Trop at 0.053. EKG with signs of acute ischemia. CXR showed some pulmonary vascular congestion when compared to prior CXRs. The ED deferred diuresis given pt's severe aortic stenosis. Patient was admitted to hospital medicine for management of acute decompensated heart failure.      Past Medical History:   Diagnosis Date    Afib     Arthritis     Choledocholithiasis     Chronic kidney disease     Colon polyp     COPD (chronic obstructive pulmonary disease)     Diabetes mellitus, type 2     2/2011    Diverticulosis     Fatty liver     Hepatomegaly     History of blood clots     2020 Dr. Rodriguez     Irradiation cystitis     Prostate cancer 2009    s/p XRT (T1C, João 8)    Radiation proctitis     Sleep apnea        Past Surgical History:   Procedure Laterality Date    ANGIOGRAM, CORONARY, WITH LEFT HEART CATHETERIZATION N/A 05/17/2022    Procedure: Angiogram, Coronary, with Left Heart Cath;  Surgeon: Kamlesh Sullivan MD;  Location: [facility] CATH/EP LAB;  Service: Cardiology;  Laterality: N/A;    APPENDECTOMY      CARPAL TUNNEL RELEASE Left 02/05/2021    Procedure: RELEASE, CARPAL TUNNEL;  Surgeon: Jayro Hawkins II, MD;  Location: [facility];  Service: Orthopedics;  Laterality: Left;    CARPAL TUNNEL RELEASE Right 3/1/2023    Procedure: RELEASE, CARPAL TUNNEL;  Surgeon: Shaun Leonardo MD;  Location: [facility] OR;  Service: Orthopedics;  Laterality: Right;    CATARACT EXTRACTION Bilateral     COLONOSCOPY N/A 12/14/2018    Procedure: COLONOSCOPY;  Surgeon: Noel Aguiar MD;  Location: [facility];  Service: Endoscopy;  Laterality: N/A;    COLONOSCOPY  06/21/2021    Dr. Rodgers, in media: 2 colon polyps removed; diverticulosis, erythema in transverse colon; biopsy: tubular adenomas, ranomd colon unremarkable    COLONOSCOPY N/A 11/4/2022    Procedure: COLONOSCOPY;  Surgeon: Noel Aguiar MD;  Location: [facility];   Service: Endoscopy;  Laterality: N/A;    CORONARY ANGIOGRAPHY N/A 5/22/2023    Procedure: ANGIOGRAM, CORONARY ARTERY;  Surgeon: Xavier Solis MD;  Location: [facility] CATH/EP LAB;  Service: Cardiology;  Laterality: N/A;    ERCP N/A 11/23/2021    Procedure: ERCP (ENDOSCOPIC RETROGRADE CHOLANGIOPANCREATOGRAPHY);  Surgeon: Marshall Gardner III, MD;  Location: [facility] ENDO;  Service: Endoscopy;  Laterality: N/A;    ESOPHAGOGASTRODUODENOSCOPY N/A 01/24/2019    Procedure: EGD (ESOPHAGOGASTRODUODENOSCOPY);  Surgeon: Noel Aguiar MD;  Location: [facility] ENDO;  Service: Endoscopy;  Laterality: N/A;    EYE SURGERY      FLEXIBLE SIGMOIDOSCOPY N/A 01/22/2019    Procedure: SIGMOIDOSCOPY, FLEXIBLE;  Surgeon: Noel Aguiar MD;  Location: [facility] ENDO;  Service: Endoscopy;  Laterality: N/A;    HERNIA REPAIR      bilateral inguinal    LAPAROSCOPIC CHOLECYSTECTOMY N/A 11/24/2021    Procedure: CHOLECYSTECTOMY, LAPAROSCOPIC;  Surgeon: Jay Cabrera Jr., MD;  Location: [facility] OR;  Service: General;  Laterality: N/A;    TONSILLECTOMY         Review of patient's allergies indicates:   Allergen Reactions    No known drug allergies        No current facility-administered medications on file prior to encounter.     Current Outpatient Medications on File Prior to Encounter   Medication Sig    albuterol (PROVENTIL/VENTOLIN HFA) 90 mcg/actuation inhaler INHALE 2 PUFFS INTO THE LUNGS EVERY 6 HOURS AS NEEDED FOR WHEEZE    allopurinoL (ZYLOPRIM) 100 MG tablet Take 2 tablets (200 mg total) by mouth once daily.    apixaban (ELIQUIS) 5 mg Tab Take 1 tablet (5 mg total) by mouth 2 (two) times daily.    aspirin 81 MG Chew Take 81 mg by mouth once daily.    colestipoL (COLESTID) 1 gram Tab TAKE 1 TABLET BY MOUTH 2 TIMES DAILY. TAKE OTHER ORAL MEDICATIONS >1H BEFORE OR >4H AFTER COLESTIPOL (Patient taking differently: Take 1 g by mouth 2 (two) times daily.)    fluticasone-umeclidin-vilanter (TRELEGY ELLIPTA) 100-62.5-25 mcg DsDv Inhale 1  puff into the lungs once daily.    losartan (COZAAR) 25 MG tablet Take 1 tablet (25 mg total) by mouth once daily.    pantoprazole (PROTONIX) 40 MG tablet Take 1 tablet (40 mg total) by mouth once daily.    potassium chloride SA (K-DUR,KLOR-CON) 20 MEQ tablet Take 1 tablet (20 mEq total) by mouth 2 (two) times daily.    pravastatin (PRAVACHOL) 40 MG tablet Take 1 tablet (40 mg total) by mouth once daily.    solifenacin (VESICARE) 5 MG tablet Take 1 tablet (5 mg total) by mouth once daily.    tamsulosin (FLOMAX) 0.4 mg Cap Take 1 capsule (0.4 mg total) by mouth once daily.    traZODone (DESYREL) 50 MG tablet Take 1 tablet (50 mg total) by mouth nightly as needed for Insomnia.    doxycycline (VIBRA-TABS) 100 MG tablet Take 100 mg by mouth 2 (two) times daily.    furosemide (LASIX) 40 MG tablet Take 0.5 tablets (20 mg total) by mouth once daily.    metoprolol succinate (TOPROL-XL) 25 MG 24 hr tablet Take 25 mg by mouth once daily.    miconazole NITRATE 2 % (MICOTIN) 2 % top powder Apply topically 2 (two) times daily. Apply to groin twice a day x1 week for 7 days     Family History       Problem Relation (Age of Onset)    Diabetes Mother, Brother          Tobacco Use    Smoking status: Former     Packs/day: 1.50     Years: 60.00     Pack years: 90.00     Types: Cigarettes     Start date: 1944     Quit date: 1/2/2008     Years since quitting: 15.4    Smokeless tobacco: Never   Substance and Sexual Activity    Alcohol use: Yes     Alcohol/week: 1.0 standard drink     Types: 1 Glasses of wine per week     Comment: social - at speical events    Drug use: No    Sexual activity: Not Currently     Partners: Female     Review of Systems   Constitutional:  Negative for chills and fever.   HENT:  Negative for congestion and sore throat.    Eyes:  Negative for photophobia and visual disturbance.   Respiratory:  Negative for cough, chest tightness and shortness of breath.         Dyspnea on exertion   Cardiovascular:  Positive for  leg swelling. Negative for chest pain and palpitations.   Gastrointestinal:  Negative for abdominal pain.   Genitourinary:  Negative for dysuria and hematuria.   Musculoskeletal:  Negative for arthralgias and back pain.   Skin:  Negative for rash and wound.   Neurological:  Negative for dizziness and syncope.   Psychiatric/Behavioral:  Negative for agitation and behavioral problems.    Objective:     Vital Signs (Most Recent):  Temp: 98.4 °F (36.9 °C) (06/18/23 1937)  Pulse: 106 (06/18/23 1937)  Resp: (!) 22 (06/18/23 1937)  BP: 122/64 (06/18/23 1937)  SpO2: 98 % (06/18/23 1937) Vital Signs (24h Range):  Temp:  [98.2 °F (36.8 °C)-98.5 °F (36.9 °C)] 98.4 °F (36.9 °C)  Pulse:  [] 106  Resp:  [18-24] 22  SpO2:  [95 %-100 %] 98 %  BP: ()/(50-64) 122/64        There is no height or weight on file to calculate BMI.     Physical Exam  Vitals reviewed.   Constitutional:       General: He is not in acute distress.     Appearance: Normal appearance.   HENT:      Head: Normocephalic and atraumatic.      Nose: No congestion or rhinorrhea.      Mouth/Throat:      Mouth: Mucous membranes are moist.      Pharynx: Oropharynx is clear.   Cardiovascular:      Rate and Rhythm: Regular rhythm. Tachycardia present.      Pulses: Normal pulses.      Heart sounds: Murmur (Crescendo decrescendo systolic murmur noted) heard.   Pulmonary:      Effort: Pulmonary effort is normal. No respiratory distress.      Breath sounds: Wheezing and rales present.      Comments: Bilateral wheezes and rales appreciated  Abdominal:      General: Bowel sounds are normal.      Palpations: Abdomen is soft.      Tenderness: There is no abdominal tenderness.   Musculoskeletal:         General: Swelling present. No tenderness.      Cervical back: Full passive range of motion without pain and normal range of motion.      Right lower leg: Edema present.      Left lower leg: Edema present.      Comments: Bilateral pitting edema   Skin:     General: Skin  is warm and dry.   Neurological:      General: No focal deficit present.      Mental Status: He is alert and oriented to person, place, and time.   Psychiatric:         Mood and Affect: Mood normal.         Behavior: Behavior normal.              Significant Labs: All pertinent labs within the past 24 hours have been reviewed.    Significant Imaging: I have reviewed all pertinent imaging results/findings within the past 24 hours.    Assessment/Plan:     * Acute decompensated heart failure  89M with history of CAD, severe AS, AF, and HFpEF, presenting with lower extremity edema, and cough with elevated BNP and CXR showing pulmonary edema, concerning for acute on chronic congestive HF. Pt with multiple presentations and admissions for acute CHF exacerbations in the last two months. Recently seen for TAVR and revascularization evaluation, however deemed a poor candidate, requiring further rehab to improve physical status. On interview, unclear if patient has been taking medications appropriately.    Plan:  - Cardiology consulted, appreciate assistance with management of ADHF in setting of severe aortic stenosis, given concern that patient is preload dependent and may need further support to maintain cardiac output.  - Repeat Echo  - Initial diuresis with IV lasix 40mg qd   - Titrate to UOP, target net 1-2L negative daily  - Strict I/O's  - Fluid restriction, 1.5L and low Na diet  - Monitor electrolytes with daily CMP, maintain Mag > 2 and K > 4  - Continue home Toprol 25mg  - Hold Losartan in setting of KAT    ACP (advance care planning)  Discussed with the patient and his family (wife and son) at bedside about his wishes in the event that he was to become critically ill and if he was to undergo cardiac arrest. The patient expressed that he would want full resuscitative efforts including chest compressions and intubation to achieve ROSC, but expressed that he would not want to remain on the ventilator for a prolonged  "period of time if his chances of a meaningful recovery were low. I explained that that would mean a Full Code status would be placed in the chart, and the patient and his family were in agreement.    BPH (benign prostatic hyperplasia)  Continue home tamsulosin, hold Solifenacin as may lead to poor UOP.      Deep vein thrombosis (DVT) of lower extremity  Patient with nonocclusive DVT distal left femoral vein which may be chronic. Seen on US lower extremities on two occasions in May 2023. Patient asymptomatic.    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 No intervention was done at the time. Continuing evaluation for revascularization.    - Continue medical management with ASA and pravastatin    Leukocytosis  Patient presents with WBC elevated at 15, which is downtrending from WBC of 21 from 6/12. Unclear cause of leukocytosis, but suspicion of infection is low at present. No source of infection appreciated on exam.    - Daily CBCs  - Continue to monitor    Frail elderly  Due to age and cardiac disease, pt with significant physical debility.    - Will consult PT/OT when pt medically optimized      Gout attack  Continue allopurinol at reduced dose of 50mg qd      PAF (paroxysmal atrial fibrillation)  Patient with Long standing persistent (>12 months) atrial fibrillation which is controlled currently with Beta Blocker. Patient is currently in sinus rhythm.[id number] Score: 4.  Anticoagulation indicated. Anticoagulation done with Eliquis 5 BID.    - Continue Eliquis 5 BID  - Continue Toprol XL 25  - Currently rate controlled with HR < 110    PAD (peripheral artery disease)  See "CAD". No present symptoms of claudication.      Aortic stenosis, " severe  TTE in April 2023 showed:  There is severe aortic valve stenosis.  Aortic valve area is 0.46 cm2; peak velocity is 4.4 m/s; mean gradient is 50 mmHg. Of note, gradients were measured following a post PVC beat.    Pt with symptoms of FRY, but denies any syncope or chest pain. Patient seen by Dr. Ledezma for TAVR evaluation, presently not a candidate, however noted that if pt continued to be hospitalized, may consider BAV as bridging therapy.    - Consider Interventional Cardiology consult if Cardiology recommend    Acute renal failure superimposed on stage 3 chronic kidney disease  Patient with stage 3 CKD, baseline Creatinine as low as 1.5, however appears to have presented with Cr frequently in the 2 range. Cr on admission is 2.0. KAT on top of CKD, likely pre-renal due to decreased perfusion 2/2 acute heart failure and cardiorenal syndrome.    - Gentle diuresis  - Daily CMPs  - Strict I&Os  - Avoid nephrotoxic agents such as NSAIDs, gadolinium and IV radiocontrast.  - Renally dose meds to current GFR.    COPD (chronic obstructive pulmonary disease)  Chronic medical issue, stable with no recent exacerbations.    - Continue albuterol rescue inhaler  - Start Breo and Spiriva, as no Trelegy on formulary  - Supplemental O2 if needed to maintain sats > 88%      Type 2 diabetes mellitus, controlled, with renal complications  Patient's FSGs are controlled on current medication regimen.  Last A1c reviewed-   Lab Results   Component Value Date    HGBA1C 6.6 (H) 05/19/2023     Most recent fingerstick glucose reviewed- No results for input(s): POCTGLUCOSE in the last 24 hours.  Current correctional scale  None  Maintain anti-hyperglycemic dose as follows-   Antihyperglycemics (From admission, onward)      None          Hold Oral hypoglycemics while patient is in the hospital.    - Patient not on any anti-hyperglycemics at present, diet controlled  - Continue to monitor, can add insulin if needed if pts' BGLs  persistently elevated      VTE Risk Mitigation (From admission, onward)           Ordered     apixaban tablet 5 mg  2 times daily         06/18/23 2153     IP VTE HIGH RISK PATIENT  Once         06/18/23 1827                           On 06/18/2023, patient should be placed in hospital observation services under my care in collaboration with Dr. Brown.    Kareem Mcfarlane MD  Department of Hospital Medicine  [facility] - Emergency Dept

## 2023-06-19 NOTE — AI DETERIORATION ALERT
RAPID RESPONSE NURSE AI ALERT       AI alert received.    Chart Reviewed: 06/18/2023, 7:48 PM    MRN: [id number]  Bed: ED 23/23    Dx: <principal problem not specified>    Siddharth Urias has a past medical history of Afib, Arthritis, Choledocholithiasis, Chronic kidney disease, Colon polyp, COPD (chronic obstructive pulmonary disease), Diabetes mellitus, type 2, Diverticulosis, Fatty liver, Hepatomegaly, History of blood clots, Irradiation cystitis, Prostate cancer, Radiation proctitis, and Sleep apnea.    Last VS: /64   Pulse 106   Temp 98.4 °F (36.9 °C)   Resp (!) 22   SpO2 98%     24H Vital Sign Range:  Temp:  [98.2 °F (36.8 °C)-98.5 °F (36.9 °C)]   Pulse:  []   Resp:  [18-24]   BP: ()/(50-64)   SpO2:  [95 %-100 %]     Level of Consciousness (AVPU): alert    Recent Labs     06/18/23  1703   WBC 15.10*   HGB 12.6*   HCT 41.0          Recent Labs     06/18/23  1703      K 5.4*      CO2 24   CREATININE 2.0*   GLU 88        No results for input(s): PH, PCO2, PO2, HCO3, POCSATURATED, BE in the last 72 hours.     OXYGEN:             MEWS score:      Bedside RNChastity  contacted. No concerns verbalized at this time. Instructed to call [phone] for further concerns or assistance.    Chas Lorenzana RN

## 2023-06-19 NOTE — ASSESSMENT & PLAN NOTE
Patient presents with WBC elevated at 15, which is downtrending from WBC of 21 from 6/12. Unclear cause of leukocytosis, but suspicion of infection is low at present. No source of infection appreciated on exam.    - Daily CBCs  - Continue to monitor  - Down-trending without treatment

## 2023-06-19 NOTE — ASSESSMENT & PLAN NOTE
Patient with stage 3 CKD, baseline Creatinine as low as 1.5, however appears to have presented with Cr frequently in the 2 range. Cr on admission is 2.0. KAT on top of CKD, likely pre-renal due to decreased perfusion 2/2 acute heart failure and cardiorenal syndrome.    - Gentle diuresis, 40 lasix bid  - Daily CMPs  - Strict I&Os  - Avoid nephrotoxic agents such as NSAIDs, gadolinium and IV radiocontrast.  - Renally dose meds to current GFR.

## 2023-06-19 NOTE — TELEPHONE ENCOUNTER
----- Message from More Gamboa sent at 6/19/2023 11:18 AM CDT -----  Type: Needs Medical Advice  Who Called:  pt's wife   Symptoms (please be specific):  pt in hospital   Best Call Back Number: [phone] or [phone]  Additional Information: pt is hospital again at [facility].Pt stated she needs current up to date med list for pt. Pt's wife is asking for someone to be able to send it to the wife for pt. Please call back to advise asap, thanks!

## 2023-06-19 NOTE — SUBJECTIVE & OBJECTIVE
Interval History: Patient doing well overnight, no complaints. States his LE edema is improving. Otherwise bradycardic on vitals, HDS, afebrile. Denies any new pain, cough, dyspnea or other symptoms.     Review of Systems   Constitutional:  Negative for chills and fever.   HENT:  Negative for congestion and sinus pressure.    Respiratory:  Negative for cough and shortness of breath.    Cardiovascular:  Positive for leg swelling. Negative for chest pain.   Gastrointestinal:  Negative for abdominal pain, diarrhea, nausea and vomiting.   Musculoskeletal:  Negative for back pain and gait problem.   Skin:  Negative for rash and wound.   Neurological:  Negative for dizziness and light-headedness.   Psychiatric/Behavioral:  Negative for agitation and confusion.    Objective:     Vital Signs (Most Recent):  Temp: 97.7 °F (36.5 °C) (06/19/23 0722)  Pulse: 65 (06/19/23 0808)  Resp: 16 (06/19/23 0722)  BP: (!) 119/56 (06/19/23 0808)  SpO2: 96 % (06/19/23 0722) Vital Signs (24h Range):  Temp:  [96.2 °F (35.7 °C)-98.5 °F (36.9 °C)] 97.7 °F (36.5 °C)  Pulse:  [] 65  Resp:  [16-24] 16  SpO2:  [92 %-100 %] 96 %  BP: ()/(50-64) 119/56     Weight: 83.3 kg (183 lb 10.3 oz)  Body mass index is 28.76 kg/m².    Intake/Output Summary (Last 24 hours) at 6/19/2023 1208  Last data filed at 6/19/2023 0845  Gross per 24 hour   Intake 567 ml   Output 250 ml   Net 317 ml         Physical Exam  HENT:      Head: Normocephalic.      Mouth/Throat:      Mouth: Mucous membranes are moist.   Eyes:      Pupils: Pupils are equal, round, and reactive to light.   Cardiovascular:      Rate and Rhythm: Bradycardia present. Rhythm irregular.   Pulmonary:      Effort: Pulmonary effort is normal.      Breath sounds: Rales present.   Abdominal:      General: Abdomen is flat. There is no distension.   Musculoskeletal:      Cervical back: Neck supple.      Right lower leg: Edema present.      Left lower leg: Edema present.   Skin:     Capillary Refill:  Capillary refill takes less than 2 seconds.   Neurological:      General: No focal deficit present.      Mental Status: He is alert and oriented to person, place, and time.   Psychiatric:         Mood and Affect: Mood normal.           Significant Labs: All pertinent labs within the past 24 hours have been reviewed.    Significant Imaging: I have reviewed all pertinent imaging results/findings within the past 24 hours.

## 2023-06-19 NOTE — ASSESSMENT & PLAN NOTE
Patient's FSGs are controlled on current medication regimen.  Last A1c reviewed-   Lab Results   Component Value Date    HGBA1C 6.6 (H) 05/19/2023     Most recent fingerstick glucose reviewed- No results for input(s): POCTGLUCOSE in the last 24 hours.  Current correctional scale  None  Maintain anti-hyperglycemic dose as follows-   Antihyperglycemics (From admission, onward)    None        Hold Oral hypoglycemics while patient is in the hospital.    - Patient not on any anti-hyperglycemics at present, diet controlled  - Continue to monitor, can add insulin if needed if pts' BGLs persistently elevated

## 2023-06-19 NOTE — ASSESSMENT & PLAN NOTE
Patient with stage 3 CKD, baseline Creatinine as low as 1.5, however appears to have presented with Cr frequently in the 2 range. Cr on admission is 2.0. KAT on top of CKD, likely pre-renal due to decreased perfusion 2/2 acute heart failure and cardiorenal syndrome.    - Gentle diuresis  - Daily CMPs  - Strict I&Os  - Avoid nephrotoxic agents such as NSAIDs, gadolinium and IV radiocontrast.  - Renally dose meds to current GFR.

## 2023-06-19 NOTE — NURSING
Nurses Note -- 4 Eyes      6/19/2023   4:10 AM      Skin assessed during: Admit      [x] No Altered Skin Integrity Present    []Prevention Measures Documented      [] Yes- Altered Skin Integrity Present or Discovered   [] LDA Added if Not in Epic (Describe Wound)   [] New Altered Skin Integrity was Present on Admit and Documented in LDA   [] Wound Image Taken    Wound Care Consulted? No    Attending Nurse:  Jaspreet Capone RN     Second RN/Staff Member:  ALEENA Dailey

## 2023-06-19 NOTE — SUBJECTIVE & OBJECTIVE
Past Medical History:   Diagnosis Date    Afib     Arthritis     Choledocholithiasis     Chronic kidney disease     Colon polyp     COPD (chronic obstructive pulmonary disease)     Diabetes mellitus, type 2     2/2011    Diverticulosis     Fatty liver     Hepatomegaly     History of blood clots     2020 Dr. Rodriguez     Irradiation cystitis     Prostate cancer 2009    s/p XRT (T1C, [place] 8)    Radiation proctitis     Sleep apnea        Past Surgical History:   Procedure Laterality Date    ANGIOGRAM, CORONARY, WITH LEFT HEART CATHETERIZATION N/A 05/17/2022    Procedure: Angiogram, Coronary, with Left Heart Cath;  Surgeon: Kamlesh Sullivan MD;  Location: [facility] CATH/EP LAB;  Service: Cardiology;  Laterality: N/A;    APPENDECTOMY      CARPAL TUNNEL RELEASE Left 02/05/2021    Procedure: RELEASE, CARPAL TUNNEL;  Surgeon: Jayro Hawkins II, MD;  Location: [facility] OR;  Service: Orthopedics;  Laterality: Left;    CARPAL TUNNEL RELEASE Right 3/1/2023    Procedure: RELEASE, CARPAL TUNNEL;  Surgeon: Shaun Leonardo MD;  Location: [facility] OR;  Service: Orthopedics;  Laterality: Right;    CATARACT EXTRACTION Bilateral     COLONOSCOPY N/A 12/14/2018    Procedure: COLONOSCOPY;  Surgeon: Noel Aguiar MD;  Location: [facility] ENDO;  Service: Endoscopy;  Laterality: N/A;    COLONOSCOPY  06/21/2021    Dr. Rodgers, in media: 2 colon polyps removed; diverticulosis, erythema in transverse colon; biopsy: tubular adenomas, ranomd colon unremarkable    COLONOSCOPY N/A 11/4/2022    Procedure: COLONOSCOPY;  Surgeon: Noel Aguiar MD;  Location: [facility] ENDO;  Service: Endoscopy;  Laterality: N/A;    CORONARY ANGIOGRAPHY N/A 5/22/2023    Procedure: ANGIOGRAM, CORONARY ARTERY;  Surgeon: Xavier Solis MD;  Location: [facility] CATH/EP LAB;  Service: Cardiology;  Laterality: N/A;    ERCP N/A 11/23/2021    Procedure: ERCP (ENDOSCOPIC RETROGRADE CHOLANGIOPANCREATOGRAPHY);  Surgeon: Marshall Gardner III, MD;  Location: [facility] ENDO;  Service:  Endoscopy;  Laterality: N/A;    ESOPHAGOGASTRODUODENOSCOPY N/A 01/24/2019    Procedure: EGD (ESOPHAGOGASTRODUODENOSCOPY);  Surgeon: Noel Aguiar MD;  Location: [facility] ENDO;  Service: Endoscopy;  Laterality: N/A;    EYE SURGERY      FLEXIBLE SIGMOIDOSCOPY N/A 01/22/2019    Procedure: SIGMOIDOSCOPY, FLEXIBLE;  Surgeon: Noel Aguiar MD;  Location: [facility] ENDO;  Service: Endoscopy;  Laterality: N/A;    HERNIA REPAIR      bilateral inguinal    LAPAROSCOPIC CHOLECYSTECTOMY N/A 11/24/2021    Procedure: CHOLECYSTECTOMY, LAPAROSCOPIC;  Surgeon: Jay Cabrera Jr., MD;  Location: [facility] OR;  Service: General;  Laterality: N/A;    TONSILLECTOMY         Review of patient's allergies indicates:   Allergen Reactions    No known drug allergies        No current facility-administered medications on file prior to encounter.     Current Outpatient Medications on File Prior to Encounter   Medication Sig    albuterol (PROVENTIL/VENTOLIN HFA) 90 mcg/actuation inhaler INHALE 2 PUFFS INTO THE LUNGS EVERY 6 HOURS AS NEEDED FOR WHEEZE    allopurinoL (ZYLOPRIM) 100 MG tablet Take 2 tablets (200 mg total) by mouth once daily.    apixaban (ELIQUIS) 5 mg Tab Take 1 tablet (5 mg total) by mouth 2 (two) times daily.    aspirin 81 MG Chew Take 81 mg by mouth once daily.    colestipoL (COLESTID) 1 gram Tab TAKE 1 TABLET BY MOUTH 2 TIMES DAILY. TAKE OTHER ORAL MEDICATIONS >1H BEFORE OR >4H AFTER COLESTIPOL (Patient taking differently: Take 1 g by mouth 2 (two) times daily.)    fluticasone-umeclidin-vilanter (TRELEGY ELLIPTA) 100-62.5-25 mcg DsDv Inhale 1 puff into the lungs once daily.    losartan (COZAAR) 25 MG tablet Take 1 tablet (25 mg total) by mouth once daily.    pantoprazole (PROTONIX) 40 MG tablet Take 1 tablet (40 mg total) by mouth once daily.    potassium chloride SA (K-DUR,KLOR-CON) 20 MEQ tablet Take 1 tablet (20 mEq total) by mouth 2 (two) times daily.    pravastatin (PRAVACHOL) 40 MG tablet Take 1 tablet (40 mg total) by  mouth once daily.    solifenacin (VESICARE) 5 MG tablet Take 1 tablet (5 mg total) by mouth once daily.    tamsulosin (FLOMAX) 0.4 mg Cap Take 1 capsule (0.4 mg total) by mouth once daily.    traZODone (DESYREL) 50 MG tablet Take 1 tablet (50 mg total) by mouth nightly as needed for Insomnia.    doxycycline (VIBRA-TABS) 100 MG tablet Take 100 mg by mouth 2 (two) times daily.    furosemide (LASIX) 40 MG tablet Take 0.5 tablets (20 mg total) by mouth once daily.    metoprolol succinate (TOPROL-XL) 25 MG 24 hr tablet Take 25 mg by mouth once daily.    miconazole NITRATE 2 % (MICOTIN) 2 % top powder Apply topically 2 (two) times daily. Apply to groin twice a day x1 week for 7 days     Family History       Problem Relation (Age of Onset)    Diabetes Mother, Brother          Tobacco Use    Smoking status: Former     Packs/day: 1.50     Years: 60.00     Pack years: 90.00     Types: Cigarettes     Start date: 1944     Quit date: 1/2/2008     Years since quitting: 15.4    Smokeless tobacco: Never   Substance and Sexual Activity    Alcohol use: Yes     Alcohol/week: 1.0 standard drink     Types: 1 Glasses of wine per week     Comment: social - at speical events    Drug use: No    Sexual activity: Not Currently     Partners: Female     ROS  Constitutional: Negative for chills and fever.   HENT:  Negative for hearing loss.    Eyes:  Negative for blurred vision, double vision and photophobia.   Cardiovascular:  Positive for dyspnea on exertion. Negative for chest pain, claudication, cyanosis, irregular heartbeat, leg swelling, near-syncope, orthopnea, palpitations, paroxysmal nocturnal dyspnea and syncope.   Respiratory:  Negative for cough and hemoptysis.    Musculoskeletal:  Negative for falls.   Gastrointestinal:  Negative for abdominal pain, constipation, diarrhea, nausea and vomiting.   Genitourinary:  Negative for dysuria.   Neurological:  Negative for disturbances in coordination, dizziness, focal weakness, headaches,  loss of balance and weakness.     Objective:     Vital Signs (Most Recent):  Temp: 98.4 °F (36.9 °C) (06/18/23 1937)  Pulse: 106 (06/18/23 1937)  Resp: (!) 22 (06/18/23 1937)  BP: 122/64 (06/18/23 1937)  SpO2: 98 % (06/18/23 1937) Vital Signs (24h Range):  Temp:  [98.2 °F (36.8 °C)-98.5 °F (36.9 °C)] 98.4 °F (36.9 °C)  Pulse:  [] 106  Resp:  [18-24] 22  SpO2:  [95 %-100 %] 98 %  BP: ()/(50-64) 122/64        There is no height or weight on file to calculate BMI.    SpO2: 98 %       No intake or output data in the 24 hours ending 06/18/23 2011    Lines/Drains/Airways       Peripheral Intravenous Line  Duration                  Peripheral IV - Single Lumen 06/18/23 1650 20 G;1 in Posterior;Proximal;Right Forearm <1 day         Peripheral IV - Single Lumen 06/18/23 1955 22 G Left;Posterior Forearm <1 day                     Physical Exam   Vitals reviewed.   Constitutional:       General: He is not in acute distress.     Appearance: He is well-developed. He is not diaphoretic.   HENT:      Head: Normocephalic and atraumatic.   Neck:      Vascular: JVD present.   Cardiovascular:      Rate and Rhythm: Normal rate and regular rhythm.      Pulses: Intact distal pulses.      Heart sounds: Murmur heard.   Harsh midsystolic murmur is present at the upper right sternal border radiating to the neck.   Pulmonary:      Effort: Pulmonary effort is normal. No respiratory distress.   Musculoskeletal:      Cervical back: Normal range of motion.      Right lower leg: Edema present.      Left lower leg: Edema present.   Skin:     General: Skin is warm and dry.   Neurological:      Mental Status: He is alert and oriented to person, place, and time.   Significant Labs:   Recent Lab Results         06/18/23  1703   06/18/23  1649        Albumin 3.1         Alkaline Phosphatase 72         ALT 16         Anion Gap 10         AST 13         Baso # 0.03         Basophil % 0.2         BILIRUBIN TOTAL 0.6  Comment: For infants and  newborns, interpretation of results should be based  on gestational age, weight and in agreement with clinical  observations.    Premature Infant recommended reference ranges:  Up to 24 hours.............<8.0 mg/dL  Up to 48 hours............<12.0 mg/dL  3-5 days..................<15.0 mg/dL  6-29 days.................<15.0 mg/dL           BNP 1,220  Comment: Values of less than 100 pg/ml are consistent with non-CHF populations.         BUN 64         Calcium 8.7         Chloride 106         CO2 24         Creatinine 2.0         Differential Method Automated         eGFR 31.3         Eos # 0.1         Eosinophil % 0.9         Glucose 88         Gran # (ANC) 11.9         Gran % 78.8         Hematocrit 41.0         Hemoglobin 12.6         HIV 1/2 Ag/Ab Non-reactive         Immature Grans (Abs) 0.12  Comment: Mild elevation in immature granulocytes is non specific and   can be seen in a variety of conditions including stress response,   acute inflammation, trauma and pregnancy. Correlation with other   laboratory and clinical findings is essential.           Immature Granulocytes 0.8         Lymph # 1.8         Lymph % 12.2         MCH 25.6         MCHC 30.7         MCV 83         Mono # 1.1         Mono % 7.1         MPV 11.5         nRBC 0         Platelets 175         POC Cardiac Troponin I   0.06       Potassium 5.4  Comment: *No Visible Hemolysis         PROTEIN TOTAL 6.6         RBC 4.92         RDW 19.1         Sample   OTHER  Comment: A single negative troponin is insufficient to rule out myocardial infarction.  The use of a serial sampling protocol is recommended practice. Correlate results with reference intervals established for methodology used. Point of care and core laboratory   troponin results are not interchangeable.         Sodium 140         Troponin I 0.053  Comment: The reference interval for Troponin I represents the 99th percentile   cutoff   for our facility and is consistent with 3rd generation  assay   performance.           WBC 15.10

## 2023-06-19 NOTE — ASSESSMENT & PLAN NOTE
Discussed with the patient and his family (wife and son) at bedside about his wishes in the event that he was to become critically ill and if he was to undergo cardiac arrest. The patient expressed that he would want full resuscitative efforts including chest compressions and intubation to achieve ROSC, but expressed that he would not want to remain on the ventilator for a prolonged period of time if his chances of a meaningful recovery were low. I explained that that would mean a Full Code status would be placed in the chart, and the patient and his family were in agreement.

## 2023-06-19 NOTE — ASSESSMENT & PLAN NOTE
89M with history of CAD, severe AS, AF, and HFpEF, presenting with lower extremity edema, and cough with elevated BNP and CXR showing pulmonary edema, concerning for acute on chronic congestive HF. Pt with multiple presentations and admissions for acute CHF exacerbations in the last two months. Recently seen for TAVR and revascularization evaluation, however deemed a poor candidate, requiring further rehab to improve physical status. On interview, unclear if patient has been taking medications appropriately.    Plan:  - Cardiology consulted, appreciate assistance with management of ADHF in setting of severe aortic stenosis, given concern that patient is preload dependent and may need further support to maintain cardiac output.  - Repeat Echo  - Initial diuresis with IV lasix 40mg qd   - Titrate to UOP, target net 1-2L negative daily  - Strict I/O's  - Fluid restriction, 1.5L and low Na diet  - Monitor electrolytes with daily CMP, maintain Mag > 2 and K > 4  - Continue home Toprol 25mg  - Hold Losartan in setting of KAT

## 2023-06-19 NOTE — ASSESSMENT & PLAN NOTE
He was recently seen in clinic by Dr. Ledezma for TAVR eval but deemed to frail and recommended prehab at home and to return to clinic in 2 months for  RCA angiography and possible intervention and iliac and subclavian IVUS. If he failed to prehab then he was going to be considered for BAV as bridge to TAVR or destination therapy.      The patient has undergone the following TAVR work-up:    ECHO (Date 4/24/23): SIVAN= 0.46 cm2, MG= 50mmHg, Peak Rojas= 4.4 m/s, EF= 55%.    LHC (Date 5/22/23): LM and RCA stenosis     STS: 8%    Frailty: 4/4    Iliacs are    LVOT area by CTA is    Incidental findings on CT:    CT Surgery risk assessment: inoperable risk, per Dr Madrigal   Rhythm issues: 1st deg AV block    PFTs: deferred   KCCQ/5 meter walk:    Comorbidities: age, CAD, Afib     Recs:   - Continue diuresis for now.   - PT/OT

## 2023-06-19 NOTE — ASSESSMENT & PLAN NOTE
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 Seen on US lower extremities on two occasions in May 2023. Patient asymptomatic.     - Repeat US bilateral lower extremities showing previous clot dissolved, new clot on the right distal femoral  - Unclear compliance with medication, reload, continue Eliquis

## 2023-06-19 NOTE — ASSESSMENT & PLAN NOTE
Patient with Long standing persistent (>12 months) atrial fibrillation which is controlled currently with Beta Blocker. Patient is currently in sinus rhythm.[id number] Score: 4.  Anticoagulation indicated. Anticoagulation done with Eliquis 5 BID.    - Continue Eliquis 5 BID  - Continue Toprol XL 25  - Currently rate controlled with HR < 110

## 2023-06-19 NOTE — CONSULTS
[facility]  Interventional Cardiology  Consult Note    Patient Name: Siddharth Urias  MRN: [id number]  Admission Date: 6/18/2023  Hospital Length of Stay: 0 days  Code Status: Full Code   Attending Provider: Jody Brown DO  Consulting Provider: Salena Smith MD  Primary Care Physician: Martine Maxwell MD  Principal Problem:Acute decompensated heart failure    Patient information was obtained from patient and ER records.     Inpatient consult to Interventional Cardiology  Consult performed by: Salena Smith MD  Consult ordered by: Mita Crocker MD        Subjective:     Chief Complaint:  Cough     HPI:  IC Consult: Severe AS, TAVR vs BAV eval  Team: Medicine     Mr. Siddharth Urias is a pleasant 89 y.o. male who has a PMH severe AS, CAD, PAF, Diastolic HF, CKD3, PAD who presented to the ED overnight on 6/18 per notes with a c/o of shortness of breath and worsening edema.Work up in the ED found patient to have an elevated BNP>2000 w. CXR showing small left pleural effusion. Patient has had multiple admissions secondary to heart failure. He was seen last week by by Dr. Ledezma for TAVR eval but deemed to frail and recommended prehab at home and to return to clinic in 2 months for  RCA angiography and possible intervention w/ iliac and subclavian IVUS. If he failed to prehab then he was going to be considered for BAV as bridge to TAVR or destination therapy.      Patient continues to be resting comfortably and watching tv. He is hard of hearing but denies any SOB, palpitations or worsening edema. He admits increased cough with some clear sputum production. He also admits he is very sedentary at home and mostly just sits and watches tv all .    He had been given lasix 40mg Iv once with good urine output. Patient is unclear about how much lasix he takes at home.     Recent TAVR work-up:   ECHO (Date 4/24/23): SIVAN= 0.46 cm2, MG= 50mmHg, Peak Rojas= 4.4 m/s, EF= 55%.   LHC (Date 5/22/23): LM  and RCA stenosis    STS: 8%   Frailty: 4/4   Iliacs are   LVOT area by CTA is   Incidental findings on CT:   CT Surgery risk assessment: inoperable risk, per Dr Madrigal  Rhythm issues: 1st deg AV block   PFTs: deferred  KCCQ/5 meter walk:   Comorbidities: age, CAD, Afib          Past Medical History:   Diagnosis Date    Afib     Arthritis     Choledocholithiasis     Chronic kidney disease     Colon polyp     COPD (chronic obstructive pulmonary disease)     Diabetes mellitus, type 2     2/2011    Diverticulosis     Fatty liver     Hepatomegaly     History of blood clots     2020 Dr. Rodriguez     Irradiation cystitis     Prostate cancer 2009    s/p XRT (T1C, [place] 8)    Radiation proctitis     Sleep apnea        Past Surgical History:   Procedure Laterality Date    ANGIOGRAM, CORONARY, WITH LEFT HEART CATHETERIZATION N/A 05/17/2022    Procedure: Angiogram, Coronary, with Left Heart Cath;  Surgeon: Kamlesh Sullivan MD;  Location: [facility] CATH/EP LAB;  Service: Cardiology;  Laterality: N/A;    APPENDECTOMY      CARPAL TUNNEL RELEASE Left 02/05/2021    Procedure: RELEASE, CARPAL TUNNEL;  Surgeon: Jayro Hawkins II, MD;  Location: [facility] OR;  Service: Orthopedics;  Laterality: Left;    CARPAL TUNNEL RELEASE Right 3/1/2023    Procedure: RELEASE, CARPAL TUNNEL;  Surgeon: Shaun Leonardo MD;  Location: [facility] OR;  Service: Orthopedics;  Laterality: Right;    CATARACT EXTRACTION Bilateral     COLONOSCOPY N/A 12/14/2018    Procedure: COLONOSCOPY;  Surgeon: Noel Aguiar MD;  Location: [facility] ENDO;  Service: Endoscopy;  Laterality: N/A;    COLONOSCOPY  06/21/2021    Dr. Rodgers, in media: 2 colon polyps removed; diverticulosis, erythema in transverse colon; biopsy: tubular adenomas, ranomd colon unremarkable    COLONOSCOPY N/A 11/4/2022    Procedure: COLONOSCOPY;  Surgeon: Noel Aguiar MD;  Location: [facility] ENDO;  Service: Endoscopy;  Laterality: N/A;    CORONARY ANGIOGRAPHY N/A 5/22/2023    Procedure: ANGIOGRAM, CORONARY ARTERY;   Surgeon: Xavier Solis MD;  Location: [facility] CATH/EP LAB;  Service: Cardiology;  Laterality: N/A;    ERCP N/A 11/23/2021    Procedure: ERCP (ENDOSCOPIC RETROGRADE CHOLANGIOPANCREATOGRAPHY);  Surgeon: Marshall Gardner III, MD;  Location: [facility] ENDO;  Service: Endoscopy;  Laterality: N/A;    ESOPHAGOGASTRODUODENOSCOPY N/A 01/24/2019    Procedure: EGD (ESOPHAGOGASTRODUODENOSCOPY);  Surgeon: Noel Aguiar MD;  Location: [facility] ENDO;  Service: Endoscopy;  Laterality: N/A;    EYE SURGERY      FLEXIBLE SIGMOIDOSCOPY N/A 01/22/2019    Procedure: SIGMOIDOSCOPY, FLEXIBLE;  Surgeon: Noel Aguiar MD;  Location: [facility] ENDO;  Service: Endoscopy;  Laterality: N/A;    HERNIA REPAIR      bilateral inguinal    LAPAROSCOPIC CHOLECYSTECTOMY N/A 11/24/2021    Procedure: CHOLECYSTECTOMY, LAPAROSCOPIC;  Surgeon: Jay Cabrera Jr., MD;  Location: [facility] OR;  Service: General;  Laterality: N/A;    TONSILLECTOMY         Review of patient's allergies indicates:   Allergen Reactions    No known drug allergies        PTA Medications   Medication Sig    albuterol (PROVENTIL/VENTOLIN HFA) 90 mcg/actuation inhaler INHALE 2 PUFFS INTO THE LUNGS EVERY 6 HOURS AS NEEDED FOR WHEEZE    allopurinoL (ZYLOPRIM) 100 MG tablet Take 2 tablets (200 mg total) by mouth once daily.    apixaban (ELIQUIS) 5 mg Tab Take 1 tablet (5 mg total) by mouth 2 (two) times daily.    aspirin 81 MG Chew Take 81 mg by mouth once daily.    colestipoL (COLESTID) 1 gram Tab TAKE 1 TABLET BY MOUTH 2 TIMES DAILY. TAKE OTHER ORAL MEDICATIONS >1H BEFORE OR >4H AFTER COLESTIPOL (Patient taking differently: Take 1 g by mouth 2 (two) times daily.)    fluticasone-umeclidin-vilanter (TRELEGY ELLIPTA) 100-62.5-25 mcg DsDv Inhale 1 puff into the lungs once daily.    losartan (COZAAR) 25 MG tablet Take 1 tablet (25 mg total) by mouth once daily.    pantoprazole (PROTONIX) 40 MG tablet Take 1 tablet (40 mg total) by mouth once daily.    potassium chloride SA  (K-DUR,KLOR-CON) 20 MEQ tablet Take 1 tablet (20 mEq total) by mouth 2 (two) times daily.    pravastatin (PRAVACHOL) 40 MG tablet Take 1 tablet (40 mg total) by mouth once daily.    solifenacin (VESICARE) 5 MG tablet Take 1 tablet (5 mg total) by mouth once daily.    tamsulosin (FLOMAX) 0.4 mg Cap Take 1 capsule (0.4 mg total) by mouth once daily.    traZODone (DESYREL) 50 MG tablet Take 1 tablet (50 mg total) by mouth nightly as needed for Insomnia.    doxycycline (VIBRA-TABS) 100 MG tablet Take 100 mg by mouth 2 (two) times daily.    furosemide (LASIX) 40 MG tablet Take 0.5 tablets (20 mg total) by mouth once daily.    metoprolol succinate (TOPROL-XL) 25 MG 24 hr tablet Take 25 mg by mouth once daily.    miconazole NITRATE 2 % (MICOTIN) 2 % top powder Apply topically 2 (two) times daily. Apply to groin twice a day x1 week for 7 days     Family History       Problem Relation (Age of Onset)    Diabetes Mother, Brother          Tobacco Use    Smoking status: Former     Packs/day: 1.50     Years: 60.00     Pack years: 90.00     Types: Cigarettes     Start date: 1944     Quit date: 1/2/2008     Years since quitting: 15.4    Smokeless tobacco: Never   Substance and Sexual Activity    Alcohol use: Yes     Alcohol/week: 1.0 standard drink     Types: 1 Glasses of wine per week     Comment: social - at speical events    Drug use: No    Sexual activity: Not Currently     Partners: Female     ROS  Constitutional: Negative for chills and fever.   HENT:  Negative for hearing loss.    Eyes:  Negative for blurred vision, double vision and photophobia.   Cardiovascular:  Positive for dyspnea on exertion. Negative for chest pain, claudication, cyanosis, irregular heartbeat, leg swelling, near-syncope, orthopnea, palpitations, paroxysmal nocturnal dyspnea and syncope.   Respiratory:  Negative for cough and hemoptysis.    Musculoskeletal:  Negative for falls.   Gastrointestinal:  Negative for abdominal pain, constipation, diarrhea,  nausea and vomiting.   Genitourinary:  Negative for dysuria.   Neurological:  Negative for disturbances in coordination, dizziness, focal weakness, headaches, loss of balance and weakness    Objective:     Vital Signs (Most Recent):  Temp: 97.7 °F (36.5 °C) (06/19/23 0722)  Pulse: 65 (06/19/23 0808)  Resp: 16 (06/19/23 0722)  BP: (!) 119/56 (06/19/23 0808)  SpO2: 96 % (06/19/23 0722) Vital Signs (24h Range):  Temp:  [96.2 °F (35.7 °C)-98.5 °F (36.9 °C)] 97.7 °F (36.5 °C)  Pulse:  [] 65  Resp:  [16-24] 16  SpO2:  [92 %-100 %] 96 %  BP: ()/(50-64) 119/56     Weight: 83.3 kg (183 lb 10.3 oz)  Body mass index is 28.76 kg/m².    SpO2: 96 %         Intake/Output Summary (Last 24 hours) at 6/19/2023 1210  Last data filed at 6/19/2023 0845  Gross per 24 hour   Intake 567 ml   Output 250 ml   Net 317 ml       Lines/Drains/Airways       Peripheral Intravenous Line  Duration                  Peripheral IV - Single Lumen 06/18/23 1650 20 G;1 in Posterior;Proximal;Right Forearm <1 day         Peripheral IV - Single Lumen 06/18/23 1955 22 G Left;Posterior Forearm <1 day                     Physical Exam      Vitals reviewed.   Constitutional:       General: He is not in acute distress.     Appearance: He is well-developed. He is not diaphoretic.   HENT:      Head: Normocephalic and atraumatic.   Neck:      Vascular: JVD present.   Cardiovascular:      Rate and Rhythm: Normal rate and regular rhythm.      Pulses: Intact distal pulses.      Heart sounds: Murmur heard.   Harsh midsystolic murmur is present at the upper right sternal border radiating to the neck.   Pulmonary:      Effort: Pulmonary effort is normal. No respiratory distress.   Musculoskeletal:      Cervical back: Normal range of motion.      Right lower leg: Edema present.      Left lower leg: Edema present.   Skin:     General: Skin is warm and dry.   Neurological:      Mental Status: He is alert and oriented to person, place, and time.   Significant  Labs:   Recent Lab Results  (Last 5 results in the past 24 hours)        06/19/23  0831   06/19/23  0810   06/18/23  2129   06/18/23 2029 06/18/23  1703        Albumin 2.9         3.1       Alkaline Phosphatase 65         72       ALT 13         16       Anion Gap 12         10       Ascending aorta   3.47             Ao peak yanelis   4.46             Ao VTI   116.35             Appearance, UA       Clear         AST 13         13       AV valve area   0.93             AV mean gradient   50             AV index (prosthetic)   0.22             AV peak gradient   80             AV Velocity Ratio   0.22             Bacteria, UA       Rare         Baso # 0.03         0.03       Basophil % 0.2         0.2       Bilirubin (UA)       Negative         BILIRUBIN TOTAL 0.8  Comment: For infants and newborns, interpretation of results should be based  on gestational age, weight and in agreement with clinical  observations.    Premature Infant recommended reference ranges:  Up to 24 hours.............<8.0 mg/dL  Up to 48 hours............<12.0 mg/dL  3-5 days..................<15.0 mg/dL  6-29 days.................<15.0 mg/dL           0.6  Comment: For infants and newborns, interpretation of results should be based  on gestational age, weight and in agreement with clinical  observations.    Premature Infant recommended reference ranges:  Up to 24 hours.............<8.0 mg/dL  Up to 48 hours............<12.0 mg/dL  3-5 days..................<15.0 mg/dL  6-29 days.................<15.0 mg/dL         BNP         1,220  Comment: Values of less than 100 pg/ml are consistent with non-CHF populations.       BSA   1.98             BUN 58         64       Calcium 8.8         8.7       Chloride 106         106       CO2 24         24       Color, UA       Straw         Creatinine 1.9         2.0       Left Ventricle Relative Wall Thickness   0.34             Differential Method Automated         Automated       E/A ratio   0.82              eGFR 33.3         31.3       EF   45             Eos # 0.2         0.1       Eosinophil % 1.1         0.9       E wave deceleration time   338.51             FS   30             Glucose 115         88       Glucose, UA       Negative         Gran # (ANC) 9.4         11.9       Gran % 72.2         78.8       Hematocrit 41.8         41.0       Hemoglobin 12.6         12.6       HIV 1/2 Ag/Ab         Non-reactive       Hyaline Casts, UA       5         Immature Grans (Abs) 0.10  Comment: Mild elevation in immature granulocytes is non specific and   can be seen in a variety of conditions including stress response,   acute inflammation, trauma and pregnancy. Correlation with other   laboratory and clinical findings is essential.           0.12  Comment: Mild elevation in immature granulocytes is non specific and   can be seen in a variety of conditions including stress response,   acute inflammation, trauma and pregnancy. Correlation with other   laboratory and clinical findings is essential.         Immature Granulocytes 0.8         0.8       IVSd   0.77             Ketones, UA       Negative         LA WIDTH   5.09             Left Atrium Major Axis   7.12             Left Atrium Minor Axis   6.86             LA size   3.48             LA volume   105.21                121.84             LA vol index   54.0             LA Volume Index (Mod)   62.5             LVOT area   4.2             Leukocytes, UA       Trace         LV EDV BP   144.81             LV Diastolic Volume Index   74.26             LVIDd   5.46             LVIDs   3.80             LV mass   170.57             LV Mass Index   87             LVOT diameter   2.30             LVOT peak yanelis   0.97             LVOT stroke volume   108.18             LVOT peak VTI   26.05             LV ESV BP   61.88             LV Systolic Volume Index   31.7             Lymph # 2.4         1.8       Lymph % 18.4         12.2       Magnesium 2.0               MCH 25.9          25.6       MCHC 30.1         30.7       MCV 86         83       Microscopic Comment       SEE COMMENT  Comment: Other formed elements not mentioned in the report are not   present in the microscopic examination.            Mono # 1.0         1.1       Mono % 7.3         7.1       MPV 12.0         11.5       MV valve area p 1/2 method   2.24             MV Peak A Rojas   1.11             MV Peak E Rojas   0.91             MV stenosis pressure 1/2 time   98.17             NITRITE UA       Negative         nRBC 0         0       Occult Blood UA       1+         pH, UA       5.0         Phosphorus 4.5               Platelets 174         175       Potassium 4.7         5.4  Comment: *No Visible Hemolysis       PROTEIN TOTAL 6.1         6.6       Protein, UA       Negative  Comment: Recommend a 24 hour urine protein or a urine   protein/creatinine ratio if globulin induced proteinuria is  clinically suspected.           Posterior Wall   0.93             Right Atrial Pressure (from IVC)   3             RA Major Axis   5.12             RA Width   3.43             RBC 4.87         4.92       RBC, UA       5         RDW 19.0         19.1       RVDD   3.69             Sinus   3.60             Sodium 142         140       Specific [place], UA       1.010         Specimen UA       Urine, Clean Catch         Squam Epithel, UA       0         STJ   2.89             TAPSE   2.11             Troponin I 0.080  Comment: The reference interval for Troponin I represents the 99th percentile   cutoff   for our facility and is consistent with 3rd generation assay   performance.       0.060  Comment: The reference interval for Troponin I represents the 99th percentile   cutoff   for our facility and is consistent with 3rd generation assay   performance.       0.053  Comment: The reference interval for Troponin I represents the 99th percentile   cutoff   for our facility and is consistent with 3rd generation assay   performance.         WBC,  UA       1         WBC 13.07         15.10                                  Assessment and Plan:     Cardiac/Vascular  Aortic stenosis, severe  He was recently seen in clinic by Dr. Ledezma for TAVR eval but deemed to frail and recommended prehab at home and to return to clinic in 2 months for  RCA angiography and possible intervention and iliac and subclavian IVUS. If he failed to prehab then he was going to be considered for BAV as bridge to TAVR or destination therapy.      Recent TAVR work-up:   ECHO (Date 4/24/23): SIVAN= 0.46 cm2, MG= 50mmHg, Peak Rojas= 4.4 m/s, EF= 55%.   LHC (Date 5/22/23): LM and RCA stenosis    STS: 8%   Frailty: 4/4   Iliacs are   LVOT area by CTA is   Incidental findings on CT:   CT Surgery risk assessment: inoperable risk, per Dr Madrigal  Rhythm issues: 1st deg AV block   PFTs: deferred  KCCQ/5 meter walk:   Comorbidities: age, CAD, Afib      Recs:   - Imaging and patient seen with Dr. Coles, as Dr. Ledezma currently out of town.   - Patient admits he is very sedentary and does not monitor his fluid intake.   - We recommend further diuresis and medical management.   - needs PT/OT  - Prehab at home and can follow up with Dr. Ledezma in 2 months unless despite medical compliance he continues to be profoundly symptomatic then a possible BAV can be considered.     Discussed with Dr. Coles. We will sign off but please contact us if any further questions arise.         VTE Risk Mitigation (From admission, onward)           Ordered     apixaban tablet 2.5 mg  2 times daily         06/19/23 0732     IP VTE HIGH RISK PATIENT  Once         06/18/23 1827                    Thank you for your consult.     Salena Smith MD  Interventional Cardiology   [facility]  I have seen the patient, reviewed the Fellow's history and physical, assessment and plan. I have personally interviewed and examined the patient and agree with the findings.     PARAS Coles MD

## 2023-06-19 NOTE — ASSESSMENT & PLAN NOTE
89M with history of CAD, severe AS, AF, and HFpEF, presenting with lower extremity edema, and cough with elevated BNP and CXR showing pulmonary edema, concerning for acute on chronic congestive HF. Pt with multiple presentations and admissions for acute CHF exacerbations in the last two months. Recently seen for TAVR and revascularization evaluation, however deemed a poor candidate, requiring further rehab to improve physical status. On interview, unclear if patient has been taking medications appropriately.    Plan:  - Cardiology consulted, appreciate assistance with management of ADHF in setting of severe aortic stenosis, given concern that patient is preload dependent and may need further support to maintain cardiac output.  - IV Lasix 40mg BID per cardiology  - Repeat Echo appears similar to previous  - Strict I/O's, daily weights  - Fluid restriction, 1.5L and low Na diet  - Monitor electrolytes with daily CMP, maintain Mag > 2 and K > 4  - Continue home Metoprolol 12.5 bid  - Hold Losartan in setting of KAT

## 2023-06-19 NOTE — CARE UPDATE
RAPID RESPONSE NURSE ROUND       Rounding completed with charge RN, Jorge. No concerns verbalized at this time. Instructed to call [phone] for further concerns or assistance.

## 2023-06-19 NOTE — CARE UPDATE
Mr. Urias was seen overnight for evaluation of pulmonary edema in the setting of severe AS.   S/p lasix 40mg Iv once with great urine output >500cc.   -Patient was comfortable laying in bed on RYAN. Denied any SOB, chest pain, palpitations or worsening edema. Did admit to some cough.     Plan: Formal consult to follow  - Can continue 40mg IV lasix  - strict I & Os  -  repeat echo once more euvolemic  - currently not a candidate for TAVR due to debility/deconditioning.   - PT/OT          Salena Smith MD  Cardiology fellow   Additional Notes: COVID-19 screening questions \\n\\nHave you traveled internationally or on a cruise ship in the last 14 days : No\\nHave you traveled out of state within the [place] in the last 14 days :No\\nHave you been in contact with anyone who is suspected of having, being tested, or has tested positive for the corona virus :No\\nHave you been experiencing fever or respiratory symptoms: No\\nHave you been experiencing GI symptoms- diarrhea, vomiting, nausea, or stomach pains in the last week? No Detail Level: Detailed

## 2023-06-19 NOTE — ASSESSMENT & PLAN NOTE
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 Seen on US lower extremities on two occasions in May 2023. Patient asymptomatic.    - Repeat US bilateral lower extremities  - Continue Eliquis 5mg BID

## 2023-06-20 NOTE — ASSESSMENT & PLAN NOTE
Patient with stage 3 CKD, baseline Creatinine as low as 1.5, however appears to have presented with Cr frequently in the 2 range. Cr on admission is 2.0. KAT on top of CKD, likely pre-renal due to decreased perfusion 2/2 acute heart failure and cardiorenal syndrome.    - Gentle diuresis. Switched from IV to po, holding for worsening KAT   - Daily CMPs  - Strict I&Os  - Avoid nephrotoxic agents such as NSAIDs, gadolinium and IV radiocontrast.  - Renally dose meds to current GFR.

## 2023-06-20 NOTE — ASSESSMENT & PLAN NOTE
89M with history of CAD, severe AS, AF, and HFpEF, presenting with lower extremity edema, and cough with elevated BNP and CXR showing pulmonary edema, concerning for acute on chronic congestive HF. Pt with multiple presentations and admissions for acute CHF exacerbations in the last two months. Recently seen for TAVR and revascularization evaluation, however deemed a poor candidate, requiring further rehab to improve physical status. On interview, unclear if patient has been taking medications appropriately.    Plan:  - Cardiology consulted, appreciate assistance with management of ADHF in setting of severe aortic stenosis, given concern that patient is preload dependent and may need further support to maintain cardiac output.  - Initially on IV lasix 40mg bid, now holding in the setting of worsening KAT  - Repeat Echo appears similar to previous  - Strict I/O's, daily weights  - Fluid restriction, 1.5L and low Na diet  - Monitor electrolytes with daily CMP, maintain Mag > 2 and K > 4  - Hold Losartan in setting of KAT, resume on discharge

## 2023-06-20 NOTE — TELEPHONE ENCOUNTER
Pt's wife calls on behalf of pt who is currently hospitalized. She states that pt was diagnosed with a blood clot in his leg and that he was treated in the past by a Dr. Michelle Rodriguez. Pt's wife wants pt's current treatment team to be aware of this former treatment. She is advised to talk to pt's inpatient treatment team directly. She hangs up.   Reason for Disposition   Information only question and nurse able to answer    Protocols used: Information Only Call - No Triage-A-[place]

## 2023-06-20 NOTE — PT/OT/SLP EVAL
"Occupational Therapy   Evaluation and tx    Name: Siddharth Urias  MRN: [id number]  Admitting Diagnosis: Acute on chronic diastolic heart failure  Recent Surgery: * No surgery found *      Recommendations:     Discharge Recommendations: nursing facility, skilled  Discharge Equipment Recommendations:  none  Barriers to discharge:  None    Assessment:     Siddharth Urias is a 89 y.o. male with a medical diagnosis of Acute on chronic diastolic heart failure.  He presents with very pleasant demeanor. Performance deficits affecting function: impaired self care skills, gait instability, impaired sensation, edema, decreased ROM.  Patient would benefit from continued skilled acute OT 3x/wk to improve functional mobility, increase independence with ADLs, and address established goals. Recommending  continued therapy once medically appropriate for discharge to increase maximal independence, reduce burden of care, and ensure safety.     Rehab Prognosis: Good; patient would benefit from acute skilled OT services to address these deficits and reach maximum level of function.       Plan:     Patient to be seen 3 x/week to address the above listed problems via self-care/home management, therapeutic activities, therapeutic exercises  Plan of Care Expires: 07/11/23  Plan of Care Reviewed with: patient    Subjective   " I need to get home, my wife is by herself and doesn't drive"  Chief Complaint: not wanting to be transferred to inpatient rehab.  Patient/Family Comments/goals: To return home with HH.    Occupational Profile:  Living Environment: Pt lives with his wife in a H, I HOME, no rails, has both walk in shower and tub/shower combo, raised toilet seat.  Previous level of function: Ind  Roles and Routines: Retired   Equipment Used at Home: walker, standard, wheelchair, cane, straight, 3-in-1 commode, shower chair  Assistance upon Discharge: TBD    Pain/Comfort:  Pain Rating 1: 0/10    Patients cultural, " spiritual, Alevism conflicts given the current situation: no    Objective:     Communicated with: nurse prior to session.  Patient found sitting edge of bed with peripheral IV, telemetry upon OT entry to room.    General Precautions: Standard, other (see comments), fall (slight hearing impaired)  Orthopedic Precautions: N/A  Braces: N/A  Respiratory Status: Room air    Occupational Performance:    Bed Mobility:    Patient completed Supine to Sit with independence  Patient completed Sit to Supine with independence    Functional Mobility/Transfers:  Patient completed Sit <> Stand Transfer with modified independence  with  rolling walker and vc's for one handed push off to stand.   Functional Mobility: Pt able to take steps forward/back/lateral with RW and SBA    Activities of Daily Living:  Upper Body Dressing: contact guard assistance don/doff gown  Lower Body Dressing: contact guard assistance don/doff socks EOB    Cognitive/Visual Perceptual:  Cognitive/Psychosocial Skills:     -       Oriented to: Person, Place, Time, and Situation   -       Follows Commands/attention:Follows multistep  commands  -       Communication: clear/fluent  -       Memory: No Deficits noted  -       Safety awareness/insight to disability: intact   -       Mood/Affect/Coping skills/emotional control: Happy and Pleasant  Visual/Perceptual:      -Intact WFL    Physical Exam:  Balance: -       static and dynamic sit and stand WFL  Postural examination/scapula alignment:    -       No postural abnormalities identified  Skin integrity: Visible skin intact  Edema:  Mild BLE and    Sensation:    -       Impaired  bliteral thumbs, index and middle fingers tingle secondary to carpal tunnel surgeries.  Motor Planning: -       WNL  Dominant hand: -       right  Upper Extremity Range of Motion:     -       Right Upper Extremity: limited to 90 degree shoulder flexion  -       Left Upper Extremity: limited to 90 degree shoulder flexion  Upper  Extremity Strength:    -       Right Upper Extremity: WFL  -       Left Upper Extremity: WFL   Strength:    -       Right Upper Extremity: WFL  -       Left Upper Extremity: WFL  Fine Motor Coordination:    -       Intact  Gross motor coordination:   WFL  Neurological: -       WNL    AMPAC 6 Click ADL:  AMPAC Total Score: 20    Treatment & Education:  Role of OT and POC  Safety  ADL retraining  Functional mobility training  Discharge planning  Importance of EOB/OOB activity      Patient left HOB elevated with all lines intact, call button in reach, and nurse notified    GOALS:   Multidisciplinary Problems       Occupational Therapy Goals       Not on file                    History:     Past Medical History:   Diagnosis Date    Afib     Arthritis     Choledocholithiasis     Chronic kidney disease     Colon polyp     COPD (chronic obstructive pulmonary disease)     Diabetes mellitus, type 2     2/2011    Diverticulosis     Fatty liver     Hepatomegaly     History of blood clots     2020 Dr. Rodriguez     Irradiation cystitis     Prostate cancer 2009    s/p XRT (T1C, João 8)    Radiation proctitis     Sleep apnea          Past Surgical History:   Procedure Laterality Date    ANGIOGRAM, CORONARY, WITH LEFT HEART CATHETERIZATION N/A 05/17/2022    Procedure: Angiogram, Coronary, with Left Heart Cath;  Surgeon: Kamlesh Sullivan MD;  Location: [facility] CATH/EP LAB;  Service: Cardiology;  Laterality: N/A;    APPENDECTOMY      CARPAL TUNNEL RELEASE Left 02/05/2021    Procedure: RELEASE, CARPAL TUNNEL;  Surgeon: Jayro Hawkins II, MD;  Location: [facility] OR;  Service: Orthopedics;  Laterality: Left;    CARPAL TUNNEL RELEASE Right 3/1/2023    Procedure: RELEASE, CARPAL TUNNEL;  Surgeon: Shaun Leonardo MD;  Location: [facility] OR;  Service: Orthopedics;  Laterality: Right;    CATARACT EXTRACTION Bilateral     COLONOSCOPY N/A 12/14/2018    Procedure: COLONOSCOPY;  Surgeon: Noel Aguiar MD;  Location: [facility] ENDO;  Service: Endoscopy;   Laterality: N/A;    COLONOSCOPY  06/21/2021    Dr. Rodgers, in media: 2 colon polyps removed; diverticulosis, erythema in transverse colon; biopsy: tubular adenomas, ranomd colon unremarkable    COLONOSCOPY N/A 11/4/2022    Procedure: COLONOSCOPY;  Surgeon: Noel Aguiar MD;  Location: [facility] ENDO;  Service: Endoscopy;  Laterality: N/A;    CORONARY ANGIOGRAPHY N/A 5/22/2023    Procedure: ANGIOGRAM, CORONARY ARTERY;  Surgeon: Xavier Solis MD;  Location: [facility] CATH/EP LAB;  Service: Cardiology;  Laterality: N/A;    ERCP N/A 11/23/2021    Procedure: ERCP (ENDOSCOPIC RETROGRADE CHOLANGIOPANCREATOGRAPHY);  Surgeon: Marshall Gardner III, MD;  Location: [facility];  Service: Endoscopy;  Laterality: N/A;    ESOPHAGOGASTRODUODENOSCOPY N/A 01/24/2019    Procedure: EGD (ESOPHAGOGASTRODUODENOSCOPY);  Surgeon: Noel Aguiar MD;  Location: [facility];  Service: Endoscopy;  Laterality: N/A;    EYE SURGERY      FLEXIBLE SIGMOIDOSCOPY N/A 01/22/2019    Procedure: SIGMOIDOSCOPY, FLEXIBLE;  Surgeon: Noel Aguiar MD;  Location: [facility];  Service: Endoscopy;  Laterality: N/A;    HERNIA REPAIR      bilateral inguinal    LAPAROSCOPIC CHOLECYSTECTOMY N/A 11/24/2021    Procedure: CHOLECYSTECTOMY, LAPAROSCOPIC;  Surgeon: Jay Cabrera Jr., MD;  Location: [facility] OR;  Service: General;  Laterality: N/A;    TONSILLECTOMY         Time Tracking:     OT Date of Treatment:    OT Start Time: 1504  OT Stop Time: 1530  OT Total Time (min): 26 min    Billable Minutes:Evaluation 8  Self Care/Home Management 18    6/20/2023

## 2023-06-20 NOTE — ASSESSMENT & PLAN NOTE
Due to age and cardiac disease, pt with significant physical debility.    - Completed rehab after prior episodes of AHF.  - Cardiology recommended another round

## 2023-06-20 NOTE — ASSESSMENT & PLAN NOTE
Patient with Long standing persistent (>12 months) atrial fibrillation which is controlled currently with Beta Blocker. Patient is currently in sinus rhythm.[id number] Score: 4.  Anticoagulation indicated. Anticoagulation done with Eliquis 5 BID.    - Continue Eliquis  - Patient with persistent bradycardia w/ 1st degree AVB. Will switch metoprolol to coreg 3.125 per cardiology  - Currently rate controlled with HR < 110

## 2023-06-20 NOTE — SUBJECTIVE & OBJECTIVE
Interval History: No issues overnight. Reports improvement in dyspnea and leg swelling. Denies chest pain. 1.3L UOP over the past 24 hours; net even. Pt unsure of his dry weight; also unsure about the doses of his medication; unsure if he's meant to be on a fluid restriction    Review of Systems   Constitutional: Negative for chills and fever.   HENT:  Negative for congestion and sore throat.    Eyes:  Negative for double vision and visual disturbance.   Cardiovascular:  Negative for chest pain, leg swelling and palpitations.   Respiratory:  Negative for cough, shortness of breath and wheezing.    Skin:  Positive for dry skin. Negative for rash.   Gastrointestinal:  Negative for abdominal pain, nausea and vomiting.   Genitourinary:  Negative for dysuria, frequency and hematuria.   Neurological:  Negative for dizziness, headaches and light-headedness.   Psychiatric/Behavioral:  Negative for altered mental status. The patient is not nervous/anxious.    Objective:     Vital Signs (Most Recent):  Temp: 97.8 °F (36.6 °C) (06/20/23 0731)  Pulse: 66 (06/20/23 0931)  Resp: 17 (06/20/23 0931)  BP: (!) 128/58 (06/20/23 0731)  SpO2: (!) 90 % (06/20/23 0931) Vital Signs (24h Range):  Temp:  [96.4 °F (35.8 °C)-98 °F (36.7 °C)] 97.8 °F (36.6 °C)  Pulse:  [40-66] 66  Resp:  [16-20] 17  SpO2:  [90 %-96 %] 90 %  BP: (110-128)/(53-75) 128/58     Weight: 77.7 kg (171 lb 5.8 oz)  Body mass index is 26.83 kg/m².     SpO2: (!) 90 %         Intake/Output Summary (Last 24 hours) at 6/20/2023 1047  Last data filed at 6/20/2023 1040  Gross per 24 hour   Intake 1356 ml   Output 1800 ml   Net -444 ml       Lines/Drains/Airways       Peripheral Intravenous Line  Duration                  Peripheral IV - Single Lumen 06/18/23 1650 20 G;1 in Posterior;Proximal;Right Forearm 1 day         Peripheral IV - Single Lumen 06/18/23 1955 22 G Left;Posterior Forearm 1 day                       Physical Exam  Vitals and nursing note reviewed.    Constitutional:       General: He is not in acute distress.     Appearance: Normal appearance. He is not ill-appearing.   HENT:      Head: Normocephalic and atraumatic.      Mouth/Throat:      Mouth: Mucous membranes are moist.   Eyes:      Extraocular Movements: Extraocular movements intact.      Pupils: Pupils are equal, round, and reactive to light.   Neck:      Vascular: No JVD.   Cardiovascular:      Rate and Rhythm: Bradycardia present. Rhythm irregular.      Pulses: Normal pulses.      Heart sounds: Murmur (ejection systolic) heard.   Pulmonary:      Effort: Pulmonary effort is normal.      Breath sounds: No wheezing, rhonchi or rales.   Abdominal:      General: Bowel sounds are normal. There is no distension.      Palpations: Abdomen is soft.      Tenderness: There is no abdominal tenderness.   Musculoskeletal:      Right lower leg: No edema.      Left lower leg: Edema (pitting edema at ankle) present.   Skin:     General: Skin is warm and dry.      Capillary Refill: Capillary refill takes less than 2 seconds.      Findings: Bruising present. No erythema or rash.   Neurological:      General: No focal deficit present.      Mental Status: He is alert and oriented to person, place, and time.   Psychiatric:         Mood and Affect: Mood normal.         Thought Content: Thought content normal.          Significant Labs: CMP   Recent Labs   Lab 06/18/23  1703 06/19/23  0831 06/19/23  2155 06/20/23  0256    142 138 137   K 5.4* 4.7 4.1 4.0    106 103 103   CO2 24 24 24 19*   GLU 88 115* 132* 133*   BUN 64* 58* 61* 66*   CREATININE 2.0* 1.9* 2.0* 2.2*   CALCIUM 8.7 8.8 8.4* 8.6*   PROT 6.6 6.1  --  6.0   ALBUMIN 3.1* 2.9*  --  2.7*   BILITOT 0.6 0.8  --  0.8   ALKPHOS 72 65  --  66   AST 13 13  --  14   ALT 16 13  --  12   ANIONGAP 10 12 11 15   , CBC   Recent Labs   Lab 06/18/23  1703 06/19/23  0831 06/20/23  0256   WBC 15.10* 13.07* 12.38   HGB 12.6* 12.6* 12.4*   HCT 41.0 41.8 39.9*    174  149*   , Troponin   Recent Labs   Lab 06/19/23  0831 06/19/23  1722 06/20/23  0912   TROPONINI 0.080* 0.163* 0.199*   , All pertinent lab results from the last 24 hours have been reviewed., and   Recent Lab Results         06/20/23  0912   06/20/23  0256   06/19/23  2155   06/19/23  1722        Albumin   2.7           Alkaline Phosphatase   66           ALT   12           Anion Gap   15   11         AST   14           Baso #   0.04           Basophil %   0.3           BILIRUBIN TOTAL   0.8  Comment: For infants and newborns, interpretation of results should be based  on gestational age, weight and in agreement with clinical  observations.    Premature Infant recommended reference ranges:  Up to 24 hours.............<8.0 mg/dL  Up to 48 hours............<12.0 mg/dL  3-5 days..................<15.0 mg/dL  6-29 days.................<15.0 mg/dL             BUN   66   61         Calcium   8.6   8.4         Chloride   103   103         CO2   19   24         Creatinine   2.2   2.0         Differential Method   Automated           eGFR   27.9   31.3         Eos #   0.2           Eosinophil %   1.2           Glucose   133   132         Gran # (ANC)   9.2           Gran %   74.6           Hematocrit   39.9           Hemoglobin   12.4           Immature Grans (Abs)   0.09  Comment: Mild elevation in immature granulocytes is non specific and   can be seen in a variety of conditions including stress response,   acute inflammation, trauma and pregnancy. Correlation with other   laboratory and clinical findings is essential.             Immature Granulocytes   0.7           Lactate, Gerry     1.5  Comment: Falsely low lactic acid results can be found in samples   containing >=13.0 mg/dL total bilirubin and/or >=3.5 mg/dL   direct bilirubin.           Lymph #   1.9           Lymph %   15.4           Magnesium   1.9           MCH   26.5           MCHC   31.1           MCV   85           Mono #   1.0           Mono %   7.8            MPV   12.1           nRBC   0           Phosphorus   4.1           Platelets   149           Potassium   4.0   4.1         PROTEIN TOTAL   6.0           RBC   4.68           RDW   18.7           Sodium   137   138         Troponin I 0.199  Comment: The reference interval for Troponin I represents the 99th percentile   cutoff   for our facility and is consistent with 3rd generation assay   performance.         0.163  Comment: The reference interval for Troponin I represents the 99th percentile   cutoff   for our facility and is consistent with 3rd generation assay   performance.         WBC   12.38                   Significant Imaging: EKG:  and X-Ray: CXR: X-Ray Chest 1 View (CXR): No results found for this visit on 06/18/23.

## 2023-06-20 NOTE — ASSESSMENT & PLAN NOTE
TTE in April 2023 showed:   There is severe aortic valve stenosis.   Aortic valve area is 0.46 cm2; peak velocity is 4.4 m/s; mean gradient is 50 mmHg. Of note, gradients were measured following a post PVC beat.    Pt with symptoms of FRY, but denies any syncope or chest pain. Patient seen by Dr. Ledezma for TAVR evaluation, presently not a candidate, however noted that if pt continued to be hospitalized, may consider BAV as bridging therapy.    - Careful diuresis per cardiology.   Detail Level: Detailed General Sunscreen Counseling: I recommended a broad spectrum sunscreen with a SPF of 30 or higher.  I explained that SPF 30 sunscreens block approximately 97 percent of the sun's harmful rays.  Sunscreens should be applied at least 15 minutes prior to expected sun exposure and then every 2 hours after that as long as sun exposure continues. If swimming or exercising sunscreen should be reapplied every 45 minutes to an hour after getting wet or sweating.  One ounce, or the equivalent of a shot glass full of sunscreen, is adequate to protect the skin not covered by a bathing suit. I also recommended a lip balm with a sunscreen as well. Sun protective clothing can be used in lieu of sunscreen but must be worn the entire time you are exposed to the sun's rays.

## 2023-06-20 NOTE — PROGRESS NOTES
[facility] [place] [facility]  Progress Note    Patient Name: Siddharth Urias  MRN: [id number]  Patient Class: IP- Inpatient   Admission Date: 6/18/2023  Length of Stay: 1 days  Attending Physician: Jody Brown DO  Primary Care Provider: Martine Maxwell MD        Subjective:     Principal Problem:Acute on chronic diastolic heart failure        HPI:  Siddharth Urias is an 89 year old man with history of severe aortic stenosis, CHF, AF, CKD3, PAD, DVT, and COPD who presents with bilateral lower extremity edema and cough. Patient was brought in by wife after she noticed that his legs and feet were becoming progressively more swollen over the past week. Pt denies any significant respiratory symptoms however his son reports that the pt had had recent episode of productive cough with clear sputum. He was recently hospitalized 4 times for acute on chronic congestive heart failure over the last two months, consisting of shortness of breath and lower extremity swelling, receiving diuresis each time. On Echo done in April 2023, it showed he had severe aortic stenosis. He was see by Dr. Ledezma on 6/12 for TAVR evaluation, and on that visit, he was deemed to not be a candidate for TAVR in his present physical condition. Dr. Ledezma recommended that he continue prehab, but also commented on consideration of BAV as bridging therapy if patient continued to undergo hospitalization. Patient does not know exactly which medications he is taking at home, and is not sure if he is taking his Lasix as directed though recognizes the name. He denies any recent fevers, chills, chest pain, chest tightness, SOB at rest, or urinary/bowel issues.    In the ED, patient was afebrile and initially hypotensive 89/50 but improved to 116/56 shortly after, satting well on room air. Labs were notable for WBC 15 and K 5.4, and BNP 1220. Trop at 0.053. EKG with signs of acute ischemia. CXR showed some pulmonary vascular congestion when compared  to prior CXRs. The ED deferred diuresis given pt's severe aortic stenosis. Patient was admitted to hospital medicine for management of acute decompensated heart failure.      Overview/Hospital Course:  Mr. Urias is an 88 y/o M with hx of severe aortic stenosis, CHF, AF, CKD3, PAD, DVT, COPD who presented for worsening swelling and dyspnea w/ cough. He was recently discharged from rehab after multiple hospitalizations for CHF failure over the past 2 months. Unclear about his medication compliance or fluid consumption at home. Patient was started on IV Lasix 40mg for gentle diuresis due to his severe AS. He was evaluated by interventional cardiology and general cardiology who recommended continued diuresis without acute valve interventions at this time. He will need additional rehab at home prior to further interventions. He sees Dr. Ledezma as an outpatient, was found to have multiple vessel blockages on angiogram done this year. Per family, Dr. Ledezma would like to intervene on these vessels prior to him going for valve repair.       Interval History: No acute events overnight, patient was again bradycardic so his metoprolol was held. He denies any symptoms, states he feels better. Denies lightheadedness, dizziness, chest pain, palpitations, SOB.    Review of Systems   Constitutional:  Negative for chills, fatigue and fever.   HENT:  Negative for congestion and sore throat.    Respiratory:  Positive for cough. Negative for shortness of breath.    Cardiovascular:  Negative for chest pain, palpitations and leg swelling.   Gastrointestinal:  Negative for abdominal pain, constipation, diarrhea, nausea and vomiting.   Genitourinary:  Negative for dysuria and flank pain.   Musculoskeletal:  Negative for joint swelling.   Neurological:  Negative for dizziness and headaches.   Psychiatric/Behavioral:  Negative for confusion. The patient is not nervous/anxious.    Objective:     Vital Signs (Most Recent):  Temp: 97.8 °F  (36.6 °C) (06/20/23 0731)  Pulse: (!) 52 (06/20/23 0817)  Resp: 18 (06/20/23 0817)  BP: (!) 128/58 (06/20/23 0731)  SpO2: 96 % (06/20/23 0817) Vital Signs (24h Range):  Temp:  [96.4 °F (35.8 °C)-98 °F (36.7 °C)] 97.8 °F (36.6 °C)  Pulse:  [40-65] 52  Resp:  [16-20] 18  SpO2:  [93 %-96 %] 96 %  BP: (110-128)/(53-75) 128/58     Weight: 77.7 kg (171 lb 5.8 oz)  Body mass index is 26.83 kg/m².    Intake/Output Summary (Last 24 hours) at 6/20/2023 0848  Last data filed at 6/20/2023 0509  Gross per 24 hour   Intake 996 ml   Output 1300 ml   Net -304 ml         Physical Exam  HENT:      Head: Normocephalic.      Nose: Nose normal.      Mouth/Throat:      Mouth: Mucous membranes are moist.   Eyes:      Pupils: Pupils are equal, round, and reactive to light.   Neck:      Comments: JVD to mid neck  Cardiovascular:      Rate and Rhythm: Normal rate.   Pulmonary:      Breath sounds: Rales present.   Abdominal:      General: Abdomen is flat.      Tenderness: There is no abdominal tenderness.   Musculoskeletal:      Cervical back: Neck supple.      Right lower leg: No edema.      Left lower leg: No edema.   Skin:     General: Skin is warm.      Capillary Refill: Capillary refill takes less than 2 seconds.   Neurological:      General: No focal deficit present.      Mental Status: He is alert and oriented to person, place, and time.   Psychiatric:         Mood and Affect: Mood normal.           Significant Labs: All pertinent labs within the past 24 hours have been reviewed.    Significant Imaging: I have reviewed all pertinent imaging results/findings within the past 24 hours.      Assessment/Plan:      * Acute on chronic diastolic heart failure  89M with history of CAD, severe AS, AF, and HFpEF, presenting with lower extremity edema, and cough with elevated BNP and CXR showing pulmonary edema, concerning for acute on chronic congestive HF. Pt with multiple presentations and admissions for acute CHF exacerbations in the last two  months. Recently seen for TAVR and revascularization evaluation, however deemed a poor candidate, requiring further rehab to improve physical status. On interview, unclear if patient has been taking medications appropriately. Repeat Echo appears similar to previous.    Plan:  - Cardiology consulted, appreciate assistance with management of ADHF in setting of severe aortic stenosis, given concern that patient is preload dependent and may need further support to maintain cardiac output.  - Initially on IV lasix 40mg bid, now holding in the setting of worsening KAT. Weight based prn lasix per cardiology.  - Held Losartan in setting of KAT, will start Entresto when Cr trending down  - Strict I/O's, daily weights  - Fluid restriction, 1.5L and low Na diet  - Monitor electrolytes with daily CMP, maintain Mag > 2 and K > 4        ACP (advance care planning)  Discussed with the patient and his family (wife and son) at bedside about his wishes in the event that he was to become critically ill and if he was to undergo cardiac arrest. The patient expressed that he would want full resuscitative efforts including chest compressions and intubation to achieve ROSC, but expressed that he would not want to remain on the ventilator for a prolonged period of time if his chances of a meaningful recovery were low. I explained that that would mean a Full Code status would be placed in the chart, and the patient and his family were in agreement.    BPH (benign prostatic hyperplasia)  Continue home tamsulosin, hold Solifenacin as may lead to poor UOP.      Deep vein thrombosis (DVT) of lower extremity  Patient with nonocclusive DVT distal left femoral vein which may be chronic. Seen on US lower extremities on two occasions in May 2023. Patient asymptomatic.     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 He sees Dr. Ledezma as an outpatient, and had a recent angiogram done in 5/22/23 showing 70% distal left main, 80-90% LAD D1, Lcx OM1 80%.  Continuing evaluation for revascularization.    - Continue medical management with ASA and pravastatin    Leukocytosis  Patient presents with WBC elevated at 15, which is downtrending from WBC of 21 from 6/12. Unclear cause of leukocytosis, but suspicion of infection is low at present. No source of infection appreciated on exam.    - Daily CBCs  - Continue to monitor  - Down-trending without treatment    Frail elderly  Due to age and cardiac disease, pt with significant physical debility.    - Completed rehab after prior episodes of AHF.  - Cardiology recommended another round     Gout attack  Continue allopurinol at reduced dose of 50mg qd      PAF (paroxysmal atrial fibrillation)  Patient with Long standing persistent (>12 months) atrial fibrillation which is controlled currently with Beta Blocker. Patient is currently in sinus rhythm.[id number] Score: 4.  Anticoagulation indicated. Anticoagulation done with Eliquis 5 BID.    - Continue Eliquis  - Patient with persistent bradycardia w/ 1st degree AVB. Will switch metoprolol to coreg 3.125 per cardiology  - Currently rate controlled with HR < 110    PAD (peripheral artery disease)  See "CAD". No present symptoms of claudication.      Aortic stenosis, severe  TTE in April 2023 showed:  There is severe aortic valve stenosis.  Aortic valve area is 0.46 cm2; peak velocity is 4.4 m/s; mean gradient is 50 mmHg. Of note, gradients were measured following a post PVC beat.    Pt with symptoms of FRY, but denies any syncope or chest pain. Patient seen by Dr. Ledezma for TAVR evaluation, presently not a candidate, however noted that if pt continued to be hospitalized, may consider " BAV as bridging therapy.    - Careful diuresis per cardiology.    Acute renal failure superimposed on stage 3 chronic kidney disease  Patient with stage 3 CKD, baseline Creatinine as low as 1.5, however appears to have presented with Cr frequently in the 2 range. Cr on admission is 2.0. KAT on top of CKD, likely pre-renal due to decreased perfusion 2/2 acute heart failure and cardiorenal syndrome.    - Gentle diuresis. Switched from IV to po, holding for worsening KAT. Will give 500 mL LR in setting of worsening KAT  - Hold Entresto until Cr improving  - Daily CMPs  - Strict I&Os  - Avoid nephrotoxic agents such as NSAIDs, gadolinium and IV radiocontrast.  - Renally dose meds to current GFR.    COPD (chronic obstructive pulmonary disease)  Chronic medical issue, stable with no recent exacerbations.    - Continue albuterol rescue inhaler  - Start Breo and Spiriva, as no Trelegy on formulary  - Supplemental O2 if needed to maintain sats > 88%      Type 2 diabetes mellitus, controlled, with renal complications  Patient's FSGs are controlled on current medication regimen.  Last A1c reviewed-   Lab Results   Component Value Date    HGBA1C 6.6 (H) 05/19/2023     Most recent fingerstick glucose reviewed- No results for input(s): POCTGLUCOSE in the last 24 hours.  Current correctional scale  None  Maintain anti-hyperglycemic dose as follows-   Antihyperglycemics (From admission, onward)      None          Hold Oral hypoglycemics while patient is in the hospital.    - Patient not on any anti-hyperglycemics at present, diet controlled  - Continue to monitor, can add insulin if needed if pts' BGLs persistently elevated      VTE Risk Mitigation (From admission, onward)           Ordered     apixaban tablet 2.5 mg  2 times daily        See Hyperspace for full Linked Orders Report.    06/19/23 1246     apixaban tablet 10 mg  2 times daily        See Hyperspace for full Linked Orders Report.    06/19/23 1246     IP VTE HIGH RISK  PATIENT  Once         06/18/23 1827                    Discharge Planning   MAGDA: 6/21/2023     Code Status: Full Code   Is the patient medically ready for discharge?: No    Reason for patient still in hospital (select all that apply): Treatment  Discharge Plan A: Home, Home with family, Home Health   Discharge Delays: Orders Needed              Narda Milton DO  Department of Hospital Medicine   [facility]

## 2023-06-20 NOTE — ASSESSMENT & PLAN NOTE
89 y.o. male who has a PMH severe AS, CAD, PAF, Diastolic HF, CKD3, PAD who presented to the ED overnight on 6/18 with a c/o of shortness of breath and worsening edema. Work-up in ED found patient to have an elevated BNP>2000. He has had multiple recent admission due to decompensated HF in the setting of severe AS. Pt has poor understanding of his medications and low salt diet/fluid restriction compliance  Diuresed with IV lasix 80mg BID; currently appears euvolemic and reports improvement in symptoms    Recommend:   - can hold lasix and use prn for weight gain (3lbs in 1 day or 5lbs in 3 days)  - start entresto 24/26 BID  - switch metoprolol to coreg 3.125mg BID  - strict I&Os  - currently not a candidate for TAVR due to debility/deconditioning  - PT/OT; would recommend inpatient rehab for deconditioning

## 2023-06-20 NOTE — ASSESSMENT & PLAN NOTE
Patient with Long standing persistent (>12 months) atrial fibrillation which is controlled currently with Beta Blocker. Patient is currently in sinus rhythm.[id number] Score: 4.  Anticoagulation indicated. Anticoagulation done with Eliquis 5 BID.    - Continue Eliquis  - Patient with persistent bradycardia w/ 1st degree AVB. Will switch metoprolol to coreg 3.125 per cardiology  - Currently rate controlled with HR < 110

## 2023-06-20 NOTE — PROGRESS NOTES
[facility] - Med Surg  Cardiology  Progress Note    Patient Name: Siddharth Urias  MRN: [id number]  Admission Date: 6/18/2023  Hospital Length of Stay: 1 days  Code Status: Full Code   Attending Physician: Jody Brown DO   Primary Care Physician: Martine Maxwell MD  Expected Discharge Date: 6/21/2023  Principal Problem:Acute on chronic diastolic heart failure    Subjective:     Interval History: No issues overnight. Reports improvement in dyspnea and leg swelling. Denies chest pain. 1.3L UOP over the past 24 hours; net even. Pt unsure of his dry weight; also unsure about the doses of his medication; unsure if he's meant to be on a fluid restriction    Review of Systems   Constitutional: Negative for chills and fever.   HENT:  Negative for congestion and sore throat.    Eyes:  Negative for double vision and visual disturbance.   Cardiovascular:  Negative for chest pain, leg swelling and palpitations.   Respiratory:  Negative for cough, shortness of breath and wheezing.    Skin:  Positive for dry skin. Negative for rash.   Gastrointestinal:  Negative for abdominal pain, nausea and vomiting.   Genitourinary:  Negative for dysuria, frequency and hematuria.   Neurological:  Negative for dizziness, headaches and light-headedness.   Psychiatric/Behavioral:  Negative for altered mental status. The patient is not nervous/anxious.    Objective:     Vital Signs (Most Recent):  Temp: 97.8 °F (36.6 °C) (06/20/23 0731)  Pulse: 66 (06/20/23 0931)  Resp: 17 (06/20/23 0931)  BP: (!) 128/58 (06/20/23 0731)  SpO2: (!) 90 % (06/20/23 0931) Vital Signs (24h Range):  Temp:  [96.4 °F (35.8 °C)-98 °F (36.7 °C)] 97.8 °F (36.6 °C)  Pulse:  [40-66] 66  Resp:  [16-20] 17  SpO2:  [90 %-96 %] 90 %  BP: (110-128)/(53-75) 128/58     Weight: 77.7 kg (171 lb 5.8 oz)  Body mass index is 26.83 kg/m².     SpO2: (!) 90 %         Intake/Output Summary (Last 24 hours) at 6/20/2023 1047  Last data filed at 6/20/2023 1040  Gross per 24 hour   Intake 1356 ml    Output 1800 ml   Net -444 ml       Lines/Drains/Airways       Peripheral Intravenous Line  Duration                  Peripheral IV - Single Lumen 06/18/23 1650 20 G;1 in Posterior;Proximal;Right Forearm 1 day         Peripheral IV - Single Lumen 06/18/23 1955 22 G Left;Posterior Forearm 1 day                       Physical Exam  Vitals and nursing note reviewed.   Constitutional:       General: He is not in acute distress.     Appearance: Normal appearance. He is not ill-appearing.   HENT:      Head: Normocephalic and atraumatic.      Mouth/Throat:      Mouth: Mucous membranes are moist.   Eyes:      Extraocular Movements: Extraocular movements intact.      Pupils: Pupils are equal, round, and reactive to light.   Neck:      Vascular: No JVD.   Cardiovascular:      Rate and Rhythm: Bradycardia present. Rhythm irregular.      Pulses: Normal pulses.      Heart sounds: Murmur (ejection systolic) heard.   Pulmonary:      Effort: Pulmonary effort is normal.      Breath sounds: No wheezing, rhonchi or rales.   Abdominal:      General: Bowel sounds are normal. There is no distension.      Palpations: Abdomen is soft.      Tenderness: There is no abdominal tenderness.   Musculoskeletal:      Right lower leg: No edema.      Left lower leg: Edema (pitting edema at ankle) present.   Skin:     General: Skin is warm and dry.      Capillary Refill: Capillary refill takes less than 2 seconds.      Findings: Bruising present. No erythema or rash.   Neurological:      General: No focal deficit present.      Mental Status: He is alert and oriented to person, place, and time.   Psychiatric:         Mood and Affect: Mood normal.         Thought Content: Thought content normal.          Significant Labs: CMP   Recent Labs   Lab 06/18/23  1703 06/19/23  0831 06/19/23  2155 06/20/23  0256    142 138 137   K 5.4* 4.7 4.1 4.0    106 103 103   CO2 24 24 24 19*   GLU 88 115* 132* 133*   BUN 64* 58* 61* 66*   CREATININE 2.0* 1.9*  2.0* 2.2*   CALCIUM 8.7 8.8 8.4* 8.6*   PROT 6.6 6.1  --  6.0   ALBUMIN 3.1* 2.9*  --  2.7*   BILITOT 0.6 0.8  --  0.8   ALKPHOS 72 65  --  66   AST 13 13  --  14   ALT 16 13  --  12   ANIONGAP 10 12 11 15   , CBC   Recent Labs   Lab 06/18/23  1703 06/19/23  0831 06/20/23  0256   WBC 15.10* 13.07* 12.38   HGB 12.6* 12.6* 12.4*   HCT 41.0 41.8 39.9*    174 149*   , Troponin   Recent Labs   Lab 06/19/23  0831 06/19/23  1722 06/20/23  0912   TROPONINI 0.080* 0.163* 0.199*   , All pertinent lab results from the last 24 hours have been reviewed., and   Recent Lab Results         06/20/23  0912   06/20/23  0256   06/19/23  2155   06/19/23  1722        Albumin   2.7           Alkaline Phosphatase   66           ALT   12           Anion Gap   15   11         AST   14           Baso #   0.04           Basophil %   0.3           BILIRUBIN TOTAL   0.8  Comment: For infants and newborns, interpretation of results should be based  on gestational age, weight and in agreement with clinical  observations.    Premature Infant recommended reference ranges:  Up to 24 hours.............<8.0 mg/dL  Up to 48 hours............<12.0 mg/dL  3-5 days..................<15.0 mg/dL  6-29 days.................<15.0 mg/dL             BUN   66   61         Calcium   8.6   8.4         Chloride   103   103         CO2   19   24         Creatinine   2.2   2.0         Differential Method   Automated           eGFR   27.9   31.3         Eos #   0.2           Eosinophil %   1.2           Glucose   133   132         Gran # (ANC)   9.2           Gran %   74.6           Hematocrit   39.9           Hemoglobin   12.4           Immature Grans (Abs)   0.09  Comment: Mild elevation in immature granulocytes is non specific and   can be seen in a variety of conditions including stress response,   acute inflammation, trauma and pregnancy. Correlation with other   laboratory and clinical findings is essential.             Immature Granulocytes   0.7            Lactate, Gerry     1.5  Comment: Falsely low lactic acid results can be found in samples   containing >=13.0 mg/dL total bilirubin and/or >=3.5 mg/dL   direct bilirubin.           Lymph #   1.9           Lymph %   15.4           Magnesium   1.9           MCH   26.5           MCHC   31.1           MCV   85           Mono #   1.0           Mono %   7.8           MPV   12.1           nRBC   0           Phosphorus   4.1           Platelets   149           Potassium   4.0   4.1         PROTEIN TOTAL   6.0           RBC   4.68           RDW   18.7           Sodium   137   138         Troponin I 0.199  Comment: The reference interval for Troponin I represents the 99th percentile   cutoff   for our facility and is consistent with 3rd generation assay   performance.         0.163  Comment: The reference interval for Troponin I represents the 99th percentile   cutoff   for our facility and is consistent with 3rd generation assay   performance.         WBC   12.38                   Significant Imaging: EKG:  and X-Ray: CXR: X-Ray Chest 1 View (CXR): No results found for this visit on 06/18/23.    Assessment and Plan:     Brief HPI:   Cardiology consult: Management for acute decompensated HF setting severe AS     Mr. Siddharth Urias is a pleasant 89 y.o. male who has a PMH severe AS, CAD, PAF, Diastolic HF, CKD3, PAD who presented to the ED overnight on 6/18 with a c/o of shortness of breath and worsening edema.Work up in the ED found patient to have an elevated BNP>2000 w. CXR showing small left pleural effusion. Patient has had multiple admissions secondary to heart failure. He was recently seen in clinic by Dr. Ledezma for TAVR eval but deemed to frail and recommended prehab at home and to return to clinic in 2 months for  RCA angiography and possible intervention and iliac and subclavian IVUS. If he failed to prehab then he was going to be considered for BAV as bridge to TAVR or destination therapy.      Upon evaluation  patient was resting comfortably and watching tv. He was comfortable laying in bed on RYAN. Denied any SOB, chest pain, palpitations or worsening edema. Did admit to some cough with clear sputum.   He had been given lasix 40mg Iv once with good urine output. Patient is unclear about how much lasix he takes at home.      As per Dr. Ledezma,      The patient has undergone the following TAVR work-up:   ECHO (Date 4/24/23): SIVAN= 0.46 cm2, MG= 50mmHg, Peak Rojas= 4.4 m/s, EF= 55%.   LHC (Date 5/22/23): LM and RCA stenosis    STS: 8%   Frailty: 4/4   Iliacs are   LVOT area by CTA is   Incidental findings on CT:   CT Surgery risk assessment: inoperable risk, per Dr Madrigal  Rhythm issues: 1st deg AV block   PFTs: deferred  KCCQ/5 meter walk:   Comorbidities: age, CAD, Afib      * Acute on chronic diastolic heart failure  89 y.o. male who has a PMH severe AS, CAD, PAF, Diastolic HF, CKD3, PAD who presented to the ED overnight on 6/18 with a c/o of shortness of breath and worsening edema. Work-up in ED found patient to have an elevated BNP>2000. He has had multiple recent admission due to decompensated HF in the setting of severe AS. Pt has poor understanding of his medications and low salt diet/fluid restriction compliance  Diuresed with IV lasix 80mg BID; currently appears euvolemic and reports improvement in symptoms    Recommend:   - can hold lasix and use prn for weight gain (3lbs in 1 day or 5lbs in 3 days) or dyspnea  - start entresto 24/26 BID  - switch metoprolol to coreg 3.125mg BID  - strict I&Os  - currently not a candidate for TAVR due to debility/deconditioning  - PT/OT; would recommend inpatient rehab for deconditioning     Aortic stenosis, severe  He was recently seen in clinic by Dr. Ledezma for TAVR eval but deemed to frail and recommended prehab at home and to return to clinic in 2 months for  RCA angiography and possible intervention and iliac and subclavian IVUS. If he failed to prehab then he was going to be  considered for BAV as bridge to TAVR or destination therapy.      The patient has undergone the following TAVR work-up:   ECHO (Date 4/24/23): SIVAN= 0.46 cm2, MG= 50mmHg, Peak Rojas= 4.4 m/s, EF= 55%.   LHC (Date 5/22/23): LM and RCA stenosis    STS: 8%   Frailty: 4/4   Iliacs are   LVOT area by CTA is   Incidental findings on CT:   CT Surgery risk assessment: inoperable risk, per Dr Madrigal  Rhythm issues: 1st deg AV block   PFTs: deferred  KCCQ/5 meter walk:   Comorbidities: age, CAD, Afib     Recs:   - PT/OT  - outpatient follow-up with Dr. Ledezma      We will sign off. Please contact cardiology with any questions or concerns.    VTE Risk Mitigation (From admission, onward)           Ordered     apixaban tablet 5 mg  2 times daily         06/20/23 0941     apixaban tablet 10 mg  2 times daily        See Hyperspace for full Linked Orders Report.    06/19/23 1246     IP VTE HIGH RISK PATIENT  Once         06/18/23 1827                  Liliana Rmairez MD  Cardiology  [facility]

## 2023-06-20 NOTE — PLAN OF CARE
Problem: Adult Inpatient Plan of Care  Goal: Plan of Care Review  Outcome: Ongoing, Progressing  Goal: Patient-Specific Goal (Individualized)  Outcome: Ongoing, Progressing  Goal: Absence of Hospital-Acquired Illness or Injury  Outcome: Ongoing, Progressing  Goal: Optimal Comfort and Wellbeing  Outcome: Ongoing, Progressing  Goal: Readiness for Transition of Care  Outcome: Ongoing, Progressing     Problem: Diabetes Comorbidity  Goal: Blood Glucose Level Within Targeted Range  Outcome: Ongoing, Progressing     Problem: Fluid and Electrolyte Imbalance (Acute Kidney Injury/Impairment)  Goal: Fluid and Electrolyte Balance  Outcome: Ongoing, Progressing     Problem: Oral Intake Inadequate (Acute Kidney Injury/Impairment)  Goal: Optimal Nutrition Intake  Outcome: Ongoing, Progressing     Problem: Renal Function Impairment (Acute Kidney Injury/Impairment)  Goal: Effective Renal Function  Outcome: Ongoing, Progressing     Problem: Fall Injury Risk  Goal: Absence of Fall and Fall-Related Injury  Outcome: Ongoing, Progressing     Problem: Skin Injury Risk Increased  Goal: Skin Health and Integrity  Outcome: Ongoing, Progressing    Patient's heart rate was 43 bpm, MD urias informed and as per him to hold the due metoprolol. Instructed patient to seek for assistance as needed, emphasized adequate rest and sleep, intake and output monitored, fall prevention observed, cared and monitored continuously.

## 2023-06-20 NOTE — ASSESSMENT & PLAN NOTE
He was recently seen in clinic by Dr. Ledezma for TAVR eval but deemed to frail and recommended prehab at home and to return to clinic in 2 months for  RCA angiography and possible intervention and iliac and subclavian IVUS. If he failed to prehab then he was going to be considered for BAV as bridge to TAVR or destination therapy.      The patient has undergone the following TAVR work-up:    ECHO (Date 4/24/23): SIVAN= 0.46 cm2, MG= 50mmHg, Peak Rojas= 4.4 m/s, EF= 55%.    LHC (Date 5/22/23): LM and RCA stenosis     STS: 8%    Frailty: 4/4    Iliacs are    LVOT area by CTA is    Incidental findings on CT:    CT Surgery risk assessment: inoperable risk, per Dr Madrigal   Rhythm issues: 1st deg AV block    PFTs: deferred   KCCQ/5 meter walk:    Comorbidities: age, CAD, Afib     Recs:   - PT/OT  - outpatient follow-up with Dr. Ledezma

## 2023-06-20 NOTE — ASSESSMENT & PLAN NOTE
Pt with multiple risk factors for CAD. He sees Dr. Ledezma as an outpatient, and had a recent angiogram done in 5/22/23 showing 70% distal left main, 80-90% LAD D1, Lcx OM1 80%.  Continuing evaluation for revascularization.    - Continue medical management with ASA and pravastatin

## 2023-06-20 NOTE — PLAN OF CARE
Patient sitting up at edge of bed awake and alert, no signs of distress noted, denies discomfort at present, patient's medication adjusted due to heart rate in 40's, pt received a bolus of LR, will continue to observe.  Problem: Adult Inpatient Plan of Care  Goal: Plan of Care Review  Outcome: Ongoing, Progressing  Flowsheets (Taken 6/20/2023 1825)  Plan of Care Reviewed With: patient     Problem: Fluid and Electrolyte Imbalance (Acute Kidney Injury/Impairment)  Goal: Fluid and Electrolyte Balance  Outcome: Ongoing, Progressing  Intervention: Monitor and Manage Fluid and Electrolyte Balance  Flowsheets (Taken 6/20/2023 1825)  Fluid/Electrolyte Management: fluids provided

## 2023-06-20 NOTE — ASSESSMENT & PLAN NOTE
Due to age and cardiac disease, pt with significant physical debility.    - Completed rehab after prior episodes of AHF, will discharge with home health PT/OT. If he has prolonged hospital stay, will re-consult PT/OT

## 2023-06-20 NOTE — ASSESSMENT & PLAN NOTE
89M with history of CAD, severe AS, AF, and HFpEF, presenting with lower extremity edema, and cough with elevated BNP and CXR showing pulmonary edema, concerning for acute on chronic congestive HF. Pt with multiple presentations and admissions for acute CHF exacerbations in the last two months. Recently seen for TAVR and revascularization evaluation, however deemed a poor candidate, requiring further rehab to improve physical status. On interview, unclear if patient has been taking medications appropriately. Repeat Echo appears similar to previous.    Plan:  - Cardiology consulted, appreciate assistance with management of ADHF in setting of severe aortic stenosis, given concern that patient is preload dependent and may need further support to maintain cardiac output.  - Initially on IV lasix 40mg bid, now holding in the setting of worsening KAT. Weight based prn lasix per cardiology.  - Held Losartan in setting of KAT, will start Entresto when Cr trending down  - Strict I/O's, daily weights  - Fluid restriction, 1.5L and low Na diet  - Monitor electrolytes with daily CMP, maintain Mag > 2 and K > 4

## 2023-06-20 NOTE — HOSPITAL COURSE
Mr. Urias is an 89 y/o M with hx of severe aortic stenosis, CHF, AF, CKD3, PAD, DVT, COPD who presented for worsening swelling and dyspnea w/ cough. He was recently discharged from rehab after multiple hospitalizations for CHF failure over the past 2 months. Unclear about his medication compliance or fluid consumption at home. Patient was started on IV Lasix 40mg for gentle diuresis due to his severe AS. He was evaluated by interventional cardiology and general cardiology who recommended continued diuresis without acute valve interventions at this time. He will need additional rehab at home prior to further interventions. Currently trending his kidney function and troponins which were downtrending. Patient appeared more euvolemic and KAT and BUN was elevated from baseline 2/2 diuresis. Discontinued lasix and gave 500cc NS. Cr started downtrending. PT/OT recommended SNF but patient and family wanted home with home health. Patient was discharged with instructions to recheck BMP on Friday and patient has follow up with his PCP tomorrow. Patient will also follow up with Interventional cardiology outpatient for aortic stenosis.

## 2023-06-20 NOTE — ASSESSMENT & PLAN NOTE
Patient with stage 3 CKD, baseline Creatinine as low as 1.5, however appears to have presented with Cr frequently in the 2 range. Cr on admission is 2.0. KAT on top of CKD, likely pre-renal due to decreased perfusion 2/2 acute heart failure and cardiorenal syndrome.    - Gentle diuresis. Switched from IV to po, holding for worsening KAT. Will give 500 mL LR in setting of worsening KAT  - Hold Entresto until Cr improving  - Daily CMPs  - Strict I&Os  - Avoid nephrotoxic agents such as NSAIDs, gadolinium and IV radiocontrast.  - Renally dose meds to current GFR.

## 2023-06-20 NOTE — SUBJECTIVE & OBJECTIVE
Interval History: No acute events overnight, patient was again bradycardic so his metoprolol was held. He denies any symptoms, states he feels better. Denies lightheadedness, dizziness, chest pain, palpitations, SOB.    Review of Systems   Constitutional:  Negative for chills, fatigue and fever.   HENT:  Negative for congestion and sore throat.    Respiratory:  Positive for cough. Negative for shortness of breath.    Cardiovascular:  Negative for chest pain, palpitations and leg swelling.   Gastrointestinal:  Negative for abdominal pain, constipation, diarrhea, nausea and vomiting.   Genitourinary:  Negative for dysuria and flank pain.   Musculoskeletal:  Negative for joint swelling.   Neurological:  Negative for dizziness and headaches.   Psychiatric/Behavioral:  Negative for confusion. The patient is not nervous/anxious.    Objective:     Vital Signs (Most Recent):  Temp: 97.8 °F (36.6 °C) (06/20/23 0731)  Pulse: (!) 52 (06/20/23 0817)  Resp: 18 (06/20/23 0817)  BP: (!) 128/58 (06/20/23 0731)  SpO2: 96 % (06/20/23 0817) Vital Signs (24h Range):  Temp:  [96.4 °F (35.8 °C)-98 °F (36.7 °C)] 97.8 °F (36.6 °C)  Pulse:  [40-65] 52  Resp:  [16-20] 18  SpO2:  [93 %-96 %] 96 %  BP: (110-128)/(53-75) 128/58     Weight: 77.7 kg (171 lb 5.8 oz)  Body mass index is 26.83 kg/m².    Intake/Output Summary (Last 24 hours) at 6/20/2023 0848  Last data filed at 6/20/2023 0509  Gross per 24 hour   Intake 996 ml   Output 1300 ml   Net -304 ml         Physical Exam  HENT:      Head: Normocephalic.      Nose: Nose normal.      Mouth/Throat:      Mouth: Mucous membranes are moist.   Eyes:      Pupils: Pupils are equal, round, and reactive to light.   Neck:      Comments: JVD to mid neck  Cardiovascular:      Rate and Rhythm: Normal rate.   Pulmonary:      Breath sounds: Rales present.   Abdominal:      General: Abdomen is flat.      Tenderness: There is no abdominal tenderness.   Musculoskeletal:      Cervical back: Neck supple.       Right lower leg: No edema.      Left lower leg: No edema.   Skin:     General: Skin is warm.      Capillary Refill: Capillary refill takes less than 2 seconds.   Neurological:      General: No focal deficit present.      Mental Status: He is alert and oriented to person, place, and time.   Psychiatric:         Mood and Affect: Mood normal.           Significant Labs: All pertinent labs within the past 24 hours have been reviewed.    Significant Imaging: I have reviewed all pertinent imaging results/findings within the past 24 hours.

## 2023-06-20 NOTE — ASSESSMENT & PLAN NOTE
TTE in April 2023 showed:   There is severe aortic valve stenosis.   Aortic valve area is 0.46 cm2; peak velocity is 4.4 m/s; mean gradient is 50 mmHg. Of note, gradients were measured following a post PVC beat.    Pt with symptoms of FRY, but denies any syncope or chest pain. Patient seen by Dr. Ledezma for TAVR evaluation, presently not a candidate, however noted that if pt continued to be hospitalized, may consider BAV as bridging therapy.    - Careful diuresis per cardiology.

## 2023-06-21 PROBLEM — E03.8 OTHER SPECIFIED HYPOTHYROIDISM: Status: ACTIVE | Noted: 2023-01-01

## 2023-06-21 PROBLEM — E03.8 OTHER SPECIFIED HYPOTHYROIDISM: Status: RESOLVED | Noted: 2023-01-01 | Resolved: 2023-01-01

## 2023-06-21 NOTE — TELEPHONE ENCOUNTER
----- Message from Celeste Downs sent at 6/21/2023  3:09 PM CDT -----  Type: Needs Medical Advice  Who Called:  pt wife mariza     Best Call Back Number: [phone]    Additional Information: requesting a call back , pt is getting discharged from the hospital please advise thank you

## 2023-06-21 NOTE — PT/OT/SLP EVAL
Physical Therapy Evaluation    Patient Name:  Siddharth Urias   MRN:  [id number]  Admit Date: 6/18/2023  Admitting Diagnosis:  Acute on chronic diastolic heart failure  Length of Stay: 2 days  Recent Surgery: * No surgery found *      Recommendations:     Discharge Recommendations:  other (see comments) (defer to CM/SW)   Discharge Equipment Recommendations: none     Assessment:     Siddharth Urias is a 89 y.o. male admitted with a medical diagnosis of Acute on chronic diastolic heart failure. Pt found alert and cooperative. Pt is [place]. Pt able to ambulate in the hallway with a RW and one person assistance and in the room with no AD and one person assistance. Pt demo'd no LOBs.      Problem List: impaired endurance, impaired balance, impaired cardiopulmonary response to activity, weakness  Rehab Prognosis: Good; patient would benefit from acute skilled PT services to address these deficits and reach maximum level of function.      Plan:     During this hospitalization, patient to be seen 3 x/week to address the identified rehab impairments via gait training, therapeutic activities, therapeutic exercises, neuromuscular re-education and progress towards the established goals.    Plan of Care Expires:  07/20/23    Subjective   Communicated with RN prior to session.  Patient found HOB elevated upon PT entry to room, agreeable to evaluation. Siddharth Urias's alone during session.    Chief Complaint: Leg Swelling (Wife is concerned due to b/l leg swelling. )    Patient/Family Comments/goals: to get better  Pain/Comfort:  Pain Rating 1: 0/10  Pain Rating Post-Intervention 1: 0/10    Living Environment:  Living Environment: Pt lives with his wife in a [facility], I HOME, no rails, has both walk in shower and tub/shower combo, raised toilet seat.  Previous level of function: Ind  Roles and Routines: Retired   Equipment Used at Home: walker, standard, wheelchair, cane, straight, 3-in-1 commode, shower  "chair  Assistance upon Discharge: TBD    Objective:   Patient found with: telemetry, peripheral IV     General Precautions: Standard, Cardiac other (see comments) (defer to CM/SW)   Orthopedic Precautions:N/A   Braces: N/A   Oxygen Device: Room Air  Vitals: BP (!) 98/49   Pulse 95 Comment: Simultaneous filing. User may not have seen previous data.  Temp 97.7 °F (36.5 °C) (Oral)   Resp 18   Ht 5' 7.01" (1.702 m)   Wt 77.1 kg (169 lb 15.6 oz)   SpO2 97%   BMI 26.62 kg/m²     Exams:  Cognition:   Alert and Cooperative  Ox4  Command following: Follows multistep  commands  Fluency: clear/fluent    RLE ROM: WFL  RLE Strength: grossly 4+/5  LLE ROM: WFL  LLE Strength: grossly 4+/5       Outcome Measures:  AM-PAC 6 CLICK MOBILITY  Turning over in bed (including adjusting bedclothes, sheets and blankets)?: 3  Sitting down on and standing up from a chair with arms (e.g., wheelchair, bedside commode, etc.): 3  Moving from lying on back to sitting on the side of the bed?: 3  Moving to and from a bed to a chair (including a wheelchair)?: 3  Need to walk in hospital room?: 3  Climbing 3-5 steps with a railing?: 3  Basic Mobility Total Score: 18     Functional Mobility:  Additional staff present: OT  Bed Mobility:  Supine to Sit: stand by assistance; HOB elevated  Scooting anteriorly to EOB to have both feet planted on floor: stand by assistance      Sitting Balance at Edge of Bed:  Assistance Level Required: Stand-by Assistance    Transfers:   Sit <> Stand Transfer: stand by assistance with RW from EOB; SBA with no AD from EOB      Gait:   Patient ambulated: 130ft using RW in hallway+ 20ft using no AD in room (seated rest break between trials)   Patient required: standy by assistance  Gait Pattern observed: reciprocal gait  Gait Deviation(s): decreased step length, narrow base of support, and decreased rosario  Impairments due to:  generalized age related deficits   Comments:   No LOB  No SOB  Pt demo'd safe use of RW and " safe in room ambulation with no AD  Verbal cuing for upright posture and attention to task     Therapeutic Activities, Exercises, & Education:   Educated pt on PT role/POC  Educated pt on importance of OOB activity and daily ambulation   Pt verbalized understanding     T/f to chair to increase tolerance to OOB activity and to create optimal positioning for lung expansion     Patient left up in chair with all lines intact, call button in reach, and RN notified.    GOALS:   Multidisciplinary Problems       Physical Therapy Goals          Problem: Physical Therapy    Goal Priority Disciplines Outcome Goal Variances Interventions   Physical Therapy Goal     PT, PT/OT Ongoing, Progressing     Description: Goals to be met by: 2023    Patient will increase functional independence with mobility by performin. Supine to sit with Supervision  2. Sit to stand transfer with Supervision using LRAD  3. Gait  x 150 feet with Supervision using LRAD.                          History:     Past Medical History:   Diagnosis Date    Afib     Arthritis     Choledocholithiasis     Chronic kidney disease     Colon polyp     COPD (chronic obstructive pulmonary disease)     Diabetes mellitus, type 2     2011    Diverticulosis     Fatty liver     Hepatomegaly     History of blood clots      Dr. Rodriguez     Irradiation cystitis     Prostate cancer     s/p XRT (T1C, [place] 8)    Radiation proctitis     Sleep apnea        Past Surgical History:   Procedure Laterality Date    ANGIOGRAM, CORONARY, WITH LEFT HEART CATHETERIZATION N/A 2022    Procedure: Angiogram, Coronary, with Left Heart Cath;  Surgeon: Kamlesh Sullivan MD;  Location: [facility] CATH/EP LAB;  Service: Cardiology;  Laterality: N/A;    APPENDECTOMY      CARPAL TUNNEL RELEASE Left 2021    Procedure: RELEASE, CARPAL TUNNEL;  Surgeon: Jayro Hawkins II, MD;  Location: [facility];  Service: Orthopedics;  Laterality: Left;    CARPAL TUNNEL RELEASE Right 3/1/2023     Procedure: RELEASE, CARPAL TUNNEL;  Surgeon: Shanu Leonardo MD;  Location: [facility] OR;  Service: Orthopedics;  Laterality: Right;    CATARACT EXTRACTION Bilateral     COLONOSCOPY N/A 12/14/2018    Procedure: COLONOSCOPY;  Surgeon: Noel Aguiar MD;  Location: [facility] ENDO;  Service: Endoscopy;  Laterality: N/A;    COLONOSCOPY  06/21/2021    Dr. Rodgers, in media: 2 colon polyps removed; diverticulosis, erythema in transverse colon; biopsy: tubular adenomas, ranomd colon unremarkable    COLONOSCOPY N/A 11/4/2022    Procedure: COLONOSCOPY;  Surgeon: Noel Aguiar MD;  Location: [facility] ENDO;  Service: Endoscopy;  Laterality: N/A;    CORONARY ANGIOGRAPHY N/A 5/22/2023    Procedure: ANGIOGRAM, CORONARY ARTERY;  Surgeon: Xavier Solis MD;  Location: [facility] CATH/EP LAB;  Service: Cardiology;  Laterality: N/A;    ERCP N/A 11/23/2021    Procedure: ERCP (ENDOSCOPIC RETROGRADE CHOLANGIOPANCREATOGRAPHY);  Surgeon: Marshall Gardner III, MD;  Location: [facility];  Service: Endoscopy;  Laterality: N/A;    ESOPHAGOGASTRODUODENOSCOPY N/A 01/24/2019    Procedure: EGD (ESOPHAGOGASTRODUODENOSCOPY);  Surgeon: Noel Aguiar MD;  Location: [facility];  Service: Endoscopy;  Laterality: N/A;    EYE SURGERY      FLEXIBLE SIGMOIDOSCOPY N/A 01/22/2019    Procedure: SIGMOIDOSCOPY, FLEXIBLE;  Surgeon: Noel Aguiar MD;  Location: [facility];  Service: Endoscopy;  Laterality: N/A;    HERNIA REPAIR      bilateral inguinal    LAPAROSCOPIC CHOLECYSTECTOMY N/A 11/24/2021    Procedure: CHOLECYSTECTOMY, LAPAROSCOPIC;  Surgeon: Jay Cabrera Jr., MD;  Location: [facility] OR;  Service: General;  Laterality: N/A;    TONSILLECTOMY         Time Tracking:     PT Received On: 06/21/23  PT Start Time: 1413     PT Stop Time: 1424  PT Total Time (min): 11 min     Billable Minutes: Evaluation 11

## 2023-06-21 NOTE — NURSING
The PCT came to me and stated she was Unable to get pt's BP. My unit Director came and took pt's BP manually and it was 76/45. Pt asymptomatic and he denied chest pain. Informed Dr. Jewel Kim and he came and assessed the pt . BP repeated with reading 98/50. Pt remained asymptomatic. MD said it is ok to discharge the pt. Waiting for pt's son to come pick him up. Will continue to monitor.

## 2023-06-21 NOTE — PLAN OF CARE
Problem: Adult Inpatient Plan of Care  Goal: Plan of Care Review  Outcome: Adequate for Care Transition  Goal: Patient-Specific Goal (Individualized)  Outcome: Adequate for Care Transition  Goal: Absence of Hospital-Acquired Illness or Injury  Outcome: Adequate for Care Transition  Goal: Optimal Comfort and Wellbeing  Outcome: Adequate for Care Transition  Goal: Readiness for Transition of Care  Outcome: Adequate for Care Transition     Problem: Diabetes Comorbidity  Goal: Blood Glucose Level Within Targeted Range  Outcome: Adequate for Care Transition     Problem: Fluid and Electrolyte Imbalance (Acute Kidney Injury/Impairment)  Goal: Fluid and Electrolyte Balance  Outcome: Adequate for Care Transition     Problem: Oral Intake Inadequate (Acute Kidney Injury/Impairment)  Goal: Optimal Nutrition Intake  Outcome: Adequate for Care Transition     Problem: Renal Function Impairment (Acute Kidney Injury/Impairment)  Goal: Effective Renal Function  Outcome: Adequate for Care Transition     Problem: Fall Injury Risk  Goal: Absence of Fall and Fall-Related Injury  Outcome: Adequate for Care Transition     Problem: Skin Injury Risk Increased  Goal: Skin Health and Integrity  Outcome: Adequate for Care Transition   Pt is A,A,O x 4. Stable V/S. No C/O pain or discomfort. Pt ambulated to the bathroom with assist and sat on the chair during the day. Pt remained free of falls and injuries during the day. Pt is medically stable and ready for discharge to home per MD order.

## 2023-06-21 NOTE — PROGRESS NOTES
Pt's HR early this morning @ 0754  was 46, MD notified and he ordered to hold the Corage dose. Then around 0853 pt's HR came up to 178 when he went to the bathroom and had a BM and after he got back to the bed his HR was 104 . Dr. Preston Moreno notified and  he order to give the dose of corage that was held earlier. Corage gvien as ordered and pt's HR came down to 93.   @ 1109 Pt's BP 98/49 with MAP 69 and HR 79 pt asymptomatic , MD notified and no new orders at this time. will continue to monitor.

## 2023-06-21 NOTE — NURSING
Discharge papers and instructions given and reviewed with pt ,and his son at the bedside and they verbalized understanding . Questions and concerns addressed. Peripheral IV access removed and pressure applied to the site. Tele box and leads removed and returned to the nursing station. Pt left the floor on wheelchair with his family going to home.

## 2023-06-21 NOTE — PLAN OF CARE
Per medical team, pt is medically ready for d/c today.  SW sent HH orders to [facility].  Pt is current.  Pt and family not interested in SNF facility at this time.  Pt would like to resume HH with [facility].  Referral for OPCM placed.  Pt's family will provide transportation home.       06/21/23 0670   Post-Acute Status   Post-Acute Authorization Home Health   HME Status   ([facility])   Home Health Status Referrals Sent   Coverage Medicare A & B   Discharge Delays None known at this time   Discharge Plan   Discharge Plan A Home;Home with family;Home Health;Other  (Referral for OPCM)   Discharge Plan B Home;Home with family     Cony Colorado LMSW  PRN-  [facility]  Ext. [phone]

## 2023-06-21 NOTE — PLAN OF CARE
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 [I50.9, I38]  Yes    Deep vein thrombosis (DVT) of lower extremity [I82.409]  Yes    BPH (benign prostatic hyperplasia) [N40.0]  Unknown    ACP (advance care planning) [Z71.89]  Not Applicable    Frail elderly [R54]  Yes    Leukocytosis [D72.829]  Yes    Gout attack [M10.9]  Yes    PAF (paroxysmal atrial fibrillation) [I48.0]  Yes    PAD (peripheral artery disease) [I73.9]  Yes    Aortic stenosis, severe [I35.0]  Yes    Acute renal failure superimposed on stage 3 chronic kidney disease [N17.9, N18.30]  Yes    COPD (chronic obstructive pulmonary disease) [J44.9]  Yes    Type 2 diabetes mellitus, controlled, 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  Not taking meds or wearing oxygen          Follow Up Appointments:  Future Appointments   Date Time Provider Department Center   6/22/2023  3:00 PM Martine Maxwell MD [facility] [place]   7/13/2023  3:00 PM Martine Maxwell MD [facility] [place]   9/8/2023  8:30 AM Jorge Sabillon PA-C [facility]       Allergies:  Review of patient's allergies indicates:   Allergen Reactions    No known drug allergies        Medications: Review discharge medications with patient and  family and provide education.    Current Facility-Administered Medications   Medication Dose Route Frequency Provider Last Rate Last Admin    acetaminophen tablet 650 mg  650 mg Oral Q4H PRN Kareem Mcfarlane MD        albuterol inhaler 2 puff  2 puff Inhalation Q6H PRN Kareem Mcfarlane MD        allopurinol split tablet 50 mg  50 mg Oral Daily Kareem Mcfarlane MD   50 mg at 06/21/23 0840    apixaban tablet 10 mg  10 mg Oral BID Jody Sleem, DO   10 mg at 06/21/23 0840    [START ON 6/26/2023] apixaban tablet 5 mg  5 mg Oral BID Jody Sleem, DO        aspirin chewable tablet 81 mg  81 mg Oral Daily Kareem Mcfarlane MD   81 mg at 06/21/23 0840    carvediloL tablet 3.125 mg  3.125 mg Oral BID Narda Milton,         dextrose 10% bolus 125 mL 125 mL  12.5 g Intravenous PRN Kareem Mcfarlane MD        dextrose 10% bolus 250 mL 250 mL  25 g Intravenous PRN Kareem Mcfarlane MD        dextrose 40 % gel 15,000 mg  15 g Oral PRN Kareem Mcfarlane MD        dextrose 40 % gel 30,000 mg  30 g Oral PRN Kareem Mcfarlane MD        fluticasone furoate-vilanteroL 100-25 mcg/dose diskus inhaler 1 puff  1 puff Inhalation Daily Kareem Mcfarlane MD   1 puff at 06/20/23 0931    glucagon (human recombinant) injection 1 mg  1 mg Intramuscular PRN Kareem Mcfarlane MD        melatonin tablet 6 mg  6 mg Oral Nightly PRN Kareem Mcfarlane MD        naloxone 0.4 mg/mL injection 0.02 mg  0.02 mg Intravenous PRN Kareem Mcfarlane MD        pravastatin tablet 40 mg  40 mg Oral Daily Kareem Mcfarlane MD   40 mg at 06/21/23 0840    sodium chloride 0.9% flush 10 mL  10 mL Intravenous Q12H PRN Kareem Mcfarlane MD        tamsulosin 24 hr capsule 0.4 mg  1 capsule Oral Daily Kareem Mcfarlane MD   0.4 mg at 06/21/23 0841    tiotropium bromide 2.5 mcg/actuation inhaler 2 puff  2 puff Inhalation Daily Kareem Mcfarlane MD         Current Discharge Medication List        START taking these medications    Details   carvediloL (COREG) 3.125 MG tablet Take 1 tablet (3.125 mg total) by mouth 2 (two) times daily.  Qty: 60 tablet,  Refills: 11    Comments: .           CONTINUE these medications which have CHANGED    Details   apixaban (ELIQUIS) 5 mg Tab Take 2 tablets (10 mg total) by mouth 2 (two) times daily for 4 days, THEN 1 tablet (5 mg total) 2 (two) times daily.  Qty: 196 tablet, Refills: 0      furosemide (LASIX) 40 MG tablet Take 0.5 tablets (20 mg total) by mouth daily as needed. Take as needed for weight gain/swelling of 4-5 lbs. If inadequate, take again in 6-8 hours.           CONTINUE these medications which have NOT CHANGED    Details   albuterol (PROVENTIL/VENTOLIN HFA) 90 mcg/actuation inhaler INHALE 2 PUFFS INTO THE LUNGS EVERY 6 HOURS AS NEEDED FOR WHEEZE  Qty: 18 g, Refills: 1    Associated Diagnoses: COPD exacerbation; Chronic obstructive pulmonary disease, unspecified COPD type      allopurinoL (ZYLOPRIM) 100 MG tablet Take 2 tablets (200 mg total) by mouth once daily.  Qty: 60 tablet, Refills: 2      aspirin 81 MG Chew Take 81 mg by mouth once daily.      colestipoL (COLESTID) 1 gram Tab TAKE 1 TABLET BY MOUTH 2 TIMES DAILY. TAKE OTHER ORAL MEDICATIONS >1H BEFORE OR >4H AFTER COLESTIPOL  Qty: 180 tablet, Refills: 1    Associated Diagnoses: Chronic diarrhea; S/P cholecystectomy      fluticasone-umeclidin-vilanter (TRELEGY ELLIPTA) 100-62.5-25 mcg DsDv Inhale 1 puff into the lungs once daily.  Qty: 180 each, Refills: 3    Associated Diagnoses: Chronic obstructive pulmonary disease, unspecified COPD type      losartan (COZAAR) 25 MG tablet Take 1 tablet (25 mg total) by mouth once daily.  Qty: 90 tablet, Refills: 3    Comments: Hold for next 3 days  Associated Diagnoses: Hypertension associated with diabetes      pantoprazole (PROTONIX) 40 MG tablet Take 1 tablet (40 mg total) by mouth once daily.  Qty: 90 tablet, Refills: 3    Associated Diagnoses: Gastroesophageal reflux disease without esophagitis      pravastatin (PRAVACHOL) 40 MG tablet Take 1 tablet (40 mg total) by mouth once daily.  Qty: 90 tablet, Refills: 3       solifenacin (VESICARE) 5 MG tablet Take 1 tablet (5 mg total) by mouth once daily.  Qty: 90 tablet, Refills: 3    Associated Diagnoses: Radiation induced proctitis; Prostate cancer      tamsulosin (FLOMAX) 0.4 mg Cap Take 1 capsule (0.4 mg total) by mouth once daily.  Qty: 90 capsule, Refills: 3    Associated Diagnoses: Radiation induced proctitis      traZODone (DESYREL) 50 MG tablet Take 1 tablet (50 mg total) by mouth nightly as needed for Insomnia.  Qty: 90 tablet, Refills: 3      miconazole NITRATE 2 % (MICOTIN) 2 % top powder Apply topically 2 (two) times daily. Apply to groin twice a day x1 week for 7 days  Qty: 71 g, Refills: 0           STOP taking these medications       potassium chloride SA (K-DUR,KLOR-CON) 20 MEQ tablet Comments:   Reason for Stopping:         doxycycline (VIBRA-TABS) 100 MG tablet Comments:   Reason for Stopping:         metoprolol succinate (TOPROL-XL) 25 MG 24 hr tablet Comments:   Reason for Stopping:                 I have seen and examined this patient within the last 30 days. My clinical findings that support the need for the home health skilled services and home bound status are the following:no   Weakness/numbness causing balance and gait disturbance due to Heart Failure and Weakness/Debility making it taxing to leave home.     Diet:   cardiac diet    Labs:  [facility] on 6/23/2023  Report Lab results to PCP.    Referrals/ Consults  Physical Therapy to evaluate and treat. Evaluate for home safety and equipment needs; Establish/upgrade home exercise program. Perform / instruct on therapeutic exercises, gait training, transfer training, and Range of Motion.  Occupational Therapy to evaluate and treat. Evaluate home environment for safety and equipment needs. Perform/Instruct on transfers, ADL training, ROM, and therapeutic exercises.    Activities:   activity as tolerated    Nursing:   Agency to admit patient within 24 hours of hospital discharge unless specified on physician order or  at patient request    SN to complete comprehensive assessment including routine vital signs. Instruct on disease process and s/s of complications to report to MD. Review/verify medication list sent home with the patient at time of discharge  and instruct patient/caregiver as needed. Frequency may be adjusted depending on start of care date.     Skilled nurse to perform up to 3 visits PRN for symptoms related to diagnosis    Notify MD if SBP > 160 or < 90; DBP > 90 or < 50; HR > 120 or < 50; Temp > 101; O2 < 88%;    Ok to schedule additional visits based on staff availability and patient request on consecutive days within the home health episode.    When multiple disciplines ordered:    Start of Care occurs on Sunday - Wednesday schedule remaining discipline evaluations as ordered on separate consecutive days following the start of care.    Thursday SOC -schedule subsequent evaluations Friday and Monday the following week.     Friday - Saturday SOC - schedule subsequent discipline evaluations on consecutive days starting Monday of the following week.    For all post-discharge communication and subsequent orders please contact patient's primary care physician. If unable to reach primary care physician or do not receive response within 30 minutes, please contact Dr. Moreno for clinical staff order clarification    Miscellaneous   Heart Failure:      SN to instruct on the following:    Instruct on the definition of CHF.   Instruct on the signs/sympoms of CHF to be reported.   Instruct on and monitor daily weights.   Instruct on factors that cause exacerbation.   Instruct on action, dose, schedule, and side effects of medications.   Instruct on diet as prescribed.   Instruct on activity allowed.   Instruct on life-style modifications for life long management of CHF   SN to assess compliance with daily weights, diet, medications, fluid retention,    safety precautions, activities permitted and life-style  modifications.   Additional 1-2 SN visits per week as needed for signs and symptoms     of CHF exacerbation.      For Weight Gain > 2-3 lbs in 1 day or 4-6 lbs over 1 week notify PCP:  CHF DIURETIC SLIDING SCALE     Skilled nurse visits daily to instruct and monitor medication adherence until target weight. Then resume prior order frequency.     If weight gain exceeds 5 lbs over target weight, call MD  If weight gain of 3-4 lbs over target weight, then:     Increase Furosemide - current dose (mg/day) to 80 double dose and frequency of daily until target weight achieved or maximum 3 days.     If already on max oral daily dose, see IV diuretic instructions.    After 3 days of increased oral diuretic dose, get BMP. If patient is on increased oral diuretic dose greater than 5 days, repeat BMP at day 7 of increased dose.     Potassium supplementation   Scr > 1.5 mg/dl  Scr  1.5 mg/dl    K < 3.0 - NOTIFY MD and 40 mEq bid  40 mEq tid   K- 3.1-3.3  20 mEq bid  20 mEq tid    K 3.4-3.7  10 mEq bid  10 mEq tid      If target weight not reached after 5 days of increased oral diuretic, proceed to IV diuretic with daily patient contact to include face-to-face visit and telephone encounters.       Home Health Aide:  Physical Therapy Three times weekly and Occupational Therapy Three times weekly    Wound Care Orders  no    I certify that this patient is confined to his home and needs physical therapy and occupational therapy.

## 2023-06-21 NOTE — PROGRESS NOTES
"[facility]) nurse navigator notified of HFTCC candidate in need of education and introduction to 4-6 week program.      PT aao x 3 while lying in bed,  ANJELICA. Introduced self to pt as HFTCC nurse navigator.     Patient given "Heart Failure Transitional Care Clinic Home Care Guide" which includes "Daily weight and symptom tracker".  Encouraged pt to review information.      Reviewed the following key points of HFTCC program with pt and family:   1.) Take your medications as directed.    2.) Weight yourself daily   3.) Follow low salt and limited fluid diet.    4.) Stop smoking and start exercising   5.) Go to your appointments and call your team.      Pt reminded to follow Symptom tracker and to call at the onset of symptoms according to tracker.     Reviewed plan for follow up once discharged to include phone calls, in person and virtual visits to assist pt optimizing their heart failure medication regimen and encouraging healthy lifestyle modifications.  Reminded pt that program will assist them over the next 4-6 weeks and then patient will be transferred to long term care provider .  Reminded pt how to contact HFTCC navigator via phone and or via [employer].     Pt instructed appointment will be printed on hospital discharge paperwork.     Pt also reminded RN will call 48-72 hours after discharge to check on them.     Pt verbalized read back of information given.  Encouraged pt and family to read over information often and contact team with any questions or concerns.    "

## 2023-06-21 NOTE — PLAN OF CARE
Problem: Physical Therapy  Goal: Physical Therapy Goal  Description: Goals to be met by: 2023    Patient will increase functional independence with mobility by performin. Supine to sit with Supervision  2. Sit to stand transfer with Supervision using LRAD  3. Gait  x 150 feet with Supervision using LRAD.     Outcome: Ongoing, Progressing     Eval completed. Goals appropriate.

## 2023-06-21 NOTE — DISCHARGE SUMMARY
[facility]  Discharge Summary      Patient Name: Siddharth Urias  MRN: [id number]  ELVA: [id number]  Patient Class: IP- Inpatient  Admission Date: 6/18/2023  Hospital Length of Stay: 2 days  Discharge Date and Time:  06/21/2023 3:42 PM  Attending Physician: Jody Brown DO   Discharging Provider: Preston Moreno MD  Primary Care Provider: Martine Maxwell MD  [facility] Medicine Team: [facility] 5 Preston Moreno MD  Primary Care Team: [facility] 5    HPI:   Siddharth Urias is an 89 year old man with history of severe aortic stenosis, CHF, AF, CKD3, PAD, DVT, and COPD who presents with bilateral lower extremity edema and cough. Patient was brought in by wife after she noticed that his legs and feet were becoming progressively more swollen over the past week. Pt denies any significant respiratory symptoms however his son reports that the pt had had recent episode of productive cough with clear sputum. He was recently hospitalized 4 times for acute on chronic congestive heart failure over the last two months, consisting of shortness of breath and lower extremity swelling, receiving diuresis each time. On Echo done in April 2023, it showed he had severe aortic stenosis. He was see by Dr. Ledezma on 6/12 for TAVR evaluation, and on that visit, he was deemed to not be a candidate for TAVR in his present physical condition. Dr. Ledezma recommended that he continue prehab, but also commented on consideration of BAV as bridging therapy if patient continued to undergo hospitalization. Patient does not know exactly which medications he is taking at home, and is not sure if he is taking his Lasix as directed though recognizes the name. He denies any recent fevers, chills, chest pain, chest tightness, SOB at rest, or urinary/bowel issues.    In the ED, patient was afebrile and initially hypotensive 89/50 but improved to 116/56 shortly after, satting well on room air. Labs were notable for WBC 15 and K  "5.4, and BNP 1220. Trop at 0.053. EKG with signs of acute ischemia. CXR showed some pulmonary vascular congestion when compared to prior CXRs. The ED deferred diuresis given pt's severe aortic stenosis. Patient was admitted to hospital medicine for management of acute decompensated heart failure.      * No surgery found *      Hospital Course:   Mr. Urias is an 89 y/o M with hx of severe aortic stenosis, CHF, AF, CKD3, PAD, DVT, COPD who presented for worsening swelling and dyspnea w/ cough. He was recently discharged from rehab after multiple hospitalizations for CHF failure over the past 2 months. Unclear about his medication compliance or fluid consumption at home. Patient was started on IV Lasix 40mg for gentle diuresis due to his severe AS. He was evaluated by interventional cardiology and general cardiology who recommended continued diuresis without acute valve interventions at this time. He will need additional rehab at home prior to further interventions. Currently trending his kidney function and troponins which were downtrending. Patient appeared more euvolemic and KAT and BUN was elevated from baseline 2/2 diuresis. Discontinued lasix and gave 500cc NS. Cr started downtrending. PT/OT recommended SNF but patient and family wanted home with home health. Patient was discharged with instructions to recheck BMP on Friday and patient has follow up with his PCP tomorrow. Patient will also follow up with Interventional cardiology outpatient for aortic stenosis.        Goals of Care Treatment Preferences:  Code Status: Full Code    BP (!) 98/49   Pulse 68   Temp 97.7 °F (36.5 °C) (Oral)   Resp 18   Ht 5' 7.01" (1.702 m)   Wt 77.1 kg (169 lb 15.6 oz)   SpO2 97%   BMI 26.62 kg/m²     Physical Exam  HENT:      Head: Normocephalic.      Nose: Nose normal.      Mouth/Throat:      Mouth: Mucous membranes are moist.   Eyes:      Pupils: Pupils are equal, round, and reactive to light.   Cardiovascular:      Rate " and Rhythm: Normal rate.   Pulmonary:      Breath sounds: Rales present.   Abdominal:      General: Abdomen is flat.      Tenderness: There is no abdominal tenderness.   Musculoskeletal:      Cervical back: Neck supple.      Right lower leg: No edema.      Left lower leg: No edema.   Skin:     General: Skin is warm.      Capillary Refill: Capillary refill takes less than 2 seconds.   Neurological:      General: No focal deficit present.      Mental Status: He is alert and oriented to person, place, and time.   Psychiatric:         Mood and Affect: Mood normal.     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 [I50.9, I38] 06/18/2023 Yes    Deep vein thrombosis (DVT) of lower extremity [I82.409] 06/18/2023 Yes    BPH (benign prostatic hyperplasia) [N40.0] 06/18/2023 Unknown    ACP (advance care planning) [Z71.89] 06/18/2023 Not Applicable    Frail elderly [R54] 05/18/2023 Yes    Leukocytosis [D72.829] 05/18/2023 Yes    Gout attack [M10.9] 05/09/2023 Yes    PAF (paroxysmal atrial fibrillation) [I48.0] 04/24/2023 Yes    PAD (peripheral artery disease) [I73.9] 07/09/2022 Yes    Aortic stenosis, severe [I35.0] 11/23/2021 Yes    Acute renal failure superimposed on stage 3 chronic kidney disease [N17.9, N18.30] 11/20/2021 Yes    COPD (chronic obstructive pulmonary disease) [J44.9] 12/11/2019 Yes    Type 2 diabetes mellitus, controlled, 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    Specialty: Family Medicine  Contact information:  [address] [facility] [place] [address]  [phone]                       Patient Instructions:      BASIC METABOLIC PANEL   Standing Status: Future Standing Exp. Date: 08/19/24     Ambulatory referral/consult to Heart Failure Transitional Care Clinic   Standing Status: Future   Referral Priority: Routine Referral Type: Consultation   Referral Reason: Specialty Services Required   Requested Specialty: Cardiology   Number of Visits Requested: 1     Ambulatory referral/consult to Interventional Cardiology   Standing Status: Future   Referral Priority: Routine Referral Type: Consultation   Referral Reason: Specialty Services Required   Referred to Provider: BEBETO VELA Requested Specialty: Interventional Cardiology   Number of Visits Requested: 1       Significant Diagnostic Studies: Labs: All labs within the past 24 hours have been reviewed    Pending Diagnostic Studies:     None         Medications:  Reconciled Home Medications:      Medication List      START taking these medications    carvediloL 3.125 MG tablet  Commonly known as: COREG  Take 1 tablet (3.125 mg total) by mouth 2 (two) times daily.        CHANGE how you take these medications    colestipoL 1 gram Tab  Commonly known as: COLESTID  TAKE 1 TABLET BY MOUTH 2 TIMES DAILY. TAKE OTHER ORAL MEDICATIONS >1H BEFORE OR >4H AFTER COLESTIPOL  What changed: See the new instructions.     ELIQUIS 5 mg Tab  Generic drug: apixaban  Take 2 tablets (10 mg total) by mouth 2 (two) times daily for 4 days, THEN 1 tablet  (5 mg total) 2 (two) times daily.  Start taking on: June 21, 2023  What changed: See the new instructions.     furosemide 40 MG tablet  Commonly known as: LASIX  Take 0.5 tablets (20 mg total) by mouth daily as needed. Take as needed for weight gain/swelling of 4-5 lbs. If inadequate, take again in 6-8 hours.  What changed:   · when to take this  · reasons to take this  · additional instructions        CONTINUE taking these medications    albuterol 90 mcg/actuation inhaler  Commonly known as: PROVENTIL/VENTOLIN HFA  INHALE 2 PUFFS INTO THE LUNGS EVERY 6 HOURS AS NEEDED FOR WHEEZE     allopurinoL 100 MG tablet  Commonly known as: ZYLOPRIM  Take 2 tablets (200 mg total) by mouth once daily.     aspirin 81 MG Chew  Take 81 mg by mouth once daily.     fluticasone-umeclidin-vilanter 100-62.5-25 mcg Dsdv  Commonly known as: TRELEGY ELLIPTA  Inhale 1 puff into the lungs once daily.     losartan 25 MG tablet  Commonly known as: COZAAR  Take 1 tablet (25 mg total) by mouth once daily.     miconazole NITRATE 2 % 2 % top powder  Commonly known as: MICOTIN  Apply topically 2 (two) times daily. Apply to groin twice a day x1 week for 7 days     pantoprazole 40 MG tablet  Commonly known as: PROTONIX  Take 1 tablet (40 mg total) by mouth once daily.     pravastatin 40 MG tablet  Commonly known as: PRAVACHOL  Take 1 tablet (40 mg total) by mouth once daily.     solifenacin 5 MG tablet  Commonly known as: VESICARE  Take 1 tablet (5 mg total) by mouth once daily.     tamsulosin 0.4 mg Cap  Commonly known as: FLOMAX  Take 1 capsule (0.4 mg total) by mouth once daily.     traZODone 50 MG tablet  Commonly known as: DESYREL  Take 1 tablet (50 mg total) by mouth nightly as needed for Insomnia.        STOP taking these medications    doxycycline 100 MG tablet  Commonly known as: VIBRA-TABS     metoprolol succinate 25 MG 24 hr tablet  Commonly known as: TOPROL-XL     potassium chloride SA 20 MEQ tablet  Commonly known as: K-DUR,KLOR-CON             Indwelling Lines/Drains at time of discharge:   Lines/Drains/Airways     None                 Time spent on the discharge of patient: 40 minutes         Preston Moreno MD  Department of Hospital Medicine  [facility]

## 2023-06-21 NOTE — PT/OT/SLP PROGRESS
"Occupational Therapy   Treatment    Name: Siddharth Urias  MRN: [id number]  Admitting Diagnosis:  Acute on chronic diastolic heart failure       Recommendations:     Discharge Recommendations: other (see comments)  Discharge Equipment Recommendations:  none  Barriers to discharge:  None    Assessment:     Siddharth Urias is a 89 y.o. male with a medical diagnosis of Acute on chronic diastolic heart failure.  He presents with the following performance deficits affecting function are impaired endurance, impaired balance, impaired cardiopulmonary response to activity, impaired self care skills, impaired functional mobility.     Rehab Prognosis:  Good; patient would benefit from acute skilled OT services to address these deficits and reach maximum level of function.       Plan:     Patient to be seen 3 x/week to address the above listed problems via self-care/home management, therapeutic activities, therapeutic exercises  Plan of Care Expires: 07/11/23  Plan of Care Reviewed with: patient    Subjective     Chief Complaint: "Oh yeah, I know when I need to use the walker."  Patient/Family Comments/goals: Return home  Pain/Comfort:  Pain Rating 1: 0/10  Pain Rating Post-Intervention 1: 0/10    Objective:     Communicated with: Nursing prior to session.  Patient found supine with telemetry, peripheral IV upon OT entry to room.    General Precautions: Standard, other (see comments)    Orthopedic Precautions:N/A  Braces: N/A  Respiratory Status: Room air     Occupational Performance:     Bed Mobility:    Patient completed Rolling/Turning to Right with stand by assistance  Patient completed Scooting/Bridging with stand by assistance  Patient completed Supine to Sit with stand by assistance     Functional Mobility/Transfers:  Patient completed Sit <> Stand Transfer with stand by assistance  with  rolling walker   Patient completed Bed <> Chair Transfer using Step Transfer technique with stand by assistance with rolling " walker  Functional Mobility: SBA with rolling walker >HH distance x 2 (130 ft)    Activities of Daily Living:  Grooming: stand by assistance seated edge of bed  Upper Body Dressing: stand by assistance seated edge of bed   Lower Body Dressing: stand by assistance seated edge of bed       [facility] 6 Click ADL: 21    Treatment & Education:  -Plan of care established.  -Patient and family educated on roles/goals of OT and POC.  -White board updated.  -Therapist provided time for questions and answered within scope of practice.  -Patient educated on importance of EOB/OOB activity to maximize recovery.     Patient left up in chair with all lines intact and call button in reach    GOALS:   Multidisciplinary Problems       Occupational Therapy Goals          Problem: Occupational Therapy    Goal Priority Disciplines Outcome Interventions   Occupational Therapy Goal     OT, PT/OT Ongoing, Progressing    Description: Goals to be met by: 7/21/23     Patient will increase functional independence with ADLs by performing:    UE Dressing with Modified [place].  LE Dressing with Modified [place].  Grooming while standing at sink with Modified [place].  Toileting from toilet with Modified [place] for hygiene and clothing management.   Supine to sit with Modified [place].  Step transfer with Modified [place]  Toilet transfer to toilet with Modified [place].                         Time Tracking:     OT Date of Treatment: 06/21/23  OT Start Time: 1411  OT Stop Time: 1424  OT Total Time (min): 13 min    Billable Minutes:Therapeutic Activity 13    OT/AYLEEN: OT          6/21/2023

## 2023-06-21 NOTE — TELEPHONE ENCOUNTER
Pt wife wanted called to make notification of pt's hospital stay no other needs communicated no further intervention needed at this time.   no

## 2023-06-21 NOTE — PLAN OF CARE
Plan of care established.    Problem: Occupational Therapy  Goal: Occupational Therapy Goal  Description: Goals to be met by: 7/21/23     Patient will increase functional independence with ADLs by performing:    UE Dressing with Modified [place].  LE Dressing with Modified [place].  Grooming while standing at sink with Modified [place].  Toileting from toilet with Modified [place] for hygiene and clothing management.   Supine to sit with Modified [place].  Step transfer with Modified [place]  Toilet transfer to toilet with Modified [place].    Outcome: Ongoing, Progressing

## 2023-06-21 NOTE — PLAN OF CARE
APPOINTMENT:    Patient Appointment(s) scheduled with Martine Maxwell MD, on Thursday Jun 22, 2023 3:00 PM

## 2023-06-21 NOTE — NURSING
08:14 PM - informed MD on call Dr. Gaby Fierro that the telemetry tech called me and raise her concerns about the finding on the tele monitor which was PVCs 4-5/ minute and bigeminy. Patient was assessed and as per him, he was okay. No chest pain or any complaints. BP was taken manually and referred to the MD on call. Coreg was on hold as per the MD since the HR was still on 40's. Patient was monitored continuously.

## 2023-06-22 NOTE — PLAN OF CARE
[facility] Med [facility]  Discharge Final Note    Primary Care Provider: Martine Maxwell MD    Expected Discharge Date: 6/21/2023    Pt and family refused SNF placement.  Pt current with [facility].  Referral for OPCM.  Pt's family provided transportation home.     Final Discharge Note (most recent)       Final Note - 06/21/23 2144          Final Note    Assessment Type Final Discharge Note     Anticipated Discharge Disposition Home-Health Care Svc   [facility] and OPCM    Hospital Resources/Appts/Education Provided Appointments scheduled and added to AVS        Post-Acute Status    Post-Acute Authorization Home Health     Home Health Status Set-up Complete/Auth obtained     Discharge Delays None known at this time                     Important Message from Medicare             Contact Info       Martine Maxwell MD   Specialty: Family Medicine   Relationship: PCP - [facility] [address] [place] [address]   Phone: [phone]       Next Steps: Follow up          Future Appointments   Date Time Provider Department Center   6/30/2023  1:30 PM LAB, APPOINTMENT [facility]   6/30/2023  2:00 PM Venus Meza PA-C [facility]   6/30/2023  2:00 PM [facility] HEART FAILURE NURSE [facility]   7/13/2023  3:00 PM Martine Maxwlel MD [facility] [place]   7/14/2023  3:00 PM Martine Maxwell MD [facility] [place]   9/8/2023  8:30 AM Jorge Sabillon PA-C [facility]     Cony [place] LMSW  PRN-  [facility]  Ext. [phone]

## 2023-06-23 NOTE — TELEPHONE ENCOUNTER
"Heart Failure Transitional Care Clinic(HFTCC) hospital discharge 48-72 hour phone follow up completed.     Most Recent Hospital Discharge Date: June 21, 2023  Last admission Diagnosis/chief complaint: SOB    TCC RN Navigator spoke with  Pt's wife, Ms. Enedina Urias    Current Patient reported weight: 171 lbs    Current Patient reported blood pressure and heart rate: RN did not assess VS at this time.     Pt reports the following:  [x]  Shortness of Breath with Activity  []  Shortness of Breath at rest   []  Fatigue  []  Edema   [] Chest pain or tightness  [] Weight Increase since discharge  [] None of the above    Medications:   Discharge medication reviewed with pt.  Pt reports having medication list available and has all medications at home for use per list.   Pt denies medication needs at this time.    Education:   Confirmed pt received "Home Care Guide for Heart Failure Patients" while admitted. Reviewed key points as listed below.     Recommend 2 -3 gram sodium restriction and 1500 cc-2000 cc fluid restriction.  Encourage physical activity with graded exercise program.  Requested patient to weigh themselves daily, and to notify us if their weight increases by more than 3 lbs in 1 day or 5 lbs in 3 days.   Reminded patient to use "Daily weight and symptom tracker" to record and guide patient on when and how to call HFTCC. PT may also use symptom tracker if no scale available  Pt reports being in the GREEN Zone. If in yellow/red, reminded that they should be calling HFTCC today or now.     Watch for these Signs and Symptoms: If any of these occur, contact HFTCC immediately:   Increase in shortness of breath with movement   Increase in swelling in your legs and ankles   Weight gain of more than 3 pounds in a day or 5 pounds in 3 days.   Difficulty breathing when you are lying down   Worsening fatigue or tiredness   Stomach bloating, a full feeling or a loss of appetite   Increased coughing--especially when " "you are lying down      Pt was able to verbalize back to RN in their own words correct diet/fluid restrictions, necessity for exercise, warning signs and symptoms, when and how to contact their TCC team.      Pt educated on follow-up plan while in HFTCC program to include:   Week 1 -  F/u appt with Provider and RN  June 30, 2023   Week 2-5 - In person/ Virtual/ phone call check ins    Week 5-7 - Pt will discharge from HFTCC and transition to longterm care provider (Cardiology/PCP/ Advanced Heart Failure).      Patient active on myChart?  Pt does not use My Chart often.     Rn spoke with pt's wife who expressed concerns about making it to appts. Pt's wife states she has children "who are spread out across the [place]" with no support system. RN will explore social options in the future.  Pt given the following contact information for ease of communication: [phone] (Mon-Fri, 8a-5p) & for urgent issues on the weekend to page the Heart Transplant MD on call.  Pt also encouraged utilize [facility] messaging as well.        Will follow up with pt at first clinic visit and RN navigator available for pt questions, issues or concerns.   "

## 2023-06-26 NOTE — PROGRESS NOTES
[facility] Provider Note (Initial Clinic) Consult Note    Date of original referral: 6/21/23  Age: 89 y.o.  Gender: male  Ethnicity: White  Number of admissions for CHF within the preceding year: 4-5   Type of Congestive Heart Failure: Combined, HFmrEF  Etiology: Ischemic and Valvular   Enrolled in Infusion suite: no    Diagnostic Labs:   EKG - 06/20/2023  CXR - 06/18/2023  ECHO - 06/19/2023  Stress test -   Stress echo -   Pharmacologic stress -   Cardiac catheterization - 05/29/2023   Cardiac MRI -     Lab Results   Component Value Date     06/22/2023     06/21/2023    K 4.0 06/22/2023    K 3.7 06/21/2023     06/22/2023     06/21/2023    CO2 16 (L) 06/22/2023    CO2 25 06/21/2023     (H) 06/22/2023    GLU 89 06/21/2023    BUN 75 (H) 06/22/2023    BUN 64 (H) 06/21/2023    CREATININE 2.2 (H) 06/22/2023    CREATININE 2.3 (H) 06/21/2023    CALCIUM 8.5 (L) 06/22/2023    CALCIUM 8.2 (L) 06/21/2023    PROT 6.0 06/21/2023    PROT 5.8 (L) 06/20/2023    ALBUMIN 2.7 (L) 06/21/2023    ALBUMIN 2.6 (L) 06/20/2023    BILITOT 0.9 06/21/2023    BILITOT 0.6 06/20/2023    ALKPHOS 71 06/21/2023    ALKPHOS 76 06/20/2023    AST 11 06/21/2023    AST 12 06/20/2023    ALT 10 06/21/2023    ALT 11 06/20/2023    ANIONGAP 19 (H) 06/22/2023    ANIONGAP 13 06/21/2023    ESTGFRAFRICA 40.7 (A) 05/13/2022    ESTGFRAFRICA 38.0 (A) 04/21/2022    EGFRNONAA 35.2 (A) 05/13/2022    EGFRNONAA 32.9 (A) 04/21/2022       Lab Results   Component Value Date    WBC 12.94 (H) 06/21/2023    WBC 12.36 06/20/2023    RBC 4.85 06/21/2023    RBC 4.59 (L) 06/20/2023    HGB 12.4 (L) 06/21/2023    HGB 12.2 (L) 06/20/2023    HCT 40.4 06/21/2023    HCT 38.5 (L) 06/20/2023    MCV 83 06/21/2023    MCV 84 06/20/2023    MCH 25.6 (L) 06/21/2023    MCH 26.6 (L) 06/20/2023    MCHC 30.7 (L) 06/21/2023    MCHC 31.7 (L) 06/20/2023    RDW 18.6 (H) 06/21/2023    RDW 18.5 (H) 06/20/2023     06/21/2023     (L) 06/20/2023    MPV 12.5 06/21/2023     MPV 11.9 06/20/2023    IMMGR 0.6 (H) 06/21/2023    IMMGR 0.5 06/20/2023    IGABS 0.08 (H) 06/21/2023    IGABS 0.06 (H) 06/20/2023    LYMPH 2.0 06/21/2023    LYMPH 15.8 (L) 06/21/2023    MONO 1.1 (H) 06/21/2023    MONO 8.1 06/21/2023    EOS 0.2 06/21/2023    EOS 0.1 06/20/2023    BASO 0.03 06/21/2023    BASO 0.03 06/20/2023    NRBC 0 06/21/2023    NRBC 0 06/20/2023    GRAN 9.6 (H) 06/21/2023    GRAN 74.1 (H) 06/21/2023    EOSINOPHIL 1.2 06/21/2023    EOSINOPHIL 1.0 06/20/2023    BASOPHIL 0.2 06/21/2023    BASOPHIL 0.2 06/20/2023    PLTEST Appears normal 11/28/2021    PLTEST Appears normal 11/27/2021    ANISO Slight 11/28/2021       Lab Results   Component Value Date    BNP 1,220 (H) 06/18/2023     (H) 05/18/2023    MG 1.9 06/21/2023    MG 1.9 06/20/2023    PHOS 4.5 06/21/2023    PHOS 4.1 06/20/2023    TROPONINI 0.151 (H) 06/20/2023    TROPONINI 0.199 (H) 06/20/2023    HGBA1C 6.6 (H) 05/19/2023    HGBA1C 6.4 (H) 05/01/2023    TSH 1.667 01/31/2022    TSH 1.786 03/04/2020       Lab Results   Component Value Date    IRON 65 11/14/2022    TIBC 309 11/14/2022    FERRITIN 203 11/14/2022    CHOL 194 03/23/2023    TRIG 228 (H) 03/23/2023    HDL 34 (L) 03/23/2023    LDLCALC 114.4 03/23/2023    CHOLHDL 17.5 (L) 03/23/2023    TOTALCHOLEST 5.7 (H) 03/23/2023    NONHDLCHOL 160 03/23/2023    COLORU Straw 06/18/2023    APPEARANCEUA Clear 06/18/2023    PHUR 5.0 06/18/2023    SPECGRAV 1.010 06/18/2023    PROTEINUA Negative 06/18/2023    GLUCUA Negative 06/18/2023    KETONESU Negative 06/18/2023    BILIRUBINUA Negative 06/18/2023    OCCULTUA 1+ (A) 06/18/2023    NITRITE Negative 06/18/2023    LEUKOCYTESUR Trace (A) 06/18/2023       No implanted cardiac devices    Current Outpatient Medications on File Prior to Visit   Medication Sig Dispense Refill    albuterol (PROVENTIL/VENTOLIN HFA) 90 mcg/actuation inhaler INHALE 2 PUFFS INTO THE LUNGS EVERY 6 HOURS AS NEEDED FOR WHEEZE 18 g 1    allopurinoL (ZYLOPRIM) 100 MG tablet Take  2 tablets (200 mg total) by mouth once daily. 60 tablet 2    apixaban (ELIQUIS) 5 mg Tab Take 2 tablets (10 mg total) by mouth 2 (two) times daily for 4 days, THEN 1 tablet (5 mg total) 2 (two) times daily. 196 tablet 0    aspirin 81 MG Chew Take 81 mg by mouth once daily.      carvediloL (COREG) 3.125 MG tablet Take 1 tablet (3.125 mg total) by mouth 2 (two) times daily. 60 tablet 11    colestipoL (COLESTID) 1 gram Tab TAKE 1 TABLET BY MOUTH 2 TIMES DAILY. TAKE OTHER ORAL MEDICATIONS >1H BEFORE OR >4H AFTER COLESTIPOL (Patient taking differently: Take 1 g by mouth 2 (two) times daily.) 180 tablet 1    fluticasone-umeclidin-vilanter (TRELEGY ELLIPTA) 100-62.5-25 mcg DsDv Inhale 1 puff into the lungs once daily. 180 each 3    furosemide (LASIX) 40 MG tablet Take 0.5 tablets (20 mg total) by mouth daily as needed. Take as needed for weight gain/swelling of 4-5 lbs. If inadequate, take again in 6-8 hours.      losartan (COZAAR) 25 MG tablet Take 1 tablet (25 mg total) by mouth once daily. 90 tablet 3    miconazole NITRATE 2 % (MICOTIN) 2 % top powder Apply topically 2 (two) times daily. Apply to groin twice a day x1 week for 7 days 71 g 0    pantoprazole (PROTONIX) 40 MG tablet Take 1 tablet (40 mg total) by mouth once daily. 90 tablet 3    pravastatin (PRAVACHOL) 40 MG tablet Take 1 tablet (40 mg total) by mouth once daily. 90 tablet 3    solifenacin (VESICARE) 5 MG tablet Take 1 tablet (5 mg total) by mouth once daily. 90 tablet 3    tamsulosin (FLOMAX) 0.4 mg Cap Take 1 capsule (0.4 mg total) by mouth once daily. 90 capsule 3    traZODone (DESYREL) 50 MG tablet Take 1 tablet (50 mg total) by mouth nightly as needed for Insomnia. 90 tablet 3     No current facility-administered medications on file prior to visit.         HPI:  Patient denies appreciable SOB with ADL and pace slow. Presents to clinic in wheelchair   Patient sleeps on 1 number of pillows   Patient wakes up SOB, has to get out of bed, associated cough-  denies   Palpitations - denies    Dizzy, light-headed, pre-syncope or syncope- denies   Since discharge frequency of performing weights, home weight and weight change- performing home wieghts. 174-175lbs on home scale. Endorses worsening BLE edema.   Other information felt pertinent to HPI: Mr. 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 On chart review, he was recently discharged from rehab and has been hospitalized 4 times in the last 2 months for ADHF. In ED BNP>2000, Mildly elevated troponin (053>0.060) with no signs of ichemia on EKG and CXR showing small left pleural effusion. He was cautiously diuresed with IVP lasix 40mg daily given severe AS. He was evaluated by [facility] who recommended continuing diuresis without acute valve interventions at this time. He will need additional rehab at home prior to further interventions. He was seen by Dr. Ledezma in clinic on 6/12 for TAVR evaluation, and was deemed to not be a candidate for TAVR in his present physical condition. Dr. Ledezma recommended that he continue rehab, but also commented on consideration of BAV as bridging therapy if patient continued to undergo hospitalization.      PHYSICAL:   Vitals:    06/30/23 1404   BP: (!) 88/50   Pulse: (!) 30      EKG in NSR (74bpm) with frequent PVCs    Wt Readings from Last 3 Encounters:   06/30/23 78.6 kg (173 lb 3.2 oz)   06/21/23 77.1 kg (169 lb 15.6 oz)   06/12/23 83.3 kg (183 lb 10.3 oz)     JVD: at level of clavicle  Heart rhythm: regular with premature beats  Cardiac murmur: Yes - 3/6 systolic murmur across precordium    S3: no  S4: no  Lungs: clear  Hepatojugular reflux: no  Edema: yes, 2+ BLE      Echo 6/19/23:  The left ventricle is normal in size with mildly decreased systolic function. The estimated  ejection fraction is 45%, but there was frequent ectopy throughout the study making assessment challenging.  Normal right ventricular size with normal right ventricular systolic function.  Grade II left ventricular diastolic dysfunction.  Severe left atrial enlargement.  There is severe aortic valve stenosis. Aortic valve area is 0.93 cm2; peak velocity is 4.46 m/s; mean gradient is 50 mmHg. LVOT diameter is 23mm.  Normal central venous pressure (3 mmHg).    ASSESSMENT: HFmrEF    PLAN:      Patient Instructions:   Instruct the patient to notify this clinic if HH, a physician or an advanced care provider wants to change medication one of their HF medications   Activity and Diet restrictions:   Recommend 2-3 gram sodium restriction and 1500cc- 2000cc fluid restriction.  Encourage physical activity with graded exercise program.  Requested patient to weigh themselves daily, and to notify us if their weight increases by more than 3 lbs in 1 day or 5 lbs in 3 days.    Assigned dry weight on home scale: unsure  Medication changes (include current dose and changed dose): NYHA Class III symptoms. Endorses worsening BLE edema. Mild fluid volume on exam. BNP persistently elevated above previous baseline. Increase lasix 20mg daily to 40mg daily with caution to avoid over diuresis given h/o severe AS. BP 80s/50s. Hold losartan 25mg daily for now with close monitoring of BP. Toprol 25mg daily recently switched to coreg 3.125mg BID during recent admission. Provided updated med detail given medication confusion with multiple recent adjustments. Pending BP trend, may need to resume toprol. EKG with HR 74 and frequent PVCs, otherwise asymptomatic.   Upcoming labs and date anticipated: phone check in Monday with cautious diuresis  Other diagnostic tests ordered: Pt and family discussing possible rehab to improve functional status in order to work towards TAVR. Established with  PT/OT. Plan to introduce palliative care next visit for  further GOC discussions given multiple admissions in the past coupe months.    Venus Meza PA-C

## 2023-06-27 NOTE — PROGRESS NOTES
Outpatient Care Management  Plan of Care Follow Up Visit    Patient: Siddharth Urias  MRN: [id number]  Date of Service: 06/27/2023  Completed by: Jennifer Edmondson RN  Referral Date: 06/14/2023    Reason for Visit   Patient presents with    OPCM RN Follow Up Call       Brief Summary: Phone contact made with Mrs. Urias today. We discussed pt's care plan. Collaborated with SW to reach out again. F/u discussed with patient on or around 7/12/23.

## 2023-06-27 NOTE — TELEPHONE ENCOUNTER
Faxed signed client coordination note report to [place] [place] at [phone]

## 2023-06-29 NOTE — TELEPHONE ENCOUNTER
Returned call to [place] at [facility] in regards to needing a  to go over to residents home and help with resources such as transportation , groceries , and getting to appointments. Gave verbal order. Verbalized understanding.

## 2023-06-29 NOTE — TELEPHONE ENCOUNTER
----- Message from Celeste Downs sent at 6/29/2023 12:06 PM CDT -----  Type: Needs Medical Advice  Who Called:  cale villareal [facility]     Best Call Back Number: [phone]  Additional Information: requesting  appt please advise thank you

## 2023-06-30 NOTE — TELEPHONE ENCOUNTER
Call from Pt's son asking if his Father should be taking Bumetanide and Furosemide. Explained NO he should only be taking Furosemide. Asked Pt's son if his Father is taking both Metoprolol and Carvedilol, son states his Father is taking Carvedilol twice a day but no Metoprolol and I explain that is good. Son states that his Father is NOT taking the Losartan because Ms. Meza told him to stop.    4:47 Pt's son calls back to ask if it's OK to take the Trazadone and I ask if ot's on Ms. P[icou's list that was given, Son states yes. I explain that is to help him sleep and if it is on the list Ms. Meza gave him then it's OK to take it

## 2023-06-30 NOTE — TELEPHONE ENCOUNTER
Reason for Disposition   Mild diaper rash    Additional Information   Negative: Doesn't fit the description of diaper rash   Negative: Age < 12 weeks with fever 100.4 F (38.0 C) or higher rectally   Negative: [place] < 4 weeks starts to look or act abnormal in any way   Negative: Bright red skin that peels off in sheets   Negative: Child sounds very sick or weak to the triager   Negative: Large red area with a fever   Negative:  (< 1 month) with tiny water blisters or pimples (like chickenpox) in a cluster   Negative:  (< 1 month) and infection suspected (open sores, yellow crusts)   Negative: Pimples, blisters, open weeping sores, boils, yellow crusts, red streaks   Negative: Rash is very raw or bleeds   Negative: Has spread beyond the diaper area   Negative: Rash is not improved after 3 days of treatment for yeast   Negative: Triager thinks child needs to be seen for non-urgent problem   Negative: Caller wants child seen for non-urgent problem    Protocols used: Diaper Rash-P-OH  Spouse called re pt wears diaper. Pt sitting in urine accident. Pt has a rash in buttock crease. Spouse states she changes pt every time. Washing bottom with warm soapy water. skin not red. Pt anticipating surgery. Rec home care. Offered protocol advice.

## 2023-06-30 NOTE — TELEPHONE ENCOUNTER
----- Message from Chai Tamayo sent at 6/30/2023 10:14 AM CDT -----  Type: Needs Medical Advice  Who Called:  Son/ Kvng Urias    Best Call Back Number: [phone]  Additional Information: Caller states that he would like a callback regarding the patient's appointment today at [facility] and if Dr. Maxwell feels that the appointment is necessary.

## 2023-06-30 NOTE — PATIENT INSTRUCTIONS
HOLD losartan 25mg daily.    Take lasix 40mg (1 tablet daily).    Check blood pressure and weight daily and keep log.    Notify us ([phone]) if you have worsening shortness of breath, weight gain or if no improvement in leg swelling.

## 2023-07-03 NOTE — TELEPHONE ENCOUNTER
"Returned patients call to check on symptoms of "diaper rash". No answer, left voicemail to return call.  "

## 2023-07-03 NOTE — TELEPHONE ENCOUNTER
I spoke with pt's son Kvng. His weight is 170 lbs, reprts swollen ankles and SOB with exertion. Also his HR is 40, O2 sats 90% on O2, no BP this AM. The son doesn't know how many O2 liters. He has already taken his this AM    @1121:  RN spoke to pt's son Kvng, he reports pt BP and HR as follows: @1100: 99/64; 49 @1115: 86/65: P: 69 @1120:91/64 P: 67 @1124 HR:45. PARMINDER notified and recommends pt holding carvedilol for Monday and Tuesday dose. [facility] will contact pt on Wednesday to assess weight, BP and HR to develop plan going forward. PARMINDER contacted EP for closer f/u r/t PVC's on EKG. Pt's FM verbalized understanding. Stated he will remove the carvedilol from pt's pill planner until Wednesday.

## 2023-07-05 NOTE — TELEPHONE ENCOUNTER
RN contacted pt son, Kvng to give PA-C recommendations r/t BP medications and lab appt f/u. RN left message for pt to callback to [facility] clinic.     @1145: Rn spoke with pt's wife  who had a difficult time hearing RN instructions. [facility] medical SW was in the home with pt and took information for spouse.     @1258: RN spoke with pt's son Kvng and gave Pa-C recommendations as above.  Pt's son verbalized understanding of instructions.

## 2023-07-05 NOTE — TELEPHONE ENCOUNTER
Pt wife, Enedina called [facility] for information r/t his EP visit on 7/6/2023. RN educated FM r/t EP lab location and time of appt.  Pt spouse reports that his vitals on 78/5/2023 ate weight 166 lbs, BP: 113/57 P: 48.  On 7/4/2023 vitals were weight 167 lbs, /63. PACourtC notified of pt vitals, waiting for recommendations.

## 2023-07-06 NOTE — TELEPHONE ENCOUNTER
RN spoke with pt's wife Enedina, who has questions r/t taking carvedilol. RN spoke with Sharon who recommends pt stop carvedilol and switch to taking 12.5 mg metoprolol daily for HR control. Pt's wife unable to repeat directions correctly. She did grasp stopping carvedilol. RN called to speak with pt's son, Kvng. FM did not answer, left general message for pt callback.

## 2023-07-06 NOTE — TELEPHONE ENCOUNTER
----- Message from Michael Obrien MA sent at 7/6/2023  2:04 PM CDT -----  Regarding: FW: MEDICATION    ----- Message -----  From: Lisa Gresham  Sent: 7/6/2023   1:59 PM CDT  To: [facility] Staff  Subject: MEDICATION                                       Pls call pt's wife at [phone].  She needs to know if pt should still be taking carvediloL (COREG) 3.125 MG tablet    Thank you
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
## 2023-07-08 PROBLEM — R53.1 GENERALIZED WEAKNESS: Status: ACTIVE | Noted: 2023-01-01

## 2023-07-08 PROBLEM — N39.0 ACUTE UTI: Status: ACTIVE | Noted: 2023-01-01

## 2023-07-08 PROBLEM — G93.41 ACUTE METABOLIC ENCEPHALOPATHY: Status: ACTIVE | Noted: 2023-01-01

## 2023-07-11 NOTE — PROGRESS NOTES
07/11/23 Pt currently admitted at this time. Spoke with his spouse. They will most likely have pt placed in rehab upon discharge. Will continue to follow. ALEENA OPCM

## 2023-07-12 PROBLEM — I50.32 CHRONIC HEART FAILURE WITH PRESERVED EJECTION FRACTION (HFPEF): Status: ACTIVE | Noted: 2023-01-01

## 2023-07-12 PROBLEM — I47.29 NSVT (NONSUSTAINED VENTRICULAR TACHYCARDIA): Status: ACTIVE | Noted: 2023-01-01

## 2023-07-13 PROBLEM — I47.29 NSVT (NONSUSTAINED VENTRICULAR TACHYCARDIA): Status: RESOLVED | Noted: 2023-01-01 | Resolved: 2023-01-01

## 2023-07-13 PROBLEM — E87.5 HYPERKALEMIA: Status: RESOLVED | Noted: 2023-01-01 | Resolved: 2023-01-01

## 2023-07-13 PROBLEM — R53.1 GENERALIZED WEAKNESS: Status: RESOLVED | Noted: 2023-01-01 | Resolved: 2023-01-01

## 2023-07-13 PROBLEM — N39.0 ACUTE UTI: Status: RESOLVED | Noted: 2023-01-01 | Resolved: 2023-01-01

## 2023-07-13 PROBLEM — I82.409 DEEP VEIN THROMBOSIS (DVT) OF LOWER EXTREMITY: Status: RESOLVED | Noted: 2023-01-01 | Resolved: 2023-01-01
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
## 2023-07-14 NOTE — TELEPHONE ENCOUNTER
Pt recently admitted to [facility], pt discharged to SNF. [facility] will disenroll at this time

## 2023-08-02 NOTE — PROGRESS NOTES
08/02/23 Spoke with pt's dtr and spouse. Pt still at [facility] for rehab. There is not current discharge date. Will continue to follow for now. RN OPCM     @9NT@ [address] [place], [place] [id number] [place] [id number]    DISCHARGE SUMMARY     Patient: Benedicto Chew                             Medical Record Number: [id number]                : 1955  Age: 64 y.o. Admit Date: 2017  Discharge Date: 2017  Admission Diagnosis: OSTEOARTHRITIS BILATERAL KNEE  DJD (degenerative joint disease) of knee  Discharge Diagnosis: OSTEOARTHRITIS BILATERAL KNEE  Procedures: Procedure(s):  BILATERAL UNICOMPARTMENTAL KNEE REPLACEMENT  Surgeon: Deion Vera MD  Assistants: Ten Rader PA-C  Anesthesia: spinal  Complications: None     History of Present Illness:  Benedicto Chew is a 64 y.o. male with a history of Bilateral knee pain, swelling, and marked loss of function. Despite conservative management and after clinical and radiographic evaluation, it was determined that he suffered from end-stage osteoarthritis and would benefit from Procedure(s):  BILATERAL UNICOMPARTMENTAL KNEE REPLACEMENT, which he consented to undergo after a discussion of the risks, benefits, alternatives, rehab concerns, and potential complications of surgery. Hospital Course:  Benedicto Chew tolerated the procedure well. He was transferred  to the recovery room in stable condition. After a brief stay the patient was then transferred to the Joint Replacement Unit at [address] [place] [address].  On postoperative day #1, the dressing was clean and dry, he was neurovascularly intact. The patient was afebrile and vital signs were stable. Calves were soft and non-tender bilaterally. On postoperative day  # 1, the patient was tolerating a regular diet and making satisfactory progress with physical therapy.   Hemoglobin and INR prior to discharge were   Lab Results   Component Value Date/Time    HGB 12.0 2017 03:24 AM    INR 1.0 2017 11:33 AM   .  Benedicto Chew made satisfactory progress with physical therapy and was discharged to Home in stable condition on postoperative day 1. He was provided with routine postoperative instructions and advised to follow up in my office in 3 weeks following discharge from the hospital.  He was prescribed aspirin for DVT prophylaxis and oxycodone for post-operative pain. Discharge Medications:  Cannot display discharge medications since this patient is not currently admitted.       Signed by: Ravi Powers MD  2/24/2017

## 2023-08-14 NOTE — PROGRESS NOTES
OUTPATIENT CATHETERIZATION INSTRUCTIONS    You have been scheduled for a procedure in the catheterization lab on Thursday, September 7, 2023.     Please report to the Cardiology Waiting Area on the Third floor of the hospital and check in at 7:30 AM.   You will then be taken to the SSCU (Short Stay Cardiac Unit) and prepared for your procedure. Please be aware that this is not the time of your procedure but the time you are to arrive. The procedures are scheduled on an hourly basis; however, emergency cases take precedence over all other cases.       No solid foods 8 hours prior to your procedure.  You may have clear liquids until the time of your admission which should be 2 hours prior to your procedure.  You are encouraged to drink at least 8 ounces of clear liquids prior to your admission to SSCU.  Patients with gastric emptying issues should be fasting for 6- 8 hours prior to the procedure.  Clear liquids include water, black coffee, clear juices, and performance drinks - no pulp or milk.    Heart failure or dialysis patients will be limited to 8 ounces (1 cup) of clear liquids up until 2 hours of the procedure.    3.   You may take your regular morning medications with water. If there are any medications that you should not take you will be instructed to hold them that morning. If you         are diabetic and on Metformin (Glucophage) do not take it the day before, the day of, and for 2 days after your procedure.  4.   If you are on Pradaxa, Eliquis or Coumadin , you can hold them 3 days prior to your procedure.      The procedure will take 1-2 hours to perform. After the procedure, you will return to SSCU on the third floor of the hospital. You will need to lie still (or keep your arm still) for the next 2 to 4 hours to minimize bleeding from the puncture site.  This time is determined by your physician.  Your family may remain in the room with you during this time.       You may be able to be discharged home  "that same afternoon if there is someone to drive you home and there are no complications.  Your doctor will determine, based on your progress, the date and time of your discharge. The results of your procedure will be discussed with you before you are discharged. Any further testing or procedures will be scheduled for you either before you leave or after your discharge..       If you should have any questions, concerns, or need to change the date of your procedure, please call "ALEENA Willoughby @ [phone]            Special Instructions:    Your last dose of Eliquis will be on Alberto, September 3, 2023. It has to be stopped three days before your procedure.  You will receive instructions from the provider on when you can resume the medication.    Continue your other prescribed medication.    On the morning of your procedure take your Aspirin only.        THE ABOVE INSTRUCTIONS WERE GIVEN TO THE PATIENT VERBALLY AND THEY VERBALIZED UNDERSTANDING.  THEY DO NOT REQUIRE ANY SPECIAL NEEDS AND DO NOT HAVE ANY LEARNING BARRIERS.          Directions for Reporting to Cardiology Waiting Area in the Hospital  If you park in the Parking Garage:  Take elevators to the1st floor of the parking garage.  Continue past the gift shop, coffee shop, and piano.  Take a right and go to the gold elevators. (Elevator B)  Take the elevator to the 3rd floor.  Follow the arrow on the sign on the wall that says Cath Lab Registration/EP Lab Registration.  Follow the long hallway all the way around until you come to a big open area.  This is the registration area.  Check in at Reception Desk.    OR    If family is dropping you off:  Have them drop you off at the front of the Hospital under the green overhang.  Enter through the doors and take a right.  Take the E elevators to the 3rd floor Cardiology Waiting Area.  Check in at the Reception Desk in the waiting room.              "

## 2023-08-14 NOTE — PROGRESS NOTES
Interventional Cardiology Clinic Note  Reason for Visit: TAVR and 3vCAD    HPI:     89M pmhx severe AS, multivessel CAD, HFrEF, Pafib, DVT 5/1/2023, PAD, prostate cancer, NISH, SANDRA, COPD, CKD3-4, DM2, HTN, HLD presenting for evaluation of TAVR evaluation and multivessel CAD.    Echo on 4/24/23 with Severe AS and EF 55%. Underwent LHC on 5/29/23 with Dr. Solis which found with significant L main disease and 3 vessel CAD. Had appointment for this on 6/2023 w/ Dr. Ledezma with plans for repeat RCA angiography with intervention and TAVR after. Patient was in rehab for 1 month and left yesterday. Able to walk 200 feet before short of breath. Denies any chest pain. Sleeps with 2 pillows, and significant edema to bilateral extremities. Has not been taking Lasix    Vitals: 120/56 HR 62  Home Meds: eliquis, aspirin, lasix 40mg, metop xl 25  7/2023 BMP: Cr 1.4; 7/2023 CBC: Hgb 9.2, Plt 254; 3/2023 Lipid panel: [id number]    ROS:    Constitution: Negative for fever, chills, weight loss or gain.   HENT: Negative for sore throat, rhinorrhea, or headache.  Eyes: Negative for blurred or double vision.   Cardiovascular: See above  Pulmonary: Positive for SOB   Gastrointestinal: Negative for abdominal pain, nausea, vomiting, or diarrhea.   : Negative for dysuria.   Neurological: Negative for focal weakness or sensory changes.  PMH:     Past Medical History:   Diagnosis Date    Afib     Arthritis     Choledocholithiasis     Chronic kidney disease     Colon polyp     COPD (chronic obstructive pulmonary disease)     Diabetes mellitus, type 2     2/2011    Diverticulosis     Fatty liver     Hepatomegaly     History of blood clots     2020 Dr. Rodriguez     Irradiation cystitis     Prostate cancer 2009    s/p XRT (T1C, [place] 8)    Radiation proctitis     Sleep apnea      Past Surgical History:   Procedure Laterality Date    ANGIOGRAM, CORONARY, WITH LEFT HEART CATHETERIZATION N/A 05/17/2022    Procedure: Angiogram, Coronary, with Left  Heart Cath;  Surgeon: Kamlesh Sullivan MD;  Location: [facility] CATH/EP LAB;  Service: Cardiology;  Laterality: N/A;    APPENDECTOMY      CARPAL TUNNEL RELEASE Left 02/05/2021    Procedure: RELEASE, CARPAL TUNNEL;  Surgeon: Jayro Hawkins II, MD;  Location: [facility] OR;  Service: Orthopedics;  Laterality: Left;    CARPAL TUNNEL RELEASE Right 3/1/2023    Procedure: RELEASE, CARPAL TUNNEL;  Surgeon: Shaun Leonardo MD;  Location: [facility] OR;  Service: Orthopedics;  Laterality: Right;    CATARACT EXTRACTION Bilateral     COLONOSCOPY N/A 12/14/2018    Procedure: COLONOSCOPY;  Surgeon: Noel Aguiar MD;  Location: [facility] ENDO;  Service: Endoscopy;  Laterality: N/A;    COLONOSCOPY  06/21/2021    Dr. Rodgers, in media: 2 colon polyps removed; diverticulosis, erythema in transverse colon; biopsy: tubular adenomas, ranomd colon unremarkable    COLONOSCOPY N/A 11/4/2022    Procedure: COLONOSCOPY;  Surgeon: Noel Aguiar MD;  Location: [facility] ENDO;  Service: Endoscopy;  Laterality: N/A;    CORONARY ANGIOGRAPHY N/A 5/22/2023    Procedure: ANGIOGRAM, CORONARY ARTERY;  Surgeon: Xavier Solis MD;  Location: [facility] CATH/EP LAB;  Service: Cardiology;  Laterality: N/A;    ERCP N/A 11/23/2021    Procedure: ERCP (ENDOSCOPIC RETROGRADE CHOLANGIOPANCREATOGRAPHY);  Surgeon: Marshall Gardner III, MD;  Location: [facility] ENDO;  Service: Endoscopy;  Laterality: N/A;    ESOPHAGOGASTRODUODENOSCOPY N/A 01/24/2019    Procedure: EGD (ESOPHAGOGASTRODUODENOSCOPY);  Surgeon: Noel Aguiar MD;  Location: [facility] ENDO;  Service: Endoscopy;  Laterality: N/A;    EYE SURGERY      FLEXIBLE SIGMOIDOSCOPY N/A 01/22/2019    Procedure: SIGMOIDOSCOPY, FLEXIBLE;  Surgeon: Noel Aguiar MD;  Location: [facility] ENDO;  Service: Endoscopy;  Laterality: N/A;    HERNIA REPAIR      bilateral inguinal    LAPAROSCOPIC CHOLECYSTECTOMY N/A 11/24/2021    Procedure: CHOLECYSTECTOMY, LAPAROSCOPIC;  Surgeon: Jay Cabrera Jr., MD;  Location: [facility] OR;  Service:  General;  Laterality: N/A;    TONSILLECTOMY       Allergies:     Review of patient's allergies indicates:   Allergen Reactions    No known drug allergies      Medications:     Current Outpatient Medications on File Prior to Visit   Medication Sig Dispense Refill    albuterol (PROVENTIL/VENTOLIN HFA) 90 mcg/actuation inhaler INHALE 2 PUFFS INTO THE LUNGS EVERY 6 HOURS AS NEEDED FOR WHEEZE 18 g 1    apixaban (ELIQUIS) 5 mg Tab Take 2 tablets (10 mg total) by mouth 2 (two) times daily for 4 days, THEN 1 tablet (5 mg total) 2 (two) times daily. 196 tablet 0    aspirin 81 MG Chew Take 81 mg by mouth once daily.      colestipoL (COLESTID) 1 gram Tab TAKE 1 TABLET BY MOUTH 2 TIMES DAILY. TAKE OTHER ORAL MEDICATIONS >1H BEFORE OR >4H AFTER COLESTIPOL (Patient taking differently: Take 1 g by mouth 2 (two) times daily.) 180 tablet 1    fluticasone-umeclidin-vilanter (TRELEGY ELLIPTA) 100-62.5-25 mcg DsDv Inhale 1 puff into the lungs once daily. 180 each 3    furosemide (LASIX) 40 MG tablet Take 1 tablet (40 mg total) by mouth once daily. 30 tablet 11    metoprolol succinate (TOPROL-XL) 25 MG 24 hr tablet Take 0.5 tablets (12.5 mg total) by mouth once daily. 45 tablet 3    miconazole NITRATE 2 % (MICOTIN) 2 % top powder Apply topically 2 (two) times daily. Apply to groin twice a day x1 week for 7 days 71 g 0    pantoprazole (PROTONIX) 40 MG tablet Take 1 tablet (40 mg total) by mouth once daily. 90 tablet 3    solifenacin (VESICARE) 5 MG tablet Take 1 tablet (5 mg total) by mouth once daily. 90 tablet 3    tamsulosin (FLOMAX) 0.4 mg Cap Take 1 capsule (0.4 mg total) by mouth once daily. 90 capsule 3    traZODone (DESYREL) 50 MG tablet Take 1 tablet (50 mg total) by mouth nightly as needed for Insomnia. 90 tablet 3     No current facility-administered medications on file prior to visit.     Social History:     Social History     Tobacco Use    Smoking status: Former     Current packs/day: 0.00     Average packs/day: 1.5  "packs/day for 64.0 years (96.0 ttl pk-yrs)     Types: Cigarettes     Start date: 1944     Quit date: 1/2/2008     Years since quitting: 15.6    Smokeless tobacco: Never   Substance Use Topics    Alcohol use: Yes     Alcohol/week: 1.0 standard drink of alcohol     Types: 1 Glasses of wine per week     Comment: social - at speical events     Family History:     Family History   Problem Relation Age of Onset    Diabetes Mother     Diabetes Brother      Physical Exam:   BP (!) 120/56 (BP Location: Left arm, Patient Position: Sitting, BP Method: Large (Automatic))   Pulse 62   Ht 5' 7" (1.702 m)   Wt 82.2 kg (181 lb 3.5 oz)   SpO2 95%   BMI 28.38 kg/m²    Wt Readings from Last 4 Encounters:   08/14/23 82.2 kg (181 lb 3.5 oz)   07/13/23 76.5 kg (168 lb 10.4 oz)   06/30/23 78.6 kg (173 lb 3.2 oz)   06/21/23 77.1 kg (169 lb 15.6 oz)         Constitutional: No distress, obese, conversant  HEENT: Sclera anicteric, PERRLA, EOMI  Neck: No JVD, no masses, good movement  CV: RRR, S1 and S2 normal, no additional heart sounds or murmurs. 2+ radial pulses, R femoral pulse is poor.  Pulm: Clear to auscultation bilaterally with symmetrical expansion. Chest wall palpated for reproduction of pain symptoms, and no pain was able to be produced on palpation or resistance exercises  GI: Abdomen soft, non-tender, good bowel sounds  Extremities: Both extremities intact and grossly normal, skin is warm, no edema noted  Skin: No ecchymosis, erythema, or ulcers  Psych: AOx3, appropriate affect  Neuro: CNII-XII intact, no focal deficits      Labs:     Lab Results   Component Value Date     07/13/2023    K 3.8 07/13/2023     07/13/2023    CO2 24 07/13/2023    BUN 45 (H) 07/13/2023    BUN 24 (A) 03/05/2018    CREATININE 1.4 07/13/2023    ANIONGAP 11 07/13/2023     Lab Results   Component Value Date    HGBA1C 6.6 (H) 05/19/2023    HGBA1C 6.6 03/05/2018     Lab Results   Component Value Date    BNP 2,026 (H) 07/08/2023    BNP 1,102 " (H) 06/30/2023    BNP 1,220 (H) 06/18/2023    Lab Results   Component Value Date    WBC 11.49 07/13/2023    HGB 9.2 (L) 07/13/2023    HCT 29.8 (L) 07/13/2023     07/13/2023    GRAN 8.3 (H) 07/13/2023    GRAN 72.3 07/13/2023     Lab Results   Component Value Date    CHOL 194 03/23/2023    HDL 34 (L) 03/23/2023    HDL 32 03/05/2018    LDLCALC 114.4 03/23/2023    LDLCALC 106 03/05/2018    TRIG 228 (H) 03/23/2023    TRIG 151 03/05/2018          Imaging:      EF   Date Value Ref Range Status   06/19/2023 45 % Final   04/24/2023 50 % Final   11/21/2021 55 % Final     TTE: 6/2023 with EF drop to 45%, severe AS  Coronary Angiography: 5/2023 with severe LM and 3 vessel disease  Assessment:   89M pmhx severe AS, multivessel CAD, HFrEF, Pafib, DVT 5/1/2023, PAD, prostate cancer, NISH, SANDRA, COPD, CKD3-4, DM2, HTN, HLD presenting for evaluation of TAVR evaluation and multivessel CAD.    Plan:     1. Coronary artery disease of native artery of native heart with stable angina pectoris    2. Aortic stenosis, severe    3. Combined systolic and diastolic heart failure, unspecified HF chronicity      -Will schedule for RCA angiography with possible intervention. Also Iliac IVUS for TAVR workup  - 20mg Lasix daily until procedure. Patient with 2+ edema up the knees.   -TTE: 6/2023 with EF drop to 45%, severe AS. This is reduced from 55% in 4/2023  -Coronary Angiography: 5/2023 cath w/ LM and 3 vessel disease  - 7/2023 BMP: Cr 1.4 (at baseline); 7/2023 CBC: Hgb 9.2, Plt 254; 3/2023 Lipid panel: [id number]  - Anti-platelet Therapy: aspirin, eliquis (hold 3 days before)  - Access: Prep L femoral. R femoral is calficied and unable to be accessed prior.  - Allergies: No shellfish / Iodine contrast allergy  - Pre-Hydration: NS  - Pre-Op Med: Bendaryl 50mg pO   - All patient's questions were answered.  -The risks, benefits and alternatives of the procedure were explained to the patient.   -The risks of coronary angiography include but are  not limited to: bleeding, infection, heart rhythm abnormalities, allergic reactions, kidney injury and potential need for dialysis, stroke and death.   - Should stenting be indicated, the patient has agreed to dual anti-platelet therapy for 1-consecutive year with a drug-eluting stent and a minimum of 1-month with the use of a bare metal stent  - Additionally, pt is aware that non-compliance is likely to result in stent clotting with heart attack, heart failure, and/or death  -The risks of moderate sedation include hypotension, respiratory depression, arrhythmias, bronchospasm, and death.   - Informed consent was obtained and the  patient is agreeable to proceed with the procedure.      Signed:  Cory Diaz M.D.  Cardiology Fellow  [facility]  8/14/2023 1:54 PM    I have seen the patient, reviewed the Fellow's history and physical, assessment and plan. I have personally interviewed and examined the patient and agree with the findings.  89-year-old male who just completed inpatient rehabilitation and can now walk 200 feet with shortness of breath.  He has severe ostial right coronary artery stenosis which we plan angioplasty in the near future and diffusely diseased left coronary system which we plan medical therapy.  Will plan intravascular ultrasound of iliac system at the time due to CKD.  We will also plan balloon aortic valvuloplasty if patient is not a good candidate for TAVR.  PARAS Coles MD     Render Risk Assessment In Note?: no Detail Level: Zone Recommendation Preamble: The following recommendations were made during the visit: buy urine pH paper at pharmacy and start taking marshal johner (bycarb) to get urine pH higher

## 2023-08-16 NOTE — PROGRESS NOTES
Subjective:   Patient ID:  Siddharth Urias is a 89 y.o. male who presents for evaluation of Atrial Fibrillation      89 yoM referred for arrhythmia management. I do not see AF on any ECG in the system however it is on his medical history. He is on apixaban however this appears to be for DVT. He has frequent PVCs and is due for a complex PCI in September with Dr Coles. EF 45%, severe AS SIVAN 0.93.     LHC 5/23:  ·  Significant left main disease and three-vessel coronary artery disease.  ·  Technically challenging angiogram due to brachiocephalic artery tortuosity.     Echo 6/23:  · The left ventricle is normal in size with mildly decreased systolic function. The estimated ejection fraction is 45%, but there was frequent ectopy throughout the study making assessment challenging.  · Normal right ventricular size with normal right ventricular systolic function.  · Grade II left ventricular diastolic dysfunction.  · Severe left atrial enlargement.  · There is severe aortic valve stenosis. Aortic valve area is 0.93 cm2; peak velocity is 4.46 m/s; mean gradient is 50 mmHg. LVOT diameter is 23mm.  · Normal central venous pressure (3 mmHg).    Past Medical History:  No date: Afib  No date: Arthritis  No date: Choledocholithiasis  No date: Chronic kidney disease  No date: Colon polyp  No date: COPD (chronic obstructive pulmonary disease)  No date: Diabetes mellitus, type 2      Comment:  2/2011  No date: Diverticulosis  No date: Fatty liver  No date: Hepatomegaly  No date: History of blood clots      Comment:  2020 Dr. Rodriguez   No date: Irradiation cystitis  2009: Prostate cancer      Comment:  s/p XRT (T1C, [place] 8)  No date: Radiation proctitis  No date: Sleep apnea    Past Surgical History:  05/17/2022: ANGIOGRAM, CORONARY, WITH LEFT HEART CATHETERIZATION; N/A      Comment:  Procedure: Angiogram, Coronary, with Left Heart Cath;                 Surgeon: Kamlesh Sullivan MD;  Location: [facility] CATH/EP                LAB;   Service: Cardiology;  Laterality: N/A;  No date: APPENDECTOMY  02/05/2021: CARPAL TUNNEL RELEASE; Left      Comment:  Procedure: RELEASE, CARPAL TUNNEL;  Surgeon: Jayro Hawkins II, MD;  Location: [facility] OR;  Service: Orthopedics;               Laterality: Left;  3/1/2023: CARPAL TUNNEL RELEASE; Right      Comment:  Procedure: RELEASE, CARPAL TUNNEL;  Surgeon: Shaun Leonardo MD;  Location: [facility] OR;  Service: Orthopedics;                 Laterality: Right;  No date: CATARACT EXTRACTION; Bilateral  12/14/2018: COLONOSCOPY; N/A      Comment:  Procedure: COLONOSCOPY;  Surgeon: Noel Aguiar MD;                Location: [facility] ENDO;  Service: Endoscopy;  Laterality:                N/A;  06/21/2021: COLONOSCOPY      Comment:  Dr. Rodgers, in media: 2 colon polyps removed;                diverticulosis, erythema in transverse colon; biopsy:                tubular adenomas, ranomd colon unremarkable  11/4/2022: COLONOSCOPY; N/A      Comment:  Procedure: COLONOSCOPY;  Surgeon: Noel Aguiar MD;                Location: [facility] ENDO;  Service: Endoscopy;  Laterality:                N/A;  5/22/2023: CORONARY ANGIOGRAPHY; N/A      Comment:  Procedure: ANGIOGRAM, CORONARY ARTERY;  Surgeon:                Xavier Solis MD;  Location: [facility] CATH/EP LAB;               Service: Cardiology;  Laterality: N/A;  11/23/2021: ERCP; N/A      Comment:  Procedure: ERCP (ENDOSCOPIC RETROGRADE                CHOLANGIOPANCREATOGRAPHY);  Surgeon: Marshall Gardner III, MD;  Location: [facility] ENDO;  Service: Endoscopy;                 Laterality: N/A;  01/24/2019: ESOPHAGOGASTRODUODENOSCOPY; N/A      Comment:  Procedure: EGD (ESOPHAGOGASTRODUODENOSCOPY);  Surgeon:                Noel Aguiar MD;  Location: [facility] ENDO;  Service:                Endoscopy;  Laterality: N/A;  No date: EYE SURGERY  01/22/2019: FLEXIBLE SIGMOIDOSCOPY; N/A      Comment:  Procedure: SIGMOIDOSCOPY, FLEXIBLE;   Surgeon: Noel Aguiar MD;  Location: [facility];  Service: Endoscopy;                 Laterality: N/A;  No date: HERNIA REPAIR      Comment:  bilateral inguinal  11/24/2021: LAPAROSCOPIC CHOLECYSTECTOMY; N/A      Comment:  Procedure: CHOLECYSTECTOMY, LAPAROSCOPIC;  Surgeon:                Jay Cabrera Jr., MD;  Location: [facility] OR;                 Service: General;  Laterality: N/A;  No date: TONSILLECTOMY    Social History    Socioeconomic History      Marital status:     Tobacco Use      Smoking status: Former        Packs/day: 0        Years: 1.5 packs/day for 64.0 years (96.0 ttl pk-yrs)        Types: Cigarettes        Start date: 1944        Quit date: 1/2/2008        Years since quitting: 15.6      Smokeless tobacco: Never    Substance and Sexual Activity      Alcohol use: Yes        Alcohol/week: 1.0 standard drink of alcohol        Types: 1 Glasses of wine per week        Comment: social - at speical events      Drug use: No      Sexual activity: Not Currently        Partners: Female    Social Determinants of Health  Financial Resource Strain: Low Risk  (7/9/2023)      Overall Financial Resource Strain (CARDIA)          Difficulty of Paying Living Expenses: Not very hard  Food Insecurity: No Food Insecurity (7/9/2023)      Hunger Vital Sign          Worried About Running Out of Food in the Last Year: Never true          Ran Out of Food in the Last Year: Never true  Transportation Needs: No Transportation Needs (7/9/2023)      PRAPARE - Transportation          Lack of Transportation (Medical): No          Lack of Transportation (Non-Medical): No  Physical Activity: Sufficiently Active (7/9/2023)      Exercise Vital Sign          Days of Exercise per Week: 5 days          Minutes of Exercise per Session: 30 min  Recent Concern: Physical Activity - Inactive (5/11/2023)      Exercise Vital Sign          Days of Exercise per Week: 0 days          Minutes of Exercise per Session: 0  min  Stress: No Stress Concern Present (7/9/2023)      [employer] [place] [employer] - Occupational Stress Questionnaire          Feeling of Stress : Only a little  Social Connections: Moderately Isolated (7/9/2023)      Social Connection and Isolation Panel [NHANES]          Frequency of Communication with Friends and Family: More than three times a week          Frequency of Social Gatherings with Friends and Family: Never          Attends Yazidi Services: Never          Active Member of Clubs or Organizations: No          Attends Club or Organization Meetings: Never          Marital Status:   Housing Stability: Low Risk  (7/9/2023)      Housing Stability Vital Sign          Unable to Pay for Housing in the Last Year: No          Number of Places Lived in the Last Year: 1          Unstable Housing in the Last Year: No    Review of patient's family history indicates:  Problem: Diabetes      Relation: Mother          Age of Onset: (Not Specified)  Problem: Diabetes      Relation: Brother          Age of Onset: (Not Specified)    Current Outpatient Medications:  albuterol (PROVENTIL/VENTOLIN HFA) 90 mcg/actuation inhaler, INHALE 2 PUFFS INTO THE LUNGS EVERY 6 HOURS AS NEEDED FOR WHEEZE, Disp: 18 g, Rfl: 1  apixaban (ELIQUIS) 5 mg Tab, Take 2 tablets (10 mg total) by mouth 2 (two) times daily for 4 days, THEN 1 tablet (5 mg total) 2 (two) times daily., Disp: 196 tablet, Rfl: 0  aspirin 81 MG Chew, Take 81 mg by mouth once daily., Disp: , Rfl:   colestipoL (COLESTID) 1 gram Tab, TAKE 1 TABLET BY MOUTH 2 TIMES DAILY. TAKE OTHER ORAL MEDICATIONS >1H BEFORE OR >4H AFTER COLESTIPOL (Patient taking differently: Take 1 g by mouth 2 (two) times daily.), Disp: 180 tablet, Rfl: 1  fluticasone-umeclidin-vilanter (TRELEGY ELLIPTA) 100-62.5-25 mcg DsDv, Inhale 1 puff into the lungs once daily., Disp: 180 each, Rfl: 3  furosemide (LASIX) 20 MG tablet, Take 1 tablet (20 mg total) by mouth once daily., Disp: 30  tablet, Rfl: 0  metoprolol succinate (TOPROL-XL) 25 MG 24 hr tablet, Take 0.5 tablets (12.5 mg total) by mouth once daily., Disp: 45 tablet, Rfl: 3  miconazole NITRATE 2 % (MICOTIN) 2 % top powder, Apply topically 2 (two) times daily. Apply to groin twice a day x1 week for 7 days, Disp: 71 g, Rfl: 0  pantoprazole (PROTONIX) 40 MG tablet, Take 1 tablet (40 mg total) by mouth once daily., Disp: 90 tablet, Rfl: 3  solifenacin (VESICARE) 5 MG tablet, Take 1 tablet (5 mg total) by mouth once daily., Disp: 90 tablet, Rfl: 3  tamsulosin (FLOMAX) 0.4 mg Cap, Take 1 capsule (0.4 mg total) by mouth once daily., Disp: 90 capsule, Rfl: 3  traZODone (DESYREL) 50 MG tablet, Take 1 tablet (50 mg total) by mouth nightly as needed for Insomnia., Disp: 90 tablet, Rfl: 3    No current facility-administered medications for this visit.            Review of Systems   Constitutional: Negative.   HENT: Negative.     Eyes: Negative.    Cardiovascular:  Positive for palpitations. Negative for chest pain, dyspnea on exertion, leg swelling, near-syncope and syncope.   Respiratory: Negative.  Negative for shortness of breath.    Endocrine: Negative.    Hematologic/Lymphatic: Negative.    Skin: Negative.    Musculoskeletal: Negative.    Gastrointestinal: Negative.    Genitourinary: Negative.    Neurological: Negative.  Negative for dizziness and light-headedness.   Psychiatric/Behavioral: Negative.     Allergic/Immunologic: Negative.        Objective:   Physical Exam  HENT:      Head: Normocephalic and atraumatic.      Mouth/Throat:      Mouth: Mucous membranes are moist.   Eyes:      Extraocular Movements: Extraocular movements intact.      Pupils: Pupils are equal, round, and reactive to light.   Neck:      Vascular: No carotid bruit or JVD.   Cardiovascular:      Rate and Rhythm: Normal rate and regular rhythm.      Pulses: Normal pulses and intact distal pulses.      Heart sounds: Murmur heard.      Systolic murmur is present with a grade of  3/6.      No friction rub. No gallop.   Pulmonary:      Effort: Pulmonary effort is normal.      Breath sounds: Normal breath sounds.   Abdominal:      Tenderness: There is no abdominal tenderness. There is no guarding or rebound.   Musculoskeletal:      Right lower leg: No edema.      Left lower leg: No edema.   Skin:     General: Skin is warm and dry.      Capillary Refill: Capillary refill takes less than 2 seconds.   Neurological:      General: No focal deficit present.      Mental Status: He is alert and oriented to person, place, and time.   Psychiatric:         Mood and Affect: Mood normal.       ECG: NSR with first degree AVB and PVCs    Assessment:      1. PAF (paroxysmal atrial fibrillation)    2. PVC (premature ventricular contraction)    3. Paroxysmal atrial fibrillation        Plan:     89 yoM here for arrhythmia management. He has history of AF but I see no ECGs, holters or other documentation of AF. If he has AF it appears to be paroxysmal. He is on OAC for DVT/PE history. No need for change in therapy. RTC as needed

## 2023-08-18 NOTE — TELEPHONE ENCOUNTER
Returned wife's call in regards to appointment concern. Wife stated they cancelled upcoming appointment due to transportation. But will reschedule after Mr Xiong's upcoming surgery.  Asked that Dr Maxwell is made aware.  Verbalized understanding.

## 2023-08-18 NOTE — TELEPHONE ENCOUNTER
----- Message from Lianna Hyman sent at 8/18/2023  8:41 AM CDT -----  Regarding: Needs Medical Advice  Contact: patient's wife at [phone]  Type: Needs Medical Advice  Who Called:  patient's wife at [phone]    Additional Information: patient needed to cancel appointment because of transportation. She would like a call back to discuss rescheduling. Please call and advise. Thank you

## 2023-08-23 ENCOUNTER — OUTPATIENT CASE MANAGEMENT (OUTPATIENT)
Dept: ADMINISTRATIVE | Facility: OTHER | Age: 88
End: 2023-08-23
Payer: MEDICARE

## 2023-08-29 ENCOUNTER — EXTERNAL HOME HEALTH (OUTPATIENT)
Dept: HOME HEALTH SERVICES | Facility: HOSPITAL | Age: 88
End: 2023-08-29
Payer: MEDICARE

## 2023-09-08 ENCOUNTER — DOCUMENT SCAN (OUTPATIENT)
Dept: HOME HEALTH SERVICES | Facility: HOSPITAL | Age: 88
End: 2023-09-08
Payer: MEDICARE

## (undated) DEVICE — ALCOHOL 70% ISOP RUBBING 4OZ

## (undated) DEVICE — TROCAR ENDOPATH XCEL BLADELESS B5LT

## (undated) DEVICE — UNDERGLOVES BIOGEL PI SIZE 8.5

## (undated) DEVICE — Device

## (undated) DEVICE — BLADE SURG #15 CARBON STEEL

## (undated) DEVICE — SUCTION/IRRIGATOR W/TIP

## (undated) DEVICE — PAD BOVIE ADULT

## (undated) DEVICE — BANDAGE ACE STERILE 4 REB3114

## (undated) DEVICE — CATHETER DIAGNOSTIC DXTERITY 5FR JR4.0

## (undated) DEVICE — SLEEVE TROCAR ENDOPATH XCEL

## (undated) DEVICE — UNDERGLOVE BIOGEL MICRO BLUE SZ 8.5

## (undated) DEVICE — HEMOSTAT SURGICEL 4X8

## (undated) DEVICE — GUIDEWIRE WHOLEY 260CMX0.035IN

## (undated) DEVICE — NDL BOX COUNTER

## (undated) DEVICE — APPLIER CLIP  5MM

## (undated) DEVICE — SEE MEDLINE ITEM 157173

## (undated) DEVICE — DRESSING ADAPTIC 3X3 6112

## (undated) DEVICE — GLOVE SURG ULTRA TOUCH 7

## (undated) DEVICE — SYR EAR ULCER SGL USE 3 OZ

## (undated) DEVICE — SPONGE GAUZE 16PLY 4X4

## (undated) DEVICE — CONTAINER SPECIMEN STRL 4OZ

## (undated) DEVICE — SPONGE SUPER KERLIX 6X6.75IN

## (undated) DEVICE — PACK BASIC

## (undated) DEVICE — PADDING CAST SPECIALIST 3X4YD

## (undated) DEVICE — CUFF TOURNIQUET 18DUAL PRT 5921-218-235

## (undated) DEVICE — DRESSING XEROFORM FOIL PK 1X8

## (undated) DEVICE — GLOVE BIOGEL PI  GOLD SZ 7

## (undated) DEVICE — DRAPE STERI-DRAPE 1000 17X11IN

## (undated) DEVICE — SOL 9P NACL IRR PIC IL

## (undated) DEVICE — SLEEVE SCD EXPRESS KNEE MEDIUM

## (undated) DEVICE — TROCAR BLADED ZTHREAD 11MM X 100MM CTB33

## (undated) DEVICE — TR BAND

## (undated) DEVICE — SLING ORTHOPEDIC LARGE

## (undated) DEVICE — SEE MEDLINE ITEM 157117

## (undated) DEVICE — APPLICATOR CHLORAPREP ORN 26ML

## (undated) DEVICE — SPONGE KERLIX SUPER   NON25854

## (undated) DEVICE — SOLUTION IRRI H2O BOTTLE 1000ML

## (undated) DEVICE — UNDERGLOVES BIOGEL PI SZ 7 LF

## (undated) DEVICE — GLOVE SURG ULTRA TOUCH 8.5

## (undated) DEVICE — GUIDEWIRE DOUBLE ENDED .035 DIA. 150CML

## (undated) DEVICE — SYR 10CC LUER LOCK

## (undated) DEVICE — GLOVE BIOGEL PI GOLD SZ  8.5

## (undated) DEVICE — SUTURE ETHILON 3-0 PS-1 18 1663H

## (undated) DEVICE — GLOVE SURG ULTRA TOUCH 8

## (undated) DEVICE — SEE MEDLINE ITEM 157131

## (undated) DEVICE — SOLUTION IRRI NS BOTTLE 1000ML R5200-01

## (undated) DEVICE — COVER SURG LIGHT HANDLE

## (undated) DEVICE — STRAP OR TABLE 5IN X 72IN

## (undated) DEVICE — SCISSORS 5MM APPLIED MEDICAL   CB030

## (undated) DEVICE — SEE MEDLINE ITEM 152622

## (undated) DEVICE — DRAPE PLASTIC U 60X72

## (undated) DEVICE — SKINMARKER W/RULER DEVON

## (undated) DEVICE — DERMABOND HVD MINI  DHVM12

## (undated) DEVICE — NDL SAFETY 25G X 1.5 ECLIPSE

## (undated) DEVICE — PADDING CAST 3 STERILE 30-320

## (undated) DEVICE — TOURNIQUET SB QC DP 18X4IN

## (undated) DEVICE — BANDAGE ELASTIC 3X5 VELCRO ST

## (undated) DEVICE — SHEATH INTRODUCER SLENDER 6FX10CM

## (undated) DEVICE — GUIDEWIRE J TIP EXCHANGE FIXED CORE 260

## (undated) DEVICE — PAD CAST SPECIALIST STRL 3

## (undated) DEVICE — TRAY UPPER EXTREMITY

## (undated) DEVICE — CABLE MONOPOLAR 10FT DISPOSABLE

## (undated) DEVICE — ADHESIVE DERMABOND SKIN DNX12

## (undated) DEVICE — RETRIEVER ENDOPOUCH SPEC BAG

## (undated) DEVICE — SUT ETHILON 3-0 PS2 18 BLK

## (undated) DEVICE — SHEATH PINNACLE 5FRX10CM W/GUIDEWIRE

## (undated) DEVICE — CATHETER GUIDE LAUNCHER EBU3.5 5FX100CM

## (undated) DEVICE — BANDAGE ESMARK LATEX FREE 4INX

## (undated) DEVICE — CATHETER DIAGNOSTIC DXTERITY 5FR JL3.5

## (undated) DEVICE — BORDER MEPILEX SILICONE 9.2X9.2IN 282455

## (undated) DEVICE — SUTURE VICRYL #0 27 UR-6 VCP603H

## (undated) DEVICE — CATHETER DIAGNOSTIC DXTERITY 5FR NTR

## (undated) DEVICE — SYRINGE HYPODERMC LL 22G 1.5 ECLIPSE 30

## (undated) DEVICE — SEE MEDLINE ITEM 152487

## (undated) DEVICE — WIRE GUIDE WHOLEY MOD J .035

## (undated) DEVICE — SYS LABEL CORRECT MED

## (undated) DEVICE — SEE MEDLINE ITEM 157118

## (undated) DEVICE — NEEDLE INSUFFLATION 120MM 172015

## (undated) DEVICE — SUTURE MONOCRYL 4-0 PS-2 27 MCP426H

## (undated) DEVICE — ELECTRODE REM PLYHSV RETURN 9

## (undated) DEVICE — TRAY GENERAL LAPAROSCOPY